# Patient Record
Sex: FEMALE | Race: WHITE | NOT HISPANIC OR LATINO
[De-identification: names, ages, dates, MRNs, and addresses within clinical notes are randomized per-mention and may not be internally consistent; named-entity substitution may affect disease eponyms.]

---

## 2016-06-06 RX ORDER — METOPROLOL TARTRATE 50 MG
2 TABLET ORAL
Qty: 60 | Refills: 0 | COMMUNITY
Start: 2016-06-06 | End: 2016-07-06

## 2017-01-03 ENCOUNTER — APPOINTMENT (OUTPATIENT)
Dept: ENDOCRINOLOGY | Facility: CLINIC | Age: 72
End: 2017-01-03

## 2017-01-04 ENCOUNTER — TRANSCRIPTION ENCOUNTER (OUTPATIENT)
Age: 72
End: 2017-01-04

## 2017-01-04 ENCOUNTER — OTHER (OUTPATIENT)
Age: 72
End: 2017-01-04

## 2017-01-11 ENCOUNTER — OTHER (OUTPATIENT)
Age: 72
End: 2017-01-11

## 2017-01-18 ENCOUNTER — OTHER (OUTPATIENT)
Age: 72
End: 2017-01-18

## 2017-01-24 ENCOUNTER — OTHER (OUTPATIENT)
Age: 72
End: 2017-01-24

## 2017-01-25 ENCOUNTER — OTHER (OUTPATIENT)
Age: 72
End: 2017-01-25

## 2017-01-25 ENCOUNTER — APPOINTMENT (OUTPATIENT)
Dept: CARDIOLOGY | Facility: CLINIC | Age: 72
End: 2017-01-25

## 2017-01-25 ENCOUNTER — NON-APPOINTMENT (OUTPATIENT)
Age: 72
End: 2017-01-25

## 2017-01-25 VITALS
SYSTOLIC BLOOD PRESSURE: 116 MMHG | OXYGEN SATURATION: 93 % | HEART RATE: 104 BPM | HEIGHT: 63 IN | BODY MASS INDEX: 24.98 KG/M2 | DIASTOLIC BLOOD PRESSURE: 60 MMHG | RESPIRATION RATE: 16 BRPM | WEIGHT: 141 LBS

## 2017-01-27 ENCOUNTER — MEDICATION RENEWAL (OUTPATIENT)
Age: 72
End: 2017-01-27

## 2017-02-01 LAB
ALBUMIN SERPL ELPH-MCNC: 3.6 G/DL
ALP BLD-CCNC: 60 U/L
ALT SERPL-CCNC: 5 U/L
ANION GAP SERPL CALC-SCNC: 13 MMOL/L
AST SERPL-CCNC: 13 U/L
BILIRUB SERPL-MCNC: 0.6 MG/DL
BUN SERPL-MCNC: 34 MG/DL
CALCIUM SERPL-MCNC: 9.4 MG/DL
CHLORIDE SERPL-SCNC: 100 MMOL/L
CO2 SERPL-SCNC: 28 MMOL/L
CREAT SERPL-MCNC: 1.75 MG/DL
GLUCOSE SERPL-MCNC: 96 MG/DL
HBA1C MFR BLD HPLC: 6 %
POTASSIUM SERPL-SCNC: 4.2 MMOL/L
PROT SERPL-MCNC: 6.3 G/DL
SODIUM SERPL-SCNC: 141 MMOL/L
TSH SERPL-ACNC: 0.23 UIU/ML

## 2017-02-07 ENCOUNTER — OTHER (OUTPATIENT)
Age: 72
End: 2017-02-07

## 2017-02-24 ENCOUNTER — OTHER (OUTPATIENT)
Age: 72
End: 2017-02-24

## 2017-03-06 ENCOUNTER — OTHER (OUTPATIENT)
Age: 72
End: 2017-03-06

## 2017-03-21 ENCOUNTER — OTHER (OUTPATIENT)
Age: 72
End: 2017-03-21

## 2017-03-31 ENCOUNTER — OTHER (OUTPATIENT)
Age: 72
End: 2017-03-31

## 2017-04-10 ENCOUNTER — OTHER (OUTPATIENT)
Age: 72
End: 2017-04-10

## 2017-04-21 ENCOUNTER — INPATIENT (INPATIENT)
Facility: HOSPITAL | Age: 72
LOS: 20 days | Discharge: HOME | DRG: 286 | End: 2017-05-12
Attending: INTERNAL MEDICINE | Admitting: INTERNAL MEDICINE
Payer: COMMERCIAL

## 2017-04-21 VITALS
SYSTOLIC BLOOD PRESSURE: 150 MMHG | DIASTOLIC BLOOD PRESSURE: 71 MMHG | HEART RATE: 68 BPM | RESPIRATION RATE: 20 BRPM | TEMPERATURE: 98 F | OXYGEN SATURATION: 99 %

## 2017-04-21 LAB
ALBUMIN SERPL ELPH-MCNC: 4.1 G/DL — SIGNIFICANT CHANGE UP (ref 3.3–5)
ALP SERPL-CCNC: 61 U/L — SIGNIFICANT CHANGE UP (ref 40–120)
ALT FLD-CCNC: 7 U/L RC — LOW (ref 10–45)
ANION GAP SERPL CALC-SCNC: 16 MMOL/L — SIGNIFICANT CHANGE UP (ref 5–17)
APTT BLD: 27.4 SEC — LOW (ref 27.5–37.4)
AST SERPL-CCNC: 16 U/L — SIGNIFICANT CHANGE UP (ref 10–40)
BASOPHILS # BLD AUTO: 0 K/UL — SIGNIFICANT CHANGE UP (ref 0–0.2)
BASOPHILS NFR BLD AUTO: 0.2 % — SIGNIFICANT CHANGE UP (ref 0–2)
BILIRUB SERPL-MCNC: 0.8 MG/DL — SIGNIFICANT CHANGE UP (ref 0.2–1.2)
BUN SERPL-MCNC: 50 MG/DL — HIGH (ref 7–23)
CALCIUM SERPL-MCNC: 9.5 MG/DL — SIGNIFICANT CHANGE UP (ref 8.4–10.5)
CHLORIDE SERPL-SCNC: 99 MMOL/L — SIGNIFICANT CHANGE UP (ref 96–108)
CO2 SERPL-SCNC: 28 MMOL/L — SIGNIFICANT CHANGE UP (ref 22–31)
CREAT SERPL-MCNC: 1.47 MG/DL — HIGH (ref 0.5–1.3)
EOSINOPHIL # BLD AUTO: 0 K/UL — SIGNIFICANT CHANGE UP (ref 0–0.5)
EOSINOPHIL NFR BLD AUTO: 0.3 % — SIGNIFICANT CHANGE UP (ref 0–6)
GLUCOSE SERPL-MCNC: 109 MG/DL — HIGH (ref 70–99)
HCT VFR BLD CALC: 35 % — SIGNIFICANT CHANGE UP (ref 34.5–45)
HGB BLD-MCNC: 11.3 G/DL — LOW (ref 11.5–15.5)
INR BLD: 1.14 RATIO — SIGNIFICANT CHANGE UP (ref 0.88–1.16)
LYMPHOCYTES # BLD AUTO: 0.3 K/UL — LOW (ref 1–3.3)
LYMPHOCYTES # BLD AUTO: 4.4 % — LOW (ref 13–44)
MCHC RBC-ENTMCNC: 27 PG — SIGNIFICANT CHANGE UP (ref 27–34)
MCHC RBC-ENTMCNC: 32.2 GM/DL — SIGNIFICANT CHANGE UP (ref 32–36)
MCV RBC AUTO: 83.9 FL — SIGNIFICANT CHANGE UP (ref 80–100)
MONOCYTES # BLD AUTO: 0.5 K/UL — SIGNIFICANT CHANGE UP (ref 0–0.9)
MONOCYTES NFR BLD AUTO: 6.1 % — SIGNIFICANT CHANGE UP (ref 2–14)
NEUTROPHILS # BLD AUTO: 6.8 K/UL — SIGNIFICANT CHANGE UP (ref 1.8–7.4)
NEUTROPHILS NFR BLD AUTO: 89 % — HIGH (ref 43–77)
PLATELET # BLD AUTO: 161 K/UL — SIGNIFICANT CHANGE UP (ref 150–400)
POTASSIUM SERPL-MCNC: 3.9 MMOL/L — SIGNIFICANT CHANGE UP (ref 3.5–5.3)
POTASSIUM SERPL-SCNC: 3.9 MMOL/L — SIGNIFICANT CHANGE UP (ref 3.5–5.3)
PROT SERPL-MCNC: 6.7 G/DL — SIGNIFICANT CHANGE UP (ref 6–8.3)
PROTHROM AB SERPL-ACNC: 12.3 SEC — SIGNIFICANT CHANGE UP (ref 9.8–12.7)
RBC # BLD: 4.17 M/UL — SIGNIFICANT CHANGE UP (ref 3.8–5.2)
RBC # FLD: 19 % — HIGH (ref 10.3–14.5)
SODIUM SERPL-SCNC: 143 MMOL/L — SIGNIFICANT CHANGE UP (ref 135–145)
TROPONIN T SERPL-MCNC: 0.02 NG/ML — SIGNIFICANT CHANGE UP (ref 0–0.06)
WBC # BLD: 7.6 K/UL — SIGNIFICANT CHANGE UP (ref 3.8–10.5)
WBC # FLD AUTO: 7.6 K/UL — SIGNIFICANT CHANGE UP (ref 3.8–10.5)

## 2017-04-21 PROCEDURE — 99285 EMERGENCY DEPT VISIT HI MDM: CPT | Mod: 25

## 2017-04-21 PROCEDURE — 71010: CPT | Mod: 26

## 2017-04-21 PROCEDURE — 93010 ELECTROCARDIOGRAM REPORT: CPT

## 2017-04-21 RX ORDER — ALBUTEROL 90 UG/1
2.5 AEROSOL, METERED ORAL ONCE
Qty: 0 | Refills: 0 | Status: COMPLETED | OUTPATIENT
Start: 2017-04-21 | End: 2017-04-21

## 2017-04-21 RX ORDER — FUROSEMIDE 40 MG
40 TABLET ORAL ONCE
Qty: 0 | Refills: 0 | Status: COMPLETED | OUTPATIENT
Start: 2017-04-21 | End: 2017-04-21

## 2017-04-21 RX ORDER — SODIUM CHLORIDE 9 MG/ML
3 INJECTION INTRAMUSCULAR; INTRAVENOUS; SUBCUTANEOUS ONCE
Qty: 0 | Refills: 0 | Status: COMPLETED | OUTPATIENT
Start: 2017-04-21 | End: 2017-04-21

## 2017-04-21 RX ADMIN — Medication 40 MILLIGRAM(S): at 22:25

## 2017-04-21 RX ADMIN — SODIUM CHLORIDE 3 MILLILITER(S): 9 INJECTION INTRAMUSCULAR; INTRAVENOUS; SUBCUTANEOUS at 22:31

## 2017-04-21 RX ADMIN — ALBUTEROL 2.5 MILLIGRAM(S): 90 AEROSOL, METERED ORAL at 22:25

## 2017-04-21 NOTE — ED PROVIDER NOTE - PROGRESS NOTE DETAILS
Called Dr. Pierce Cobb Pt feels improved but desat when nrb change to nasal o2 refused Bipap. Risks understood  Lonnie Gerard MD, Facep Paged Dr. Pierce Cobb

## 2017-04-21 NOTE — ED ADULT NURSE NOTE - OBJECTIVE STATEMENT
70 y/o female presented to the ED c/o worsening SOB x 1 week. Pt utilizes O2 at home had to increase to 5Liters. Pt arrived to ED O2sat in 70's. Pt has hx of left carcinoid tumor. Pt denies fever, CP, N/V. Pt is Aa&Ox3, PERRL, lungs CTA B/L, abd. soft, NT, ND, skin W/D/I, MAEx4.

## 2017-04-21 NOTE — ED PROVIDER NOTE - OBJECTIVE STATEMENT
71 year old male patient with pmhx of HTN, HLD, T2DM, lung cancer s/p JUDY lobectomy, copd, pulmonary HTN, CAD s/p stents, Afib, CKD presents to the ED c/o intermittent shortness of breath x1 week with nonproductive cough. Patient is on O2 at home and was using up to 5L today. Was desating into the 80s at home.  Denies fever and chest pain. Former smoker.  Pulmonology: Dr. Lopez  PMD: Dr. Du Méndez

## 2017-04-21 NOTE — ED PROVIDER NOTE - DETAILS:
History and Physical performed by me with scribe under my direct supervision.  ITALO Gerard MD FACEP

## 2017-04-21 NOTE — ED PROVIDER NOTE - NS ED MD SCRIBE ATTENDING SCRIBE SECTIONS
VITAL SIGNS( Pullset)/REVIEW OF SYSTEMS/DISPOSITION/HIV/PAST MEDICAL/SURGICAL/SOCIAL HISTORY/HISTORY OF PRESENT ILLNESS/PHYSICAL EXAM

## 2017-04-21 NOTE — ED ADULT NURSE NOTE - PMH
Afib    Arthritis of Knee    Asthma    CAD S/P percutaneous coronary angioplasty    Cardiomegaly    Constipation    COPD (chronic obstructive pulmonary disease)  Home O2  Diabetes mellitus  type 2  dx about 4-5 years ago   no daily fingerstick  Enlarged lymph nodes    Epistaxis    Female stress incontinence    GERD (gastroesophageal reflux disease)    GIB (gastrointestinal bleeding)    H/O heartburn    H/O: HTN (hypertension)    History of lung cancer    Hx of hyperlipidemia    Hyperthyroidism    Loose, teeth  3 bottom front loose teeth  Obese    Obesity    JOSE (obstructive sleep apnea)    Pulmonary HTN    Shoulder pain, right    Sleep Apnea  by criteria  Valvular heart disease

## 2017-04-21 NOTE — ED PROVIDER NOTE - MEDICAL DECISION MAKING DETAILS
Elderly female with history of COPD, afib comes in with shortness of breath. Concern for ACS, CHF, COPD exacerbation. Treat with high flow oxygen, duonebs, and diuretics. Probable admission.

## 2017-04-21 NOTE — ED ADULT NURSE REASSESSMENT NOTE - NS ED NURSE REASSESS COMMENT FT1
Pt refused RVP test, MD aware. Pt stable, no distress noted, family at bedside, commode provided, will continue to monitor.

## 2017-04-22 DIAGNOSIS — R06.09 OTHER FORMS OF DYSPNEA: ICD-10-CM

## 2017-04-22 DIAGNOSIS — E05.90 THYROTOXICOSIS, UNSPECIFIED WITHOUT THYROTOXIC CRISIS OR STORM: ICD-10-CM

## 2017-04-22 DIAGNOSIS — I50.9 HEART FAILURE, UNSPECIFIED: ICD-10-CM

## 2017-04-22 DIAGNOSIS — R04.0 EPISTAXIS: ICD-10-CM

## 2017-04-22 DIAGNOSIS — I48.91 UNSPECIFIED ATRIAL FIBRILLATION: ICD-10-CM

## 2017-04-22 DIAGNOSIS — E11.9 TYPE 2 DIABETES MELLITUS WITHOUT COMPLICATIONS: ICD-10-CM

## 2017-04-22 DIAGNOSIS — J44.9 CHRONIC OBSTRUCTIVE PULMONARY DISEASE, UNSPECIFIED: ICD-10-CM

## 2017-04-22 LAB
ANION GAP SERPL CALC-SCNC: 12 MMOL/L — SIGNIFICANT CHANGE UP (ref 5–17)
BUN SERPL-MCNC: 47 MG/DL — HIGH (ref 7–23)
CALCIUM SERPL-MCNC: 9.8 MG/DL — SIGNIFICANT CHANGE UP (ref 8.4–10.5)
CHLORIDE SERPL-SCNC: 102 MMOL/L — SIGNIFICANT CHANGE UP (ref 96–108)
CO2 SERPL-SCNC: 33 MMOL/L — HIGH (ref 22–31)
CREAT SERPL-MCNC: 1.55 MG/DL — HIGH (ref 0.5–1.3)
GLUCOSE SERPL-MCNC: 108 MG/DL — HIGH (ref 70–99)
HCT VFR BLD CALC: 33.8 % — LOW (ref 34.5–45)
HGB BLD-MCNC: 10.8 G/DL — LOW (ref 11.5–15.5)
MCHC RBC-ENTMCNC: 26.7 PG — LOW (ref 27–34)
MCHC RBC-ENTMCNC: 31.9 GM/DL — LOW (ref 32–36)
MCV RBC AUTO: 83.6 FL — SIGNIFICANT CHANGE UP (ref 80–100)
PLATELET # BLD AUTO: 171 K/UL — SIGNIFICANT CHANGE UP (ref 150–400)
POTASSIUM SERPL-MCNC: 4.6 MMOL/L — SIGNIFICANT CHANGE UP (ref 3.5–5.3)
POTASSIUM SERPL-SCNC: 4.6 MMOL/L — SIGNIFICANT CHANGE UP (ref 3.5–5.3)
RBC # BLD: 4.05 M/UL — SIGNIFICANT CHANGE UP (ref 3.8–5.2)
RBC # FLD: 18.7 % — HIGH (ref 10.3–14.5)
SODIUM SERPL-SCNC: 147 MMOL/L — HIGH (ref 135–145)
TROPONIN T SERPL-MCNC: 0.03 NG/ML — SIGNIFICANT CHANGE UP (ref 0–0.06)
WBC # BLD: 8 K/UL — SIGNIFICANT CHANGE UP (ref 3.8–10.5)
WBC # FLD AUTO: 8 K/UL — SIGNIFICANT CHANGE UP (ref 3.8–10.5)

## 2017-04-22 PROCEDURE — 99223 1ST HOSP IP/OBS HIGH 75: CPT

## 2017-04-22 RX ORDER — SODIUM CHLORIDE 9 MG/ML
1000 INJECTION, SOLUTION INTRAVENOUS
Qty: 0 | Refills: 0 | Status: DISCONTINUED | OUTPATIENT
Start: 2017-04-22 | End: 2017-05-12

## 2017-04-22 RX ORDER — DILTIAZEM HCL 120 MG
240 CAPSULE, EXT RELEASE 24 HR ORAL DAILY
Qty: 0 | Refills: 0 | Status: DISCONTINUED | OUTPATIENT
Start: 2017-04-22 | End: 2017-04-27

## 2017-04-22 RX ORDER — CLOPIDOGREL BISULFATE 75 MG/1
75 TABLET, FILM COATED ORAL DAILY
Qty: 0 | Refills: 0 | Status: DISCONTINUED | OUTPATIENT
Start: 2017-04-22 | End: 2017-05-12

## 2017-04-22 RX ORDER — DIGOXIN 250 MCG
0.12 TABLET ORAL
Qty: 0 | Refills: 0 | Status: DISCONTINUED | OUTPATIENT
Start: 2017-04-22 | End: 2017-04-27

## 2017-04-22 RX ORDER — INSULIN LISPRO 100/ML
VIAL (ML) SUBCUTANEOUS
Qty: 0 | Refills: 0 | Status: DISCONTINUED | OUTPATIENT
Start: 2017-04-22 | End: 2017-05-12

## 2017-04-22 RX ORDER — POTASSIUM CHLORIDE 20 MEQ
10 PACKET (EA) ORAL
Qty: 0 | Refills: 0 | Status: DISCONTINUED | OUTPATIENT
Start: 2017-04-22 | End: 2017-04-26

## 2017-04-22 RX ORDER — FUROSEMIDE 40 MG
40 TABLET ORAL ONCE
Qty: 0 | Refills: 0 | Status: COMPLETED | OUTPATIENT
Start: 2017-04-22 | End: 2017-04-22

## 2017-04-22 RX ORDER — IPRATROPIUM BROMIDE 0.2 MG/ML
500 SOLUTION, NON-ORAL INHALATION EVERY 6 HOURS
Qty: 0 | Refills: 0 | Status: DISCONTINUED | OUTPATIENT
Start: 2017-04-22 | End: 2017-05-12

## 2017-04-22 RX ORDER — GLUCAGON INJECTION, SOLUTION 0.5 MG/.1ML
1 INJECTION, SOLUTION SUBCUTANEOUS ONCE
Qty: 0 | Refills: 0 | Status: DISCONTINUED | OUTPATIENT
Start: 2017-04-22 | End: 2017-05-12

## 2017-04-22 RX ORDER — HEPARIN SODIUM 5000 [USP'U]/ML
5000 INJECTION INTRAVENOUS; SUBCUTANEOUS EVERY 12 HOURS
Qty: 0 | Refills: 0 | Status: DISCONTINUED | OUTPATIENT
Start: 2017-04-22 | End: 2017-05-12

## 2017-04-22 RX ORDER — DEXTROSE 50 % IN WATER 50 %
25 SYRINGE (ML) INTRAVENOUS ONCE
Qty: 0 | Refills: 0 | Status: DISCONTINUED | OUTPATIENT
Start: 2017-04-22 | End: 2017-05-12

## 2017-04-22 RX ORDER — ACETAMINOPHEN 500 MG
650 TABLET ORAL EVERY 6 HOURS
Qty: 0 | Refills: 0 | Status: DISCONTINUED | OUTPATIENT
Start: 2017-04-22 | End: 2017-05-12

## 2017-04-22 RX ORDER — FUROSEMIDE 40 MG
40 TABLET ORAL
Qty: 0 | Refills: 0 | Status: DISCONTINUED | OUTPATIENT
Start: 2017-04-23 | End: 2017-04-27

## 2017-04-22 RX ORDER — LEVALBUTEROL 1.25 MG/.5ML
0.63 SOLUTION, CONCENTRATE RESPIRATORY (INHALATION) EVERY 6 HOURS
Qty: 0 | Refills: 0 | Status: DISCONTINUED | OUTPATIENT
Start: 2017-04-22 | End: 2017-05-03

## 2017-04-22 RX ORDER — METOPROLOL TARTRATE 50 MG
50 TABLET ORAL THREE TIMES A DAY
Qty: 0 | Refills: 0 | Status: DISCONTINUED | OUTPATIENT
Start: 2017-04-22 | End: 2017-04-26

## 2017-04-22 RX ORDER — SODIUM CHLORIDE 0.65 %
1 AEROSOL, SPRAY (ML) NASAL
Qty: 0 | Refills: 0 | Status: DISCONTINUED | OUTPATIENT
Start: 2017-04-22 | End: 2017-04-26

## 2017-04-22 RX ORDER — DOCUSATE SODIUM 100 MG
100 CAPSULE ORAL
Qty: 0 | Refills: 0 | Status: DISCONTINUED | OUTPATIENT
Start: 2017-04-22 | End: 2017-05-12

## 2017-04-22 RX ORDER — ATORVASTATIN CALCIUM 80 MG/1
20 TABLET, FILM COATED ORAL AT BEDTIME
Qty: 0 | Refills: 0 | Status: DISCONTINUED | OUTPATIENT
Start: 2017-04-22 | End: 2017-05-12

## 2017-04-22 RX ORDER — LORATADINE 10 MG/1
10 TABLET ORAL DAILY
Qty: 0 | Refills: 0 | Status: DISCONTINUED | OUTPATIENT
Start: 2017-04-22 | End: 2017-05-12

## 2017-04-22 RX ORDER — ASPIRIN/CALCIUM CARB/MAGNESIUM 324 MG
81 TABLET ORAL DAILY
Qty: 0 | Refills: 0 | Status: DISCONTINUED | OUTPATIENT
Start: 2017-04-22 | End: 2017-05-12

## 2017-04-22 RX ORDER — INSULIN LISPRO 100/ML
VIAL (ML) SUBCUTANEOUS AT BEDTIME
Qty: 0 | Refills: 0 | Status: DISCONTINUED | OUTPATIENT
Start: 2017-04-22 | End: 2017-05-12

## 2017-04-22 RX ORDER — DEXTROSE 50 % IN WATER 50 %
12.5 SYRINGE (ML) INTRAVENOUS ONCE
Qty: 0 | Refills: 0 | Status: DISCONTINUED | OUTPATIENT
Start: 2017-04-22 | End: 2017-05-12

## 2017-04-22 RX ORDER — DEXTROSE 50 % IN WATER 50 %
1 SYRINGE (ML) INTRAVENOUS ONCE
Qty: 0 | Refills: 0 | Status: DISCONTINUED | OUTPATIENT
Start: 2017-04-22 | End: 2017-05-12

## 2017-04-22 RX ADMIN — Medication 240 MILLIGRAM(S): at 12:23

## 2017-04-22 RX ADMIN — Medication 50 MILLIGRAM(S): at 12:23

## 2017-04-22 RX ADMIN — Medication 50 MILLIGRAM(S): at 22:40

## 2017-04-22 RX ADMIN — CLOPIDOGREL BISULFATE 75 MILLIGRAM(S): 75 TABLET, FILM COATED ORAL at 12:23

## 2017-04-22 RX ADMIN — Medication 5 MILLIGRAM(S): at 12:23

## 2017-04-22 RX ADMIN — HEPARIN SODIUM 5000 UNIT(S): 5000 INJECTION INTRAVENOUS; SUBCUTANEOUS at 22:40

## 2017-04-22 RX ADMIN — ATORVASTATIN CALCIUM 20 MILLIGRAM(S): 80 TABLET, FILM COATED ORAL at 22:40

## 2017-04-22 RX ADMIN — Medication 81 MILLIGRAM(S): at 12:23

## 2017-04-22 RX ADMIN — Medication 40 MILLIGRAM(S): at 17:13

## 2017-04-22 NOTE — H&P ADULT. - ASSESSMENT
70 y/o f with PMH HTN, HLD, CAD s/p stents, DM II, lung cancer s/p JUDY lobectomy, COPD on 5L home O2, pulmonary HTN, AFib not on ac due to nasal hemorrhage, CKD III p/w worsening BRO and SOB.

## 2017-04-22 NOTE — H&P ADULT. - HISTORY OF PRESENT ILLNESS
70 y/o f with PMH HTN, HLD, CAD s/p stents, DM II, lung cancer s/p JUDY lobectomy, COPD on 5L home O2, pulmonary HTN, AFib not on ac due to nasal hemorrhage, CKD III p/w worsening SOB and dizziness.  History obtained from patient and daughter at bedside.  Uses 5L home O2 at home and always has some BRO, usually sats in high 80s and desats with movement however has been feeling more SOB for past few days prior to admission.  Denies any cough, chest pain, fevers, chills, sick contacts, LE edema, worsening orthopnea.  Also with dizziness, when looks up or moving and sometimes when going from sitting to standing worse over past few days, no vertigo.  Daughter has noticed sats dropping as low as 70s and also that she cannot move even 3 steps without getting very SOB and desaturating.  Received lasix in ED and urinated well, feels somewhat better.  Otherwise has been having nose bleeds intermittently with lots of clots, last 1 week prior to admission.

## 2017-04-22 NOTE — H&P ADULT. - PROBLEM SELECTOR PLAN 5
continue home prednisone, rofluimlast, inhalers continue home prednisone, rofluimlast, inhalers- patient to bring in nonformulary medications from home

## 2017-04-22 NOTE — H&P ADULT. - PROBLEM SELECTOR PLAN 3
patient reports epistaxis on and off for past few weeks  humidified O2, nasal saline, afrin prn   ENT f/u inpatient vs. outpatient as per primary

## 2017-04-22 NOTE — H&P ADULT. - OTHER
Reviewed previous TTE 10/16- EF 75-80%, severe MV calcification, minimal MR, small pericardial effusion, moderate pulm HTN

## 2017-04-22 NOTE — H&P ADULT. - PROBLEM SELECTOR PLAN 1
patient with severe COPD as well as known CHF, pulmonary HTN presenting with worsening SOB, although lack of cough/sputum + pleural effusions on CXR as well as patient reported improvement after lasix suggests acute on chronic diastolic CHF exacerbation. troponins negative x 2  -check pro-BNP  -repeat TTE to evaluate pulm HTN, valvular disease   -given elevated bicarb and hypernatremia will hold off on additional IV diuresis for now, will continue home torsemide, strict Is/Os, daily weights, cards consult - Dr. Méndez  -pulm consult Dr. Lopez  -continue all home COPD meds  -xopenex (jittery with albuterol), ipratroprium prn   -continue supplemental O2 PRN goal sat 88-92 patient with severe COPD as well as known CHF, pulmonary HTN presenting with worsening SOB, although lack of cough/sputum + pleural effusions on CXR as well as patient reported improvement after lasix suggests acute on chronic diastolic CHF exacerbation. troponins negative x 2  -check pro-BNP  -repeat TTE to evaluate pulm HTN, valvular disease   -continue diuresis with IV lasix, strict Is/Os, daily weights, cards consult - Dr. Méndez  -pulm consult Dr. Lopez  -continue all home COPD meds  -xopenex (jittery with albuterol), ipratroprium prn   -continue supplemental O2 PRN goal sat 88-92 patient with severe COPD as well as known CHF, pulmonary HTN presenting with worsening SOB, although lack of cough/sputum + pleural effusions on CXR as well as patient reported improvement after lasix suggests acute on chronic diastolic CHF exacerbation. troponins negative x 2  -check pro-BNP  -repeat TTE to evaluate pulm HTN, valvular disease   -continue diuresis with IV lasix, strict Is/Os, daily weights, cards consult  to be called by primary Dr. Méndez  -pulm consulted Dr. Lopez, f/u recs  -continue all home COPD meds  -xopenex (jittery with albuterol), ipratroprium prn   -continue supplemental O2 PRN goal sat 88-92

## 2017-04-23 LAB
ANION GAP SERPL CALC-SCNC: 15 MMOL/L — SIGNIFICANT CHANGE UP (ref 5–17)
BASOPHILS # BLD AUTO: 0.01 K/UL — SIGNIFICANT CHANGE UP (ref 0–0.2)
BASOPHILS NFR BLD AUTO: 0.2 % — SIGNIFICANT CHANGE UP (ref 0–2)
BUN SERPL-MCNC: 47 MG/DL — HIGH (ref 7–23)
CALCIUM SERPL-MCNC: 9.8 MG/DL — SIGNIFICANT CHANGE UP (ref 8.4–10.5)
CHLORIDE SERPL-SCNC: 103 MMOL/L — SIGNIFICANT CHANGE UP (ref 96–108)
CO2 SERPL-SCNC: 30 MMOL/L — SIGNIFICANT CHANGE UP (ref 22–31)
CREAT SERPL-MCNC: 1.61 MG/DL — HIGH (ref 0.5–1.3)
DIGOXIN SERPL-MCNC: 1.4 NG/ML — SIGNIFICANT CHANGE UP (ref 0.8–2)
EOSINOPHIL # BLD AUTO: 0.09 K/UL — SIGNIFICANT CHANGE UP (ref 0–0.5)
EOSINOPHIL NFR BLD AUTO: 1.6 % — SIGNIFICANT CHANGE UP (ref 0–6)
GLUCOSE SERPL-MCNC: 67 MG/DL — LOW (ref 70–99)
HBA1C BLD-MCNC: 5.8 % — HIGH (ref 4–5.6)
HCT VFR BLD CALC: 35.5 % — SIGNIFICANT CHANGE UP (ref 34.5–45)
HGB BLD-MCNC: 10.2 G/DL — LOW (ref 11.5–15.5)
IMM GRANULOCYTES NFR BLD AUTO: 0.2 % — SIGNIFICANT CHANGE UP (ref 0–1.5)
LYMPHOCYTES # BLD AUTO: 0.46 K/UL — LOW (ref 1–3.3)
LYMPHOCYTES # BLD AUTO: 8.2 % — LOW (ref 13–44)
MAGNESIUM SERPL-MCNC: 2.4 MG/DL — SIGNIFICANT CHANGE UP (ref 1.6–2.6)
MCHC RBC-ENTMCNC: 25.2 PG — LOW (ref 27–34)
MCHC RBC-ENTMCNC: 28.7 GM/DL — LOW (ref 32–36)
MCV RBC AUTO: 87.9 FL — SIGNIFICANT CHANGE UP (ref 80–100)
MONOCYTES # BLD AUTO: 0.45 K/UL — SIGNIFICANT CHANGE UP (ref 0–0.9)
MONOCYTES NFR BLD AUTO: 8 % — SIGNIFICANT CHANGE UP (ref 2–14)
NEUTROPHILS # BLD AUTO: 4.62 K/UL — SIGNIFICANT CHANGE UP (ref 1.8–7.4)
NEUTROPHILS NFR BLD AUTO: 81.8 % — HIGH (ref 43–77)
NT-PROBNP SERPL-SCNC: HIGH PG/ML (ref 0–300)
PHOSPHATE SERPL-MCNC: 4.2 MG/DL — SIGNIFICANT CHANGE UP (ref 2.5–4.5)
PLATELET # BLD AUTO: 179 K/UL — SIGNIFICANT CHANGE UP (ref 150–400)
POTASSIUM SERPL-MCNC: 5 MMOL/L — SIGNIFICANT CHANGE UP (ref 3.5–5.3)
POTASSIUM SERPL-SCNC: 5 MMOL/L — SIGNIFICANT CHANGE UP (ref 3.5–5.3)
RAPID RVP RESULT: SIGNIFICANT CHANGE UP
RBC # BLD: 4.04 M/UL — SIGNIFICANT CHANGE UP (ref 3.8–5.2)
RBC # FLD: 19.4 % — HIGH (ref 10.3–14.5)
SODIUM SERPL-SCNC: 148 MMOL/L — HIGH (ref 135–145)
TSH SERPL-MCNC: 1.23 UIU/ML — SIGNIFICANT CHANGE UP (ref 0.27–4.2)
WBC # BLD: 5.64 K/UL — SIGNIFICANT CHANGE UP (ref 3.8–10.5)
WBC # FLD AUTO: 5.64 K/UL — SIGNIFICANT CHANGE UP (ref 3.8–10.5)

## 2017-04-23 RX ADMIN — Medication 240 MILLIGRAM(S): at 07:05

## 2017-04-23 RX ADMIN — Medication 10 MILLIEQUIVALENT(S): at 18:50

## 2017-04-23 RX ADMIN — ATORVASTATIN CALCIUM 20 MILLIGRAM(S): 80 TABLET, FILM COATED ORAL at 21:52

## 2017-04-23 RX ADMIN — CLOPIDOGREL BISULFATE 75 MILLIGRAM(S): 75 TABLET, FILM COATED ORAL at 11:58

## 2017-04-23 RX ADMIN — Medication 81 MILLIGRAM(S): at 11:58

## 2017-04-23 RX ADMIN — LEVALBUTEROL 0.63 MILLIGRAM(S): 1.25 SOLUTION, CONCENTRATE RESPIRATORY (INHALATION) at 11:59

## 2017-04-23 RX ADMIN — Medication 50 MILLIGRAM(S): at 21:52

## 2017-04-23 RX ADMIN — Medication 500 MICROGRAM(S): at 11:59

## 2017-04-23 RX ADMIN — HEPARIN SODIUM 5000 UNIT(S): 5000 INJECTION INTRAVENOUS; SUBCUTANEOUS at 07:05

## 2017-04-23 RX ADMIN — Medication 40 MILLIGRAM(S): at 07:05

## 2017-04-23 RX ADMIN — Medication 5 MILLIGRAM(S): at 07:05

## 2017-04-23 RX ADMIN — Medication 50 MILLIGRAM(S): at 07:05

## 2017-04-23 RX ADMIN — Medication 20 MILLIGRAM(S): at 21:51

## 2017-04-23 RX ADMIN — HEPARIN SODIUM 5000 UNIT(S): 5000 INJECTION INTRAVENOUS; SUBCUTANEOUS at 18:49

## 2017-04-23 RX ADMIN — Medication 10 MILLIEQUIVALENT(S): at 07:05

## 2017-04-23 RX ADMIN — Medication 40 MILLIGRAM(S): at 18:49

## 2017-04-24 LAB
ANION GAP SERPL CALC-SCNC: 17 MMOL/L — SIGNIFICANT CHANGE UP (ref 5–17)
BUN SERPL-MCNC: 57 MG/DL — HIGH (ref 7–23)
CALCIUM SERPL-MCNC: 9.4 MG/DL — SIGNIFICANT CHANGE UP (ref 8.4–10.5)
CHLORIDE SERPL-SCNC: 97 MMOL/L — SIGNIFICANT CHANGE UP (ref 96–108)
CO2 SERPL-SCNC: 29 MMOL/L — SIGNIFICANT CHANGE UP (ref 22–31)
CREAT SERPL-MCNC: 1.65 MG/DL — HIGH (ref 0.5–1.3)
GLUCOSE SERPL-MCNC: 148 MG/DL — HIGH (ref 70–99)
HCT VFR BLD CALC: 33.6 % — LOW (ref 34.5–45)
HGB BLD-MCNC: 10.6 G/DL — LOW (ref 11.5–15.5)
MCHC RBC-ENTMCNC: 26.6 PG — LOW (ref 27–34)
MCHC RBC-ENTMCNC: 31.7 GM/DL — LOW (ref 32–36)
MCV RBC AUTO: 83.9 FL — SIGNIFICANT CHANGE UP (ref 80–100)
PLATELET # BLD AUTO: 164 K/UL — SIGNIFICANT CHANGE UP (ref 150–400)
POTASSIUM SERPL-MCNC: 4.8 MMOL/L — SIGNIFICANT CHANGE UP (ref 3.5–5.3)
POTASSIUM SERPL-SCNC: 4.8 MMOL/L — SIGNIFICANT CHANGE UP (ref 3.5–5.3)
RBC # BLD: 4.01 M/UL — SIGNIFICANT CHANGE UP (ref 3.8–5.2)
RBC # FLD: 18.4 % — HIGH (ref 10.3–14.5)
SODIUM SERPL-SCNC: 143 MMOL/L — SIGNIFICANT CHANGE UP (ref 135–145)
WBC # BLD: 4.3 K/UL — SIGNIFICANT CHANGE UP (ref 3.8–10.5)
WBC # FLD AUTO: 4.3 K/UL — SIGNIFICANT CHANGE UP (ref 3.8–10.5)

## 2017-04-24 PROCEDURE — 99222 1ST HOSP IP/OBS MODERATE 55: CPT

## 2017-04-24 PROCEDURE — 71010: CPT | Mod: 26

## 2017-04-24 RX ORDER — TIOTROPIUM BROMIDE AND OLODATEROL 3.124; 2.736 UG/1; UG/1
2 SPRAY, METERED RESPIRATORY (INHALATION) DAILY
Qty: 0 | Refills: 0 | Status: DISCONTINUED | OUTPATIENT
Start: 2017-04-24 | End: 2017-05-12

## 2017-04-24 RX ORDER — ROFLUMILAST 500 UG/1
500 TABLET ORAL DAILY
Qty: 0 | Refills: 0 | Status: DISCONTINUED | OUTPATIENT
Start: 2017-04-24 | End: 2017-05-12

## 2017-04-24 RX ADMIN — TIOTROPIUM BROMIDE AND OLODATEROL 2 PUFF(S): 3.124; 2.736 SPRAY, METERED RESPIRATORY (INHALATION) at 14:30

## 2017-04-24 RX ADMIN — Medication 5 MILLIGRAM(S): at 06:51

## 2017-04-24 RX ADMIN — CLOPIDOGREL BISULFATE 75 MILLIGRAM(S): 75 TABLET, FILM COATED ORAL at 13:46

## 2017-04-24 RX ADMIN — Medication 20 MILLIGRAM(S): at 21:30

## 2017-04-24 RX ADMIN — Medication 2: at 17:26

## 2017-04-24 RX ADMIN — HEPARIN SODIUM 5000 UNIT(S): 5000 INJECTION INTRAVENOUS; SUBCUTANEOUS at 17:21

## 2017-04-24 RX ADMIN — Medication 40 MILLIGRAM(S): at 17:22

## 2017-04-24 RX ADMIN — Medication 50 MILLIGRAM(S): at 06:51

## 2017-04-24 RX ADMIN — Medication 10 MILLIEQUIVALENT(S): at 17:24

## 2017-04-24 RX ADMIN — ROFLUMILAST 500 MICROGRAM(S): 500 TABLET ORAL at 14:32

## 2017-04-24 RX ADMIN — Medication 50 MILLIGRAM(S): at 13:51

## 2017-04-24 RX ADMIN — Medication 240 MILLIGRAM(S): at 06:50

## 2017-04-24 RX ADMIN — Medication 10 MILLIEQUIVALENT(S): at 06:51

## 2017-04-24 RX ADMIN — Medication 81 MILLIGRAM(S): at 13:44

## 2017-04-24 RX ADMIN — Medication 40 MILLIGRAM(S): at 06:50

## 2017-04-24 RX ADMIN — LEVALBUTEROL 0.63 MILLIGRAM(S): 1.25 SOLUTION, CONCENTRATE RESPIRATORY (INHALATION) at 13:47

## 2017-04-24 RX ADMIN — Medication 20 MILLIGRAM(S): at 13:49

## 2017-04-24 RX ADMIN — Medication 1: at 13:42

## 2017-04-24 RX ADMIN — ATORVASTATIN CALCIUM 20 MILLIGRAM(S): 80 TABLET, FILM COATED ORAL at 21:30

## 2017-04-24 RX ADMIN — Medication 20 MILLIGRAM(S): at 06:50

## 2017-04-24 RX ADMIN — HEPARIN SODIUM 5000 UNIT(S): 5000 INJECTION INTRAVENOUS; SUBCUTANEOUS at 06:50

## 2017-04-24 RX ADMIN — Medication 50 MILLIGRAM(S): at 21:30

## 2017-04-24 NOTE — PHYSICAL THERAPY INITIAL EVALUATION ADULT - PERTINENT HX OF CURRENT PROBLEM, REHAB EVAL
CXR 4/21/17 bilateral pleural effusions L> R , bibasilar opactiies; EKG SR w/ primary AVB w/frequent and consequetive PVC's and fusion complexes ,R Superior axis deviation , RVH , anteroseptal infarct age undetermined; await TTE

## 2017-04-24 NOTE — PHYSICAL THERAPY INITIAL EVALUATION ADULT - GENERAL OBSERVATIONS, REHAB EVAL
pt received standing up at sink in room on 6 L nc O2 humidified air pulse ox 88%  nad mild SOB with exertion ;pt agreeable for PT EVAL

## 2017-04-24 NOTE — PHYSICAL THERAPY INITIAL EVALUATION ADULT - ADDITIONAL COMMENTS
pt lives with spouse   on 5L nc O2 at home   Has working glucometer and supplies , declines identifying caregiver pt lives in house  with spouse with 3-4 steps with rail to enter then everything on one level ;   on 3-4 L nc O2 at home continuous past few yrs (started with nc O2 back In 2008 but prn back then )   Has working glucometer and supplies , declines identifying caregiver; no problems bathing and dressing self

## 2017-04-24 NOTE — PHYSICAL THERAPY INITIAL EVALUATION ADULT - PRECAUTIONS/LIMITATIONS, REHAB EVAL
72 y/o f with PMH HTN, HLD, CAD s/p stents, DM II, lung cancer s/p JUDY lobectomy, COPD on 5L home O2, pulmonary HTN, AFib not on ac due to nasal hemorrhage, CKD III p/w worsening SOB and dizziness.  History obtained from patient and daughter at bedside.  Uses 5L home O2 at home and always has some BRO, usually sats in high 80s and desats with movement however has been feeling more SOB for past few days prior to admission.  Denies any cough, chest pain, fevers, chills, sick contacts, LE edema, worsening orthopnea.  Also with dizziness, when looks up or moving and sometimes when going from sitting to standing worse over past few days, no vertigo.  Daughter has noticed sats dropping as low as 70s and also that she cannot move even 3 steps without getting very SOB and desaturating.  Received lasix in ED and urinated well, feels somewhat better.  Otherwise has been having nose bleeds intermittently with lots of clots, last 1 week prior to admission./oxygen therapy device and L/min 72 y/o f with PMH HTN, HLD, CAD s/p stents, DM II, lung cancer s/p JUDY lobectomy, COPD on 5L home O2, pulmonary HTN, AFib not on ac due to nasal hemorrhage, CKD III p/w worsening SOB and dizziness.  History obtained from patient and daughter at bedside.  Uses 5L home O2 at home and always has some BRO, usually sats in high 80s and desats with movement however has been feeling more SOB for past few days prior to admission.  Denies any cough, chest pain, fevers, chills, sick contacts, LE edema, worsening orthopnea.  Also with dizziness, when looks up or moving and sometimes when going from sitting to standing worse over past few days, no vertigo.  Daughter has noticed sats dropping as low as 70s and also that she cannot move even 3 steps without getting very SOB and desaturating.  Received lasix in ED and urinated well, feels somewhat better.  Otherwise has been having nose bleeds intermittently with lots of clots, last 1 week prior to admission./oxygen therapy device and L/min/fall precautions 70 y/o f with PMH HTN, HLD, CAD s/p stents, DM II, lung cancer s/p JUDY lobectomy, COPD on 5L home O2, pulmonary HTN, AFib not on ac due to nasal hemorrhage, CKD III p/w worsening SOB and dizziness. patient  Uses 3-4L home O2 at home and always has some BRO, usually sats in high 80s and desats with movement however has been feeling more SOB for past few days prior to admission.  Denies any cough, chest pain, fevers, chills, sick contacts, LE edema, worsening orthopnea.  Also with dizziness, when looks up or moving and sometimes when going from sitting to standing worse over past few days, no vertigo.  Daughter has noticed sats dropping as low as 70s and also that she cannot move even 3 steps without getting very SOB and desaturating.  Received lasix in ED and urinated well, feels somewhat better.  Otherwise has been having nose bleeds intermittently with lots of clots, last 1 week prior to admission./oxygen therapy device and L/min/fall precautions

## 2017-04-25 LAB
ANION GAP SERPL CALC-SCNC: 13 MMOL/L — SIGNIFICANT CHANGE UP (ref 5–17)
ANION GAP SERPL CALC-SCNC: 14 MMOL/L — SIGNIFICANT CHANGE UP (ref 5–17)
BASE EXCESS BLDV CALC-SCNC: 3.6 MMOL/L — HIGH (ref -2–2)
BUN SERPL-MCNC: 67 MG/DL — HIGH (ref 7–23)
BUN SERPL-MCNC: 72 MG/DL — HIGH (ref 7–23)
CA-I SERPL-SCNC: 1.28 MMOL/L — SIGNIFICANT CHANGE UP (ref 1.12–1.3)
CALCIUM SERPL-MCNC: 9.7 MG/DL — SIGNIFICANT CHANGE UP (ref 8.4–10.5)
CALCIUM SERPL-MCNC: 9.8 MG/DL — SIGNIFICANT CHANGE UP (ref 8.4–10.5)
CHLORIDE BLDV-SCNC: 101 MMOL/L — SIGNIFICANT CHANGE UP (ref 96–108)
CHLORIDE SERPL-SCNC: 97 MMOL/L — SIGNIFICANT CHANGE UP (ref 96–108)
CHLORIDE SERPL-SCNC: 99 MMOL/L — SIGNIFICANT CHANGE UP (ref 96–108)
CO2 BLDV-SCNC: 32 MMOL/L — HIGH (ref 22–30)
CO2 SERPL-SCNC: 30 MMOL/L — SIGNIFICANT CHANGE UP (ref 22–31)
CO2 SERPL-SCNC: 31 MMOL/L — SIGNIFICANT CHANGE UP (ref 22–31)
CREAT SERPL-MCNC: 1.57 MG/DL — HIGH (ref 0.5–1.3)
CREAT SERPL-MCNC: 1.61 MG/DL — HIGH (ref 0.5–1.3)
GAS PNL BLDV: 138 MMOL/L — SIGNIFICANT CHANGE UP (ref 136–145)
GAS PNL BLDV: SIGNIFICANT CHANGE UP
GLUCOSE BLDV-MCNC: 199 MG/DL — HIGH (ref 70–99)
GLUCOSE SERPL-MCNC: 168 MG/DL — HIGH (ref 70–99)
GLUCOSE SERPL-MCNC: 206 MG/DL — HIGH (ref 70–99)
HCO3 BLDV-SCNC: 30 MMOL/L — HIGH (ref 21–29)
HCT VFR BLD CALC: 35.1 % — SIGNIFICANT CHANGE UP (ref 34.5–45)
HCT VFR BLDA CALC: 34 % — LOW (ref 39–50)
HGB BLD CALC-MCNC: 10.9 G/DL — LOW (ref 11.5–15.5)
HGB BLD-MCNC: 10.7 G/DL — LOW (ref 11.5–15.5)
LACTATE BLDV-MCNC: 1.7 MMOL/L — SIGNIFICANT CHANGE UP (ref 0.7–2)
MCHC RBC-ENTMCNC: 25.8 PG — LOW (ref 27–34)
MCHC RBC-ENTMCNC: 30.5 GM/DL — LOW (ref 32–36)
MCV RBC AUTO: 84.8 FL — SIGNIFICANT CHANGE UP (ref 80–100)
OTHER CELLS CSF MANUAL: 14 ML/DL — LOW (ref 18–22)
PCO2 BLDV: 58 MMHG — HIGH (ref 35–50)
PH BLDV: 7.34 — LOW (ref 7.35–7.45)
PLATELET # BLD AUTO: 202 K/UL — SIGNIFICANT CHANGE UP (ref 150–400)
PO2 BLDV: 80 MMHG — HIGH (ref 25–45)
POTASSIUM BLDV-SCNC: 5 MMOL/L — SIGNIFICANT CHANGE UP (ref 3.5–5)
POTASSIUM SERPL-MCNC: 5.7 MMOL/L — HIGH (ref 3.5–5.3)
POTASSIUM SERPL-MCNC: 6 MMOL/L — HIGH (ref 3.5–5.3)
POTASSIUM SERPL-SCNC: 5.7 MMOL/L — HIGH (ref 3.5–5.3)
POTASSIUM SERPL-SCNC: 6 MMOL/L — HIGH (ref 3.5–5.3)
RBC # BLD: 4.14 M/UL — SIGNIFICANT CHANGE UP (ref 3.8–5.2)
RBC # FLD: 18.8 % — HIGH (ref 10.3–14.5)
SAO2 % BLDV: 94 % — HIGH (ref 67–88)
SODIUM SERPL-SCNC: 140 MMOL/L — SIGNIFICANT CHANGE UP (ref 135–145)
SODIUM SERPL-SCNC: 144 MMOL/L — SIGNIFICANT CHANGE UP (ref 135–145)
WBC # BLD: 6.66 K/UL — SIGNIFICANT CHANGE UP (ref 3.8–10.5)
WBC # FLD AUTO: 6.66 K/UL — SIGNIFICANT CHANGE UP (ref 3.8–10.5)

## 2017-04-25 PROCEDURE — 99233 SBSQ HOSP IP/OBS HIGH 50: CPT

## 2017-04-25 PROCEDURE — 93306 TTE W/DOPPLER COMPLETE: CPT | Mod: 26

## 2017-04-25 PROCEDURE — 71250 CT THORAX DX C-: CPT | Mod: 26

## 2017-04-25 RX ORDER — SODIUM POLYSTYRENE SULFONATE 4.1 MEQ/G
30 POWDER, FOR SUSPENSION ORAL ONCE
Qty: 0 | Refills: 0 | Status: COMPLETED | OUTPATIENT
Start: 2017-04-25 | End: 2017-04-25

## 2017-04-25 RX ADMIN — Medication 40 MILLIGRAM(S): at 05:42

## 2017-04-25 RX ADMIN — Medication 40 MILLIGRAM(S): at 18:12

## 2017-04-25 RX ADMIN — ROFLUMILAST 500 MICROGRAM(S): 500 TABLET ORAL at 05:41

## 2017-04-25 RX ADMIN — Medication 50 MILLIGRAM(S): at 13:19

## 2017-04-25 RX ADMIN — Medication 20 MILLIGRAM(S): at 05:43

## 2017-04-25 RX ADMIN — Medication 81 MILLIGRAM(S): at 13:10

## 2017-04-25 RX ADMIN — Medication 100 MILLIGRAM(S): at 18:11

## 2017-04-25 RX ADMIN — Medication 240 MILLIGRAM(S): at 05:41

## 2017-04-25 RX ADMIN — Medication 20 MILLIGRAM(S): at 13:09

## 2017-04-25 RX ADMIN — Medication 1: at 09:51

## 2017-04-25 RX ADMIN — HEPARIN SODIUM 5000 UNIT(S): 5000 INJECTION INTRAVENOUS; SUBCUTANEOUS at 18:11

## 2017-04-25 RX ADMIN — Medication 1: at 13:21

## 2017-04-25 RX ADMIN — ATORVASTATIN CALCIUM 20 MILLIGRAM(S): 80 TABLET, FILM COATED ORAL at 21:32

## 2017-04-25 RX ADMIN — Medication 20 MILLIGRAM(S): at 21:32

## 2017-04-25 RX ADMIN — HEPARIN SODIUM 5000 UNIT(S): 5000 INJECTION INTRAVENOUS; SUBCUTANEOUS at 05:43

## 2017-04-25 RX ADMIN — Medication 50 MILLIGRAM(S): at 21:32

## 2017-04-25 RX ADMIN — Medication 0.12 MILLIGRAM(S): at 13:10

## 2017-04-25 RX ADMIN — TIOTROPIUM BROMIDE AND OLODATEROL 2 PUFF(S): 3.124; 2.736 SPRAY, METERED RESPIRATORY (INHALATION) at 13:11

## 2017-04-25 RX ADMIN — CLOPIDOGREL BISULFATE 75 MILLIGRAM(S): 75 TABLET, FILM COATED ORAL at 13:10

## 2017-04-25 RX ADMIN — Medication 1: at 18:12

## 2017-04-25 RX ADMIN — Medication 10 MILLIEQUIVALENT(S): at 05:51

## 2017-04-25 RX ADMIN — SODIUM POLYSTYRENE SULFONATE 30 GRAM(S): 4.1 POWDER, FOR SUSPENSION ORAL at 18:35

## 2017-04-26 LAB
ANION GAP SERPL CALC-SCNC: 15 MMOL/L — SIGNIFICANT CHANGE UP (ref 5–17)
ANION GAP SERPL CALC-SCNC: 16 MMOL/L — SIGNIFICANT CHANGE UP (ref 5–17)
BUN SERPL-MCNC: 75 MG/DL — HIGH (ref 7–23)
BUN SERPL-MCNC: 77 MG/DL — HIGH (ref 7–23)
CALCIUM SERPL-MCNC: 9.3 MG/DL — SIGNIFICANT CHANGE UP (ref 8.4–10.5)
CALCIUM SERPL-MCNC: 9.4 MG/DL — SIGNIFICANT CHANGE UP (ref 8.4–10.5)
CHLORIDE SERPL-SCNC: 97 MMOL/L — SIGNIFICANT CHANGE UP (ref 96–108)
CHLORIDE SERPL-SCNC: 98 MMOL/L — SIGNIFICANT CHANGE UP (ref 96–108)
CO2 SERPL-SCNC: 26 MMOL/L — SIGNIFICANT CHANGE UP (ref 22–31)
CO2 SERPL-SCNC: 27 MMOL/L — SIGNIFICANT CHANGE UP (ref 22–31)
CREAT SERPL-MCNC: 1.66 MG/DL — HIGH (ref 0.5–1.3)
CREAT SERPL-MCNC: 1.67 MG/DL — HIGH (ref 0.5–1.3)
GLUCOSE SERPL-MCNC: 182 MG/DL — HIGH (ref 70–99)
GLUCOSE SERPL-MCNC: 215 MG/DL — HIGH (ref 70–99)
NT-PROBNP SERPL-SCNC: HIGH PG/ML (ref 0–300)
POTASSIUM SERPL-MCNC: 5 MMOL/L — SIGNIFICANT CHANGE UP (ref 3.5–5.3)
POTASSIUM SERPL-MCNC: 5.1 MMOL/L — SIGNIFICANT CHANGE UP (ref 3.5–5.3)
POTASSIUM SERPL-SCNC: 5 MMOL/L — SIGNIFICANT CHANGE UP (ref 3.5–5.3)
POTASSIUM SERPL-SCNC: 5.1 MMOL/L — SIGNIFICANT CHANGE UP (ref 3.5–5.3)
SODIUM SERPL-SCNC: 139 MMOL/L — SIGNIFICANT CHANGE UP (ref 135–145)
SODIUM SERPL-SCNC: 140 MMOL/L — SIGNIFICANT CHANGE UP (ref 135–145)

## 2017-04-26 PROCEDURE — 31231 NASAL ENDOSCOPY DX: CPT

## 2017-04-26 PROCEDURE — 99232 SBSQ HOSP IP/OBS MODERATE 35: CPT

## 2017-04-26 PROCEDURE — 99222 1ST HOSP IP/OBS MODERATE 55: CPT | Mod: 25

## 2017-04-26 PROCEDURE — 71010: CPT | Mod: 26

## 2017-04-26 RX ORDER — METOPROLOL TARTRATE 50 MG
50 TABLET ORAL ONCE
Qty: 0 | Refills: 0 | Status: COMPLETED | OUTPATIENT
Start: 2017-04-26 | End: 2017-04-26

## 2017-04-26 RX ORDER — METOPROLOL TARTRATE 50 MG
50 TABLET ORAL EVERY 6 HOURS
Qty: 0 | Refills: 0 | Status: DISCONTINUED | OUTPATIENT
Start: 2017-04-26 | End: 2017-04-29

## 2017-04-26 RX ORDER — SUCRALFATE 1 G
1 TABLET ORAL ONCE
Qty: 0 | Refills: 0 | Status: COMPLETED | OUTPATIENT
Start: 2017-04-26 | End: 2017-04-26

## 2017-04-26 RX ORDER — IPRATROPIUM BROMIDE 21 MCG
2 AEROSOL, SPRAY (ML) NASAL
Qty: 0 | Refills: 0 | Status: DISCONTINUED | OUTPATIENT
Start: 2017-04-26 | End: 2017-05-12

## 2017-04-26 RX ORDER — SODIUM CHLORIDE 0.65 %
2 AEROSOL, SPRAY (ML) NASAL
Qty: 0 | Refills: 0 | Status: DISCONTINUED | OUTPATIENT
Start: 2017-04-26 | End: 2017-05-12

## 2017-04-26 RX ORDER — IPRATROPIUM BROMIDE 0.2 MG/ML
500 SOLUTION, NON-ORAL INHALATION EVERY 6 HOURS
Qty: 0 | Refills: 0 | Status: DISCONTINUED | OUTPATIENT
Start: 2017-04-26 | End: 2017-04-28

## 2017-04-26 RX ORDER — BACITRACIN ZINC 500 UNIT/G
1 OINTMENT IN PACKET (EA) TOPICAL
Qty: 0 | Refills: 0 | Status: DISCONTINUED | OUTPATIENT
Start: 2017-04-26 | End: 2017-05-12

## 2017-04-26 RX ADMIN — Medication 500 MICROGRAM(S): at 11:48

## 2017-04-26 RX ADMIN — Medication 81 MILLIGRAM(S): at 11:48

## 2017-04-26 RX ADMIN — Medication 1: at 09:43

## 2017-04-26 RX ADMIN — Medication 20 MILLIGRAM(S): at 05:50

## 2017-04-26 RX ADMIN — HEPARIN SODIUM 5000 UNIT(S): 5000 INJECTION INTRAVENOUS; SUBCUTANEOUS at 18:27

## 2017-04-26 RX ADMIN — Medication 200 MILLIGRAM(S): at 18:27

## 2017-04-26 RX ADMIN — HEPARIN SODIUM 5000 UNIT(S): 5000 INJECTION INTRAVENOUS; SUBCUTANEOUS at 05:57

## 2017-04-26 RX ADMIN — Medication 500 MICROGRAM(S): at 18:27

## 2017-04-26 RX ADMIN — Medication 240 MILLIGRAM(S): at 05:49

## 2017-04-26 RX ADMIN — Medication 1: at 13:40

## 2017-04-26 RX ADMIN — Medication 20 MILLIGRAM(S): at 20:26

## 2017-04-26 RX ADMIN — Medication 50 MILLIGRAM(S): at 05:49

## 2017-04-26 RX ADMIN — Medication 1 GRAM(S): at 23:24

## 2017-04-26 RX ADMIN — Medication 200 MILLIGRAM(S): at 13:40

## 2017-04-26 RX ADMIN — ROFLUMILAST 500 MICROGRAM(S): 500 TABLET ORAL at 05:49

## 2017-04-26 RX ADMIN — TIOTROPIUM BROMIDE AND OLODATEROL 2 PUFF(S): 3.124; 2.736 SPRAY, METERED RESPIRATORY (INHALATION) at 13:38

## 2017-04-26 RX ADMIN — Medication 40 MILLIGRAM(S): at 05:50

## 2017-04-26 RX ADMIN — CLOPIDOGREL BISULFATE 75 MILLIGRAM(S): 75 TABLET, FILM COATED ORAL at 11:48

## 2017-04-26 RX ADMIN — Medication 50 MILLIGRAM(S): at 11:49

## 2017-04-26 RX ADMIN — Medication 50 MILLIGRAM(S): at 18:27

## 2017-04-26 RX ADMIN — Medication 2: at 18:27

## 2017-04-26 RX ADMIN — Medication 500 MICROGRAM(S): at 23:26

## 2017-04-26 RX ADMIN — Medication 50 MILLIGRAM(S): at 16:08

## 2017-04-26 RX ADMIN — Medication 40 MILLIGRAM(S): at 18:27

## 2017-04-26 RX ADMIN — ATORVASTATIN CALCIUM 20 MILLIGRAM(S): 80 TABLET, FILM COATED ORAL at 21:17

## 2017-04-27 LAB
ANION GAP SERPL CALC-SCNC: 18 MMOL/L — HIGH (ref 5–17)
BUN SERPL-MCNC: 88 MG/DL — HIGH (ref 7–23)
CALCIUM SERPL-MCNC: 9.6 MG/DL — SIGNIFICANT CHANGE UP (ref 8.4–10.5)
CHLORIDE SERPL-SCNC: 97 MMOL/L — SIGNIFICANT CHANGE UP (ref 96–108)
CO2 SERPL-SCNC: 25 MMOL/L — SIGNIFICANT CHANGE UP (ref 22–31)
CREAT SERPL-MCNC: 1.94 MG/DL — HIGH (ref 0.5–1.3)
GLUCOSE SERPL-MCNC: 140 MG/DL — HIGH (ref 70–99)
HCT VFR BLD CALC: 35.7 % — SIGNIFICANT CHANGE UP (ref 34.5–45)
HGB BLD-MCNC: 11.2 G/DL — LOW (ref 11.5–15.5)
MCHC RBC-ENTMCNC: 26.6 PG — LOW (ref 27–34)
MCHC RBC-ENTMCNC: 31.4 GM/DL — LOW (ref 32–36)
MCV RBC AUTO: 84.8 FL — SIGNIFICANT CHANGE UP (ref 80–100)
PLATELET # BLD AUTO: 196 K/UL — SIGNIFICANT CHANGE UP (ref 150–400)
POTASSIUM SERPL-MCNC: 4.4 MMOL/L — SIGNIFICANT CHANGE UP (ref 3.5–5.3)
POTASSIUM SERPL-SCNC: 4.4 MMOL/L — SIGNIFICANT CHANGE UP (ref 3.5–5.3)
RBC # BLD: 4.21 M/UL — SIGNIFICANT CHANGE UP (ref 3.8–5.2)
RBC # FLD: 19.1 % — HIGH (ref 10.3–14.5)
SODIUM SERPL-SCNC: 140 MMOL/L — SIGNIFICANT CHANGE UP (ref 135–145)
WBC # BLD: 8.4 K/UL — SIGNIFICANT CHANGE UP (ref 3.8–10.5)
WBC # FLD AUTO: 8.4 K/UL — SIGNIFICANT CHANGE UP (ref 3.8–10.5)

## 2017-04-27 PROCEDURE — 99232 SBSQ HOSP IP/OBS MODERATE 35: CPT

## 2017-04-27 RX ORDER — SUCRALFATE 1 G
1 TABLET ORAL
Qty: 0 | Refills: 0 | Status: DISCONTINUED | OUTPATIENT
Start: 2017-04-27 | End: 2017-04-30

## 2017-04-27 RX ORDER — SUCRALFATE 1 G
1 TABLET ORAL EVERY 6 HOURS
Qty: 0 | Refills: 0 | Status: DISCONTINUED | OUTPATIENT
Start: 2017-04-27 | End: 2017-04-27

## 2017-04-27 RX ORDER — DIGOXIN 250 MCG
0.12 TABLET ORAL ONCE
Qty: 0 | Refills: 0 | Status: COMPLETED | OUTPATIENT
Start: 2017-04-27 | End: 2017-04-27

## 2017-04-27 RX ORDER — DIGOXIN 250 MCG
0.12 TABLET ORAL DAILY
Qty: 0 | Refills: 0 | Status: DISCONTINUED | OUTPATIENT
Start: 2017-04-27 | End: 2017-04-28

## 2017-04-27 RX ADMIN — Medication 240 MILLIGRAM(S): at 06:34

## 2017-04-27 RX ADMIN — Medication 0.12 MILLIGRAM(S): at 16:01

## 2017-04-27 RX ADMIN — Medication 50 MILLIGRAM(S): at 15:09

## 2017-04-27 RX ADMIN — Medication 0.12 MILLIGRAM(S): at 12:56

## 2017-04-27 RX ADMIN — ATORVASTATIN CALCIUM 20 MILLIGRAM(S): 80 TABLET, FILM COATED ORAL at 23:26

## 2017-04-27 RX ADMIN — Medication 200 MILLIGRAM(S): at 18:07

## 2017-04-27 RX ADMIN — Medication 1 GRAM(S): at 22:06

## 2017-04-27 RX ADMIN — Medication 50 MILLIGRAM(S): at 23:27

## 2017-04-27 RX ADMIN — Medication 81 MILLIGRAM(S): at 12:56

## 2017-04-27 RX ADMIN — CLOPIDOGREL BISULFATE 75 MILLIGRAM(S): 75 TABLET, FILM COATED ORAL at 12:56

## 2017-04-27 RX ADMIN — ROFLUMILAST 500 MICROGRAM(S): 500 TABLET ORAL at 06:33

## 2017-04-27 RX ADMIN — Medication 200 MILLIGRAM(S): at 12:56

## 2017-04-27 RX ADMIN — HEPARIN SODIUM 5000 UNIT(S): 5000 INJECTION INTRAVENOUS; SUBCUTANEOUS at 06:33

## 2017-04-27 RX ADMIN — Medication 50 MILLIGRAM(S): at 20:34

## 2017-04-27 RX ADMIN — Medication 2 SPRAY(S): at 12:55

## 2017-04-27 RX ADMIN — Medication 20 MILLIGRAM(S): at 18:37

## 2017-04-27 RX ADMIN — HEPARIN SODIUM 5000 UNIT(S): 5000 INJECTION INTRAVENOUS; SUBCUTANEOUS at 18:06

## 2017-04-27 RX ADMIN — Medication 500 MICROGRAM(S): at 12:56

## 2017-04-27 RX ADMIN — Medication 1 GRAM(S): at 11:41

## 2017-04-27 RX ADMIN — Medication 200 MILLIGRAM(S): at 23:27

## 2017-04-27 RX ADMIN — Medication 1 APPLICATION(S): at 18:05

## 2017-04-27 RX ADMIN — Medication 2 SPRAY(S): at 23:26

## 2017-04-27 RX ADMIN — Medication 40 MILLIGRAM(S): at 06:33

## 2017-04-27 RX ADMIN — Medication 2 SPRAY(S): at 18:04

## 2017-04-27 RX ADMIN — TIOTROPIUM BROMIDE AND OLODATEROL 2 PUFF(S): 3.124; 2.736 SPRAY, METERED RESPIRATORY (INHALATION) at 12:46

## 2017-04-27 RX ADMIN — Medication 2: at 12:57

## 2017-04-27 RX ADMIN — Medication 2: at 18:05

## 2017-04-27 RX ADMIN — Medication 30 MILLIGRAM(S): at 12:56

## 2017-04-27 NOTE — DIETITIAN INITIAL EVALUATION ADULT. - PROBLEM SELECTOR PLAN 1
patient with severe COPD as well as known CHF, pulmonary HTN presenting with worsening SOB, although lack of cough/sputum + pleural effusions on CXR as well as patient reported improvement after lasix suggests acute on chronic diastolic CHF exacerbation. troponins negative x 2  -check pro-BNP  -repeat TTE to evaluate pulm HTN, valvular disease   -continue diuresis with IV lasix, strict Is/Os, daily weights, cards consult  to be called by primary Dr. Méndez  -pulm consulted Dr. Lopez, f/u recs  -continue all home COPD meds  -xopenex (jittery with albuterol), ipratroprium prn   -continue supplemental O2 PRN goal sat 88-92

## 2017-04-27 NOTE — DIETITIAN INITIAL EVALUATION ADULT. - ORAL INTAKE PTA
Pt reports consuming 2-3 meals/day with occasional snacks. Breakfast usually consists of toast or 1/2 mini bagel and coffee. Lunch usually consists of tuna salad and dinner usually consists a protein, vegetable, and a starch. Pt reports occasional snacking on gabrielle Cristino or weight watcher's snacks. Pt denies usage of micronutrient supplementation or nutritional shake supplementation PTA. Pt confirmed NKFA./good

## 2017-04-27 NOTE — DIETITIAN INITIAL EVALUATION ADULT. - ADHERENCE
Pt reports staying away from sugar and salt at home. Pt reports checking blood sugars 1x/day usually ranging 80-97./good

## 2017-04-27 NOTE — DIETITIAN INITIAL EVALUATION ADULT. - +GENDER
----- Message from Stephani Muñiz sent at 2017  9:05 AM CDT -----  Contact: mom [phone]  Mom called to say that was not sleeping, been fussy. Please call mom. Been vomiting also. Fever was 99.0 states mom.      
Mom states patient is vomiting after every feeding  
Statement Selected

## 2017-04-27 NOTE — DIETITIAN INITIAL EVALUATION ADULT. - DIET TYPE
DASH/TLC (sodium and cholesterol restricted diet)/consistent carbohydrate (no snacks)/no concentrated potassium

## 2017-04-27 NOTE — DIETITIAN INITIAL EVALUATION ADULT. - OTHER INFO
Pt seen for LOS. Pt expressed frustrations with  while in house, although eating well with a good appetite. Pt states UBW of 144pounds and stable. Pt reports wt of 238pounds prior to pulmonary lobectomy 2/09 and lost weight gradually through watching diet with no recent changes in weight. Note in chart pt weight 145.5pounds. Pt wt 9/16 per last RD note 148.8pounds. Pt denies any N/V, constipation or diarrhea while in house. Pt's last BM today. PT denies any chewing/swallowing difficulties.

## 2017-04-27 NOTE — DIETITIAN INITIAL EVALUATION ADULT. - ENERGY NEEDS
ht.63inches wt. 145.5pounds BMI: 25.8kg/m2 IBW:115pounds (+/-10%) %IBW: 127%  Pt is 72 yo F p/w worsening SOB and dizziness admitted with CHF exacerbation.   +1 b/l feet Edema noted  No Pressure Ulcers noted

## 2017-04-28 LAB
ANION GAP SERPL CALC-SCNC: 14 MMOL/L — SIGNIFICANT CHANGE UP (ref 5–17)
BUN SERPL-MCNC: 87 MG/DL — HIGH (ref 7–23)
CALCIUM SERPL-MCNC: 9.1 MG/DL — SIGNIFICANT CHANGE UP (ref 8.4–10.5)
CHLORIDE SERPL-SCNC: 99 MMOL/L — SIGNIFICANT CHANGE UP (ref 96–108)
CO2 SERPL-SCNC: 32 MMOL/L — HIGH (ref 22–31)
CREAT SERPL-MCNC: 1.86 MG/DL — HIGH (ref 0.5–1.3)
GLUCOSE SERPL-MCNC: 103 MG/DL — HIGH (ref 70–99)
HCT VFR BLD CALC: 33.1 % — LOW (ref 34.5–45)
HGB BLD-MCNC: 10.1 G/DL — LOW (ref 11.5–15.5)
MCHC RBC-ENTMCNC: 25.9 PG — LOW (ref 27–34)
MCHC RBC-ENTMCNC: 30.5 GM/DL — LOW (ref 32–36)
MCV RBC AUTO: 84.9 FL — SIGNIFICANT CHANGE UP (ref 80–100)
PLATELET # BLD AUTO: 154 K/UL — SIGNIFICANT CHANGE UP (ref 150–400)
POTASSIUM SERPL-MCNC: 4.3 MMOL/L — SIGNIFICANT CHANGE UP (ref 3.5–5.3)
POTASSIUM SERPL-SCNC: 4.3 MMOL/L — SIGNIFICANT CHANGE UP (ref 3.5–5.3)
RBC # BLD: 3.9 M/UL — SIGNIFICANT CHANGE UP (ref 3.8–5.2)
RBC # FLD: 18.7 % — HIGH (ref 10.3–14.5)
SODIUM SERPL-SCNC: 144 MMOL/L — SIGNIFICANT CHANGE UP (ref 135–145)
WBC # BLD: 6.41 K/UL — SIGNIFICANT CHANGE UP (ref 3.8–10.5)
WBC # FLD AUTO: 6.41 K/UL — SIGNIFICANT CHANGE UP (ref 3.8–10.5)

## 2017-04-28 PROCEDURE — 99232 SBSQ HOSP IP/OBS MODERATE 35: CPT

## 2017-04-28 PROCEDURE — 93970 EXTREMITY STUDY: CPT | Mod: 26

## 2017-04-28 RX ORDER — DIGOXIN 250 MCG
0.12 TABLET ORAL EVERY OTHER DAY
Qty: 0 | Refills: 0 | Status: DISCONTINUED | OUTPATIENT
Start: 2017-04-28 | End: 2017-04-29

## 2017-04-28 RX ADMIN — Medication 20 MILLIGRAM(S): at 17:42

## 2017-04-28 RX ADMIN — Medication 2 SPRAY(S): at 17:43

## 2017-04-28 RX ADMIN — Medication 50 MILLIGRAM(S): at 12:05

## 2017-04-28 RX ADMIN — Medication 0.12 MILLIGRAM(S): at 06:51

## 2017-04-28 RX ADMIN — TIOTROPIUM BROMIDE AND OLODATEROL 2 PUFF(S): 3.124; 2.736 SPRAY, METERED RESPIRATORY (INHALATION) at 12:07

## 2017-04-28 RX ADMIN — Medication 2 SPRAY(S): at 17:42

## 2017-04-28 RX ADMIN — Medication 2 SPRAY(S): at 06:53

## 2017-04-28 RX ADMIN — Medication 200 MILLIGRAM(S): at 12:03

## 2017-04-28 RX ADMIN — Medication 1: at 17:38

## 2017-04-28 RX ADMIN — Medication 30 MILLIGRAM(S): at 06:51

## 2017-04-28 RX ADMIN — HEPARIN SODIUM 5000 UNIT(S): 5000 INJECTION INTRAVENOUS; SUBCUTANEOUS at 06:52

## 2017-04-28 RX ADMIN — Medication 50 MILLIGRAM(S): at 06:52

## 2017-04-28 RX ADMIN — Medication 200 MILLIGRAM(S): at 17:37

## 2017-04-28 RX ADMIN — Medication 1: at 13:52

## 2017-04-28 RX ADMIN — ATORVASTATIN CALCIUM 20 MILLIGRAM(S): 80 TABLET, FILM COATED ORAL at 21:42

## 2017-04-28 RX ADMIN — ROFLUMILAST 500 MICROGRAM(S): 500 TABLET ORAL at 06:51

## 2017-04-28 RX ADMIN — Medication 2 SPRAY(S): at 12:05

## 2017-04-28 RX ADMIN — Medication 20 MILLIGRAM(S): at 06:52

## 2017-04-28 RX ADMIN — Medication 1 APPLICATION(S): at 06:52

## 2017-04-28 RX ADMIN — Medication 81 MILLIGRAM(S): at 12:03

## 2017-04-28 RX ADMIN — CLOPIDOGREL BISULFATE 75 MILLIGRAM(S): 75 TABLET, FILM COATED ORAL at 12:06

## 2017-04-28 RX ADMIN — Medication 50 MILLIGRAM(S): at 17:40

## 2017-04-28 RX ADMIN — HEPARIN SODIUM 5000 UNIT(S): 5000 INJECTION INTRAVENOUS; SUBCUTANEOUS at 17:33

## 2017-04-29 LAB
ANION GAP SERPL CALC-SCNC: 22 MMOL/L — HIGH (ref 5–17)
BUN SERPL-MCNC: 80 MG/DL — HIGH (ref 7–23)
CALCIUM SERPL-MCNC: 9.5 MG/DL — SIGNIFICANT CHANGE UP (ref 8.4–10.5)
CHLORIDE SERPL-SCNC: 101 MMOL/L — SIGNIFICANT CHANGE UP (ref 96–108)
CO2 SERPL-SCNC: 23 MMOL/L — SIGNIFICANT CHANGE UP (ref 22–31)
CREAT SERPL-MCNC: 1.66 MG/DL — HIGH (ref 0.5–1.3)
DIGOXIN SERPL-MCNC: 1 NG/ML — SIGNIFICANT CHANGE UP (ref 0.8–2)
GLUCOSE SERPL-MCNC: 98 MG/DL — SIGNIFICANT CHANGE UP (ref 70–99)
HCT VFR BLD CALC: 35.2 % — SIGNIFICANT CHANGE UP (ref 34.5–45)
HGB BLD-MCNC: 10.1 G/DL — LOW (ref 11.5–15.5)
MCHC RBC-ENTMCNC: 24.8 PG — LOW (ref 27–34)
MCHC RBC-ENTMCNC: 28.7 GM/DL — LOW (ref 32–36)
MCV RBC AUTO: 86.3 FL — SIGNIFICANT CHANGE UP (ref 80–100)
PLATELET # BLD AUTO: 172 K/UL — SIGNIFICANT CHANGE UP (ref 150–400)
POTASSIUM SERPL-MCNC: 3.9 MMOL/L — SIGNIFICANT CHANGE UP (ref 3.5–5.3)
POTASSIUM SERPL-SCNC: 3.9 MMOL/L — SIGNIFICANT CHANGE UP (ref 3.5–5.3)
RBC # BLD: 4.08 M/UL — SIGNIFICANT CHANGE UP (ref 3.8–5.2)
RBC # FLD: 18.7 % — HIGH (ref 10.3–14.5)
SODIUM SERPL-SCNC: 146 MMOL/L — HIGH (ref 135–145)
WBC # BLD: 7.81 K/UL — SIGNIFICANT CHANGE UP (ref 3.8–10.5)
WBC # FLD AUTO: 7.81 K/UL — SIGNIFICANT CHANGE UP (ref 3.8–10.5)

## 2017-04-29 RX ORDER — ATROPINE SULFATE 0.1 MG/ML
0.5 SYRINGE (ML) INJECTION ONCE
Qty: 0 | Refills: 0 | Status: DISCONTINUED | OUTPATIENT
Start: 2017-04-29 | End: 2017-05-12

## 2017-04-29 RX ADMIN — HEPARIN SODIUM 5000 UNIT(S): 5000 INJECTION INTRAVENOUS; SUBCUTANEOUS at 05:40

## 2017-04-29 RX ADMIN — ROFLUMILAST 500 MICROGRAM(S): 500 TABLET ORAL at 05:39

## 2017-04-29 RX ADMIN — Medication 1: at 18:01

## 2017-04-29 RX ADMIN — Medication 50 MILLIGRAM(S): at 05:39

## 2017-04-29 RX ADMIN — HEPARIN SODIUM 5000 UNIT(S): 5000 INJECTION INTRAVENOUS; SUBCUTANEOUS at 18:01

## 2017-04-29 RX ADMIN — Medication 200 MILLIGRAM(S): at 05:40

## 2017-04-29 RX ADMIN — Medication 2 SPRAY(S): at 05:41

## 2017-04-29 RX ADMIN — Medication 30 MILLIGRAM(S): at 05:40

## 2017-04-29 RX ADMIN — Medication 200 MILLIGRAM(S): at 23:59

## 2017-04-29 RX ADMIN — Medication 20 MILLIGRAM(S): at 18:00

## 2017-04-29 RX ADMIN — Medication 2 SPRAY(S): at 00:23

## 2017-04-29 RX ADMIN — Medication 200 MILLIGRAM(S): at 18:00

## 2017-04-29 RX ADMIN — Medication 81 MILLIGRAM(S): at 13:10

## 2017-04-29 RX ADMIN — Medication 200 MILLIGRAM(S): at 13:09

## 2017-04-29 RX ADMIN — ATORVASTATIN CALCIUM 20 MILLIGRAM(S): 80 TABLET, FILM COATED ORAL at 21:19

## 2017-04-29 RX ADMIN — Medication 20 MILLIGRAM(S): at 05:39

## 2017-04-29 RX ADMIN — CLOPIDOGREL BISULFATE 75 MILLIGRAM(S): 75 TABLET, FILM COATED ORAL at 13:09

## 2017-04-29 RX ADMIN — Medication 1: at 13:09

## 2017-04-29 RX ADMIN — Medication 50 MILLIGRAM(S): at 00:23

## 2017-04-29 NOTE — PROVIDER CONTACT NOTE (OTHER) - ASSESSMENT
Pt ambulated to the bathroom and was on the toilet when her tele monitor showed v.tach. Pt asymptomic. VS WDL.

## 2017-04-30 LAB
ANION GAP SERPL CALC-SCNC: 13 MMOL/L — SIGNIFICANT CHANGE UP (ref 5–17)
BASOPHILS # BLD AUTO: 0 K/UL — SIGNIFICANT CHANGE UP (ref 0–0.2)
BASOPHILS NFR BLD AUTO: 0 % — SIGNIFICANT CHANGE UP (ref 0–2)
BUN SERPL-MCNC: 69 MG/DL — HIGH (ref 7–23)
CALCIUM SERPL-MCNC: 9.2 MG/DL — SIGNIFICANT CHANGE UP (ref 8.4–10.5)
CHLORIDE SERPL-SCNC: 103 MMOL/L — SIGNIFICANT CHANGE UP (ref 96–108)
CO2 SERPL-SCNC: 30 MMOL/L — SIGNIFICANT CHANGE UP (ref 22–31)
CREAT SERPL-MCNC: 1.54 MG/DL — HIGH (ref 0.5–1.3)
EOSINOPHIL # BLD AUTO: 0.03 K/UL — SIGNIFICANT CHANGE UP (ref 0–0.5)
EOSINOPHIL NFR BLD AUTO: 0.6 % — SIGNIFICANT CHANGE UP (ref 0–6)
GLUCOSE SERPL-MCNC: 92 MG/DL — SIGNIFICANT CHANGE UP (ref 70–99)
HCT VFR BLD CALC: 32 % — LOW (ref 34.5–45)
HCT VFR BLD CALC: 32.5 % — LOW (ref 34.5–45)
HGB BLD-MCNC: 10.6 G/DL — LOW (ref 11.5–15.5)
HGB BLD-MCNC: 9.5 G/DL — LOW (ref 11.5–15.5)
IMM GRANULOCYTES NFR BLD AUTO: 0.6 % — SIGNIFICANT CHANGE UP (ref 0–1.5)
LYMPHOCYTES # BLD AUTO: 0.42 K/UL — LOW (ref 1–3.3)
LYMPHOCYTES # BLD AUTO: 8.2 % — LOW (ref 13–44)
MANUAL SMEAR VERIFICATION: SIGNIFICANT CHANGE UP
MCHC RBC-ENTMCNC: 24.9 PG — LOW (ref 27–34)
MCHC RBC-ENTMCNC: 27.6 PG — SIGNIFICANT CHANGE UP (ref 27–34)
MCHC RBC-ENTMCNC: 29.2 GM/DL — LOW (ref 32–36)
MCHC RBC-ENTMCNC: 33 GM/DL — SIGNIFICANT CHANGE UP (ref 32–36)
MCV RBC AUTO: 83.5 FL — SIGNIFICANT CHANGE UP (ref 80–100)
MCV RBC AUTO: 85.1 FL — SIGNIFICANT CHANGE UP (ref 80–100)
MONOCYTES # BLD AUTO: 0.37 K/UL — SIGNIFICANT CHANGE UP (ref 0–0.9)
MONOCYTES NFR BLD AUTO: 7.2 % — SIGNIFICANT CHANGE UP (ref 2–14)
NEUTROPHILS # BLD AUTO: 4.3 K/UL — SIGNIFICANT CHANGE UP (ref 1.8–7.4)
NEUTROPHILS NFR BLD AUTO: 83.4 % — HIGH (ref 43–77)
PLAT MORPH BLD: NORMAL — SIGNIFICANT CHANGE UP
PLATELET # BLD AUTO: 101 K/UL — LOW (ref 150–400)
PLATELET # BLD AUTO: 129 K/UL — LOW (ref 150–400)
POTASSIUM SERPL-MCNC: 4.2 MMOL/L — SIGNIFICANT CHANGE UP (ref 3.5–5.3)
POTASSIUM SERPL-SCNC: 4.2 MMOL/L — SIGNIFICANT CHANGE UP (ref 3.5–5.3)
RBC # BLD: 3.82 M/UL — SIGNIFICANT CHANGE UP (ref 3.8–5.2)
RBC # BLD: 3.84 M/UL — SIGNIFICANT CHANGE UP (ref 3.8–5.2)
RBC # FLD: 18.4 % — HIGH (ref 10.3–14.5)
RBC # FLD: 18.7 % — HIGH (ref 10.3–14.5)
RBC BLD AUTO: SIGNIFICANT CHANGE UP
SODIUM SERPL-SCNC: 146 MMOL/L — HIGH (ref 135–145)
WBC # BLD: 5.15 K/UL — SIGNIFICANT CHANGE UP (ref 3.8–10.5)
WBC # BLD: 7.4 K/UL — SIGNIFICANT CHANGE UP (ref 3.8–10.5)
WBC # FLD AUTO: 5.15 K/UL — SIGNIFICANT CHANGE UP (ref 3.8–10.5)
WBC # FLD AUTO: 7.4 K/UL — SIGNIFICANT CHANGE UP (ref 3.8–10.5)

## 2017-04-30 RX ORDER — METOPROLOL TARTRATE 50 MG
2.5 TABLET ORAL ONCE
Qty: 0 | Refills: 0 | Status: COMPLETED | OUTPATIENT
Start: 2017-04-30 | End: 2017-04-30

## 2017-04-30 RX ORDER — SUCRALFATE 1 G
1 TABLET ORAL
Qty: 0 | Refills: 0 | Status: DISCONTINUED | OUTPATIENT
Start: 2017-04-30 | End: 2017-05-12

## 2017-04-30 RX ORDER — METOPROLOL TARTRATE 50 MG
25 TABLET ORAL
Qty: 0 | Refills: 0 | Status: DISCONTINUED | OUTPATIENT
Start: 2017-04-30 | End: 2017-05-04

## 2017-04-30 RX ADMIN — Medication 2.5 MILLIGRAM(S): at 12:01

## 2017-04-30 RX ADMIN — HEPARIN SODIUM 5000 UNIT(S): 5000 INJECTION INTRAVENOUS; SUBCUTANEOUS at 17:48

## 2017-04-30 RX ADMIN — ATORVASTATIN CALCIUM 20 MILLIGRAM(S): 80 TABLET, FILM COATED ORAL at 21:40

## 2017-04-30 RX ADMIN — Medication 1 GRAM(S): at 17:47

## 2017-04-30 RX ADMIN — CLOPIDOGREL BISULFATE 75 MILLIGRAM(S): 75 TABLET, FILM COATED ORAL at 12:00

## 2017-04-30 RX ADMIN — Medication 200 MILLIGRAM(S): at 05:42

## 2017-04-30 RX ADMIN — Medication 81 MILLIGRAM(S): at 12:00

## 2017-04-30 RX ADMIN — Medication 20 MILLIGRAM(S): at 05:40

## 2017-04-30 RX ADMIN — Medication 2: at 17:57

## 2017-04-30 RX ADMIN — Medication 1 GRAM(S): at 12:01

## 2017-04-30 RX ADMIN — HEPARIN SODIUM 5000 UNIT(S): 5000 INJECTION INTRAVENOUS; SUBCUTANEOUS at 05:40

## 2017-04-30 RX ADMIN — Medication 1 GRAM(S): at 08:51

## 2017-04-30 RX ADMIN — Medication 2.5 MILLIGRAM(S): at 10:31

## 2017-04-30 RX ADMIN — TIOTROPIUM BROMIDE AND OLODATEROL 2 PUFF(S): 3.124; 2.736 SPRAY, METERED RESPIRATORY (INHALATION) at 12:04

## 2017-04-30 RX ADMIN — Medication 20 MILLIGRAM(S): at 17:48

## 2017-04-30 RX ADMIN — Medication 30 MILLIGRAM(S): at 05:40

## 2017-04-30 RX ADMIN — ROFLUMILAST 500 MICROGRAM(S): 500 TABLET ORAL at 05:40

## 2017-04-30 RX ADMIN — Medication 200 MILLIGRAM(S): at 17:50

## 2017-04-30 RX ADMIN — Medication 25 MILLIGRAM(S): at 17:47

## 2017-04-30 RX ADMIN — Medication 2 SPRAY(S): at 00:05

## 2017-05-01 PROCEDURE — 99232 SBSQ HOSP IP/OBS MODERATE 35: CPT | Mod: GC

## 2017-05-01 RX ORDER — DILTIAZEM HCL 120 MG
240 CAPSULE, EXT RELEASE 24 HR ORAL DAILY
Qty: 0 | Refills: 0 | Status: DISCONTINUED | OUTPATIENT
Start: 2017-05-01 | End: 2017-05-12

## 2017-05-01 RX ORDER — METOPROLOL TARTRATE 50 MG
2.5 TABLET ORAL ONCE
Qty: 0 | Refills: 0 | Status: COMPLETED | OUTPATIENT
Start: 2017-05-01 | End: 2017-05-01

## 2017-05-01 RX ORDER — DIGOXIN 250 MCG
0.12 TABLET ORAL EVERY OTHER DAY
Qty: 0 | Refills: 0 | Status: DISCONTINUED | OUTPATIENT
Start: 2017-05-01 | End: 2017-05-12

## 2017-05-01 RX ADMIN — Medication 81 MILLIGRAM(S): at 11:41

## 2017-05-01 RX ADMIN — Medication 240 MILLIGRAM(S): at 18:32

## 2017-05-01 RX ADMIN — CLOPIDOGREL BISULFATE 75 MILLIGRAM(S): 75 TABLET, FILM COATED ORAL at 11:41

## 2017-05-01 RX ADMIN — ROFLUMILAST 500 MICROGRAM(S): 500 TABLET ORAL at 06:15

## 2017-05-01 RX ADMIN — Medication 20 MILLIGRAM(S): at 08:44

## 2017-05-01 RX ADMIN — HEPARIN SODIUM 5000 UNIT(S): 5000 INJECTION INTRAVENOUS; SUBCUTANEOUS at 06:15

## 2017-05-01 RX ADMIN — Medication 25 MILLIGRAM(S): at 18:31

## 2017-05-01 RX ADMIN — Medication 20 MILLIGRAM(S): at 18:31

## 2017-05-01 RX ADMIN — Medication 20 MILLIGRAM(S): at 06:15

## 2017-05-01 RX ADMIN — Medication 2.5 MILLIGRAM(S): at 22:01

## 2017-05-01 RX ADMIN — TIOTROPIUM BROMIDE AND OLODATEROL 2 PUFF(S): 3.124; 2.736 SPRAY, METERED RESPIRATORY (INHALATION) at 11:51

## 2017-05-01 RX ADMIN — Medication 1 GRAM(S): at 12:20

## 2017-05-01 RX ADMIN — Medication 0.12 MILLIGRAM(S): at 18:30

## 2017-05-01 RX ADMIN — Medication 1: at 18:32

## 2017-05-01 RX ADMIN — Medication 1 GRAM(S): at 18:31

## 2017-05-01 RX ADMIN — Medication 1 GRAM(S): at 08:43

## 2017-05-01 RX ADMIN — Medication 25 MILLIGRAM(S): at 06:15

## 2017-05-01 RX ADMIN — HEPARIN SODIUM 5000 UNIT(S): 5000 INJECTION INTRAVENOUS; SUBCUTANEOUS at 18:32

## 2017-05-01 RX ADMIN — ATORVASTATIN CALCIUM 20 MILLIGRAM(S): 80 TABLET, FILM COATED ORAL at 21:45

## 2017-05-01 RX ADMIN — Medication 1: at 21:45

## 2017-05-02 LAB
ANION GAP SERPL CALC-SCNC: 18 MMOL/L — HIGH (ref 5–17)
BUN SERPL-MCNC: 54 MG/DL — HIGH (ref 7–23)
CALCIUM SERPL-MCNC: 9.6 MG/DL — SIGNIFICANT CHANGE UP (ref 8.4–10.5)
CHLORIDE SERPL-SCNC: 99 MMOL/L — SIGNIFICANT CHANGE UP (ref 96–108)
CO2 SERPL-SCNC: 27 MMOL/L — SIGNIFICANT CHANGE UP (ref 22–31)
CREAT SERPL-MCNC: 1.34 MG/DL — HIGH (ref 0.5–1.3)
GLUCOSE SERPL-MCNC: 95 MG/DL — SIGNIFICANT CHANGE UP (ref 70–99)
HCT VFR BLD CALC: 36.8 % — SIGNIFICANT CHANGE UP (ref 34.5–45)
HGB BLD-MCNC: 10.9 G/DL — LOW (ref 11.5–15.5)
MCHC RBC-ENTMCNC: 25 PG — LOW (ref 27–34)
MCHC RBC-ENTMCNC: 29.6 GM/DL — LOW (ref 32–36)
MCV RBC AUTO: 84.4 FL — SIGNIFICANT CHANGE UP (ref 80–100)
PLATELET # BLD AUTO: 199 K/UL — SIGNIFICANT CHANGE UP (ref 150–400)
POTASSIUM SERPL-MCNC: 3.6 MMOL/L — SIGNIFICANT CHANGE UP (ref 3.5–5.3)
POTASSIUM SERPL-SCNC: 3.6 MMOL/L — SIGNIFICANT CHANGE UP (ref 3.5–5.3)
RBC # BLD: 4.36 M/UL — SIGNIFICANT CHANGE UP (ref 3.8–5.2)
RBC # FLD: 18.9 % — HIGH (ref 10.3–14.5)
SODIUM SERPL-SCNC: 144 MMOL/L — SIGNIFICANT CHANGE UP (ref 135–145)
WBC # BLD: 9.96 K/UL — SIGNIFICANT CHANGE UP (ref 3.8–10.5)
WBC # FLD AUTO: 9.96 K/UL — SIGNIFICANT CHANGE UP (ref 3.8–10.5)

## 2017-05-02 PROCEDURE — 93463 DRUG ADMIN & HEMODYNMIC MEAS: CPT | Mod: 59,GC

## 2017-05-02 PROCEDURE — 93451 RIGHT HEART CATH: CPT | Mod: 26,GC

## 2017-05-02 RX ADMIN — Medication 1: at 13:41

## 2017-05-02 RX ADMIN — Medication 1 GRAM(S): at 05:43

## 2017-05-02 RX ADMIN — Medication 25 MILLIGRAM(S): at 05:42

## 2017-05-02 RX ADMIN — HEPARIN SODIUM 5000 UNIT(S): 5000 INJECTION INTRAVENOUS; SUBCUTANEOUS at 05:43

## 2017-05-02 RX ADMIN — Medication 1 GRAM(S): at 13:35

## 2017-05-02 RX ADMIN — Medication 20 MILLIGRAM(S): at 05:42

## 2017-05-02 RX ADMIN — ROFLUMILAST 500 MICROGRAM(S): 500 TABLET ORAL at 05:42

## 2017-05-02 RX ADMIN — Medication 81 MILLIGRAM(S): at 13:36

## 2017-05-02 RX ADMIN — Medication 2 SPRAY(S): at 13:23

## 2017-05-02 RX ADMIN — Medication 1 GRAM(S): at 00:16

## 2017-05-02 RX ADMIN — ATORVASTATIN CALCIUM 20 MILLIGRAM(S): 80 TABLET, FILM COATED ORAL at 21:29

## 2017-05-02 RX ADMIN — Medication 20 MILLIGRAM(S): at 05:43

## 2017-05-02 RX ADMIN — CLOPIDOGREL BISULFATE 75 MILLIGRAM(S): 75 TABLET, FILM COATED ORAL at 13:36

## 2017-05-02 RX ADMIN — Medication 240 MILLIGRAM(S): at 05:43

## 2017-05-02 RX ADMIN — TIOTROPIUM BROMIDE AND OLODATEROL 2 PUFF(S): 3.124; 2.736 SPRAY, METERED RESPIRATORY (INHALATION) at 13:38

## 2017-05-03 LAB
ANION GAP SERPL CALC-SCNC: 14 MMOL/L — SIGNIFICANT CHANGE UP (ref 5–17)
BUN SERPL-MCNC: 49 MG/DL — HIGH (ref 7–23)
CALCIUM SERPL-MCNC: 9.6 MG/DL — SIGNIFICANT CHANGE UP (ref 8.4–10.5)
CHLORIDE SERPL-SCNC: 102 MMOL/L — SIGNIFICANT CHANGE UP (ref 96–108)
CO2 SERPL-SCNC: 29 MMOL/L — SIGNIFICANT CHANGE UP (ref 22–31)
CREAT SERPL-MCNC: 1.28 MG/DL — SIGNIFICANT CHANGE UP (ref 0.5–1.3)
GLUCOSE SERPL-MCNC: 99 MG/DL — SIGNIFICANT CHANGE UP (ref 70–99)
HCT VFR BLD CALC: 32.6 % — LOW (ref 34.5–45)
HGB BLD-MCNC: 9.6 G/DL — LOW (ref 11.5–15.5)
MCHC RBC-ENTMCNC: 25.1 PG — LOW (ref 27–34)
MCHC RBC-ENTMCNC: 29.4 GM/DL — LOW (ref 32–36)
MCV RBC AUTO: 85.1 FL — SIGNIFICANT CHANGE UP (ref 80–100)
PLATELET # BLD AUTO: 142 K/UL — LOW (ref 150–400)
POTASSIUM SERPL-MCNC: 4.4 MMOL/L — SIGNIFICANT CHANGE UP (ref 3.5–5.3)
POTASSIUM SERPL-SCNC: 4.4 MMOL/L — SIGNIFICANT CHANGE UP (ref 3.5–5.3)
RBC # BLD: 3.83 M/UL — SIGNIFICANT CHANGE UP (ref 3.8–5.2)
RBC # FLD: 18.8 % — HIGH (ref 10.3–14.5)
SODIUM SERPL-SCNC: 145 MMOL/L — SIGNIFICANT CHANGE UP (ref 135–145)
WBC # BLD: 5.79 K/UL — SIGNIFICANT CHANGE UP (ref 3.8–10.5)
WBC # FLD AUTO: 5.79 K/UL — SIGNIFICANT CHANGE UP (ref 3.8–10.5)

## 2017-05-03 PROCEDURE — 71010: CPT | Mod: 26

## 2017-05-03 PROCEDURE — 99233 SBSQ HOSP IP/OBS HIGH 50: CPT

## 2017-05-03 RX ORDER — LEVALBUTEROL 1.25 MG/.5ML
0.63 SOLUTION, CONCENTRATE RESPIRATORY (INHALATION) EVERY 8 HOURS
Qty: 0 | Refills: 0 | Status: DISCONTINUED | OUTPATIENT
Start: 2017-05-03 | End: 2017-05-09

## 2017-05-03 RX ORDER — ACETYLCYSTEINE 200 MG/ML
3 VIAL (ML) MISCELLANEOUS EVERY 6 HOURS
Qty: 0 | Refills: 0 | Status: DISCONTINUED | OUTPATIENT
Start: 2017-05-03 | End: 2017-05-09

## 2017-05-03 RX ADMIN — Medication 0.12 MILLIGRAM(S): at 13:05

## 2017-05-03 RX ADMIN — HEPARIN SODIUM 5000 UNIT(S): 5000 INJECTION INTRAVENOUS; SUBCUTANEOUS at 17:45

## 2017-05-03 RX ADMIN — Medication 20 MILLIGRAM(S): at 06:26

## 2017-05-03 RX ADMIN — Medication 1 GRAM(S): at 13:01

## 2017-05-03 RX ADMIN — Medication 20 MILLIGRAM(S): at 17:44

## 2017-05-03 RX ADMIN — Medication 25 MILLIGRAM(S): at 02:05

## 2017-05-03 RX ADMIN — Medication 1 GRAM(S): at 06:35

## 2017-05-03 RX ADMIN — LEVALBUTEROL 0.63 MILLIGRAM(S): 1.25 SOLUTION, CONCENTRATE RESPIRATORY (INHALATION) at 22:31

## 2017-05-03 RX ADMIN — Medication 1200 MILLIGRAM(S): at 17:41

## 2017-05-03 RX ADMIN — Medication 81 MILLIGRAM(S): at 13:03

## 2017-05-03 RX ADMIN — ATORVASTATIN CALCIUM 20 MILLIGRAM(S): 80 TABLET, FILM COATED ORAL at 22:31

## 2017-05-03 RX ADMIN — TIOTROPIUM BROMIDE AND OLODATEROL 2 PUFF(S): 3.124; 2.736 SPRAY, METERED RESPIRATORY (INHALATION) at 12:59

## 2017-05-03 RX ADMIN — HEPARIN SODIUM 5000 UNIT(S): 5000 INJECTION INTRAVENOUS; SUBCUTANEOUS at 06:27

## 2017-05-03 RX ADMIN — Medication 240 MILLIGRAM(S): at 06:26

## 2017-05-03 RX ADMIN — Medication 3 MILLILITER(S): at 17:39

## 2017-05-03 RX ADMIN — CLOPIDOGREL BISULFATE 75 MILLIGRAM(S): 75 TABLET, FILM COATED ORAL at 13:04

## 2017-05-03 RX ADMIN — LEVALBUTEROL 0.63 MILLIGRAM(S): 1.25 SOLUTION, CONCENTRATE RESPIRATORY (INHALATION) at 17:35

## 2017-05-03 RX ADMIN — Medication 25 MILLIGRAM(S): at 17:48

## 2017-05-03 RX ADMIN — Medication 1 GRAM(S): at 17:43

## 2017-05-03 RX ADMIN — ROFLUMILAST 500 MICROGRAM(S): 500 TABLET ORAL at 06:35

## 2017-05-03 RX ADMIN — Medication 2: at 17:49

## 2017-05-03 NOTE — PROVIDER CONTACT NOTE (OTHER) - ACTION/TREATMENT ORDERED:
pt is being managed with medications to control HR. pt may need placement of pacemaker this admission. cardiology following, no additional medications at this time.

## 2017-05-04 LAB
ANION GAP SERPL CALC-SCNC: 11 MMOL/L — SIGNIFICANT CHANGE UP (ref 5–17)
BUN SERPL-MCNC: 51 MG/DL — HIGH (ref 7–23)
CALCIUM SERPL-MCNC: 9 MG/DL — SIGNIFICANT CHANGE UP (ref 8.4–10.5)
CHLORIDE SERPL-SCNC: 100 MMOL/L — SIGNIFICANT CHANGE UP (ref 96–108)
CO2 SERPL-SCNC: 31 MMOL/L — SIGNIFICANT CHANGE UP (ref 22–31)
CREAT SERPL-MCNC: 1.43 MG/DL — HIGH (ref 0.5–1.3)
GLUCOSE SERPL-MCNC: 95 MG/DL — SIGNIFICANT CHANGE UP (ref 70–99)
HCT VFR BLD CALC: 28.5 % — LOW (ref 34.5–45)
HGB BLD-MCNC: 8.7 G/DL — LOW (ref 11.5–15.5)
MCHC RBC-ENTMCNC: 26 PG — LOW (ref 27–34)
MCHC RBC-ENTMCNC: 30.5 GM/DL — LOW (ref 32–36)
MCV RBC AUTO: 85.1 FL — SIGNIFICANT CHANGE UP (ref 80–100)
PLATELET # BLD AUTO: 127 K/UL — LOW (ref 150–400)
POTASSIUM SERPL-MCNC: 4.1 MMOL/L — SIGNIFICANT CHANGE UP (ref 3.5–5.3)
POTASSIUM SERPL-SCNC: 4.1 MMOL/L — SIGNIFICANT CHANGE UP (ref 3.5–5.3)
RBC # BLD: 3.35 M/UL — LOW (ref 3.8–5.2)
RBC # FLD: 18.7 % — HIGH (ref 10.3–14.5)
SODIUM SERPL-SCNC: 142 MMOL/L — SIGNIFICANT CHANGE UP (ref 135–145)
WBC # BLD: 4.58 K/UL — SIGNIFICANT CHANGE UP (ref 3.8–10.5)
WBC # FLD AUTO: 4.58 K/UL — SIGNIFICANT CHANGE UP (ref 3.8–10.5)

## 2017-05-04 PROCEDURE — 99232 SBSQ HOSP IP/OBS MODERATE 35: CPT

## 2017-05-04 RX ORDER — METOPROLOL TARTRATE 50 MG
25 TABLET ORAL EVERY 8 HOURS
Qty: 0 | Refills: 0 | Status: DISCONTINUED | OUTPATIENT
Start: 2017-05-04 | End: 2017-05-12

## 2017-05-04 RX ADMIN — Medication 25 MILLIGRAM(S): at 09:41

## 2017-05-04 RX ADMIN — Medication 240 MILLIGRAM(S): at 09:39

## 2017-05-04 RX ADMIN — HEPARIN SODIUM 5000 UNIT(S): 5000 INJECTION INTRAVENOUS; SUBCUTANEOUS at 09:43

## 2017-05-04 RX ADMIN — ATORVASTATIN CALCIUM 20 MILLIGRAM(S): 80 TABLET, FILM COATED ORAL at 21:06

## 2017-05-04 RX ADMIN — Medication 1 GRAM(S): at 21:13

## 2017-05-04 RX ADMIN — Medication 1 GRAM(S): at 13:57

## 2017-05-04 RX ADMIN — HEPARIN SODIUM 5000 UNIT(S): 5000 INJECTION INTRAVENOUS; SUBCUTANEOUS at 21:06

## 2017-05-04 RX ADMIN — Medication 3 MILLILITER(S): at 13:57

## 2017-05-04 RX ADMIN — LEVALBUTEROL 0.63 MILLIGRAM(S): 1.25 SOLUTION, CONCENTRATE RESPIRATORY (INHALATION) at 09:39

## 2017-05-04 RX ADMIN — Medication 20 MILLIGRAM(S): at 09:38

## 2017-05-04 RX ADMIN — Medication 10 MILLIGRAM(S): at 09:43

## 2017-05-04 RX ADMIN — Medication 3 MILLILITER(S): at 21:17

## 2017-05-04 RX ADMIN — CLOPIDOGREL BISULFATE 75 MILLIGRAM(S): 75 TABLET, FILM COATED ORAL at 13:57

## 2017-05-04 RX ADMIN — Medication 81 MILLIGRAM(S): at 13:56

## 2017-05-04 RX ADMIN — ROFLUMILAST 500 MICROGRAM(S): 500 TABLET ORAL at 09:42

## 2017-05-04 RX ADMIN — Medication 1200 MILLIGRAM(S): at 21:06

## 2017-05-04 RX ADMIN — Medication 3 MILLILITER(S): at 09:40

## 2017-05-04 RX ADMIN — LEVALBUTEROL 0.63 MILLIGRAM(S): 1.25 SOLUTION, CONCENTRATE RESPIRATORY (INHALATION) at 17:33

## 2017-05-04 RX ADMIN — TIOTROPIUM BROMIDE AND OLODATEROL 2 PUFF(S): 3.124; 2.736 SPRAY, METERED RESPIRATORY (INHALATION) at 13:58

## 2017-05-04 RX ADMIN — Medication 1 GRAM(S): at 09:43

## 2017-05-04 RX ADMIN — Medication 25 MILLIGRAM(S): at 21:13

## 2017-05-04 RX ADMIN — LEVALBUTEROL 0.63 MILLIGRAM(S): 1.25 SOLUTION, CONCENTRATE RESPIRATORY (INHALATION) at 21:17

## 2017-05-04 RX ADMIN — Medication 1200 MILLIGRAM(S): at 09:38

## 2017-05-05 LAB
ANION GAP SERPL CALC-SCNC: 21 MMOL/L — HIGH (ref 5–17)
BUN SERPL-MCNC: 43 MG/DL — HIGH (ref 7–23)
CALCIUM SERPL-MCNC: 9.2 MG/DL — SIGNIFICANT CHANGE UP (ref 8.4–10.5)
CHLORIDE SERPL-SCNC: 100 MMOL/L — SIGNIFICANT CHANGE UP (ref 96–108)
CO2 SERPL-SCNC: 23 MMOL/L — SIGNIFICANT CHANGE UP (ref 22–31)
CREAT SERPL-MCNC: 1.21 MG/DL — SIGNIFICANT CHANGE UP (ref 0.5–1.3)
GLUCOSE SERPL-MCNC: 92 MG/DL — SIGNIFICANT CHANGE UP (ref 70–99)
HCT VFR BLD CALC: 31.3 % — LOW (ref 34.5–45)
HGB BLD-MCNC: 9.3 G/DL — LOW (ref 11.5–15.5)
MCHC RBC-ENTMCNC: 25.7 PG — LOW (ref 27–34)
MCHC RBC-ENTMCNC: 29.7 GM/DL — LOW (ref 32–36)
MCV RBC AUTO: 86.5 FL — SIGNIFICANT CHANGE UP (ref 80–100)
PLATELET # BLD AUTO: 129 K/UL — LOW (ref 150–400)
POTASSIUM SERPL-MCNC: 4 MMOL/L — SIGNIFICANT CHANGE UP (ref 3.5–5.3)
POTASSIUM SERPL-SCNC: 4 MMOL/L — SIGNIFICANT CHANGE UP (ref 3.5–5.3)
RBC # BLD: 3.62 M/UL — LOW (ref 3.8–5.2)
RBC # FLD: 18.5 % — HIGH (ref 10.3–14.5)
SODIUM SERPL-SCNC: 144 MMOL/L — SIGNIFICANT CHANGE UP (ref 135–145)
WBC # BLD: 5.8 K/UL — SIGNIFICANT CHANGE UP (ref 3.8–10.5)
WBC # FLD AUTO: 5.8 K/UL — SIGNIFICANT CHANGE UP (ref 3.8–10.5)

## 2017-05-05 PROCEDURE — 99232 SBSQ HOSP IP/OBS MODERATE 35: CPT

## 2017-05-05 PROCEDURE — 71010: CPT | Mod: 26

## 2017-05-05 RX ADMIN — Medication 25 MILLIGRAM(S): at 21:15

## 2017-05-05 RX ADMIN — Medication 81 MILLIGRAM(S): at 12:27

## 2017-05-05 RX ADMIN — Medication 25 MILLIGRAM(S): at 12:31

## 2017-05-05 RX ADMIN — Medication 3 MILLILITER(S): at 21:16

## 2017-05-05 RX ADMIN — Medication 1 APPLICATION(S): at 05:12

## 2017-05-05 RX ADMIN — ROFLUMILAST 500 MICROGRAM(S): 500 TABLET ORAL at 05:13

## 2017-05-05 RX ADMIN — HEPARIN SODIUM 5000 UNIT(S): 5000 INJECTION INTRAVENOUS; SUBCUTANEOUS at 05:12

## 2017-05-05 RX ADMIN — Medication 10 MILLIGRAM(S): at 05:13

## 2017-05-05 RX ADMIN — LEVALBUTEROL 0.63 MILLIGRAM(S): 1.25 SOLUTION, CONCENTRATE RESPIRATORY (INHALATION) at 21:16

## 2017-05-05 RX ADMIN — Medication 20 MILLIGRAM(S): at 06:19

## 2017-05-05 RX ADMIN — ATORVASTATIN CALCIUM 20 MILLIGRAM(S): 80 TABLET, FILM COATED ORAL at 21:15

## 2017-05-05 RX ADMIN — Medication 1 GRAM(S): at 18:06

## 2017-05-05 RX ADMIN — LEVALBUTEROL 0.63 MILLIGRAM(S): 1.25 SOLUTION, CONCENTRATE RESPIRATORY (INHALATION) at 12:33

## 2017-05-05 RX ADMIN — Medication 25 MILLIGRAM(S): at 05:13

## 2017-05-05 RX ADMIN — Medication 20 MILLIGRAM(S): at 18:22

## 2017-05-05 RX ADMIN — Medication 1200 MILLIGRAM(S): at 05:13

## 2017-05-05 RX ADMIN — Medication 240 MILLIGRAM(S): at 05:13

## 2017-05-05 RX ADMIN — Medication 0.12 MILLIGRAM(S): at 12:28

## 2017-05-05 RX ADMIN — Medication 1 GRAM(S): at 12:29

## 2017-05-05 RX ADMIN — CLOPIDOGREL BISULFATE 75 MILLIGRAM(S): 75 TABLET, FILM COATED ORAL at 12:28

## 2017-05-05 RX ADMIN — Medication 3 MILLILITER(S): at 12:27

## 2017-05-05 RX ADMIN — TIOTROPIUM BROMIDE AND OLODATEROL 2 PUFF(S): 3.124; 2.736 SPRAY, METERED RESPIRATORY (INHALATION) at 12:30

## 2017-05-05 RX ADMIN — Medication 1200 MILLIGRAM(S): at 18:05

## 2017-05-05 RX ADMIN — HEPARIN SODIUM 5000 UNIT(S): 5000 INJECTION INTRAVENOUS; SUBCUTANEOUS at 18:05

## 2017-05-06 LAB
ANION GAP SERPL CALC-SCNC: 16 MMOL/L — SIGNIFICANT CHANGE UP (ref 5–17)
BUN SERPL-MCNC: 38 MG/DL — HIGH (ref 7–23)
CALCIUM SERPL-MCNC: 9.4 MG/DL — SIGNIFICANT CHANGE UP (ref 8.4–10.5)
CHLORIDE SERPL-SCNC: 100 MMOL/L — SIGNIFICANT CHANGE UP (ref 96–108)
CO2 SERPL-SCNC: 25 MMOL/L — SIGNIFICANT CHANGE UP (ref 22–31)
CREAT SERPL-MCNC: 1.1 MG/DL — SIGNIFICANT CHANGE UP (ref 0.5–1.3)
GLUCOSE SERPL-MCNC: 85 MG/DL — SIGNIFICANT CHANGE UP (ref 70–99)
HCT VFR BLD CALC: 32.2 % — LOW (ref 34.5–45)
HGB BLD-MCNC: 9.7 G/DL — LOW (ref 11.5–15.5)
MCHC RBC-ENTMCNC: 26 PG — LOW (ref 27–34)
MCHC RBC-ENTMCNC: 30.1 GM/DL — LOW (ref 32–36)
MCV RBC AUTO: 86.3 FL — SIGNIFICANT CHANGE UP (ref 80–100)
PLATELET # BLD AUTO: 150 K/UL — SIGNIFICANT CHANGE UP (ref 150–400)
POTASSIUM SERPL-MCNC: 3.6 MMOL/L — SIGNIFICANT CHANGE UP (ref 3.5–5.3)
POTASSIUM SERPL-SCNC: 3.6 MMOL/L — SIGNIFICANT CHANGE UP (ref 3.5–5.3)
RBC # BLD: 3.73 M/UL — LOW (ref 3.8–5.2)
RBC # FLD: 18.8 % — HIGH (ref 10.3–14.5)
SODIUM SERPL-SCNC: 141 MMOL/L — SIGNIFICANT CHANGE UP (ref 135–145)
WBC # BLD: 6.07 K/UL — SIGNIFICANT CHANGE UP (ref 3.8–10.5)
WBC # FLD AUTO: 6.07 K/UL — SIGNIFICANT CHANGE UP (ref 3.8–10.5)

## 2017-05-06 PROCEDURE — 99232 SBSQ HOSP IP/OBS MODERATE 35: CPT

## 2017-05-06 RX ADMIN — Medication 25 MILLIGRAM(S): at 21:31

## 2017-05-06 RX ADMIN — Medication 3 MILLILITER(S): at 11:17

## 2017-05-06 RX ADMIN — Medication 10 MILLIGRAM(S): at 05:18

## 2017-05-06 RX ADMIN — CLOPIDOGREL BISULFATE 75 MILLIGRAM(S): 75 TABLET, FILM COATED ORAL at 13:09

## 2017-05-06 RX ADMIN — HEPARIN SODIUM 5000 UNIT(S): 5000 INJECTION INTRAVENOUS; SUBCUTANEOUS at 17:51

## 2017-05-06 RX ADMIN — LEVALBUTEROL 0.63 MILLIGRAM(S): 1.25 SOLUTION, CONCENTRATE RESPIRATORY (INHALATION) at 11:04

## 2017-05-06 RX ADMIN — Medication 25 MILLIGRAM(S): at 13:14

## 2017-05-06 RX ADMIN — ROFLUMILAST 500 MICROGRAM(S): 500 TABLET ORAL at 05:18

## 2017-05-06 RX ADMIN — LEVALBUTEROL 0.63 MILLIGRAM(S): 1.25 SOLUTION, CONCENTRATE RESPIRATORY (INHALATION) at 21:31

## 2017-05-06 RX ADMIN — Medication 240 MILLIGRAM(S): at 05:18

## 2017-05-06 RX ADMIN — HEPARIN SODIUM 5000 UNIT(S): 5000 INJECTION INTRAVENOUS; SUBCUTANEOUS at 05:18

## 2017-05-06 RX ADMIN — Medication 25 MILLIGRAM(S): at 05:18

## 2017-05-06 RX ADMIN — ATORVASTATIN CALCIUM 20 MILLIGRAM(S): 80 TABLET, FILM COATED ORAL at 21:31

## 2017-05-06 RX ADMIN — Medication 1 GRAM(S): at 05:22

## 2017-05-06 RX ADMIN — Medication 500 MICROGRAM(S): at 19:05

## 2017-05-06 RX ADMIN — Medication 20 MILLIGRAM(S): at 05:23

## 2017-05-06 RX ADMIN — Medication 3 MILLILITER(S): at 21:33

## 2017-05-06 RX ADMIN — TIOTROPIUM BROMIDE AND OLODATEROL 2 PUFF(S): 3.124; 2.736 SPRAY, METERED RESPIRATORY (INHALATION) at 12:46

## 2017-05-06 RX ADMIN — Medication 20 MILLIGRAM(S): at 17:51

## 2017-05-06 RX ADMIN — Medication 81 MILLIGRAM(S): at 13:09

## 2017-05-06 RX ADMIN — Medication 1 GRAM(S): at 13:08

## 2017-05-06 RX ADMIN — Medication 1 GRAM(S): at 17:49

## 2017-05-06 NOTE — PROVIDER CONTACT NOTE (OTHER) - BACKGROUND
Pt admitted with CHF exacerbation. hx of afib, COPD, lung ca with resection. Pt goes in and out of rapid afib

## 2017-05-06 NOTE — PROVIDER CONTACT NOTE (OTHER) - REASON
Brad on tele
HR - 130s on tele monitor
Pt went into rapid afib
pt HR elevated
Pt arm bleeding w/ heparin drip running

## 2017-05-07 RX ADMIN — LEVALBUTEROL 0.63 MILLIGRAM(S): 1.25 SOLUTION, CONCENTRATE RESPIRATORY (INHALATION) at 10:33

## 2017-05-07 RX ADMIN — Medication 0.12 MILLIGRAM(S): at 12:21

## 2017-05-07 RX ADMIN — LEVALBUTEROL 0.63 MILLIGRAM(S): 1.25 SOLUTION, CONCENTRATE RESPIRATORY (INHALATION) at 14:49

## 2017-05-07 RX ADMIN — Medication 3 MILLILITER(S): at 10:34

## 2017-05-07 RX ADMIN — Medication 1200 MILLIGRAM(S): at 05:52

## 2017-05-07 RX ADMIN — Medication 1 GRAM(S): at 23:57

## 2017-05-07 RX ADMIN — Medication 1 GRAM(S): at 05:52

## 2017-05-07 RX ADMIN — Medication 3 MILLILITER(S): at 18:49

## 2017-05-07 RX ADMIN — Medication 1200 MILLIGRAM(S): at 18:51

## 2017-05-07 RX ADMIN — TIOTROPIUM BROMIDE AND OLODATEROL 2 PUFF(S): 3.124; 2.736 SPRAY, METERED RESPIRATORY (INHALATION) at 10:25

## 2017-05-07 RX ADMIN — HEPARIN SODIUM 5000 UNIT(S): 5000 INJECTION INTRAVENOUS; SUBCUTANEOUS at 05:52

## 2017-05-07 RX ADMIN — Medication 2 SPRAY(S): at 09:36

## 2017-05-07 RX ADMIN — ATORVASTATIN CALCIUM 20 MILLIGRAM(S): 80 TABLET, FILM COATED ORAL at 21:18

## 2017-05-07 RX ADMIN — Medication 81 MILLIGRAM(S): at 12:21

## 2017-05-07 RX ADMIN — HEPARIN SODIUM 5000 UNIT(S): 5000 INJECTION INTRAVENOUS; SUBCUTANEOUS at 18:54

## 2017-05-07 RX ADMIN — Medication 20 MILLIGRAM(S): at 05:58

## 2017-05-07 RX ADMIN — Medication 25 MILLIGRAM(S): at 05:57

## 2017-05-07 RX ADMIN — LEVALBUTEROL 0.63 MILLIGRAM(S): 1.25 SOLUTION, CONCENTRATE RESPIRATORY (INHALATION) at 21:18

## 2017-05-07 RX ADMIN — Medication 240 MILLIGRAM(S): at 05:52

## 2017-05-07 RX ADMIN — Medication 25 MILLIGRAM(S): at 21:19

## 2017-05-07 RX ADMIN — CLOPIDOGREL BISULFATE 75 MILLIGRAM(S): 75 TABLET, FILM COATED ORAL at 12:21

## 2017-05-07 RX ADMIN — ROFLUMILAST 500 MICROGRAM(S): 500 TABLET ORAL at 05:52

## 2017-05-07 RX ADMIN — Medication 20 MILLIGRAM(S): at 18:48

## 2017-05-07 RX ADMIN — Medication 25 MILLIGRAM(S): at 14:49

## 2017-05-08 LAB
ANION GAP SERPL CALC-SCNC: 12 MMOL/L — SIGNIFICANT CHANGE UP (ref 5–17)
BUN SERPL-MCNC: 34 MG/DL — HIGH (ref 7–23)
CALCIUM SERPL-MCNC: 9.4 MG/DL — SIGNIFICANT CHANGE UP (ref 8.4–10.5)
CHLORIDE SERPL-SCNC: 103 MMOL/L — SIGNIFICANT CHANGE UP (ref 96–108)
CO2 SERPL-SCNC: 28 MMOL/L — SIGNIFICANT CHANGE UP (ref 22–31)
CREAT SERPL-MCNC: 1.25 MG/DL — SIGNIFICANT CHANGE UP (ref 0.5–1.3)
GLUCOSE SERPL-MCNC: 111 MG/DL — HIGH (ref 70–99)
HCT VFR BLD CALC: 30.7 % — LOW (ref 34.5–45)
HGB BLD-MCNC: 9.4 G/DL — LOW (ref 11.5–15.5)
MCHC RBC-ENTMCNC: 26.5 PG — LOW (ref 27–34)
MCHC RBC-ENTMCNC: 30.6 GM/DL — LOW (ref 32–36)
MCV RBC AUTO: 86.5 FL — SIGNIFICANT CHANGE UP (ref 80–100)
PLATELET # BLD AUTO: 133 K/UL — LOW (ref 150–400)
POTASSIUM SERPL-MCNC: 3.6 MMOL/L — SIGNIFICANT CHANGE UP (ref 3.5–5.3)
POTASSIUM SERPL-SCNC: 3.6 MMOL/L — SIGNIFICANT CHANGE UP (ref 3.5–5.3)
RBC # BLD: 3.55 M/UL — LOW (ref 3.8–5.2)
RBC # FLD: 18.8 % — HIGH (ref 10.3–14.5)
SODIUM SERPL-SCNC: 143 MMOL/L — SIGNIFICANT CHANGE UP (ref 135–145)
WBC # BLD: 5.51 K/UL — SIGNIFICANT CHANGE UP (ref 3.8–10.5)
WBC # FLD AUTO: 5.51 K/UL — SIGNIFICANT CHANGE UP (ref 3.8–10.5)

## 2017-05-08 PROCEDURE — 99232 SBSQ HOSP IP/OBS MODERATE 35: CPT

## 2017-05-08 PROCEDURE — 93923 UPR/LXTR ART STDY 3+ LVLS: CPT | Mod: 26

## 2017-05-08 RX ADMIN — Medication 240 MILLIGRAM(S): at 05:54

## 2017-05-08 RX ADMIN — LEVALBUTEROL 0.63 MILLIGRAM(S): 1.25 SOLUTION, CONCENTRATE RESPIRATORY (INHALATION) at 15:04

## 2017-05-08 RX ADMIN — Medication 25 MILLIGRAM(S): at 15:04

## 2017-05-08 RX ADMIN — Medication 1 GRAM(S): at 12:51

## 2017-05-08 RX ADMIN — Medication 1 GRAM(S): at 18:10

## 2017-05-08 RX ADMIN — HEPARIN SODIUM 5000 UNIT(S): 5000 INJECTION INTRAVENOUS; SUBCUTANEOUS at 17:58

## 2017-05-08 RX ADMIN — ATORVASTATIN CALCIUM 20 MILLIGRAM(S): 80 TABLET, FILM COATED ORAL at 21:44

## 2017-05-08 RX ADMIN — Medication 25 MILLIGRAM(S): at 05:54

## 2017-05-08 RX ADMIN — Medication 81 MILLIGRAM(S): at 12:46

## 2017-05-08 RX ADMIN — HEPARIN SODIUM 5000 UNIT(S): 5000 INJECTION INTRAVENOUS; SUBCUTANEOUS at 05:55

## 2017-05-08 RX ADMIN — Medication 1 GRAM(S): at 05:55

## 2017-05-08 RX ADMIN — TIOTROPIUM BROMIDE AND OLODATEROL 2 PUFF(S): 3.124; 2.736 SPRAY, METERED RESPIRATORY (INHALATION) at 15:00

## 2017-05-08 RX ADMIN — ROFLUMILAST 500 MICROGRAM(S): 500 TABLET ORAL at 05:55

## 2017-05-08 RX ADMIN — LEVALBUTEROL 0.63 MILLIGRAM(S): 1.25 SOLUTION, CONCENTRATE RESPIRATORY (INHALATION) at 09:42

## 2017-05-08 RX ADMIN — Medication 5 MILLIGRAM(S): at 05:54

## 2017-05-08 RX ADMIN — Medication 1200 MILLIGRAM(S): at 07:19

## 2017-05-08 RX ADMIN — Medication 20 MILLIGRAM(S): at 17:58

## 2017-05-08 RX ADMIN — CLOPIDOGREL BISULFATE 75 MILLIGRAM(S): 75 TABLET, FILM COATED ORAL at 12:46

## 2017-05-08 RX ADMIN — Medication 1200 MILLIGRAM(S): at 17:58

## 2017-05-08 RX ADMIN — LEVALBUTEROL 0.63 MILLIGRAM(S): 1.25 SOLUTION, CONCENTRATE RESPIRATORY (INHALATION) at 21:46

## 2017-05-08 RX ADMIN — Medication 25 MILLIGRAM(S): at 21:44

## 2017-05-08 RX ADMIN — Medication 20 MILLIGRAM(S): at 05:54

## 2017-05-09 PROCEDURE — 99233 SBSQ HOSP IP/OBS HIGH 50: CPT | Mod: GC

## 2017-05-09 PROCEDURE — 71010: CPT | Mod: 26

## 2017-05-09 RX ADMIN — Medication 5 MILLIGRAM(S): at 06:08

## 2017-05-09 RX ADMIN — Medication 25 MILLIGRAM(S): at 21:26

## 2017-05-09 RX ADMIN — Medication 25 MILLIGRAM(S): at 13:41

## 2017-05-09 RX ADMIN — ATORVASTATIN CALCIUM 20 MILLIGRAM(S): 80 TABLET, FILM COATED ORAL at 21:26

## 2017-05-09 RX ADMIN — Medication 1200 MILLIGRAM(S): at 06:08

## 2017-05-09 RX ADMIN — TIOTROPIUM BROMIDE AND OLODATEROL 2 PUFF(S): 3.124; 2.736 SPRAY, METERED RESPIRATORY (INHALATION) at 12:18

## 2017-05-09 RX ADMIN — Medication 81 MILLIGRAM(S): at 12:09

## 2017-05-09 RX ADMIN — Medication 0.12 MILLIGRAM(S): at 12:09

## 2017-05-09 RX ADMIN — Medication 20 MILLIGRAM(S): at 06:08

## 2017-05-09 RX ADMIN — HEPARIN SODIUM 5000 UNIT(S): 5000 INJECTION INTRAVENOUS; SUBCUTANEOUS at 06:08

## 2017-05-09 RX ADMIN — Medication 240 MILLIGRAM(S): at 06:08

## 2017-05-09 RX ADMIN — Medication 1 GRAM(S): at 12:09

## 2017-05-09 RX ADMIN — Medication 20 MILLIGRAM(S): at 18:25

## 2017-05-09 RX ADMIN — Medication 25 MILLIGRAM(S): at 06:08

## 2017-05-09 RX ADMIN — CLOPIDOGREL BISULFATE 75 MILLIGRAM(S): 75 TABLET, FILM COATED ORAL at 12:09

## 2017-05-09 RX ADMIN — HEPARIN SODIUM 5000 UNIT(S): 5000 INJECTION INTRAVENOUS; SUBCUTANEOUS at 18:25

## 2017-05-09 RX ADMIN — ROFLUMILAST 500 MICROGRAM(S): 500 TABLET ORAL at 06:08

## 2017-05-10 LAB
ANION GAP SERPL CALC-SCNC: 13 MMOL/L — SIGNIFICANT CHANGE UP (ref 5–17)
BUN SERPL-MCNC: 38 MG/DL — HIGH (ref 7–23)
CALCIUM SERPL-MCNC: 9.5 MG/DL — SIGNIFICANT CHANGE UP (ref 8.4–10.5)
CHLORIDE SERPL-SCNC: 102 MMOL/L — SIGNIFICANT CHANGE UP (ref 96–108)
CO2 SERPL-SCNC: 28 MMOL/L — SIGNIFICANT CHANGE UP (ref 22–31)
CREAT SERPL-MCNC: 1.31 MG/DL — HIGH (ref 0.5–1.3)
GLUCOSE SERPL-MCNC: 91 MG/DL — SIGNIFICANT CHANGE UP (ref 70–99)
HCT VFR BLD CALC: 30 % — LOW (ref 34.5–45)
HGB BLD-MCNC: 9.2 G/DL — LOW (ref 11.5–15.5)
MCHC RBC-ENTMCNC: 26.5 PG — LOW (ref 27–34)
MCHC RBC-ENTMCNC: 30.7 GM/DL — LOW (ref 32–36)
MCV RBC AUTO: 86.5 FL — SIGNIFICANT CHANGE UP (ref 80–100)
PLATELET # BLD AUTO: 134 K/UL — LOW (ref 150–400)
POTASSIUM SERPL-MCNC: 4.3 MMOL/L — SIGNIFICANT CHANGE UP (ref 3.5–5.3)
POTASSIUM SERPL-SCNC: 4.3 MMOL/L — SIGNIFICANT CHANGE UP (ref 3.5–5.3)
RAPID RVP RESULT: DETECTED
RBC # BLD: 3.47 M/UL — LOW (ref 3.8–5.2)
RBC # FLD: 18.5 % — HIGH (ref 10.3–14.5)
RV+EV RNA SPEC QL NAA+PROBE: DETECTED
SODIUM SERPL-SCNC: 143 MMOL/L — SIGNIFICANT CHANGE UP (ref 135–145)
WBC # BLD: 4.31 K/UL — SIGNIFICANT CHANGE UP (ref 3.8–10.5)
WBC # FLD AUTO: 4.31 K/UL — SIGNIFICANT CHANGE UP (ref 3.8–10.5)

## 2017-05-10 RX ADMIN — HEPARIN SODIUM 5000 UNIT(S): 5000 INJECTION INTRAVENOUS; SUBCUTANEOUS at 18:41

## 2017-05-10 RX ADMIN — Medication 5 MILLIGRAM(S): at 05:34

## 2017-05-10 RX ADMIN — CLOPIDOGREL BISULFATE 75 MILLIGRAM(S): 75 TABLET, FILM COATED ORAL at 13:28

## 2017-05-10 RX ADMIN — Medication 81 MILLIGRAM(S): at 13:28

## 2017-05-10 RX ADMIN — Medication 1: at 13:32

## 2017-05-10 RX ADMIN — Medication 1 GRAM(S): at 05:34

## 2017-05-10 RX ADMIN — HEPARIN SODIUM 5000 UNIT(S): 5000 INJECTION INTRAVENOUS; SUBCUTANEOUS at 05:35

## 2017-05-10 RX ADMIN — Medication 100 MILLIGRAM(S): at 05:34

## 2017-05-10 RX ADMIN — ATORVASTATIN CALCIUM 20 MILLIGRAM(S): 80 TABLET, FILM COATED ORAL at 22:23

## 2017-05-10 RX ADMIN — Medication 240 MILLIGRAM(S): at 05:34

## 2017-05-10 RX ADMIN — Medication 25 MILLIGRAM(S): at 13:28

## 2017-05-10 RX ADMIN — Medication 1200 MILLIGRAM(S): at 05:34

## 2017-05-10 RX ADMIN — Medication 20 MILLIGRAM(S): at 18:41

## 2017-05-10 RX ADMIN — Medication 25 MILLIGRAM(S): at 22:22

## 2017-05-10 RX ADMIN — TIOTROPIUM BROMIDE AND OLODATEROL 2 PUFF(S): 3.124; 2.736 SPRAY, METERED RESPIRATORY (INHALATION) at 12:59

## 2017-05-10 RX ADMIN — ROFLUMILAST 500 MICROGRAM(S): 500 TABLET ORAL at 05:34

## 2017-05-10 RX ADMIN — Medication 20 MILLIGRAM(S): at 13:27

## 2017-05-11 LAB
ANION GAP SERPL CALC-SCNC: 15 MMOL/L — SIGNIFICANT CHANGE UP (ref 5–17)
BUN SERPL-MCNC: 36 MG/DL — HIGH (ref 7–23)
CALCIUM SERPL-MCNC: 9.1 MG/DL — SIGNIFICANT CHANGE UP (ref 8.4–10.5)
CHLORIDE SERPL-SCNC: 101 MMOL/L — SIGNIFICANT CHANGE UP (ref 96–108)
CO2 SERPL-SCNC: 28 MMOL/L — SIGNIFICANT CHANGE UP (ref 22–31)
CREAT SERPL-MCNC: 1.39 MG/DL — HIGH (ref 0.5–1.3)
GLUCOSE SERPL-MCNC: 84 MG/DL — SIGNIFICANT CHANGE UP (ref 70–99)
HCT VFR BLD CALC: 29.2 % — LOW (ref 34.5–45)
HGB BLD-MCNC: 8.9 G/DL — LOW (ref 11.5–15.5)
MCHC RBC-ENTMCNC: 26.1 PG — LOW (ref 27–34)
MCHC RBC-ENTMCNC: 30.5 GM/DL — LOW (ref 32–36)
MCV RBC AUTO: 85.6 FL — SIGNIFICANT CHANGE UP (ref 80–100)
PLATELET # BLD AUTO: 147 K/UL — LOW (ref 150–400)
POTASSIUM SERPL-MCNC: 3.9 MMOL/L — SIGNIFICANT CHANGE UP (ref 3.5–5.3)
POTASSIUM SERPL-SCNC: 3.9 MMOL/L — SIGNIFICANT CHANGE UP (ref 3.5–5.3)
RBC # BLD: 3.41 M/UL — LOW (ref 3.8–5.2)
RBC # FLD: 18.5 % — HIGH (ref 10.3–14.5)
SODIUM SERPL-SCNC: 144 MMOL/L — SIGNIFICANT CHANGE UP (ref 135–145)
WBC # BLD: 4.93 K/UL — SIGNIFICANT CHANGE UP (ref 3.8–10.5)
WBC # FLD AUTO: 4.93 K/UL — SIGNIFICANT CHANGE UP (ref 3.8–10.5)

## 2017-05-11 RX ADMIN — Medication 25 MILLIGRAM(S): at 21:45

## 2017-05-11 RX ADMIN — Medication 240 MILLIGRAM(S): at 06:19

## 2017-05-11 RX ADMIN — Medication 25 MILLIGRAM(S): at 13:36

## 2017-05-11 RX ADMIN — Medication 25 MILLIGRAM(S): at 06:20

## 2017-05-11 RX ADMIN — Medication 81 MILLIGRAM(S): at 13:36

## 2017-05-11 RX ADMIN — ATORVASTATIN CALCIUM 20 MILLIGRAM(S): 80 TABLET, FILM COATED ORAL at 21:45

## 2017-05-11 RX ADMIN — Medication 0.12 MILLIGRAM(S): at 13:36

## 2017-05-11 RX ADMIN — ROFLUMILAST 500 MICROGRAM(S): 500 TABLET ORAL at 06:18

## 2017-05-11 RX ADMIN — CLOPIDOGREL BISULFATE 75 MILLIGRAM(S): 75 TABLET, FILM COATED ORAL at 13:36

## 2017-05-11 RX ADMIN — Medication 4: at 19:30

## 2017-05-11 RX ADMIN — Medication 5 MILLIGRAM(S): at 06:19

## 2017-05-11 RX ADMIN — Medication 20 MILLIGRAM(S): at 06:18

## 2017-05-11 NOTE — PROVIDER CONTACT NOTE (MEDICATION) - BACKGROUND
pt admitted on 4/22/17 with dyspnea, hypoxia , Bl pleural effusions, severe copd. hx hyperthyroid, GIB, afib, gerd, pulmonary htn, DM , lung ca with resection

## 2017-05-12 ENCOUNTER — TRANSCRIPTION ENCOUNTER (OUTPATIENT)
Age: 72
End: 2017-05-12

## 2017-05-12 VITALS
OXYGEN SATURATION: 94 % | SYSTOLIC BLOOD PRESSURE: 112 MMHG | TEMPERATURE: 98 F | DIASTOLIC BLOOD PRESSURE: 79 MMHG | HEART RATE: 88 BPM | RESPIRATION RATE: 20 BRPM

## 2017-05-12 LAB
ANION GAP SERPL CALC-SCNC: 12 MMOL/L — SIGNIFICANT CHANGE UP (ref 5–17)
BUN SERPL-MCNC: 30 MG/DL — HIGH (ref 7–23)
CALCIUM SERPL-MCNC: 9.2 MG/DL — SIGNIFICANT CHANGE UP (ref 8.4–10.5)
CHLORIDE SERPL-SCNC: 102 MMOL/L — SIGNIFICANT CHANGE UP (ref 96–108)
CO2 SERPL-SCNC: 29 MMOL/L — SIGNIFICANT CHANGE UP (ref 22–31)
CREAT SERPL-MCNC: 1.21 MG/DL — SIGNIFICANT CHANGE UP (ref 0.5–1.3)
GLUCOSE SERPL-MCNC: 93 MG/DL — SIGNIFICANT CHANGE UP (ref 70–99)
HCT VFR BLD CALC: 29.6 % — LOW (ref 34.5–45)
HGB BLD-MCNC: 8.6 G/DL — LOW (ref 11.5–15.5)
MCHC RBC-ENTMCNC: 25.1 PG — LOW (ref 27–34)
MCHC RBC-ENTMCNC: 29.1 GM/DL — LOW (ref 32–36)
MCV RBC AUTO: 86.3 FL — SIGNIFICANT CHANGE UP (ref 80–100)
PLATELET # BLD AUTO: 129 K/UL — LOW (ref 150–400)
POTASSIUM SERPL-MCNC: 3.8 MMOL/L — SIGNIFICANT CHANGE UP (ref 3.5–5.3)
POTASSIUM SERPL-SCNC: 3.8 MMOL/L — SIGNIFICANT CHANGE UP (ref 3.5–5.3)
RBC # BLD: 3.43 M/UL — LOW (ref 3.8–5.2)
RBC # FLD: 18.3 % — HIGH (ref 10.3–14.5)
SODIUM SERPL-SCNC: 143 MMOL/L — SIGNIFICANT CHANGE UP (ref 135–145)
WBC # BLD: 3.63 K/UL — LOW (ref 3.8–10.5)
WBC # FLD AUTO: 3.63 K/UL — LOW (ref 3.8–10.5)

## 2017-05-12 PROCEDURE — C1894: CPT

## 2017-05-12 PROCEDURE — 87581 M.PNEUMON DNA AMP PROBE: CPT

## 2017-05-12 PROCEDURE — 87486 CHLMYD PNEUM DNA AMP PROBE: CPT

## 2017-05-12 PROCEDURE — 84295 ASSAY OF SERUM SODIUM: CPT

## 2017-05-12 PROCEDURE — 87633 RESP VIRUS 12-25 TARGETS: CPT

## 2017-05-12 PROCEDURE — 94640 AIRWAY INHALATION TREATMENT: CPT

## 2017-05-12 PROCEDURE — 83735 ASSAY OF MAGNESIUM: CPT

## 2017-05-12 PROCEDURE — 97116 GAIT TRAINING THERAPY: CPT

## 2017-05-12 PROCEDURE — 93005 ELECTROCARDIOGRAM TRACING: CPT

## 2017-05-12 PROCEDURE — 82803 BLOOD GASES ANY COMBINATION: CPT

## 2017-05-12 PROCEDURE — C1769: CPT

## 2017-05-12 PROCEDURE — 80048 BASIC METABOLIC PNL TOTAL CA: CPT

## 2017-05-12 PROCEDURE — 85730 THROMBOPLASTIN TIME PARTIAL: CPT

## 2017-05-12 PROCEDURE — 80162 ASSAY OF DIGOXIN TOTAL: CPT

## 2017-05-12 PROCEDURE — 97530 THERAPEUTIC ACTIVITIES: CPT

## 2017-05-12 PROCEDURE — 93923 UPR/LXTR ART STDY 3+ LVLS: CPT

## 2017-05-12 PROCEDURE — 99285 EMERGENCY DEPT VISIT HI MDM: CPT | Mod: 25

## 2017-05-12 PROCEDURE — 84132 ASSAY OF SERUM POTASSIUM: CPT

## 2017-05-12 PROCEDURE — 85027 COMPLETE CBC AUTOMATED: CPT

## 2017-05-12 PROCEDURE — 73110 X-RAY EXAM OF WRIST: CPT | Mod: 26,RT

## 2017-05-12 PROCEDURE — 84443 ASSAY THYROID STIM HORMONE: CPT

## 2017-05-12 PROCEDURE — 84484 ASSAY OF TROPONIN QUANT: CPT

## 2017-05-12 PROCEDURE — 93306 TTE W/DOPPLER COMPLETE: CPT

## 2017-05-12 PROCEDURE — 85014 HEMATOCRIT: CPT

## 2017-05-12 PROCEDURE — 93463 DRUG ADMIN & HEMODYNMIC MEAS: CPT

## 2017-05-12 PROCEDURE — 93451 RIGHT HEART CATH: CPT

## 2017-05-12 PROCEDURE — 96374 THER/PROPH/DIAG INJ IV PUSH: CPT

## 2017-05-12 PROCEDURE — 71250 CT THORAX DX C-: CPT

## 2017-05-12 PROCEDURE — 84100 ASSAY OF PHOSPHORUS: CPT

## 2017-05-12 PROCEDURE — 73110 X-RAY EXAM OF WRIST: CPT

## 2017-05-12 PROCEDURE — 85610 PROTHROMBIN TIME: CPT

## 2017-05-12 PROCEDURE — 97161 PT EVAL LOW COMPLEX 20 MIN: CPT

## 2017-05-12 PROCEDURE — 83605 ASSAY OF LACTIC ACID: CPT

## 2017-05-12 PROCEDURE — 83880 ASSAY OF NATRIURETIC PEPTIDE: CPT

## 2017-05-12 PROCEDURE — 82330 ASSAY OF CALCIUM: CPT

## 2017-05-12 PROCEDURE — 87798 DETECT AGENT NOS DNA AMP: CPT

## 2017-05-12 PROCEDURE — 93970 EXTREMITY STUDY: CPT

## 2017-05-12 PROCEDURE — 71045 X-RAY EXAM CHEST 1 VIEW: CPT

## 2017-05-12 PROCEDURE — 99232 SBSQ HOSP IP/OBS MODERATE 35: CPT

## 2017-05-12 PROCEDURE — 82435 ASSAY OF BLOOD CHLORIDE: CPT

## 2017-05-12 PROCEDURE — 83036 HEMOGLOBIN GLYCOSYLATED A1C: CPT

## 2017-05-12 PROCEDURE — 82947 ASSAY GLUCOSE BLOOD QUANT: CPT

## 2017-05-12 PROCEDURE — 80053 COMPREHEN METABOLIC PANEL: CPT

## 2017-05-12 RX ORDER — DIGOXIN 250 MCG
1 TABLET ORAL
Qty: 15 | Refills: 0 | OUTPATIENT
Start: 2017-05-12 | End: 2017-06-11

## 2017-05-12 RX ORDER — SUCRALFATE 1 G
10 TABLET ORAL
Qty: 1200 | Refills: 0 | OUTPATIENT
Start: 2017-05-12 | End: 2017-06-11

## 2017-05-12 RX ORDER — METOPROLOL TARTRATE 50 MG
1 TABLET ORAL
Qty: 90 | Refills: 0 | OUTPATIENT
Start: 2017-05-12 | End: 2017-06-11

## 2017-05-12 RX ORDER — DIGOXIN 250 MCG
1 TABLET ORAL
Qty: 0 | Refills: 0 | COMMUNITY

## 2017-05-12 RX ORDER — DILTIAZEM HCL 120 MG
1 CAPSULE, EXT RELEASE 24 HR ORAL
Qty: 30 | Refills: 0 | OUTPATIENT
Start: 2017-05-12 | End: 2017-06-11

## 2017-05-12 RX ORDER — CLOPIDOGREL BISULFATE 75 MG/1
1 TABLET, FILM COATED ORAL
Qty: 30 | Refills: 0 | OUTPATIENT
Start: 2017-05-12 | End: 2017-06-11

## 2017-05-12 RX ORDER — CEPHALEXIN 500 MG
1 CAPSULE ORAL
Qty: 10 | Refills: 0 | OUTPATIENT
Start: 2017-05-12 | End: 2017-05-17

## 2017-05-12 RX ORDER — BACITRACIN ZINC 500 UNIT/G
1 OINTMENT IN PACKET (EA) TOPICAL
Qty: 0 | Refills: 0 | COMMUNITY
Start: 2017-05-12

## 2017-05-12 RX ADMIN — Medication 25 MILLIGRAM(S): at 06:08

## 2017-05-12 RX ADMIN — Medication 25 MILLIGRAM(S): at 14:19

## 2017-05-12 RX ADMIN — Medication 500 MICROGRAM(S): at 12:28

## 2017-05-12 RX ADMIN — Medication 2 SPRAY(S): at 17:12

## 2017-05-12 RX ADMIN — Medication 240 MILLIGRAM(S): at 06:08

## 2017-05-12 RX ADMIN — Medication 81 MILLIGRAM(S): at 12:30

## 2017-05-12 RX ADMIN — Medication 5 MILLIGRAM(S): at 06:08

## 2017-05-12 RX ADMIN — Medication 1: at 17:48

## 2017-05-12 RX ADMIN — CLOPIDOGREL BISULFATE 75 MILLIGRAM(S): 75 TABLET, FILM COATED ORAL at 12:34

## 2017-05-12 RX ADMIN — ROFLUMILAST 500 MICROGRAM(S): 500 TABLET ORAL at 06:08

## 2017-05-12 NOTE — DISCHARGE NOTE ADULT - CARE PROVIDER_API CALL
Pierce Cobb), Internal Medicine  [address]  [place] [address]  Phone: [phone]  Fax: [phone]    Margot Youssef), Critical Care Medicine; Internal Medicine; Pulmonary Disease  [address] [place] [address]  Phone: [phone]  Fax: [phone]    Du Méndez), Cardiovascular Disease; Interventional Cardiology  [address] [place] [address]  Phone: [phone]  Fax: [phone]    Manuel Smith), Internal Medicine; Nephrology  [address]  [place] [address]  Phone: [phone]  Fax: [phone]

## 2017-05-12 NOTE — PROVIDER CONTACT NOTE (MEDICATION) - RECOMMENDATIONS
educate patient on risks of dvt and importance of dvt ppx endorse to md amin
teach pt importance of heparin shot

## 2017-05-12 NOTE — PROVIDER CONTACT NOTE (MEDICATION) - ACTION/TREATMENT ORDERED:
continue to educate pt on importance of dvt ppx and encourage to take, hold per pt request encourage ambulation
teach pt importance of heparin shot

## 2017-05-12 NOTE — DISCHARGE NOTE ADULT - MEDICATION SUMMARY - MEDICATIONS TO CHANGE
I will SWITCH the dose or number of times a day I take the medications listed below when I get home from the hospital:    Klor-Con 10 mEq oral tablet, extended release  -- 1 tab(s) by mouth 2 times a day    diltiazem 12 hour extended release  -- 240 milligram(s) by mouth twice    torsemide 20 mg oral tablet  -- 1 tab(s) by mouth every 12 hours    metoprolol tartrate 25 mg oral tablet  -- 2 tab(s) by mouth 3 times a day    predniSONE 10 mg oral tablet  -- 0.5 tab(s) by mouth once a day    digoxin 125 mcg (0.125 mg) oral tablet  -- 1 tab(s) by mouth every 3 days    torsemide 20 mg oral tablet  -- 1 tab(s) by mouth every 12 hours

## 2017-05-12 NOTE — DISCHARGE NOTE ADULT - PLAN OF CARE
f/u with Dr Youssef for pulmonary rehab; take medication as prescribed maintain adequate oxygenation rate control take prescribed mediation; f/u with cardiology take prescribed medication; f/u with Pulmonary Dr. Weiss monitor BS; f/u with PCP or private endocrinologist take prescribed medication; f/u with PCP f/u with Dr Smith in one month

## 2017-05-12 NOTE — DISCHARGE NOTE ADULT - PATIENT PORTAL LINK FT
“You can access the [facility] Patient Portal, offered by [facility], by registering with the following website: [email or web address]”

## 2017-05-12 NOTE — DISCHARGE NOTE ADULT - CARE PROVIDERS DIRECT ADDRESSES
,DirectAddress_Unknown,[email or web address],DirectAddress_Unknown

## 2017-05-12 NOTE — DISCHARGE NOTE ADULT - HOSPITAL COURSE
attending  to complete 72 y/o f with PMH HTN, HLD, CAD s/p stents, DM II, lung cancer s/p JUDY lobectomy, COPD on 5L home O2, pulmonary HTN, AFib not on ac due to nasal hemorrhage, CKD III p/w worsening SOB and dizziness.  History obtained from patient and daughter at bedside.  Uses 5L home O2 at home and always has some BRO, usually sats in high 80s and desats with movement however has been feeling more SOB for past few days prior to admission.  Also with dizziness, when looks up or moving and sometimes when going from sitting to standing worse over past few days, no vertigo.  Daughter has noticed sats dropping as low as 70s and also that she cannot move even 3 steps without getting very SOB and desaturating.    Dx: Dyspnea likely Acute on Chronic HFpEF         Hypoxia         Bilateral Pleural Effusions ( ? Atelectasis vs PNA)         Severe COPD/pulm Htn requiring home 02         Hypernatremia         CKD     4/22: - S/p Lasix 40mg IV with improvement in ED, no additional IV diuresis at this time but                    continued on oral home meds, Echo, cards eval, and pulm eval pending   4/24 lasix iv bid; solumedrol q 8  4/26 solumedrol q12; -120 - cardiology aware  4/27 HR remains > 115 .. cardizem IVP, dig changed to daily; dose of po cardizem increased  4/28 dopplers performed;tele karon 80-90; pred taper; po demadex  4/30 A.fib wit RVR - 120s. Spoke with cardiology. Lopressor IV and oral low dose 25mg BID  5/1 Experience one episode of palpitation up on arising from bed.; dig and cardizem restarted by cards  5/2 - off tele for cath  - Dr. Smith  / Dr. Alvarenga [facility] RA 14/12; RV 88/6/14; PA 84/30/50; PCW 11-13; AO sat 97%; PA sat 55.3%  5/3 Dr Gabriel to see today  5/5 as per Dr Gabriel if HR > 120 may increase lopressor to q6  5/9 VQ scan ordered by pulm but scan necessitates pt to lie flat for one hour - pt unable to lie flat; Dr Youssef recommending OP pulm rehab  5/11 continuos pulse ox d/c - O2 alma rosa q4h as per pulmonary  5/12 - xray right wrist  - discharge pending     Pt was diuresed aggressively and given a short course of antibiotics for poss right wrist cellulitis.

## 2017-05-12 NOTE — PROVIDER CONTACT NOTE (MEDICATION) - ASSESSMENT
pt a&ox4 able to make needs known vitals wdl. ambulates frequently refusing sq heparin utilizes O2 nc when ambulating
Pt bruised abdomen. States "does not want heparin shot due to bruising"

## 2017-05-12 NOTE — DISCHARGE NOTE ADULT - MEDICATION SUMMARY - MEDICATIONS TO TAKE
I will START or STAY ON the medications listed below when I get home from the hospital:    predniSONE 5 mg oral tablet  -- 1 tab(s) by mouth once a day  -- Indication: For Antiinflammatory    aspirin 81 mg oral delayed release tablet  -- 1 tab(s) by mouth once a day  -- Indication: For Antiplatelet    dilTIAZem 240 mg/24 hours oral capsule, extended release  -- 1 cap(s) by mouth once a day  -- Indication: For Heart rate control    digoxin 125 mcg (0.125 mg) oral tablet  -- 1 tab(s) by mouth every other day  -- Indication: For Heart failure    ZyrTEC 10 mg oral tablet  -- 1 tab(s) by mouth once a day  -- Indication: For Allergies    atorvastatin 20 mg oral tablet  -- 1 tab(s) by mouth once a day  -- Indication: For CHolesterol    clopidogrel 75 mg oral tablet  -- 1 tab(s) by mouth once a day  -- Indication: For blood thinner    methIMAzole 5 mg oral tablet  -- 1 tab(s) by mouth once a day  -- Indication: For Hyperthtyroid    metoprolol tartrate 25 mg oral tablet  -- 1 tab(s) by mouth every 8 hours  -- Indication: For blood pressure and heart rate    Ventolin HFA 90 mcg/inh inhalation aerosol  -- 2 puff(s) inhaled 4 times a day, As Needed  -- Indication: For breathing    olodaterol-tiotropium 2.5 mcg-2.5 mcg inhalation aerosol  -- 2 puff(s) inhaled once a day  -- Indication: For breathing    bacitracin 500 units/g topical ointment  -- 1 application on skin 2 times a day  -- Indication: For skin    torsemide 20 mg oral tablet  -- 1 tab(s) by mouth daily  -- Indication: For Diuretic    Colace 100 mg oral capsule  -- 1 cap(s) by mouth 2 times a day  -- Indication: For stool softener    Klor-Con 10 mEq oral tablet, extended release  -- 1 tab(s) by mouth daily  -- Indication: For supplement    sucralfate 1 g/10 mL oral suspension  -- 10 milliliter(s) by mouth 4 times a day  -- Indication: For GI    roflumilast 500 mcg oral tablet  -- 1 tab(s) by mouth once a day  -- Indication: For breathing I will START or STAY ON the medications listed below when I get home from the hospital:    carafate 1gm oral tablet  -- 1 tab(s) in the right eye 4 times a day  -- Indication: For GI    predniSONE 5 mg oral tablet  -- 1 tab(s) by mouth once a day  -- Indication: For Antiinflammatory    aspirin 81 mg oral delayed release tablet  -- 1 tab(s) by mouth once a day  -- Indication: For Antiplatelet    dilTIAZem 240 mg/24 hours oral capsule, extended release  -- 1 cap(s) by mouth once a day  -- Indication: For Heart rate control    digoxin 125 mcg (0.125 mg) oral tablet  -- 1 tab(s) by mouth every other day  -- Indication: For Heart failure    ZyrTEC 10 mg oral tablet  -- 1 tab(s) by mouth once a day  -- Indication: For Allergies    atorvastatin 20 mg oral tablet  -- 1 tab(s) by mouth once a day  -- Indication: For CHolesterol    clopidogrel 75 mg oral tablet  -- 1 tab(s) by mouth once a day  -- Indication: For blood thinner    methIMAzole 5 mg oral tablet  -- 1 tab(s) by mouth once a day  -- Indication: For Hyperthtyroid    metoprolol tartrate 25 mg oral tablet  -- 1 tab(s) by mouth every 8 hours  -- Indication: For blood pressure and heart rate    Ventolin HFA 90 mcg/inh inhalation aerosol  -- 2 puff(s) inhaled 4 times a day, As Needed  -- Indication: For breathing    olodaterol-tiotropium 2.5 mcg-2.5 mcg inhalation aerosol  -- 2 puff(s) inhaled once a day  -- Indication: For breathing    bacitracin 500 units/g topical ointment  -- 1 application on skin 2 times a day  -- Indication: For skin    torsemide 20 mg oral tablet  -- 1 tab(s) by mouth daily  -- Indication: For Diuretic    Colace 100 mg oral capsule  -- 1 cap(s) by mouth 2 times a day  -- Indication: For stool softener    Klor-Con 10 mEq oral tablet, extended release  -- 1 tab(s) by mouth daily  -- Indication: For supplement    sucralfate 1 g/10 mL oral suspension  -- 10 milliliter(s) by mouth 4 times a day  -- Indication: For GI    roflumilast 500 mcg oral tablet  -- 1 tab(s) by mouth once a day  -- Indication: For breathing I will START or STAY ON the medications listed below when I get home from the hospital:    predniSONE 5 mg oral tablet  -- 1 tab(s) by mouth once a day  -- Indication: For Antiinflammatory    aspirin 81 mg oral delayed release tablet  -- 1 tab(s) by mouth once a day  -- Indication: For Antiplatelet    dilTIAZem 240 mg/24 hours oral capsule, extended release  -- 1 cap(s) by mouth once a day  -- Indication: For Heart rate control    digoxin 125 mcg (0.125 mg) oral tablet  -- 1 tab(s) by mouth every other day  -- Indication: For Heart failure    ZyrTEC 10 mg oral tablet  -- 1 tab(s) by mouth once a day  -- Indication: For Allergies    atorvastatin 20 mg oral tablet  -- 1 tab(s) by mouth once a day  -- Indication: For CHolesterol    clopidogrel 75 mg oral tablet  -- 1 tab(s) by mouth once a day  -- Indication: For blood thinner    methIMAzole 5 mg oral tablet  -- 1 tab(s) by mouth once a day  -- Indication: For Hyperthtyroid    metoprolol tartrate 25 mg oral tablet  -- 1 tab(s) by mouth every 8 hours  -- Indication: For blood pressure and heart rate    Ventolin HFA 90 mcg/inh inhalation aerosol  -- 2 puff(s) inhaled 4 times a day, As Needed  -- Indication: For breathing    olodaterol-tiotropium 2.5 mcg-2.5 mcg inhalation aerosol  -- 2 puff(s) inhaled once a day  -- Indication: For breathing    Keflex 500 mg oral capsule  -- 1 cap(s) by mouth every 12 hours  -- Finish all this medication unless otherwise directed by prescriber.    -- Indication: For Antibiotic     bacitracin 500 units/g topical ointment  -- 1 application on skin 2 times a day  -- Indication: For skin    torsemide 20 mg oral tablet  -- 1 tab(s) by mouth daily  -- Indication: For Diuretic    Colace 100 mg oral capsule  -- 1 cap(s) by mouth 2 times a day  -- Indication: For stool softener    Klor-Con 10 mEq oral tablet, extended release  -- 1 tab(s) by mouth daily  -- Indication: For supplement    sucralfate 1 g/10 mL oral suspension  -- 10 milliliter(s) by mouth 4 times a day  -- Indication: For GI    roflumilast 500 mcg oral tablet  -- 1 tab(s) by mouth once a day  -- Indication: For breathing

## 2017-05-12 NOTE — DISCHARGE NOTE ADULT - SECONDARY DIAGNOSIS.
Afib CHF (congestive heart failure) COPD (chronic obstructive pulmonary disease) Diabetes mellitus Hyperthyroidism CKD (chronic kidney disease) stage 3, GFR 30-59 ml/min

## 2017-05-12 NOTE — DISCHARGE NOTE ADULT - CARE PLAN
Principal Discharge DX:	Pulmonary HTN  Goal:	maintain adequate oxygenation  Instructions for follow-up, activity and diet:	f/u with Dr Youssef for pulmonary rehab; take medication as prescribed  Secondary Diagnosis:	Afib  Goal:	rate control  Instructions for follow-up, activity and diet:	take prescribed mediation; f/u with cardiology  Secondary Diagnosis:	CHF (congestive heart failure)  Instructions for follow-up, activity and diet:	take prescribed mediation; f/u with cardiology  Secondary Diagnosis:	COPD (chronic obstructive pulmonary disease)  Instructions for follow-up, activity and diet:	take prescribed medication; f/u with Pulmonary Dr. Weiss  Secondary Diagnosis:	Diabetes mellitus  Instructions for follow-up, activity and diet:	monitor BS; f/u with PCP or private endocrinologist  Secondary Diagnosis:	Hyperthyroidism  Instructions for follow-up, activity and diet:	take prescribed medication; f/u with PCP  Secondary Diagnosis:	CKD (chronic kidney disease) stage 3, GFR 30-59 ml/min  Instructions for follow-up, activity and diet:	f/u with Dr Smith in one month Principal Discharge DX:	Pulmonary HTN  Goal:	maintain adequate oxygenation  Instructions for follow-up, activity and diet:	f/u with Dr Youssfe for pulmonary rehab; take medication as prescribed  Secondary Diagnosis:	Afib  Goal:	rate control  Instructions for follow-up, activity and diet:	take prescribed mediation; f/u with cardiology  Secondary Diagnosis:	CHF (congestive heart failure)  Instructions for follow-up, activity and diet:	take prescribed mediation; f/u with cardiology  Secondary Diagnosis:	COPD (chronic obstructive pulmonary disease)  Instructions for follow-up, activity and diet:	take prescribed medication; f/u with Pulmonary Dr. Weiss  Secondary Diagnosis:	Diabetes mellitus  Instructions for follow-up, activity and diet:	monitor BS; f/u with PCP or private endocrinologist  Secondary Diagnosis:	Hyperthyroidism  Instructions for follow-up, activity and diet:	take prescribed medication; f/u with PCP  Secondary Diagnosis:	CKD (chronic kidney disease) stage 3, GFR 30-59 ml/min  Instructions for follow-up, activity and diet:	f/u with Dr Smith in one month

## 2017-05-16 ENCOUNTER — OTHER (OUTPATIENT)
Age: 72
End: 2017-05-16

## 2017-05-24 ENCOUNTER — APPOINTMENT (OUTPATIENT)
Dept: CARDIOLOGY | Facility: CLINIC | Age: 72
End: 2017-05-24

## 2017-06-01 ENCOUNTER — MEDICATION RENEWAL (OUTPATIENT)
Age: 72
End: 2017-06-01

## 2017-06-13 ENCOUNTER — INPATIENT (INPATIENT)
Facility: HOSPITAL | Age: 72
LOS: 7 days | Discharge: HOME | DRG: 554 | End: 2017-06-21
Attending: INTERNAL MEDICINE | Admitting: INTERNAL MEDICINE
Payer: COMMERCIAL

## 2017-06-13 VITALS
RESPIRATION RATE: 16 BRPM | HEIGHT: 63 IN | SYSTOLIC BLOOD PRESSURE: 125 MMHG | HEART RATE: 106 BPM | OXYGEN SATURATION: 97 % | DIASTOLIC BLOOD PRESSURE: 52 MMHG

## 2017-06-13 DIAGNOSIS — I25.10 ATHEROSCLEROTIC HEART DISEASE OF NATIVE CORONARY ARTERY WITHOUT ANGINA PECTORIS: ICD-10-CM

## 2017-06-13 DIAGNOSIS — E11.9 TYPE 2 DIABETES MELLITUS WITHOUT COMPLICATIONS: ICD-10-CM

## 2017-06-13 DIAGNOSIS — M25.531 PAIN IN RIGHT WRIST: ICD-10-CM

## 2017-06-13 DIAGNOSIS — L03.90 CELLULITIS, UNSPECIFIED: ICD-10-CM

## 2017-06-13 DIAGNOSIS — Z85.118 PERSONAL HISTORY OF OTHER MALIGNANT NEOPLASM OF BRONCHUS AND LUNG: ICD-10-CM

## 2017-06-13 DIAGNOSIS — Z29.9 ENCOUNTER FOR PROPHYLACTIC MEASURES, UNSPECIFIED: ICD-10-CM

## 2017-06-13 DIAGNOSIS — J44.9 CHRONIC OBSTRUCTIVE PULMONARY DISEASE, UNSPECIFIED: ICD-10-CM

## 2017-06-13 DIAGNOSIS — I48.2 CHRONIC ATRIAL FIBRILLATION: ICD-10-CM

## 2017-06-13 DIAGNOSIS — L03.116 CELLULITIS OF LEFT LOWER LIMB: ICD-10-CM

## 2017-06-13 LAB
ALBUMIN SERPL ELPH-MCNC: 3.7 G/DL — SIGNIFICANT CHANGE UP (ref 3.3–5)
ALP SERPL-CCNC: 53 U/L — SIGNIFICANT CHANGE UP (ref 40–120)
ALT FLD-CCNC: 9 U/L RC — LOW (ref 10–45)
ANION GAP SERPL CALC-SCNC: 13 MMOL/L — SIGNIFICANT CHANGE UP (ref 5–17)
APPEARANCE UR: CLEAR — SIGNIFICANT CHANGE UP
APTT BLD: 27.1 SEC — LOW (ref 27.5–37.4)
AST SERPL-CCNC: 26 U/L — SIGNIFICANT CHANGE UP (ref 10–40)
BACTERIA # UR AUTO: ABNORMAL /HPF
BASOPHILS # BLD AUTO: 0.1 K/UL — SIGNIFICANT CHANGE UP (ref 0–0.2)
BASOPHILS NFR BLD AUTO: 0.7 % — SIGNIFICANT CHANGE UP (ref 0–2)
BILIRUB SERPL-MCNC: 0.9 MG/DL — SIGNIFICANT CHANGE UP (ref 0.2–1.2)
BILIRUB UR-MCNC: NEGATIVE — SIGNIFICANT CHANGE UP
BUN SERPL-MCNC: 56 MG/DL — HIGH (ref 7–23)
CALCIUM SERPL-MCNC: 10.1 MG/DL — SIGNIFICANT CHANGE UP (ref 8.4–10.5)
CHLORIDE SERPL-SCNC: 90 MMOL/L — LOW (ref 96–108)
CO2 SERPL-SCNC: 34 MMOL/L — HIGH (ref 22–31)
COLOR SPEC: YELLOW — SIGNIFICANT CHANGE UP
CREAT SERPL-MCNC: 1.55 MG/DL — HIGH (ref 0.5–1.3)
DIFF PNL FLD: NEGATIVE — SIGNIFICANT CHANGE UP
EOSINOPHIL # BLD AUTO: 0 K/UL — SIGNIFICANT CHANGE UP (ref 0–0.5)
EOSINOPHIL NFR BLD AUTO: 0.4 % — SIGNIFICANT CHANGE UP (ref 0–6)
EPI CELLS # UR: SIGNIFICANT CHANGE UP /HPF
GAS PNL BLDV: SIGNIFICANT CHANGE UP
GLUCOSE SERPL-MCNC: 127 MG/DL — HIGH (ref 70–99)
GLUCOSE UR QL: NEGATIVE — SIGNIFICANT CHANGE UP
HCT VFR BLD CALC: 31.9 % — LOW (ref 34.5–45)
HGB BLD-MCNC: 10.1 G/DL — LOW (ref 11.5–15.5)
INR BLD: 1.18 RATIO — HIGH (ref 0.88–1.16)
KETONES UR-MCNC: NEGATIVE — SIGNIFICANT CHANGE UP
LEUKOCYTE ESTERASE UR-ACNC: NEGATIVE — SIGNIFICANT CHANGE UP
LYMPHOCYTES # BLD AUTO: 0.3 K/UL — LOW (ref 1–3.3)
LYMPHOCYTES # BLD AUTO: 3.2 % — LOW (ref 13–44)
MCHC RBC-ENTMCNC: 26.5 PG — LOW (ref 27–34)
MCHC RBC-ENTMCNC: 31.8 GM/DL — LOW (ref 32–36)
MCV RBC AUTO: 83.2 FL — SIGNIFICANT CHANGE UP (ref 80–100)
MONOCYTES # BLD AUTO: 0.6 K/UL — SIGNIFICANT CHANGE UP (ref 0–0.9)
MONOCYTES NFR BLD AUTO: 8 % — SIGNIFICANT CHANGE UP (ref 2–14)
NEUTROPHILS # BLD AUTO: 7.1 K/UL — SIGNIFICANT CHANGE UP (ref 1.8–7.4)
NEUTROPHILS NFR BLD AUTO: 87.7 % — HIGH (ref 43–77)
NITRITE UR-MCNC: NEGATIVE — SIGNIFICANT CHANGE UP
PH UR: 6 — SIGNIFICANT CHANGE UP (ref 5–8)
PLATELET # BLD AUTO: 175 K/UL — SIGNIFICANT CHANGE UP (ref 150–400)
POTASSIUM SERPL-MCNC: 4.1 MMOL/L — SIGNIFICANT CHANGE UP (ref 3.5–5.3)
POTASSIUM SERPL-SCNC: 4.1 MMOL/L — SIGNIFICANT CHANGE UP (ref 3.5–5.3)
PROT SERPL-MCNC: 6.8 G/DL — SIGNIFICANT CHANGE UP (ref 6–8.3)
PROT UR-MCNC: 30 MG/DL
PROTHROM AB SERPL-ACNC: 12.8 SEC — HIGH (ref 9.8–12.7)
RBC # BLD: 3.83 M/UL — SIGNIFICANT CHANGE UP (ref 3.8–5.2)
RBC # FLD: 16.8 % — HIGH (ref 10.3–14.5)
RBC CASTS # UR COMP ASSIST: SIGNIFICANT CHANGE UP /HPF (ref 0–2)
SODIUM SERPL-SCNC: 137 MMOL/L — SIGNIFICANT CHANGE UP (ref 135–145)
SP GR SPEC: 1.01 — SIGNIFICANT CHANGE UP (ref 1.01–1.02)
UROBILINOGEN FLD QL: NEGATIVE — SIGNIFICANT CHANGE UP
WBC # BLD: 8.1 K/UL — SIGNIFICANT CHANGE UP (ref 3.8–10.5)
WBC # FLD AUTO: 8.1 K/UL — SIGNIFICANT CHANGE UP (ref 3.8–10.5)
WBC UR QL: SIGNIFICANT CHANGE UP /HPF (ref 0–5)

## 2017-06-13 PROCEDURE — 99285 EMERGENCY DEPT VISIT HI MDM: CPT | Mod: 25

## 2017-06-13 PROCEDURE — 93971 EXTREMITY STUDY: CPT | Mod: 26

## 2017-06-13 PROCEDURE — 99223 1ST HOSP IP/OBS HIGH 75: CPT

## 2017-06-13 PROCEDURE — 93010 ELECTROCARDIOGRAM REPORT: CPT

## 2017-06-13 PROCEDURE — 71010: CPT | Mod: 26

## 2017-06-13 RX ORDER — SODIUM CHLORIDE 9 MG/ML
1000 INJECTION, SOLUTION INTRAVENOUS
Qty: 0 | Refills: 0 | Status: DISCONTINUED | OUTPATIENT
Start: 2017-06-13 | End: 2017-06-21

## 2017-06-13 RX ORDER — POTASSIUM CHLORIDE 20 MEQ
10 PACKET (EA) ORAL DAILY
Qty: 0 | Refills: 0 | Status: DISCONTINUED | OUTPATIENT
Start: 2017-06-13 | End: 2017-06-21

## 2017-06-13 RX ORDER — INSULIN LISPRO 100/ML
VIAL (ML) SUBCUTANEOUS AT BEDTIME
Qty: 0 | Refills: 0 | Status: DISCONTINUED | OUTPATIENT
Start: 2017-06-13 | End: 2017-06-21

## 2017-06-13 RX ORDER — METOPROLOL TARTRATE 50 MG
25 TABLET ORAL EVERY 8 HOURS
Qty: 0 | Refills: 0 | Status: DISCONTINUED | OUTPATIENT
Start: 2017-06-13 | End: 2017-06-16

## 2017-06-13 RX ORDER — ASPIRIN/CALCIUM CARB/MAGNESIUM 324 MG
81 TABLET ORAL DAILY
Qty: 0 | Refills: 0 | Status: DISCONTINUED | OUTPATIENT
Start: 2017-06-13 | End: 2017-06-21

## 2017-06-13 RX ORDER — ATORVASTATIN CALCIUM 80 MG/1
20 TABLET, FILM COATED ORAL AT BEDTIME
Qty: 0 | Refills: 0 | Status: DISCONTINUED | OUTPATIENT
Start: 2017-06-13 | End: 2017-06-21

## 2017-06-13 RX ORDER — ACETAMINOPHEN 500 MG
650 TABLET ORAL EVERY 6 HOURS
Qty: 0 | Refills: 0 | Status: DISCONTINUED | OUTPATIENT
Start: 2017-06-13 | End: 2017-06-21

## 2017-06-13 RX ORDER — DEXTROSE 50 % IN WATER 50 %
12.5 SYRINGE (ML) INTRAVENOUS ONCE
Qty: 0 | Refills: 0 | Status: DISCONTINUED | OUTPATIENT
Start: 2017-06-13 | End: 2017-06-21

## 2017-06-13 RX ORDER — DILTIAZEM HCL 120 MG
240 CAPSULE, EXT RELEASE 24 HR ORAL DAILY
Qty: 0 | Refills: 0 | Status: DISCONTINUED | OUTPATIENT
Start: 2017-06-13 | End: 2017-06-21

## 2017-06-13 RX ORDER — HEPARIN SODIUM 5000 [USP'U]/ML
5000 INJECTION INTRAVENOUS; SUBCUTANEOUS EVERY 12 HOURS
Qty: 0 | Refills: 0 | Status: DISCONTINUED | OUTPATIENT
Start: 2017-06-13 | End: 2017-06-21

## 2017-06-13 RX ORDER — INSULIN LISPRO 100/ML
VIAL (ML) SUBCUTANEOUS
Qty: 0 | Refills: 0 | Status: DISCONTINUED | OUTPATIENT
Start: 2017-06-13 | End: 2017-06-21

## 2017-06-13 RX ORDER — DIGOXIN 250 MCG
0.12 TABLET ORAL EVERY OTHER DAY
Qty: 0 | Refills: 0 | Status: DISCONTINUED | OUTPATIENT
Start: 2017-06-13 | End: 2017-06-16

## 2017-06-13 RX ORDER — DEXTROSE 50 % IN WATER 50 %
25 SYRINGE (ML) INTRAVENOUS ONCE
Qty: 0 | Refills: 0 | Status: DISCONTINUED | OUTPATIENT
Start: 2017-06-13 | End: 2017-06-21

## 2017-06-13 RX ORDER — CEFAZOLIN SODIUM 1 G
1000 VIAL (EA) INJECTION EVERY 8 HOURS
Qty: 0 | Refills: 0 | Status: DISCONTINUED | OUTPATIENT
Start: 2017-06-13 | End: 2017-06-14

## 2017-06-13 RX ORDER — CLOPIDOGREL BISULFATE 75 MG/1
75 TABLET, FILM COATED ORAL DAILY
Qty: 0 | Refills: 0 | Status: DISCONTINUED | OUTPATIENT
Start: 2017-06-13 | End: 2017-06-21

## 2017-06-13 RX ORDER — CETIRIZINE HYDROCHLORIDE 10 MG/1
1 TABLET ORAL
Qty: 0 | Refills: 0 | COMMUNITY

## 2017-06-13 RX ORDER — ROFLUMILAST 500 UG/1
500 TABLET ORAL DAILY
Qty: 0 | Refills: 0 | Status: DISCONTINUED | OUTPATIENT
Start: 2017-06-13 | End: 2017-06-21

## 2017-06-13 RX ORDER — ACETAMINOPHEN 500 MG
1000 TABLET ORAL ONCE
Qty: 0 | Refills: 0 | Status: COMPLETED | OUTPATIENT
Start: 2017-06-13 | End: 2017-06-13

## 2017-06-13 RX ORDER — POTASSIUM CHLORIDE 20 MEQ
40 PACKET (EA) ORAL ONCE
Qty: 0 | Refills: 0 | Status: COMPLETED | OUTPATIENT
Start: 2017-06-13 | End: 2017-06-13

## 2017-06-13 RX ORDER — CEFAZOLIN SODIUM 1 G
1000 VIAL (EA) INJECTION ONCE
Qty: 0 | Refills: 0 | Status: COMPLETED | OUTPATIENT
Start: 2017-06-13 | End: 2017-06-13

## 2017-06-13 RX ORDER — DEXTROSE 50 % IN WATER 50 %
1 SYRINGE (ML) INTRAVENOUS ONCE
Qty: 0 | Refills: 0 | Status: DISCONTINUED | OUTPATIENT
Start: 2017-06-13 | End: 2017-06-21

## 2017-06-13 RX ADMIN — Medication 25 MILLIGRAM(S): at 23:39

## 2017-06-13 RX ADMIN — Medication 100 MILLIGRAM(S): at 21:07

## 2017-06-13 RX ADMIN — Medication 400 MILLIGRAM(S): at 21:39

## 2017-06-13 NOTE — H&P ADULT - PROBLEM SELECTOR PLAN 1
Likely gout vs possible cellulitis. Pt on chronic prednisone but dose was recently lowered. However, the L ankle is not warm. Some R patellar cellulitis vs inflammation  -Will cont. cefazolin over night and tylenol for pain control  -F/u uric acid  -Will consider gout further if no improvement on antibiotics.

## 2017-06-13 NOTE — ED ADULT NURSE NOTE - OBJECTIVE STATEMENT
72 yo female A&OX3 presents to the ED with the c/o r leg swelling and pain. Pt states that she noticed swelling x 1 week. + pain on ambulation, no numbness or tingling. Pt denies any crackles or cuts in the leg. Leg appears slightly red, cool to touch. Pt able to move b/l feet. R knee also red. MAEX4. Pt denies fevers and chills.

## 2017-06-13 NOTE — H&P ADULT - ASSESSMENT
71F w/ CAD s/p STEPHEN, DM2, Lung CA s/p JUDY lobectomy, COPD 4-5L NC, HTN, afib, Pulm HTN, CKD 3, p/w LLE edema and pain x2 weeks

## 2017-06-13 NOTE — H&P ADULT - PROBLEM SELECTOR PLAN 5
On 4-5L NC  -Cont. home 02  -Cont. home spiriva medication  -Cont. prednisone 5mg daily  -Cont. roflumilast

## 2017-06-13 NOTE — H&P ADULT - HISTORY OF PRESENT ILLNESS
71F w/ CAD s/p STEPHEN, DM2, Lung CA s/p JUDY lobectomy, COPD 3-4L NC, HTN, afib, Pulm HTN, CKD 3, p/w LLE edema and pain x2 weeks. Pt states she has been having R patellar discomfort and erythema but has been tolerable. Starting roughly 2 weeks ago she experienced L big toe pain which radiated up to L ankle. Some mild edema in L ankle over this time. Pain worsened until patient had difficulty bearing weight yesterday and today. Pt then came to ER for further evaluation of this pain. Pt denies fevers, chills, discharge, dysuria. Pt denies any recent travel, prolonged immobilization. Pt also complains of R wrist pain since discharge in 3 months ago. Prednisone lowered from 10mg to 5mg at some point after discharge during prior admission    In ER: Given cefazolin

## 2017-06-13 NOTE — H&P ADULT - NSHPPHYSICALEXAM_GEN_ALL_CORE
ICU Vital Signs Last 24 Hrs  T(C): 36.7, Max: 37.2 (06-13 @ 21:50)  T(F): 98, Max: 98.9 (06-13 @ 21:50)  HR: 78 (59 - 106)  BP: 117/61 (111/70 - 125/52)  BP(mean): --  ABP: --  ABP(mean): --  RR: 16 (16 - 16)  SpO2: 97% (96% - 97%)

## 2017-06-13 NOTE — ED PROVIDER NOTE - ATTENDING CONTRIBUTION TO CARE
Patient with multiple chronic medical problems presenting c/o 2 weeks of worsening LLE pain and swelling.  History of edema, but never to this degree/with this much pain.  Now reporting unable to ambulate due to pain.  Also began having pain and redness to R knee x3 weeks, slight dyscomfort over redness, but no swelling of knee and less pain than L leg.  No change to chronic SOB, no chest pains.    On exam patient well appearing,  tachycardic, satting normally on 3L NC (baseline).  No respiratory distress.  LLE with 2+ pitting edema to knee, no noted erythema.  RLE with circumscribed area of erythema directly over kneecap without flucutance, no swelling of knee, no generalized erythema, full ROM.    Concerned for possible LLE DVT given exam, R knee possible cellulitis but no evidence of septic joint - labs, DVT US, pain control, re-evaluate.

## 2017-06-13 NOTE — ED PROVIDER NOTE - OBJECTIVE STATEMENT
72yo female history of afib, COPD on 3-4L, lung ca s/p left lobectomy, pHTN, DM(FS 1767 by EMS) p/w left lower extremity edema x2 weeks and right knee redness for 3 weeks. Pt. reporting difficulty ambulating due to pain. Pt. unable to walk for 3 days. Pt. reports left medial ankle redness 2 weeks ago now with worsening pain to medial ankle radiates upwards. Denies fevers, chest pain, shortness of breath, nausea/vomiting, dysuria/hematuria, sick contacts, recent travel, history of blood clots. PCP: Pierce Cobb Cards: Honey Nephro: Ali 70yo female history of HTN, HLD, CAD s/p stents, DM II, lung cancer s/p JUDY lobectomy, COPD on 3-4L home O2, pulmonary HTN, AFib not on ac due to nasal hemorrhage, CKD III p/w left lower extremity edema x2 weeks and right knee redness for 3 weeks. Pt. reporting difficulty ambulating due to pain. Pt. unable to walk for 3 days. Pt. reports left medial ankle redness 2 weeks ago now with worsening pain to medial ankle radiates upwards. Denies fevers, chest pain, shortness of breath, nausea/vomiting, dysuria/hematuria, sick contacts, recent travel, history of blood clots. PCP: Pierce Cobb Cards: Honey Nephro: Ali

## 2017-06-13 NOTE — H&P ADULT - PROBLEM SELECTOR PLAN 2
R wrist x-ray with some lucency. Suggest MRI. Of note, pt has cardioMEMs device. Unclear if this is MRI safe.  -Call [phone] in AM to discuss with Tech safety for MRI R wrist x-ray with some lucency. Suggest MRI. Of note, pt has cardioMEMs device. Unclear if this is MRI safe.  -Spoke to radiology, Call [phone] in AM to discuss with Tech safety for MRI

## 2017-06-14 LAB
ANION GAP SERPL CALC-SCNC: 9 MMOL/L — SIGNIFICANT CHANGE UP (ref 5–17)
BASOPHILS # BLD AUTO: 0.01 K/UL — SIGNIFICANT CHANGE UP (ref 0–0.2)
BASOPHILS NFR BLD AUTO: 0.2 % — SIGNIFICANT CHANGE UP (ref 0–2)
BUN SERPL-MCNC: 50 MG/DL — HIGH (ref 7–23)
CALCIUM SERPL-MCNC: 9.6 MG/DL — SIGNIFICANT CHANGE UP (ref 8.4–10.5)
CHLORIDE SERPL-SCNC: 92 MMOL/L — LOW (ref 96–108)
CO2 SERPL-SCNC: 35 MMOL/L — HIGH (ref 22–31)
CREAT SERPL-MCNC: 1.48 MG/DL — HIGH (ref 0.5–1.3)
CULTURE RESULTS: NO GROWTH — SIGNIFICANT CHANGE UP
EOSINOPHIL # BLD AUTO: 0.06 K/UL — SIGNIFICANT CHANGE UP (ref 0–0.5)
EOSINOPHIL NFR BLD AUTO: 1.4 % — SIGNIFICANT CHANGE UP (ref 0–6)
GLUCOSE SERPL-MCNC: 104 MG/DL — HIGH (ref 70–99)
HCT VFR BLD CALC: 28.5 % — LOW (ref 34.5–45)
HGB BLD-MCNC: 8.6 G/DL — LOW (ref 11.5–15.5)
IMM GRANULOCYTES NFR BLD AUTO: 0.2 % — SIGNIFICANT CHANGE UP (ref 0–1.5)
LYMPHOCYTES # BLD AUTO: 0.28 K/UL — LOW (ref 1–3.3)
LYMPHOCYTES # BLD AUTO: 6.5 % — LOW (ref 13–44)
MAGNESIUM SERPL-MCNC: 2.6 MG/DL — SIGNIFICANT CHANGE UP (ref 1.6–2.6)
MCHC RBC-ENTMCNC: 24.7 PG — LOW (ref 27–34)
MCHC RBC-ENTMCNC: 30.2 GM/DL — LOW (ref 32–36)
MCV RBC AUTO: 81.9 FL — SIGNIFICANT CHANGE UP (ref 80–100)
MONOCYTES # BLD AUTO: 0.35 K/UL — SIGNIFICANT CHANGE UP (ref 0–0.9)
MONOCYTES NFR BLD AUTO: 8.1 % — SIGNIFICANT CHANGE UP (ref 2–14)
NEUTROPHILS # BLD AUTO: 3.6 K/UL — SIGNIFICANT CHANGE UP (ref 1.8–7.4)
NEUTROPHILS NFR BLD AUTO: 83.6 % — HIGH (ref 43–77)
PLATELET # BLD AUTO: 168 K/UL — SIGNIFICANT CHANGE UP (ref 150–400)
POTASSIUM SERPL-MCNC: 3.5 MMOL/L — SIGNIFICANT CHANGE UP (ref 3.5–5.3)
POTASSIUM SERPL-SCNC: 3.5 MMOL/L — SIGNIFICANT CHANGE UP (ref 3.5–5.3)
RBC # BLD: 3.48 M/UL — LOW (ref 3.8–5.2)
RBC # FLD: 17.1 % — HIGH (ref 10.3–14.5)
SODIUM SERPL-SCNC: 136 MMOL/L — SIGNIFICANT CHANGE UP (ref 135–145)
SPECIMEN SOURCE: SIGNIFICANT CHANGE UP
URATE SERPL-MCNC: 12.5 MG/DL — HIGH (ref 2.5–7)
WBC # BLD: 4.31 K/UL — SIGNIFICANT CHANGE UP (ref 3.8–10.5)
WBC # FLD AUTO: 4.31 K/UL — SIGNIFICANT CHANGE UP (ref 3.8–10.5)

## 2017-06-14 PROCEDURE — 73610 X-RAY EXAM OF ANKLE: CPT | Mod: 26,LT

## 2017-06-14 RX ORDER — COLCHICINE 0.6 MG
0.6 TABLET ORAL DAILY
Qty: 0 | Refills: 0 | Status: DISCONTINUED | OUTPATIENT
Start: 2017-06-14 | End: 2017-06-15

## 2017-06-14 RX ADMIN — Medication 100 MILLIGRAM(S): at 05:37

## 2017-06-14 RX ADMIN — HEPARIN SODIUM 5000 UNIT(S): 5000 INJECTION INTRAVENOUS; SUBCUTANEOUS at 05:37

## 2017-06-14 RX ADMIN — HEPARIN SODIUM 5000 UNIT(S): 5000 INJECTION INTRAVENOUS; SUBCUTANEOUS at 17:52

## 2017-06-14 RX ADMIN — ROFLUMILAST 500 MICROGRAM(S): 500 TABLET ORAL at 11:00

## 2017-06-14 RX ADMIN — Medication 20 MILLIGRAM(S): at 11:00

## 2017-06-14 RX ADMIN — Medication 81 MILLIGRAM(S): at 11:00

## 2017-06-14 RX ADMIN — Medication 5 MILLIGRAM(S): at 09:17

## 2017-06-14 RX ADMIN — ATORVASTATIN CALCIUM 20 MILLIGRAM(S): 80 TABLET, FILM COATED ORAL at 22:37

## 2017-06-14 RX ADMIN — Medication 25 MILLIGRAM(S): at 22:37

## 2017-06-14 RX ADMIN — Medication 240 MILLIGRAM(S): at 10:58

## 2017-06-14 RX ADMIN — Medication 2: at 09:17

## 2017-06-14 RX ADMIN — Medication 10 MILLIEQUIVALENT(S): at 10:59

## 2017-06-14 RX ADMIN — CLOPIDOGREL BISULFATE 75 MILLIGRAM(S): 75 TABLET, FILM COATED ORAL at 10:59

## 2017-06-14 RX ADMIN — Medication 25 MILLIGRAM(S): at 09:17

## 2017-06-14 RX ADMIN — Medication 100 MILLIGRAM(S): at 15:44

## 2017-06-14 RX ADMIN — Medication 0.6 MILLIGRAM(S): at 15:43

## 2017-06-14 NOTE — PROGRESS NOTE ADULT - ASSESSMENT
poss cellulitis vs Gout  severe phtn  volume overload  bucky  acute pulm edema    renal eval  start colchicine  ID eval  continue diuresis

## 2017-06-14 NOTE — PROGRESS NOTE ADULT - SUBJECTIVE AND OBJECTIVE BOX
MEDICINE, PROGRESS NOTE [phone]    FRAN ALEXANDRA 71y [id number]    Patient seen and examined.  Patient is a 71y old  Female who presents with a chief complaint of L Ankle Pain (2017 21:57)  Pt still with significant foot pain and difficulty ambulating.  breathing feels ok     PAST MEDICAL & SURGICAL HISTORY:  Hyperthyroidism  JOSE (obstructive sleep apnea)  Epistaxis  GIB (gastrointestinal bleeding)  CAD S/P percutaneous coronary angioplasty  Afib  Pulmonary HTN  GERD (gastroesophageal reflux disease)  Obesity  Cardiomegaly  Valvular heart disease  Sleep apnea  COPD (chronic obstructive pulmonary disease): Home O2  Sleep Apnea: by criteria  Loose, teeth: 3 bottom front loose teethpain in foot and difficulty ambulating  Female stress incontinence  Shoulder pain, right  Constipation  Arthritis of Knee  H/O heartburn  History of lung cancer  Obese  Hx of hyperlipidemia  Asthma  Diabetes mellitus: type 2  dx about 4-5 years ago   no daily fingerstick  H/O: HTN (hypertension)  Enlarged lymph nodes  Lymph nodes enlarged: s/p biopsy mediastinal  benign  H/O endoscopy  left upper lobectomy    MEDICATIONS  (STANDING):  heparin  Injectable 5000Unit(s) SubCutaneous every 12 hours  predniSONE   Tablet 5milliGRAM(s) Oral daily  aspirin enteric coated 81milliGRAM(s) Oral daily  diltiazem   CD 240milliGRAM(s) Oral daily  clopidogrel Tablet 75milliGRAM(s) Oral daily  methimazole 5milliGRAM(s) Oral daily  metoprolol 25milliGRAM(s) Oral every 8 hours  roflumilast 500MICROGram(s) Oral daily  digoxin     Tablet 0.125milliGRAM(s) Oral every other day  potassium chloride    Tablet ER 10milliEquivalent(s) Oral daily  torsemide 20milliGRAM(s) Oral daily  atorvastatin 20milliGRAM(s) Oral at bedtime  ceFAZolin   IVPB 1000milliGRAM(s) IV Intermittent every 8 hours  insulin lispro (HumaLOG) corrective regimen sliding scale  SubCutaneous three times a day before meals  insulin lispro (HumaLOG) corrective regimen sliding scale  SubCutaneous at bedtime  dextrose 5%. 1000milliLiter(s) IV Continuous <Continuous>  dextrose 50% Injectable 12.5Gram(s) IV Push once  dextrose 50% Injectable 25Gram(s) IV Push once    MEDICATIONS  (PRN):  acetaminophen   Tablet. 650milliGRAM(s) Oral every 6 hours PRN Moderate Pain (4 - 6)  dextrose Gel 1Dose(s) Oral once PRN Blood Glucose LESS THAN 70 milliGRAM(s)/deciliter    Allergies    No Known Allergies    Intolerances    albuterol (Unknown)      PHYSICAL EXAM:  Constitutional: NAD  HEENT: Normocephalic, EOMI  Neck:  No JVD  Respiratory: CTA B/L, No wheezes  Cardiovascular: S1, S2, RRR, + systolic murmur  Gastrointestinal: BS+, soft, NT/ND  Extremities: mild peripheral edema b/l le  Neurological: AAOX3, no focal deficits  Psychiatric: Normal mood, normal affect  : No Gottlieb    Vital Signs Last 24 Hrs  T(C): 36.7, Max: 37.2 ( @ 21:50)  T(F): 98.1, Max: 98.9 ( @ 21:50)  HR: 89 (59 - 106)  BP: 128/81 (111/70 - 128/81)  BP(mean): --  RR: 18 (16 - 18)  SpO2: 96% (96% - 98%)  I&O's Summary      LABS:                        8.6    4.31  )-----------( 168      ( 2017 07:25 )             28.5         136  |  92<L>  |  50<H>  ----------------------------<  104<H>  3.5   |  35<H>  |  1.48<H>    Ca    9.6      2017 07:25  Mg     2.6         TPro  6.8  /  Alb  3.7  /  TBili  0.9  /  DBili  x   /  AST  26  /  ALT  9<L>  /  AlkPhos  53          Magnesium, Serum: 2.6 mg/dL ( @ 07:25)    Urinalysis Basic - ( 2017 17:45 )    Color: Yellow / Appearance: Clear / S.015 / pH: x  Gluc: x / Ketone: Negative  / Bili: Negative / Urobili: Negative   Blood: x / Protein: 30 mg/dL / Nitrite: Negative   Leuk Esterase: Negative / RBC: 0-2 /HPF / WBC 0-2 /HPF   Sq Epi: x / Non Sq Epi: OCC /HPF / Bacteria: Few /HPF        REVIEW OF SYSTEMS:      RADIOLOGY & ADDITIONAL STUDIES:      ASSESSMENT/PLAN:

## 2017-06-14 NOTE — CONSULT NOTE ADULT - SUBJECTIVE AND OBJECTIVE BOX
HPI:   Patient is a 71y female with pulmonary hypertension, on prednisone 5mg, recent prolonged stay for respiratory failure and returns to hospital for 2 weeks history of right knee redness and pain and left foot redness and pain. The foot redness is focused over where she would have a bunyon. She denies any trauma, has no fever or chills. She has chronic shortness of breath that is worse than usual today. She has no dog or cat exposure. No known history of gout.     REVIEW OF SYSTEMS:  All other review of systems negative (Comprehensive ROS)    PAST MEDICAL & SURGICAL HISTORY:  Hyperthyroidism  JOSE (obstructive sleep apnea)  Epistaxis  GIB (gastrointestinal bleeding)  CAD S/P percutaneous coronary angioplasty  Afib  Pulmonary HTN  GERD (gastroesophageal reflux disease)  Obesity  Cardiomegaly  Valvular heart disease  Sleep apnea  COPD (chronic obstructive pulmonary disease): Home O2  Sleep Apnea: by criteria  Loose, teeth: 3 bottom front loose teeth  Female stress incontinence  Shoulder pain, right  Constipation  Arthritis of Knee  H/O heartburn  History of lung cancer  Obese  Hx of hyperlipidemia  Asthma  Diabetes mellitus: type 2  dx about 4-5 years ago   no daily fingerstick  H/O: HTN (hypertension)  Enlarged lymph nodes  Lymph nodes enlarged: s/p biopsy mediastinal  benign  H/O endoscopy  left upper lobectomy      Allergies    No Known Allergies    Intolerances    albuterol (Unknown)      Antimicrobials Day #  :  ceFAZolin   IVPB 1000milliGRAM(s) IV Intermittent every 8 hours    Other Medications:  heparin  Injectable 5000Unit(s) SubCutaneous every 12 hours  predniSONE   Tablet 5milliGRAM(s) Oral daily  aspirin enteric coated 81milliGRAM(s) Oral daily  diltiazem   CD 240milliGRAM(s) Oral daily  clopidogrel Tablet 75milliGRAM(s) Oral daily  methimazole 5milliGRAM(s) Oral daily  metoprolol 25milliGRAM(s) Oral every 8 hours  roflumilast 500MICROGram(s) Oral daily  digoxin     Tablet 0.125milliGRAM(s) Oral every other day  potassium chloride    Tablet ER 10milliEquivalent(s) Oral daily  torsemide 20milliGRAM(s) Oral daily  atorvastatin 20milliGRAM(s) Oral at bedtime  acetaminophen   Tablet. 650milliGRAM(s) Oral every 6 hours PRN  insulin lispro (HumaLOG) corrective regimen sliding scale  SubCutaneous three times a day before meals  insulin lispro (HumaLOG) corrective regimen sliding scale  SubCutaneous at bedtime  dextrose 5%. 1000milliLiter(s) IV Continuous <Continuous>  dextrose Gel 1Dose(s) Oral once PRN  dextrose 50% Injectable 12.5Gram(s) IV Push once  dextrose 50% Injectable 25Gram(s) IV Push once  colchicine 0.6milliGRAM(s) Oral daily      FAMILY HISTORY:  Family history of diabetes mellitus (Mother)  Family history of breast cancer      SOCIAL HISTORY:  Smoking: [ ]Yes [ x]No  ETOH: [ ]Yes [x ]No  Drug Use: [ ]Yes [x ]No      T(F): 98, Max: 98.9 ( @ 21:50)  HR: 88  BP: 119/57  RR: 18  SpO2: 96%  Wt(kg): --    PHYSICAL EXAM:  General: alert, no acute distress  Eyes:  anicteric, no conjunctival injection, no discharge  Oropharynx: no lesions or injection 	  Neck: supple, without adenopathy  Lungs: distant bs to auscultation  Heart: regular rate and rhythm; no murmur, rubs or gallops  Abdomen: soft, nondistended, nontender, without mass or organomegaly  Skin: no lesions  Extremities: right knee patch of red over patella, left foot edema with redness over 1st mtp most, pain with rom foot and ankle. slight pretibial edema of the left leg no redness  Neurologic: alert, oriented, moves all extremities    LAB RESULTS:                        8.6    4.31  )-----------( 168      ( 2017 07:25 )             28.5     06-14    136  |  92<L>  |  50<H>  ----------------------------<  104<H>  3.5   |  35<H>  |  1.48<H>    Ca    9.6      2017 07:25  Mg     2.6         TPro  6.8  /  Alb  3.7  /  TBili  0.9  /  DBili  x   /  AST  26  /  ALT  9<L>  /  AlkPhos  53      LIVER FUNCTIONS - ( 2017 16:32 )  Alb: 3.7 g/dL / Pro: 6.8 g/dL / ALK PHOS: 53 U/L / ALT: 9 U/L RC / AST: 26 U/L / GGT: x           Urinalysis Basic - ( 2017 17:45 )    Color: Yellow / Appearance: Clear / S.015 / pH: x  Gluc: x / Ketone: Negative  / Bili: Negative / Urobili: Negative   Blood: x / Protein: 30 mg/dL / Nitrite: Negative   Leuk Esterase: Negative / RBC: 0-2 /HPF / WBC 0-2 /HPF   Sq Epi: x / Non Sq Epi: OCC /HPF / Bacteria: Few /HPF  Uric Acid, Serum (17 @ 07:25)    Uric Acid, Serum: 12.5 mg/dL      MICROBIOLOGY:  RECENT CULTURES:        RADIOLOGY REVIEWED:  XAM:  ANKLE LEFT (MINIMUM 3 VIEWS)                            PROCEDURE DATE:  2017            INTERPRETATION:  CLINICAL INFORMATION: Left ankle pain.    EXAM: 3 views of the left  ankle with no prior comparisons.    IMPRESSION:  There is nofracture or dislocation of the left ankle. The ankle mortise   is congruent on these nonweightbearing radiographs. No appreciable soft   tissue swelling. Small plantar calcaneal spur. Radiopaque rounded density   seen overlying the midfoot, limiting evaluation.      JERONIMO VENEGAS M.D., RADIOLOGY RESIDENT  This document has been electronically signed.  SHLOMO GONCALVES M.D., ATTENDING RADIOLOGIST  This document has been electronically signed. 2017  9:19AM      Impression:  Patient with 2 weeks of left ankle and right knee pain with redness over the right knee but little swelling and left foot edema with redness most over 1st mtp. She has no fever, no leukocytosis and has not been feeling ill, just pain. the uric acid level is very high. She has gout. If an infection was present she should show some signs of sepsis and feel sick at this point and process should have progress more over 2 weeks    Recommendations:  Monitor off antibiotic  Treat for gout

## 2017-06-15 LAB
FERRITIN SERPL-MCNC: 182 NG/ML — HIGH (ref 15–150)
IRON SATN MFR SERPL: 12 % — LOW (ref 14–50)
IRON SATN MFR SERPL: 26 UG/DL — LOW (ref 30–160)
TIBC SERPL-MCNC: 221 UG/DL — SIGNIFICANT CHANGE UP (ref 220–430)
UIBC SERPL-MCNC: 195 UG/DL — SIGNIFICANT CHANGE UP (ref 110–370)

## 2017-06-15 RX ORDER — ALLOPURINOL 300 MG
200 TABLET ORAL DAILY
Qty: 0 | Refills: 0 | Status: DISCONTINUED | OUTPATIENT
Start: 2017-06-15 | End: 2017-06-21

## 2017-06-15 RX ORDER — COLCHICINE 0.6 MG
0.3 TABLET ORAL
Qty: 0 | Refills: 0 | Status: COMPLETED | OUTPATIENT
Start: 2017-06-15 | End: 2017-06-18

## 2017-06-15 RX ADMIN — Medication 40 MILLIGRAM(S): at 12:15

## 2017-06-15 RX ADMIN — Medication 240 MILLIGRAM(S): at 06:20

## 2017-06-15 RX ADMIN — ATORVASTATIN CALCIUM 20 MILLIGRAM(S): 80 TABLET, FILM COATED ORAL at 22:37

## 2017-06-15 RX ADMIN — Medication 0.12 MILLIGRAM(S): at 12:15

## 2017-06-15 RX ADMIN — Medication 81 MILLIGRAM(S): at 12:11

## 2017-06-15 RX ADMIN — Medication 25 MILLIGRAM(S): at 06:20

## 2017-06-15 RX ADMIN — CLOPIDOGREL BISULFATE 75 MILLIGRAM(S): 75 TABLET, FILM COATED ORAL at 12:11

## 2017-06-15 RX ADMIN — Medication 6: at 17:56

## 2017-06-15 RX ADMIN — Medication 25 MILLIGRAM(S): at 14:50

## 2017-06-15 RX ADMIN — Medication 10 MILLIEQUIVALENT(S): at 12:11

## 2017-06-15 RX ADMIN — Medication 5 MILLIGRAM(S): at 06:20

## 2017-06-15 RX ADMIN — Medication 200 MILLIGRAM(S): at 12:15

## 2017-06-15 RX ADMIN — ROFLUMILAST 500 MICROGRAM(S): 500 TABLET ORAL at 08:05

## 2017-06-15 RX ADMIN — Medication 20 MILLIGRAM(S): at 06:20

## 2017-06-15 RX ADMIN — HEPARIN SODIUM 5000 UNIT(S): 5000 INJECTION INTRAVENOUS; SUBCUTANEOUS at 17:58

## 2017-06-15 RX ADMIN — Medication 0.3 MILLIGRAM(S): at 17:58

## 2017-06-15 RX ADMIN — Medication 25 MILLIGRAM(S): at 22:37

## 2017-06-15 RX ADMIN — HEPARIN SODIUM 5000 UNIT(S): 5000 INJECTION INTRAVENOUS; SUBCUTANEOUS at 06:20

## 2017-06-15 NOTE — PROGRESS NOTE ADULT - SUBJECTIVE AND OBJECTIVE BOX
CC: f/u for LE swelling pain    Patient reports pain mostly L ankle>R, no other joints pain, no fevers    REVIEW OF SYSTEMS: no fevers, no chills, no nausea, no vomiting, no abd pain, no resp symptoms  All other review of systems negative (Comprehensive ROS)    Antimicrobials Day #  none    Other Medications Reviewed    T(F): 97.7, Max: 98.6 (-14 @ 19:20)  HR: 74  BP: 120/73  RR: 16  SpO2: 100%  Wt(kg): --    PHYSICAL EXAM:  General: alert, no acute distress  Eyes:  anicteric, no conjunctival injection, no discharge  Oropharynx: no lesions or injection 	  Neck: supple, without adenopathy  Lungs: clear to auscultation  Heart: regular rate and rhythm; no murmur, rubs or gallops  Abdomen: soft, nondistended, nontender, without mass or organomegaly  Skin: no lesions  Extremities:  right knee patch of redness over patella, left foot edema with redness over 1st mtp, pain with rom foot and ankle. slight pretibial edema of the left leg no redness  Neurologic: alert, oriented, moves all extremities    LAB RESULTS:                        8.6    4.31  )-----------( 168      ( 2017 07:25 )             28.5     06-14    136  |  92<L>  |  50<H>  ----------------------------<  104<H>  3.5   |  35<H>  |  1.48<H>    Ca    9.6      2017 07:25  Mg     2.6         TPro  6.8  /  Alb  3.7  /  TBili  0.9  /  DBili  x   /  AST  26  /  ALT  9<L>  /  AlkPhos  53  06-13    LIVER FUNCTIONS - ( 2017 16:32 )  Alb: 3.7 g/dL / Pro: 6.8 g/dL / ALK PHOS: 53 U/L / ALT: 9 U/L RC / AST: 26 U/L / GGT: x           Urinalysis Basic - ( 2017 17:45 )    Color: Yellow / Appearance: Clear / S.015 / pH: x  Gluc: x / Ketone: Negative  / Bili: Negative / Urobili: Negative   Blood: x / Protein: 30 mg/dL / Nitrite: Negative   Leuk Esterase: Negative / RBC: 0-2 /HPF / WBC 0-2 /HPF   Sq Epi: x / Non Sq Epi: OCC /HPF / Bacteria: Few /HPF        MICROBIOLOGY REVIEWED:  Culture - Blood (17 @ 03:38)   No growth to date.      RADIOLOGY REVIEWED:    There is nofracture or dislocation of the left ankle. The ankle mortise   is congruent on these nonweightbearing radiographs. No appreciable soft   tissue swelling. Small plantar calcaneal spur. Radiopaque rounded density   seen overlying the midfoot, limiting evaluation.

## 2017-06-15 NOTE — CHART NOTE - NSCHARTNOTEFT_GEN_A_CORE
Briefly this is a female c hx of HTN DM CKD stage 3 pw pain in the foot and knee that is likely cw gout    -Solumederol 40mg ivp x 1  -Change colchicine to 0.3mg po bid  -cont demedex at present dose  -Start Uloric    Sayed Sarah [phone] Briefly this is a female c hx of HTN DM CKD stage 3 pw pain in the foot and knee that is likely cw gout    -Solumederol 40mg ivp x 1  -Change colchicine to 0.3mg po bid  -cont demedex at present dose  -Start Allopurinol  -Check iron stores and ferritin    Sayed Sarah [phone]

## 2017-06-15 NOTE — PROGRESS NOTE ADULT - ASSESSMENT
72 yo F with pulmonary hypertension, on prednisone 5mg,  a/w 2 weeks history of right knee redness and pain and left foot redness and pain.   no trauma, no fever or chills.   Now on colchicine for possible gout  Uric Acid, Serum: 12.5 mg/dL  PLAN:   no indications for abx at present  f/u cultures  f/u ESR and procalcitonin trend  colchicine and prednisone for gout

## 2017-06-15 NOTE — PROGRESS NOTE ADULT - ASSESSMENT
Gout flare  ckd 3  severe phtn    apprec ID/nephro input  steroids and colchicine  d/c planning once pt ambulatory  continue diuretics.

## 2017-06-15 NOTE — PROGRESS NOTE ADULT - SUBJECTIVE AND OBJECTIVE BOX
MEDICINE, PROGRESS NOTE [phone]    FARN ALEXANDRA 71y [id number]    Patient seen and examined.  Patient is a 71y old  Female who presents with a chief complaint of L Ankle Pain (13 Jun 2017 21:57)      PAST MEDICAL & SURGICAL HISTORY:  Hyperthyroidism  JOSE (obstructive sleep apnea)  Epistaxis  GIB (gastrointestinal bleeding)  CAD S/P percutaneous coronary angioplasty  Afib  Pulmonary HTN  GERD (gastroesophageal reflux disease)  Obesity  Cardiomegaly  Valvular heart disease  Sleep apnea  COPD (chronic obstructive pulmonary disease): Home O2  Sleep Apnea: by criteria  Loose, teeth: 3 bottom front loose teeth  Female stress incontinence  Shoulder pain, right  Constipation  Arthritis of Knee  H/O heartburn  History of lung cancer  Obese  Hx of hyperlipidemia  Asthma  Diabetes mellitus: type 2  dx about 4-5 years ago   no daily fingerstick  H/O: HTN (hypertension)  Enlarged lymph nodes  Lymph nodes enlarged: s/p biopsy mediastinal  benign  H/O endoscopy  left upper lobectomy    MEDICATIONS  (STANDING):  heparin  Injectable 5000Unit(s) SubCutaneous every 12 hours  predniSONE   Tablet 5milliGRAM(s) Oral daily  aspirin enteric coated 81milliGRAM(s) Oral daily  diltiazem   CD 240milliGRAM(s) Oral daily  clopidogrel Tablet 75milliGRAM(s) Oral daily  methimazole 5milliGRAM(s) Oral daily  metoprolol 25milliGRAM(s) Oral every 8 hours  roflumilast 500MICROGram(s) Oral daily  digoxin     Tablet 0.125milliGRAM(s) Oral every other day  potassium chloride    Tablet ER 10milliEquivalent(s) Oral daily  torsemide 20milliGRAM(s) Oral daily  atorvastatin 20milliGRAM(s) Oral at bedtime  insulin lispro (HumaLOG) corrective regimen sliding scale  SubCutaneous three times a day before meals  insulin lispro (HumaLOG) corrective regimen sliding scale  SubCutaneous at bedtime  dextrose 5%. 1000milliLiter(s) IV Continuous <Continuous>  dextrose 50% Injectable 12.5Gram(s) IV Push once  dextrose 50% Injectable 25Gram(s) IV Push once  colchicine 0.3milliGRAM(s) Oral two times a day  allopurinol 200milliGRAM(s) Oral daily    MEDICATIONS  (PRN):  acetaminophen   Tablet. 650milliGRAM(s) Oral every 6 hours PRN Moderate Pain (4 - 6)  dextrose Gel 1Dose(s) Oral once PRN Blood Glucose LESS THAN 70 milliGRAM(s)/deciliter    Allergies    No Known Allergies    Intolerances    albuterol (Unknown)      PHYSICAL EXAM:  Constitutional: NAD  HEENT: Normocephalic, EOMI  Neck:  No JVD  Respiratory: CTA B/L, No wheezes  Cardiovascular: S1, S2, RRR, + systolic murmur  Gastrointestinal: BS+, soft, NT/ND  Extremities: No peripheral edema  Neurological: AAOX3, no focal deficits  Psychiatric: Normal mood, normal affect  : No Gottlieb    Vital Signs Last 24 Hrs  T(C): 37.1, Max: 37.1 (06-15 @ 17:34)  T(F): 98.7, Max: 98.7 (06-15 @ 17:34)  HR: 79 (53 - 82)  BP: 120/66 (100/57 - 120/73)  BP(mean): --  RR: 18 (16 - 18)  SpO2: 95% (95% - 100%)  I&O's Summary  I & Os for 24h ending 15 Ernie 2017 07:00  =============================================  IN: 400 ml / OUT: 0 ml / NET: 400 ml    I & Os for current day (as of 15 Ernie 2017 21:12)  =============================================  IN: 480 ml / OUT: 0 ml / NET: 480 ml      LABS:                        8.6    4.31  )-----------( 168      ( 14 Jun 2017 07:25 )             28.5     06-14    136  |  92<L>  |  50<H>  ----------------------------<  104<H>  3.5   |  35<H>  |  1.48<H>    Ca    9.6      14 Jun 2017 07:25  Mg     2.6     06-14                REVIEW OF SYSTEMS:      RADIOLOGY & ADDITIONAL STUDIES:      ASSESSMENT/PLAN:

## 2017-06-16 LAB
ANION GAP SERPL CALC-SCNC: 11 MMOL/L — SIGNIFICANT CHANGE UP (ref 5–17)
BUN SERPL-MCNC: 68 MG/DL — HIGH (ref 7–23)
CALCIUM SERPL-MCNC: 10.1 MG/DL — SIGNIFICANT CHANGE UP (ref 8.4–10.5)
CHLORIDE SERPL-SCNC: 94 MMOL/L — LOW (ref 96–108)
CO2 SERPL-SCNC: 36 MMOL/L — HIGH (ref 22–31)
CREAT SERPL-MCNC: 1.59 MG/DL — HIGH (ref 0.5–1.3)
DIGOXIN SERPL-MCNC: 1.1 NG/ML — SIGNIFICANT CHANGE UP (ref 0.8–2)
GLUCOSE SERPL-MCNC: 169 MG/DL — HIGH (ref 70–99)
HCT VFR BLD CALC: 29 % — LOW (ref 34.5–45)
HGB BLD-MCNC: 9.1 G/DL — LOW (ref 11.5–15.5)
MAGNESIUM SERPL-MCNC: 2.4 MG/DL — SIGNIFICANT CHANGE UP (ref 1.6–2.6)
MCHC RBC-ENTMCNC: 26 PG — LOW (ref 27–34)
MCHC RBC-ENTMCNC: 31.4 GM/DL — LOW (ref 32–36)
MCV RBC AUTO: 82.7 FL — SIGNIFICANT CHANGE UP (ref 80–100)
PLATELET # BLD AUTO: 186 K/UL — SIGNIFICANT CHANGE UP (ref 150–400)
POTASSIUM SERPL-MCNC: 3.7 MMOL/L — SIGNIFICANT CHANGE UP (ref 3.5–5.3)
POTASSIUM SERPL-SCNC: 3.7 MMOL/L — SIGNIFICANT CHANGE UP (ref 3.5–5.3)
RBC # BLD: 3.51 M/UL — LOW (ref 3.8–5.2)
RBC # FLD: 16.6 % — HIGH (ref 10.3–14.5)
SODIUM SERPL-SCNC: 141 MMOL/L — SIGNIFICANT CHANGE UP (ref 135–145)
WBC # BLD: 7.4 K/UL — SIGNIFICANT CHANGE UP (ref 3.8–10.5)
WBC # FLD AUTO: 7.4 K/UL — SIGNIFICANT CHANGE UP (ref 3.8–10.5)

## 2017-06-16 RX ORDER — METOPROLOL TARTRATE 50 MG
25 TABLET ORAL EVERY 8 HOURS
Qty: 0 | Refills: 0 | Status: DISCONTINUED | OUTPATIENT
Start: 2017-06-16 | End: 2017-06-21

## 2017-06-16 RX ORDER — IRON SUCROSE 20 MG/ML
200 INJECTION, SOLUTION INTRAVENOUS EVERY 24 HOURS
Qty: 0 | Refills: 0 | Status: COMPLETED | OUTPATIENT
Start: 2017-06-16 | End: 2017-06-19

## 2017-06-16 RX ORDER — DIGOXIN 250 MCG
0.12 TABLET ORAL EVERY OTHER DAY
Qty: 0 | Refills: 0 | Status: DISCONTINUED | OUTPATIENT
Start: 2017-06-17 | End: 2017-06-21

## 2017-06-16 RX ADMIN — HEPARIN SODIUM 5000 UNIT(S): 5000 INJECTION INTRAVENOUS; SUBCUTANEOUS at 06:14

## 2017-06-16 RX ADMIN — Medication 10 MILLIEQUIVALENT(S): at 11:24

## 2017-06-16 RX ADMIN — ROFLUMILAST 500 MICROGRAM(S): 500 TABLET ORAL at 06:15

## 2017-06-16 RX ADMIN — Medication 25 MILLIGRAM(S): at 06:15

## 2017-06-16 RX ADMIN — Medication 25 MILLIGRAM(S): at 15:01

## 2017-06-16 RX ADMIN — Medication 20 MILLIGRAM(S): at 06:15

## 2017-06-16 RX ADMIN — Medication 0.3 MILLIGRAM(S): at 17:07

## 2017-06-16 RX ADMIN — Medication 25 MILLIGRAM(S): at 21:58

## 2017-06-16 RX ADMIN — HEPARIN SODIUM 5000 UNIT(S): 5000 INJECTION INTRAVENOUS; SUBCUTANEOUS at 17:07

## 2017-06-16 RX ADMIN — IRON SUCROSE 110 MILLIGRAM(S): 20 INJECTION, SOLUTION INTRAVENOUS at 11:25

## 2017-06-16 RX ADMIN — ATORVASTATIN CALCIUM 20 MILLIGRAM(S): 80 TABLET, FILM COATED ORAL at 21:57

## 2017-06-16 RX ADMIN — CLOPIDOGREL BISULFATE 75 MILLIGRAM(S): 75 TABLET, FILM COATED ORAL at 11:24

## 2017-06-16 RX ADMIN — Medication 5 MILLIGRAM(S): at 06:15

## 2017-06-16 RX ADMIN — Medication 200 MILLIGRAM(S): at 11:24

## 2017-06-16 RX ADMIN — Medication 0.3 MILLIGRAM(S): at 06:15

## 2017-06-16 RX ADMIN — Medication 81 MILLIGRAM(S): at 11:24

## 2017-06-16 RX ADMIN — Medication 2: at 17:07

## 2017-06-16 RX ADMIN — Medication 240 MILLIGRAM(S): at 06:15

## 2017-06-16 NOTE — PROGRESS NOTE ADULT - SUBJECTIVE AND OBJECTIVE BOX
CC: f/u for gout    Patient reports  that her left ankle and knee are better but still feel tight  REVIEW OF SYSTEMS:  All other review of systems negative (Comprehensive ROS)    Antimicrobials Day #  :none    Other Medications Reviewed    T(F): 98.3, Max: 98.7 (06-15 @ 17:34)  HR: 79  BP: 110/56  RR: 16  SpO2: 99%  Wt(kg): --    PHYSICAL EXAM:  General: alert, no acute distress  Eyes:  anicteric, no conjunctival injection, no discharge  Oropharynx: no lesions or injection 	  Neck: supple, without adenopathy  Lungs: distant bs to auscultation  Heart: regular rate and rhythm; no murmur, rubs or gallops  Abdomen: soft, nondistended, nontender, without mass or organomegaly  Skin: no lesions  Extremities: redness over right knee much improved, no zbigniew swelling. left leg edema better and redness over 1st metatarsal area much improved. no redness of leg, much less tender foot  Neurologic: alert, oriented, moves all extremities    LAB RESULTS:    Uric Acid, Serum (06.14.17 @ 07:25)    Uric Acid, Serum: 12.5 mg/dL      MICROBIOLOGY:  RECENT CULTURES:  06-14 @ 03:38 .Blood Blood     No growth to date.      06-13 @ 21:09 .Urine Clean Catch (Midstream)     No growth    RADIOLOGY REVIEWED:  EXAM:  DUPLEX EXT VEINS LOWER LT                            PROCEDURE DATE:  06/13/2017            INTERPRETATION:  CLINICAL INFORMATION: Left lower extremity swelling    COMPARISON: Lower extremity duplex 10/11/2016    TECHNIQUE: Duplex sonographyof the LEFT LOWER extremity with color and   spectral Doppler, with and without compression.      FINDINGS:    There is normal compressibility of the left common femoral, femoral and   popliteal veins. No calf vein thrombosis is detected.    Doppler examination shows normal spontaneous and phasic flow.    IMPRESSION:     No evidence of left lower extremity deep venous thrombosis.      ERNA GUTIÉRREZ M.D., RADIOLOGY RESIDENT  This document has been electronically signed.  NAVID COUCH M.D., ATTENDING RADIOLOGIST  This document has been electronically signed. Jun 13 2017  7:52PM      EXAM:  ANKLE LEFT (MINIMUM 3 VIEWS)                            PROCEDURE DATE:  06/14/2017            INTERPRETATION:  CLINICAL INFORMATION: Left ankle pain.    EXAM: 3 views of the left  ankle with no prior comparisons.    IMPRESSION:  There is nofracture or dislocation of the left ankle. The ankle mortise   is congruent on these nonweightbearing radiographs. No appreciable soft   tissue swelling. Small plantar calcaneal spur. Radiopaque rounded density   seen overlying the midfoot, limiting evaluation.      JERONIMO VENEGAS M.D., RADIOLOGY RESIDENT  This document has been electronically signed.  SHLOMO GONCALVES M.D., ATTENDING RADIOLOGIST  This document has been electronically signed. Jun 14 2017  9:19AM        Assessment: Patient with pulmonary hypertension, copd who developed redness over the right knee and left foot 2 weeks ago, no fever and no progression with high uric acid all consistent with gout. She is improving on gout treatment and off antibiotics    Plan:  Continue gout treatment  Monitor off antibiotics  call us if further input is needed

## 2017-06-16 NOTE — PROGRESS NOTE ADULT - ASSESSMENT
The patient is a juan carlos 71-year-old female with past medical history of lung cancer, status post lobectomy, pulmonary hypertension, diet-controlled diabetes, hypertension, and chronic kidney disease stage III, presents with extreme shortness of breath.  The patient has severe pulmonary hypertension and was scheduled to have a right heart catheterization to measure pulmonary pressures as an outpt.  This is worsening of her pulmonary disease?  The patient may have some virus(despite RVP being negative) except her lungs actually sounded clear(not much rhonhi at all).  Chest x-ray was consistent with heart failure as well.    PLAN:  1.	Cardiology:  Continue with IV Lasix twice a day.  Out's greater than in's.  I think the patient would benefit from right and left heart catheterization.  2.	Pulmonary:  Nebulizers per Pulmonary.  Continue her oxygen.  CPAP at night.  3.	Renal:  Monitor creatinine, but at present, she will require aggressive diuresis. The patient is a 71-year-old female with past medical history of hypertension, diabetes, lung cancer, pulmonary hypertension with no response to nitric oxide, who presents with pain in her foot.  Uric acid level is around 12, I suspect this can be gout.  Blood cultures and urine cultures are negative.  I do not think this is infectious in etiology.  The patient with chronic kidney disease stage III, which is at her baseline.  Goals of therapy are supportive in nature.  1.	Rheumatology:  S/P Solu-Medrol 40 mg x1.  Colchicine to 0.3 mg mg twice a day for the next three days.   Allopurinol 200mg po qd as well  2.	Renal:  No need for renal sonogram.  At some point, we will quantify her proteinuria burden.  3.	Cardiovascular:  Does not seem to be in any decompensated heart failure despite chest x-ray--> continue with Demadex.  I do not see the need for IV loop diuretics at present.  4.       Anemia- IV Venofer 200mg ivss qd x 4 doses

## 2017-06-16 NOTE — PROGRESS NOTE ADULT - SUBJECTIVE AND OBJECTIVE BOX
MEDICINE, PROGRESS NOTE [phone]    FRAN ALEXANDRA 71y [id number]    Patient seen and examined.  Patient is a 71y old  Female who presents with a chief complaint of L Ankle Pain (13 Jun 2017 21:57)  Pt feels ankle now hurts    PAST MEDICAL & SURGICAL HISTORY:  Hyperthyroidism  JOES (obstructive sleep apnea)  Epistaxis  GIB (gastrointestinal bleeding)  CAD S/P percutaneous coronary angioplasty  Afib  Pulmonary HTN  GERD (gastroesophageal reflux disease)  Obesity  Cardiomegaly  Valvular heart disease  Sleep apnea  COPD (chronic obstructive pulmonary disease): Home O2  Sleep Apnea: by criteria  Loose, teeth: 3 bottom front loose teeth  Female stress incontinence  Shoulder pain, right  Constipation  Arthritis of Knee  H/O heartburn  History of lung cancer  Obese  Hx of hyperlipidemia  Asthma  Diabetes mellitus: type 2  dx about 4-5 years ago   no daily fingerstick  H/O: HTN (hypertension)  Enlarged lymph nodes  Lymph nodes enlarged: s/p biopsy mediastinal  benign  H/O endoscopy  left upper lobectomy    MEDICATIONS  (STANDING):  heparin  Injectable 5000Unit(s) SubCutaneous every 12 hours  predniSONE   Tablet 5milliGRAM(s) Oral daily  aspirin enteric coated 81milliGRAM(s) Oral daily  diltiazem   CD 240milliGRAM(s) Oral daily  clopidogrel Tablet 75milliGRAM(s) Oral daily  methimazole 5milliGRAM(s) Oral daily  roflumilast 500MICROGram(s) Oral daily  digoxin     Tablet 0.125milliGRAM(s) Oral every other day  potassium chloride    Tablet ER 10milliEquivalent(s) Oral daily  torsemide 20milliGRAM(s) Oral daily  atorvastatin 20milliGRAM(s) Oral at bedtime  insulin lispro (HumaLOG) corrective regimen sliding scale  SubCutaneous three times a day before meals  insulin lispro (HumaLOG) corrective regimen sliding scale  SubCutaneous at bedtime  dextrose 5%. 1000milliLiter(s) IV Continuous <Continuous>  dextrose 50% Injectable 12.5Gram(s) IV Push once  dextrose 50% Injectable 25Gram(s) IV Push once  colchicine 0.3milliGRAM(s) Oral two times a day  allopurinol 200milliGRAM(s) Oral daily  iron sucrose IVPB 200milliGRAM(s) IV Intermittent every 24 hours  metoprolol 25milliGRAM(s) Oral every 8 hours    MEDICATIONS  (PRN):  acetaminophen   Tablet. 650milliGRAM(s) Oral every 6 hours PRN Moderate Pain (4 - 6)  dextrose Gel 1Dose(s) Oral once PRN Blood Glucose LESS THAN 70 milliGRAM(s)/deciliter    Allergies    No Known Allergies    Intolerances    albuterol (Unknown)      PHYSICAL EXAM:  Constitutional: NAD  HEENT: Normocephalic, EOMI  Neck:  No JVD  Respiratory: CTA B/L, No wheezes  Cardiovascular: S1, S2, RRR, + systolic murmur  Gastrointestinal: BS+, soft, NT/ND  Extremities: No peripheral edema  Neurological: AAOX3, no focal deficits  Psychiatric: Normal mood, normal affect  : No Gottlieb    Vital Signs Last 24 Hrs  T(C): 36.8, Max: 37.1 (06-15 @ 17:34)  T(F): 98.2, Max: 98.7 (06-15 @ 17:34)  HR: 57 (57 - 92)  BP: 115/64 (107/55 - 120/66)  BP(mean): --  RR: 17 (16 - 18)  SpO2: 100% (95% - 100%)  I&O's Summary  I & Os for 24h ending 16 Jun 2017 07:00  =============================================  IN: 1180 ml / OUT: 0 ml / NET: 1180 ml    I & Os for current day (as of 16 Jun 2017 16:46)  =============================================  IN: 400 ml / OUT: 0 ml / NET: 400 ml      LABS:                        9.1    7.4   )-----------( 186      ( 16 Jun 2017 15:48 )             29.0     06-16    141  |  94<L>  |  68<H>  ----------------------------<  169<H>  3.7   |  36<H>  |  1.59<H>    Ca    10.1      16 Jun 2017 15:49  Mg     2.4     06-16          Magnesium, Serum: 2.4 mg/dL (06-16 @ 15:49)        REVIEW OF SYSTEMS:      RADIOLOGY & ADDITIONAL STUDIES:      ASSESSMENT/PLAN:

## 2017-06-16 NOTE — PROGRESS NOTE ADULT - SUBJECTIVE AND OBJECTIVE BOX
[facility]PHROLOGY[facility] [phone]        Patient seen and examined         MEDICATIONS  (STANDING):  heparin  Injectable 5000Unit(s) SubCutaneous every 12 hours  predniSONE   Tablet 5milliGRAM(s) Oral daily  aspirin enteric coated 81milliGRAM(s) Oral daily  diltiazem   CD 240milliGRAM(s) Oral daily  clopidogrel Tablet 75milliGRAM(s) Oral daily  methimazole 5milliGRAM(s) Oral daily  metoprolol 25milliGRAM(s) Oral every 8 hours  roflumilast 500MICROGram(s) Oral daily  digoxin     Tablet 0.125milliGRAM(s) Oral every other day  potassium chloride    Tablet ER 10milliEquivalent(s) Oral daily  torsemide 20milliGRAM(s) Oral daily  atorvastatin 20milliGRAM(s) Oral at bedtime  insulin lispro (HumaLOG) corrective regimen sliding scale  SubCutaneous three times a day before meals  insulin lispro (HumaLOG) corrective regimen sliding scale  SubCutaneous at bedtime  dextrose 5%. 1000milliLiter(s) IV Continuous <Continuous>  dextrose 50% Injectable 12.5Gram(s) IV Push once  dextrose 50% Injectable 25Gram(s) IV Push once  colchicine 0.3milliGRAM(s) Oral two times a day  allopurinol 200milliGRAM(s) Oral daily      VITAL:  T(C): , Max: 37.1 (06-15 @ 17:34)  T(F): , Max: 98.7 (06-15 @ 17:34)  HR: 79  BP: 110/56  BP(mean): --  RR: 16  SpO2: 99%  Wt(kg): --    I and O's:    I & Os for current day (as of 06-16 @ 08:26)  =============================================  IN: 1180 ml / OUT: 0 ml / NET: 1180 ml        PHYSICAL EXAM:    Constitutional: NAD  HEENT: PERRLA    Neck:  No JVD  Respiratory: CTAB/L  Cardiovascular: S1 and S2  Gastrointestinal: BS+, soft, NT/ND  Extremities: No peripheral edema  Neurological: A/O x 3, no focal deficits  Psychiatric: Normal mood, normal affect  : No Gottlieb  Skin: No rashes  Access: Not applicable    LABS:                Urine Studies:          RADIOLOGY & ADDITIONAL STUDIES: [facility] [phone]        Patient seen and examined in bed.  The foot is better for sure.          MEDICATIONS  (STANDING):  heparin  Injectable 5000Unit(s) SubCutaneous every 12 hours  predniSONE   Tablet 5milliGRAM(s) Oral daily  aspirin enteric coated 81milliGRAM(s) Oral daily  diltiazem   CD 240milliGRAM(s) Oral daily  clopidogrel Tablet 75milliGRAM(s) Oral daily  methimazole 5milliGRAM(s) Oral daily  metoprolol 25milliGRAM(s) Oral every 8 hours  roflumilast 500MICROGram(s) Oral daily  digoxin     Tablet 0.125milliGRAM(s) Oral every other day  potassium chloride    Tablet ER 10milliEquivalent(s) Oral daily  torsemide 20milliGRAM(s) Oral daily  atorvastatin 20milliGRAM(s) Oral at bedtime  insulin lispro (HumaLOG) corrective regimen sliding scale  SubCutaneous three times a day before meals  insulin lispro (HumaLOG) corrective regimen sliding scale  SubCutaneous at bedtime  dextrose 5%. 1000milliLiter(s) IV Continuous <Continuous>  dextrose 50% Injectable 12.5Gram(s) IV Push once  dextrose 50% Injectable 25Gram(s) IV Push once  colchicine 0.3milliGRAM(s) Oral two times a day  allopurinol 200milliGRAM(s) Oral daily      VITAL:  T(C): , Max: 37.1 (06-15 @ 17:34)  T(F): , Max: 98.7 (06-15 @ 17:34)  HR: 79  BP: 110/56  BP(mean): --  RR: 16  SpO2: 99%  Wt(kg): --    I and O's:    I & Os for current day (as of 06-16 @ 08:26)  =============================================  IN: 1180 ml / OUT: 0 ml / NET: 1180 ml        PHYSICAL EXAM:    Constitutional: NAD  HEENT: PERRLA    Neck:  No JVD  Respiratory: decreased breath sounds  Cardiovascular: S1 and S2  Gastrointestinal: BS+, soft, NT/ND  Extremities: No peripheral edema  Neurological: A/O x 3, no focal deficits  Psychiatric: Normal mood, normal affect  : No Gottlieb  Skin: No rashes  Access: Not applicable    LABS:    Iron with Total Binding Capacity (06.15.17 @ 13:32)    Iron - Total Binding Capacity.: 221 ug/dL    % Saturation, Iron: 12 %    Iron Total, Serum: 26 ug/dL    Unsaturated Iron Binding Capacity: 195 ug/dL

## 2017-06-16 NOTE — PROGRESS NOTE ADULT - ASSESSMENT
Compensated hf  gout flare  ckd  sev phtn    continue current care  apprec id/renal input  continue venofer  continue colchicine  encourge activity.

## 2017-06-17 LAB
ANION GAP SERPL CALC-SCNC: 13 MMOL/L — SIGNIFICANT CHANGE UP (ref 5–17)
BUN SERPL-MCNC: 69 MG/DL — HIGH (ref 7–23)
CALCIUM SERPL-MCNC: 9.9 MG/DL — SIGNIFICANT CHANGE UP (ref 8.4–10.5)
CHLORIDE SERPL-SCNC: 96 MMOL/L — SIGNIFICANT CHANGE UP (ref 96–108)
CO2 SERPL-SCNC: 35 MMOL/L — HIGH (ref 22–31)
CREAT SERPL-MCNC: 1.37 MG/DL — HIGH (ref 0.5–1.3)
GLUCOSE SERPL-MCNC: 153 MG/DL — HIGH (ref 70–99)
HCT VFR BLD CALC: 29.7 % — LOW (ref 34.5–45)
HGB BLD-MCNC: 9.4 G/DL — LOW (ref 11.5–15.5)
MCHC RBC-ENTMCNC: 26.5 PG — LOW (ref 27–34)
MCHC RBC-ENTMCNC: 31.6 GM/DL — LOW (ref 32–36)
MCV RBC AUTO: 83.9 FL — SIGNIFICANT CHANGE UP (ref 80–100)
PLATELET # BLD AUTO: 196 K/UL — SIGNIFICANT CHANGE UP (ref 150–400)
POTASSIUM SERPL-MCNC: 3.2 MMOL/L — LOW (ref 3.5–5.3)
POTASSIUM SERPL-SCNC: 3.2 MMOL/L — LOW (ref 3.5–5.3)
RBC # BLD: 3.54 M/UL — LOW (ref 3.8–5.2)
RBC # FLD: 16.7 % — HIGH (ref 10.3–14.5)
SODIUM SERPL-SCNC: 144 MMOL/L — SIGNIFICANT CHANGE UP (ref 135–145)
WBC # BLD: 6.1 K/UL — SIGNIFICANT CHANGE UP (ref 3.8–10.5)
WBC # FLD AUTO: 6.1 K/UL — SIGNIFICANT CHANGE UP (ref 3.8–10.5)

## 2017-06-17 RX ADMIN — CLOPIDOGREL BISULFATE 75 MILLIGRAM(S): 75 TABLET, FILM COATED ORAL at 08:31

## 2017-06-17 RX ADMIN — Medication 0.3 MILLIGRAM(S): at 22:03

## 2017-06-17 RX ADMIN — Medication 0.3 MILLIGRAM(S): at 08:29

## 2017-06-17 RX ADMIN — Medication 20 MILLIGRAM(S): at 08:30

## 2017-06-17 RX ADMIN — Medication 5 MILLIGRAM(S): at 08:31

## 2017-06-17 RX ADMIN — HEPARIN SODIUM 5000 UNIT(S): 5000 INJECTION INTRAVENOUS; SUBCUTANEOUS at 08:41

## 2017-06-17 RX ADMIN — Medication 25 MILLIGRAM(S): at 22:04

## 2017-06-17 RX ADMIN — ROFLUMILAST 500 MICROGRAM(S): 500 TABLET ORAL at 08:32

## 2017-06-17 RX ADMIN — Medication 25 MILLIGRAM(S): at 17:17

## 2017-06-17 RX ADMIN — Medication 200 MILLIGRAM(S): at 08:29

## 2017-06-17 RX ADMIN — IRON SUCROSE 110 MILLIGRAM(S): 20 INJECTION, SOLUTION INTRAVENOUS at 13:03

## 2017-06-17 RX ADMIN — Medication 0.12 MILLIGRAM(S): at 13:04

## 2017-06-17 RX ADMIN — ATORVASTATIN CALCIUM 20 MILLIGRAM(S): 80 TABLET, FILM COATED ORAL at 22:03

## 2017-06-17 RX ADMIN — Medication 81 MILLIGRAM(S): at 08:31

## 2017-06-17 RX ADMIN — Medication 240 MILLIGRAM(S): at 08:30

## 2017-06-17 RX ADMIN — HEPARIN SODIUM 5000 UNIT(S): 5000 INJECTION INTRAVENOUS; SUBCUTANEOUS at 22:02

## 2017-06-17 NOTE — PROGRESS NOTE ADULT - SUBJECTIVE AND OBJECTIVE BOX
NEPHROLOGY-[facility]  Luba Fischer NP      Patient seen and examined . Awake alert, oriented, No acute events noted. Denies sob, cp, nausea/vomiting        MEDICATIONS  (STANDING):  heparin  Injectable 5000Unit(s) SubCutaneous every 12 hours  predniSONE   Tablet 5milliGRAM(s) Oral daily  aspirin enteric coated 81milliGRAM(s) Oral daily  diltiazem   CD 240milliGRAM(s) Oral daily  clopidogrel Tablet 75milliGRAM(s) Oral daily  methimazole 5milliGRAM(s) Oral daily  roflumilast 500MICROGram(s) Oral daily  potassium chloride    Tablet ER 10milliEquivalent(s) Oral daily  torsemide 20milliGRAM(s) Oral daily  atorvastatin 20milliGRAM(s) Oral at bedtime  insulin lispro (HumaLOG) corrective regimen sliding scale  SubCutaneous three times a day before meals  insulin lispro (HumaLOG) corrective regimen sliding scale  SubCutaneous at bedtime  dextrose 5%. 1000milliLiter(s) IV Continuous <Continuous>  dextrose 50% Injectable 12.5Gram(s) IV Push once  dextrose 50% Injectable 25Gram(s) IV Push once  colchicine 0.3milliGRAM(s) Oral two times a day  allopurinol 200milliGRAM(s) Oral daily  iron sucrose IVPB 200milliGRAM(s) IV Intermittent every 24 hours  metoprolol 25milliGRAM(s) Oral every 8 hours  digoxin     Tablet 0.125milliGRAM(s) Oral every other day      VITAL:  T(C): , Max: 36.8 (06-16 @ 21:48)  T(F): , Max: 98.3 (06-16 @ 21:48)  HR: 68  BP: 112/53  BP(mean): --  RR: 18  SpO2: 98%  Wt(kg): --    I and O's:  I & Os for 24h ending 06-17 @ 07:00  =============================================  IN: 740 ml / OUT: 0 ml / NET: 740 ml    I & Os for current day (as of 06-17 @ 13:52)  =============================================  IN: 300 ml / OUT: 0 ml / NET: 300 ml    Height (cm): 160 (06-17 @ 12:18)  Weight (kg): 62.1 (06-17 @ 12:18)  BMI (kg/m2): 24.3 (06-17 @ 12:18)  BSA (m2): 1.65 (06-17 @ 12:18)    PHYSICAL EXAM:    Constitutional: NAD  HEENT: PERRLA, EOMI,     Neck:  No JVD  Respiratory: CTAB/L  Cardiovascular: S1 and S2  Gastrointestinal: BS+, soft, NT/ND  Extremities: No peripheral edema  Neurological: A/O x 3, no focal deficits  Psychiatric: Normal mood, normal affect  : No Gottlieb  Skin: No rashes  Access: Not applicable    LABS:                        9.4    6.1   )-----------( 196      ( 17 Jun 2017 08:58 )             29.7       17 Jun 2017 08:58    144    |  96     |  69<H>  ----------------------------<  153<H>  3.2<L>   |  35<H>  |  1.37<H>  16 Jun 2017 15:49    141    |  94<L>  |  68<H>  ----------------------------<  169<H>  3.7     |  36<H>  |  1.59<H>    Ca    9.9        17 Jun 2017 08:58  Ca    10.1       16 Jun 2017 15:49  Mg     2.4       16 Jun 2017 15:49      RADIOLOGY & ADDITIONAL STUDIES:      Assessment and Plan:   		   The patient is a 71-year-old female with past medical history of hypertension, diabetes, lung cancer, pulmonary hypertension with no response to nitric oxide, who presents with pain in her foot. Elevated Uric Acid levels.CKD III   Plan  1.	Rheumatology:  S/P Solu-Medrol 40 mg x1.  Colchicine to 0.3 mg mg twice a day for the next three days.   Allopurinol 200mg po qd as well  2.	Renal:  No need for renal sonogram.  Stable CKD III at baseline. Daily BMP  3.	Cardiovascular:  Continue with Torsemide.    4.       Anemia-Continue with Venofer day#2 of 4. Trend hgb/hct  The patient is a juan carlos 71-year-old female with past medical history of lung cancer, status post lobectomy, pulmonary hypertension, diet-controlled diabetes, hypertension, and chronic kidney disease stage III, presents with extreme shortness of breath.  The patient has severe pulmonary hypertension and was scheduled to have a right heart catheterization to measure pulmonary pressures as an outpt.  This is worsening of her pulmonary disease?  The patient may have some virus(despite RVP being negative) except her lungs actually sounded clear(not much rhonhi at all).  Chest x-ray was consistent with heart failure as well.    PLAN:  1.	Cardiology:  Continue with IV Lasix twice a day.  Out's greater than in's.  I think the patient would benefit from right and left heart catheterization.  2.	Pulmonary:  Nebulizers per Pulmonary.  Continue her oxygen.  CPAP at night.  3.	Renal:  Monitor creatinine, but at present, she will require aggressive diuresis.

## 2017-06-17 NOTE — PROGRESS NOTE ADULT - ASSESSMENT
Gout flare  severe phtn  knee pain    nutrition consult for weight loss  continue diuresis  may need to consider increased diuresis alix  leg elevation

## 2017-06-17 NOTE — PROGRESS NOTE ADULT - SUBJECTIVE AND OBJECTIVE BOX
MEDICINE, PROGRESS NOTE [phone]    FRAN ALEXANDRA 71y [id number]    Patient seen and examined.  Patient is a 71y old  Female who presents with a chief complaint of L Ankle Pain (13 Jun 2017 21:57)  Pt wrist, toe, ankle feeling better but knee is hurting worse today    PAST MEDICAL & SURGICAL HISTORY:  Hyperthyroidism  JOSE (obstructive sleep apnea)  Epistaxis  GIB (gastrointestinal bleeding)  CAD S/P percutaneous coronary angioplasty  Afib  Pulmonary HTN  GERD (gastroesophageal reflux disease)  Obesity  Cardiomegaly  Valvular heart disease  Sleep apnea  COPD (chronic obstructive pulmonary disease): Home O2  Sleep Apnea: by criteria  Loose, teeth: 3 bottom front loose teeth  Female stress incontinence  Shoulder pain, right  Constipation  Arthritis of Knee  H/O heartburn  History of lung cancer  Obese  Hx of hyperlipidemia  Asthma  Diabetes mellitus: type 2  dx about 4-5 years ago   no daily fingerstick  H/O: HTN (hypertension)  Enlarged lymph nodes  Lymph nodes enlarged: s/p biopsy mediastinal  benign  H/O endoscopy  left upper lobectomy    MEDICATIONS  (STANDING):  heparin  Injectable 5000Unit(s) SubCutaneous every 12 hours  predniSONE   Tablet 5milliGRAM(s) Oral daily  aspirin enteric coated 81milliGRAM(s) Oral daily  diltiazem   CD 240milliGRAM(s) Oral daily  clopidogrel Tablet 75milliGRAM(s) Oral daily  methimazole 5milliGRAM(s) Oral daily  roflumilast 500MICROGram(s) Oral daily  potassium chloride    Tablet ER 10milliEquivalent(s) Oral daily  torsemide 20milliGRAM(s) Oral daily  atorvastatin 20milliGRAM(s) Oral at bedtime  insulin lispro (HumaLOG) corrective regimen sliding scale  SubCutaneous three times a day before meals  insulin lispro (HumaLOG) corrective regimen sliding scale  SubCutaneous at bedtime  dextrose 5%. 1000milliLiter(s) IV Continuous <Continuous>  dextrose 50% Injectable 12.5Gram(s) IV Push once  dextrose 50% Injectable 25Gram(s) IV Push once  colchicine 0.3milliGRAM(s) Oral two times a day  allopurinol 200milliGRAM(s) Oral daily  iron sucrose IVPB 200milliGRAM(s) IV Intermittent every 24 hours  metoprolol 25milliGRAM(s) Oral every 8 hours  digoxin     Tablet 0.125milliGRAM(s) Oral every other day    MEDICATIONS  (PRN):  acetaminophen   Tablet. 650milliGRAM(s) Oral every 6 hours PRN Moderate Pain (4 - 6)  dextrose Gel 1Dose(s) Oral once PRN Blood Glucose LESS THAN 70 milliGRAM(s)/deciliter    Allergies    No Known Allergies    Intolerances    albuterol (Unknown)      PHYSICAL EXAM:  Constitutional: NAD  HEENT: Normocephalic, EOMI  Neck:  No JVD  Respiratory: CTA B/L, No wheezes  Cardiovascular: S1, S2, RRR, + systolic murmur  Gastrointestinal: BS+, soft, NT/ND  Extremities: No peripheral edema  Neurological: AAOX3, no focal deficits  Psychiatric: Normal mood, normal affect  : No Gottlieb    Vital Signs Last 24 Hrs  T(C): 36.7, Max: 36.8 (06-16 @ 21:48)  T(F): 98, Max: 98.3 (06-16 @ 21:48)  HR: 65 (62 - 68)  BP: 120/72 (94/55 - 123/61)  BP(mean): --  RR: 18 (18 - 18)  SpO2: 99% (98% - 99%)  I&O's Summary  I & Os for 24h ending 17 Jun 2017 07:00  =============================================  IN: 740 ml / OUT: 0 ml / NET: 740 ml    I & Os for current day (as of 17 Jun 2017 18:28)  =============================================  IN: 1040 ml / OUT: 0 ml / NET: 1040 ml      LABS:                        9.4    6.1   )-----------( 196      ( 17 Jun 2017 08:58 )             29.7     06-17    144  |  96  |  69<H>  ----------------------------<  153<H>  3.2<L>   |  35<H>  |  1.37<H>    Ca    9.9      17 Jun 2017 08:58  Mg     2.4     06-16                REVIEW OF SYSTEMS:      RADIOLOGY & ADDITIONAL STUDIES:      ASSESSMENT/PLAN:

## 2017-06-18 LAB
ANION GAP SERPL CALC-SCNC: 9 MMOL/L — SIGNIFICANT CHANGE UP (ref 5–17)
BUN SERPL-MCNC: 55 MG/DL — HIGH (ref 7–23)
CALCIUM SERPL-MCNC: 9.7 MG/DL — SIGNIFICANT CHANGE UP (ref 8.4–10.5)
CHLORIDE SERPL-SCNC: 99 MMOL/L — SIGNIFICANT CHANGE UP (ref 96–108)
CO2 SERPL-SCNC: 36 MMOL/L — HIGH (ref 22–31)
CREAT SERPL-MCNC: 1.23 MG/DL — SIGNIFICANT CHANGE UP (ref 0.5–1.3)
GLUCOSE SERPL-MCNC: 98 MG/DL — SIGNIFICANT CHANGE UP (ref 70–99)
HCT VFR BLD CALC: 30.4 % — LOW (ref 34.5–45)
HGB BLD-MCNC: 9.7 G/DL — LOW (ref 11.5–15.5)
MCHC RBC-ENTMCNC: 26.8 PG — LOW (ref 27–34)
MCHC RBC-ENTMCNC: 31.9 GM/DL — LOW (ref 32–36)
MCV RBC AUTO: 84 FL — SIGNIFICANT CHANGE UP (ref 80–100)
PLATELET # BLD AUTO: 196 K/UL — SIGNIFICANT CHANGE UP (ref 150–400)
POTASSIUM SERPL-MCNC: 3.7 MMOL/L — SIGNIFICANT CHANGE UP (ref 3.5–5.3)
POTASSIUM SERPL-SCNC: 3.7 MMOL/L — SIGNIFICANT CHANGE UP (ref 3.5–5.3)
RBC # BLD: 3.62 M/UL — LOW (ref 3.8–5.2)
RBC # FLD: 16.9 % — HIGH (ref 10.3–14.5)
SODIUM SERPL-SCNC: 144 MMOL/L — SIGNIFICANT CHANGE UP (ref 135–145)
WBC # BLD: 5.2 K/UL — SIGNIFICANT CHANGE UP (ref 3.8–10.5)
WBC # FLD AUTO: 5.2 K/UL — SIGNIFICANT CHANGE UP (ref 3.8–10.5)

## 2017-06-18 RX ADMIN — Medication 81 MILLIGRAM(S): at 08:02

## 2017-06-18 RX ADMIN — Medication 240 MILLIGRAM(S): at 07:58

## 2017-06-18 RX ADMIN — HEPARIN SODIUM 5000 UNIT(S): 5000 INJECTION INTRAVENOUS; SUBCUTANEOUS at 07:58

## 2017-06-18 RX ADMIN — IRON SUCROSE 110 MILLIGRAM(S): 20 INJECTION, SOLUTION INTRAVENOUS at 12:31

## 2017-06-18 RX ADMIN — Medication 2: at 16:49

## 2017-06-18 RX ADMIN — Medication 200 MILLIGRAM(S): at 08:04

## 2017-06-18 RX ADMIN — Medication 5 MILLIGRAM(S): at 07:58

## 2017-06-18 RX ADMIN — CLOPIDOGREL BISULFATE 75 MILLIGRAM(S): 75 TABLET, FILM COATED ORAL at 08:02

## 2017-06-18 RX ADMIN — Medication 20 MILLIGRAM(S): at 07:58

## 2017-06-18 RX ADMIN — Medication 10 MILLIEQUIVALENT(S): at 07:58

## 2017-06-18 RX ADMIN — ROFLUMILAST 500 MICROGRAM(S): 500 TABLET ORAL at 07:58

## 2017-06-18 RX ADMIN — Medication 25 MILLIGRAM(S): at 21:11

## 2017-06-18 RX ADMIN — ATORVASTATIN CALCIUM 20 MILLIGRAM(S): 80 TABLET, FILM COATED ORAL at 21:11

## 2017-06-18 RX ADMIN — Medication 0.3 MILLIGRAM(S): at 07:58

## 2017-06-18 NOTE — PROGRESS NOTE ADULT - ASSESSMENT
Gout  ckd 3  severe pthn  copd/emphysema     continue current care  complete venofer  encourage activity  d/c planning alix

## 2017-06-18 NOTE — PROGRESS NOTE ADULT - SUBJECTIVE AND OBJECTIVE BOX
NEPHROLOGY[facility]  Luba Fischer NP      Patient seen and examined. Awake, orientedx3, no acute events noted. Slept better last night. Denies sob, chest pain, n/v, no abd pain  No acute events noted    MEDICATIONS  (STANDING):  heparin  Injectable 5000Unit(s) SubCutaneous every 12 hours  predniSONE   Tablet 5milliGRAM(s) Oral daily  aspirin enteric coated 81milliGRAM(s) Oral daily  diltiazem   CD 240milliGRAM(s) Oral daily  clopidogrel Tablet 75milliGRAM(s) Oral daily  methimazole 5milliGRAM(s) Oral daily  roflumilast 500MICROGram(s) Oral daily  potassium chloride    Tablet ER 10milliEquivalent(s) Oral daily  torsemide 20milliGRAM(s) Oral daily  atorvastatin 20milliGRAM(s) Oral at bedtime  insulin lispro (HumaLOG) corrective regimen sliding scale  SubCutaneous three times a day before meals  insulin lispro (HumaLOG) corrective regimen sliding scale  SubCutaneous at bedtime  dextrose 5%. 1000milliLiter(s) IV Continuous <Continuous>  dextrose 50% Injectable 12.5Gram(s) IV Push once  dextrose 50% Injectable 25Gram(s) IV Push once  allopurinol 200milliGRAM(s) Oral daily  iron sucrose IVPB 200milliGRAM(s) IV Intermittent every 24 hours  metoprolol 25milliGRAM(s) Oral every 8 hours  digoxin     Tablet 0.125milliGRAM(s) Oral every other day      VITAL:  T(C): , Max: 36.9 (06-18 @ 09:15)  T(F): , Max: 98.4 (06-18 @ 09:15)  HR: 85  BP: 115/65  BP(mean): --  RR: 17  SpO2: 100%  Wt(kg): --    I and O's:  I & Os for 24h ending 06-18 @ 07:00  =============================================  IN: 1640 ml / OUT: 0 ml / NET: 1640 ml    I & Os for current day (as of 06-18 @ 09:56)  =============================================  IN: 350 ml / OUT: 0 ml / NET: 350 ml    Height (cm): 160 (06-17 @ 12:18)  Weight (kg): 62.1 (06-17 @ 12:18)  BMI (kg/m2): 24.3 (06-17 @ 12:18)  BSA (m2): 1.65 (06-17 @ 12:18)    PHYSICAL EXAM:    Constitutional: NAD  HEENT: PERRLA, EOMI,     Neck:  No JVD  Respiratory: CTAB/L  Cardiovascular: S1 and S2  Gastrointestinal: BS+, soft, NT/ND  Extremities: No peripheral edema  Neurological: A/O x 3, no focal deficits  Psychiatric: Normal mood, normal affect  : No Gottlieb  Skin: No rashes  Access: Not applicable    LABS:                        9.7    5.2   )-----------( 196      ( 18 Jun 2017 08:48 )             30.4       18 Jun 2017 08:48    144    |  99     |  55<H>  ----------------------------<  98     3.7     |  36<H>  |  1.23   17 Jun 2017 08:58    144    |  96     |  69<H>  ----------------------------<  153<H>  3.2<L>   |  35<H>  |  1.37<H>    Ca    9.7        18 Jun 2017 08:48  Ca    9.9        17 Jun 2017 08:58  Mg     2.4       16 Jun 2017 15:49      RADIOLOGY & ADDITIONAL STUDIES: no new    Assessment and Plan:   		   The patient is a 71-year-old female with past medical history of hypertension, diabetes, lung cancer, pulmonary hypertension with no response to nitric oxide, who presents with pain in her foot. Elevated Uric Acid levels.CKD III   Plan  1.	Rheumatology:  S/P Solu-Medrol 40 mg x1. C/W Allopurinol 200mg po qd and Prednisone 5mg po daily  2.	Renal:  No need for renal sonogram.  CKD III at baseline. Daily BMP  3.	Cardiovascular:  Continue with Torsemide.    4.       Anemia-Continue with Venofer day#3 of 4. HGB/HCT improving  The patient is a juan carlos 71-year-old female with past medical history of lung cancer, status post lobectomy, pulmonary hypertension, diet-controlled diabetes, hypertension, and chronic kidney disease stage III, presents with extreme shortness of breath.  The patient has severe pulmonary hypertension and was scheduled to have a right heart catheterization to measure pulmonary pressures as an outpt.  This is worsening of her pulmonary disease?  The patient may have some virus(despite RVP being negative) except her lungs actually sounded clear(not much rhonhi at all).  Chest x-ray was consistent with heart failure as well.    PLAN:  1.	Cardiology:  Continue with IV Lasix twice a day.  Out's greater than in's.  I think the patient would benefit from right and left heart catheterization.  2.	Pulmonary:  Nebulizers per Pulmonary.  Continue her oxygen.  CPAP at night.  3.	Renal:  Monitor creatinine, but at present, she will require aggressive diuresis.

## 2017-06-18 NOTE — PROGRESS NOTE ADULT - SUBJECTIVE AND OBJECTIVE BOX
MEDICINE, PROGRESS NOTE [phone]    FRAN ALEXANDRA 71y [id number]    Patient seen and examined.  Patient is a 71y old  Female who presents with a chief complaint of L Ankle Pain (13 Jun 2017 21:57)  Pt c/o feeling sob, pt c/o b/l feet pain, pt c/o right knee pain    PAST MEDICAL & SURGICAL HISTORY:  Hyperthyroidism  JOSE (obstructive sleep apnea)  Epistaxis  GIB (gastrointestinal bleeding)  CAD S/P percutaneous coronary angioplasty  Afib  Pulmonary HTN  GERD (gastroesophageal reflux disease)  Obesity  Cardiomegaly  Valvular heart disease  Sleep apnea  COPD (chronic obstructive pulmonary disease): Home O2  Sleep Apnea: by criteria  Loose, teeth: 3 bottom front loose teeth  Female stress incontinence  Shoulder pain, right  Constipation  Arthritis of Knee  H/O heartburn  History of lung cancer  Obese  Hx of hyperlipidemia  Asthma  Diabetes mellitus: type 2  dx about 4-5 years ago   no daily fingerstick  H/O: HTN (hypertension)  Enlarged lymph nodes  Lymph nodes enlarged: s/p biopsy mediastinal  benign  H/O endoscopy  left upper lobectomy    MEDICATIONS  (STANDING):  heparin  Injectable 5000Unit(s) SubCutaneous every 12 hours  predniSONE   Tablet 5milliGRAM(s) Oral daily  aspirin enteric coated 81milliGRAM(s) Oral daily  diltiazem   CD 240milliGRAM(s) Oral daily  clopidogrel Tablet 75milliGRAM(s) Oral daily  methimazole 5milliGRAM(s) Oral daily  roflumilast 500MICROGram(s) Oral daily  potassium chloride    Tablet ER 10milliEquivalent(s) Oral daily  torsemide 20milliGRAM(s) Oral daily  atorvastatin 20milliGRAM(s) Oral at bedtime  insulin lispro (HumaLOG) corrective regimen sliding scale  SubCutaneous three times a day before meals  insulin lispro (HumaLOG) corrective regimen sliding scale  SubCutaneous at bedtime  dextrose 5%. 1000milliLiter(s) IV Continuous <Continuous>  dextrose 50% Injectable 12.5Gram(s) IV Push once  dextrose 50% Injectable 25Gram(s) IV Push once  allopurinol 200milliGRAM(s) Oral daily  iron sucrose IVPB 200milliGRAM(s) IV Intermittent every 24 hours  metoprolol 25milliGRAM(s) Oral every 8 hours  digoxin     Tablet 0.125milliGRAM(s) Oral every other day    MEDICATIONS  (PRN):  acetaminophen   Tablet. 650milliGRAM(s) Oral every 6 hours PRN Moderate Pain (4 - 6)  dextrose Gel 1Dose(s) Oral once PRN Blood Glucose LESS THAN 70 milliGRAM(s)/deciliter    Allergies    No Known Allergies    Intolerances    albuterol (Unknown)      PHYSICAL EXAM:  Constitutional: NAD  HEENT: Normocephalic, EOMI  Neck:  No JVD  Respiratory: CTA B/L, No wheezes  Cardiovascular: S1, S2, RRR, + systolic murmur  Gastrointestinal: BS+, soft, NT/ND  Extremities: mild peripheral edema  Neurological: AAOX3, no focal deficits  Psychiatric: Normal mood, normal affect  : No Gottlieb    Vital Signs Last 24 Hrs  T(C): 37.3, Max: 37.3 (06-18 @ 17:08)  T(F): 99.2, Max: 99.2 (06-18 @ 17:08)  HR: 87 (56 - 87)  BP: 104/64 (101/48 - 115/70)  BP(mean): --  RR: 18 (16 - 18)  SpO2: 100% (97% - 100%)  I&O's Summary  I & Os for 24h ending 18 Jun 2017 07:00  =============================================  IN: 1640 ml / OUT: 0 ml / NET: 1640 ml    I & Os for current day (as of 18 Jun 2017 19:23)  =============================================  IN: 1040 ml / OUT: 0 ml / NET: 1040 ml      LABS:                        9.7    5.2   )-----------( 196      ( 18 Jun 2017 08:48 )             30.4     06-18    144  |  99  |  55<H>  ----------------------------<  98  3.7   |  36<H>  |  1.23    Ca    9.7      18 Jun 2017 08:48                REVIEW OF SYSTEMS:      RADIOLOGY & ADDITIONAL STUDIES:      ASSESSMENT/PLAN:

## 2017-06-19 ENCOUNTER — TRANSCRIPTION ENCOUNTER (OUTPATIENT)
Age: 72
End: 2017-06-19

## 2017-06-19 LAB
ALBUMIN SERPL ELPH-MCNC: 3.2 G/DL — LOW (ref 3.3–5)
ALP SERPL-CCNC: 46 U/L — SIGNIFICANT CHANGE UP (ref 40–120)
ALT FLD-CCNC: 8 U/L RC — LOW (ref 10–45)
ANION GAP SERPL CALC-SCNC: 11 MMOL/L — SIGNIFICANT CHANGE UP (ref 5–17)
AST SERPL-CCNC: 12 U/L — SIGNIFICANT CHANGE UP (ref 10–40)
BILIRUB SERPL-MCNC: 0.4 MG/DL — SIGNIFICANT CHANGE UP (ref 0.2–1.2)
BUN SERPL-MCNC: 51 MG/DL — HIGH (ref 7–23)
CALCIUM SERPL-MCNC: 9.2 MG/DL — SIGNIFICANT CHANGE UP (ref 8.4–10.5)
CHLORIDE SERPL-SCNC: 98 MMOL/L — SIGNIFICANT CHANGE UP (ref 96–108)
CO2 SERPL-SCNC: 34 MMOL/L — HIGH (ref 22–31)
CREAT SERPL-MCNC: 1.24 MG/DL — SIGNIFICANT CHANGE UP (ref 0.5–1.3)
CULTURE RESULTS: SIGNIFICANT CHANGE UP
CULTURE RESULTS: SIGNIFICANT CHANGE UP
DIGOXIN SERPL-MCNC: 1.1 NG/ML — SIGNIFICANT CHANGE UP (ref 0.8–2)
GLUCOSE SERPL-MCNC: 98 MG/DL — SIGNIFICANT CHANGE UP (ref 70–99)
HCT VFR BLD CALC: 28.1 % — LOW (ref 34.5–45)
HGB BLD-MCNC: 9.2 G/DL — LOW (ref 11.5–15.5)
MAGNESIUM SERPL-MCNC: 2.1 MG/DL — SIGNIFICANT CHANGE UP (ref 1.6–2.6)
MCHC RBC-ENTMCNC: 27.3 PG — SIGNIFICANT CHANGE UP (ref 27–34)
MCHC RBC-ENTMCNC: 32.7 GM/DL — SIGNIFICANT CHANGE UP (ref 32–36)
MCV RBC AUTO: 83.7 FL — SIGNIFICANT CHANGE UP (ref 80–100)
PHOSPHATE SERPL-MCNC: 2.6 MG/DL — SIGNIFICANT CHANGE UP (ref 2.5–4.5)
PLATELET # BLD AUTO: 163 K/UL — SIGNIFICANT CHANGE UP (ref 150–400)
POTASSIUM SERPL-MCNC: 3.7 MMOL/L — SIGNIFICANT CHANGE UP (ref 3.5–5.3)
POTASSIUM SERPL-SCNC: 3.7 MMOL/L — SIGNIFICANT CHANGE UP (ref 3.5–5.3)
PROT SERPL-MCNC: 5.8 G/DL — LOW (ref 6–8.3)
RBC # BLD: 3.36 M/UL — LOW (ref 3.8–5.2)
RBC # FLD: 17.3 % — HIGH (ref 10.3–14.5)
SODIUM SERPL-SCNC: 143 MMOL/L — SIGNIFICANT CHANGE UP (ref 135–145)
SPECIMEN SOURCE: SIGNIFICANT CHANGE UP
SPECIMEN SOURCE: SIGNIFICANT CHANGE UP
T3 SERPL-MCNC: 75 NG/DL — LOW (ref 80–200)
T4 AB SER-ACNC: 6.2 UG/DL — SIGNIFICANT CHANGE UP (ref 4.6–12)
TSH SERPL-MCNC: 1.1 UIU/ML — SIGNIFICANT CHANGE UP (ref 0.27–4.2)
WBC # BLD: 4.4 K/UL — SIGNIFICANT CHANGE UP (ref 3.8–10.5)
WBC # FLD AUTO: 4.4 K/UL — SIGNIFICANT CHANGE UP (ref 3.8–10.5)

## 2017-06-19 RX ADMIN — Medication 240 MILLIGRAM(S): at 08:10

## 2017-06-19 RX ADMIN — ATORVASTATIN CALCIUM 20 MILLIGRAM(S): 80 TABLET, FILM COATED ORAL at 23:44

## 2017-06-19 RX ADMIN — Medication 5 MILLIGRAM(S): at 08:09

## 2017-06-19 RX ADMIN — Medication 2: at 17:11

## 2017-06-19 RX ADMIN — Medication 25 MILLIGRAM(S): at 08:09

## 2017-06-19 RX ADMIN — Medication 0.12 MILLIGRAM(S): at 08:12

## 2017-06-19 RX ADMIN — CLOPIDOGREL BISULFATE 75 MILLIGRAM(S): 75 TABLET, FILM COATED ORAL at 08:09

## 2017-06-19 RX ADMIN — Medication 81 MILLIGRAM(S): at 08:10

## 2017-06-19 RX ADMIN — HEPARIN SODIUM 5000 UNIT(S): 5000 INJECTION INTRAVENOUS; SUBCUTANEOUS at 08:10

## 2017-06-19 RX ADMIN — Medication 25 MILLIGRAM(S): at 17:12

## 2017-06-19 RX ADMIN — Medication 25 MILLIGRAM(S): at 23:44

## 2017-06-19 RX ADMIN — Medication 10 MILLIEQUIVALENT(S): at 08:08

## 2017-06-19 RX ADMIN — IRON SUCROSE 110 MILLIGRAM(S): 20 INJECTION, SOLUTION INTRAVENOUS at 12:29

## 2017-06-19 RX ADMIN — Medication 200 MILLIGRAM(S): at 08:09

## 2017-06-19 RX ADMIN — ROFLUMILAST 500 MICROGRAM(S): 500 TABLET ORAL at 08:08

## 2017-06-19 RX ADMIN — Medication 20 MILLIGRAM(S): at 08:08

## 2017-06-19 NOTE — PROGRESS NOTE ADULT - SUBJECTIVE AND OBJECTIVE BOX
NEPHROLOGY-[facility] [phone]        Patient seen and examined         MEDICATIONS  (STANDING):  heparin  Injectable 5000Unit(s) SubCutaneous every 12 hours  predniSONE   Tablet 5milliGRAM(s) Oral daily  aspirin enteric coated 81milliGRAM(s) Oral daily  diltiazem   CD 240milliGRAM(s) Oral daily  clopidogrel Tablet 75milliGRAM(s) Oral daily  methimazole 5milliGRAM(s) Oral daily  roflumilast 500MICROGram(s) Oral daily  potassium chloride    Tablet ER 10milliEquivalent(s) Oral daily  torsemide 20milliGRAM(s) Oral daily  atorvastatin 20milliGRAM(s) Oral at bedtime  insulin lispro (HumaLOG) corrective regimen sliding scale  SubCutaneous three times a day before meals  insulin lispro (HumaLOG) corrective regimen sliding scale  SubCutaneous at bedtime  dextrose 5%. 1000milliLiter(s) IV Continuous <Continuous>  dextrose 50% Injectable 12.5Gram(s) IV Push once  dextrose 50% Injectable 25Gram(s) IV Push once  allopurinol 200milliGRAM(s) Oral daily  iron sucrose IVPB 200milliGRAM(s) IV Intermittent every 24 hours  metoprolol 25milliGRAM(s) Oral every 8 hours  digoxin     Tablet 0.125milliGRAM(s) Oral every other day      VITAL:  T(C): , Max: 37.3 (06-18 @ 17:08)  T(F): , Max: 99.2 (06-18 @ 17:08)  HR: 67  BP: 110/67  BP(mean): --  RR: 17  SpO2: 96%  Wt(kg): --    I and O's:    I & Os for current day (as of 06-19 @ 08:41)  =============================================  IN: 1260 ml / OUT: 0 ml / NET: 1260 ml        PHYSICAL EXAM:    Constitutional: NAD  HEENT: PERRLA    Neck:  No JVD  Respiratory: CTAB/L  Cardiovascular: S1 and S2  Gastrointestinal: BS+, soft, NT/ND  Extremities: No peripheral edema  Neurological: A/O x 3, no focal deficits  Psychiatric: Normal mood, normal affect  : No Gottlieb  Skin: No rashes  Access: Not applicable    LABS:                        9.2    4.4   )-----------( 163      ( 19 Jun 2017 07:16 )             28.1     06-19    143  |  98  |  51<H>  ----------------------------<  98  3.7   |  34<H>  |  1.24    Ca    9.2      19 Jun 2017 07:16  Phos  2.6     06-19  Mg     2.1     06-19    TPro  5.8<L>  /  Alb  3.2<L>  /  TBili  0.4  /  DBili  x   /  AST  12  /  ALT  8<L>  /  AlkPhos  46  06-19          Urine Studies:          RADIOLOGY & ADDITIONAL STUDIES: NEPHROLOGY[facility] [phone]        Patient seen and examined in the chair.  Ankle pain has improved.  She states that the knee still hurts        MEDICATIONS  (STANDING):  heparin  Injectable 5000Unit(s) SubCutaneous every 12 hours  predniSONE   Tablet 5milliGRAM(s) Oral daily  aspirin enteric coated 81milliGRAM(s) Oral daily  diltiazem   CD 240milliGRAM(s) Oral daily  clopidogrel Tablet 75milliGRAM(s) Oral daily  methimazole 5milliGRAM(s) Oral daily  roflumilast 500MICROGram(s) Oral daily  potassium chloride    Tablet ER 10milliEquivalent(s) Oral daily  torsemide 20milliGRAM(s) Oral daily  atorvastatin 20milliGRAM(s) Oral at bedtime  insulin lispro (HumaLOG) corrective regimen sliding scale  SubCutaneous three times a day before meals  insulin lispro (HumaLOG) corrective regimen sliding scale  SubCutaneous at bedtime  dextrose 5%. 1000milliLiter(s) IV Continuous <Continuous>  dextrose 50% Injectable 12.5Gram(s) IV Push once  dextrose 50% Injectable 25Gram(s) IV Push once  allopurinol 200milliGRAM(s) Oral daily  iron sucrose IVPB 200milliGRAM(s) IV Intermittent every 24 hours  metoprolol 25milliGRAM(s) Oral every 8 hours  digoxin     Tablet 0.125milliGRAM(s) Oral every other day      VITAL:  T(C): , Max: 37.3 (06-18 @ 17:08)  T(F): , Max: 99.2 (06-18 @ 17:08)  HR: 67  BP: 110/67  BP(mean): --  RR: 17  SpO2: 96%  Wt(kg): --    I and O's:    I & Os for current day (as of 06-19 @ 08:41)  =============================================  IN: 1260 ml / OUT: 0 ml / NET: 1260 ml        PHYSICAL EXAM:    Constitutional: NAD  HEENT: PERRLA    Neck:  No JVD  Respiratory: CTAB/L  Cardiovascular: S1 and S2  Gastrointestinal: BS+, soft, NT/ND  Extremities: No peripheral edema  Neurological: A/O x 3, no focal deficits  Psychiatric: Normal mood, normal affect  : No Gottlieb  Skin: No rashes  Access: Not applicable    LABS:                        9.2    4.4   )-----------( 163      ( 19 Jun 2017 07:16 )             28.1     06-19    143  |  98  |  51<H>  ----------------------------<  98  3.7   |  34<H>  |  1.24    Ca    9.2      19 Jun 2017 07:16  Phos  2.6     06-19  Mg     2.1     06-19    TPro  5.8<L>  /  Alb  3.2<L>  /  TBili  0.4  /  DBili  x   /  AST  12  /  ALT  8<L>  /  AlkPhos  46  06-19          Urine Studies:          RADIOLOGY & ADDITIONAL STUDIES:

## 2017-06-19 NOTE — DISCHARGE NOTE ADULT - PATIENT PORTAL LINK FT
“You can access the [employer] Patient Portal, offered by [facility], by registering with the following website: [email or web address]”

## 2017-06-19 NOTE — DISCHARGE NOTE ADULT - PLAN OF CARE
-Continue the Allopurinol 200mg orally once a day, take Tylenol to help with pain as needed  Gout is a form of arthritis. It can cause pain and swelling in the joints. At first, it tends to affect only one joint – most frequently the big toe. It happens in people who have too much uric acid in the blood. Uric acid is a chemical that is produced when the body breaks down certain foods. Uric acid can form sharp needle-like crystals that build up in the joints and cause pain. Uric acid crystals can also form inside the tubes that carry urine from the kidneys to the bladder. These crystals can turn into "kidney stones" that can cause pain and problems with the flow of urine.  -TO PREVENT GOUT ATTACKS:  If you are overweight, losing weight can help relieve gout. Plus, you can make changes to your diet that might help prevent future attacks.  You should cut down on:  ?Red meat and seafood  ?Alcohol, such as beer, wine, and hard alcohol  ?Foods and drinks that have high-fructose corn syrup (that includes most sodas and store-bought cakes and cookies)    Instead, you should eat lots of:  ?Low-fat dairy products, such as low-fat milk, cheese, and yogurt  ?Whole grains and vegetables    Some people with gout also have other health problems, such as heart disease, high blood pressure, kidney disease, or obesity. If you have any of these issues, it's important to work with your doctor to manage them. This can help improve your overall health and might also help with your gout. To prevent acute painful/gouty attacks Renal:  No need for renal sonogram.  At some point, we will quantify her proteinuria burden.  Cardiovascular:  Continue with Demadex.  I do not see the need for IV loop diuretics at present.  4.       Anemia- SP 4 doses of   Venofer 200mg      Attending Attestation:   JEFF planning. -Continue your medications as prescribed  Atrial fibrillation is the most common heart rhythm problem.  The condition puts you at risk for has stroke and heart attack  It helps if you control your blood pressure, not drink more than 1-2 alcohol drinks per day, cut down on caffeine, getting treatment for over active thyroid gland, and get regular exercise  Call your doctor if you feel your heart racing or beating unusually, chest tightness or pain, lightheaded, faint, shortness of breath especially with exercise  It is important to take your heart medication as prescribed  You may be on anticoagulation which is very important to take as directed - you may need blood work to monitor drug levels -Continue your medications and chronic home oxygen as prescribed -Continue your medications as prescribed -Plan to evaluate the protein in your urine outpatient. Your kidney function improved.  The kidneys' job is to remove wastes and excess water and salts from the blood. Kidneys receive blood through the renal arteries (figure 1). The blood flows into parts of the kidney called nephrons (figure 2). Each nephron is made of a glomerulus and a tubule. Each kidney contains hundreds of thousands of nephrons.    The glomeruli filter the blood, removing waste products from the blood. They also prevent some substances, such as protein, from being taken out of the blood. If the glomeruli are damaged, protein from the blood leaks into the urine.    Normally, you should have less than 150 milligrams (about 3 percent of a teaspoon) of protein in the urine per day. Having more than 150 milligrams per day is called proteinuria.    DOES PROTEINURIA CAUSE SYMPTOMS? — People with a small amount of proteinuria generally have no signs or symptoms. However, some patients have edema (swelling) in the face, legs, or abdomen if they lose large amounts of protein in their urine.

## 2017-06-19 NOTE — DISCHARGE NOTE ADULT - INSTRUCTIONS
call your pmd if you have a fever greater than 100  if your pain  or swelling increases.   follow up with your doctor as directed

## 2017-06-19 NOTE — DIETITIAN INITIAL EVALUATION ADULT. - OTHER INFO
Patient found sitting in chair.  Complaining of some pain in knee.  Patient not very open to RD.  Attempted to discuss meal planning at home.  Patient reports not to eat breakfast or lunch as not hungry.  Just drinks coffee.  Dinner may be a bowl of pasta with butter.    Doesn't like to cook for self if  not home.  Will eat Gerardo Ku  ice cream.  Very unhappy with hospital food and has been "living" on hamburgers.  Wants pizza, ice cream, fruit ices and hossein shantelle cake.  This RDN assisted in getting pizza for lunch meal.  Dislikes cold cereals.  This RDN offered bagels, puddings, yogurt, chicken, fish, peanut butter and jelly, cheese, eggs but patient firmly resisted trying.  Patient feels that if she eats, she will gain back her weight.   Patient does not mind diabetes or actively self manage.  Patient reports that MD wants her to eat more.  Patient provided with multiple ideas for meal planning at home and tips to improve appetite and intake but patient highly resistant   A1c is 5.8 and normal due to  not eating. Patient found sitting in chair.  Complaining of some pain in knee.  Patient not very open to RD.  Attempted to discuss meal planning at home.  Patient reports not to eat breakfast or lunch as not hungry.  Just drinks coffee.  Dinner may be a bowl of pasta with butter.    Doesn't like to cook for self if  not home.  Will eat Gerardo Ku  ice cream.  Very unhappy with hospital food and has been "living" on hamburgers.  Wants pizza, ice cream, fruit ices and hossein shantelle cake.  This RDN assisted in getting pizza for lunch meal.  Dislikes cold cereals.  This RDN offered bagels, puddings, yogurt, chicken, fish, peanut butter and jelly, cheese, eggs but patient firmly resisted trying.  Patient feels that if she eats, she will gain back her weight.  Highest wt was 238 pounds in 2008.  Dropped down to 200 pounds in 2013.  Now at 137 pounds. Patient does not mind diabetes or actively self manage.  Patient reports that MD wants her to eat more.  Patient provided with multiple ideas for meal planning at home and tips to improve appetite and intake but patient highly resistant   A1c is 5.8 and normal due to  not eating.

## 2017-06-19 NOTE — DISCHARGE NOTE ADULT - MEDICATION SUMMARY - MEDICATIONS TO TAKE
I will START or STAY ON the medications listed below when I get home from the hospital:    predniSONE 5 mg oral tablet  -- 1 tab(s) by mouth once a day  -- Indication: For Copd    aspirin 81 mg oral delayed release tablet  -- 1 tab(s) by mouth once a day  -- Indication: For CAD    acetaminophen 325 mg oral tablet  -- 2 tab(s) by mouth every 6 hours, As needed, Moderate Pain (4 - 6)  -- Indication: For Gout    dilTIAZem 240 mg/24 hours oral capsule, extended release  -- 1 cap(s) by mouth once a day  -- Indication: For A-fib    digoxin 125 mcg (0.125 mg) oral tablet  -- 1 tab(s) by mouth every other day  -- Indication: For A-fib    allopurinol 100 mg oral tablet  -- 2 tab(s) by mouth once a day  -- Indication: For Gout    atorvastatin 20 mg oral tablet  -- 1 tab(s) by mouth once a day  -- Indication: For HLD    clopidogrel 75 mg oral tablet  -- 1 tab(s) by mouth once a day  -- Indication: For CAD    methIMAzole 5 mg oral tablet  -- 1 tab(s) by mouth once a day  -- Indication: For Thyroid dysfunction    metoprolol tartrate 25 mg oral tablet  -- 1 tab(s) by mouth every 8 hours  -- Indication: For A-fib    Ventolin HFA 90 mcg/inh inhalation aerosol  -- 2 puff(s) inhaled 4 times a day, As Needed  -- Indication: For CoPD    olodaterol-tiotropium 2.5 mcg-2.5 mcg inhalation aerosol  -- 2 puff(s) inhaled once a day  -- Indication: For CoPD    torsemide 20 mg oral tablet  -- 1 tab(s) by mouth daily  -- Indication: For Pulm HTN    Colace 100 mg oral capsule  -- 1 cap(s) by mouth 2 times a day  -- Indication: For Constipation    Klor-Con 10 mEq oral tablet, extended release  -- 1 tab(s) by mouth daily  -- Indication: For Pulm HTN    sucralfate 1 g/10 mL oral suspension  -- 10 milliliter(s) by mouth 4 times a day  -- Indication: For PPI    roflumilast 500 mcg oral tablet  -- 1 tab(s) by mouth once a day  -- Indication: For CoPD I will START or STAY ON the medications listed below when I get home from the hospital:    predniSONE 5 mg oral tablet  -- 1 tab(s) by mouth once a day  -- Indication: For Copd    aspirin 81 mg oral delayed release tablet  -- 1 tab(s) by mouth once a day  -- Indication: For CAD    acetaminophen 325 mg oral tablet  -- 2 tab(s) by mouth every 6 hours, As needed, Moderate Pain (4 - 6)  -- Indication: For Gout    digoxin 125 mcg (0.125 mg) oral tablet  -- 1 tab(s) by mouth every other day  -- Indication: For A-fib    dilTIAZem 240 mg/24 hours oral capsule, extended release  -- 1 cap(s) by mouth once a day  -- Indication: For Af    allopurinol 100 mg oral tablet  -- 2 tab(s) by mouth once a day  -- Indication: For Acute gout of left ankle, unspecified cause    atorvastatin 20 mg oral tablet  -- 1 tab(s) by mouth once a day  -- Indication: For CAD S/P percutaneous coronary angioplasty    clopidogrel 75 mg oral tablet  -- 1 tab(s) by mouth once a day  -- Indication: For CAD S/P percutaneous coronary angioplasty    methIMAzole 5 mg oral tablet  -- 1 tab(s) by mouth once a day  -- Indication: For Thyroid dysfunction    metoprolol tartrate 25 mg oral tablet  -- 1 tab(s) by mouth every 8 hours  -- Indication: For A-fib    Ventolin HFA 90 mcg/inh inhalation aerosol  -- 2 puff(s) inhaled 4 times a day, As Needed  -- Indication: For CoPD    olodaterol-tiotropium 2.5 mcg-2.5 mcg inhalation aerosol  -- 2 puff(s) inhaled once a day  -- Indication: For CoPD    torsemide 20 mg oral tablet  -- 1 tab(s) by mouth daily  -- Indication: For Pulm HTN    Colace 100 mg oral capsule  -- 1 cap(s) by mouth 2 times a day  -- Indication: For Constipation    Klor-Con 10 mEq oral tablet, extended release  -- 1 tab(s) by mouth daily  -- Indication: For Pulm HTN    sucralfate 1 g/10 mL oral suspension  -- 10 milliliter(s) by mouth 4 times a day  -- Indication: For gi    roflumilast 500 mcg oral tablet  -- 1 tab(s) by mouth once a day  -- Indication: For CoPD

## 2017-06-19 NOTE — PROGRESS NOTE ADULT - ASSESSMENT
GOUT  Vol overload  severe PHTN  severe COPD    continue current care  appreciate renal input  pt instructed to perform leg elevation  monitor fluid intake  d/c home alix am

## 2017-06-19 NOTE — DISCHARGE NOTE ADULT - CONDITIONS AT DISCHARGE
stable stable  pt A&O x's 4,  ambulating independently , tolerating diet,  no c/o pain and voiding adequate urine  skin intact left lower leg ankle and foot  swollen r/t gout.    ivl dc'd  no s/s of infection or infiltration  pt being dc'd on o2.  pt has own tank from home   setting as per pt.

## 2017-06-19 NOTE — PROGRESS NOTE ADULT - ASSESSMENT
The patient is a 71-year-old female with past medical history of hypertension, diabetes, lung cancer, pulmonary hypertension with no response to nitric oxide, who presents with pain in her foot.  Uric acid level is around 12, I suspect this can be gout.  Blood cultures and urine cultures are negative.  I do not think this is infectious in etiology.  The patient with chronic kidney disease stage III, which is at her baseline.  Goals of therapy are supportive in nature.  1.	Rheumatology:  S/P Solu-Medrol 40 mg x1.  Colchicine to 0.3 mg mg twice a day for the next three days.   Allopurinol 200mg po qd as well  2.	Renal:  No need for renal sonogram.  At some point, we will quantify her proteinuria burden.  3.	Cardiovascular:  Does not seem to be in any decompensated heart failure despite chest x-ray--> continue with Demadex.  I do not see the need for IV loop diuretics at present.  4.       Anemia- IV Venofer 200mg ivss qd x 4 doses The patient is a 71-year-old female with past medical history of hypertension, diabetes, lung cancer, pulmonary hypertension with no response to nitric oxide, who presents with   gout.     The patient with chronic kidney disease stage III, which is at her baseline.  Goals of therapy are supportive in nature.  Iron deficiency  1.	Rheumatology:  S/P Solu-Medrol 40 mg x1.  SP Colchicine to 0.3 mg mg twice a day and on  Allopurinol 200mg po qd as well  2.	Renal:  No need for renal sonogram.  At some point, we will quantify her proteinuria burden.  3.	Cardiovascular:  Continue with Demadex.  I do not see the need for IV loop diuretics at present.  4.       Anemia- Last dose of  Venofer 200mg

## 2017-06-19 NOTE — DISCHARGE NOTE ADULT - CARE PROVIDER_API CALL
Pierce Cobb), Internal Medicine  [address]  [place] [address]  Phone: [phone]  Fax: [phone]    Manuel Smith), Internal Medicine; Nephrology  [address] [facility] [address]  [place] [address]  Phone: [phone]  Fax: [phone]

## 2017-06-19 NOTE — DIETITIAN INITIAL EVALUATION ADULT. - ENERGY NEEDS
HT 63 inches, , BMI 24.3 HT 63 inches, , BMI 24.3, High wt 238 pounds.  Dxd with gout.  Patient does not eat diet high in fatty red meats, etoh?  Skin intact.

## 2017-06-19 NOTE — DIETITIAN INITIAL EVALUATION ADULT. - NS AS NUTRI INTERV ED CONTENT
Recommended modifications/Reviewed need for protein as well as calories for optimal muscular health and weight stability, offered a variety of simple ideas for meals such as cheese, eggs, tuna, hamburgers, yogurt, pudding, low sugar ice cream.  Patient resistant to suggestions and had a reason why she could not engage in healthier meal planning.  Advised patient to discuss nutrition concerns with her MD to confirm what she should be doing.  Suggested MVI with minerals, calcium, Vit D.  Reports that Vit D gives her palpitations.  Doesn't know if she can take a MVI with her meds./Nutrition relationship to health/disease

## 2017-06-19 NOTE — DIETITIAN INITIAL EVALUATION ADULT. - SIGNS/SYMPTOMS
Patient eats < 500 calories/ day.  Does not include adequate amounts of protein, resistant to change

## 2017-06-19 NOTE — DISCHARGE NOTE ADULT - CARE PLAN
Principal Discharge DX:	Acute gout of left ankle, unspecified cause  Goal:	To prevent acute painful/gouty attacks  Instructions for follow-up, activity and diet:	-Continue the Allopurinol 200mg orally once a day, take Tylenol to help with pain as needed  Gout is a form of arthritis. It can cause pain and swelling in the joints. At first, it tends to affect only one joint – most frequently the big toe. It happens in people who have too much uric acid in the blood. Uric acid is a chemical that is produced when the body breaks down certain foods. Uric acid can form sharp needle-like crystals that build up in the joints and cause pain. Uric acid crystals can also form inside the tubes that carry urine from the kidneys to the bladder. These crystals can turn into "kidney stones" that can cause pain and problems with the flow of urine.  -TO PREVENT GOUT ATTACKS:  If you are overweight, losing weight can help relieve gout. Plus, you can make changes to your diet that might help prevent future attacks.  You should cut down on:  ?Red meat and seafood  ?Alcohol, such as beer, wine, and hard alcohol  ?Foods and drinks that have high-fructose corn syrup (that includes most sodas and store-bought cakes and cookies)    Instead, you should eat lots of:  ?Low-fat dairy products, such as low-fat milk, cheese, and yogurt  ?Whole grains and vegetables    Some people with gout also have other health problems, such as heart disease, high blood pressure, kidney disease, or obesity. If you have any of these issues, it's important to work with your doctor to manage them. This can help improve your overall health and might also help with your gout.  Instructions for follow-up, activity and diet:	Renal:  No need for renal sonogram.  At some point, we will quantify her proteinuria burden.  Cardiovascular:  Continue with Demadex.  I do not see the need for IV loop diuretics at present.  4.       Anemia- SP 4 doses of   Venofer 200mg      Attending Attestation:   JEFF planning. Principal Discharge DX:	Acute gout of left ankle, unspecified cause  Goal:	To prevent acute painful/gouty attacks  Instructions for follow-up, activity and diet:	-Continue the Allopurinol 200mg orally once a day, take Tylenol to help with pain as needed  Gout is a form of arthritis. It can cause pain and swelling in the joints. At first, it tends to affect only one joint – most frequently the big toe. It happens in people who have too much uric acid in the blood. Uric acid is a chemical that is produced when the body breaks down certain foods. Uric acid can form sharp needle-like crystals that build up in the joints and cause pain. Uric acid crystals can also form inside the tubes that carry urine from the kidneys to the bladder. These crystals can turn into "kidney stones" that can cause pain and problems with the flow of urine.  -TO PREVENT GOUT ATTACKS:  If you are overweight, losing weight can help relieve gout. Plus, you can make changes to your diet that might help prevent future attacks.  You should cut down on:  ?Red meat and seafood  ?Alcohol, such as beer, wine, and hard alcohol  ?Foods and drinks that have high-fructose corn syrup (that includes most sodas and store-bought cakes and cookies)    Instead, you should eat lots of:  ?Low-fat dairy products, such as low-fat milk, cheese, and yogurt  ?Whole grains and vegetables    Some people with gout also have other health problems, such as heart disease, high blood pressure, kidney disease, or obesity. If you have any of these issues, it's important to work with your doctor to manage them. This can help improve your overall health and might also help with your gout.  Secondary Diagnosis:	Proteinuria, unspecified type  Instructions for follow-up, activity and diet:	-Plan to evaluate the protein in your urine outpatient. Your kidney function improved.  The kidneys' job is to remove wastes and excess water and salts from the blood. Kidneys receive blood through the renal arteries (figure 1). The blood flows into parts of the kidney called nephrons (figure 2). Each nephron is made of a glomerulus and a tubule. Each kidney contains hundreds of thousands of nephrons.    The glomeruli filter the blood, removing waste products from the blood. They also prevent some substances, such as protein, from being taken out of the blood. If the glomeruli are damaged, protein from the blood leaks into the urine.    Normally, you should have less than 150 milligrams (about 3 percent of a teaspoon) of protein in the urine per day. Having more than 150 milligrams per day is called proteinuria.    DOES PROTEINURIA CAUSE SYMPTOMS? — People with a small amount of proteinuria generally have no signs or symptoms. However, some patients have edema (swelling) in the face, legs, or abdomen if they lose large amounts of protein in their urine.  Secondary Diagnosis:	Atrial fibrillation, unspecified type  Instructions for follow-up, activity and diet:	-Continue your medications as prescribed  Atrial fibrillation is the most common heart rhythm problem.  The condition puts you at risk for has stroke and heart attack  It helps if you control your blood pressure, not drink more than 1-2 alcohol drinks per day, cut down on caffeine, getting treatment for over active thyroid gland, and get regular exercise  Call your doctor if you feel your heart racing or beating unusually, chest tightness or pain, lightheaded, faint, shortness of breath especially with exercise  It is important to take your heart medication as prescribed  You may be on anticoagulation which is very important to take as directed - you may need blood work to monitor drug levels  Secondary Diagnosis:	Chronic obstructive pulmonary disease, unspecified COPD type  Instructions for follow-up, activity and diet:	-Continue your medications and chronic home oxygen as prescribed  Secondary Diagnosis:	CAD S/P percutaneous coronary angioplasty  Instructions for follow-up, activity and diet:	-Continue your medications as prescribed

## 2017-06-19 NOTE — DISCHARGE NOTE ADULT - SECONDARY DIAGNOSIS.
Proteinuria, unspecified type Atrial fibrillation, unspecified type Chronic obstructive pulmonary disease, unspecified COPD type CAD S/P percutaneous coronary angioplasty

## 2017-06-19 NOTE — PROGRESS NOTE ADULT - SUBJECTIVE AND OBJECTIVE BOX
MEDICINE, PROGRESS NOTE [phone]    FRAN ALEXANDRA 71y [id number]    Patient seen and examined.  Patient is a 71y old  Female who presents with a chief complaint of L Ankle Pain (19 Jun 2017 09:57)  Pt still c/o knee pain    PAST MEDICAL & SURGICAL HISTORY:  Hyperthyroidism  JOSE (obstructive sleep apnea)  Epistaxis  GIB (gastrointestinal bleeding)  CAD S/P percutaneous coronary angioplasty  Afib  Pulmonary HTN  GERD (gastroesophageal reflux disease)  Obesity  Cardiomegaly  Valvular heart disease  Sleep apnea  COPD (chronic obstructive pulmonary disease): Home O2  Sleep Apnea: by criteria  Loose, teeth: 3 bottom front loose teeth  Female stress incontinence  Shoulder pain, right  Constipation  Arthritis of Knee  H/O heartburn  History of lung cancer  Obese  Hx of hyperlipidemia  Asthma  Diabetes mellitus: type 2  dx about 4-5 years ago   no daily fingerstick  H/O: HTN (hypertension)  Enlarged lymph nodes  Lymph nodes enlarged: s/p biopsy mediastinal  benign  H/O endoscopy  left upper lobectomy    MEDICATIONS  (STANDING):  heparin  Injectable 5000Unit(s) SubCutaneous every 12 hours  predniSONE   Tablet 5milliGRAM(s) Oral daily  aspirin enteric coated 81milliGRAM(s) Oral daily  diltiazem   CD 240milliGRAM(s) Oral daily  clopidogrel Tablet 75milliGRAM(s) Oral daily  methimazole 5milliGRAM(s) Oral daily  roflumilast 500MICROGram(s) Oral daily  potassium chloride    Tablet ER 10milliEquivalent(s) Oral daily  torsemide 20milliGRAM(s) Oral daily  atorvastatin 20milliGRAM(s) Oral at bedtime  insulin lispro (HumaLOG) corrective regimen sliding scale  SubCutaneous three times a day before meals  insulin lispro (HumaLOG) corrective regimen sliding scale  SubCutaneous at bedtime  dextrose 5%. 1000milliLiter(s) IV Continuous <Continuous>  dextrose 50% Injectable 12.5Gram(s) IV Push once  dextrose 50% Injectable 25Gram(s) IV Push once  allopurinol 200milliGRAM(s) Oral daily  metoprolol 25milliGRAM(s) Oral every 8 hours  digoxin     Tablet 0.125milliGRAM(s) Oral every other day    MEDICATIONS  (PRN):  acetaminophen   Tablet. 650milliGRAM(s) Oral every 6 hours PRN Moderate Pain (4 - 6)  dextrose Gel 1Dose(s) Oral once PRN Blood Glucose LESS THAN 70 milliGRAM(s)/deciliter    Allergies    No Known Allergies    Intolerances    albuterol (Unknown)      PHYSICAL EXAM:  Constitutional: NAD  HEENT: Normocephalic, EOMI  Neck:  No JVD  Respiratory: CTA B/L, No wheezes  Cardiovascular: S1, S2, RRR, + systolic murmur  Gastrointestinal: BS+, soft, NT/ND  Extremities: No peripheral edema  Neurological: AAOX3, no focal deficits  Psychiatric: Normal mood, normal affect  : No Gottlieb    Vital Signs Last 24 Hrs  T(C): 36.8, Max: 36.9 (06-18 @ 21:09)  T(F): 98.2, Max: 98.5 (06-18 @ 21:09)  HR: 85 (67 - 99)  BP: 111/68 (109/67 - 111/68)  BP(mean): --  RR: 18 (17 - 18)  SpO2: 96% (96% - 99%)  I&O's Summary  I & Os for 24h ending 19 Jun 2017 07:00  =============================================  IN: 1260 ml / OUT: 0 ml / NET: 1260 ml    I & Os for current day (as of 19 Jun 2017 18:45)  =============================================  IN: 960 ml / OUT: 0 ml / NET: 960 ml      LABS:                        9.2    4.4   )-----------( 163      ( 19 Jun 2017 07:16 )             28.1     06-19    143  |  98  |  51<H>  ----------------------------<  98  3.7   |  34<H>  |  1.24    Ca    9.2      19 Jun 2017 07:16  Phos  2.6     06-19  Mg     2.1     06-19    TPro  5.8<L>  /  Alb  3.2<L>  /  TBili  0.4  /  DBili  x   /  AST  12  /  ALT  8<L>  /  AlkPhos  46  06-19        Magnesium, Serum: 2.1 mg/dL (06-19 @ 07:16)        REVIEW OF SYSTEMS:      RADIOLOGY & ADDITIONAL STUDIES:      ASSESSMENT/PLAN:

## 2017-06-20 VITALS
SYSTOLIC BLOOD PRESSURE: 99 MMHG | TEMPERATURE: 99 F | HEART RATE: 79 BPM | DIASTOLIC BLOOD PRESSURE: 64 MMHG | RESPIRATION RATE: 17 BRPM | OXYGEN SATURATION: 99 %

## 2017-06-20 LAB
ANION GAP SERPL CALC-SCNC: 12 MMOL/L — SIGNIFICANT CHANGE UP (ref 5–17)
BUN SERPL-MCNC: 37 MG/DL — HIGH (ref 7–23)
CALCIUM SERPL-MCNC: 9.7 MG/DL — SIGNIFICANT CHANGE UP (ref 8.4–10.5)
CHLORIDE SERPL-SCNC: 96 MMOL/L — SIGNIFICANT CHANGE UP (ref 96–108)
CO2 SERPL-SCNC: 32 MMOL/L — HIGH (ref 22–31)
CREAT SERPL-MCNC: 1.24 MG/DL — SIGNIFICANT CHANGE UP (ref 0.5–1.3)
GLUCOSE SERPL-MCNC: 124 MG/DL — HIGH (ref 70–99)
HCT VFR BLD CALC: 33.1 % — LOW (ref 34.5–45)
HGB BLD-MCNC: 10.4 G/DL — LOW (ref 11.5–15.5)
MCHC RBC-ENTMCNC: 26.4 PG — LOW (ref 27–34)
MCHC RBC-ENTMCNC: 31.3 GM/DL — LOW (ref 32–36)
MCV RBC AUTO: 84.3 FL — SIGNIFICANT CHANGE UP (ref 80–100)
PLATELET # BLD AUTO: 206 K/UL — SIGNIFICANT CHANGE UP (ref 150–400)
POTASSIUM SERPL-MCNC: 4.4 MMOL/L — SIGNIFICANT CHANGE UP (ref 3.5–5.3)
POTASSIUM SERPL-SCNC: 4.4 MMOL/L — SIGNIFICANT CHANGE UP (ref 3.5–5.3)
RBC # BLD: 3.93 M/UL — SIGNIFICANT CHANGE UP (ref 3.8–5.2)
RBC # FLD: 18 % — HIGH (ref 10.3–14.5)
SODIUM SERPL-SCNC: 140 MMOL/L — SIGNIFICANT CHANGE UP (ref 135–145)
WBC # BLD: 7.7 K/UL — SIGNIFICANT CHANGE UP (ref 3.8–10.5)
WBC # FLD AUTO: 7.7 K/UL — SIGNIFICANT CHANGE UP (ref 3.8–10.5)

## 2017-06-20 RX ORDER — ACETAMINOPHEN 500 MG
2 TABLET ORAL
Qty: 0 | Refills: 0 | COMMUNITY
Start: 2017-06-20

## 2017-06-20 RX ORDER — ALLOPURINOL 300 MG
2 TABLET ORAL
Qty: 60 | Refills: 0 | OUTPATIENT
Start: 2017-06-20 | End: 2017-07-20

## 2017-06-20 RX ADMIN — ATORVASTATIN CALCIUM 20 MILLIGRAM(S): 80 TABLET, FILM COATED ORAL at 22:23

## 2017-06-20 RX ADMIN — Medication 2: at 17:50

## 2017-06-20 RX ADMIN — CLOPIDOGREL BISULFATE 75 MILLIGRAM(S): 75 TABLET, FILM COATED ORAL at 08:35

## 2017-06-20 RX ADMIN — Medication 25 MILLIGRAM(S): at 08:17

## 2017-06-20 RX ADMIN — Medication 25 MILLIGRAM(S): at 17:47

## 2017-06-20 RX ADMIN — Medication 81 MILLIGRAM(S): at 08:32

## 2017-06-20 RX ADMIN — HEPARIN SODIUM 5000 UNIT(S): 5000 INJECTION INTRAVENOUS; SUBCUTANEOUS at 08:18

## 2017-06-20 RX ADMIN — ROFLUMILAST 500 MICROGRAM(S): 500 TABLET ORAL at 08:18

## 2017-06-20 RX ADMIN — Medication 5 MILLIGRAM(S): at 08:17

## 2017-06-20 RX ADMIN — Medication 20 MILLIGRAM(S): at 08:18

## 2017-06-20 RX ADMIN — Medication 240 MILLIGRAM(S): at 08:47

## 2017-06-20 NOTE — PROGRESS NOTE ADULT - ASSESSMENT
The patient is a 71-year-old female with past medical history of hypertension, diabetes, lung cancer, pulmonary hypertension with no response to nitric oxide, who presents with   gout.     The patient with chronic kidney disease stage III, which is at her baseline.  Goals of therapy are supportive in nature.  Iron deficiency  1.	Rheumatology:  S/P Solu-Medrol 40 mg x1.  SP Colchicine to 0.3 mg mg twice a day and on  Allopurinol 200mg po qd as well  2.	Renal:  No need for renal sonogram.  At some point, we will quantify her proteinuria burden.  3.	Cardiovascular:  Continue with Demadex.  I do not see the need for IV loop diuretics at present.  4.       Anemia- Last dose of  Venofer 200mg The patient is a 71-year-old female with past medical history of hypertension, diabetes, lung cancer, pulmonary hypertension(with no response to nitric oxide,) who presents with   gout.    Chronic kidney disease stage III  Iron deficiency  1.	Rheumatology:  S/P Solu-Medrol 40 mg x1.  SP Colchicine to 0.3 mg mg twice a day and on  Allopurinol 200mg po qd as well  2.	Renal:  No need for renal sonogram.  At some point, we will quantify her proteinuria burden.  3.	Cardiovascular:  Continue with Demadex.  I do not see the need for IV loop diuretics at present.  4.       Anemia- SP 4 doses of   Venofer 200mg

## 2017-06-20 NOTE — PROGRESS NOTE ADULT - SUBJECTIVE AND OBJECTIVE BOX
MEDICINE, PROGRESS NOTE [phone]    FRAN ALEXANDRA 71y [id number]    Patient seen and examined.  Patient is a 71y old  Female who presents with a chief complaint of L Ankle Pain (19 Jun 2017 09:57)  Pt still c/o sob, knee pain, foot pain.    PAST MEDICAL & SURGICAL HISTORY:  Hyperthyroidism  JOSE (obstructive sleep apnea)  Epistaxis  GIB (gastrointestinal bleeding)  CAD S/P percutaneous coronary angioplasty  Afib  Pulmonary HTN  GERD (gastroesophageal reflux disease)  Obesity  Cardiomegaly  Valvular heart disease  Sleep apnea  COPD (chronic obstructive pulmonary disease): Home O2  Sleep Apnea: by criteria  Loose, teeth: 3 bottom front loose teeth  Female stress incontinence  Shoulder pain, right  Constipation  Arthritis of Knee  H/O heartburn  History of lung cancer  Obese  Hx of hyperlipidemia  Asthma  Diabetes mellitus: type 2  dx about 4-5 years ago   no daily fingerstick  H/O: HTN (hypertension)  Enlarged lymph nodes  Lymph nodes enlarged: s/p biopsy mediastinal  benign  H/O endoscopy  left upper lobectomy    MEDICATIONS  (STANDING):  heparin  Injectable 5000Unit(s) SubCutaneous every 12 hours  predniSONE   Tablet 5milliGRAM(s) Oral daily  aspirin enteric coated 81milliGRAM(s) Oral daily  diltiazem   CD 240milliGRAM(s) Oral daily  clopidogrel Tablet 75milliGRAM(s) Oral daily  methimazole 5milliGRAM(s) Oral daily  roflumilast 500MICROGram(s) Oral daily  potassium chloride    Tablet ER 10milliEquivalent(s) Oral daily  torsemide 20milliGRAM(s) Oral daily  atorvastatin 20milliGRAM(s) Oral at bedtime  insulin lispro (HumaLOG) corrective regimen sliding scale  SubCutaneous three times a day before meals  insulin lispro (HumaLOG) corrective regimen sliding scale  SubCutaneous at bedtime  dextrose 5%. 1000milliLiter(s) IV Continuous <Continuous>  dextrose 50% Injectable 12.5Gram(s) IV Push once  dextrose 50% Injectable 25Gram(s) IV Push once  allopurinol 200milliGRAM(s) Oral daily  metoprolol 25milliGRAM(s) Oral every 8 hours  digoxin     Tablet 0.125milliGRAM(s) Oral every other day    MEDICATIONS  (PRN):  acetaminophen   Tablet. 650milliGRAM(s) Oral every 6 hours PRN Moderate Pain (4 - 6)  dextrose Gel 1Dose(s) Oral once PRN Blood Glucose LESS THAN 70 milliGRAM(s)/deciliter    Allergies    No Known Allergies    Intolerances    albuterol (Unknown)      PHYSICAL EXAM:  Constitutional: NAD  HEENT: Normocephalic, EOMI  Neck:  No JVD  Respiratory: CTA B/L, No wheezes  Cardiovascular: S1, S2, RRR, + systolic murmur  Gastrointestinal: BS+, soft, NT/ND  Extremities: No peripheral edema  Neurological: AAOX3, no focal deficits  Psychiatric: Normal mood, normal affect  : No Gottlieb    Vital Signs Last 24 Hrs  T(C): 37, Max: 37 (06-20 @ 21:25)  T(F): 98.6, Max: 98.6 (06-20 @ 21:25)  HR: 79 (79 - 98)  BP: 99/64 (99/64 - 114/62)  BP(mean): --  RR: 17 (17 - 18)  SpO2: 99% (98% - 100%)  I&O's Summary  I & Os for 24h ending 20 Jun 2017 07:00  =============================================  IN: 1740 ml / OUT: 0 ml / NET: 1740 ml    I & Os for current day (as of 20 Jun 2017 21:33)  =============================================  IN: 440 ml / OUT: 0 ml / NET: 440 ml      LABS:                        10.4   7.7   )-----------( 206      ( 20 Jun 2017 11:56 )             33.1     06-20    140  |  96  |  37<H>  ----------------------------<  124<H>  4.4   |  32<H>  |  1.24    Ca    9.7      20 Jun 2017 11:56  Phos  2.6     06-19  Mg     2.1     06-19    TPro  5.8<L>  /  Alb  3.2<L>  /  TBili  0.4  /  DBili  x   /  AST  12  /  ALT  8<L>  /  AlkPhos  46  06-19              REVIEW OF SYSTEMS:      RADIOLOGY & ADDITIONAL STUDIES:      ASSESSMENT/PLAN:

## 2017-06-20 NOTE — PROGRESS NOTE ADULT - SUBJECTIVE AND OBJECTIVE BOX
NEPHROLOGY-[facility] [phone]        Patient seen and examined         MEDICATIONS  (STANDING):  heparin  Injectable 5000Unit(s) SubCutaneous every 12 hours  predniSONE   Tablet 5milliGRAM(s) Oral daily  aspirin enteric coated 81milliGRAM(s) Oral daily  diltiazem   CD 240milliGRAM(s) Oral daily  clopidogrel Tablet 75milliGRAM(s) Oral daily  methimazole 5milliGRAM(s) Oral daily  roflumilast 500MICROGram(s) Oral daily  potassium chloride    Tablet ER 10milliEquivalent(s) Oral daily  torsemide 20milliGRAM(s) Oral daily  atorvastatin 20milliGRAM(s) Oral at bedtime  insulin lispro (HumaLOG) corrective regimen sliding scale  SubCutaneous three times a day before meals  insulin lispro (HumaLOG) corrective regimen sliding scale  SubCutaneous at bedtime  dextrose 5%. 1000milliLiter(s) IV Continuous <Continuous>  dextrose 50% Injectable 12.5Gram(s) IV Push once  dextrose 50% Injectable 25Gram(s) IV Push once  allopurinol 200milliGRAM(s) Oral daily  metoprolol 25milliGRAM(s) Oral every 8 hours  digoxin     Tablet 0.125milliGRAM(s) Oral every other day      VITAL:  T(C): , Max: 36.8 (06-19 @ 09:41)  T(F): , Max: 98.3 (06-19 @ 09:41)  HR: 85  BP: 114/62  BP(mean): --  RR: 18  SpO2: 100%  Wt(kg): --    I and O's:    I & Os for current day (as of 06-20 @ 08:36)  =============================================  IN: 1740 ml / OUT: 0 ml / NET: 1740 ml        PHYSICAL EXAM:    Constitutional: NAD  HEENT: PERRLA    Neck:  No JVD  Respiratory: CTAB/L  Cardiovascular: S1 and S2  Gastrointestinal: BS+, soft, NT/ND  Extremities: No peripheral edema  Neurological: A/O x 3, no focal deficits  Psychiatric: Normal mood, normal affect  : No Gottlieb  Skin: No rashes  Access: Not applicable    LABS:                        9.2    4.4   )-----------( 163      ( 19 Jun 2017 07:16 )             28.1     06-19    143  |  98  |  51<H>  ----------------------------<  98  3.7   |  34<H>  |  1.24    Ca    9.2      19 Jun 2017 07:16  Phos  2.6     06-19  Mg     2.1     06-19    TPro  5.8<L>  /  Alb  3.2<L>  /  TBili  0.4  /  DBili  x   /  AST  12  /  ALT  8<L>  /  AlkPhos  46  06-19          Urine Studies:          RADIOLOGY & ADDITIONAL STUDIES: [facility]OLOGY-[facility] [phone]        Patient seen and examined in bed.  She was complaining of knee pain         MEDICATIONS  (STANDING):  heparin  Injectable 5000Unit(s) SubCutaneous every 12 hours  predniSONE   Tablet 5milliGRAM(s) Oral daily  aspirin enteric coated 81milliGRAM(s) Oral daily  diltiazem   CD 240milliGRAM(s) Oral daily  clopidogrel Tablet 75milliGRAM(s) Oral daily  methimazole 5milliGRAM(s) Oral daily  roflumilast 500MICROGram(s) Oral daily  potassium chloride    Tablet ER 10milliEquivalent(s) Oral daily  torsemide 20milliGRAM(s) Oral daily  atorvastatin 20milliGRAM(s) Oral at bedtime  insulin lispro (HumaLOG) corrective regimen sliding scale  SubCutaneous three times a day before meals  insulin lispro (HumaLOG) corrective regimen sliding scale  SubCutaneous at bedtime  dextrose 5%. 1000milliLiter(s) IV Continuous <Continuous>  dextrose 50% Injectable 12.5Gram(s) IV Push once  dextrose 50% Injectable 25Gram(s) IV Push once  allopurinol 200milliGRAM(s) Oral daily  metoprolol 25milliGRAM(s) Oral every 8 hours  digoxin     Tablet 0.125milliGRAM(s) Oral every other day      VITAL:  T(C): , Max: 36.8 (06-19 @ 09:41)  T(F): , Max: 98.3 (06-19 @ 09:41)  HR: 85  BP: 114/62  BP(mean): --  RR: 18  SpO2: 100%  Wt(kg): --    I and O's:    I & Os for current day (as of 06-20 @ 08:36)  =============================================  IN: 1740 ml / OUT: 0 ml / NET: 1740 ml        PHYSICAL EXAM:    Constitutional: NAD  HEENT: PERRLA    Neck:  No JVD  Respiratory: decreased breath sounds   Cardiovascular: S1 and S2  Gastrointestinal: BS+, soft, NT/ND  Extremities: trace peripheral edema  Neurological: A/O x 3, no focal deficits  Psychiatric: Normal mood, normal affect  : No Gottlieb  Skin: No rashes  Access: Not applicable    LABS:                        9.2    4.4   )-----------( 163      ( 19 Jun 2017 07:16 )             28.1     06-19    143  |  98  |  51<H>  ----------------------------<  98  3.7   |  34<H>  |  1.24    Ca    9.2      19 Jun 2017 07:16  Phos  2.6     06-19  Mg     2.1     06-19    TPro  5.8<L>  /  Alb  3.2<L>  /  TBili  0.4  /  DBili  x   /  AST  12  /  ALT  8<L>  /  AlkPhos  46  06-19

## 2017-06-21 PROCEDURE — 85027 COMPLETE CBC AUTOMATED: CPT

## 2017-06-21 PROCEDURE — 81001 URINALYSIS AUTO W/SCOPE: CPT

## 2017-06-21 PROCEDURE — 83735 ASSAY OF MAGNESIUM: CPT

## 2017-06-21 PROCEDURE — 84436 ASSAY OF TOTAL THYROXINE: CPT

## 2017-06-21 PROCEDURE — 80053 COMPREHEN METABOLIC PANEL: CPT

## 2017-06-21 PROCEDURE — 85610 PROTHROMBIN TIME: CPT

## 2017-06-21 PROCEDURE — 82435 ASSAY OF BLOOD CHLORIDE: CPT

## 2017-06-21 PROCEDURE — 84550 ASSAY OF BLOOD/URIC ACID: CPT

## 2017-06-21 PROCEDURE — 84443 ASSAY THYROID STIM HORMONE: CPT

## 2017-06-21 PROCEDURE — 85014 HEMATOCRIT: CPT

## 2017-06-21 PROCEDURE — 83605 ASSAY OF LACTIC ACID: CPT

## 2017-06-21 PROCEDURE — 83550 IRON BINDING TEST: CPT

## 2017-06-21 PROCEDURE — 84295 ASSAY OF SERUM SODIUM: CPT

## 2017-06-21 PROCEDURE — 82947 ASSAY GLUCOSE BLOOD QUANT: CPT

## 2017-06-21 PROCEDURE — 80299 QUANTITATIVE ASSAY DRUG: CPT

## 2017-06-21 PROCEDURE — 71045 X-RAY EXAM CHEST 1 VIEW: CPT

## 2017-06-21 PROCEDURE — 82330 ASSAY OF CALCIUM: CPT

## 2017-06-21 PROCEDURE — 99285 EMERGENCY DEPT VISIT HI MDM: CPT | Mod: 25

## 2017-06-21 PROCEDURE — 82803 BLOOD GASES ANY COMBINATION: CPT

## 2017-06-21 PROCEDURE — 80162 ASSAY OF DIGOXIN TOTAL: CPT

## 2017-06-21 PROCEDURE — 84132 ASSAY OF SERUM POTASSIUM: CPT

## 2017-06-21 PROCEDURE — 80048 BASIC METABOLIC PNL TOTAL CA: CPT

## 2017-06-21 PROCEDURE — 73610 X-RAY EXAM OF ANKLE: CPT

## 2017-06-21 PROCEDURE — 93971 EXTREMITY STUDY: CPT

## 2017-06-21 PROCEDURE — 93005 ELECTROCARDIOGRAM TRACING: CPT

## 2017-06-21 PROCEDURE — 85730 THROMBOPLASTIN TIME PARTIAL: CPT

## 2017-06-21 PROCEDURE — 87086 URINE CULTURE/COLONY COUNT: CPT

## 2017-06-21 PROCEDURE — 87040 BLOOD CULTURE FOR BACTERIA: CPT

## 2017-06-21 PROCEDURE — 84100 ASSAY OF PHOSPHORUS: CPT

## 2017-06-21 PROCEDURE — 82728 ASSAY OF FERRITIN: CPT

## 2017-06-21 PROCEDURE — 84480 ASSAY TRIIODOTHYRONINE (T3): CPT

## 2017-06-21 RX ORDER — DILTIAZEM HCL 120 MG
1 CAPSULE, EXT RELEASE 24 HR ORAL
Qty: 30 | Refills: 0 | OUTPATIENT
Start: 2017-06-21 | End: 2017-07-21

## 2017-06-21 RX ORDER — ALLOPURINOL 300 MG
2 TABLET ORAL
Qty: 60 | Refills: 0 | OUTPATIENT
Start: 2017-06-21 | End: 2017-07-21

## 2017-06-21 RX ORDER — CLOPIDOGREL BISULFATE 75 MG/1
1 TABLET, FILM COATED ORAL
Qty: 30 | Refills: 0 | OUTPATIENT
Start: 2017-06-21 | End: 2017-07-21

## 2017-06-21 RX ORDER — SUCRALFATE 1 G
10 TABLET ORAL
Qty: 1200 | Refills: 0 | OUTPATIENT
Start: 2017-06-21 | End: 2017-07-21

## 2017-06-21 RX ORDER — ATORVASTATIN CALCIUM 80 MG/1
1 TABLET, FILM COATED ORAL
Qty: 30 | Refills: 0 | OUTPATIENT
Start: 2017-06-21 | End: 2017-07-21

## 2017-06-21 RX ADMIN — Medication 200 MILLIGRAM(S): at 08:31

## 2017-06-21 RX ADMIN — Medication 5 MILLIGRAM(S): at 08:32

## 2017-06-21 RX ADMIN — CLOPIDOGREL BISULFATE 75 MILLIGRAM(S): 75 TABLET, FILM COATED ORAL at 09:55

## 2017-06-21 RX ADMIN — Medication 10 MILLIEQUIVALENT(S): at 09:55

## 2017-06-21 RX ADMIN — Medication 0.12 MILLIGRAM(S): at 09:55

## 2017-06-21 RX ADMIN — Medication 240 MILLIGRAM(S): at 08:31

## 2017-06-21 RX ADMIN — Medication 81 MILLIGRAM(S): at 08:31

## 2017-06-21 RX ADMIN — Medication 25 MILLIGRAM(S): at 08:29

## 2017-06-21 RX ADMIN — Medication 20 MILLIGRAM(S): at 09:53

## 2017-06-21 RX ADMIN — ROFLUMILAST 500 MICROGRAM(S): 500 TABLET ORAL at 08:30

## 2017-06-21 NOTE — PROGRESS NOTE ADULT - ASSESSMENT
severe pthn  severe copd  gout flare    continue current care  d/ pt home today and f/u with me in 1 week.

## 2017-06-21 NOTE — PROGRESS NOTE ADULT - PROVIDER SPECIALTY LIST ADULT
Infectious Disease
Infectious Disease
Internal Medicine
Nephrology

## 2017-06-21 NOTE — PROGRESS NOTE ADULT - SUBJECTIVE AND OBJECTIVE BOX
MEDICINE, PROGRESS NOTE [phone]    FRAN ALEXANDRA 71y [id number]    Patient seen and examined.  Patient is a 71y old  Female who presents with a chief complaint of L Ankle Pain (19 Jun 2017 09:57)  Pt with no new complaints      PAST MEDICAL & SURGICAL HISTORY:  Hyperthyroidism  JOSE (obstructive sleep apnea)  Epistaxis  GIB (gastrointestinal bleeding)  CAD S/P percutaneous coronary angioplasty  Afib  Pulmonary HTN  GERD (gastroesophageal reflux disease)  Obesity  Cardiomegaly  Valvular heart disease  Sleep apnea  COPD (chronic obstructive pulmonary disease): Home O2  Sleep Apnea: by criteria  Loose, teeth: 3 bottom front loose teeth  Female stress incontinence  Shoulder pain, right  Constipation  Arthritis of Knee  H/O heartburn  History of lung cancer  Obese  Hx of hyperlipidemia  Asthma  Diabetes mellitus: type 2  dx about 4-5 years ago   no daily fingerstick  H/O: HTN (hypertension)  Enlarged lymph nodes  Lymph nodes enlarged: s/p biopsy mediastinal  benign  H/O endoscopy  left upper lobectomy    MEDICATIONS  (STANDING):  heparin  Injectable 5000Unit(s) SubCutaneous every 12 hours  predniSONE   Tablet 5milliGRAM(s) Oral daily  aspirin enteric coated 81milliGRAM(s) Oral daily  diltiazem   CD 240milliGRAM(s) Oral daily  clopidogrel Tablet 75milliGRAM(s) Oral daily  methimazole 5milliGRAM(s) Oral daily  roflumilast 500MICROGram(s) Oral daily  potassium chloride    Tablet ER 10milliEquivalent(s) Oral daily  torsemide 20milliGRAM(s) Oral daily  atorvastatin 20milliGRAM(s) Oral at bedtime  insulin lispro (HumaLOG) corrective regimen sliding scale  SubCutaneous three times a day before meals  insulin lispro (HumaLOG) corrective regimen sliding scale  SubCutaneous at bedtime  dextrose 5%. 1000milliLiter(s) IV Continuous <Continuous>  dextrose 50% Injectable 12.5Gram(s) IV Push once  dextrose 50% Injectable 25Gram(s) IV Push once  allopurinol 200milliGRAM(s) Oral daily  metoprolol 25milliGRAM(s) Oral every 8 hours  digoxin     Tablet 0.125milliGRAM(s) Oral every other day    MEDICATIONS  (PRN):  acetaminophen   Tablet. 650milliGRAM(s) Oral every 6 hours PRN Moderate Pain (4 - 6)  dextrose Gel 1Dose(s) Oral once PRN Blood Glucose LESS THAN 70 milliGRAM(s)/deciliter    Allergies    No Known Allergies    Intolerances    albuterol (Unknown)      PHYSICAL EXAM:  Constitutional: NAD  HEENT: Normocephalic, EOMI  Neck:  No JVD  Respiratory: CTA B/L, No wheezes  Cardiovascular: S1, S2, RRR, + systolic murmur  Gastrointestinal: BS+, soft, NT/ND  Extremities: mild peripheral edema  Neurological: AAOX3, no focal deficits  Psychiatric: Normal mood, normal affect  : No Gottlieb    Vital Signs Last 24 Hrs  T(C): 37, Max: 37 (06-20 @ 21:25)  T(F): 98.6, Max: 98.6 (06-20 @ 21:25)  HR: 79 (79 - 98)  BP: 99/64 (99/64 - 108/73)  BP(mean): --  RR: 17 (17 - 18)  SpO2: 99% (98% - 99%)  I&O's Summary    I & Os for current day (as of 21 Jun 2017 10:44)  =============================================  IN: 680 ml / OUT: 0 ml / NET: 680 ml      LABS:                        10.4   7.7   )-----------( 206      ( 20 Jun 2017 11:56 )             33.1     06-20    140  |  96  |  37<H>  ----------------------------<  124<H>  4.4   |  32<H>  |  1.24    Ca    9.7      20 Jun 2017 11:56                REVIEW OF SYSTEMS:      RADIOLOGY & ADDITIONAL STUDIES:      ASSESSMENT/PLAN:

## 2017-08-02 ENCOUNTER — MEDICATION RENEWAL (OUTPATIENT)
Age: 72
End: 2017-08-02

## 2017-08-07 ENCOUNTER — APPOINTMENT (OUTPATIENT)
Dept: CARDIOLOGY | Facility: CLINIC | Age: 72
End: 2017-08-07
Payer: MEDICARE

## 2017-08-07 ENCOUNTER — APPOINTMENT (OUTPATIENT)
Dept: CARDIOLOGY | Facility: CLINIC | Age: 72
End: 2017-08-07

## 2017-08-07 ENCOUNTER — NON-APPOINTMENT (OUTPATIENT)
Age: 72
End: 2017-08-07

## 2017-08-07 VITALS
BODY MASS INDEX: 26.57 KG/M2 | SYSTOLIC BLOOD PRESSURE: 121 MMHG | WEIGHT: 150 LBS | DIASTOLIC BLOOD PRESSURE: 61 MMHG | HEART RATE: 79 BPM | OXYGEN SATURATION: 93 %

## 2017-08-07 DIAGNOSIS — I25.10 ATHEROSCLEROTIC HEART DISEASE OF NATIVE CORONARY ARTERY W/OUT ANGINA PECTORIS: ICD-10-CM

## 2017-08-07 DIAGNOSIS — R60.9 EDEMA, UNSPECIFIED: ICD-10-CM

## 2017-08-07 DIAGNOSIS — I27.2 OTHER SECONDARY PULMONARY HYPERTENSION: ICD-10-CM

## 2017-08-07 DIAGNOSIS — I50.32 CHRONIC DIASTOLIC (CONGESTIVE) HEART FAILURE: ICD-10-CM

## 2017-08-07 DIAGNOSIS — I48.91 UNSPECIFIED ATRIAL FIBRILLATION: ICD-10-CM

## 2017-08-07 DIAGNOSIS — I10 ESSENTIAL (PRIMARY) HYPERTENSION: ICD-10-CM

## 2017-08-07 PROCEDURE — 99215 OFFICE O/P EST HI 40 MIN: CPT

## 2017-08-07 PROCEDURE — 93000 ELECTROCARDIOGRAM COMPLETE: CPT

## 2017-08-08 LAB
ALBUMIN SERPL ELPH-MCNC: 3.6 G/DL
ALP BLD-CCNC: 54 U/L
ALT SERPL-CCNC: 5 U/L
ANION GAP SERPL CALC-SCNC: 15 MMOL/L
AST SERPL-CCNC: 15 U/L
BILIRUB SERPL-MCNC: 1 MG/DL
BUN SERPL-MCNC: 44 MG/DL
CALCIUM SERPL-MCNC: 9.8 MG/DL
CHLORIDE SERPL-SCNC: 95 MMOL/L
CHOLEST SERPL-MCNC: 92 MG/DL
CHOLEST/HDLC SERPL: 2.8 RATIO
CO2 SERPL-SCNC: 32 MMOL/L
CREAT SERPL-MCNC: 1.39 MG/DL
GLUCOSE SERPL-MCNC: 155 MG/DL
HBA1C MFR BLD HPLC: 5.9 %
HDLC SERPL-MCNC: 33 MG/DL
LDLC SERPL CALC-MCNC: 39 MG/DL
POTASSIUM SERPL-SCNC: 3.7 MMOL/L
PROT SERPL-MCNC: 6.3 G/DL
SODIUM SERPL-SCNC: 142 MMOL/L
TRIGL SERPL-MCNC: 102 MG/DL

## 2017-08-18 ENCOUNTER — OTHER (OUTPATIENT)
Age: 72
End: 2017-08-18

## 2017-09-12 ENCOUNTER — OTHER (OUTPATIENT)
Age: 72
End: 2017-09-12

## 2017-09-15 ENCOUNTER — OTHER (OUTPATIENT)
Age: 72
End: 2017-09-15

## 2017-09-20 ENCOUNTER — APPOINTMENT (OUTPATIENT)
Dept: CARDIOLOGY | Facility: CLINIC | Age: 72
End: 2017-09-20

## 2017-09-25 ENCOUNTER — RX RENEWAL (OUTPATIENT)
Age: 72
End: 2017-09-25

## 2017-11-20 ENCOUNTER — RX RENEWAL (OUTPATIENT)
Age: 72
End: 2017-11-20

## 2017-11-28 ENCOUNTER — INPATIENT (INPATIENT)
Facility: HOSPITAL | Age: 72
LOS: 37 days | Discharge: HOME HEALTH | DRG: 314 | End: 2018-01-05
Attending: INTERNAL MEDICINE | Admitting: INTERNAL MEDICINE
Payer: COMMERCIAL

## 2017-11-28 VITALS
RESPIRATION RATE: 20 BRPM | HEIGHT: 63 IN | SYSTOLIC BLOOD PRESSURE: 115 MMHG | DIASTOLIC BLOOD PRESSURE: 71 MMHG | TEMPERATURE: 99 F | HEART RATE: 84 BPM | WEIGHT: 151.9 LBS | OXYGEN SATURATION: 96 %

## 2017-11-28 DIAGNOSIS — J44.9 CHRONIC OBSTRUCTIVE PULMONARY DISEASE, UNSPECIFIED: ICD-10-CM

## 2017-11-28 DIAGNOSIS — I27.20 PULMONARY HYPERTENSION, UNSPECIFIED: ICD-10-CM

## 2017-11-28 DIAGNOSIS — Z83.3 FAMILY HISTORY OF DIABETES MELLITUS: ICD-10-CM

## 2017-11-28 DIAGNOSIS — R18.8 OTHER ASCITES: ICD-10-CM

## 2017-11-28 DIAGNOSIS — I25.10 ATHEROSCLEROTIC HEART DISEASE OF NATIVE CORONARY ARTERY WITHOUT ANGINA PECTORIS: ICD-10-CM

## 2017-11-28 DIAGNOSIS — Z75.8 OTHER PROBLEMS RELATED TO MEDICAL FACILITIES AND OTHER HEALTH CARE: ICD-10-CM

## 2017-11-28 DIAGNOSIS — I48.91 UNSPECIFIED ATRIAL FIBRILLATION: ICD-10-CM

## 2017-11-28 DIAGNOSIS — R74.8 ABNORMAL LEVELS OF OTHER SERUM ENZYMES: ICD-10-CM

## 2017-11-28 DIAGNOSIS — E87.70 FLUID OVERLOAD, UNSPECIFIED: ICD-10-CM

## 2017-11-28 LAB
ALBUMIN SERPL ELPH-MCNC: 3.9 G/DL — SIGNIFICANT CHANGE UP (ref 3.3–5)
ALP SERPL-CCNC: 52 U/L — SIGNIFICANT CHANGE UP (ref 40–120)
ALT FLD-CCNC: 5 U/L RC — LOW (ref 10–45)
AMMONIA BLD-MCNC: 12 UMOL/L — SIGNIFICANT CHANGE UP (ref 11–55)
ANION GAP SERPL CALC-SCNC: 14 MMOL/L — SIGNIFICANT CHANGE UP (ref 5–17)
APTT BLD: 27.9 SEC — SIGNIFICANT CHANGE UP (ref 27.5–37.4)
AST SERPL-CCNC: 16 U/L — SIGNIFICANT CHANGE UP (ref 10–40)
BASOPHILS # BLD AUTO: 0 K/UL — SIGNIFICANT CHANGE UP (ref 0–0.2)
BASOPHILS NFR BLD AUTO: 0.1 % — SIGNIFICANT CHANGE UP (ref 0–2)
BILIRUB SERPL-MCNC: 0.8 MG/DL — SIGNIFICANT CHANGE UP (ref 0.2–1.2)
BUN SERPL-MCNC: 69 MG/DL — HIGH (ref 7–23)
CALCIUM SERPL-MCNC: 9.3 MG/DL — SIGNIFICANT CHANGE UP (ref 8.4–10.5)
CHLORIDE SERPL-SCNC: 97 MMOL/L — SIGNIFICANT CHANGE UP (ref 96–108)
CO2 SERPL-SCNC: 32 MMOL/L — HIGH (ref 22–31)
CREAT SERPL-MCNC: 2.06 MG/DL — HIGH (ref 0.5–1.3)
EOSINOPHIL # BLD AUTO: 0 K/UL — SIGNIFICANT CHANGE UP (ref 0–0.5)
EOSINOPHIL NFR BLD AUTO: 0.3 % — SIGNIFICANT CHANGE UP (ref 0–6)
GAS PNL BLDV: SIGNIFICANT CHANGE UP
GLUCOSE SERPL-MCNC: 211 MG/DL — HIGH (ref 70–99)
HCT VFR BLD CALC: 32.6 % — LOW (ref 34.5–45)
HGB BLD-MCNC: 10.2 G/DL — LOW (ref 11.5–15.5)
INR BLD: 1.13 RATIO — SIGNIFICANT CHANGE UP (ref 0.88–1.16)
LYMPHOCYTES # BLD AUTO: 0.3 K/UL — LOW (ref 1–3.3)
LYMPHOCYTES # BLD AUTO: 3.8 % — LOW (ref 13–44)
MCHC RBC-ENTMCNC: 27.7 PG — SIGNIFICANT CHANGE UP (ref 27–34)
MCHC RBC-ENTMCNC: 31.4 GM/DL — LOW (ref 32–36)
MCV RBC AUTO: 88.2 FL — SIGNIFICANT CHANGE UP (ref 80–100)
MONOCYTES # BLD AUTO: 0.3 K/UL — SIGNIFICANT CHANGE UP (ref 0–0.9)
MONOCYTES NFR BLD AUTO: 3.8 % — SIGNIFICANT CHANGE UP (ref 2–14)
NEUTROPHILS # BLD AUTO: 7.2 K/UL — SIGNIFICANT CHANGE UP (ref 1.8–7.4)
NEUTROPHILS NFR BLD AUTO: 91.9 % — HIGH (ref 43–77)
NT-PROBNP SERPL-SCNC: HIGH PG/ML (ref 0–300)
PLATELET # BLD AUTO: 206 K/UL — SIGNIFICANT CHANGE UP (ref 150–400)
POTASSIUM SERPL-MCNC: 4.3 MMOL/L — SIGNIFICANT CHANGE UP (ref 3.5–5.3)
POTASSIUM SERPL-SCNC: 4.3 MMOL/L — SIGNIFICANT CHANGE UP (ref 3.5–5.3)
PROT SERPL-MCNC: 6.4 G/DL — SIGNIFICANT CHANGE UP (ref 6–8.3)
PROTHROM AB SERPL-ACNC: 12.3 SEC — SIGNIFICANT CHANGE UP (ref 9.8–12.7)
RBC # BLD: 3.69 M/UL — LOW (ref 3.8–5.2)
RBC # FLD: 17.2 % — HIGH (ref 10.3–14.5)
SODIUM SERPL-SCNC: 143 MMOL/L — SIGNIFICANT CHANGE UP (ref 135–145)
TROPONIN T SERPL-MCNC: 0.07 NG/ML — HIGH (ref 0–0.06)
WBC # BLD: 7.8 K/UL — SIGNIFICANT CHANGE UP (ref 3.8–10.5)
WBC # FLD AUTO: 7.8 K/UL — SIGNIFICANT CHANGE UP (ref 3.8–10.5)

## 2017-11-28 PROCEDURE — 99223 1ST HOSP IP/OBS HIGH 75: CPT

## 2017-11-28 PROCEDURE — 99285 EMERGENCY DEPT VISIT HI MDM: CPT

## 2017-11-28 PROCEDURE — 76705 ECHO EXAM OF ABDOMEN: CPT | Mod: 26

## 2017-11-28 RX ORDER — FUROSEMIDE 40 MG
80 TABLET ORAL ONCE
Qty: 0 | Refills: 0 | Status: COMPLETED | OUTPATIENT
Start: 2017-11-28 | End: 2017-11-28

## 2017-11-28 RX ORDER — HEPARIN SODIUM 5000 [USP'U]/ML
5000 INJECTION INTRAVENOUS; SUBCUTANEOUS EVERY 8 HOURS
Qty: 0 | Refills: 0 | Status: DISCONTINUED | OUTPATIENT
Start: 2017-11-28 | End: 2018-01-05

## 2017-11-28 RX ADMIN — Medication 80 MILLIGRAM(S): at 19:35

## 2017-11-28 NOTE — H&P ADULT - PROBLEM SELECTOR PLAN 1
Patient has predominantly R-sided heart failure secondary to pulmonary hypertension and today presents with gradual increasing lower extremity edema and ascites over the past 1-2 weeks. Reports that normal weight is ~130 pounds, but over this time has noticed an increase in her weight to 152lbs on the day of admission. Symptoms have persisted despite increases in her torsemide dosing to 80mg daily from 20mg daily. Likely secondary to worsening pHTN and ineffective diuresis.   - Unclear output to 80IV, however patient reports effective diuresis  - Follow up AM labs, if creatinine improving and lytes stable will redose 80IV x 1  - Goal net negative ~1-1.5L, would redose 120IV x 1 in afternoon if insufficient output  - Fluid restriction, low sodium diet  - Daily weights, strict I&Os  - Would likely benefit from Pulmonary and Cardiology consult in AM

## 2017-11-28 NOTE — H&P ADULT - ASSESSMENT
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

## 2017-11-28 NOTE — H&P ADULT - PROBLEM SELECTOR PLAN 8
F: none  E: replete prn  N: regular diet, low sodium (declined DASH)  GI: PPI  DVT: subq heparin  Code: FULL

## 2017-11-28 NOTE — ED PROVIDER NOTE - CARE PLAN
Principal Discharge DX:	Hypervolemia, unspecified hypervolemia type  Secondary Diagnosis:	Other ascites

## 2017-11-28 NOTE — H&P ADULT - HISTORY OF PRESENT ILLNESS
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    Patient states that     In the ED, T 99.1, HR 84, /71, O2 94% on 3L NC. Labs showed WBC 7.8, stable anemia of Hgb 10.4, Platelets 206. Metabolic panel with BUN 69, Cr 2.06 (baseline 1.2-1.3), but otherwise unremarkable. Coags unremarkable. Troponin 0.07, BNP >35,000 (higher than prior). VBG unremarkable with normal lactate. ECG without ischemic changes. Bedside ECHO in ED with severe R-sided heart failure. CXR with increased pulmonary edema compared to baseline.     Patient was given 80IV lasix with unclear output, declining dubois. Admitted for diuresis and plan for paracentesis. 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    Patient states that she initially started to develop ascites approximately 4 weeks ago, at which time she had a paracentesis with 6.1L drained. Since then, she has noticed the slow re-accumulation of fluid over time that has led to worsening distention and lower extremity edema. She has been followed as an outpatient and her Torsemide dose has been titrated. Initially on 20mg daily, but since then has increased to 80mg daily. Despite this, she continues to have worsening fluid accumulation. In addition to this, she reports that she has had worsening shortness of breath/BRO, intermittent nausea, and poor appetite with minimal PO intake. She denies any fevers, chills, chest pain, or other GI symptoms. Abdominal pain is present from distention, but no area with pain in particular.     In the ED, T 99.1, HR 84, /71, O2 94% on 3L NC. Labs showed WBC 7.8, stable anemia of Hgb 10.4, Platelets 206. Metabolic panel with BUN 69, Cr 2.06 (baseline 1.2-1.3), but otherwise unremarkable. Coags unremarkable. Troponin 0.07, BNP >35,000 (higher than prior). VBG unremarkable with normal lactate. ECG without ischemic changes. Bedside ECHO in ED with severe R-sided heart failure. CXR with increased pulmonary edema compared to baseline.     Patient was given 80IV lasix with unclear output, declining dubois. Admitted for diuresis and plan for paracentesis.

## 2017-11-28 NOTE — H&P ADULT - PROBLEM SELECTOR PLAN 4
Patient has a history of severe pHTN. Pending Pulmonary evaluation and diuresis. Continue home medications  - Continue Opsumit 10mg daily  - IV Lasix for diuresis as above

## 2017-11-28 NOTE — ED PROCEDURE NOTE - PROCEDURE ADDITIONAL DETAILS
Jazz Gil DO: Emergency Department Focused Ultrasound performed at patient's bedside for educational purposes and concern for cardiac tamponade. The study will have a follow up echo inpatient performed for poor R sided CHF.

## 2017-11-28 NOTE — H&P ADULT - PROBLEM SELECTOR PLAN 2
Patient with significant abdominal ascites with recent tap about 4 weeks ago with ~6L fluid drained. Suspect secondary to R-sided failure though unclear studies from prior paracentesis  - Will need repeat paracentesis with IR during this admission to alleviate symptoms  - No suspicion of SBP at this time

## 2017-11-28 NOTE — H&P ADULT - PROBLEM SELECTOR PLAN 6
- Not on any anticoagulation  - Continue rate control with Dilt, digoxin  - Check digoxin level this AM given CASSANDRA, next dose due 11/30 AM

## 2017-11-28 NOTE — ED ADULT NURSE NOTE - OBJECTIVE STATEMENT
70 yo F presents to ED A+Ox3 c/o generalized abdominal pain. Pt. reports history of CHF. States approx. 4 weeks ago she "needed fluid from her abdomen drained." Pt. states over the past four weeks, the "fluid has been collecting again and it got worse over the past few days." Pt. states at home she wears 3-5L NC, as per patient oxygen saturation at baseline "is 85-86%, it goes up a little when I walk around." Pt. reports swelling to her lower extremities. Reports abdominal discomfort. Denies chest pain, SOB at this time, fever, chills, burning with urination, urinary frequency, difficulty urinating. Breathing unlabored on NC. Abdomen round, firm to touch and distended. Edema noted to lower extremities. Skin warm pink and dry. Pt. ambulatory with steady gait. Family at bedside. Comfort and safety measures in place. MD Shaffer aware of vital signs, no interventions required at this time.

## 2017-11-28 NOTE — H&P ADULT - NSHPREVIEWOFSYSTEMS_GEN_ALL_CORE
REVIEW OF SYSTEMS:    CONSTITUTIONAL: No weakness, fevers or chills  EYES/ENT: No visual changes;  No vertigo or throat pain   NECK: No pain or stiffness  RESPIRATORY: No cough, wheezing, hemoptysis; No shortness of breath  CARDIOVASCULAR: No chest pain or palpitations  GASTROINTESTINAL: No abdominal or epigastric pain. No nausea, vomiting, or hematemesis; No diarrhea or constipation. No melena or hematochezia.  GENITOURINARY: No dysuria, frequency or hematuria  NEUROLOGICAL: No numbness or weakness  SKIN: No itching, burning, rashes, or lesions   PSYCH: No depression or anxiety  HEME: No swollen lymph nodes REVIEW OF SYSTEMS:    CONSTITUTIONAL: No weakness, fevers or chills  EYES/ENT: No visual changes;  No vertigo or throat pain   NECK: No pain or stiffness  RESPIRATORY: +shortness of breath/BRO. No cough, wheezing, hemoptysis  CARDIOVASCULAR: +lower extremity swelling. No chest pain or palpitations  GASTROINTESTINAL: +abdominal distention/pain, + nausea No vomiting or hematemesis; No diarrhea or constipation. No melena or hematochezia.  GENITOURINARY: No dysuria, frequency or hematuria  NEUROLOGICAL: No numbness or weakness  SKIN: No itching, burning, rashes, or lesions   PSYCH: No depression or anxiety  HEME: No swollen lymph nodes

## 2017-11-28 NOTE — ED PROVIDER NOTE - MEDICAL DECISION MAKING DETAILS
71F w/ CAD s/p STEPHEN, DM2, Lung CA s/p JUDY lobectomy, COPD 3-4L NC, HTN, afib, Pulm HTN, CKD 3 coming in today with evidence of R sided CHF with symptomatic ascites (1 month hx, w/u at [facility]). WIll need admission for diuresis and paracentesis. Will d/w Dr. Cobb regarding admission. 71F w/ CAD s/p STEPHEN, DM2, Lung CA s/p JUDY lobectomy, COPD 3-4L NC, HTN, afib, Pulm HTN, CKD 3 coming in today with evidence of R sided CHF with symptomatic ascites (1 month hx, w/u at [facility]). WIll need admission for diuresis and paracentesis. Will d/w Dr. Cobb regarding admission.    BERNARDA Shaffer MD: Pt is a 72 y/o female with PMH CAD s/p STEPHEN, DM2, Lung CA s/p JUDY lobectomy, COPD 3-4L NC, HTN, afib, Pulm HTN, CKD 3 who p/w 1 mo of abdominal pain. Has had ascites that developed about a month ago, had it drained at [facility] and it was thought to be due to right heart failure. Has had worsening distension and abd pain. Seen by her Cardiologist as an outpt, had an US that showed minimal pocket, not drainable. c/o nausea, worsening LE edema bilaterally. Denies fevers, chills, chest pain, new SOB outside of baseline, HA.  DDx: CHF exacerbation, hepatic d/o, volume overload  Plan: basic labs, cxr, ekg, likely admit for further eval and mgt

## 2017-11-28 NOTE — ED ADULT NURSE REASSESSMENT NOTE - NS ED NURSE REASSESS COMMENT FT1
MD Shaffer aware of vital signs. Pt. states "my normal oxygen is in the high 80s, it goes up a little if I walk around." Breathing unlabored on 3L NC. MD Diana at bedside evaluating patient, no interventions required at this time.

## 2017-11-28 NOTE — ED PROVIDER NOTE - PROGRESS NOTE DETAILS
Jazz Gil, DO: US done at bedside to evaluate for tamponade. Pericardial effusion with no evidence of RV end diastolic collapse. Will continue to monitor. D/w Dr. Cobb. Pt adamantly refusing dubois for aggressive diuresis.

## 2017-11-28 NOTE — ED PROVIDER NOTE - OBJECTIVE STATEMENT
71F w/ CAD s/p STEPHEN, DM2, Lung CA s/p JUDY lobectomy, COPD 3-4L NC, HTN, afib, Pulm HTN, CKD 3 presents today with abdominal pain for the last month. Has had ascites that developed about a month ago, which was drained at [facility]. Likely etiology at that time was thought to be 2/2 right heart failure. Since d/c from  [facility] abd pain     PMD: Varin Cobb   Nephro: Sayed Ali  Cards: Dr. Méndez 71F w/ CAD s/p STEPHEN, DM2, Lung CA s/p JUDY lobectomy, COPD 3-4L NC, HTN, afib, Pulm HTN, CKD 3 presents today with abdominal pain for the last month. Has had ascites that developed about a month ago, which was drained at [facility]. Likely etiology at that time was thought to be 2/2 right heart failure. Since d/c from  [facility] abd pain has been getting worse as it has been getting more distended. Has been following with her cardiologist during the interval period. US as outpatient initially showed minimal pocket, not drainable. Today is very distended with nausea, large well formed stools. Pt also complaining of worsening LE edema bilaterally. Denies fevers, chills, chest pain, new SOB outside of baseline, HA.    PMD: Varin Cobb   Nephro: Sayed Ali  Cards: Dr. Méndez

## 2017-11-28 NOTE — H&P ADULT - NSHPPHYSICALEXAM_GEN_ALL_CORE
PHYSICAL EXAM:  GENERAL: NAD, well-groomed, well-developed  HEAD:  Atraumatic, Normocephalic  EYES: EOMI, PERRLA, conjunctiva and sclera clear  ENMT: No tonsillar erythema, exudates, or enlargement; Moist mucous membranes,   NECK: Supple, No JVD, Normal thyroid  HEART: Regular rate and rhythm; No murmurs, rubs, or gallops  RESPIRATORY: CTA B/L, No W/R/R  ABDOMEN: Soft, Nontender, Nondistended; Bowel sounds present  NEUROLOGY: A&Ox3, nonfocal, CN II-XII grossly intact, sensation intact, no gait abnormalities bilaterally;  EXTREMITIES:  2+ Peripheral Pulses, No clubbing, cyanosis, or edema  SKIN: warm, dry, normal color, no rash or abnormal lesions  MSK: No joint effusion  PSYCH: Flat affect PHYSICAL EXAM:  GENERAL: NAD, well-groomed, well-developed  HEAD:  Atraumatic, Normocephalic  EYES: EOMI, PERRLA, conjunctiva and sclera clear  ENMT: No tonsillar erythema, exudates, or enlargement; Moist mucous membranes,   NECK: Supple, + JVD, Normal thyroid  HEART: irregular rhythm, non-tachycardic; No murmurs, rubs, or gallops  RESPIRATORY: bibasilar rales, no wheezing  ABDOMEN: Soft, distended without areas of tenderness, positive fluid wave, no rebound or guarding  NEUROLOGY: A&Ox3, nonfocal, CN II-XII grossly intact, sensation intact  EXTREMITIES:  2+ Peripheral Pulses, 1-2+ bilateral edema to the knee  SKIN: warm, dry, normal color, no rash or abnormal lesions  MSK: No joint effusion  PSYCH: Flat affect

## 2017-11-28 NOTE — H&P ADULT - NSHPLABSRESULTS_GEN_ALL_CORE
10.2   7.8   )-----------( 206      ( 28 Nov 2017 19:16 )             32.6       11-28    143  |  97  |  69<H>  ----------------------------<  211<H>  4.3   |  32<H>  |  2.06<H>    Ca    9.3      28 Nov 2017 19:16    TPro  6.4  /  Alb  3.9  /  TBili  0.8  /  DBili  x   /  AST  16  /  ALT  5<L>  /  AlkPhos  52  11-28          PT/INR - ( 28 Nov 2017 19:16 )   PT: 12.3 sec;   INR: 1.13 ratio         PTT - ( 28 Nov 2017 19:16 )  PTT:27.9 sec    Lactate Trend      CARDIAC MARKERS ( 28 Nov 2017 19:16 )  x     / 0.07 ng/mL / x     / x     / x        I personally interpreted this patient's labs. They were notable for WBC 7.8, stable anemia of Hgb 10.4, Platelets 206. Metabolic panel with BUN 69, Cr 2.06 (baseline 1.2-1.3), but otherwise unremarkable. Coags unremarkable. Troponin 0.07, BNP >35,000 (higher than prior). VBG unremarkable with normal lactate.  I personally interpreted this patient's imaging. CXR was notable for enlarged heart border along with increased pulmonary edema and left-sided effusion  I personally interpreted this patient's ECG. It showed no acute ischemic changes 10.2   7.8   )-----------( 206      ( 28 Nov 2017 19:16 )             32.6       11-28    143  |  97  |  69<H>  ----------------------------<  211<H>  4.3   |  32<H>  |  2.06<H>    Ca    9.3      28 Nov 2017 19:16    TPro  6.4  /  Alb  3.9  /  TBili  0.8  /  DBili  x   /  AST  16  /  ALT  5<L>  /  AlkPhos  52  11-28          PT/INR - ( 28 Nov 2017 19:16 )   PT: 12.3 sec;   INR: 1.13 ratio         PTT - ( 28 Nov 2017 19:16 )  PTT:27.9 sec    Lactate Trend      CARDIAC MARKERS ( 28 Nov 2017 19:16 )  x     / 0.07 ng/mL / x     / x     / x        I personally interpreted this patient's labs. They were notable for WBC 7.8, stable anemia of Hgb 10.4, Platelets 206. Metabolic panel with BUN 69, Cr 2.06 (baseline 1.2-1.3), but otherwise unremarkable. Coags unremarkable. Troponin 0.07, BNP >35,000 (higher than prior). VBG unremarkable with normal lactate.  I personally interpreted this patient's imaging. CXR was notable for enlarged heart border along with increased pulmonary edema and left-sided effusion  I personally interpreted this patient's ECG. It showed no acute ischemic changes, occasional PVCs

## 2017-11-29 DIAGNOSIS — E87.70 FLUID OVERLOAD, UNSPECIFIED: ICD-10-CM

## 2017-11-29 DIAGNOSIS — I27.20 PULMONARY HYPERTENSION, UNSPECIFIED: ICD-10-CM

## 2017-11-29 DIAGNOSIS — J44.9 CHRONIC OBSTRUCTIVE PULMONARY DISEASE, UNSPECIFIED: ICD-10-CM

## 2017-11-29 DIAGNOSIS — R06.00 DYSPNEA, UNSPECIFIED: ICD-10-CM

## 2017-11-29 LAB
ALBUMIN SERPL ELPH-MCNC: 3.4 G/DL — SIGNIFICANT CHANGE UP (ref 3.3–5)
ALP SERPL-CCNC: 45 U/L — SIGNIFICANT CHANGE UP (ref 40–120)
ALT FLD-CCNC: 5 U/L — LOW (ref 10–45)
ANION GAP SERPL CALC-SCNC: 12 MMOL/L — SIGNIFICANT CHANGE UP (ref 5–17)
AST SERPL-CCNC: 12 U/L — SIGNIFICANT CHANGE UP (ref 10–40)
BASOPHILS # BLD AUTO: 0.06 K/UL — SIGNIFICANT CHANGE UP (ref 0–0.2)
BASOPHILS NFR BLD AUTO: 1 % — SIGNIFICANT CHANGE UP (ref 0–2)
BILIRUB SERPL-MCNC: 0.7 MG/DL — SIGNIFICANT CHANGE UP (ref 0.2–1.2)
BUN SERPL-MCNC: 67 MG/DL — HIGH (ref 7–23)
CALCIUM SERPL-MCNC: 9.1 MG/DL — SIGNIFICANT CHANGE UP (ref 8.4–10.5)
CHLORIDE SERPL-SCNC: 96 MMOL/L — SIGNIFICANT CHANGE UP (ref 96–108)
CO2 SERPL-SCNC: 33 MMOL/L — HIGH (ref 22–31)
CREAT SERPL-MCNC: 1.85 MG/DL — HIGH (ref 0.5–1.3)
DIGOXIN SERPL-MCNC: 1.3 NG/ML — SIGNIFICANT CHANGE UP (ref 0.8–2)
EOSINOPHIL # BLD AUTO: 0.06 K/UL — SIGNIFICANT CHANGE UP (ref 0–0.5)
EOSINOPHIL NFR BLD AUTO: 1 % — SIGNIFICANT CHANGE UP (ref 0–6)
GLUCOSE SERPL-MCNC: 104 MG/DL — HIGH (ref 70–99)
HCT VFR BLD CALC: 30 % — LOW (ref 34.5–45)
HGB BLD-MCNC: 8.9 G/DL — LOW (ref 11.5–15.5)
LYMPHOCYTES # BLD AUTO: 0.72 K/UL — LOW (ref 1–3.3)
LYMPHOCYTES # BLD AUTO: 13 % — SIGNIFICANT CHANGE UP (ref 13–44)
MAGNESIUM SERPL-MCNC: 2.3 MG/DL — SIGNIFICANT CHANGE UP (ref 1.6–2.6)
MCHC RBC-ENTMCNC: 25.6 PG — LOW (ref 27–34)
MCHC RBC-ENTMCNC: 29.7 GM/DL — LOW (ref 32–36)
MCV RBC AUTO: 86.2 FL — SIGNIFICANT CHANGE UP (ref 80–100)
MONOCYTES # BLD AUTO: 0.28 K/UL — SIGNIFICANT CHANGE UP (ref 0–0.9)
MONOCYTES NFR BLD AUTO: 5 % — SIGNIFICANT CHANGE UP (ref 2–14)
NEUTROPHILS # BLD AUTO: 4.45 K/UL — SIGNIFICANT CHANGE UP (ref 1.8–7.4)
NEUTROPHILS NFR BLD AUTO: 80 % — HIGH (ref 43–77)
PLATELET # BLD AUTO: 190 K/UL — SIGNIFICANT CHANGE UP (ref 150–400)
POTASSIUM SERPL-MCNC: 4 MMOL/L — SIGNIFICANT CHANGE UP (ref 3.5–5.3)
POTASSIUM SERPL-SCNC: 4 MMOL/L — SIGNIFICANT CHANGE UP (ref 3.5–5.3)
PROT SERPL-MCNC: 5.8 G/DL — LOW (ref 6–8.3)
RBC # BLD: 3.48 M/UL — LOW (ref 3.8–5.2)
RBC # FLD: 18.1 % — HIGH (ref 10.3–14.5)
SODIUM SERPL-SCNC: 141 MMOL/L — SIGNIFICANT CHANGE UP (ref 135–145)
TROPONIN T SERPL-MCNC: 0.07 NG/ML — HIGH (ref 0–0.06)
WBC # BLD: 5.56 K/UL — SIGNIFICANT CHANGE UP (ref 3.8–10.5)
WBC # FLD AUTO: 5.56 K/UL — SIGNIFICANT CHANGE UP (ref 3.8–10.5)

## 2017-11-29 RX ORDER — ATORVASTATIN CALCIUM 80 MG/1
20 TABLET, FILM COATED ORAL AT BEDTIME
Qty: 0 | Refills: 0 | Status: DISCONTINUED | OUTPATIENT
Start: 2017-11-29 | End: 2018-01-05

## 2017-11-29 RX ORDER — ONDANSETRON 8 MG/1
4 TABLET, FILM COATED ORAL EVERY 8 HOURS
Qty: 0 | Refills: 0 | Status: DISCONTINUED | OUTPATIENT
Start: 2017-11-29 | End: 2018-01-05

## 2017-11-29 RX ORDER — FUROSEMIDE 40 MG
10 TABLET ORAL
Qty: 500 | Refills: 0 | Status: DISCONTINUED | OUTPATIENT
Start: 2017-11-29 | End: 2017-12-05

## 2017-11-29 RX ORDER — ALLOPURINOL 300 MG
200 TABLET ORAL DAILY
Qty: 0 | Refills: 0 | Status: DISCONTINUED | OUTPATIENT
Start: 2017-11-29 | End: 2017-12-28

## 2017-11-29 RX ORDER — DIGOXIN 250 MCG
0.12 TABLET ORAL EVERY OTHER DAY
Qty: 0 | Refills: 0 | Status: DISCONTINUED | OUTPATIENT
Start: 2017-11-30 | End: 2018-01-05

## 2017-11-29 RX ORDER — ATORVASTATIN CALCIUM 80 MG/1
20 TABLET, FILM COATED ORAL ONCE
Qty: 0 | Refills: 0 | Status: COMPLETED | OUTPATIENT
Start: 2017-11-29 | End: 2017-11-29

## 2017-11-29 RX ORDER — SIMETHICONE 80 MG/1
80 TABLET, CHEWABLE ORAL ONCE
Qty: 0 | Refills: 0 | Status: COMPLETED | OUTPATIENT
Start: 2017-11-29 | End: 2017-11-29

## 2017-11-29 RX ORDER — ALBUTEROL 90 UG/1
2 AEROSOL, METERED ORAL EVERY 6 HOURS
Qty: 0 | Refills: 0 | Status: DISCONTINUED | OUTPATIENT
Start: 2017-11-29 | End: 2017-11-29

## 2017-11-29 RX ORDER — DILTIAZEM HCL 120 MG
120 CAPSULE, EXT RELEASE 24 HR ORAL DAILY
Qty: 0 | Refills: 0 | Status: DISCONTINUED | OUTPATIENT
Start: 2017-11-29 | End: 2017-12-19

## 2017-11-29 RX ORDER — ROFLUMILAST 500 UG/1
500 TABLET ORAL DAILY
Qty: 0 | Refills: 0 | Status: DISCONTINUED | OUTPATIENT
Start: 2017-11-29 | End: 2018-01-04

## 2017-11-29 RX ORDER — FUROSEMIDE 40 MG
80 TABLET ORAL DAILY
Qty: 0 | Refills: 0 | Status: DISCONTINUED | OUTPATIENT
Start: 2017-11-29 | End: 2017-11-29

## 2017-11-29 RX ORDER — ACETAMINOPHEN 500 MG
650 TABLET ORAL EVERY 6 HOURS
Qty: 0 | Refills: 0 | Status: DISCONTINUED | OUTPATIENT
Start: 2017-11-29 | End: 2018-01-05

## 2017-11-29 RX ORDER — DILTIAZEM HCL 120 MG
240 CAPSULE, EXT RELEASE 24 HR ORAL DAILY
Qty: 0 | Refills: 0 | Status: DISCONTINUED | OUTPATIENT
Start: 2017-11-29 | End: 2017-11-29

## 2017-11-29 RX ORDER — IPRATROPIUM/ALBUTEROL SULFATE 18-103MCG
3 AEROSOL WITH ADAPTER (GRAM) INHALATION EVERY 6 HOURS
Qty: 0 | Refills: 0 | Status: DISCONTINUED | OUTPATIENT
Start: 2017-11-29 | End: 2017-12-18

## 2017-11-29 RX ORDER — METOPROLOL TARTRATE 50 MG
25 TABLET ORAL EVERY 8 HOURS
Qty: 0 | Refills: 0 | Status: DISCONTINUED | OUTPATIENT
Start: 2017-11-29 | End: 2017-12-15

## 2017-11-29 RX ORDER — METOPROLOL TARTRATE 50 MG
25 TABLET ORAL ONCE
Qty: 0 | Refills: 0 | Status: COMPLETED | OUTPATIENT
Start: 2017-11-29 | End: 2017-11-29

## 2017-11-29 RX ORDER — TIOTROPIUM BROMIDE AND OLODATEROL 3.124; 2.736 UG/1; UG/1
2 SPRAY, METERED RESPIRATORY (INHALATION) DAILY
Qty: 0 | Refills: 0 | Status: DISCONTINUED | OUTPATIENT
Start: 2017-11-29 | End: 2018-01-05

## 2017-11-29 RX ORDER — DOCUSATE SODIUM 100 MG
100 CAPSULE ORAL
Qty: 0 | Refills: 0 | Status: DISCONTINUED | OUTPATIENT
Start: 2017-11-29 | End: 2018-01-05

## 2017-11-29 RX ORDER — PANTOPRAZOLE SODIUM 20 MG/1
40 TABLET, DELAYED RELEASE ORAL
Qty: 0 | Refills: 0 | Status: DISCONTINUED | OUTPATIENT
Start: 2017-11-29 | End: 2018-01-05

## 2017-11-29 RX ORDER — ASPIRIN/CALCIUM CARB/MAGNESIUM 324 MG
81 TABLET ORAL DAILY
Qty: 0 | Refills: 0 | Status: DISCONTINUED | OUTPATIENT
Start: 2017-11-29 | End: 2018-01-05

## 2017-11-29 RX ORDER — CLOPIDOGREL BISULFATE 75 MG/1
75 TABLET, FILM COATED ORAL DAILY
Qty: 0 | Refills: 0 | Status: DISCONTINUED | OUTPATIENT
Start: 2017-11-29 | End: 2018-01-05

## 2017-11-29 RX ADMIN — ATORVASTATIN CALCIUM 20 MILLIGRAM(S): 80 TABLET, FILM COATED ORAL at 01:32

## 2017-11-29 RX ADMIN — Medication 25 MILLIGRAM(S): at 14:32

## 2017-11-29 RX ADMIN — HEPARIN SODIUM 5000 UNIT(S): 5000 INJECTION INTRAVENOUS; SUBCUTANEOUS at 14:32

## 2017-11-29 RX ADMIN — Medication 81 MILLIGRAM(S): at 11:19

## 2017-11-29 RX ADMIN — CLOPIDOGREL BISULFATE 75 MILLIGRAM(S): 75 TABLET, FILM COATED ORAL at 11:20

## 2017-11-29 RX ADMIN — Medication 200 MILLIGRAM(S): at 11:19

## 2017-11-29 RX ADMIN — Medication 5 MG/HR: at 15:53

## 2017-11-29 RX ADMIN — Medication 80 MILLIGRAM(S): at 10:13

## 2017-11-29 NOTE — CONSULT NOTE ADULT - SUBJECTIVE AND OBJECTIVE BOX
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    Patient states that she initially started to develop ascites approximately 4 weeks ago, at which time she had a paracentesis with 6.1L drained. Since then, she has noticed the slow re-accumulation of fluid over time that has led to worsening distention and lower extremity edema. She has been followed as an outpatient and her Torsemide dose has been titrated. Initially on 20mg daily, but since then has increased to 80mg daily. Despite this, she continues to have worsening fluid accumulation. In addition to this, she reports that she has had worsening shortness of breath/BRO, intermittent nausea, and poor appetite with minimal PO intake. She denies any fevers, chills, chest pain, or other GI symptoms. Abdominal pain is present from distention, but no area with pain in particular.     In the ED, T 99.1, HR 84, /71, O2 94% on 3L NC. Labs showed WBC 7.8, stable anemia of Hgb 10.4, Platelets 206. Metabolic panel with BUN 69, Cr 2.06 (baseline 1.2-1.3), but otherwise unremarkable. Coags unremarkable. Troponin 0.07, BNP >35,000 (higher than prior). VBG unremarkable with normal lactate. ECG without ischemic changes. Bedside ECHO in ED with severe R-sided heart failure. CXR with increased pulmonary edema compared to baseline.     Patient was given 80IV lasix with unclear output, declining dubois. Admitted for diuresis and plan for paracentesis. (28 Nov 2017 22:19)      PAST MEDICAL & SURGICAL HISTORY:  Hyperthyroidism  JOSE (obstructive sleep apnea)  Epistaxis  GIB (gastrointestinal bleeding)  CAD S/P percutaneous coronary angioplasty  Afib  Pulmonary HTN  GERD (gastroesophageal reflux disease)  Obesity  Cardiomegaly  Valvular heart disease  COPD (chronic obstructive pulmonary disease): Home O2  Sleep Apnea: by criteria  Loose, teeth: 3 bottom front loose teeth  Female stress incontinence  Shoulder pain, right  Constipation  Arthritis of Knee  H/O heartburn  History of lung cancer  Obese  Hx of hyperlipidemia  Asthma  Diabetes mellitus: type 2  dx about 4-5 years ago   no daily fingerstick  H/O: HTN (hypertension)  Enlarged lymph nodes  Lymph nodes enlarged: s/p biopsy mediastinal  benign  H/O endoscopy  left upper lobectomy    Allergies    No Known Allergies    Intolerances    albuterol (Unknown)    FAMILY HISTORY:  No pertinent family history in first degree relatives    Social history: Former Smoker    Review of Systems:  CONSTITUTIONAL: No fever, chills, or fatigue  EYES: No eye pain, visual disturbances, or discharge  ENMT:  No difficulty hearing, tinnitus, vertigo; No sinus or throat pain  NECK: No pain or stiffness  RESPIRATORY: Per above  CARDIOVASCULAR: No chest pain, palpitations, dizziness, or leg swelling  GASTROINTESTINAL: No abdominal or epigastric pain. No nausea, vomiting, or hematemesis; No diarrhea or constipation. No melena or hematochezia.  GENITOURINARY: No dysuria, frequency, hematuria, or incontinence  NEUROLOGICAL: No headaches, memory loss, loss of strength, numbness, or tremors  SKIN: No itching, burning, rashes, or lesions   MUSCULOSKELETAL: No joint pain or swelling; No muscle, back, or extremity pain  PSYCHIATRIC: No depression, anxiety, mood swings, or difficulty sleeping      Medications:  MEDICATIONS  (STANDING):  allopurinol 200 milliGRAM(s) Oral daily  aspirin enteric coated 81 milliGRAM(s) Oral daily  atorvastatin 20 milliGRAM(s) Oral at bedtime  clopidogrel Tablet 75 milliGRAM(s) Oral daily  diltiazem    milliGRAM(s) Oral daily  docusate sodium 100 milliGRAM(s) Oral two times a day  furosemide   Injectable 80 milliGRAM(s) IV Push daily  furosemide Infusion 10 mG/Hr (5 mL/Hr) IV Continuous <Continuous>  heparin  Injectable 5000 Unit(s) SubCutaneous every 8 hours  macitentan (OPSUMIT) 10 milliGRAM(s) 10 milliGRAM(s) Oral daily  methimazole 5 milliGRAM(s) Oral daily  metoprolol     tartrate 25 milliGRAM(s) Oral every 8 hours  pantoprazole    Tablet 40 milliGRAM(s) Oral before breakfast  predniSONE   Tablet 5 milliGRAM(s) Oral daily  roflumilast 500 MICROGram(s) Oral daily  tiotropium 2.5 MICROgram(s)/olodaterol 2.5 MICROgram(s) Inhaler 2 Puff(s) Inhalation daily    MEDICATIONS  (PRN):  acetaminophen   Tablet 650 milliGRAM(s) Oral every 6 hours PRN Moderate Pain (4 - 6)  ALBUTerol    90 MICROgram(s) HFA Inhaler 2 Puff(s) Inhalation every 6 hours PRN Shortness of Breath and/or Wheezing  ondansetron    Tablet 4 milliGRAM(s) Oral every 8 hours PRN Nausea and/or Vomiting            Vital Signs Last 24 Hrs  T(C): 36.4 (29 Nov 2017 07:28), Max: 37.3 (28 Nov 2017 17:17)  T(F): 97.6 (29 Nov 2017 07:28), Max: 99.1 (28 Nov 2017 17:17)  HR: 108 (29 Nov 2017 14:07) (80 - 110)  BP: 108/65 (29 Nov 2017 14:07) (98/57 - 118/68)  BP(mean): --  RR: 22 (29 Nov 2017 10:06) (20 - 24)  SpO2: 92% (29 Nov 2017 10:06) (85% - 97%) on 6L NC      VBG pH 7.37 11-28 @ 19:16  VBG pCO2 64 11-28 @ 19:16  VBG O2 sat 36 11-28 @ 19:16  VBG lactate 1.7 11-28 @ 19:16            LABS:                        8.9    5.56  )-----------( 190      ( 29 Nov 2017 07:05 )             30.0     11-29    141  |  96  |  67<H>  ----------------------------<  104<H>  4.0   |  33<H>  |  1.85<H>    Ca    9.1      29 Nov 2017 07:14    TPro  5.8<L>  /  Alb  3.4  /  TBili  0.7  /  DBili  x   /  AST  12  /  ALT  5<L>  /  AlkPhos  45  11-29      CARDIAC MARKERS ( 29 Nov 2017 05:51 )  x     / 0.07 ng/mL / x     / x     / x      CARDIAC MARKERS ( 28 Nov 2017 19:16 )  x     / 0.07 ng/mL / x     / x     / x          CAPILLARY BLOOD GLUCOSE        PT/INR - ( 28 Nov 2017 19:16 )   PT: 12.3 sec;   INR: 1.13 ratio         PTT - ( 28 Nov 2017 19:16 )  PTT:27.9 sec      Serum Pro-Brain Natriuretic Peptide: >11057 pg/mL (11-28-17 @ 19:16)              CULTURES: (if applicable)        Physical Examination:    General: No acute distress.      HEENT: Pupils equal, reactive to light.  Symmetric.    PULM: Decreased BS at bases with crackles L>R    CVS: S1, S2 irreg    ABD: Soft, distended, nontender, normoactive bowel sounds, no masses    EXT: Bilateral LE edema    SKIN: Warm and well perfused, no rashes noted.    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    Patient states that she initially started to develop ascites approximately 4 weeks ago, at which time she had a paracentesis with 6.1L drained. Since then, she has noticed the slow re-accumulation of fluid over time that has led to worsening distention and lower extremity edema. She has been followed as an outpatient and her Torsemide dose has been titrated. Initially on 20mg daily, but since then has increased to 80mg daily. Despite this, she continues to have worsening fluid accumulation. In addition to this, she reports that she has had worsening shortness of breath/BRO, intermittent nausea, and poor appetite with minimal PO intake. She denies any fevers, chills, chest pain, or other GI symptoms. Abdominal pain is present from distention, but no area with pain in particular.     In the ED, T 99.1, HR 84, /71, O2 94% on 3L NC. Labs showed WBC 7.8, stable anemia of Hgb 10.4, Platelets 206. Metabolic panel with BUN 69, Cr 2.06 (baseline 1.2-1.3), but otherwise unremarkable. Coags unremarkable. Troponin 0.07, BNP >35,000 (higher than prior). VBG unremarkable with normal lactate. ECG without ischemic changes. Bedside ECHO in ED with severe R-sided heart failure. CXR with increased pulmonary edema compared to baseline.     Patient was given 80IV lasix with unclear output, declining dubois. Admitted for diuresis and plan for paracentesis. (28 Nov 2017 22:19)      PAST MEDICAL & SURGICAL HISTORY:  Hyperthyroidism  JOSE (obstructive sleep apnea)  Epistaxis  GIB (gastrointestinal bleeding)  CAD S/P percutaneous coronary angioplasty  Afib  Pulmonary HTN  GERD (gastroesophageal reflux disease)  Obesity  Cardiomegaly  Valvular heart disease  COPD (chronic obstructive pulmonary disease): Home O2  Sleep Apnea: by criteria  Loose, teeth: 3 bottom front loose teeth  Female stress incontinence  Shoulder pain, right  Constipation  Arthritis of Knee  H/O heartburn  History of lung cancer  Obese  Hx of hyperlipidemia  Asthma  Diabetes mellitus: type 2  dx about 4-5 years ago   no daily fingerstick  H/O: HTN (hypertension)  Enlarged lymph nodes  Lymph nodes enlarged: s/p biopsy mediastinal  benign  H/O endoscopy  left upper lobectomy    Allergies    No Known Allergies    Intolerances    albuterol (Unknown)    FAMILY HISTORY:  No pertinent family history in first degree relatives    Social history: Former Smoker    Review of Systems:  CONSTITUTIONAL: No fever, chills, or fatigue  EYES: No eye pain, visual disturbances, or discharge  ENMT:  No difficulty hearing, tinnitus, vertigo; No sinus or throat pain  NECK: No pain or stiffness  RESPIRATORY: Per above  CARDIOVASCULAR: No chest pain, palpitations, dizziness, or leg swelling  GASTROINTESTINAL: No abdominal or epigastric pain. No nausea, vomiting, or hematemesis; No diarrhea or constipation. No melena or hematochezia.  GENITOURINARY: No dysuria, frequency, hematuria, or incontinence  NEUROLOGICAL: No headaches, memory loss, loss of strength, numbness, or tremors  SKIN: No itching, burning, rashes, or lesions   MUSCULOSKELETAL: No joint pain or swelling; No muscle, back, or extremity pain  PSYCHIATRIC: No depression, anxiety, mood swings, or difficulty sleeping      Medications:  MEDICATIONS  (STANDING):  allopurinol 200 milliGRAM(s) Oral daily  aspirin enteric coated 81 milliGRAM(s) Oral daily  atorvastatin 20 milliGRAM(s) Oral at bedtime  clopidogrel Tablet 75 milliGRAM(s) Oral daily  diltiazem    milliGRAM(s) Oral daily  docusate sodium 100 milliGRAM(s) Oral two times a day  furosemide   Injectable 80 milliGRAM(s) IV Push daily  furosemide Infusion 10 mG/Hr (5 mL/Hr) IV Continuous <Continuous>  heparin  Injectable 5000 Unit(s) SubCutaneous every 8 hours  macitentan (OPSUMIT) 10 milliGRAM(s) 10 milliGRAM(s) Oral daily  methimazole 5 milliGRAM(s) Oral daily  metoprolol     tartrate 25 milliGRAM(s) Oral every 8 hours  pantoprazole    Tablet 40 milliGRAM(s) Oral before breakfast  predniSONE   Tablet 5 milliGRAM(s) Oral daily  roflumilast 500 MICROGram(s) Oral daily  tiotropium 2.5 MICROgram(s)/olodaterol 2.5 MICROgram(s) Inhaler 2 Puff(s) Inhalation daily    MEDICATIONS  (PRN):  acetaminophen   Tablet 650 milliGRAM(s) Oral every 6 hours PRN Moderate Pain (4 - 6)  ALBUTerol    90 MICROgram(s) HFA Inhaler 2 Puff(s) Inhalation every 6 hours PRN Shortness of Breath and/or Wheezing  ondansetron    Tablet 4 milliGRAM(s) Oral every 8 hours PRN Nausea and/or Vomiting    Vital Signs Last 24 Hrs  T(C): 36.4 (29 Nov 2017 07:28), Max: 37.3 (28 Nov 2017 17:17)  T(F): 97.6 (29 Nov 2017 07:28), Max: 99.1 (28 Nov 2017 17:17)  HR: 108 (29 Nov 2017 14:07) (80 - 110)  BP: 108/65 (29 Nov 2017 14:07) (98/57 - 118/68)  BP(mean): --  RR: 22 (29 Nov 2017 10:06) (20 - 24)  SpO2: 92% (29 Nov 2017 10:06) (85% - 97%) on 6L NC      VBG pH 7.37 11-28 @ 19:16  VBG pCO2 64 11-28 @ 19:16  VBG O2 sat 36 11-28 @ 19:16  VBG lactate 1.7 11-28 @ 19:16    LABS:                        8.9    5.56  )-----------( 190      ( 29 Nov 2017 07:05 )             30.0     11-29    141  |  96  |  67<H>  ----------------------------<  104<H>  4.0   |  33<H>  |  1.85<H>    Ca    9.1      29 Nov 2017 07:14    TPro  5.8<L>  /  Alb  3.4  /  TBili  0.7  /  DBili  x   /  AST  12  /  ALT  5<L>  /  AlkPhos  45  11-29      CARDIAC MARKERS ( 29 Nov 2017 05:51 )  x     / 0.07 ng/mL / x     / x     / x      CARDIAC MARKERS ( 28 Nov 2017 19:16 )  x     / 0.07 ng/mL / x     / x     / x          CAPILLARY BLOOD GLUCOSE        PT/INR - ( 28 Nov 2017 19:16 )   PT: 12.3 sec;   INR: 1.13 ratio         PTT - ( 28 Nov 2017 19:16 )  PTT:27.9 sec      Serum Pro-Brain Natriuretic Peptide: >22736 pg/mL (11-28-17 @ 19:16)    CULTURES: (if applicable)    Physical Examination:    General: No acute distress.      HEENT: Pupils equal, reactive to light.  Symmetric.    PULM: Decreased BS at bases with crackles L>R    CVS: S1, S2 irreg    ABD: Soft, distended, nontender, normoactive bowel sounds, no masses    EXT: Bilateral LE edema    SKIN: Warm and well perfused, no rashes noted.    NEURO: Alert, oriented, interactive, nonfocal    RADIOLOGY REVIEWED  CXR: Bilateral pleural effusions with patchy opacities L>R

## 2017-11-29 NOTE — PROVIDER CONTACT NOTE (MEDICATION) - ASSESSMENT
/81. Pt reporting that if the RN will not leave the medication she will take her own home med. /60 . Pt reporting that if the RN will not leave the medication she will take her own home med.

## 2017-11-29 NOTE — PROVIDER CONTACT NOTE (MEDICATION) - SITUATION
Pt is requesting to take home meds only. Pt refuses to give medication to pharmacy to be verified. Pt refused to show nurse medication.

## 2017-11-29 NOTE — PROVIDER CONTACT NOTE (MEDICATION) - BACKGROUND
Pt admitted fro CHF exht. Pt has jossy meds at bedside and refuses to let nurse send medication for verification to pharmacy.

## 2017-11-29 NOTE — PROGRESS NOTE ADULT - ASSESSMENT
Volume overload/ascites  respir distress  copd  pulm htn  ckd    discussed with dr hargrove earlier today  lasix drip   may consider paracentesis if needed  discussed with nursing staff in er to please hand lasix drip   discussed with pharmacy to send up lasix drip that was ordered 3 hours prior  continue home pulm regimen  discussed with cardio fellow  pulm consult  appreciate renal input  pts overall prognosis seems poor.

## 2017-11-29 NOTE — CONSULT NOTE ADULT - ASSESSMENT
72 y/o F with PMH of severe pHTN (PASP: 71 in May 2017 with normal PCWP) complicated by RV failure (on 3-5L NC at home), Diastolic HF, Afib (no AC 2nd severe epistaxis), CAD s/p PCI, Hyperthyroid, Lung CA s/p JUDY lobectomy, COPD, T2DM who presents for evaluation of shortness of breath and abdominal distention. Recent admission at [facility] approximately 5 weeks ago for large volume paracentesis (6.1L removed). Now presents with worsened LE edema, recurrent ascites, pleural effusions and pulmonary edema with worsened dyspnea. 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 Recent admission at [facility] approximately 5 weeks ago for large volume paracentesis (6.1L removed). Now presents with worsened LE edema, recurrent ascites, pleural effusions and pulmonary edema with worsened dyspnea. 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 Recent admission at [facility] approximately 5 weeks ago for large volume paracentesis (6.1L removed). Now presents again with RV failure-worsened LE edema, recurrent ascites, pleural effusions and pulmonary edema with worsened dyspnea and CASSANDRA on CKD

## 2017-11-29 NOTE — PROGRESS NOTE ADULT - SUBJECTIVE AND OBJECTIVE BOX
Pt was seen and examined.  Briefly this is a elderly female with hx HTN DM PHTN with fixed disease pw left and right heart failure  Cor Pulm and RV failure  Ascites and LE edema  CASSANDRA    Suggest  Lasix gtt at present --10mg/hr  IF BP drops then IV hydrocortisone(unclear of her ability to absorb)  Gottlieb to gravity  Eventually will need paracentesis but not today(or on the Lasix gtt)        Sayed Sarah  [facility]  [phone]

## 2017-11-29 NOTE — PROVIDER CONTACT NOTE (MEDICATION) - RECOMMENDATIONS
Rn not comfortable leaving medication with pt. Pt education on need of verification for safety. Rn needs to see pt take medication to ensure safety.

## 2017-11-30 DIAGNOSIS — I48.91 UNSPECIFIED ATRIAL FIBRILLATION: ICD-10-CM

## 2017-11-30 LAB
ANION GAP SERPL CALC-SCNC: 14 MMOL/L — SIGNIFICANT CHANGE UP (ref 5–17)
BUN SERPL-MCNC: 57 MG/DL — HIGH (ref 7–23)
CALCIUM SERPL-MCNC: 9 MG/DL — SIGNIFICANT CHANGE UP (ref 8.4–10.5)
CHLORIDE SERPL-SCNC: 98 MMOL/L — SIGNIFICANT CHANGE UP (ref 96–108)
CO2 SERPL-SCNC: 33 MMOL/L — HIGH (ref 22–31)
CREAT SERPL-MCNC: 1.63 MG/DL — HIGH (ref 0.5–1.3)
DIGOXIN SERPL-MCNC: 1.2 NG/ML — SIGNIFICANT CHANGE UP (ref 0.8–2)
GLUCOSE SERPL-MCNC: 92 MG/DL — SIGNIFICANT CHANGE UP (ref 70–99)
HCT VFR BLD CALC: 28.8 % — LOW (ref 34.5–45)
HGB BLD-MCNC: 8.7 G/DL — LOW (ref 11.5–15.5)
MAGNESIUM SERPL-MCNC: 2.2 MG/DL — SIGNIFICANT CHANGE UP (ref 1.6–2.6)
MCHC RBC-ENTMCNC: 26.4 PG — LOW (ref 27–34)
MCHC RBC-ENTMCNC: 30.2 GM/DL — LOW (ref 32–36)
MCV RBC AUTO: 87.5 FL — SIGNIFICANT CHANGE UP (ref 80–100)
PHOSPHATE SERPL-MCNC: 3.7 MG/DL — SIGNIFICANT CHANGE UP (ref 2.5–4.5)
PLATELET # BLD AUTO: 182 K/UL — SIGNIFICANT CHANGE UP (ref 150–400)
POTASSIUM SERPL-MCNC: 3.5 MMOL/L — SIGNIFICANT CHANGE UP (ref 3.5–5.3)
POTASSIUM SERPL-SCNC: 3.5 MMOL/L — SIGNIFICANT CHANGE UP (ref 3.5–5.3)
RBC # BLD: 3.29 M/UL — LOW (ref 3.8–5.2)
RBC # FLD: 18.2 % — HIGH (ref 10.3–14.5)
SODIUM SERPL-SCNC: 145 MMOL/L — SIGNIFICANT CHANGE UP (ref 135–145)
WBC # BLD: 4.51 K/UL — SIGNIFICANT CHANGE UP (ref 3.8–10.5)
WBC # FLD AUTO: 4.51 K/UL — SIGNIFICANT CHANGE UP (ref 3.8–10.5)

## 2017-11-30 RX ORDER — SODIUM CHLORIDE 0.65 %
1 AEROSOL, SPRAY (ML) NASAL
Qty: 0 | Refills: 0 | Status: DISCONTINUED | OUTPATIENT
Start: 2017-11-30 | End: 2018-01-05

## 2017-11-30 RX ADMIN — Medication 120 MILLIGRAM(S): at 08:45

## 2017-11-30 RX ADMIN — Medication 5 MILLIGRAM(S): at 08:45

## 2017-11-30 RX ADMIN — CLOPIDOGREL BISULFATE 75 MILLIGRAM(S): 75 TABLET, FILM COATED ORAL at 13:27

## 2017-11-30 RX ADMIN — HEPARIN SODIUM 5000 UNIT(S): 5000 INJECTION INTRAVENOUS; SUBCUTANEOUS at 23:33

## 2017-11-30 RX ADMIN — HEPARIN SODIUM 5000 UNIT(S): 5000 INJECTION INTRAVENOUS; SUBCUTANEOUS at 08:46

## 2017-11-30 RX ADMIN — HEPARIN SODIUM 5000 UNIT(S): 5000 INJECTION INTRAVENOUS; SUBCUTANEOUS at 16:22

## 2017-11-30 RX ADMIN — TIOTROPIUM BROMIDE AND OLODATEROL 2 PUFF(S): 3.124; 2.736 SPRAY, METERED RESPIRATORY (INHALATION) at 12:29

## 2017-11-30 RX ADMIN — Medication 0.12 MILLIGRAM(S): at 13:26

## 2017-11-30 RX ADMIN — ATORVASTATIN CALCIUM 20 MILLIGRAM(S): 80 TABLET, FILM COATED ORAL at 23:33

## 2017-11-30 RX ADMIN — Medication 25 MILLIGRAM(S): at 23:33

## 2017-11-30 RX ADMIN — Medication 81 MILLIGRAM(S): at 13:28

## 2017-11-30 RX ADMIN — ROFLUMILAST 500 MICROGRAM(S): 500 TABLET ORAL at 08:43

## 2017-11-30 RX ADMIN — Medication 25 MILLIGRAM(S): at 16:22

## 2017-11-30 RX ADMIN — Medication 200 MILLIGRAM(S): at 13:27

## 2017-11-30 RX ADMIN — Medication 25 MILLIGRAM(S): at 08:40

## 2017-11-30 NOTE — PROGRESS NOTE ADULT - ASSESSMENT
Volume overload/ascites/edema  phtn  copd severe    o2 supplementation  lasix drip  pt refused to speak with hf team  pt refusing dubois  will attempt diuresis and eventually address paracentesis  appreciate renal and pulm input

## 2017-11-30 NOTE — PROGRESS NOTE ADULT - ASSESSMENT
A 71-year-old female with past medical history of diabetes, hypertension, severe pulmonary hypertension, right ventricular dysfunction, presents with not only left heart failure but also stigmata of acute cor pulmonale.  The patient's acute renal failure is likely hemodynamic in nature.  Goals of therapy are to improve her hemodynamics to improve her forward flow and renal perfusion.  1.	Renal:  We will discuss with Dr. Cobb regarding Lasix or a Bumex drip.  Needs out's greater than in's.  2.	Gastroenterology:  With the Lasix drip, please hold off on paracentesis.  If paracentesis is planned today, will discontinue the Lasix drip.  Cannot remove a large amount of fluid from the abdominal cavity while on the Lasix drip as this will lead to intravascular volume depletion.  3.	Cardiology:  With respect to her lungs, unfortunately she has fixed pulmonary disease.  She had no response to nitric oxide.  4.	Pulmonary:  Continue with pulmonary nebulizers.  Continue with oxygen at all times.  Options to help with pulmonary hypertension are limited. A 71-year-old female with past medical history of diabetes, hypertension, severe pulmonary hypertension, right ventricular dysfunction, presents with not only left heart failure but also stigmata of acute cor pulmonale.  The patient's acute renal failure is likely hemodynamic in nature.  Goals of therapy are to improve her hemodynamics to improve her forward flow and renal perfusion.  CASSANDRA  Severe PHTN  Cor Pulm  Failure to thrive  1.	Renal:    Lasix  drip at 10mg/hr.  Needs out's greater than in's.  She refused a dubois  2.	Gastroenterology:  With the Lasix drip, please hold off on paracentesis.     3.	Cardiology:  With respect to her lungs, unfortunately she has fixed pulmonary disease.  She had no response to nitric oxide.  4.	Pulmonary:  Continue with pulmonary nebulizers.  Continue with oxygen at all times.  Options to help with pulmonary hypertension are limited.

## 2017-11-30 NOTE — PROGRESS NOTE ADULT - SUBJECTIVE AND OBJECTIVE BOX
MEDICINE, PROGRESS NOTE [phone]    FRAN ALEXANDRA 71y [id number]    Patient seen and examined.  Patient is a 71y old  Female who presents with a chief complaint of shortness of breath, abdominal pain (28 Nov 2017 22:19)  Pt still sob.    PAST MEDICAL & SURGICAL HISTORY:  Hyperthyroidism  JOSE (obstructive sleep apnea)  Epistaxis  GIB (gastrointestinal bleeding)  CAD S/P percutaneous coronary angioplasty  Afib  Pulmonary HTN  GERD (gastroesophageal reflux disease)  Obesity  Cardiomegaly  Valvular heart disease  COPD (chronic obstructive pulmonary disease): Home O2  Sleep Apnea: by criteria  Loose, teeth: 3 bottom front loose teeth  Female stress incontinence  Shoulder pain, right  Constipation  Arthritis of Knee  H/O heartburn  History of lung cancer  Obese  Hx of hyperlipidemia  Asthma  Diabetes mellitus: type 2  dx about 4-5 years ago   no daily fingerstick  H/O: HTN (hypertension)  Enlarged lymph nodes  Lymph nodes enlarged: s/p biopsy mediastinal  benign  H/O endoscopy  left upper lobectomy    MEDICATIONS  (STANDING):  ALBUTerol/ipratropium for Nebulization 3 milliLiter(s) Nebulizer every 6 hours  allopurinol 200 milliGRAM(s) Oral daily  aspirin enteric coated 81 milliGRAM(s) Oral daily  atorvastatin 20 milliGRAM(s) Oral at bedtime  clopidogrel Tablet 75 milliGRAM(s) Oral daily  digoxin     Tablet 0.125 milliGRAM(s) Oral every other day  diltiazem    milliGRAM(s) Oral daily  docusate sodium 100 milliGRAM(s) Oral two times a day  furosemide Infusion 10 mG/Hr (5 mL/Hr) IV Continuous <Continuous>  heparin  Injectable 5000 Unit(s) SubCutaneous every 8 hours  macitentan (OPSUMIT) 10 milliGRAM(s) 10 milliGRAM(s) Oral daily  methimazole 5 milliGRAM(s) Oral daily  metoprolol     tartrate 25 milliGRAM(s) Oral every 8 hours  pantoprazole    Tablet 40 milliGRAM(s) Oral before breakfast  predniSONE   Tablet 5 milliGRAM(s) Oral daily  roflumilast 500 MICROGram(s) Oral daily  tiotropium 2.5 MICROgram(s)/olodaterol 2.5 MICROgram(s) Inhaler 2 Puff(s) Inhalation daily    MEDICATIONS  (PRN):  acetaminophen   Tablet 650 milliGRAM(s) Oral every 6 hours PRN Moderate Pain (4 - 6)  ondansetron    Tablet 4 milliGRAM(s) Oral every 8 hours PRN Nausea and/or Vomiting    Allergies    No Known Allergies    Intolerances    albuterol (Unknown)      PHYSICAL EXAM:  Constitutional: NAD  HEENT: Normocephalic, EOMI  Neck:  + JVD  Respiratory: CTA B/L, No wheezes  Cardiovascular: S1, S2, RRR, + systolic murmur  Gastrointestinal: BS+, distended, + fluid wave, tympanitic  Extremities: + peripheral edema le b/l  Neurological: AAOX3, no focal deficits  Psychiatric: Normal mood, normal affect  : No Gottlieb (pt refused)    Vital Signs Last 24 Hrs  T(C): 36.7 (30 Nov 2017 21:10), Max: 36.7 (30 Nov 2017 06:06)  T(F): 98 (30 Nov 2017 21:10), Max: 98 (30 Nov 2017 06:06)  HR: 76 (30 Nov 2017 21:10) (72 - 88)  BP: 102/50 (30 Nov 2017 21:10) (98/55 - 109/67)  BP(mean): --  RR: 18 (30 Nov 2017 21:10) (18 - 20)  SpO2: 97% (30 Nov 2017 21:10) (93% - 98%)  I&O's Summary    29 Nov 2017 07:01  -  30 Nov 2017 07:00  --------------------------------------------------------  IN: 425 mL / OUT: 1450 mL / NET: -1025 mL    30 Nov 2017 07:01  -  30 Nov 2017 21:27  --------------------------------------------------------  IN: 720 mL / OUT: 350 mL / NET: 370 mL        LABS:                        8.7    4.51  )-----------( 182      ( 30 Nov 2017 08:52 )             28.8     11-30    145  |  98  |  57<H>  ----------------------------<  92  3.5   |  33<H>  |  1.63<H>    Ca    9.0      30 Nov 2017 08:46  Phos  3.7     11-30  Mg     2.2     11-30    TPro  5.8<L>  /  Alb  3.4  /  TBili  0.7  /  DBili  x   /  AST  12  /  ALT  5<L>  /  AlkPhos  45  11-29    CARDIAC MARKERS ( 29 Nov 2017 05:51 )  x     / 0.07 ng/mL / x     / x     / x          Magnesium, Serum: 2.2 mg/dL (11-30 @ 08:46)

## 2017-11-30 NOTE — PROGRESS NOTE ADULT - ASSESSMENT
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 Recent admission at [facility] approximately 5 weeks ago for large volume paracentesis (6.1L removed). Now presents again with RV failure-worsened LE edema, recurrent ascites, pleural effusions and pulmonary edema with worsened dyspnea and CASSANDRA on CKD

## 2017-11-30 NOTE — PROGRESS NOTE ADULT - SUBJECTIVE AND OBJECTIVE BOX
[facility] [phone]        Patient seen and examined         MEDICATIONS  (STANDING):  ALBUTerol/ipratropium for Nebulization 3 milliLiter(s) Nebulizer every 6 hours  allopurinol 200 milliGRAM(s) Oral daily  aspirin enteric coated 81 milliGRAM(s) Oral daily  atorvastatin 20 milliGRAM(s) Oral at bedtime  clopidogrel Tablet 75 milliGRAM(s) Oral daily  digoxin     Tablet 0.125 milliGRAM(s) Oral every other day  diltiazem    milliGRAM(s) Oral daily  docusate sodium 100 milliGRAM(s) Oral two times a day  furosemide Infusion 10 mG/Hr (5 mL/Hr) IV Continuous <Continuous>  heparin  Injectable 5000 Unit(s) SubCutaneous every 8 hours  macitentan (OPSUMIT) 10 milliGRAM(s) 10 milliGRAM(s) Oral daily  methimazole 5 milliGRAM(s) Oral daily  metoprolol     tartrate 25 milliGRAM(s) Oral every 8 hours  pantoprazole    Tablet 40 milliGRAM(s) Oral before breakfast  predniSONE   Tablet 5 milliGRAM(s) Oral daily  roflumilast 500 MICROGram(s) Oral daily  tiotropium 2.5 MICROgram(s)/olodaterol 2.5 MICROgram(s) Inhaler 2 Puff(s) Inhalation daily      VITAL:  T(C): , Max: 36.7 (11-30-17 @ 06:06)  T(F): , Max: 98 (11-30-17 @ 06:06)  HR: 77 (11-30-17 @ 06:06)  BP: 98/55 (11-30-17 @ 06:06)  BP(mean): --  RR: 19 (11-30-17 @ 06:06)  SpO2: 97% (11-30-17 @ 06:06)  Wt(kg): --    I and O's:    11-29 @ 07:01  -  11-30 @ 07:00  --------------------------------------------------------  IN: 425 mL / OUT: 1450 mL / NET: -1025 mL      Height (cm): 160.02 (11-29 @ 16:50)  Weight (kg): 69.2 (11-29 @ 16:50)  BMI (kg/m2): 27 (11-29 @ 16:50)  BSA (m2): 1.72 (11-29 @ 16:50)    PHYSICAL EXAM:    Constitutional: NAD  HEENT: PERRLA    Neck:  No JVD  Respiratory: CTAB/L  Cardiovascular: S1 and S2  Gastrointestinal: BS+, soft, NT/ND  Extremities: No peripheral edema  Neurological: A/O x 3, no focal deficits  Psychiatric: Normal mood, normal affect  : No Gottlieb  Skin: No rashes  Access: Not applicable    LABS:                        8.9    5.56  )-----------( 190      ( 29 Nov 2017 07:05 )             30.0     11-29    141  |  96  |  67<H>  ----------------------------<  104<H>  4.0   |  33<H>  |  1.85<H>    Ca    9.1      29 Nov 2017 07:14  Mg     2.3     11-29    TPro  5.8<L>  /  Alb  3.4  /  TBili  0.7  /  DBili  x   /  AST  12  /  ALT  5<L>  /  AlkPhos  45  11-29          Urine Studies:          RADIOLOGY & ADDITIONAL STUDIES: [facility] [phone]        Patient seen and examined in bed.  She can now lie at 30 degrees        MEDICATIONS  (STANDING):  ALBUTerol/ipratropium for Nebulization 3 milliLiter(s) Nebulizer every 6 hours  allopurinol 200 milliGRAM(s) Oral daily  aspirin enteric coated 81 milliGRAM(s) Oral daily  atorvastatin 20 milliGRAM(s) Oral at bedtime  clopidogrel Tablet 75 milliGRAM(s) Oral daily  digoxin     Tablet 0.125 milliGRAM(s) Oral every other day  diltiazem    milliGRAM(s) Oral daily  docusate sodium 100 milliGRAM(s) Oral two times a day  furosemide Infusion 10 mG/Hr (5 mL/Hr) IV Continuous <Continuous>  heparin  Injectable 5000 Unit(s) SubCutaneous every 8 hours  macitentan (OPSUMIT) 10 milliGRAM(s) 10 milliGRAM(s) Oral daily  methimazole 5 milliGRAM(s) Oral daily  metoprolol     tartrate 25 milliGRAM(s) Oral every 8 hours  pantoprazole    Tablet 40 milliGRAM(s) Oral before breakfast  predniSONE   Tablet 5 milliGRAM(s) Oral daily  roflumilast 500 MICROGram(s) Oral daily  tiotropium 2.5 MICROgram(s)/olodaterol 2.5 MICROgram(s) Inhaler 2 Puff(s) Inhalation daily      VITAL:  T(C): , Max: 36.7 (11-30-17 @ 06:06)  T(F): , Max: 98 (11-30-17 @ 06:06)  HR: 77 (11-30-17 @ 06:06)  BP: 98/55 (11-30-17 @ 06:06)  BP(mean): --  RR: 19 (11-30-17 @ 06:06)  SpO2: 97% (11-30-17 @ 06:06)  Wt(kg): --    I and O's:    11-29 @ 07:01  -  11-30 @ 07:00  --------------------------------------------------------  IN: 425 mL / OUT: 1450 mL / NET: -1025 mL      Height (cm): 160.02 (11-29 @ 16:50)  Weight (kg): 69.2 (11-29 @ 16:50)  BMI (kg/m2): 27 (11-29 @ 16:50)  BSA (m2): 1.72 (11-29 @ 16:50)    PHYSICAL EXAM:    Constitutional: NAD  HEENT: PERRLA    Neck:  +  JVD  Respiratory: reduced breath sounds  Cardiovascular: S1 and S2  Gastrointestinal: BS+, soft, NT/distended  Extremities: + 1 peripheral edema  Neurological: A/O x 3, no focal deficits  Psychiatric: Normal mood, normal affect  : No Gottlieb  Skin: No rashes  Access: Not applicable    LABS:                        8.9    5.56  )-----------( 190      ( 29 Nov 2017 07:05 )             30.0     11-29    141  |  96  |  67<H>  ----------------------------<  104<H>  4.0   |  33<H>  |  1.85<H>    Ca    9.1      29 Nov 2017 07:14  Mg     2.3     11-29    TPro  5.8<L>  /  Alb  3.4  /  TBili  0.7  /  DBili  x   /  AST  12  /  ALT  5<L>  /  AlkPhos  45  11-29          Urine Studies:          RADIOLOGY & ADDITIONAL STUDIES:

## 2017-11-30 NOTE — PROGRESS NOTE ADULT - SUBJECTIVE AND OBJECTIVE BOX
Follow-up Pulm Progress Note    Still more dyspneic than baseline but improved from yesterday  85% on 3L NC  91% on 5L NC    Medications:  MEDICATIONS  (STANDING):  ALBUTerol/ipratropium for Nebulization 3 milliLiter(s) Nebulizer every 6 hours  allopurinol 200 milliGRAM(s) Oral daily  aspirin enteric coated 81 milliGRAM(s) Oral daily  atorvastatin 20 milliGRAM(s) Oral at bedtime  clopidogrel Tablet 75 milliGRAM(s) Oral daily  digoxin     Tablet 0.125 milliGRAM(s) Oral every other day  diltiazem    milliGRAM(s) Oral daily  docusate sodium 100 milliGRAM(s) Oral two times a day  furosemide Infusion 10 mG/Hr (5 mL/Hr) IV Continuous <Continuous>  heparin  Injectable 5000 Unit(s) SubCutaneous every 8 hours  macitentan (OPSUMIT) 10 milliGRAM(s) 10 milliGRAM(s) Oral daily  methimazole 5 milliGRAM(s) Oral daily  metoprolol     tartrate 25 milliGRAM(s) Oral every 8 hours  pantoprazole    Tablet 40 milliGRAM(s) Oral before breakfast  predniSONE   Tablet 5 milliGRAM(s) Oral daily  roflumilast 500 MICROGram(s) Oral daily  tiotropium 2.5 MICROgram(s)/olodaterol 2.5 MICROgram(s) Inhaler 2 Puff(s) Inhalation daily    MEDICATIONS  (PRN):  acetaminophen   Tablet 650 milliGRAM(s) Oral every 6 hours PRN Moderate Pain (4 - 6)  ondansetron    Tablet 4 milliGRAM(s) Oral every 8 hours PRN Nausea and/or Vomiting          Vital Signs Last 24 Hrs  T(C): 36.6 (30 Nov 2017 11:58), Max: 36.7 (30 Nov 2017 06:06)  T(F): 97.9 (30 Nov 2017 11:58), Max: 98 (30 Nov 2017 06:06)  HR: 88 (30 Nov 2017 11:58) (59 - 108)  BP: 109/67 (30 Nov 2017 11:58) (97/59 - 113/76)  BP(mean): --  RR: 18 (30 Nov 2017 11:58) (18 - 20)  SpO2: 93% (30 Nov 2017 11:58) (93% - 98%) on 5L NC      VBG pH 7.37 11-28 @ 19:16    VBG pCO2 64 11-28 @ 19:16    VBG O2 sat 36 11-28 @ 19:16    VBG lactate 1.7 11-28 @ 19:16      11-29 @ 07:01  -  11-30 @ 07:00  --------------------------------------------------------  IN: 425 mL / OUT: 1450 mL / NET: -1025 mL          LABS:                        8.7    4.51  )-----------( 182      ( 30 Nov 2017 08:52 )             28.8     11-30    145  |  98  |  57<H>  ----------------------------<  92  3.5   |  33<H>  |  1.63<H>    Ca    9.0      30 Nov 2017 08:46  Phos  3.7     11-30  Mg     2.2     11-30    TPro  5.8<L>  /  Alb  3.4  /  TBili  0.7  /  DBili  x   /  AST  12  /  ALT  5<L>  /  AlkPhos  45  11-29      CARDIAC MARKERS ( 29 Nov 2017 05:51 )  x     / 0.07 ng/mL / x     / x     / x      CARDIAC MARKERS ( 28 Nov 2017 19:16 )  x     / 0.07 ng/mL / x     / x     / x          CAPILLARY BLOOD GLUCOSE        PT/INR - ( 28 Nov 2017 19:16 )   PT: 12.3 sec;   INR: 1.13 ratio         PTT - ( 28 Nov 2017 19:16 )  PTT:27.9 sec      Serum Pro-Brain Natriuretic Peptide: >59131 pg/mL (11-28-17 @ 19:16)        Physical Examination:  PULM: Decreased BS at bases  CVS: S1, S2 irreg    RADIOLOGY REVIEWED  CXR: L pleural effusion  Bilateral patchy opacities Follow-up Pulm Progress Note    Still more dyspneic than baseline but improved from yesterday  85% on 3L NC  91% on 5L NC    Medications:  MEDICATIONS  (STANDING):  ALBUTerol/ipratropium for Nebulization 3 milliLiter(s) Nebulizer every 6 hours  allopurinol 200 milliGRAM(s) Oral daily  aspirin enteric coated 81 milliGRAM(s) Oral daily  atorvastatin 20 milliGRAM(s) Oral at bedtime  clopidogrel Tablet 75 milliGRAM(s) Oral daily  digoxin     Tablet 0.125 milliGRAM(s) Oral every other day  diltiazem    milliGRAM(s) Oral daily  docusate sodium 100 milliGRAM(s) Oral two times a day  furosemide Infusion 10 mG/Hr (5 mL/Hr) IV Continuous <Continuous>  heparin  Injectable 5000 Unit(s) SubCutaneous every 8 hours  macitentan (OPSUMIT) 10 milliGRAM(s) 10 milliGRAM(s) Oral daily  methimazole 5 milliGRAM(s) Oral daily  metoprolol     tartrate 25 milliGRAM(s) Oral every 8 hours  pantoprazole    Tablet 40 milliGRAM(s) Oral before breakfast  predniSONE   Tablet 5 milliGRAM(s) Oral daily  roflumilast 500 MICROGram(s) Oral daily  tiotropium 2.5 MICROgram(s)/olodaterol 2.5 MICROgram(s) Inhaler 2 Puff(s) Inhalation daily    MEDICATIONS  (PRN):  acetaminophen   Tablet 650 milliGRAM(s) Oral every 6 hours PRN Moderate Pain (4 - 6)  ondansetron    Tablet 4 milliGRAM(s) Oral every 8 hours PRN Nausea and/or Vomiting    Vital Signs Last 24 Hrs  T(C): 36.6 (30 Nov 2017 11:58), Max: 36.7 (30 Nov 2017 06:06)  T(F): 97.9 (30 Nov 2017 11:58), Max: 98 (30 Nov 2017 06:06)  HR: 88 (30 Nov 2017 11:58) (59 - 108)  BP: 109/67 (30 Nov 2017 11:58) (97/59 - 113/76)  BP(mean): --  RR: 18 (30 Nov 2017 11:58) (18 - 20)  SpO2: 93% (30 Nov 2017 11:58) (93% - 98%) on 5L NC      VBG pH 7.37 11-28 @ 19:16    VBG pCO2 64 11-28 @ 19:16    VBG O2 sat 36 11-28 @ 19:16    VBG lactate 1.7 11-28 @ 19:16      11-29 @ 07:01  -  11-30 @ 07:00  --------------------------------------------------------  IN: 425 mL / OUT: 1450 mL / NET: -1025 mL    LABS:                        8.7    4.51  )-----------( 182      ( 30 Nov 2017 08:52 )             28.8     11-30    145  |  98  |  57<H>  ----------------------------<  92  3.5   |  33<H>  |  1.63<H>    Ca    9.0      30 Nov 2017 08:46  Phos  3.7     11-30  Mg     2.2     11-30    TPro  5.8<L>  /  Alb  3.4  /  TBili  0.7  /  DBili  x   /  AST  12  /  ALT  5<L>  /  AlkPhos  45  11-29      CARDIAC MARKERS ( 29 Nov 2017 05:51 )  x     / 0.07 ng/mL / x     / x     / x      CARDIAC MARKERS ( 28 Nov 2017 19:16 )  x     / 0.07 ng/mL / x     / x     / x          CAPILLARY BLOOD GLUCOSE        PT/INR - ( 28 Nov 2017 19:16 )   PT: 12.3 sec;   INR: 1.13 ratio         PTT - ( 28 Nov 2017 19:16 )  PTT:27.9 sec      Serum Pro-Brain Natriuretic Peptide: >77371 pg/mL (11-28-17 @ 19:16)    Physical Examination:  PULM: Decreased BS at bases  CVS: S1, S2 irreg    RADIOLOGY REVIEWED  CXR: L pleural effusion  Bilateral patchy opacities

## 2017-11-30 NOTE — PROGRESS NOTE ADULT - PROBLEM SELECTOR PLAN 1
2nd pleural effusions/pulmonary edema and restrictive disease from ascites   -Keep sp02>90% on supplemental oxygen (currently requiring 5L to maintain which is slightly above normal)   -Output > input with lasix gtt.

## 2017-12-01 LAB
ANION GAP SERPL CALC-SCNC: 11 MMOL/L — SIGNIFICANT CHANGE UP (ref 5–17)
BUN SERPL-MCNC: 54 MG/DL — HIGH (ref 7–23)
CALCIUM SERPL-MCNC: 8.8 MG/DL — SIGNIFICANT CHANGE UP (ref 8.4–10.5)
CHLORIDE SERPL-SCNC: 96 MMOL/L — SIGNIFICANT CHANGE UP (ref 96–108)
CO2 SERPL-SCNC: 34 MMOL/L — HIGH (ref 22–31)
CREAT SERPL-MCNC: 1.63 MG/DL — HIGH (ref 0.5–1.3)
GLUCOSE SERPL-MCNC: 93 MG/DL — SIGNIFICANT CHANGE UP (ref 70–99)
HCT VFR BLD CALC: 27.7 % — LOW (ref 34.5–45)
HGB BLD-MCNC: 8.4 G/DL — LOW (ref 11.5–15.5)
MAGNESIUM SERPL-MCNC: 2.1 MG/DL — SIGNIFICANT CHANGE UP (ref 1.6–2.6)
MCHC RBC-ENTMCNC: 26.3 PG — LOW (ref 27–34)
MCHC RBC-ENTMCNC: 30.3 GM/DL — LOW (ref 32–36)
MCV RBC AUTO: 86.8 FL — SIGNIFICANT CHANGE UP (ref 80–100)
PLATELET # BLD AUTO: 180 K/UL — SIGNIFICANT CHANGE UP (ref 150–400)
POTASSIUM SERPL-MCNC: 3.6 MMOL/L — SIGNIFICANT CHANGE UP (ref 3.5–5.3)
POTASSIUM SERPL-SCNC: 3.6 MMOL/L — SIGNIFICANT CHANGE UP (ref 3.5–5.3)
RBC # BLD: 3.19 M/UL — LOW (ref 3.8–5.2)
RBC # FLD: 18.2 % — HIGH (ref 10.3–14.5)
SODIUM SERPL-SCNC: 141 MMOL/L — SIGNIFICANT CHANGE UP (ref 135–145)
WBC # BLD: 4.75 K/UL — SIGNIFICANT CHANGE UP (ref 3.8–10.5)
WBC # FLD AUTO: 4.75 K/UL — SIGNIFICANT CHANGE UP (ref 3.8–10.5)

## 2017-12-01 PROCEDURE — 71010: CPT | Mod: 26

## 2017-12-01 RX ADMIN — HEPARIN SODIUM 5000 UNIT(S): 5000 INJECTION INTRAVENOUS; SUBCUTANEOUS at 15:56

## 2017-12-01 RX ADMIN — Medication 5 MG/HR: at 14:08

## 2017-12-01 RX ADMIN — Medication 25 MILLIGRAM(S): at 15:54

## 2017-12-01 RX ADMIN — HEPARIN SODIUM 5000 UNIT(S): 5000 INJECTION INTRAVENOUS; SUBCUTANEOUS at 23:18

## 2017-12-01 RX ADMIN — Medication 25 MILLIGRAM(S): at 06:17

## 2017-12-01 RX ADMIN — ROFLUMILAST 500 MICROGRAM(S): 500 TABLET ORAL at 08:33

## 2017-12-01 RX ADMIN — Medication 3 MILLILITER(S): at 22:05

## 2017-12-01 RX ADMIN — Medication 81 MILLIGRAM(S): at 14:08

## 2017-12-01 RX ADMIN — Medication 25 MILLIGRAM(S): at 23:15

## 2017-12-01 RX ADMIN — Medication 5 MILLIGRAM(S): at 08:34

## 2017-12-01 RX ADMIN — ATORVASTATIN CALCIUM 20 MILLIGRAM(S): 80 TABLET, FILM COATED ORAL at 23:18

## 2017-12-01 RX ADMIN — HEPARIN SODIUM 5000 UNIT(S): 5000 INJECTION INTRAVENOUS; SUBCUTANEOUS at 08:33

## 2017-12-01 RX ADMIN — Medication 200 MILLIGRAM(S): at 14:07

## 2017-12-01 RX ADMIN — Medication 3 MILLILITER(S): at 16:00

## 2017-12-01 RX ADMIN — TIOTROPIUM BROMIDE AND OLODATEROL 2 PUFF(S): 3.124; 2.736 SPRAY, METERED RESPIRATORY (INHALATION) at 14:11

## 2017-12-01 RX ADMIN — CLOPIDOGREL BISULFATE 75 MILLIGRAM(S): 75 TABLET, FILM COATED ORAL at 14:08

## 2017-12-01 RX ADMIN — Medication 120 MILLIGRAM(S): at 08:34

## 2017-12-01 NOTE — PROGRESS NOTE ADULT - ASSESSMENT
Volume overload/ascites/edema  phtn  copd severe  malnutrition weightloss  bucky    continue current care  continue diuresis  will eventually consider a paracentesis  strict i/o  continue inhale/neb regimen  appreciate renal input

## 2017-12-01 NOTE — PROGRESS NOTE ADULT - SUBJECTIVE AND OBJECTIVE BOX
MEDICINE, PROGRESS NOTE [phone]    FRAN ALEXANDRA 71y [id number]    Patient seen and examined.  Patient is a 71y old  Female who presents with a chief complaint of shortness of breath, abdominal pain (28 Nov 2017 22:19)  pt still sob    PAST MEDICAL & SURGICAL HISTORY:  Hyperthyroidism  JOSE (obstructive sleep apnea)  Epistaxis  GIB (gastrointestinal bleeding)  CAD S/P percutaneous coronary angioplasty  Afib  Pulmonary HTN  GERD (gastroesophageal reflux disease)  Obesity  Cardiomegaly  Valvular heart disease  COPD (chronic obstructive pulmonary disease): Home O2  Sleep Apnea: by criteria  Loose, teeth: 3 bottom front loose teeth  Female stress incontinence  Shoulder pain, right  Constipation  Arthritis of Knee  H/O heartburn  History of lung cancer  Obese  Hx of hyperlipidemia  Asthma  Diabetes mellitus: type 2  dx about 4-5 years ago   no daily fingerstick  H/O: HTN (hypertension)  Enlarged lymph nodes  Lymph nodes enlarged: s/p biopsy mediastinal  benign  H/O endoscopy  left upper lobectomy    MEDICATIONS  (STANDING):  ALBUTerol/ipratropium for Nebulization 3 milliLiter(s) Nebulizer every 6 hours  allopurinol 200 milliGRAM(s) Oral daily  aspirin enteric coated 81 milliGRAM(s) Oral daily  atorvastatin 20 milliGRAM(s) Oral at bedtime  clopidogrel Tablet 75 milliGRAM(s) Oral daily  digoxin     Tablet 0.125 milliGRAM(s) Oral every other day  diltiazem    milliGRAM(s) Oral daily  docusate sodium 100 milliGRAM(s) Oral two times a day  furosemide Infusion 10 mG/Hr (5 mL/Hr) IV Continuous <Continuous>  heparin  Injectable 5000 Unit(s) SubCutaneous every 8 hours  macitentan (OPSUMIT) 10 milliGRAM(s) 10 milliGRAM(s) Oral daily  methimazole 5 milliGRAM(s) Oral daily  metoprolol     tartrate 25 milliGRAM(s) Oral every 8 hours  pantoprazole    Tablet 40 milliGRAM(s) Oral before breakfast  predniSONE   Tablet 5 milliGRAM(s) Oral daily  roflumilast 500 MICROGram(s) Oral daily  tiotropium 2.5 MICROgram(s)/olodaterol 2.5 MICROgram(s) Inhaler 2 Puff(s) Inhalation daily    MEDICATIONS  (PRN):  acetaminophen   Tablet 650 milliGRAM(s) Oral every 6 hours PRN Moderate Pain (4 - 6)  ondansetron    Tablet 4 milliGRAM(s) Oral every 8 hours PRN Nausea and/or Vomiting  sodium chloride 0.65% Nasal 1 Spray(s) Both Nostrils five times a day PRN dry nose    Allergies    No Known Allergies    Intolerances    albuterol (Unknown)      PHYSICAL EXAM:  Constitutional: NAD  HEENT: Normocephalic, EOMI  Neck:  No JVD  Respiratory: dec bs at bases  Cardiovascular: S1, S2, RRR, + systolic murmur  Gastrointestinal: BS+, soft, NT/ND  Extremities: + peripheral edema  Neurological: AAOX3, no focal deficits  Psychiatric: Normal mood, normal affect  : No Gottlieb    Vital Signs Last 24 Hrs  T(C): 36.7 (01 Dec 2017 11:39), Max: 36.7 (30 Nov 2017 21:10)  T(F): 98 (01 Dec 2017 11:39), Max: 98 (30 Nov 2017 21:10)  HR: 96 (01 Dec 2017 11:39) (65 - 96)  BP: 108/63 (01 Dec 2017 11:39) (101/54 - 108/63)  BP(mean): --  RR: 18 (01 Dec 2017 11:39) (18 - 18)  SpO2: 95% (01 Dec 2017 11:39) (95% - 99%)  I&O's Summary    30 Nov 2017 07:01  -  01 Dec 2017 07:00  --------------------------------------------------------  IN: 960 mL / OUT: 750 mL / NET: 210 mL    01 Dec 2017 07:01  -  01 Dec 2017 20:21  --------------------------------------------------------  IN: 720 mL / OUT: 850 mL / NET: -130 mL        LABS:                        8.4    4.75  )-----------( 180      ( 01 Dec 2017 07:29 )             27.7     12-01    141  |  96  |  54<H>  ----------------------------<  93  3.6   |  34<H>  |  1.63<H>    Ca    8.8      01 Dec 2017 07:28  Phos  3.7     11-30  Mg     2.1     12-01          Magnesium, Serum: 2.1 mg/dL (12-01 @ 07:28)

## 2017-12-01 NOTE — PROGRESS NOTE ADULT - ASSESSMENT
A 71-year-old female with past medical history of diabetes, hypertension, severe pulmonary hypertension, right ventricular dysfunction, presents with not only left heart failure but also stigmata of acute cor pulmonale.  The patient's acute renal failure is likely hemodynamic in nature.  Goals of therapy are to improve her hemodynamics to improve her forward flow and renal perfusion.  CASSANDRA  Severe PHTN  Cor Pulm  Failure to thrive  1.	Renal:    Lasix  drip at 10mg/hr.  Needs out's greater than in's.  She still needs diuresis as still with JVD  2.	Gastroenterology:  With the Lasix drip, please hold off on paracentesis.  Maybe can perform paracentesis on Monday   3.	Cardiology:  With respect to her lungs, unfortunately she has fixed pulmonary disease.  She had no response to nitric oxide.  4.	Pulmonary:  Continue with pulmonary nebulizers.  Continue with oxygen at all times.  Options to help with pulmonary hypertension are limited.

## 2017-12-01 NOTE — PROGRESS NOTE ADULT - SUBJECTIVE AND OBJECTIVE BOX
[facility] [phone]        Patient seen and examined in bed.  She states that she was breathing easier as well        MEDICATIONS  (STANDING):  ALBUTerol/ipratropium for Nebulization 3 milliLiter(s) Nebulizer every 6 hours  allopurinol 200 milliGRAM(s) Oral daily  aspirin enteric coated 81 milliGRAM(s) Oral daily  atorvastatin 20 milliGRAM(s) Oral at bedtime  clopidogrel Tablet 75 milliGRAM(s) Oral daily  digoxin     Tablet 0.125 milliGRAM(s) Oral every other day  diltiazem    milliGRAM(s) Oral daily  docusate sodium 100 milliGRAM(s) Oral two times a day  furosemide Infusion 10 mG/Hr (5 mL/Hr) IV Continuous <Continuous>  heparin  Injectable 5000 Unit(s) SubCutaneous every 8 hours  macitentan (OPSUMIT) 10 milliGRAM(s) 10 milliGRAM(s) Oral daily  methimazole 5 milliGRAM(s) Oral daily  metoprolol     tartrate 25 milliGRAM(s) Oral every 8 hours  pantoprazole    Tablet 40 milliGRAM(s) Oral before breakfast  predniSONE   Tablet 5 milliGRAM(s) Oral daily  roflumilast 500 MICROGram(s) Oral daily  tiotropium 2.5 MICROgram(s)/olodaterol 2.5 MICROgram(s) Inhaler 2 Puff(s) Inhalation daily      VITAL:  T(C): , Max: 36.7 (11-30-17 @ 21:10)  T(F): , Max: 98 (11-30-17 @ 21:10)  HR: 65 (12-01-17 @ 03:47)  BP: 101/54 (12-01-17 @ 03:47)  BP(mean): --  RR: 18 (12-01-17 @ 03:47)  SpO2: 99% (12-01-17 @ 03:47)  Wt(kg): --    I and O's:    11-30 @ 07:01  -  12-01 @ 07:00  --------------------------------------------------------  IN: 960 mL / OUT: 750 mL / NET: 210 mL          PHYSICAL EXAM:    Constitutional: NAD  HEENT: PERRLA    Neck:  + JVD sitting up  Respiratory: CTAB/L  Cardiovascular: S1 and S2  Gastrointestinal: BS+, soft, NT/ND  Extremities: +  peripheral edema  Neurological: A/O x 3, no focal deficits  Psychiatric: Normal mood, normal affect  : No Gottlieb  Skin: No rashes  Access: Not applicable    LABS:                        8.4    4.75  )-----------( 180      ( 01 Dec 2017 07:29 )             27.7     12-01    141  |  96  |  54<H>  ----------------------------<  93  3.6   |  34<H>  |  1.63<H>    Ca    8.8      01 Dec 2017 07:28  Phos  3.7     11-30  Mg     2.1     12-01            Urine Studies:          RADIOLOGY & ADDITIONAL STUDIES:

## 2017-12-02 LAB
ANION GAP SERPL CALC-SCNC: 12 MMOL/L — SIGNIFICANT CHANGE UP (ref 5–17)
BUN SERPL-MCNC: 54 MG/DL — HIGH (ref 7–23)
CALCIUM SERPL-MCNC: 8.9 MG/DL — SIGNIFICANT CHANGE UP (ref 8.4–10.5)
CHLORIDE SERPL-SCNC: 93 MMOL/L — LOW (ref 96–108)
CO2 SERPL-SCNC: 36 MMOL/L — HIGH (ref 22–31)
CREAT SERPL-MCNC: 1.73 MG/DL — HIGH (ref 0.5–1.3)
GLUCOSE SERPL-MCNC: 120 MG/DL — HIGH (ref 70–99)
HCT VFR BLD CALC: 29 % — LOW (ref 34.5–45)
HGB BLD-MCNC: 9.5 G/DL — LOW (ref 11.5–15.5)
MAGNESIUM SERPL-MCNC: 2.2 MG/DL — SIGNIFICANT CHANGE UP (ref 1.6–2.6)
MCHC RBC-ENTMCNC: 28.8 PG — SIGNIFICANT CHANGE UP (ref 27–34)
MCHC RBC-ENTMCNC: 32.8 GM/DL — SIGNIFICANT CHANGE UP (ref 32–36)
MCV RBC AUTO: 87.8 FL — SIGNIFICANT CHANGE UP (ref 80–100)
PLATELET # BLD AUTO: 183 K/UL — SIGNIFICANT CHANGE UP (ref 150–400)
POTASSIUM SERPL-MCNC: 3.3 MMOL/L — LOW (ref 3.5–5.3)
POTASSIUM SERPL-SCNC: 3.3 MMOL/L — LOW (ref 3.5–5.3)
RBC # BLD: 3.31 M/UL — LOW (ref 3.8–5.2)
RBC # FLD: 17 % — HIGH (ref 10.3–14.5)
SODIUM SERPL-SCNC: 141 MMOL/L — SIGNIFICANT CHANGE UP (ref 135–145)
WBC # BLD: 6.5 K/UL — SIGNIFICANT CHANGE UP (ref 3.8–10.5)
WBC # FLD AUTO: 6.5 K/UL — SIGNIFICANT CHANGE UP (ref 3.8–10.5)

## 2017-12-02 RX ORDER — POTASSIUM CHLORIDE 20 MEQ
20 PACKET (EA) ORAL
Qty: 0 | Refills: 0 | Status: COMPLETED | OUTPATIENT
Start: 2017-12-02 | End: 2017-12-02

## 2017-12-02 RX ORDER — POTASSIUM CHLORIDE 20 MEQ
20 PACKET (EA) ORAL
Qty: 0 | Refills: 0 | Status: DISCONTINUED | OUTPATIENT
Start: 2017-12-02 | End: 2017-12-02

## 2017-12-02 RX ORDER — POTASSIUM CHLORIDE 20 MEQ
40 PACKET (EA) ORAL EVERY 4 HOURS
Qty: 0 | Refills: 0 | Status: DISCONTINUED | OUTPATIENT
Start: 2017-12-02 | End: 2017-12-02

## 2017-12-02 RX ADMIN — HEPARIN SODIUM 5000 UNIT(S): 5000 INJECTION INTRAVENOUS; SUBCUTANEOUS at 09:51

## 2017-12-02 RX ADMIN — Medication 0.12 MILLIGRAM(S): at 11:39

## 2017-12-02 RX ADMIN — Medication 120 MILLIGRAM(S): at 09:50

## 2017-12-02 RX ADMIN — CLOPIDOGREL BISULFATE 75 MILLIGRAM(S): 75 TABLET, FILM COATED ORAL at 11:39

## 2017-12-02 RX ADMIN — Medication 5 MILLIGRAM(S): at 09:51

## 2017-12-02 RX ADMIN — Medication 25 MILLIGRAM(S): at 06:42

## 2017-12-02 RX ADMIN — TIOTROPIUM BROMIDE AND OLODATEROL 2 PUFF(S): 3.124; 2.736 SPRAY, METERED RESPIRATORY (INHALATION) at 09:53

## 2017-12-02 RX ADMIN — HEPARIN SODIUM 5000 UNIT(S): 5000 INJECTION INTRAVENOUS; SUBCUTANEOUS at 16:14

## 2017-12-02 RX ADMIN — Medication 25 MILLIGRAM(S): at 23:16

## 2017-12-02 RX ADMIN — Medication 3 MILLILITER(S): at 10:48

## 2017-12-02 RX ADMIN — Medication 81 MILLIGRAM(S): at 11:39

## 2017-12-02 RX ADMIN — Medication 25 MILLIGRAM(S): at 15:24

## 2017-12-02 RX ADMIN — ROFLUMILAST 500 MICROGRAM(S): 500 TABLET ORAL at 09:50

## 2017-12-02 RX ADMIN — Medication 20 MILLIEQUIVALENT(S): at 20:09

## 2017-12-02 RX ADMIN — Medication 20 MILLIEQUIVALENT(S): at 18:40

## 2017-12-02 RX ADMIN — HEPARIN SODIUM 5000 UNIT(S): 5000 INJECTION INTRAVENOUS; SUBCUTANEOUS at 23:16

## 2017-12-02 RX ADMIN — Medication 3 MILLILITER(S): at 16:13

## 2017-12-02 RX ADMIN — ATORVASTATIN CALCIUM 20 MILLIGRAM(S): 80 TABLET, FILM COATED ORAL at 23:16

## 2017-12-02 RX ADMIN — Medication 200 MILLIGRAM(S): at 11:39

## 2017-12-02 NOTE — PROGRESS NOTE ADULT - SUBJECTIVE AND OBJECTIVE BOX
MEDICINE, PROGRESS NOTE [phone]    FRAN ALEXANDRA 71y [id number]    Patient seen and examined.  Patient is a 71y old  Female who presents with a chief complaint of shortness of breath, abdominal pain (28 Nov 2017 22:19)  Pt sitting with family at bedside, tachypneic but able to speak    PAST MEDICAL & SURGICAL HISTORY:  Hyperthyroidism  JOSE (obstructive sleep apnea)  Epistaxis  GIB (gastrointestinal bleeding)  CAD S/P percutaneous coronary angioplasty  Afib  Pulmonary HTN  GERD (gastroesophageal reflux disease)  Obesity  Cardiomegaly  Valvular heart disease  COPD (chronic obstructive pulmonary disease): Home O2  Sleep Apnea: by criteria  Loose, teeth: 3 bottom front loose teeth  Female stress incontinence  Shoulder pain, right  Constipation  Arthritis of Knee  H/O heartburn  History of lung cancer  Obese  Hx of hyperlipidemia  Asthma  Diabetes mellitus: type 2  dx about 4-5 years ago   no daily fingerstick  H/O: HTN (hypertension)  Enlarged lymph nodes  Lymph nodes enlarged: s/p biopsy mediastinal  benign  H/O endoscopy  left upper lobectomy    MEDICATIONS  (STANDING):  ALBUTerol/ipratropium for Nebulization 3 milliLiter(s) Nebulizer every 6 hours  allopurinol 200 milliGRAM(s) Oral daily  aspirin enteric coated 81 milliGRAM(s) Oral daily  atorvastatin 20 milliGRAM(s) Oral at bedtime  clopidogrel Tablet 75 milliGRAM(s) Oral daily  digoxin     Tablet 0.125 milliGRAM(s) Oral every other day  diltiazem    milliGRAM(s) Oral daily  docusate sodium 100 milliGRAM(s) Oral two times a day  furosemide Infusion 10 mG/Hr (5 mL/Hr) IV Continuous <Continuous>  heparin  Injectable 5000 Unit(s) SubCutaneous every 8 hours  macitentan (OPSUMIT) 10 milliGRAM(s) 10 milliGRAM(s) Oral daily  methimazole 5 milliGRAM(s) Oral daily  metoprolol     tartrate 25 milliGRAM(s) Oral every 8 hours  pantoprazole    Tablet 40 milliGRAM(s) Oral before breakfast  potassium chloride    Tablet ER 20 milliEquivalent(s) Oral every 2 hours  predniSONE   Tablet 5 milliGRAM(s) Oral daily  roflumilast 500 MICROGram(s) Oral daily  tiotropium 2.5 MICROgram(s)/olodaterol 2.5 MICROgram(s) Inhaler 2 Puff(s) Inhalation daily    MEDICATIONS  (PRN):  acetaminophen   Tablet 650 milliGRAM(s) Oral every 6 hours PRN Moderate Pain (4 - 6)  ondansetron    Tablet 4 milliGRAM(s) Oral every 8 hours PRN Nausea and/or Vomiting  sodium chloride 0.65% Nasal 1 Spray(s) Both Nostrils five times a day PRN dry nose    Allergies    No Known Allergies    Intolerances    albuterol (Unknown)      PHYSICAL EXAM:  Constitutional: NAD  HEENT: Normocephalic, EOMI  Neck:  No JVD  Respiratory: CTA B/L, No wheezes, dec bs at bases  Cardiovascular: S1, S2, RRR, + systolic murmur  Gastrointestinal: Bs hypo, distended, + fluid wave  Extremities: + peripheral edema le b/l  Neurological: AAOX3, no focal deficits  Psychiatric: Normal mood, normal affect  : No Gottlieb    Vital Signs Last 24 Hrs  T(C): 36.7 (02 Dec 2017 11:22), Max: 36.7 (02 Dec 2017 11:22)  T(F): 98.1 (02 Dec 2017 11:22), Max: 98.1 (02 Dec 2017 11:22)  HR: 90 (02 Dec 2017 15:26) (69 - 98)  BP: 105/59 (02 Dec 2017 15:26) (101/58 - 108/63)  BP(mean): --  RR: 20 (02 Dec 2017 11:22) (18 - 20)  SpO2: 90% (02 Dec 2017 11:22) (90% - 98%)  I&O's Summary    01 Dec 2017 07:01  -  02 Dec 2017 07:00  --------------------------------------------------------  IN: 840 mL / OUT: 1250 mL / NET: -410 mL    02 Dec 2017 07:01  -  02 Dec 2017 18:13  --------------------------------------------------------  IN: 200 mL / OUT: 0 mL / NET: 200 mL        LABS:                        9.5    6.5   )-----------( 183      ( 02 Dec 2017 12:13 )             29.0     12-02    141  |  93<L>  |  54<H>  ----------------------------<  120<H>  3.3<L>   |  36<H>  |  1.73<H>    Ca    8.9      02 Dec 2017 12:13  Mg     2.2     12-02          Magnesium, Serum: 2.2 mg/dL (12-02 @ 12:13)

## 2017-12-02 NOTE — PROGRESS NOTE ADULT - ASSESSMENT
Assessment and Plan:   · Assessment		  A 71-year-old female with past medical history of diabetes, hypertension, severe pulmonary hypertension, right ventricular dysfunction, presents with not only left heart failure but also stigmata of acute cor pulmonale.  The patient's acute renal failure is likely hemodynamic in nature.  Goals of therapy are to improve her hemodynamics to improve her forward flow and renal perfusion.  CASSANDRA  Severe PHTN  Cor Pulm  Failure to thrive  1.	Renal: Lasix  drip at 10mg/hr. Keep net negative I's and O's. Monitor BMP daily.  2.	Gastroenterology: Now on Lasix drip. ? paracentesis on Monday   3.	Pulmonary: Unfortunately, she has fixed pulmonary disease.  She had no response to nitric oxide. Continue with pulmonary nebulizers.  Continue with oxygen at all times.  4.	Cardiology: Monitor hemodynamics and volume status.

## 2017-12-02 NOTE — CHART NOTE - NSCHARTNOTEFT_GEN_A_CORE
Potassium level on BMP = 3.3    12-02    141  |  93<L>  |  54<H>  ----------------------------<  120<H>  3.3<L>   |  36<H>  |  1.73<H>    Ca    8.9      02 Dec 2017 12:13  Mg     2.2     12-02    Currently on lasix drip, no standing potassium replacement currently.    Patient refusing "hospital's potassium" - only wants to take her own.  Her daughter will bring in today.  Will send to pharmacy for verification and use to repleat potassium once verified.  Vital signs stable. Afib 70 - 100 on tele.

## 2017-12-02 NOTE — PROGRESS NOTE ADULT - ASSESSMENT
sev copd  phtn  chf  volume overload/ascites    continue diuresis  monitor labs  poss para on monday

## 2017-12-02 NOTE — PROGRESS NOTE ADULT - SUBJECTIVE AND OBJECTIVE BOX
[facility]-Symone Bardales NP- PROGRESS NOTE    Patient seen and examined sitting up in chair. BRO getting out of bed into chair. Lasix gtt infusing.      Hospital Medications:   MEDICATIONS  (STANDING):  ALBUTerol/ipratropium for Nebulization 3 milliLiter(s) Nebulizer every 6 hours  allopurinol 200 milliGRAM(s) Oral daily  aspirin enteric coated 81 milliGRAM(s) Oral daily  atorvastatin 20 milliGRAM(s) Oral at bedtime  clopidogrel Tablet 75 milliGRAM(s) Oral daily  digoxin     Tablet 0.125 milliGRAM(s) Oral every other day  diltiazem    milliGRAM(s) Oral daily  docusate sodium 100 milliGRAM(s) Oral two times a day  furosemide Infusion 10 mG/Hr (5 mL/Hr) IV Continuous <Continuous>  heparin  Injectable 5000 Unit(s) SubCutaneous every 8 hours  macitentan (OPSUMIT) 10 milliGRAM(s) 10 milliGRAM(s) Oral daily  methimazole 5 milliGRAM(s) Oral daily  metoprolol     tartrate 25 milliGRAM(s) Oral every 8 hours  pantoprazole    Tablet 40 milliGRAM(s) Oral before breakfast  predniSONE   Tablet 5 milliGRAM(s) Oral daily  roflumilast 500 MICROGram(s) Oral daily  tiotropium 2.5 MICROgram(s)/olodaterol 2.5 MICROgram(s) Inhaler 2 Puff(s) Inhalation daily      REVIEW OF SYSTEMS:  As per HPI, 8 out of 10 ROS were unremarkable    VITALS:  T(F): 97.8 (12-02-17 @ 09:46), Max: 98 (12-01-17 @ 11:39)  HR: 80 (12-02-17 @ 09:46)  BP: 105/68 (12-02-17 @ 09:46)  RR: 20 (12-02-17 @ 09:46)  SpO2: 93% (12-02-17 @ 09:46)  Wt(kg): --    12-01 @ 07:01  -  12-02 @ 07:00  --------------------------------------------------------  IN: 840 mL / OUT: 1250 mL / NET: -410 mL          PHYSICAL EXAM:  Constitutional: NAD  HEENT: PERRL  Neck: + JVD  Respiratory: CTAB, no wheezes, rales or rhonchi  Cardiovascular: S1, S2, RRR  Gastrointestinal: BS+, soft, NT/ND  Extremities: + peripheral edema  Neurological: A/O x 3, no focal deficits  Psychiatric: Normal mood, normal affect  : No CVA tenderness. No dubois.   Skin: No rashes    LABS:  Blood Gas Venous - Sodium: 138 mmoL/L (11-28 @ 19:16)      12-01    141  |  96  |  54<H>  ----------------------------<  93  3.6   |  34<H>  |  1.63<H>  11-30    145  |  98  |  57<H>  ----------------------------<  92  3.5   |  33<H>  |  1.63<H>    Ca    8.8      01 Dec 2017 07:28  Mg     2.1     12-01  Mg     2.2     11-30  Mg     2.3     11-29                              8.4    4.75  )-----------( 180      ( 01 Dec 2017 07:29 )             27.7

## 2017-12-03 LAB
ANION GAP SERPL CALC-SCNC: 12 MMOL/L — SIGNIFICANT CHANGE UP (ref 5–17)
BUN SERPL-MCNC: 55 MG/DL — HIGH (ref 7–23)
CALCIUM SERPL-MCNC: 9.2 MG/DL — SIGNIFICANT CHANGE UP (ref 8.4–10.5)
CHLORIDE SERPL-SCNC: 96 MMOL/L — SIGNIFICANT CHANGE UP (ref 96–108)
CO2 SERPL-SCNC: 33 MMOL/L — HIGH (ref 22–31)
CREAT SERPL-MCNC: 1.75 MG/DL — HIGH (ref 0.5–1.3)
GLUCOSE SERPL-MCNC: 95 MG/DL — SIGNIFICANT CHANGE UP (ref 70–99)
HCT VFR BLD CALC: 27.4 % — LOW (ref 34.5–45)
HGB BLD-MCNC: 8.4 G/DL — LOW (ref 11.5–15.5)
MCHC RBC-ENTMCNC: 26.6 PG — LOW (ref 27–34)
MCHC RBC-ENTMCNC: 30.7 GM/DL — LOW (ref 32–36)
MCV RBC AUTO: 86.7 FL — SIGNIFICANT CHANGE UP (ref 80–100)
PLATELET # BLD AUTO: 187 K/UL — SIGNIFICANT CHANGE UP (ref 150–400)
POTASSIUM SERPL-MCNC: 4.2 MMOL/L — SIGNIFICANT CHANGE UP (ref 3.5–5.3)
POTASSIUM SERPL-SCNC: 4.2 MMOL/L — SIGNIFICANT CHANGE UP (ref 3.5–5.3)
RBC # BLD: 3.16 M/UL — LOW (ref 3.8–5.2)
RBC # FLD: 18.1 % — HIGH (ref 10.3–14.5)
SODIUM SERPL-SCNC: 141 MMOL/L — SIGNIFICANT CHANGE UP (ref 135–145)
WBC # BLD: 5.24 K/UL — SIGNIFICANT CHANGE UP (ref 3.8–10.5)
WBC # FLD AUTO: 5.24 K/UL — SIGNIFICANT CHANGE UP (ref 3.8–10.5)

## 2017-12-03 RX ADMIN — Medication 25 MILLIGRAM(S): at 06:55

## 2017-12-03 RX ADMIN — Medication 81 MILLIGRAM(S): at 15:13

## 2017-12-03 RX ADMIN — HEPARIN SODIUM 5000 UNIT(S): 5000 INJECTION INTRAVENOUS; SUBCUTANEOUS at 15:16

## 2017-12-03 RX ADMIN — Medication 120 MILLIGRAM(S): at 09:59

## 2017-12-03 RX ADMIN — CLOPIDOGREL BISULFATE 75 MILLIGRAM(S): 75 TABLET, FILM COATED ORAL at 15:13

## 2017-12-03 RX ADMIN — Medication 3 MILLILITER(S): at 15:26

## 2017-12-03 RX ADMIN — TIOTROPIUM BROMIDE AND OLODATEROL 2 PUFF(S): 3.124; 2.736 SPRAY, METERED RESPIRATORY (INHALATION) at 08:09

## 2017-12-03 RX ADMIN — ATORVASTATIN CALCIUM 20 MILLIGRAM(S): 80 TABLET, FILM COATED ORAL at 23:17

## 2017-12-03 RX ADMIN — Medication 25 MILLIGRAM(S): at 23:17

## 2017-12-03 RX ADMIN — Medication 5 MILLIGRAM(S): at 10:08

## 2017-12-03 RX ADMIN — Medication 200 MILLIGRAM(S): at 15:13

## 2017-12-03 RX ADMIN — Medication 25 MILLIGRAM(S): at 15:13

## 2017-12-03 RX ADMIN — Medication 3 MILLILITER(S): at 22:25

## 2017-12-03 RX ADMIN — Medication 5 MG/HR: at 15:13

## 2017-12-03 RX ADMIN — Medication 3 MILLILITER(S): at 10:01

## 2017-12-03 RX ADMIN — HEPARIN SODIUM 5000 UNIT(S): 5000 INJECTION INTRAVENOUS; SUBCUTANEOUS at 23:18

## 2017-12-03 RX ADMIN — HEPARIN SODIUM 5000 UNIT(S): 5000 INJECTION INTRAVENOUS; SUBCUTANEOUS at 09:59

## 2017-12-03 RX ADMIN — ROFLUMILAST 500 MICROGRAM(S): 500 TABLET ORAL at 10:00

## 2017-12-03 NOTE — PROGRESS NOTE ADULT - ASSESSMENT
Assessment and Plan:   · Assessment		  A 71-year-old female with past medical history of diabetes, hypertension, severe pulmonary hypertension, right ventricular dysfunction, presents with not only left heart failure but also stigmata of acute cor pulmonale.  The patient's acute renal failure is likely hemodynamic in nature.  Goals of therapy are to improve her hemodynamics in order to improve her forward flow and renal perfusion.  CASSANDRA  Severe PHTN  Cor Pulm  Failure to thrive  1.	Renal: Lasix  drip at 10mg/hr. Keep net negative. I's and O's. Weight going down. Monitor BMP daily.  2.	Gastroenterology: Still on Lasix drip. ? paracentesis on Monday   3.	Pulmonary: Pulmonary nebulizers.  Continue with nasal cannula oxygen at all times.  4.	Cardiology: Monitor hemodynamics and volume status.

## 2017-12-03 NOTE — PROGRESS NOTE ADULT - ASSESSMENT
sev copd  phtn  chf  volume overload/ascites  bucky    continue current care  will potentially need to dc lasix drip alix  will consider para with albumin  pts overall prognosis is poor but she is refusing to speak with palliative at this time.

## 2017-12-03 NOTE — PROGRESS NOTE ADULT - ASSESSMENT
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 Recent admission at [facility] approximately 5 weeks ago for large volume paracentesis (6.1L removed). Now presents again with RV failure-worsened LE edema, recurrent ascites, pleural effusions and pulmonary edema with worsened dyspnea and CASSANDRA on CKD

## 2017-12-03 NOTE — PROGRESS NOTE ADULT - SUBJECTIVE AND OBJECTIVE BOX
[facility]-Symone Bardales NP- PROGRESS NOTE    Patient seen and examined sitting up in chair with BRO. Still on lasix drip at 10mg /H. Patient had BM this morning.      Hospital Medications:   MEDICATIONS  (STANDING):  ALBUTerol/ipratropium for Nebulization 3 milliLiter(s) Nebulizer every 6 hours  allopurinol 200 milliGRAM(s) Oral daily  aspirin enteric coated 81 milliGRAM(s) Oral daily  atorvastatin 20 milliGRAM(s) Oral at bedtime  clopidogrel Tablet 75 milliGRAM(s) Oral daily  digoxin     Tablet 0.125 milliGRAM(s) Oral every other day  diltiazem    milliGRAM(s) Oral daily  docusate sodium 100 milliGRAM(s) Oral two times a day  furosemide Infusion 10 mG/Hr (5 mL/Hr) IV Continuous <Continuous>  heparin  Injectable 5000 Unit(s) SubCutaneous every 8 hours  macitentan (OPSUMIT) 10 milliGRAM(s) 10 milliGRAM(s) Oral daily  methimazole 5 milliGRAM(s) Oral daily  metoprolol     tartrate 25 milliGRAM(s) Oral every 8 hours  pantoprazole    Tablet 40 milliGRAM(s) Oral before breakfast  predniSONE   Tablet 5 milliGRAM(s) Oral daily  roflumilast 500 MICROGram(s) Oral daily  tiotropium 2.5 MICROgram(s)/olodaterol 2.5 MICROgram(s) Inhaler 2 Puff(s) Inhalation daily      REVIEW OF SYSTEMS:  As per HPI, 8 out of 10 ROS were unremarkable    VITALS:  T(F): 97.9 (12-03-17 @ 06:03), Max: 97.9 (12-03-17 @ 06:03)  HR: 73 (12-03-17 @ 08:20)  BP: 107/62 (12-03-17 @ 08:20)  RR: 20 (12-03-17 @ 06:03)  SpO2: 93% (12-03-17 @ 06:03)  Wt(kg): --    12-02 @ 07:01  -  12-03 @ 07:00  --------------------------------------------------------  IN: 720 mL / OUT: 700 mL / NET: 20 mL          PHYSICAL EXAM:  Constitutional: NAD  HEENT: Normocephalic  Neck: + JVD  Respiratory: CTAB, no wheezes, rales or rhonchi  Cardiovascular: S1, S2, RRR  Gastrointestinal: BS+, soft, NT/ND  Extremities: + peripheral edema  Neurological: A/O x 3, no focal deficits  Psychiatric: Normal mood, normal affect  : No CVA tenderness. No dubois.   Skin: No rashes    LABS:      12-03    141  |  96  |  55<H>  ----------------------------<  95  4.2   |  33<H>  |  1.75<H>  12-02    141  |  93<L>  |  54<H>  ----------------------------<  120<H>  3.3<L>   |  36<H>  |  1.73<H>  12-01    141  |  96  |  54<H>  ----------------------------<  93  3.6   |  34<H>  |  1.63<H>    Ca    9.2      03 Dec 2017 08:40  Mg     2.2     12-02  Mg     2.1     12-01                              8.4    5.24  )-----------( 187      ( 03 Dec 2017 08:27 )             27.4

## 2017-12-03 NOTE — PROGRESS NOTE ADULT - SUBJECTIVE AND OBJECTIVE BOX
MEDICINE, PROGRESS NOTE [phone]    FRAN ALEXANDRA 71y [id number]    Patient seen and examined.  Patient is a 71y old  Female who presents with a chief complaint of shortness of breath, abdominal pain (28 Nov 2017 22:19)  Pt still sob.    PAST MEDICAL & SURGICAL HISTORY:  Hyperthyroidism  JOSE (obstructive sleep apnea)  Epistaxis  GIB (gastrointestinal bleeding)  CAD S/P percutaneous coronary angioplasty  Afib  Pulmonary HTN  GERD (gastroesophageal reflux disease)  Obesity  Cardiomegaly  Valvular heart disease  COPD (chronic obstructive pulmonary disease): Home O2  Sleep Apnea: by criteria  Loose, teeth: 3 bottom front loose teeth  Female stress incontinence  Shoulder pain, right  Constipation  Arthritis of Knee  H/O heartburn  History of lung cancer  Obese  Hx of hyperlipidemia  Asthma  Diabetes mellitus: type 2  dx about 4-5 years ago   no daily fingerstick  H/O: HTN (hypertension)  Enlarged lymph nodes  Lymph nodes enlarged: s/p biopsy mediastinal  benign  H/O endoscopy  left upper lobectomy    MEDICATIONS  (STANDING):  ALBUTerol/ipratropium for Nebulization 3 milliLiter(s) Nebulizer every 6 hours  allopurinol 200 milliGRAM(s) Oral daily  aspirin enteric coated 81 milliGRAM(s) Oral daily  atorvastatin 20 milliGRAM(s) Oral at bedtime  clopidogrel Tablet 75 milliGRAM(s) Oral daily  digoxin     Tablet 0.125 milliGRAM(s) Oral every other day  diltiazem    milliGRAM(s) Oral daily  docusate sodium 100 milliGRAM(s) Oral two times a day  furosemide Infusion 10 mG/Hr (5 mL/Hr) IV Continuous <Continuous>  heparin  Injectable 5000 Unit(s) SubCutaneous every 8 hours  macitentan (OPSUMIT) 10 milliGRAM(s) 10 milliGRAM(s) Oral daily  methimazole 5 milliGRAM(s) Oral daily  metoprolol     tartrate 25 milliGRAM(s) Oral every 8 hours  pantoprazole    Tablet 40 milliGRAM(s) Oral before breakfast  predniSONE   Tablet 5 milliGRAM(s) Oral daily  roflumilast 500 MICROGram(s) Oral daily  tiotropium 2.5 MICROgram(s)/olodaterol 2.5 MICROgram(s) Inhaler 2 Puff(s) Inhalation daily    MEDICATIONS  (PRN):  acetaminophen   Tablet 650 milliGRAM(s) Oral every 6 hours PRN Moderate Pain (4 - 6)  ondansetron    Tablet 4 milliGRAM(s) Oral every 8 hours PRN Nausea and/or Vomiting  sodium chloride 0.65% Nasal 1 Spray(s) Both Nostrils five times a day PRN dry nose    Allergies    No Known Allergies    Intolerances    albuterol (Unknown)      PHYSICAL EXAM:  Constitutional: NAD  HEENT: Normocephalic, EOMI  Neck:  No JVD  Respiratory: CTA B/L, No wheezes, dec bs at bases  Cardiovascular: S1, S2, RRR, + systolic murmur  Gastrointestinal: BS hypo, +distention, + fluid wave,   Extremities: + peripheral edema le b/l  Neurological: AAOX3, no focal deficits  Psychiatric: Normal mood, normal affect  : No Gottlieb pt refuses    Vital Signs Last 24 Hrs  T(C): 36.9 (03 Dec 2017 17:17), Max: 36.9 (03 Dec 2017 17:17)  T(F): 98.4 (03 Dec 2017 17:17), Max: 98.4 (03 Dec 2017 17:17)  HR: 78 (03 Dec 2017 17:17) (71 - 100)  BP: 104/63 (03 Dec 2017 17:17) (101/63 - 114/56)  BP(mean): --  RR: 18 (03 Dec 2017 17:17) (18 - 20)  SpO2: 92% (03 Dec 2017 17:17) (92% - 99%)  I&O's Summary    02 Dec 2017 07:01  -  03 Dec 2017 07:00  --------------------------------------------------------  IN: 720 mL / OUT: 700 mL / NET: 20 mL    03 Dec 2017 07:01  -  03 Dec 2017 17:55  --------------------------------------------------------  IN: 120 mL / OUT: 0 mL / NET: 120 mL        LABS:                        8.4    5.24  )-----------( 187      ( 03 Dec 2017 08:27 )             27.4     12-03    141  |  96  |  55<H>  ----------------------------<  95  4.2   |  33<H>  |  1.75<H>    Ca    9.2      03 Dec 2017 08:40  Mg     2.2     12-02

## 2017-12-03 NOTE — PROGRESS NOTE ADULT - SUBJECTIVE AND OBJECTIVE BOX
Follow-up Pulm Progress Note    No new respiratory events overnight.  mild dyspnic, 02 sat 96% on 5lnc    Medications:  MEDICATIONS  (STANDING):  ALBUTerol/ipratropium for Nebulization 3 milliLiter(s) Nebulizer every 6 hours  allopurinol 200 milliGRAM(s) Oral daily  aspirin enteric coated 81 milliGRAM(s) Oral daily  atorvastatin 20 milliGRAM(s) Oral at bedtime  clopidogrel Tablet 75 milliGRAM(s) Oral daily  digoxin     Tablet 0.125 milliGRAM(s) Oral every other day  diltiazem    milliGRAM(s) Oral daily  docusate sodium 100 milliGRAM(s) Oral two times a day  furosemide Infusion 10 mG/Hr (5 mL/Hr) IV Continuous <Continuous>  heparin  Injectable 5000 Unit(s) SubCutaneous every 8 hours  macitentan (OPSUMIT) 10 milliGRAM(s) 10 milliGRAM(s) Oral daily  methimazole 5 milliGRAM(s) Oral daily  metoprolol     tartrate 25 milliGRAM(s) Oral every 8 hours  pantoprazole    Tablet 40 milliGRAM(s) Oral before breakfast  predniSONE   Tablet 5 milliGRAM(s) Oral daily  roflumilast 500 MICROGram(s) Oral daily  tiotropium 2.5 MICROgram(s)/olodaterol 2.5 MICROgram(s) Inhaler 2 Puff(s) Inhalation daily    MEDICATIONS  (PRN):  acetaminophen   Tablet 650 milliGRAM(s) Oral every 6 hours PRN Moderate Pain (4 - 6)  ondansetron    Tablet 4 milliGRAM(s) Oral every 8 hours PRN Nausea and/or Vomiting  sodium chloride 0.65% Nasal 1 Spray(s) Both Nostrils five times a day PRN dry nose          Vital Signs Last 24 Hrs  T(C): 36.6 (03 Dec 2017 06:03), Max: 36.7 (02 Dec 2017 11:22)  T(F): 97.9 (03 Dec 2017 06:03), Max: 98.1 (02 Dec 2017 11:22)  HR: 73 (03 Dec 2017 08:20) (71 - 98)  BP: 107/62 (03 Dec 2017 08:20) (101/63 - 111/74)  BP(mean): --  RR: 20 (03 Dec 2017 06:03) (20 - 20)  SpO2: 93% (03 Dec 2017 06:03) (90% - 99%)          12-02 @ 07:01  -  12-03 @ 07:00  --------------------------------------------------------  IN: 720 mL / OUT: 700 mL / NET: 20 mL          LABS:                        8.4    5.24  )-----------( 187      ( 03 Dec 2017 08:27 )             27.4     12-03    141  |  96  |  55<H>  ----------------------------<  95  4.2   |  33<H>  |  1.75<H>    Ca    9.2      03 Dec 2017 08:40  Mg     2.2     12-02            CAPILLARY BLOOD GLUCOSE                            CULTURES: (if applicable)          Physical Examination:  PULM: decreased bs  bilaterally, no significant sputum production  CVS: S1, S2 heard  abd: + distented  RADIOLOGY REVIEWED  CXR: < from: Xray Chest 1 View AP -PORTABLE-Routine (12.01.17 @ 08:46) >  IMPRESSION:    Small left pleural effusion. Clear lungs.    < end of copied text >      CT chest:    TTE:

## 2017-12-03 NOTE — PROGRESS NOTE ADULT - PROBLEM SELECTOR PLAN 1
2nd pleural effusions/pulmonary edema and restrictive disease from ascites   -Keep sp02>90% on supplemental oxygen 5lnc  -Output > input with lasix gtt.

## 2017-12-04 LAB
ANION GAP SERPL CALC-SCNC: 15 MMOL/L — SIGNIFICANT CHANGE UP (ref 5–17)
BUN SERPL-MCNC: 52 MG/DL — HIGH (ref 7–23)
CALCIUM SERPL-MCNC: 9.4 MG/DL — SIGNIFICANT CHANGE UP (ref 8.4–10.5)
CHLORIDE SERPL-SCNC: 95 MMOL/L — LOW (ref 96–108)
CO2 SERPL-SCNC: 30 MMOL/L — SIGNIFICANT CHANGE UP (ref 22–31)
CREAT SERPL-MCNC: 1.76 MG/DL — HIGH (ref 0.5–1.3)
GLUCOSE SERPL-MCNC: 99 MG/DL — SIGNIFICANT CHANGE UP (ref 70–99)
HCT VFR BLD CALC: 29.7 % — LOW (ref 34.5–45)
HGB BLD-MCNC: 8.9 G/DL — LOW (ref 11.5–15.5)
MAGNESIUM SERPL-MCNC: 2.1 MG/DL — SIGNIFICANT CHANGE UP (ref 1.6–2.6)
MCHC RBC-ENTMCNC: 26.3 PG — LOW (ref 27–34)
MCHC RBC-ENTMCNC: 30 GM/DL — LOW (ref 32–36)
MCV RBC AUTO: 87.9 FL — SIGNIFICANT CHANGE UP (ref 80–100)
PLATELET # BLD AUTO: 192 K/UL — SIGNIFICANT CHANGE UP (ref 150–400)
POTASSIUM SERPL-MCNC: 4 MMOL/L — SIGNIFICANT CHANGE UP (ref 3.5–5.3)
POTASSIUM SERPL-SCNC: 4 MMOL/L — SIGNIFICANT CHANGE UP (ref 3.5–5.3)
RBC # BLD: 3.38 M/UL — LOW (ref 3.8–5.2)
RBC # FLD: 17.7 % — HIGH (ref 10.3–14.5)
SODIUM SERPL-SCNC: 140 MMOL/L — SIGNIFICANT CHANGE UP (ref 135–145)
WBC # BLD: 5.48 K/UL — SIGNIFICANT CHANGE UP (ref 3.8–10.5)
WBC # FLD AUTO: 5.48 K/UL — SIGNIFICANT CHANGE UP (ref 3.8–10.5)

## 2017-12-04 PROCEDURE — 76705 ECHO EXAM OF ABDOMEN: CPT | Mod: 26

## 2017-12-04 RX ADMIN — Medication 25 MILLIGRAM(S): at 14:55

## 2017-12-04 RX ADMIN — Medication 3 MILLILITER(S): at 18:56

## 2017-12-04 RX ADMIN — Medication 200 MILLIGRAM(S): at 11:45

## 2017-12-04 RX ADMIN — CLOPIDOGREL BISULFATE 75 MILLIGRAM(S): 75 TABLET, FILM COATED ORAL at 11:45

## 2017-12-04 RX ADMIN — Medication 81 MILLIGRAM(S): at 11:45

## 2017-12-04 RX ADMIN — Medication 25 MILLIGRAM(S): at 06:20

## 2017-12-04 RX ADMIN — Medication 3 MILLILITER(S): at 06:20

## 2017-12-04 RX ADMIN — Medication 5 MILLIGRAM(S): at 09:32

## 2017-12-04 RX ADMIN — HEPARIN SODIUM 5000 UNIT(S): 5000 INJECTION INTRAVENOUS; SUBCUTANEOUS at 17:44

## 2017-12-04 RX ADMIN — Medication 0.12 MILLIGRAM(S): at 11:47

## 2017-12-04 RX ADMIN — ATORVASTATIN CALCIUM 20 MILLIGRAM(S): 80 TABLET, FILM COATED ORAL at 23:02

## 2017-12-04 RX ADMIN — ROFLUMILAST 500 MICROGRAM(S): 500 TABLET ORAL at 09:32

## 2017-12-04 RX ADMIN — Medication 25 MILLIGRAM(S): at 23:03

## 2017-12-04 RX ADMIN — TIOTROPIUM BROMIDE AND OLODATEROL 2 PUFF(S): 3.124; 2.736 SPRAY, METERED RESPIRATORY (INHALATION) at 09:32

## 2017-12-04 RX ADMIN — Medication 5 MG/HR: at 09:34

## 2017-12-04 RX ADMIN — Medication 120 MILLIGRAM(S): at 09:32

## 2017-12-04 NOTE — PROGRESS NOTE ADULT - ASSESSMENT
Assessment and Plan:   · Assessment		  A 71-year-old female with past medical history of diabetes, hypertension, severe pulmonary hypertension, right ventricular dysfunction, presents with not only left heart failure but also stigmata of acute cor pulmonale.  The patient's acute renal failure is likely hemodynamic in nature.  Goals of therapy are to improve her hemodynamics in order to improve her forward flow and renal perfusion.  CASSANDRA  Severe PHTN  Cor Pulm  Failure to thrive  1.	Renal: Lasix  drip at 10mg/hr. Keep net negative. I's and O's. Weight going down nicely and the creatinine is stable. Monitor BMP daily.  JVD is improving  2.	Gastroenterology: Still on Lasix drip. Abd sono to assess the ascites   3.	Pulmonary: Pulmonary nebulizers.  Continue with nasal cannula oxygen at all times.  4.	Cardiology: Monitor hemodynamics and volume status.

## 2017-12-04 NOTE — PROGRESS NOTE ADULT - PROBLEM SELECTOR PLAN 1
2nd pleural effusions/pulmonary edema and restrictive disease from ascites   -Keep sp02>90% on supplemental oxygen   -Output > input with lasix gtt

## 2017-12-04 NOTE — PROGRESS NOTE ADULT - ASSESSMENT
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 Recent admission at [facility] approximately 5 weeks ago for large volume paracentesis (6.1L removed). Now presents again with RV failure-worsened LE edema, recurrent ascites, pleural effusions and pulmonary edema with worsened dyspnea and CASSANDRA on CKD

## 2017-12-04 NOTE — PROGRESS NOTE ADULT - SUBJECTIVE AND OBJECTIVE BOX
MEDICINE, PROGRESS NOTE [phone]    FRAN ALEXANDRA 71y [id number]    Patient seen and examined.  Patient is a 71y old  Female who presents with a chief complaint of shortness of breath, abdominal pain (28 Nov 2017 22:19)  pt feels a little better.    PAST MEDICAL & SURGICAL HISTORY:  Hyperthyroidism  JOSE (obstructive sleep apnea)  Epistaxis  GIB (gastrointestinal bleeding)  CAD S/P percutaneous coronary angioplasty  Afib  Pulmonary HTN  GERD (gastroesophageal reflux disease)  Obesity  Cardiomegaly  Valvular heart disease  COPD (chronic obstructive pulmonary disease): Home O2  Sleep Apnea: by criteria  Loose, teeth: 3 bottom front loose teeth  Female stress incontinence  Shoulder pain, right  Constipation  Arthritis of Knee  H/O heartburn  History of lung cancer  Obese  Hx of hyperlipidemia  Asthma  Diabetes mellitus: type 2  dx about 4-5 years ago   no daily fingerstick  H/O: HTN (hypertension)  Enlarged lymph nodes  Lymph nodes enlarged: s/p biopsy mediastinal  benign  H/O endoscopy  left upper lobectomy    MEDICATIONS  (STANDING):  ALBUTerol/ipratropium for Nebulization 3 milliLiter(s) Nebulizer every 6 hours  allopurinol 200 milliGRAM(s) Oral daily  aspirin enteric coated 81 milliGRAM(s) Oral daily  atorvastatin 20 milliGRAM(s) Oral at bedtime  clopidogrel Tablet 75 milliGRAM(s) Oral daily  digoxin     Tablet 0.125 milliGRAM(s) Oral every other day  diltiazem    milliGRAM(s) Oral daily  docusate sodium 100 milliGRAM(s) Oral two times a day  furosemide Infusion 10 mG/Hr (5 mL/Hr) IV Continuous <Continuous>  heparin  Injectable 5000 Unit(s) SubCutaneous every 8 hours  macitentan (OPSUMIT) 10 milliGRAM(s) 10 milliGRAM(s) Oral daily  methimazole 5 milliGRAM(s) Oral daily  metoprolol     tartrate 25 milliGRAM(s) Oral every 8 hours  pantoprazole    Tablet 40 milliGRAM(s) Oral before breakfast  predniSONE   Tablet 5 milliGRAM(s) Oral daily  roflumilast 500 MICROGram(s) Oral daily  tiotropium 2.5 MICROgram(s)/olodaterol 2.5 MICROgram(s) Inhaler 2 Puff(s) Inhalation daily    MEDICATIONS  (PRN):  acetaminophen   Tablet 650 milliGRAM(s) Oral every 6 hours PRN Moderate Pain (4 - 6)  ondansetron    Tablet 4 milliGRAM(s) Oral every 8 hours PRN Nausea and/or Vomiting  sodium chloride 0.65% Nasal 1 Spray(s) Both Nostrils five times a day PRN dry nose    Allergies    No Known Allergies    Intolerances    albuterol (Unknown)      PHYSICAL EXAM:  Constitutional: NAD  HEENT: Normocephalic, EOMI  Neck:  No JVD  Respiratory: CTA B/L, No wheezes  Cardiovascular: S1, S2, RRR, + systolic murmur  Gastrointestinal: BS+, + distention, + fluid wave  Extremities: + peripheral edema le b/l  Neurological: AAOX3, no focal deficits  Psychiatric: Normal mood, normal affect  : No Gottlieb    Vital Signs Last 24 Hrs  T(C): 36.7 (04 Dec 2017 20:55), Max: 36.7 (04 Dec 2017 05:58)  T(F): 98 (04 Dec 2017 20:55), Max: 98 (04 Dec 2017 05:58)  HR: 98 (04 Dec 2017 20:55) (89 - 112)  BP: 102/62 (04 Dec 2017 20:55) (102/62 - 116/73)  BP(mean): --  RR: 18 (04 Dec 2017 20:55) (18 - 18)  SpO2: 96% (04 Dec 2017 20:55) (92% - 96%)  I&O's Summary    03 Dec 2017 07:01  -  04 Dec 2017 07:00  --------------------------------------------------------  IN: 660 mL / OUT: 1200 mL / NET: -540 mL    04 Dec 2017 07:01  -  04 Dec 2017 22:56  --------------------------------------------------------  IN: 1140 mL / OUT: 400 mL / NET: 740 mL        LABS:                        8.9    5.48  )-----------( 192      ( 04 Dec 2017 07:09 )             29.7     12-04    140  |  95<L>  |  52<H>  ----------------------------<  99  4.0   |  30  |  1.76<H>    Ca    9.4      04 Dec 2017 07:27  Mg     2.1     12-04          Magnesium, Serum: 2.1 mg/dL (12-04 @ 07:27)

## 2017-12-04 NOTE — PROGRESS NOTE ADULT - SUBJECTIVE AND OBJECTIVE BOX
Follow-up Pulm Progress Note    No new respiratory events overnight.  Mildly dyspenic but improved  92% on 4L NC    Medications:  MEDICATIONS  (STANDING):  ALBUTerol/ipratropium for Nebulization 3 milliLiter(s) Nebulizer every 6 hours  allopurinol 200 milliGRAM(s) Oral daily  aspirin enteric coated 81 milliGRAM(s) Oral daily  atorvastatin 20 milliGRAM(s) Oral at bedtime  clopidogrel Tablet 75 milliGRAM(s) Oral daily  digoxin     Tablet 0.125 milliGRAM(s) Oral every other day  diltiazem    milliGRAM(s) Oral daily  docusate sodium 100 milliGRAM(s) Oral two times a day  furosemide Infusion 10 mG/Hr (5 mL/Hr) IV Continuous <Continuous>  heparin  Injectable 5000 Unit(s) SubCutaneous every 8 hours  macitentan (OPSUMIT) 10 milliGRAM(s) 10 milliGRAM(s) Oral daily  methimazole 5 milliGRAM(s) Oral daily  metoprolol     tartrate 25 milliGRAM(s) Oral every 8 hours  pantoprazole    Tablet 40 milliGRAM(s) Oral before breakfast  predniSONE   Tablet 5 milliGRAM(s) Oral daily  roflumilast 500 MICROGram(s) Oral daily  tiotropium 2.5 MICROgram(s)/olodaterol 2.5 MICROgram(s) Inhaler 2 Puff(s) Inhalation daily    MEDICATIONS  (PRN):  acetaminophen   Tablet 650 milliGRAM(s) Oral every 6 hours PRN Moderate Pain (4 - 6)  ondansetron    Tablet 4 milliGRAM(s) Oral every 8 hours PRN Nausea and/or Vomiting  sodium chloride 0.65% Nasal 1 Spray(s) Both Nostrils five times a day PRN dry nose          Vital Signs Last 24 Hrs  T(C): 36.4 (04 Dec 2017 11:46), Max: 37.2 (03 Dec 2017 21:00)  T(F): 97.6 (04 Dec 2017 11:46), Max: 98.9 (03 Dec 2017 21:00)  HR: 112 (04 Dec 2017 11:46) (68 - 112)  BP: 109/59 (04 Dec 2017 11:46) (103/56 - 116/73)  BP(mean): --  RR: 18 (04 Dec 2017 11:46) (18 - 19)  SpO2: 92% (04 Dec 2017 11:46) (92% - 94%) on 4L NC          12-03 @ 07:01  -  12-04 @ 07:00  --------------------------------------------------------  IN: 660 mL / OUT: 1200 mL / NET: -540 mL          LABS:                        8.9    5.48  )-----------( 192      ( 04 Dec 2017 07:09 )             29.7     12-04    140  |  95<L>  |  52<H>  ----------------------------<  99  4.0   |  30  |  1.76<H>    Ca    9.4      04 Dec 2017 07:27  Mg     2.1     12-04            CAPILLARY BLOOD GLUCOSE                            CULTURES: (if applicable)          Physical Examination:  PULM: Decreased BS at bases  CVS: S1, S2 irreg  RADIOLOGY REVIEWED  CXR: L pleural effusion, improved congestion

## 2017-12-04 NOTE — PROGRESS NOTE ADULT - ASSESSMENT
sev copd  phtn  chf  volume overload/ascites  bucky    appreciate renal and pulm input  continue current care  eventual para when renal agrees

## 2017-12-04 NOTE — PROGRESS NOTE ADULT - SUBJECTIVE AND OBJECTIVE BOX
[facility] [phone]        Patient seen and examined in bed.  She is breathing easier and remains on a Lasix gtt          MEDICATIONS  (STANDING):  ALBUTerol/ipratropium for Nebulization 3 milliLiter(s) Nebulizer every 6 hours  allopurinol 200 milliGRAM(s) Oral daily  aspirin enteric coated 81 milliGRAM(s) Oral daily  atorvastatin 20 milliGRAM(s) Oral at bedtime  clopidogrel Tablet 75 milliGRAM(s) Oral daily  digoxin     Tablet 0.125 milliGRAM(s) Oral every other day  diltiazem    milliGRAM(s) Oral daily  docusate sodium 100 milliGRAM(s) Oral two times a day  furosemide Infusion 10 mG/Hr (5 mL/Hr) IV Continuous <Continuous>  heparin  Injectable 5000 Unit(s) SubCutaneous every 8 hours  macitentan (OPSUMIT) 10 milliGRAM(s) 10 milliGRAM(s) Oral daily  methimazole 5 milliGRAM(s) Oral daily  metoprolol     tartrate 25 milliGRAM(s) Oral every 8 hours  pantoprazole    Tablet 40 milliGRAM(s) Oral before breakfast  predniSONE   Tablet 5 milliGRAM(s) Oral daily  roflumilast 500 MICROGram(s) Oral daily  tiotropium 2.5 MICROgram(s)/olodaterol 2.5 MICROgram(s) Inhaler 2 Puff(s) Inhalation daily      VITAL:  T(C): , Max: 37.2 (12-03-17 @ 21:00)  T(F): , Max: 98.9 (12-03-17 @ 21:00)  HR: 89 (12-04-17 @ 05:58)  BP: 105/58 (12-04-17 @ 05:58)  BP(mean): --  RR: 18 (12-04-17 @ 05:58)  SpO2: 94% (12-04-17 @ 05:58)  Wt(kg): --    I and O's:    12-03 @ 07:01  -  12-04 @ 07:00  --------------------------------------------------------  IN: 660 mL / OUT: 1200 mL / NET: -540 mL    12-04 @ 07:01  -  12-04 @ 10:27  --------------------------------------------------------  IN: 360 mL / OUT: 0 mL / NET: 360 mL          PHYSICAL EXAM:    Constitutional: NAD  HEENT: PERRLA    Neck:  No JVD at 60 degrees but still has at 30 degrees  Respiratory: CTAB/L  Cardiovascular: S1 and S2  Gastrointestinal: BS+, soft, NT/ND  Extremities: +  peripheral edema  Neurological: A/O x 3, no focal deficits  Psychiatric: Normal mood, normal affect  : No Gottlieb  Skin: No rashes  Access: Not applicable    LABS:                        8.9    5.48  )-----------( 192      ( 04 Dec 2017 07:09 )             29.7     12-04    140  |  95<L>  |  52<H>  ----------------------------<  99  4.0   |  30  |  1.76<H>    Ca    9.4      04 Dec 2017 07:27  Mg     2.1     12-04            Urine Studies:          RADIOLOGY & ADDITIONAL STUDIES:

## 2017-12-05 LAB
ANION GAP SERPL CALC-SCNC: 14 MMOL/L — SIGNIFICANT CHANGE UP (ref 5–17)
BUN SERPL-MCNC: 53 MG/DL — HIGH (ref 7–23)
CALCIUM SERPL-MCNC: 9.1 MG/DL — SIGNIFICANT CHANGE UP (ref 8.4–10.5)
CHLORIDE SERPL-SCNC: 93 MMOL/L — LOW (ref 96–108)
CO2 SERPL-SCNC: 33 MMOL/L — HIGH (ref 22–31)
CREAT SERPL-MCNC: 1.82 MG/DL — HIGH (ref 0.5–1.3)
GLUCOSE SERPL-MCNC: 99 MG/DL — SIGNIFICANT CHANGE UP (ref 70–99)
HCT VFR BLD CALC: 31.7 % — LOW (ref 34.5–45)
HGB BLD-MCNC: 9.6 G/DL — LOW (ref 11.5–15.5)
MCHC RBC-ENTMCNC: 26.4 PG — LOW (ref 27–34)
MCHC RBC-ENTMCNC: 30.3 GM/DL — LOW (ref 32–36)
MCV RBC AUTO: 87.1 FL — SIGNIFICANT CHANGE UP (ref 80–100)
PLATELET # BLD AUTO: 201 K/UL — SIGNIFICANT CHANGE UP (ref 150–400)
POTASSIUM SERPL-MCNC: 3.7 MMOL/L — SIGNIFICANT CHANGE UP (ref 3.5–5.3)
POTASSIUM SERPL-SCNC: 3.7 MMOL/L — SIGNIFICANT CHANGE UP (ref 3.5–5.3)
RBC # BLD: 3.64 M/UL — LOW (ref 3.8–5.2)
RBC # FLD: 17.7 % — HIGH (ref 10.3–14.5)
SODIUM SERPL-SCNC: 140 MMOL/L — SIGNIFICANT CHANGE UP (ref 135–145)
WBC # BLD: 6.4 K/UL — SIGNIFICANT CHANGE UP (ref 3.8–10.5)
WBC # FLD AUTO: 6.4 K/UL — SIGNIFICANT CHANGE UP (ref 3.8–10.5)

## 2017-12-05 RX ORDER — ACETAMINOPHEN 500 MG
1000 TABLET ORAL ONCE
Qty: 0 | Refills: 0 | Status: COMPLETED | OUTPATIENT
Start: 2017-12-05 | End: 2017-12-05

## 2017-12-05 RX ORDER — CEFTRIAXONE 500 MG/1
1 INJECTION, POWDER, FOR SOLUTION INTRAMUSCULAR; INTRAVENOUS ONCE
Qty: 0 | Refills: 0 | Status: COMPLETED | OUTPATIENT
Start: 2017-12-05 | End: 2017-12-06

## 2017-12-05 RX ADMIN — CLOPIDOGREL BISULFATE 75 MILLIGRAM(S): 75 TABLET, FILM COATED ORAL at 12:40

## 2017-12-05 RX ADMIN — Medication 3 MILLILITER(S): at 12:40

## 2017-12-05 RX ADMIN — Medication 400 MILLIGRAM(S): at 20:58

## 2017-12-05 RX ADMIN — Medication 200 MILLIGRAM(S): at 15:10

## 2017-12-05 RX ADMIN — TIOTROPIUM BROMIDE AND OLODATEROL 2 PUFF(S): 3.124; 2.736 SPRAY, METERED RESPIRATORY (INHALATION) at 10:03

## 2017-12-05 RX ADMIN — Medication 120 MILLIGRAM(S): at 10:05

## 2017-12-05 RX ADMIN — ATORVASTATIN CALCIUM 20 MILLIGRAM(S): 80 TABLET, FILM COATED ORAL at 23:09

## 2017-12-05 RX ADMIN — Medication 25 MILLIGRAM(S): at 06:30

## 2017-12-05 RX ADMIN — ROFLUMILAST 500 MICROGRAM(S): 500 TABLET ORAL at 10:05

## 2017-12-05 RX ADMIN — Medication 5 MILLIGRAM(S): at 10:05

## 2017-12-05 RX ADMIN — Medication 81 MILLIGRAM(S): at 12:40

## 2017-12-05 RX ADMIN — Medication 1000 MILLIGRAM(S): at 21:28

## 2017-12-05 RX ADMIN — Medication 25 MILLIGRAM(S): at 15:10

## 2017-12-05 NOTE — PROGRESS NOTE ADULT - ASSESSMENT
Assessment and Plan:   · Assessment		  A 71-year-old female with past medical history of diabetes, hypertension, severe pulmonary hypertension, right ventricular dysfunction, presents with not only left heart failure but also stigmata of acute cor pulmonale.  The patient's acute renal failure is likely hemodynamic in nature.  Goals of therapy are to improve her hemodynamics in order to improve her forward flow and renal perfusion.  CASSANDRA  Severe PHTN  Cor Pulm  Failure to thrive  Abd pain -RO SBP  1.	Renal: DC the Lasix  drip .  Slight bump in creatinine. Monitor BMP daily.  JVD is improving but still present  2.	Gastroenterology: No renal objection to LARGE VOLUME PARACENTESIS;  IV alb to follow procedure;  Please check cx as well;  IV Ceftriaxone at 5pm or following the paracentesis and cx  3.	Pulmonary: Pulmonary nebulizers.  Continue with nasal cannula oxygen at all times.  4.	Cardiology: Monitor hemodynamics and volume status.     Options for her PHTN are limited

## 2017-12-05 NOTE — PROGRESS NOTE ADULT - ASSESSMENT
severe copd  severe PHTN  vol overload/ ascites    d/c lasix drip  plan for para alix with post procedural albumin  start abx after para  f/u cx and studies from para  continue oxygen

## 2017-12-05 NOTE — PROGRESS NOTE ADULT - ASSESSMENT
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 Recent admission at [facility] approximately 5 weeks ago for large volume paracentesis (6.1L removed). Now presents again with RV failure-worsened LE edema, recurrent ascites, pleural effusions and pulmonary edema with worsened dyspnea and CASSANDRA on CKD

## 2017-12-05 NOTE — CHART NOTE - NSCHARTNOTEFT_GEN_A_CORE
Called by RN for pt's c/o of abdominal pain most likely related to ascites. Offered Tylenol po or IV pt refusing the medication and not even wants to try. Reviewed charts pt was receiving only tylenol for pain. BUN/cr 52/1.76. Cont with IV lasix strict I&O's .  Will f/u with day team.      Zeina Hay NP, #  [id number]

## 2017-12-05 NOTE — PROGRESS NOTE ADULT - SUBJECTIVE AND OBJECTIVE BOX
Follow-up Pulm Progress Note    No new respiratory events overnight.  Denies SOB/CP.   92% on 4L NC  c/o abd pain    Medications:  MEDICATIONS  (STANDING):  ALBUTerol/ipratropium for Nebulization 3 milliLiter(s) Nebulizer every 6 hours  allopurinol 200 milliGRAM(s) Oral daily  aspirin enteric coated 81 milliGRAM(s) Oral daily  atorvastatin 20 milliGRAM(s) Oral at bedtime  cefTRIAXone   IVPB 1 Gram(s) IV Intermittent once  clopidogrel Tablet 75 milliGRAM(s) Oral daily  digoxin     Tablet 0.125 milliGRAM(s) Oral every other day  diltiazem    milliGRAM(s) Oral daily  docusate sodium 100 milliGRAM(s) Oral two times a day  heparin  Injectable 5000 Unit(s) SubCutaneous every 8 hours  macitentan (OPSUMIT) 10 milliGRAM(s) 10 milliGRAM(s) Oral daily  methimazole 5 milliGRAM(s) Oral daily  metoprolol     tartrate 25 milliGRAM(s) Oral every 8 hours  pantoprazole    Tablet 40 milliGRAM(s) Oral before breakfast  predniSONE   Tablet 5 milliGRAM(s) Oral daily  roflumilast 500 MICROGram(s) Oral daily  tiotropium 2.5 MICROgram(s)/olodaterol 2.5 MICROgram(s) Inhaler 2 Puff(s) Inhalation daily    MEDICATIONS  (PRN):  acetaminophen   Tablet 650 milliGRAM(s) Oral every 6 hours PRN Moderate Pain (4 - 6)  ondansetron    Tablet 4 milliGRAM(s) Oral every 8 hours PRN Nausea and/or Vomiting  sodium chloride 0.65% Nasal 1 Spray(s) Both Nostrils five times a day PRN dry nose          Vital Signs Last 24 Hrs  T(C): 36.6 (05 Dec 2017 11:51), Max: 36.8 (05 Dec 2017 08:31)  T(F): 97.9 (05 Dec 2017 11:51), Max: 98.3 (05 Dec 2017 08:31)  HR: 120 (05 Dec 2017 15:07) (77 - 120)  BP: 115/62 (05 Dec 2017 15:07) (101/63 - 116/72)  BP(mean): --  RR: 18 (05 Dec 2017 11:51) (16 - 18)  SpO2: 91% (05 Dec 2017 11:51) (91% - 97%) on 4L NC          12-04 @ 07:01  -  12-05 @ 07:00  --------------------------------------------------------  IN: 1350 mL / OUT: 800 mL / NET: 550 mL          LABS:                        9.6    6.40  )-----------( 201      ( 05 Dec 2017 07:15 )             31.7     12-05    140  |  93<L>  |  53<H>  ----------------------------<  99  3.7   |  33<H>  |  1.82<H>    Ca    9.1      05 Dec 2017 07:25  Mg     2.1     12-04              Physical Examination:  PULM: Bibasilar crackles   CVS: S1, S2 RRR    RADIOLOGY REVIEWED

## 2017-12-05 NOTE — PROGRESS NOTE ADULT - SUBJECTIVE AND OBJECTIVE BOX
[facility] [phone]        Patient seen and examined in bed.  She had abd pain last night.  Pain was on the right side and associated with burping.  SHe feels better this am.  SOB is slightly improved      ROS-+weakness + fatigue + abd distention; all other ros were reviewed and were negative       MEDICATIONS  (STANDING):  ALBUTerol/ipratropium for Nebulization 3 milliLiter(s) Nebulizer every 6 hours  allopurinol 200 milliGRAM(s) Oral daily  aspirin enteric coated 81 milliGRAM(s) Oral daily  atorvastatin 20 milliGRAM(s) Oral at bedtime  cefTRIAXone   IVPB 1 Gram(s) IV Intermittent once  clopidogrel Tablet 75 milliGRAM(s) Oral daily  digoxin     Tablet 0.125 milliGRAM(s) Oral every other day  diltiazem    milliGRAM(s) Oral daily  docusate sodium 100 milliGRAM(s) Oral two times a day  heparin  Injectable 5000 Unit(s) SubCutaneous every 8 hours  macitentan (OPSUMIT) 10 milliGRAM(s) 10 milliGRAM(s) Oral daily  methimazole 5 milliGRAM(s) Oral daily  metoprolol     tartrate 25 milliGRAM(s) Oral every 8 hours  pantoprazole    Tablet 40 milliGRAM(s) Oral before breakfast  predniSONE   Tablet 5 milliGRAM(s) Oral daily  roflumilast 500 MICROGram(s) Oral daily  tiotropium 2.5 MICROgram(s)/olodaterol 2.5 MICROgram(s) Inhaler 2 Puff(s) Inhalation daily      VITAL:  T(C): , Max: 36.8 (12-05-17 @ 08:31)  T(F): , Max: 98.3 (12-05-17 @ 08:31)  HR: 87 (12-05-17 @ 08:31)  BP: 116/72 (12-05-17 @ 08:31)  BP(mean): --  RR: 16 (12-05-17 @ 08:31)  SpO2: 93% (12-05-17 @ 08:31)  Wt(kg): --    I and O's:    12-04 @ 07:01  -  12-05 @ 07:00  --------------------------------------------------------  IN: 1350 mL / OUT: 800 mL / NET: 550 mL    12-05 @ 07:01  -  12-05 @ 11:38  --------------------------------------------------------  IN: 40 mL / OUT: 0 mL / NET: 40 mL          PHYSICAL EXAM:    Constitutional: NAD  HEENT: PERRLA    Neck:  +  JVD  Respiratory: CTAB/L  Cardiovascular: S1 and S2  Gastrointestinal: BS+, soft, NT + distended  Extremities: + 1 peripheral edema  Neurological: A/O x 3, no focal deficits  Psychiatric: Normal mood, normal affect  : No Gottlieb  Skin: No rashes  Access: Not applicable    LABS:                        9.6    6.40  )-----------( 201      ( 05 Dec 2017 07:15 )             31.7     12-05    140  |  93<L>  |  53<H>  ----------------------------<  99  3.7   |  33<H>  |  1.82<H>    Ca    9.1      05 Dec 2017 07:25  Mg     2.1     12-04            Urine Studies:          RADIOLOGY & ADDITIONAL STUDIES:      < from: US Abdomen Limited (12.04.17 @ 10:59) >        INTERPRETATION:  Clinical information: Heart failure, evaluate ascites   prior to paracentesis.    Comparison: Abdominal sonogram dated 28 November.    Findings: Sonographic survey of the abdomen reveals a large volume of   ascites. Fluid is present in all 4 quadrants with the largest amount in   the lower quadrants. The fluid appears simple without intervening   septations or loculations.    Impression:Large volume ascites.            < end of copied text >

## 2017-12-05 NOTE — PROGRESS NOTE ADULT - SUBJECTIVE AND OBJECTIVE BOX
MEDICINE, PROGRESS NOTE [phone]    FRAN ALEXANDRA 71y [id number]    Patient seen and examined.  Patient is a 71y old  Female who presents with a chief complaint of shortness of breath, abdominal pain (28 Nov 2017 22:19)  Pt having abd pain since last night    PAST MEDICAL & SURGICAL HISTORY:  Hyperthyroidism  JOSE (obstructive sleep apnea)  Epistaxis  GIB (gastrointestinal bleeding)  CAD S/P percutaneous coronary angioplasty  Afib  Pulmonary HTN  GERD (gastroesophageal reflux disease)  Obesity  Cardiomegaly  Valvular heart disease  COPD (chronic obstructive pulmonary disease): Home O2  Sleep Apnea: by criteria  Loose, teeth: 3 bottom front loose teeth  Female stress incontinence  Shoulder pain, right  Constipation  Arthritis of Knee  H/O heartburn  History of lung cancer  Obese  Hx of hyperlipidemia  Asthma  Diabetes mellitus: type 2  dx about 4-5 years ago   no daily fingerstick  H/O: HTN (hypertension)  Enlarged lymph nodes  Lymph nodes enlarged: s/p biopsy mediastinal  benign  H/O endoscopy  left upper lobectomy    MEDICATIONS  (STANDING):  acetaminophen  IVPB. 1000 milliGRAM(s) IV Intermittent once  ALBUTerol/ipratropium for Nebulization 3 milliLiter(s) Nebulizer every 6 hours  allopurinol 200 milliGRAM(s) Oral daily  aspirin enteric coated 81 milliGRAM(s) Oral daily  atorvastatin 20 milliGRAM(s) Oral at bedtime  cefTRIAXone   IVPB 1 Gram(s) IV Intermittent once  clopidogrel Tablet 75 milliGRAM(s) Oral daily  digoxin     Tablet 0.125 milliGRAM(s) Oral every other day  diltiazem    milliGRAM(s) Oral daily  docusate sodium 100 milliGRAM(s) Oral two times a day  heparin  Injectable 5000 Unit(s) SubCutaneous every 8 hours  macitentan (OPSUMIT) 10 milliGRAM(s) 10 milliGRAM(s) Oral daily  methimazole 5 milliGRAM(s) Oral daily  metoprolol     tartrate 25 milliGRAM(s) Oral every 8 hours  pantoprazole    Tablet 40 milliGRAM(s) Oral before breakfast  predniSONE   Tablet 5 milliGRAM(s) Oral daily  roflumilast 500 MICROGram(s) Oral daily  tiotropium 2.5 MICROgram(s)/olodaterol 2.5 MICROgram(s) Inhaler 2 Puff(s) Inhalation daily    MEDICATIONS  (PRN):  acetaminophen   Tablet 650 milliGRAM(s) Oral every 6 hours PRN Moderate Pain (4 - 6)  ondansetron    Tablet 4 milliGRAM(s) Oral every 8 hours PRN Nausea and/or Vomiting  sodium chloride 0.65% Nasal 1 Spray(s) Both Nostrils five times a day PRN dry nose    Allergies    No Known Allergies    Intolerances    albuterol (Unknown)      PHYSICAL EXAM:  Constitutional: NAD  HEENT: Normocephalic, EOMI  Neck:  No JVD  Respiratory: CTA B/L, No wheezes, dec bs at bases  Cardiovascular: S1, S2, RRR, + systolic murmur  Gastrointestinal: Bs hypo, + ruq tenderness, distended, + fluid wave  Extremities: + peripheral edema  Neurological: AAOX3, no focal deficits  Psychiatric: Normal mood, normal affect  : No Gottlieb    Vital Signs Last 24 Hrs  T(C): 36.6 (05 Dec 2017 11:51), Max: 36.8 (05 Dec 2017 08:31)  T(F): 97.9 (05 Dec 2017 11:51), Max: 98.3 (05 Dec 2017 08:31)  HR: 120 (05 Dec 2017 15:07) (77 - 120)  BP: 115/62 (05 Dec 2017 15:07) (101/63 - 116/72)  BP(mean): --  RR: 18 (05 Dec 2017 11:51) (16 - 18)  SpO2: 91% (05 Dec 2017 11:51) (91% - 97%)  I&O's Summary    04 Dec 2017 07:01  -  05 Dec 2017 07:00  --------------------------------------------------------  IN: 1350 mL / OUT: 800 mL / NET: 550 mL    05 Dec 2017 07:01  -  05 Dec 2017 20:01  --------------------------------------------------------  IN: 380 mL / OUT: 400 mL / NET: -20 mL        LABS:                        9.6    6.40  )-----------( 201      ( 05 Dec 2017 07:15 )             31.7     12-05    140  |  93<L>  |  53<H>  ----------------------------<  99  3.7   |  33<H>  |  1.82<H>    Ca    9.1      05 Dec 2017 07:25  Mg     2.1     12-04

## 2017-12-05 NOTE — PROGRESS NOTE ADULT - PROBLEM SELECTOR PLAN 1
2nd pleural effusions/pulmonary edema and restrictive disease from ascites   -Keep sp02>90% on supplemental oxygen

## 2017-12-06 ENCOUNTER — RESULT REVIEW (OUTPATIENT)
Age: 72
End: 2017-12-06

## 2017-12-06 LAB
ALBUMIN FLD-MCNC: 2.2 G/DL — SIGNIFICANT CHANGE UP
ALBUMIN SERPL ELPH-MCNC: 3.1 G/DL — LOW (ref 3.3–5)
ALBUMIN SERPL ELPH-MCNC: 3.4 G/DL — SIGNIFICANT CHANGE UP (ref 3.3–5)
ALP SERPL-CCNC: 39 U/L — LOW (ref 40–120)
ALT FLD-CCNC: 5 U/L — LOW (ref 10–45)
ANION GAP SERPL CALC-SCNC: 10 MMOL/L — SIGNIFICANT CHANGE UP (ref 5–17)
ANION GAP SERPL CALC-SCNC: 10 MMOL/L — SIGNIFICANT CHANGE UP (ref 5–17)
AST SERPL-CCNC: 13 U/L — SIGNIFICANT CHANGE UP (ref 10–40)
B PERT IGG+IGM PNL SER: ABNORMAL
BILIRUB DIRECT SERPL-MCNC: 0.3 MG/DL — HIGH (ref 0–0.2)
BILIRUB INDIRECT FLD-MCNC: 0.4 MG/DL — SIGNIFICANT CHANGE UP (ref 0.2–1)
BILIRUB SERPL-MCNC: 0.7 MG/DL — SIGNIFICANT CHANGE UP (ref 0.2–1.2)
BUN SERPL-MCNC: 55 MG/DL — HIGH (ref 7–23)
BUN SERPL-MCNC: 55 MG/DL — HIGH (ref 7–23)
CALCIUM SERPL-MCNC: 8.8 MG/DL — SIGNIFICANT CHANGE UP (ref 8.4–10.5)
CALCIUM SERPL-MCNC: 9.1 MG/DL — SIGNIFICANT CHANGE UP (ref 8.4–10.5)
CHLORIDE SERPL-SCNC: 95 MMOL/L — LOW (ref 96–108)
CHLORIDE SERPL-SCNC: 96 MMOL/L — SIGNIFICANT CHANGE UP (ref 96–108)
CO2 SERPL-SCNC: 33 MMOL/L — HIGH (ref 22–31)
CO2 SERPL-SCNC: 34 MMOL/L — HIGH (ref 22–31)
COLOR FLD: YELLOW — SIGNIFICANT CHANGE UP
CREAT SERPL-MCNC: 1.88 MG/DL — HIGH (ref 0.5–1.3)
CREAT SERPL-MCNC: 2.24 MG/DL — HIGH (ref 0.5–1.3)
FLUID INTAKE SUBSTANCE CLASS: SIGNIFICANT CHANGE UP
FLUID SEGMENTED GRANULOCYTES: 60 % — SIGNIFICANT CHANGE UP
GLUCOSE FLD-MCNC: 104 MG/DL — SIGNIFICANT CHANGE UP
GLUCOSE SERPL-MCNC: 125 MG/DL — HIGH (ref 70–99)
GLUCOSE SERPL-MCNC: 92 MG/DL — SIGNIFICANT CHANGE UP (ref 70–99)
GRAM STN FLD: SIGNIFICANT CHANGE UP
HCT VFR BLD CALC: 30.1 % — LOW (ref 34.5–45)
HCT VFR BLD CALC: 30.5 % — LOW (ref 34.5–45)
HGB BLD-MCNC: 8.9 G/DL — LOW (ref 11.5–15.5)
HGB BLD-MCNC: 9.7 G/DL — LOW (ref 11.5–15.5)
LDH SERPL L TO P-CCNC: 85 U/L — SIGNIFICANT CHANGE UP
LYMPHOCYTES # FLD: 4 % — SIGNIFICANT CHANGE UP
MAGNESIUM SERPL-MCNC: 2.2 MG/DL — SIGNIFICANT CHANGE UP (ref 1.6–2.6)
MCHC RBC-ENTMCNC: 25.7 PG — LOW (ref 27–34)
MCHC RBC-ENTMCNC: 27.8 PG — SIGNIFICANT CHANGE UP (ref 27–34)
MCHC RBC-ENTMCNC: 29.6 GM/DL — LOW (ref 32–36)
MCHC RBC-ENTMCNC: 31.8 GM/DL — LOW (ref 32–36)
MCV RBC AUTO: 87 FL — SIGNIFICANT CHANGE UP (ref 80–100)
MCV RBC AUTO: 87.5 FL — SIGNIFICANT CHANGE UP (ref 80–100)
MESOTHL CELL # FLD: 20 % — SIGNIFICANT CHANGE UP
MONOS+MACROS # FLD: 15 % — SIGNIFICANT CHANGE UP
NIGHT BLUE STAIN TISS: SIGNIFICANT CHANGE UP
OTHER CELLS FLD MANUAL: 1 % — SIGNIFICANT CHANGE UP
PHOSPHATE SERPL-MCNC: 3.6 MG/DL — SIGNIFICANT CHANGE UP (ref 2.5–4.5)
PLATELET # BLD AUTO: 192 K/UL — SIGNIFICANT CHANGE UP (ref 150–400)
PLATELET # BLD AUTO: 193 K/UL — SIGNIFICANT CHANGE UP (ref 150–400)
POTASSIUM SERPL-MCNC: 4.1 MMOL/L — SIGNIFICANT CHANGE UP (ref 3.5–5.3)
POTASSIUM SERPL-MCNC: 4.2 MMOL/L — SIGNIFICANT CHANGE UP (ref 3.5–5.3)
POTASSIUM SERPL-SCNC: 4.1 MMOL/L — SIGNIFICANT CHANGE UP (ref 3.5–5.3)
POTASSIUM SERPL-SCNC: 4.2 MMOL/L — SIGNIFICANT CHANGE UP (ref 3.5–5.3)
PROT FLD-MCNC: 3.2 G/DL — SIGNIFICANT CHANGE UP
PROT SERPL-MCNC: 5.4 G/DL — LOW (ref 6–8.3)
RBC # BLD: 3.46 M/UL — LOW (ref 3.8–5.2)
RBC # BLD: 3.49 M/UL — LOW (ref 3.8–5.2)
RBC # FLD: 17.2 % — HIGH (ref 10.3–14.5)
RBC # FLD: 18.1 % — HIGH (ref 10.3–14.5)
RCV VOL RI: 143 /UL — HIGH (ref 0–5)
SODIUM SERPL-SCNC: 138 MMOL/L — SIGNIFICANT CHANGE UP (ref 135–145)
SODIUM SERPL-SCNC: 140 MMOL/L — SIGNIFICANT CHANGE UP (ref 135–145)
SPECIMEN SOURCE: SIGNIFICANT CHANGE UP
SPECIMEN SOURCE: SIGNIFICANT CHANGE UP
TOTAL NUCLEATED CELL COUNT, BODY FLUID: 208 /UL — HIGH (ref 0–5)
TUBE TYPE: SIGNIFICANT CHANGE UP
WBC # BLD: 5.39 K/UL — SIGNIFICANT CHANGE UP (ref 3.8–10.5)
WBC # BLD: 7.4 K/UL — SIGNIFICANT CHANGE UP (ref 3.8–10.5)
WBC # FLD AUTO: 5.39 K/UL — SIGNIFICANT CHANGE UP (ref 3.8–10.5)
WBC # FLD AUTO: 7.4 K/UL — SIGNIFICANT CHANGE UP (ref 3.8–10.5)

## 2017-12-06 PROCEDURE — 49083 ABD PARACENTESIS W/IMAGING: CPT

## 2017-12-06 PROCEDURE — 88112 CYTOPATH CELL ENHANCE TECH: CPT | Mod: 26

## 2017-12-06 PROCEDURE — 88305 TISSUE EXAM BY PATHOLOGIST: CPT | Mod: 26

## 2017-12-06 RX ORDER — ALBUMIN HUMAN 25 %
250 VIAL (ML) INTRAVENOUS ONCE
Qty: 0 | Refills: 0 | Status: DISCONTINUED | OUTPATIENT
Start: 2017-12-06 | End: 2017-12-06

## 2017-12-06 RX ORDER — ALBUMIN HUMAN 25 %
50 VIAL (ML) INTRAVENOUS ONCE
Qty: 0 | Refills: 0 | Status: DISCONTINUED | OUTPATIENT
Start: 2017-12-06 | End: 2017-12-06

## 2017-12-06 RX ORDER — ACETAMINOPHEN 500 MG
1000 TABLET ORAL ONCE
Qty: 0 | Refills: 0 | Status: COMPLETED | OUTPATIENT
Start: 2017-12-06 | End: 2017-12-06

## 2017-12-06 RX ADMIN — Medication 200 MILLIGRAM(S): at 15:37

## 2017-12-06 RX ADMIN — Medication 1000 MILLIGRAM(S): at 17:05

## 2017-12-06 RX ADMIN — TIOTROPIUM BROMIDE AND OLODATEROL 2 PUFF(S): 3.124; 2.736 SPRAY, METERED RESPIRATORY (INHALATION) at 10:53

## 2017-12-06 RX ADMIN — CLOPIDOGREL BISULFATE 75 MILLIGRAM(S): 75 TABLET, FILM COATED ORAL at 15:38

## 2017-12-06 RX ADMIN — Medication 81 MILLIGRAM(S): at 15:37

## 2017-12-06 RX ADMIN — Medication 25 MILLIGRAM(S): at 22:09

## 2017-12-06 RX ADMIN — Medication 25 MILLIGRAM(S): at 06:48

## 2017-12-06 RX ADMIN — Medication 0.12 MILLIGRAM(S): at 15:38

## 2017-12-06 RX ADMIN — Medication 400 MILLIGRAM(S): at 16:50

## 2017-12-06 RX ADMIN — Medication 25 MILLIGRAM(S): at 15:39

## 2017-12-06 RX ADMIN — CEFTRIAXONE 100 GRAM(S): 500 INJECTION, POWDER, FOR SOLUTION INTRAMUSCULAR; INTRAVENOUS at 15:39

## 2017-12-06 RX ADMIN — ROFLUMILAST 500 MICROGRAM(S): 500 TABLET ORAL at 10:07

## 2017-12-06 RX ADMIN — ATORVASTATIN CALCIUM 20 MILLIGRAM(S): 80 TABLET, FILM COATED ORAL at 22:09

## 2017-12-06 RX ADMIN — Medication 120 MILLIGRAM(S): at 10:07

## 2017-12-06 RX ADMIN — Medication 5 MILLIGRAM(S): at 10:08

## 2017-12-06 NOTE — PROGRESS NOTE ADULT - SUBJECTIVE AND OBJECTIVE BOX
[facility] [phone]        Patient seen and examined in bed.  The paracentesis was not done yesterday        MEDICATIONS  (STANDING):  ALBUTerol/ipratropium for Nebulization 3 milliLiter(s) Nebulizer every 6 hours  allopurinol 200 milliGRAM(s) Oral daily  aspirin enteric coated 81 milliGRAM(s) Oral daily  atorvastatin 20 milliGRAM(s) Oral at bedtime  cefTRIAXone   IVPB 1 Gram(s) IV Intermittent once  clopidogrel Tablet 75 milliGRAM(s) Oral daily  digoxin     Tablet 0.125 milliGRAM(s) Oral every other day  diltiazem    milliGRAM(s) Oral daily  docusate sodium 100 milliGRAM(s) Oral two times a day  heparin  Injectable 5000 Unit(s) SubCutaneous every 8 hours  macitentan (OPSUMIT) 10 milliGRAM(s) 10 milliGRAM(s) Oral daily  methimazole 5 milliGRAM(s) Oral daily  metoprolol     tartrate 25 milliGRAM(s) Oral every 8 hours  pantoprazole    Tablet 40 milliGRAM(s) Oral before breakfast  predniSONE   Tablet 5 milliGRAM(s) Oral daily  roflumilast 500 MICROGram(s) Oral daily  tiotropium 2.5 MICROgram(s)/olodaterol 2.5 MICROgram(s) Inhaler 2 Puff(s) Inhalation daily      VITAL:  T(C): , Max: 36.9 (12-05-17 @ 21:41)  T(F): , Max: 98.4 (12-05-17 @ 21:41)  HR: 73 (12-06-17 @ 06:23)  BP: 113/65 (12-06-17 @ 06:23)  BP(mean): --  RR: 18 (12-06-17 @ 03:47)  SpO2: 95% (12-06-17 @ 03:47)  Wt(kg): --    I and O's:    12-05 @ 07:01  -  12-06 @ 07:00  --------------------------------------------------------  IN: 380 mL / OUT: 400 mL / NET: -20 mL          PHYSICAL EXAM:    Constitutional: NAD  HEENT: PERRLA    Neck:  +  JVD  Respiratory: CTAB/L  Cardiovascular: S1 and S2  Gastrointestinal: BS+, soft, distended  Extremities: No peripheral edema  Neurological: A/O x 3, no focal deficits  Psychiatric: Normal mood, normal affect  : No Gottlieb  Skin: No rashes  Access: Not applicable    LABS:                        8.9    5.39  )-----------( 192      ( 06 Dec 2017 07:39 )             30.1     12-06    140  |  96  |  55<H>  ----------------------------<  92  4.2   |  34<H>  |  1.88<H>    Ca    9.1      06 Dec 2017 07:29  Phos  3.6     12-06  Mg     2.2     12-06    TPro  5.4<L>  /  Alb  3.1<L>  /  TBili  0.7  /  DBili  0.3<H>  /  AST  13  /  ALT  5<L>  /  AlkPhos  39<L>  12-06          Urine Studies:          RADIOLOGY & ADDITIONAL STUDIES:

## 2017-12-06 NOTE — PROGRESS NOTE ADULT - SUBJECTIVE AND OBJECTIVE BOX
Follow-up Pulm Progress Note    No new respiratory events overnight.  Denies SOB/CP.   c/o abd pain   s/p 8.1L paracentesis     Medications:  MEDICATIONS  (STANDING):  acetaminophen  IVPB. 1000 milliGRAM(s) IV Intermittent once  ALBUTerol/ipratropium for Nebulization 3 milliLiter(s) Nebulizer every 6 hours  allopurinol 200 milliGRAM(s) Oral daily  aspirin enteric coated 81 milliGRAM(s) Oral daily  atorvastatin 20 milliGRAM(s) Oral at bedtime  clopidogrel Tablet 75 milliGRAM(s) Oral daily  digoxin     Tablet 0.125 milliGRAM(s) Oral every other day  diltiazem    milliGRAM(s) Oral daily  docusate sodium 100 milliGRAM(s) Oral two times a day  heparin  Injectable 5000 Unit(s) SubCutaneous every 8 hours  macitentan (OPSUMIT) 10 milliGRAM(s) 10 milliGRAM(s) Oral daily  methimazole 5 milliGRAM(s) Oral daily  metoprolol     tartrate 25 milliGRAM(s) Oral every 8 hours  pantoprazole    Tablet 40 milliGRAM(s) Oral before breakfast  predniSONE   Tablet 5 milliGRAM(s) Oral daily  roflumilast 500 MICROGram(s) Oral daily  tiotropium 2.5 MICROgram(s)/olodaterol 2.5 MICROgram(s) Inhaler 2 Puff(s) Inhalation daily    MEDICATIONS  (PRN):  acetaminophen   Tablet 650 milliGRAM(s) Oral every 6 hours PRN Moderate Pain (4 - 6)  ondansetron    Tablet 4 milliGRAM(s) Oral every 8 hours PRN Nausea and/or Vomiting  sodium chloride 0.65% Nasal 1 Spray(s) Both Nostrils five times a day PRN dry nose          Vital Signs Last 24 Hrs  T(C): 36.7 (06 Dec 2017 14:40), Max: 36.9 (05 Dec 2017 21:41)  T(F): 98 (06 Dec 2017 14:40), Max: 98.4 (05 Dec 2017 21:41)  HR: 103 (06 Dec 2017 14:40) (73 - 103)  BP: 97/61 (06 Dec 2017 14:40) (90/58 - 113/65)  BP(mean): --  RR: 18 (06 Dec 2017 14:40) (18 - 18)  SpO2: 96% (06 Dec 2017 14:40) (92% - 96%) on 4L NC          12-05 @ 07:01  -  12-06 @ 07:00  --------------------------------------------------------  IN: 380 mL / OUT: 400 mL / NET: -20 mL          LABS:                        8.9    5.39  )-----------( 192      ( 06 Dec 2017 07:39 )             30.1     12-06    140  |  96  |  55<H>  ----------------------------<  92  4.2   |  34<H>  |  1.88<H>    Ca    9.1      06 Dec 2017 07:29  Phos  3.6     12-06  Mg     2.2     12-06    TPro  5.4<L>  /  Alb  3.1<L>  /  TBili  0.7  /  DBili  0.3<H>  /  AST  13  /  ALT  5<L>  /  AlkPhos  39<L>  12-06          CAPILLARY BLOOD GLUCOSE                        Fluid characteristics  -- 12-06 @ 14:33  pH --  LDH 85  tprot 3.2    Cell count  Appearance --  Fluid type --  BF lymph --  color --  eosinophil --  PMN --  Mesothelial --  Monocyte --  Other body cells --      CULTURES: (if applicable)          Physical Examination:  PULM: Trace crackles at bases  CVS: S1, S2 RRR    RADIOLOGY REVIEWED  CXR: L pleural effusion  pulm edema

## 2017-12-06 NOTE — PROGRESS NOTE ADULT - SUBJECTIVE AND OBJECTIVE BOX
71 year old female with a history of severe pHTN complicated by RV failure (on 3L NC at home), Afib not on AC, CAD s/p PCI, COPD, T2DM with ascites presented to IR for paracentesis (diagnostic and therapeutic) . Renal clearance noted .        Allergies: albuterol (Unknown)  No Known Allergies      PAST MEDICAL & SURGICAL HISTORY:  Hyperthyroidism  JOSE (obstructive sleep apnea)  Epistaxis  GIB (gastrointestinal bleeding)  CAD S/P percutaneous coronary angioplasty  Afib  Pulmonary HTN  GERD (gastroesophageal reflux disease)  Obesity  Cardiomegaly  Valvular heart disease  COPD (chronic obstructive pulmonary disease): Home O2  Sleep Apnea: by criteria  Loose, teeth: 3 bottom front loose teeth  Female stress incontinence  Shoulder pain, right  Constipation  Arthritis of Knee  H/O heartburn  History of lung cancer  Obese  Hx of hyperlipidemia  Asthma  Diabetes mellitus: type 2  dx about 4-5 years ago   no daily fingerstick  H/O: HTN (hypertension)  Enlarged lymph nodes  Lymph nodes enlarged: s/p biopsy mediastinal  benign  H/O endoscopy  left upper lobectomy        Pertinent labs:                      8.9    5.39  )-----------( 192      ( 06 Dec 2017 07:39 )             30.1   12-06    140  |  96  |  55<H>  ----------------------------<  92  4.2   |  34<H>  |  1.88<H>    Ca    9.1      06 Dec 2017 07:29  Phos  3.6     12-06  Mg     2.2     12-06    TPro  5.4<L>  /  Alb  3.1<L>  /  TBili  0.7  /  DBili  0.3<H>  /  AST  13  /  ALT  5<L>  /  AlkPhos  39<L>  12-06      Consent: Procedure/risks/ Benefits explained. Informed consent obtained. Pt verbalizes understanding. 71 year old female with a history of severe pHTN complicated by RV failure (on 3L NC at home), Afib not on AC, CAD s/p PCI, COPD, T2DM with ascites presented to IR for paracentesis (diagnostic and therapeutic) . Renal clearance noted .    D/w With NP Nataliia give 25% albumin X 1 for fluid removal.       Allergies: albuterol (Unknown)  No Known Allergies      PAST MEDICAL & SURGICAL HISTORY:  Hyperthyroidism  JOSE (obstructive sleep apnea)  Epistaxis  GIB (gastrointestinal bleeding)  CAD S/P percutaneous coronary angioplasty  Afib  Pulmonary HTN  GERD (gastroesophageal reflux disease)  Obesity  Cardiomegaly  Valvular heart disease  COPD (chronic obstructive pulmonary disease): Home O2  Sleep Apnea: by criteria  Loose, teeth: 3 bottom front loose teeth  Female stress incontinence  Shoulder pain, right  Constipation  Arthritis of Knee  H/O heartburn  History of lung cancer  Obese  Hx of hyperlipidemia  Asthma  Diabetes mellitus: type 2  dx about 4-5 years ago   no daily fingerstick  H/O: HTN (hypertension)  Enlarged lymph nodes  Lymph nodes enlarged: s/p biopsy mediastinal  benign  H/O endoscopy  left upper lobectomy        Pertinent labs:                      8.9    5.39  )-----------( 192      ( 06 Dec 2017 07:39 )             30.1   12-06    140  |  96  |  55<H>  ----------------------------<  92  4.2   |  34<H>  |  1.88<H>    Ca    9.1      06 Dec 2017 07:29  Phos  3.6     12-06  Mg     2.2     12-06    TPro  5.4<L>  /  Alb  3.1<L>  /  TBili  0.7  /  DBili  0.3<H>  /  AST  13  /  ALT  5<L>  /  AlkPhos  39<L>  12-06      Consent: Procedure/risks/ Benefits explained. Informed consent obtained. Pt verbalizes understanding.

## 2017-12-06 NOTE — PROGRESS NOTE ADULT - PROBLEM SELECTOR PLAN 1
2nd pleural effusions/pulmonary edema and restrictive disease from ascites   -Keep sp02>90% on supplemental oxygen  -s/p 8.1L paracentesis today   -CXR in AM

## 2017-12-06 NOTE — PROGRESS NOTE ADULT - ASSESSMENT
s/p large volume paracentesis  severe copd  severe phtn    continue current care  check abd xray  check abd sono  will re-evaluate diuretics alix  appreciate renal input

## 2017-12-06 NOTE — PROGRESS NOTE ADULT - ASSESSMENT
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 Recent admission at [facility] approximately 5 weeks ago for large volume paracentesis (6.1L removed). Now presents again with RV failure-worsened LE edema, recurrent ascites, pleural effusions and pulmonary edema with worsened dyspnea and CASSANDRA on CKD

## 2017-12-06 NOTE — PROGRESS NOTE ADULT - SUBJECTIVE AND OBJECTIVE BOX
MEDICINE, PROGRESS NOTE [phone]    RFAN ALEXANDRA 71y [id number]    Patient seen and examined.  Patient is a 71y old  Female who presents with a chief complaint of shortness of breath, abdominal pain (28 Nov 2017 22:19)  Pt c/o abd pain.    PAST MEDICAL & SURGICAL HISTORY:  Hyperthyroidism  JOSE (obstructive sleep apnea)  Epistaxis  GIB (gastrointestinal bleeding)  CAD S/P percutaneous coronary angioplasty  Afib  Pulmonary HTN  GERD (gastroesophageal reflux disease)  Obesity  Cardiomegaly  Valvular heart disease  COPD (chronic obstructive pulmonary disease): Home O2  Sleep Apnea: by criteria  Loose, teeth: 3 bottom front loose teeth  Female stress incontinence  Shoulder pain, right  Constipation  Arthritis of Knee  H/O heartburn  History of lung cancer  Obese  Hx of hyperlipidemia  Asthma  Diabetes mellitus: type 2  dx about 4-5 years ago   no daily fingerstick  H/O: HTN (hypertension)  Enlarged lymph nodes  Lymph nodes enlarged: s/p biopsy mediastinal  benign  H/O endoscopy  left upper lobectomy    MEDICATIONS  (STANDING):  ALBUTerol/ipratropium for Nebulization 3 milliLiter(s) Nebulizer every 6 hours  allopurinol 200 milliGRAM(s) Oral daily  aspirin enteric coated 81 milliGRAM(s) Oral daily  atorvastatin 20 milliGRAM(s) Oral at bedtime  clopidogrel Tablet 75 milliGRAM(s) Oral daily  digoxin     Tablet 0.125 milliGRAM(s) Oral every other day  diltiazem    milliGRAM(s) Oral daily  docusate sodium 100 milliGRAM(s) Oral two times a day  heparin  Injectable 5000 Unit(s) SubCutaneous every 8 hours  macitentan (OPSUMIT) 10 milliGRAM(s) 10 milliGRAM(s) Oral daily  methimazole 5 milliGRAM(s) Oral daily  metoprolol     tartrate 25 milliGRAM(s) Oral every 8 hours  pantoprazole    Tablet 40 milliGRAM(s) Oral before breakfast  predniSONE   Tablet 5 milliGRAM(s) Oral daily  roflumilast 500 MICROGram(s) Oral daily  tiotropium 2.5 MICROgram(s)/olodaterol 2.5 MICROgram(s) Inhaler 2 Puff(s) Inhalation daily    MEDICATIONS  (PRN):  acetaminophen   Tablet 650 milliGRAM(s) Oral every 6 hours PRN Moderate Pain (4 - 6)  ondansetron    Tablet 4 milliGRAM(s) Oral every 8 hours PRN Nausea and/or Vomiting  sodium chloride 0.65% Nasal 1 Spray(s) Both Nostrils five times a day PRN dry nose    Allergies    No Known Allergies    Intolerances    albuterol (Unknown)      PHYSICAL EXAM:  Constitutional: NAD  HEENT: Normocephalic, EOMI  Neck:  + JVD  Respiratory: Coarse bs b/l  Cardiovascular: S1, S2, RRR, + systolic murmur  Gastrointestinal: BS hypo, soft, + tenderness, dec distention  Extremities: + peripheral edema le b/l  Neurological: AAOX3, no focal deficits  Psychiatric: Normal mood, normal affect  : No Gottlieb    Vital Signs Last 24 Hrs  T(C): 36.7 (06 Dec 2017 14:40), Max: 36.9 (05 Dec 2017 21:41)  T(F): 98 (06 Dec 2017 14:40), Max: 98.4 (05 Dec 2017 21:41)  HR: 87 (06 Dec 2017 16:06) (73 - 103)  BP: 108/63 (06 Dec 2017 16:06) (90/58 - 113/65)  BP(mean): --  RR: 18 (06 Dec 2017 14:40) (18 - 18)  SpO2: 96% (06 Dec 2017 14:40) (92% - 96%)  I&O's Summary    05 Dec 2017 07:01  -  06 Dec 2017 07:00  --------------------------------------------------------  IN: 380 mL / OUT: 400 mL / NET: -20 mL    06 Dec 2017 07:01  -  06 Dec 2017 18:30  --------------------------------------------------------  IN: 300 mL / OUT: 200 mL / NET: 100 mL        LABS:                        8.9    5.39  )-----------( 192      ( 06 Dec 2017 07:39 )             30.1     12-06    140  |  96  |  55<H>  ----------------------------<  92  4.2   |  34<H>  |  1.88<H>    Ca    9.1      06 Dec 2017 07:29  Phos  3.6     12-06  Mg     2.2     12-06    TPro  5.4<L>  /  Alb  3.1<L>  /  TBili  0.7  /  DBili  0.3<H>  /  AST  13  /  ALT  5<L>  /  AlkPhos  39<L>  12-06        Magnesium, Serum: 2.2 mg/dL (12-06 @ 07:29)

## 2017-12-06 NOTE — PROGRESS NOTE ADULT - ASSESSMENT
Assessment and Plan:   · Assessment		  A 71-year-old female with past medical history of diabetes, hypertension, severe pulmonary hypertension, right ventricular dysfunction, presents with not only left heart failure but also stigmata of acute cor pulmonale.  The patient's acute renal failure is likely hemodynamic in nature.  Goals of therapy are to improve her hemodynamics in order to improve her forward flow and renal perfusion.  CASSANDRA  Severe PHTN  Cor Pulm  Failure to thrive  Abd pain -RO SBP  1.	Renal: Off the Lasix  drip.  Will likely restart Lasix as tid dosing in am.  Monitor BMP daily.  JVD is improving but still present  2.	Gastroenterology: No renal objection to LARGE VOLUME PARACENTESIS;  IV alb to follow procedure;  Please check cx as well;  IV Ceftriaxone at 5pm or following the paracentesis and cx  3.	Pulmonary: Pulmonary nebulizers.  Continue with nasal cannula oxygen at all times.  4.	Cardiology: Monitor hemodynamics and volume status.     Options for her PHTN are limited

## 2017-12-06 NOTE — PROGRESS NOTE ADULT - SUBJECTIVE AND OBJECTIVE BOX
Interventional Radiology Procedure Note    Procedure: US-GUIDED THERAPEUTIC AND DIAGNOSTIC PARACENTESIS    Indication: ASCITES, HEART FAILURE    Operators: JON GALO PA-C/ MARTIR NICOLE PA-C  Supervising MD: Ruben Verde    Anesthesia (type): Local Lidocaine 1%    Contrast: NONE	    EBL: Minimal     Findings/Follow up Plan of Care: Drained 8100 ml of clear straw color fluid via RLQ abdominal approach.  pt tolerated procedure well, hemostasis secure, VSS.  dressing applied over the site is C/D/I    Specimens Removed: peritoneal fluid sent     Implants: NONE    Complications: NONE    Condition/Disposition: Stable, pt returned to [facility] in stable condition.    Please call Interventional Radiology x [phone] with any questions, concerns, or issues.

## 2017-12-07 LAB
ANION GAP SERPL CALC-SCNC: 11 MMOL/L — SIGNIFICANT CHANGE UP (ref 5–17)
BUN SERPL-MCNC: 57 MG/DL — HIGH (ref 7–23)
CALCIUM SERPL-MCNC: 8.8 MG/DL — SIGNIFICANT CHANGE UP (ref 8.4–10.5)
CHLORIDE SERPL-SCNC: 95 MMOL/L — LOW (ref 96–108)
CO2 SERPL-SCNC: 32 MMOL/L — HIGH (ref 22–31)
CREAT SERPL-MCNC: 2.04 MG/DL — HIGH (ref 0.5–1.3)
GLUCOSE SERPL-MCNC: 95 MG/DL — SIGNIFICANT CHANGE UP (ref 70–99)
HCT VFR BLD CALC: 28.3 % — LOW (ref 34.5–45)
HGB BLD-MCNC: 8.5 G/DL — LOW (ref 11.5–15.5)
MAGNESIUM SERPL-MCNC: 2.2 MG/DL — SIGNIFICANT CHANGE UP (ref 1.6–2.6)
MCHC RBC-ENTMCNC: 25.9 PG — LOW (ref 27–34)
MCHC RBC-ENTMCNC: 30 GM/DL — LOW (ref 32–36)
MCV RBC AUTO: 86.3 FL — SIGNIFICANT CHANGE UP (ref 80–100)
PHOSPHATE SERPL-MCNC: 4.2 MG/DL — SIGNIFICANT CHANGE UP (ref 2.5–4.5)
PLATELET # BLD AUTO: 160 K/UL — SIGNIFICANT CHANGE UP (ref 150–400)
POTASSIUM SERPL-MCNC: 4.5 MMOL/L — SIGNIFICANT CHANGE UP (ref 3.5–5.3)
POTASSIUM SERPL-SCNC: 4.5 MMOL/L — SIGNIFICANT CHANGE UP (ref 3.5–5.3)
RBC # BLD: 3.28 M/UL — LOW (ref 3.8–5.2)
RBC # FLD: 18.1 % — HIGH (ref 10.3–14.5)
SODIUM SERPL-SCNC: 138 MMOL/L — SIGNIFICANT CHANGE UP (ref 135–145)
WBC # BLD: 5.2 K/UL — SIGNIFICANT CHANGE UP (ref 3.8–10.5)
WBC # FLD AUTO: 5.2 K/UL — SIGNIFICANT CHANGE UP (ref 3.8–10.5)

## 2017-12-07 PROCEDURE — 76700 US EXAM ABDOM COMPLETE: CPT | Mod: 26

## 2017-12-07 PROCEDURE — 71010: CPT | Mod: 26

## 2017-12-07 PROCEDURE — 74000: CPT | Mod: 26

## 2017-12-07 RX ORDER — FUROSEMIDE 40 MG
60 TABLET ORAL EVERY 8 HOURS
Qty: 0 | Refills: 0 | Status: DISCONTINUED | OUTPATIENT
Start: 2017-12-07 | End: 2017-12-08

## 2017-12-07 RX ADMIN — Medication 200 MILLIGRAM(S): at 12:31

## 2017-12-07 RX ADMIN — Medication 25 MILLIGRAM(S): at 08:03

## 2017-12-07 RX ADMIN — Medication 120 MILLIGRAM(S): at 10:21

## 2017-12-07 RX ADMIN — Medication 5 MILLIGRAM(S): at 10:21

## 2017-12-07 RX ADMIN — Medication 60 MILLIGRAM(S): at 13:27

## 2017-12-07 RX ADMIN — ROFLUMILAST 500 MICROGRAM(S): 500 TABLET ORAL at 10:23

## 2017-12-07 RX ADMIN — HEPARIN SODIUM 5000 UNIT(S): 5000 INJECTION INTRAVENOUS; SUBCUTANEOUS at 23:30

## 2017-12-07 RX ADMIN — Medication 81 MILLIGRAM(S): at 12:31

## 2017-12-07 RX ADMIN — CLOPIDOGREL BISULFATE 75 MILLIGRAM(S): 75 TABLET, FILM COATED ORAL at 12:31

## 2017-12-07 RX ADMIN — Medication 25 MILLIGRAM(S): at 23:29

## 2017-12-07 RX ADMIN — Medication 25 MILLIGRAM(S): at 17:09

## 2017-12-07 RX ADMIN — ATORVASTATIN CALCIUM 20 MILLIGRAM(S): 80 TABLET, FILM COATED ORAL at 23:29

## 2017-12-07 NOTE — PHYSICAL THERAPY INITIAL EVALUATION ADULT - CRITERIA FOR SKILLED THERAPEUTIC INTERVENTIONS
functional limitations in following categories/therapy frequency/predicted duration of therapy intervention/impairments found

## 2017-12-07 NOTE — PROGRESS NOTE ADULT - SUBJECTIVE AND OBJECTIVE BOX
[facility] [phone]        Patient seen and examined in bed.  She had her paracentesis and 8 liters removal        MEDICATIONS  (STANDING):  ALBUTerol/ipratropium for Nebulization 3 milliLiter(s) Nebulizer every 6 hours  allopurinol 200 milliGRAM(s) Oral daily  aspirin enteric coated 81 milliGRAM(s) Oral daily  atorvastatin 20 milliGRAM(s) Oral at bedtime  clopidogrel Tablet 75 milliGRAM(s) Oral daily  digoxin     Tablet 0.125 milliGRAM(s) Oral every other day  diltiazem    milliGRAM(s) Oral daily  docusate sodium 100 milliGRAM(s) Oral two times a day  heparin  Injectable 5000 Unit(s) SubCutaneous every 8 hours  macitentan (OPSUMIT) 10 milliGRAM(s) 10 milliGRAM(s) Oral daily  methimazole 5 milliGRAM(s) Oral daily  metoprolol     tartrate 25 milliGRAM(s) Oral every 8 hours  pantoprazole    Tablet 40 milliGRAM(s) Oral before breakfast  predniSONE   Tablet 5 milliGRAM(s) Oral daily  roflumilast 500 MICROGram(s) Oral daily  tiotropium 2.5 MICROgram(s)/olodaterol 2.5 MICROgram(s) Inhaler 2 Puff(s) Inhalation daily      VITAL:  T(C): , Max: 36.8 (12-06-17 @ 21:09)  T(F): , Max: 98.2 (12-06-17 @ 21:09)  HR: 87 (12-07-17 @ 08:01)  BP: 104/64 (12-07-17 @ 08:01)  BP(mean): --  RR: 18 (12-07-17 @ 08:01)  SpO2: 98% (12-07-17 @ 08:01)  Wt(kg): --    I and O's:    12-06 @ 07:01  -  12-07 @ 07:00  --------------------------------------------------------  IN: 660 mL / OUT: 400 mL / NET: 260 mL          PHYSICAL EXAM:    Constitutional: NAD  HEENT: PERRLA    Neck:  +  JVD  Respiratory: CTAB/L  Cardiovascular: S1 and S2  Gastrointestinal: BS+, soft, NT   Extremities: No peripheral edema  Neurological: A/O x 3, no focal deficits  Psychiatric: Normal mood, normal affect  : No Gottlieb  Skin: No rashes  Access: Not applicable    LABS:                        8.5    5.20  )-----------( 160      ( 07 Dec 2017 07:41 )             28.3     12-07    138  |  95<L>  |  57<H>  ----------------------------<  95  4.5   |  32<H>  |  2.04<H>    Ca    8.8      07 Dec 2017 07:38  Phos  4.2     12-07  Mg     2.2     12-07    TPro  x   /  Alb  3.4  /  TBili  x   /  DBili  x   /  AST  x   /  ALT  x   /  AlkPhos  x   12-06          Urine Studies:          RADIOLOGY & ADDITIONAL STUDIES:        < from: US Abdomen Complete (12.07.17 @ 09:21) >  IMPRESSION:     Trace to small amount of simple abdominal ascites.    Heterogeneous appearance to the liver.    Splenomegaly with 10 mm hypoechoic splenic nodule which is indeterminant.    2.7 x 2.8 cm left lower pole exophytic renal cyst with thick septation.

## 2017-12-07 NOTE — PHYSICAL THERAPY INITIAL EVALUATION ADULT - ACTIVE RANGE OF MOTION EXAMINATION, REHAB EVAL
bilateral upper extremity Active ROM was WFL (within functional limits)/bilateral  lower extremity Active ROM was WFL (within functional limits)/(+)ascites noted

## 2017-12-07 NOTE — PHYSICAL THERAPY INITIAL EVALUATION ADULT - PERTINENT HX OF CURRENT PROBLEM, REHAB EVAL
71 year old female with a history of severe pHTN complicated by RV failure (on 3L NC at home), Afib not on AC, CAD s/p PCI, COPD, T2DM who presents for evaluation of shortness of breath and abdominal distention. Patient states that she initially started to develop ascites approximately 4 weeks ago, at which time she had a paracentesis with 6.1L drained. Since then, she has noticed the slow re-accumulation of fluid over time that has led to worsening distention and LE edema contd below:

## 2017-12-07 NOTE — PROGRESS NOTE ADULT - PROBLEM SELECTOR PLAN 1
2nd compressive atelectasis from pleural effusions, pulmonary edema and restrictive disease from ascites   -Keep sp02>90% on supplemental oxygen  -s/p 8.1L paracentesis yesterday   -Resume Lasix dosing

## 2017-12-07 NOTE — PROGRESS NOTE ADULT - ASSESSMENT
s/p large volume paracentesis  severe copd  severe phtn    continue current care  lasix tid  encourage po intake  strict i/o  daily weights  f/u labs alix

## 2017-12-07 NOTE — PROGRESS NOTE ADULT - ASSESSMENT
Assessment and Plan:   · Assessment		  A 71-year-old female with past medical history of diabetes, hypertension, severe pulmonary hypertension, right ventricular dysfunction, presents with not only left heart failure but also stigmata of acute cor pulmonale.  The patient's acute renal failure is likely hemodynamic in nature.  Goals of therapy are to improve her hemodynamics in order to improve her forward flow and renal perfusion.  CASSANDRA  Severe PHTN  Cor Pulm  Failure to thrive  Abd pain -R/O SBP  1.	Renal: Off the Lasix  drip.  Will likely restart Lasix as tid today.  Monitor BMP daily.  JVD is improving but still present  2.	Gastroenterology: S/P LARGE VOLUME PARACENTESIS;  SP IV Ceftriaxone but cx are thus far negative.    3.	Pulmonary: Pulmonary nebulizers.  Continue with nasal cannula oxygen at all times.  4.	Cardiology: Monitor hemodynamics and volume status.     Options for her PHTN are limited

## 2017-12-07 NOTE — PHYSICAL THERAPY INITIAL EVALUATION ADULT - ADDITIONAL COMMENTS
contd from above: US abd: large volume ascites contd from above: US abd: large volume ascites    social history: pt states lives in private house with . pt independent with mobility and did not use assistive device, (+)home o2

## 2017-12-07 NOTE — PROGRESS NOTE ADULT - PROBLEM SELECTOR PLAN 3
Type II/III Likely Type II/III  -Check GAGAN, RF, HIV in AM  -c/w Opsumit  -Vasodilator non-responder on RHC from 5/2017

## 2017-12-07 NOTE — PROGRESS NOTE ADULT - ASSESSMENT
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 Recent admission at [facility] approximately 5 weeks ago for large volume paracentesis (6.1L removed). Now presents again with RV failure-worsened LE edema, recurrent ascites, pleural effusions and pulmonary edema with worsened dyspnea and CASSANDRA on CKD

## 2017-12-07 NOTE — PROGRESS NOTE ADULT - SUBJECTIVE AND OBJECTIVE BOX
MEDICINE, PROGRESS NOTE [phone]    FRAN ALEXANDRA 71y [id number]    Patient seen and examined.  Patient is a 71y old  Female who presents with a chief complaint of shortness of breath, abdominal pain (28 Nov 2017 22:19)  Pt still with abd discomfort but improved.    PAST MEDICAL & SURGICAL HISTORY:  Hyperthyroidism  JOSE (obstructive sleep apnea)  Epistaxis  GIB (gastrointestinal bleeding)  CAD S/P percutaneous coronary angioplasty  Afib  Pulmonary HTN  GERD (gastroesophageal reflux disease)  Obesity  Cardiomegaly  Valvular heart disease  COPD (chronic obstructive pulmonary disease): Home O2  Sleep Apnea: by criteria  Loose, teeth: 3 bottom front loose teeth  Female stress incontinence  Shoulder pain, right  Constipation  Arthritis of Knee  H/O heartburn  History of lung cancer  Obese  Hx of hyperlipidemia  Asthma  Diabetes mellitus: type 2  dx about 4-5 years ago   no daily fingerstick  H/O: HTN (hypertension)  Enlarged lymph nodes  Lymph nodes enlarged: s/p biopsy mediastinal  benign  H/O endoscopy  left upper lobectomy    MEDICATIONS  (STANDING):  ALBUTerol/ipratropium for Nebulization 3 milliLiter(s) Nebulizer every 6 hours  allopurinol 200 milliGRAM(s) Oral daily  aspirin enteric coated 81 milliGRAM(s) Oral daily  atorvastatin 20 milliGRAM(s) Oral at bedtime  clopidogrel Tablet 75 milliGRAM(s) Oral daily  digoxin     Tablet 0.125 milliGRAM(s) Oral every other day  diltiazem    milliGRAM(s) Oral daily  docusate sodium 100 milliGRAM(s) Oral two times a day  furosemide   Injectable 60 milliGRAM(s) IV Push every 8 hours  heparin  Injectable 5000 Unit(s) SubCutaneous every 8 hours  macitentan (OPSUMIT) 10 milliGRAM(s) 10 milliGRAM(s) Oral daily  methimazole 5 milliGRAM(s) Oral daily  metoprolol     tartrate 25 milliGRAM(s) Oral every 8 hours  pantoprazole    Tablet 40 milliGRAM(s) Oral before breakfast  predniSONE   Tablet 5 milliGRAM(s) Oral daily  roflumilast 500 MICROGram(s) Oral daily  tiotropium 2.5 MICROgram(s)/olodaterol 2.5 MICROgram(s) Inhaler 2 Puff(s) Inhalation daily    MEDICATIONS  (PRN):  acetaminophen   Tablet 650 milliGRAM(s) Oral every 6 hours PRN Moderate Pain (4 - 6)  ondansetron    Tablet 4 milliGRAM(s) Oral every 8 hours PRN Nausea and/or Vomiting  sodium chloride 0.65% Nasal 1 Spray(s) Both Nostrils five times a day PRN dry nose    Allergies    No Known Allergies    Intolerances    albuterol (Unknown)      PHYSICAL EXAM:  Constitutional: NAD  HEENT: Normocephalic, EOMI  Neck:  No JVD  Respiratory: dec bs at bases  Cardiovascular: S1, S2, RRR, + systolic murmur  Gastrointestinal: BS+, soft, + distention, + fluid wave  Extremities: +peripheral edema le b/l  Neurological: AAOX3, no focal deficits  Psychiatric: Normal mood, normal affect  : No Gottlieb    Vital Signs Last 24 Hrs  T(C): 36.7 (07 Dec 2017 12:29), Max: 36.8 (06 Dec 2017 21:09)  T(F): 98.1 (07 Dec 2017 12:29), Max: 98.2 (06 Dec 2017 21:09)  HR: 90 (07 Dec 2017 17:06) (63 - 90)  BP: 100/55 (07 Dec 2017 17:06) (91/52 - 104/64)  BP(mean): --  RR: 18 (07 Dec 2017 13:24) (18 - 18)  SpO2: 100% (07 Dec 2017 17:06) (98% - 100%)  I&O's Summary    06 Dec 2017 07:01  -  07 Dec 2017 07:00  --------------------------------------------------------  IN: 660 mL / OUT: 400 mL / NET: 260 mL    07 Dec 2017 07:01  -  07 Dec 2017 20:43  --------------------------------------------------------  IN: 720 mL / OUT: 600 mL / NET: 120 mL        LABS:                        8.5    5.20  )-----------( 160      ( 07 Dec 2017 07:41 )             28.3     12-07    138  |  95<L>  |  57<H>  ----------------------------<  95  4.5   |  32<H>  |  2.04<H>    Ca    8.8      07 Dec 2017 07:38  Phos  4.2     12-07  Mg     2.2     12-07    TPro  x   /  Alb  3.4  /  TBili  x   /  DBili  x   /  AST  x   /  ALT  x   /  AlkPhos  x   12-06        Magnesium, Serum: 2.2 mg/dL (12-07 @ 07:38)

## 2017-12-07 NOTE — PROGRESS NOTE ADULT - SUBJECTIVE AND OBJECTIVE BOX
Follow-up Pulm Progress Note    c/o abd pain   97% on 4L NC  Denies dyspnea, CP    Medications:  MEDICATIONS  (STANDING):  ALBUTerol/ipratropium for Nebulization 3 milliLiter(s) Nebulizer every 6 hours  allopurinol 200 milliGRAM(s) Oral daily  aspirin enteric coated 81 milliGRAM(s) Oral daily  atorvastatin 20 milliGRAM(s) Oral at bedtime  clopidogrel Tablet 75 milliGRAM(s) Oral daily  digoxin     Tablet 0.125 milliGRAM(s) Oral every other day  diltiazem    milliGRAM(s) Oral daily  docusate sodium 100 milliGRAM(s) Oral two times a day  furosemide   Injectable 60 milliGRAM(s) IV Push every 8 hours  heparin  Injectable 5000 Unit(s) SubCutaneous every 8 hours  macitentan (OPSUMIT) 10 milliGRAM(s) 10 milliGRAM(s) Oral daily  methimazole 5 milliGRAM(s) Oral daily  metoprolol     tartrate 25 milliGRAM(s) Oral every 8 hours  pantoprazole    Tablet 40 milliGRAM(s) Oral before breakfast  predniSONE   Tablet 5 milliGRAM(s) Oral daily  roflumilast 500 MICROGram(s) Oral daily  tiotropium 2.5 MICROgram(s)/olodaterol 2.5 MICROgram(s) Inhaler 2 Puff(s) Inhalation daily    MEDICATIONS  (PRN):  acetaminophen   Tablet 650 milliGRAM(s) Oral every 6 hours PRN Moderate Pain (4 - 6)  ondansetron    Tablet 4 milliGRAM(s) Oral every 8 hours PRN Nausea and/or Vomiting  sodium chloride 0.65% Nasal 1 Spray(s) Both Nostrils five times a day PRN dry nose          Vital Signs Last 24 Hrs  T(C): 36.7 (07 Dec 2017 12:29), Max: 36.8 (06 Dec 2017 21:09)  T(F): 98.1 (07 Dec 2017 12:29), Max: 98.2 (06 Dec 2017 21:09)  HR: 69 (07 Dec 2017 12:29) (63 - 103)  BP: 91/52 (07 Dec 2017 12:29) (91/52 - 108/63)  BP(mean): --  RR: 18 (07 Dec 2017 12:29) (18 - 18)  SpO2: 100% (07 Dec 2017 12:29) (96% - 100%) on 4L NC          12-06 @ 07:01  -  12-07 @ 07:00  --------------------------------------------------------  IN: 660 mL / OUT: 400 mL / NET: 260 mL          LABS:                        8.5    5.20  )-----------( 160      ( 07 Dec 2017 07:41 )             28.3     12-07    138  |  95<L>  |  57<H>  ----------------------------<  95  4.5   |  32<H>  |  2.04<H>    Ca    8.8      07 Dec 2017 07:38  Phos  4.2     12-07  Mg     2.2     12-07    TPro  x   /  Alb  3.4  /  TBili  x   /  DBili  x   /  AST  x   /  ALT  x   /  AlkPhos  x   12-06          CAPILLARY BLOOD GLUCOSE                        Fluid characteristics  -- 12-06 @ 14:33  pH --  LDH 85  tprot 3.2    Cell count  Appearance --  Fluid type --  BF lymph --  color --  eosinophil --  PMN --  Mesothelial --  Monocyte --  Other body cells --  Fluid characteristics  -- 12-06 @ 14:32  pH --  LDH --  tprot --    Cell count  Appearance Hazy  Fluid type --  BF lymph 4  color Yellow  eosinophil --  PMN 60  Mesothelial 20  Monocyte 15  Other body cells 1      CULTURES: (if applicable)  Culture Results:   Testing in progress (12-06 @ 17:13)    Most recent blood culture -- 12-06 @ 17:13   -- -- .Body Fluid Peritoneal Fluid 12-06 @ 17:13  Most recent blood culture -- 12-06 @ 17:12   -- -- .Body Fluid Abdominal Fluid 12-06 @ 17:12    Blood culture 12-06 @ 17:12  --    polymorphonuclear leukocytes seen  No organisms seen  by cytocentrifuge  --  --  --    Urine culture    -->      Physical Examination:  PULM: Clear to auscultation bilaterally, no significant sputum production  CVS: S1, S2 heard    RADIOLOGY REVIEWED  CXR: Moderate L pleural effusion, unchanged

## 2017-12-08 LAB
ANION GAP SERPL CALC-SCNC: 14 MMOL/L — SIGNIFICANT CHANGE UP (ref 5–17)
BUN SERPL-MCNC: 56 MG/DL — HIGH (ref 7–23)
CALCIUM SERPL-MCNC: 8.5 MG/DL — SIGNIFICANT CHANGE UP (ref 8.4–10.5)
CHLORIDE SERPL-SCNC: 93 MMOL/L — LOW (ref 96–108)
CO2 SERPL-SCNC: 30 MMOL/L — SIGNIFICANT CHANGE UP (ref 22–31)
CREAT SERPL-MCNC: 2.03 MG/DL — HIGH (ref 0.5–1.3)
GLUCOSE SERPL-MCNC: 88 MG/DL — SIGNIFICANT CHANGE UP (ref 70–99)
HCT VFR BLD CALC: 27.5 % — LOW (ref 34.5–45)
HGB BLD-MCNC: 8.2 G/DL — LOW (ref 11.5–15.5)
HIV 1+2 AB+HIV1 P24 AG SERPL QL IA: SIGNIFICANT CHANGE UP
MCHC RBC-ENTMCNC: 25.7 PG — LOW (ref 27–34)
MCHC RBC-ENTMCNC: 29.8 GM/DL — LOW (ref 32–36)
MCV RBC AUTO: 86.2 FL — SIGNIFICANT CHANGE UP (ref 80–100)
NON-GYNECOLOGICAL CYTOLOGY STUDY: SIGNIFICANT CHANGE UP
PLATELET # BLD AUTO: 178 K/UL — SIGNIFICANT CHANGE UP (ref 150–400)
POTASSIUM SERPL-MCNC: 3.8 MMOL/L — SIGNIFICANT CHANGE UP (ref 3.5–5.3)
POTASSIUM SERPL-SCNC: 3.8 MMOL/L — SIGNIFICANT CHANGE UP (ref 3.5–5.3)
RBC # BLD: 3.19 M/UL — LOW (ref 3.8–5.2)
RBC # FLD: 17.3 % — HIGH (ref 10.3–14.5)
RHEUMATOID FACT SERPL-ACNC: 15 IU/ML — HIGH (ref 0–13.9)
SODIUM SERPL-SCNC: 137 MMOL/L — SIGNIFICANT CHANGE UP (ref 135–145)
WBC # BLD: 4.42 K/UL — SIGNIFICANT CHANGE UP (ref 3.8–10.5)
WBC # FLD AUTO: 4.42 K/UL — SIGNIFICANT CHANGE UP (ref 3.8–10.5)

## 2017-12-08 PROCEDURE — 73522 X-RAY EXAM HIPS BI 3-4 VIEWS: CPT | Mod: 26

## 2017-12-08 PROCEDURE — 99233 SBSQ HOSP IP/OBS HIGH 50: CPT

## 2017-12-08 PROCEDURE — 73560 X-RAY EXAM OF KNEE 1 OR 2: CPT | Mod: 26,50

## 2017-12-08 RX ORDER — BUMETANIDE 0.25 MG/ML
1 INJECTION INTRAMUSCULAR; INTRAVENOUS EVERY 12 HOURS
Qty: 0 | Refills: 0 | Status: DISCONTINUED | OUTPATIENT
Start: 2017-12-08 | End: 2017-12-11

## 2017-12-08 RX ADMIN — HEPARIN SODIUM 5000 UNIT(S): 5000 INJECTION INTRAVENOUS; SUBCUTANEOUS at 23:20

## 2017-12-08 RX ADMIN — Medication 5 MILLIGRAM(S): at 09:25

## 2017-12-08 RX ADMIN — HEPARIN SODIUM 5000 UNIT(S): 5000 INJECTION INTRAVENOUS; SUBCUTANEOUS at 09:23

## 2017-12-08 RX ADMIN — TIOTROPIUM BROMIDE AND OLODATEROL 2 PUFF(S): 3.124; 2.736 SPRAY, METERED RESPIRATORY (INHALATION) at 09:28

## 2017-12-08 RX ADMIN — HEPARIN SODIUM 5000 UNIT(S): 5000 INJECTION INTRAVENOUS; SUBCUTANEOUS at 17:10

## 2017-12-08 RX ADMIN — Medication 81 MILLIGRAM(S): at 12:59

## 2017-12-08 RX ADMIN — CLOPIDOGREL BISULFATE 75 MILLIGRAM(S): 75 TABLET, FILM COATED ORAL at 13:00

## 2017-12-08 RX ADMIN — ATORVASTATIN CALCIUM 20 MILLIGRAM(S): 80 TABLET, FILM COATED ORAL at 23:20

## 2017-12-08 RX ADMIN — ROFLUMILAST 500 MICROGRAM(S): 500 TABLET ORAL at 09:24

## 2017-12-08 RX ADMIN — Medication 120 MILLIGRAM(S): at 09:24

## 2017-12-08 RX ADMIN — Medication 3 MILLILITER(S): at 09:24

## 2017-12-08 RX ADMIN — Medication 25 MILLIGRAM(S): at 09:23

## 2017-12-08 RX ADMIN — Medication 25 MILLIGRAM(S): at 17:10

## 2017-12-08 RX ADMIN — Medication 60 MILLIGRAM(S): at 06:51

## 2017-12-08 RX ADMIN — Medication 200 MILLIGRAM(S): at 13:00

## 2017-12-08 RX ADMIN — Medication 0.12 MILLIGRAM(S): at 12:59

## 2017-12-08 RX ADMIN — BUMETANIDE 1 MILLIGRAM(S): 0.25 INJECTION INTRAMUSCULAR; INTRAVENOUS at 17:09

## 2017-12-08 NOTE — DIETITIAN INITIAL EVALUATION ADULT. - OTHER INFO
Pt seen for LOS. Pt reports a fair PO intake in house. Pt states after fluid removal her appetite has increased, Pt currently consumes 70-80% of meals. RD offered Pt PO supplements, states she dislikes them and does not see a need for them. RD reviewed menu selections, foods preferences and encouraged adequate protein/energy intake. Reviewed low Na diet and encouraged Pt to separate solids from liquids to avoid early satiety.   Pt denies GI Distress. Denies chewing/swallowing difficulty. Denies micronutrient supplementation. NKFA

## 2017-12-08 NOTE — PROGRESS NOTE ADULT - SUBJECTIVE AND OBJECTIVE BOX
Follow-up Pulm Progress Note    No new respiratory events overnight.  Denies SOB/CP.   90% on 4.5L NC  s/p fall this morning     Medications:  MEDICATIONS  (STANDING):  ALBUTerol/ipratropium for Nebulization 3 milliLiter(s) Nebulizer every 6 hours  allopurinol 200 milliGRAM(s) Oral daily  aspirin enteric coated 81 milliGRAM(s) Oral daily  atorvastatin 20 milliGRAM(s) Oral at bedtime  buMETAnide Injectable 1 milliGRAM(s) IV Push every 12 hours  clopidogrel Tablet 75 milliGRAM(s) Oral daily  digoxin     Tablet 0.125 milliGRAM(s) Oral every other day  diltiazem    milliGRAM(s) Oral daily  docusate sodium 100 milliGRAM(s) Oral two times a day  heparin  Injectable 5000 Unit(s) SubCutaneous every 8 hours  macitentan (OPSUMIT) 10 milliGRAM(s) 10 milliGRAM(s) Oral daily  methimazole 5 milliGRAM(s) Oral daily  metoprolol     tartrate 25 milliGRAM(s) Oral every 8 hours  pantoprazole    Tablet 40 milliGRAM(s) Oral before breakfast  predniSONE   Tablet 5 milliGRAM(s) Oral daily  roflumilast 500 MICROGram(s) Oral daily  tiotropium 2.5 MICROgram(s)/olodaterol 2.5 MICROgram(s) Inhaler 2 Puff(s) Inhalation daily    MEDICATIONS  (PRN):  acetaminophen   Tablet 650 milliGRAM(s) Oral every 6 hours PRN Moderate Pain (4 - 6)  ondansetron    Tablet 4 milliGRAM(s) Oral every 8 hours PRN Nausea and/or Vomiting  sodium chloride 0.65% Nasal 1 Spray(s) Both Nostrils five times a day PRN dry nose          Vital Signs Last 24 Hrs  T(C): 36.6 (08 Dec 2017 08:30), Max: 36.7 (07 Dec 2017 20:56)  T(F): 97.8 (08 Dec 2017 08:30), Max: 98 (07 Dec 2017 20:56)  HR: 76 (08 Dec 2017 08:30) (70 - 98)  BP: 110/58 (08 Dec 2017 08:30) (95/49 - 110/58)  BP(mean): --  RR: 18 (08 Dec 2017 08:30) (18 - 19)  SpO2: 98% (08 Dec 2017 08:30) (98% - 100%) on 4.5L NC          12-07 @ 07:01  -  12-08 @ 07:00  --------------------------------------------------------  IN: 1080 mL / OUT: 1350 mL / NET: -270 mL          LABS:                        8.2    4.42  )-----------( 178      ( 08 Dec 2017 07:28 )             27.5     12-08    137  |  93<L>  |  56<H>  ----------------------------<  88  3.8   |  30  |  2.03<H>    Ca    8.5      08 Dec 2017 07:39  Phos  4.2     12-07  Mg     2.2     12-07    TPro  x   /  Alb  3.4  /  TBili  x   /  DBili  x   /  AST  x   /  ALT  x   /  AlkPhos  x   12-06          CAPILLARY BLOOD GLUCOSE                GAGAN -- 12-08 @ 07:39  Anti SS-1 --  Anti SS-2 --  Anti RNP --  RF 15.0 12-08 @ 07:39    Atypical ANCA -- 12-08 @ 07:39  c-ANCA titer -- 12-08 @ 07:39  c-ANCA -- 12-08 @ 07:39  p-ANCA -- 12-08 @ 07:39          Fluid characteristics  -- 12-06 @ 14:33  pH --  LDH 85  tprot 3.2    Cell count  Appearance --  Fluid type --  BF lymph --  color --  eosinophil --  PMN --  Mesothelial --  Monocyte --  Other body cells --  Fluid characteristics  -- 12-06 @ 14:32  pH --  LDH --  tprot --    Cell count  Appearance Hazy  Fluid type --  BF lymph 4  color Yellow  eosinophil --  PMN 60  Mesothelial 20  Monocyte 15  Other body cells 1      CULTURES: (if applicable)  Culture Results:   Testing in progress (12-06 @ 17:13)  Culture Results:   No growth (12-06 @ 17:12)    Most recent blood culture -- 12-06 @ 17:13   -- -- .Body Fluid Peritoneal Fluid 12-06 @ 17:13  Most recent blood culture -- 12-06 @ 17:12   -- -- .Body Fluid Abdominal Fluid 12-06 @ 17:12    Blood culture 12-06 @ 17:12  --    polymorphonuclear leukocytes seen  No organisms seen  by cytocentrifuge  --  --  --    Urine culture    -->      Physical Examination:  PULM: Decreased BS L base  CVS: S1, S2 RRR    RADIOLOGY REVIEWED  CXR 12/7: Persistent L pleural effusion Follow-up Pulm Progress Note    No new respiratory events overnight.  Denies SOB/CP.   90% on 4.5L NC  s/p fall this morning     Medications:  MEDICATIONS  (STANDING):  ALBUTerol/ipratropium for Nebulization 3 milliLiter(s) Nebulizer every 6 hours  allopurinol 200 milliGRAM(s) Oral daily  aspirin enteric coated 81 milliGRAM(s) Oral daily  atorvastatin 20 milliGRAM(s) Oral at bedtime  buMETAnide Injectable 1 milliGRAM(s) IV Push every 12 hours  clopidogrel Tablet 75 milliGRAM(s) Oral daily  digoxin     Tablet 0.125 milliGRAM(s) Oral every other day  diltiazem    milliGRAM(s) Oral daily  docusate sodium 100 milliGRAM(s) Oral two times a day  heparin  Injectable 5000 Unit(s) SubCutaneous every 8 hours  macitentan (OPSUMIT) 10 milliGRAM(s) 10 milliGRAM(s) Oral daily  methimazole 5 milliGRAM(s) Oral daily  metoprolol     tartrate 25 milliGRAM(s) Oral every 8 hours  pantoprazole    Tablet 40 milliGRAM(s) Oral before breakfast  predniSONE   Tablet 5 milliGRAM(s) Oral daily  roflumilast 500 MICROGram(s) Oral daily  tiotropium 2.5 MICROgram(s)/olodaterol 2.5 MICROgram(s) Inhaler 2 Puff(s) Inhalation daily    MEDICATIONS  (PRN):  acetaminophen   Tablet 650 milliGRAM(s) Oral every 6 hours PRN Moderate Pain (4 - 6)  ondansetron    Tablet 4 milliGRAM(s) Oral every 8 hours PRN Nausea and/or Vomiting  sodium chloride 0.65% Nasal 1 Spray(s) Both Nostrils five times a day PRN dry nose    Vital Signs Last 24 Hrs  T(C): 36.6 (08 Dec 2017 08:30), Max: 36.7 (07 Dec 2017 20:56)  T(F): 97.8 (08 Dec 2017 08:30), Max: 98 (07 Dec 2017 20:56)  HR: 76 (08 Dec 2017 08:30) (70 - 98)  BP: 110/58 (08 Dec 2017 08:30) (95/49 - 110/58)  BP(mean): --  RR: 18 (08 Dec 2017 08:30) (18 - 19)  SpO2: 98% (08 Dec 2017 08:30) (98% - 100%) on 4.5L NC      12-07 @ 07:01  -  12-08 @ 07:00  --------------------------------------------------------  IN: 1080 mL / OUT: 1350 mL / NET: -270 mL    LABS:                        8.2    4.42  )-----------( 178      ( 08 Dec 2017 07:28 )             27.5     12-08    137  |  93<L>  |  56<H>  ----------------------------<  88  3.8   |  30  |  2.03<H>    Ca    8.5      08 Dec 2017 07:39  Phos  4.2     12-07  Mg     2.2     12-07    TPro  x   /  Alb  3.4  /  TBili  x   /  DBili  x   /  AST  x   /  ALT  x   /  AlkPhos  x   12-06    CAPILLARY BLOOD GLUCOSE    GAGAN -- 12-08 @ 07:39  Anti SS-1 --  Anti SS-2 --  Anti RNP --  RF 15.0 12-08 @ 07:39    Atypical ANCA -- 12-08 @ 07:39  c-ANCA titer -- 12-08 @ 07:39  c-ANCA -- 12-08 @ 07:39  p-ANCA -- 12-08 @ 07:39    Fluid characteristics  -- 12-06 @ 14:33  pH --  LDH 85  tprot 3.2    Cell count  Appearance --  Fluid type --  BF lymph --  color --  eosinophil --  PMN --  Mesothelial --  Monocyte --  Other body cells --  Fluid characteristics  -- 12-06 @ 14:32  pH --  LDH --  tprot --    Cell count  Appearance Hazy  Fluid type --  BF lymph 4  color Yellow  eosinophil --  PMN 60  Mesothelial 20  Monocyte 15  Other body cells 1      CULTURES: (if applicable)  Culture Results:   Testing in progress (12-06 @ 17:13)  Culture Results:   No growth (12-06 @ 17:12)    Most recent blood culture -- 12-06 @ 17:13   -- -- .Body Fluid Peritoneal Fluid 12-06 @ 17:13  Most recent blood culture -- 12-06 @ 17:12   -- -- .Body Fluid Abdominal Fluid 12-06 @ 17:12    Blood culture 12-06 @ 17:12  --    polymorphonuclear leukocytes seen  No organisms seen  by cytocentrifuge  --  --  --    Urine culture    -->      Physical Examination:  PULM: Decreased BS L base  CVS: S1, S2 RRR    RADIOLOGY REVIEWED  CXR 12/7: Persistent L pleural effusion

## 2017-12-08 NOTE — CONSULT NOTE ADULT - SUBJECTIVE AND OBJECTIVE BOX
CHIEF COMPLAINT:    HPI:  70 yo F. 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 She developed ascites requiring paracentesis as an outpt and had her diuresis uptitrated as an outpt but she continued to have fluid reaccumulation. She reports that she has had worsening shortness of breath/BRO, intermittent nausea, and poor appetite with minimal PO intake. She was admitted and has been diuresed on a lasix gtt until she developed azotemia and eventually had a paracentesis removing 8.1 L of fluid and was then restarted on diuretics. She continues to be grossly fluid overloaded and question has been posed about starting remodulin, which the patient has been on in the past.       PAST MEDICAL & SURGICAL HISTORY:  Hyperthyroidism  JOSE (obstructive sleep apnea)  Epistaxis  GIB (gastrointestinal bleeding)  CAD S/P percutaneous coronary angioplasty  Afib  Pulmonary HTN  GERD (gastroesophageal reflux disease)  Obesity  Cardiomegaly  Valvular heart disease  COPD (chronic obstructive pulmonary disease): Home O2  Sleep Apnea: by criteria  Loose, teeth: 3 bottom front loose teeth  Female stress incontinence  Shoulder pain, right  Constipation  Arthritis of Knee  H/O heartburn  History of lung cancer  Obese  Hx of hyperlipidemia  Asthma  Diabetes mellitus: type 2  dx about 4-5 years ago   no daily fingerstick  H/O: HTN (hypertension)  Enlarged lymph nodes  Lymph nodes enlarged: s/p biopsy mediastinal  benign  H/O endoscopy  left upper lobectomy      FAMILY HISTORY:  No pertinent family history in first degree relatives      SOCIAL HISTORY:  Smoking: [ ] Never Smoked [ ] Former Smoker (__ packs x ___ years) [ ] Current Smoker  (__ packs x ___ years)  Substance Use: [ ] Never Used [ ] Used ____  EtOH Use:  Marital Status: [ ] Single [ ]  [ ]  [ ]   Sexual History:   Occupation:  Recent Travel:  Country of Birth:  Advance Directives:    Allergies    No Known Allergies    Intolerances    albuterol (Unknown)      HOME MEDICATIONS:    REVIEW OF SYSTEMS:  Constitutional: [ ] fevers [ ] chills [ ] weight loss [ ] weight gain  HEENT: [ ] dry eyes [ ] eye irritation [ ] postnasal drip [ ] nasal congestion  CV: [ ] chest pain [ ] orthopnea [ ] palpitations [ ] murmur  Resp: [ ] cough [ ] shortness of breath [ ] dyspnea [ ] wheezing [ ] sputum [ ] hemoptysis  GI: [ ] nausea [ ] vomiting [ ] diarrhea [ ] constipation [ ] abd pain [ ] dysphagia   : [ ] dysuria [ ] nocturia [ ] hematuria [ ] increased urinary frequency  Musculoskeletal: [ ] back pain [ ] myalgias [ ] arthralgias [ ] fracture  Skin: [ ] rash [ ] itch  Neurological: [ ] headache [ ] dizziness [ ] syncope [ ] weakness [ ] numbness  Psychiatric: [ ] anxiety [ ] depression  Endocrine: [ ] diabetes [ ] thyroid problem  Hematologic/Lymphatic: [ ] anemia [ ] bleeding problem  Allergic/Immunologic: [ ] itchy eyes [ ] nasal discharge [ ] hives [ ] angioedema  [ ] All other systems negative  [ ] Unable to assess ROS because ________    OBJECTIVE:  ICU Vital Signs Last 24 Hrs  T(C): 36.8 (08 Dec 2017 14:13), Max: 36.8 (08 Dec 2017 14:13)  T(F): 98.2 (08 Dec 2017 14:13), Max: 98.2 (08 Dec 2017 14:13)  HR: 68 (08 Dec 2017 14:13) (68 - 98)  BP: 91/43 (08 Dec 2017 14:13) (91/43 - 110/58)  BP(mean): --  ABP: --  ABP(mean): --  RR: 19 (08 Dec 2017 14:13) (18 - 19)  SpO2: 98% (08 Dec 2017 14:13) (98% - 100%)        12-07 @ 07:01  -  12-08 @ 07:00  --------------------------------------------------------  IN: 1080 mL / OUT: 1350 mL / NET: -270 mL    12-08 @ 07:01  -  12-08 @ 16:11  --------------------------------------------------------  IN: 240 mL / OUT: 400 mL / NET: -160 mL      CAPILLARY BLOOD GLUCOSE          PHYSICAL EXAM:  General:   HEENT:   Lymph Nodes:  Neck:   Respiratory:   Cardiovascular:   Abdomen:   Extremities:   Skin:   Neurological:  Psychiatry:    HOSPITAL MEDICATIONS:  MEDICATIONS  (STANDING):  ALBUTerol/ipratropium for Nebulization 3 milliLiter(s) Nebulizer every 6 hours  allopurinol 200 milliGRAM(s) Oral daily  aspirin enteric coated 81 milliGRAM(s) Oral daily  atorvastatin 20 milliGRAM(s) Oral at bedtime  buMETAnide Injectable 1 milliGRAM(s) IV Push every 12 hours  clopidogrel Tablet 75 milliGRAM(s) Oral daily  digoxin     Tablet 0.125 milliGRAM(s) Oral every other day  diltiazem    milliGRAM(s) Oral daily  docusate sodium 100 milliGRAM(s) Oral two times a day  heparin  Injectable 5000 Unit(s) SubCutaneous every 8 hours  macitentan (OPSUMIT) 10 milliGRAM(s) 10 milliGRAM(s) Oral daily  methimazole 5 milliGRAM(s) Oral daily  metoprolol     tartrate 25 milliGRAM(s) Oral every 8 hours  pantoprazole    Tablet 40 milliGRAM(s) Oral before breakfast  predniSONE   Tablet 5 milliGRAM(s) Oral daily  roflumilast 500 MICROGram(s) Oral daily  tiotropium 2.5 MICROgram(s)/olodaterol 2.5 MICROgram(s) Inhaler 2 Puff(s) Inhalation daily    MEDICATIONS  (PRN):  acetaminophen   Tablet 650 milliGRAM(s) Oral every 6 hours PRN Moderate Pain (4 - 6)  ondansetron    Tablet 4 milliGRAM(s) Oral every 8 hours PRN Nausea and/or Vomiting  sodium chloride 0.65% Nasal 1 Spray(s) Both Nostrils five times a day PRN dry nose      LABS:                        8.2    4.42  )-----------( 178      ( 08 Dec 2017 07:28 )             27.5     Hgb Trend: 8.2<--, 8.5<--, 9.7<--, 8.9<--, 9.6<--  12-08    137  |  93<L>  |  56<H>  ----------------------------<  88  3.8   |  30  |  2.03<H>    Ca    8.5      08 Dec 2017 07:39  Phos  4.2     12-07  Mg     2.2     12-07    TPro  x   /  Alb  3.4  /  TBili  x   /  DBili  x   /  AST  x   /  ALT  x   /  AlkPhos  x   12-06    Creatinine Trend: 2.03<--, 2.04<--, 2.24<--, 1.88<--, 1.82<--, 1.76<--            MICROBIOLOGY:     RADIOLOGY:  [ ] Reviewed and interpreted by me    PULMONARY FUNCTION TESTS:    EKG: CHIEF COMPLAINT:    HPI:  70 yo F. 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 She developed ascites requiring paracentesis as an outpt and had her diuresis uptitrated as an outpt but she continued to have fluid reaccumulation. She reports that she has had worsening shortness of breath/BRO, intermittent nausea, and poor appetite with minimal PO intake. She was also started on macitentan, and was supposed to start remodulin but was having difficulty taking it due to poor PO intake. She was admitted and has been diuresed on a lasix gtt until she developed azotemia and eventually had a paracentesis removing 8.1 L of fluid and was then restarted on diuretics. She continues to be grossly fluid overloaded and question has been posed about starting remodulin. Her symptoms of fluid overload started several months ago and prior to that she never had any issues of LE edema or abdominal distension. The pt is not sure what the cause of that is and is not aware of any workup looking for that.       PAST MEDICAL & SURGICAL HISTORY:  Hyperthyroidism  JOSE (obstructive sleep apnea)  Epistaxis  GIB (gastrointestinal bleeding)  CAD S/P percutaneous coronary angioplasty  Afib  Pulmonary HTN  GERD (gastroesophageal reflux disease)  Obesity  Cardiomegaly  Valvular heart disease  COPD (chronic obstructive pulmonary disease): Home O2  Sleep Apnea: by criteria  Loose, teeth: 3 bottom front loose teeth  Female stress incontinence  Shoulder pain, right  Constipation  Arthritis of Knee  H/O heartburn  History of lung cancer  Obese  Hx of hyperlipidemia  Asthma  Diabetes mellitus: type 2  dx about 4-5 years ago   no daily fingerstick  H/O: HTN (hypertension)  Enlarged lymph nodes  Lymph nodes enlarged: s/p biopsy mediastinal  benign  H/O endoscopy  left upper lobectomy      FAMILY HISTORY:  No pertinent family history in first degree relatives      SOCIAL HISTORY:  Smoking: [ x] Former Smoker (0.5-1 packs x 20 years)   Substance Use: [x ] Never Used   EtOH Use: none  Marital Status: [ ] Single [ x]  [ ]  [ ]   Sexual History:   Occupation:  Recent Travel:  Country of Birth:  Advance Directives:    Allergies    No Known Allergies    Intolerances    albuterol (Unknown)      HOME MEDICATIONS:  · 	allopurinol 100 mg oral tablet: 2 tab(s) orally once a day, Last Dose Taken:    · 	sucralfate 1 g/10 mL oral suspension: 10 milliliter(s) orally 4 times a day, Last Dose Taken:    · 	dilTIAZem 240 mg/24 hours oral capsule, extended release: 1 cap(s) orally once a day, Last Dose Taken:    · 	clopidogrel 75 mg oral tablet: 1 tab(s) orally once a day, Last Dose Taken:    · 	atorvastatin 20 mg oral tablet: 1 tab(s) orally once a day, Last Dose Taken:    · 	acetaminophen 325 mg oral tablet: 2 tab(s) orally every 6 hours, As needed, Moderate Pain (4 - 6), Last Dose Taken:    · 	metoprolol tartrate 25 mg oral tablet: 1 tab(s) orally every 8 hours, Last Dose Taken:    · 	digoxin 125 mcg (0.125 mg) oral tablet: 1 tab(s) orally every other day, Last Dose Taken:    · 	predniSONE 5 mg oral tablet: 1 tab(s) orally once a day, Last Dose Taken:    · 	aspirin 81 mg oral delayed release tablet: 1 tab(s) orally once a day, Last Dose Taken:    · 	Ventolin HFA 90 mcg/inh inhalation aerosol: 2 puff(s) inhaled 4 times a day, As Needed, Last Dose Taken:    · 	Colace 100 mg oral capsule: 1 cap(s) orally 2 times a day, Last Dose Taken:    · 	methIMAzole 5 mg oral tablet: 1 tab(s) orally once a day, Last Dose Taken:    · 	roflumilast 500 mcg oral tablet: 1 tab(s) orally once a day, Last Dose Taken:    · 	Klor-Con 10 mEq oral tablet, extended release: 1 tab(s) orally daily, Last Dose Taken:    · 	torsemide 20 mg oral tablet: 3 tab(s) orally once a day, Last Dose Taken:    · 	Stiolto Respimat 2.5 mcg-2.5 mcg/inh inhalation aerosol: 2 puff(s) inhaled every 24 hours, Last Dose Taken:    · 	Zofran 4 mg oral tablet: 1 tab(s) orally every 8 hours, As Needed, Last Dose Taken:    · 	Opsumit 10 mg oral tablet: 1 tab(s) orally once a day, Last Dose Taken:    · 	metOLazone 2.5 mg oral tablet: 1 tab(s) orally once a day, As Needed, Last Dose Taken:    · 	pantoprazole 40 mg oral delayed release tablet: 1 tab(s) orally once a day, Last Dose Taken:      REVIEW OF SYSTEMS:  Constitutional: [ -] fevers [ -] chills [ ] weight loss [+ ] weight gain  HEENT: [ ] dry eyes [ ] eye irritation [ ] postnasal drip [ ] nasal congestion  CV: [- ] chest pain [+ ] orthopnea [- ] palpitations [ ] murmur  Resp: [ -] cough [+ ] shortness of breath [+ ] dyspnea [ ] wheezing [ ] sputum [ ] hemoptysis  GI: [ + nausea [- ] vomiting [ -] diarrhea [ ] constipation [ +] abd pain [ ] dysphagia   [ x] All other systems negative  [ ] Unable to assess ROS because ________    OBJECTIVE:  ICU Vital Signs Last 24 Hrs  T(C): 36.8 (08 Dec 2017 14:13), Max: 36.8 (08 Dec 2017 14:13)  T(F): 98.2 (08 Dec 2017 14:13), Max: 98.2 (08 Dec 2017 14:13)  HR: 68 (08 Dec 2017 14:13) (68 - 98)  BP: 91/43 (08 Dec 2017 14:13) (91/43 - 110/58)  BP(mean): --  ABP: --  ABP(mean): --  RR: 19 (08 Dec 2017 14:13) (18 - 19)  SpO2: 98% (08 Dec 2017 14:13) (98% - 100%)        12-07 @ 07:01  -  12-08 @ 07:00  --------------------------------------------------------  IN: 1080 mL / OUT: 1350 mL / NET: -270 mL    12-08 @ 07:01  -  12-08 @ 16:11  --------------------------------------------------------  IN: 240 mL / OUT: 400 mL / NET: -160 mL      CAPILLARY BLOOD GLUCOSE          PHYSICAL EXAM:  General: NAD, speaking in full sentences  HEENT: MMM  Neck: supple, +JVD  Respiratory: diminished BS left base, clear to auscultation b/l, no rales  Cardiovascular: s1s2 RRR loud P2, RV heave  Abdomen: soft, distended, non tender  Extremities: warm, +2 pitting edema  Skin: intact, chronic venous stasis changes  Neurological: intact, non focal  Psychiatry: AAOx3    HOSPITAL MEDICATIONS:  MEDICATIONS  (STANDING):  ALBUTerol/ipratropium for Nebulization 3 milliLiter(s) Nebulizer every 6 hours  allopurinol 200 milliGRAM(s) Oral daily  aspirin enteric coated 81 milliGRAM(s) Oral daily  atorvastatin 20 milliGRAM(s) Oral at bedtime  buMETAnide Injectable 1 milliGRAM(s) IV Push every 12 hours  clopidogrel Tablet 75 milliGRAM(s) Oral daily  digoxin     Tablet 0.125 milliGRAM(s) Oral every other day  diltiazem    milliGRAM(s) Oral daily  docusate sodium 100 milliGRAM(s) Oral two times a day  heparin  Injectable 5000 Unit(s) SubCutaneous every 8 hours  macitentan (OPSUMIT) 10 milliGRAM(s) 10 milliGRAM(s) Oral daily  methimazole 5 milliGRAM(s) Oral daily  metoprolol     tartrate 25 milliGRAM(s) Oral every 8 hours  pantoprazole    Tablet 40 milliGRAM(s) Oral before breakfast  predniSONE   Tablet 5 milliGRAM(s) Oral daily  roflumilast 500 MICROGram(s) Oral daily  tiotropium 2.5 MICROgram(s)/olodaterol 2.5 MICROgram(s) Inhaler 2 Puff(s) Inhalation daily    MEDICATIONS  (PRN):  acetaminophen   Tablet 650 milliGRAM(s) Oral every 6 hours PRN Moderate Pain (4 - 6)  ondansetron    Tablet 4 milliGRAM(s) Oral every 8 hours PRN Nausea and/or Vomiting  sodium chloride 0.65% Nasal 1 Spray(s) Both Nostrils five times a day PRN dry nose      LABS:                        8.2    4.42  )-----------( 178      ( 08 Dec 2017 07:28 )             27.5     Hgb Trend: 8.2<--, 8.5<--, 9.7<--, 8.9<--, 9.6<--  12-08    137  |  93<L>  |  56<H>  ----------------------------<  88  3.8   |  30  |  2.03<H>    Ca    8.5      08 Dec 2017 07:39  Phos  4.2     12-07  Mg     2.2     12-07    TPro  x   /  Alb  3.4  /  TBili  x   /  DBili  x   /  AST  x   /  ALT  x   /  AlkPhos  x   12-06    Creatinine Trend: 2.03<--, 2.04<--, 2.24<--, 1.88<--, 1.82<--, 1.76<--            MICROBIOLOGY:     RADIOLOGY:  [ ] Reviewed and interpreted by me

## 2017-12-08 NOTE — PROGRESS NOTE ADULT - SUBJECTIVE AND OBJECTIVE BOX
NEPHROLOGY-[facility] [phone]        Patient seen and examined in bed.  This am she slid from her bed and was on her knees.  She was breathing easier        MEDICATIONS  (STANDING):  ALBUTerol/ipratropium for Nebulization 3 milliLiter(s) Nebulizer every 6 hours  allopurinol 200 milliGRAM(s) Oral daily  aspirin enteric coated 81 milliGRAM(s) Oral daily  atorvastatin 20 milliGRAM(s) Oral at bedtime  clopidogrel Tablet 75 milliGRAM(s) Oral daily  digoxin     Tablet 0.125 milliGRAM(s) Oral every other day  diltiazem    milliGRAM(s) Oral daily  docusate sodium 100 milliGRAM(s) Oral two times a day  furosemide   Injectable 60 milliGRAM(s) IV Push every 8 hours  heparin  Injectable 5000 Unit(s) SubCutaneous every 8 hours  macitentan (OPSUMIT) 10 milliGRAM(s) 10 milliGRAM(s) Oral daily  methimazole 5 milliGRAM(s) Oral daily  metoprolol     tartrate 25 milliGRAM(s) Oral every 8 hours  pantoprazole    Tablet 40 milliGRAM(s) Oral before breakfast  predniSONE   Tablet 5 milliGRAM(s) Oral daily  roflumilast 500 MICROGram(s) Oral daily  tiotropium 2.5 MICROgram(s)/olodaterol 2.5 MICROgram(s) Inhaler 2 Puff(s) Inhalation daily      VITAL:  T(C): , Max: 36.7 (12-07-17 @ 12:29)  T(F): , Max: 98.1 (12-07-17 @ 12:29)  HR: 76 (12-08-17 @ 08:30)  BP: 110/58 (12-08-17 @ 08:30)  BP(mean): --  RR: 18 (12-08-17 @ 08:30)  SpO2: 98% (12-08-17 @ 08:30)  Wt(kg): --    I and O's:    12-07 @ 07:01  -  12-08 @ 07:00  --------------------------------------------------------  IN: 1080 mL / OUT: 1350 mL / NET: -270 mL          PHYSICAL EXAM:    Constitutional: NAD  HEENT: PERRLA    Neck:  No JVD sitting up  Respiratory: reduced breath sounds  Cardiovascular: S1 and S2  Gastrointestinal: BS+, soft,    Extremities: No peripheral edema  Neurological: A/O x 3, no focal deficits  Psychiatric: Normal mood, normal affect  : No Gottlieb  Skin: No rashes  Access: Not applicable    LABS:                        8.2    4.42  )-----------( 178      ( 08 Dec 2017 07:28 )             27.5     12-08    137  |  93<L>  |  56<H>  ----------------------------<  88  3.8   |  30  |  2.03<H>    Ca    8.5      08 Dec 2017 07:39  Phos  4.2     12-07  Mg     2.2     12-07    TPro  x   /  Alb  3.4  /  TBili  x   /  DBili  x   /  AST  x   /  ALT  x   /  AlkPhos  x   12-06          Urine Studies:          RADIOLOGY & ADDITIONAL STUDIES:

## 2017-12-08 NOTE — PROGRESS NOTE ADULT - SUBJECTIVE AND OBJECTIVE BOX
MEDICINE, PROGRESS NOTE [phone]    FRAN ALEXANDRA 71y [id number]    Patient seen and examined.  Patient is a 71y old  Female who presents with a chief complaint of shortness of breath, abdominal pain (28 Nov 2017 22:19)  pt feels more bloated today. Pt slid off bed and landed on her knees.    PAST MEDICAL & SURGICAL HISTORY:  Hyperthyroidism  JOSE (obstructive sleep apnea)  Epistaxis  GIB (gastrointestinal bleeding)  CAD S/P percutaneous coronary angioplasty  Afib  Pulmonary HTN  GERD (gastroesophageal reflux disease)  Obesity  Cardiomegaly  Valvular heart disease  COPD (chronic obstructive pulmonary disease): Home O2  Sleep Apnea: by criteria  Loose, teeth: 3 bottom front loose teeth  Female stress incontinence  Shoulder pain, right  Constipation  Arthritis of Knee  H/O heartburn  History of lung cancer  Obese  Hx of hyperlipidemia  Asthma  Diabetes mellitus: type 2  dx about 4-5 years ago   no daily fingerstick  H/O: HTN (hypertension)  Enlarged lymph nodes  Lymph nodes enlarged: s/p biopsy mediastinal  benign  H/O endoscopy  left upper lobectomy    MEDICATIONS  (STANDING):  ALBUTerol/ipratropium for Nebulization 3 milliLiter(s) Nebulizer every 6 hours  allopurinol 200 milliGRAM(s) Oral daily  aspirin enteric coated 81 milliGRAM(s) Oral daily  atorvastatin 20 milliGRAM(s) Oral at bedtime  buMETAnide Injectable 1 milliGRAM(s) IV Push every 12 hours  clopidogrel Tablet 75 milliGRAM(s) Oral daily  digoxin     Tablet 0.125 milliGRAM(s) Oral every other day  diltiazem    milliGRAM(s) Oral daily  docusate sodium 100 milliGRAM(s) Oral two times a day  heparin  Injectable 5000 Unit(s) SubCutaneous every 8 hours  macitentan (OPSUMIT) 10 milliGRAM(s) 10 milliGRAM(s) Oral daily  methimazole 5 milliGRAM(s) Oral daily  metoprolol     tartrate 25 milliGRAM(s) Oral every 8 hours  pantoprazole    Tablet 40 milliGRAM(s) Oral before breakfast  predniSONE   Tablet 5 milliGRAM(s) Oral daily  roflumilast 500 MICROGram(s) Oral daily  tiotropium 2.5 MICROgram(s)/olodaterol 2.5 MICROgram(s) Inhaler 2 Puff(s) Inhalation daily    MEDICATIONS  (PRN):  acetaminophen   Tablet 650 milliGRAM(s) Oral every 6 hours PRN Moderate Pain (4 - 6)  ondansetron    Tablet 4 milliGRAM(s) Oral every 8 hours PRN Nausea and/or Vomiting  sodium chloride 0.65% Nasal 1 Spray(s) Both Nostrils five times a day PRN dry nose    Allergies    No Known Allergies    Intolerances    albuterol (Unknown)      PHYSICAL EXAM:  Constitutional: NAD  HEENT: Normocephalic, EOMI  Neck:  No JVD  Respiratory: CTA B/L, No wheezes, dec bs at bases  Cardiovascular: S1, S2, RRR, + systolic murmur  Gastrointestinal: BS+, soft, + distention, + fluid wave  Extremities: + peripheral edema le b/l   Neurological: AAOX3, no focal deficits  Psychiatric: Normal mood, normal affect  : No Gottlieb    Vital Signs Last 24 Hrs  T(C): 36.8 (08 Dec 2017 14:13), Max: 36.8 (08 Dec 2017 14:13)  T(F): 98.2 (08 Dec 2017 14:13), Max: 98.2 (08 Dec 2017 14:13)  HR: 68 (08 Dec 2017 14:13) (68 - 98)  BP: 91/43 (08 Dec 2017 14:13) (91/43 - 110/58)  BP(mean): --  RR: 19 (08 Dec 2017 14:13) (18 - 19)  SpO2: 98% (08 Dec 2017 14:13) (98% - 100%)  I&O's Summary    07 Dec 2017 07:01  -  08 Dec 2017 07:00  --------------------------------------------------------  IN: 1080 mL / OUT: 1350 mL / NET: -270 mL    08 Dec 2017 07:01  -  08 Dec 2017 17:55  --------------------------------------------------------  IN: 240 mL / OUT: 400 mL / NET: -160 mL        LABS:                        8.2    4.42  )-----------( 178      ( 08 Dec 2017 07:28 )             27.5     12-08    137  |  93<L>  |  56<H>  ----------------------------<  88  3.8   |  30  |  2.03<H>    Ca    8.5      08 Dec 2017 07:39  Phos  4.2     12-07  Mg     2.2     12-07    TPro  x   /  Alb  3.4  /  TBili  x   /  DBili  x   /  AST  x   /  ALT  x   /  AlkPhos  x   12-06

## 2017-12-08 NOTE — CONSULT NOTE ADULT - ATTENDING COMMENTS
72 yo F. 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 Unclear reason for pt's decompensation but now has had significant BRO, edema, ascites. Prior PFTs show significant RLD. Presume Group 1/3 phtn. Need to check for prior v/q.  Agree with continuing diuresis for net negative output. Cont Opsimut. Will d/w Dr. Youssef re: initating remodulin but given significant new limitation in ambulation despite diuresis so far would seem reasonable
as above

## 2017-12-08 NOTE — PROVIDER CONTACT NOTE (FALL NOTIFICATION) - ACTION/TREATMENT ORDERED:
seen and assessed by STEPHANIE Mora, x ray B/L knee ordered, will continue monitor, fall precaution maintained

## 2017-12-08 NOTE — DIETITIAN INITIAL EVALUATION ADULT. - NS AS NUTRI INTERV ED CONTENT
Other (specify)/Reinforced Low Na diet education. reinforced intake of nutrient dense meals/snacks as well as  fluids from solids at meal times to avoid early satiety, Pt verbalized understanding. Aware that RD remains available to monitor PO intake, wt, labs and diet education

## 2017-12-08 NOTE — PROGRESS NOTE ADULT - ASSESSMENT
Assessment and Plan:   · Assessment		  A 71-year-old female with past medical history of diabetes, hypertension, severe pulmonary hypertension, right ventricular dysfunction, presents with not only left heart failure but also stigmata of acute cor pulmonale.  The patient's acute renal failure is likely hemodynamic in nature.  Goals of therapy are to improve her hemodynamics in order to improve her forward flow and renal perfusion.  CASSANDRA  Severe PHTN  Cor Pulm  Failure to thrive  Abd pain -R/O SBP  1.	Renal: Off the Lasix  drip.  Will change Lasix to Bumex.  Monitor BMP daily.   2.	Gastroenterology: S/P LARGE VOLUME PARACENTESIS;  No evidence of SBP  3.	Pulmonary: Pulmonary nebulizers.  Continue with nasal cannula oxygen at all times.  Can we try Remodulin again.  This was prescribed as a outpt by Dr Yin Luis  4.	 Cardiology: Monitor hemodynamics and volume status.     Options for her PHTN are limited.  Can remodulin be tried again

## 2017-12-08 NOTE — DIETITIAN INITIAL EVALUATION ADULT. - ORAL INTAKE PTA
Pt reports a good PO intake PTA. States she eats small meals as she gets full quickly. Might be roll, toast, eggs, 1/2 sandwich, pasta, raviolis, shrimp, scallops, and chicken. Pt drinks water, diet soda or iced tea./good

## 2017-12-08 NOTE — DIETITIAN INITIAL EVALUATION ADULT. - PROBLEM SELECTOR PLAN 3
Patient with mildly elevated troponin in setting of volume overload with BNP > 70814 and CASSANDRA. Likely secondary to combination of mild demand from volume overload as well as decreased renal clearance.   - No anticoagulation at this time, continue aspirin  - Trend troponin to peak

## 2017-12-08 NOTE — CONSULT NOTE ADULT - ASSESSMENT
72 yo F. 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

## 2017-12-08 NOTE — CHART NOTE - NSCHARTNOTEFT_GEN_A_CORE
Notified by RN for pt s/p fall this morning at approximately 4:25am. Pt seen and examined at bedside. Pt states she rolled over in bed and slid onto her knees onto the floor. Pt states she has mild tenderness in her knees b/l from the impact of the floor. Pt has no other complaints at this time. Denies syncope, dizziness, SOB, CP, palpitations, change in vision, hip / back pain. Ordered b/l knee x-ray. Fall precautions in place. Incident report completed and signed. Will discuss with primary team in AM.    Gibran Mora PA-C  Department of Medicine  [phone]

## 2017-12-08 NOTE — DIETITIAN INITIAL EVALUATION ADULT. - PHYSICAL APPEARANCE
Appears thin. Pt laying in bed, abdominal distentions noted. Nutrition physical focused exam not conducted at Pt taken for xray./other (specify)

## 2017-12-08 NOTE — PROGRESS NOTE ADULT - ASSESSMENT
s/p large volume paracentesis  severe copd  severe phtn  bucky    f/u paracentesis cultures  continue current care  will call dr moreno for eval regarding remodulin

## 2017-12-08 NOTE — PROGRESS NOTE ADULT - ASSESSMENT
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 Recent admission at [facility] approximately 5 weeks ago for large volume paracentesis (6.1L removed). Now presents again with RV failure-worsened LE edema, recurrent ascites, pleural effusions and pulmonary edema with worsened dyspnea and CASSANDRA on CKD

## 2017-12-08 NOTE — PROGRESS NOTE ADULT - PROBLEM SELECTOR PLAN 1
2nd compressive atelectasis from pleural effusions, pulmonary edema and restrictive disease from ascites   -Keep sp02>90% on supplemental oxygen  -s/p 8.1L paracentesis 12/6  -output>input with bumex

## 2017-12-09 LAB
ANA TITR SER: NEGATIVE — SIGNIFICANT CHANGE UP
ANION GAP SERPL CALC-SCNC: 10 MMOL/L — SIGNIFICANT CHANGE UP (ref 5–17)
BUN SERPL-MCNC: 58 MG/DL — HIGH (ref 7–23)
CALCIUM SERPL-MCNC: 8.5 MG/DL — SIGNIFICANT CHANGE UP (ref 8.4–10.5)
CHLORIDE SERPL-SCNC: 97 MMOL/L — SIGNIFICANT CHANGE UP (ref 96–108)
CO2 SERPL-SCNC: 32 MMOL/L — HIGH (ref 22–31)
CREAT SERPL-MCNC: 1.72 MG/DL — HIGH (ref 0.5–1.3)
GLUCOSE SERPL-MCNC: 97 MG/DL — SIGNIFICANT CHANGE UP (ref 70–99)
HCT VFR BLD CALC: 26.2 % — LOW (ref 34.5–45)
HGB BLD-MCNC: 8.2 G/DL — LOW (ref 11.5–15.5)
MCHC RBC-ENTMCNC: 26.9 PG — LOW (ref 27–34)
MCHC RBC-ENTMCNC: 31.3 GM/DL — LOW (ref 32–36)
MCV RBC AUTO: 85.9 FL — SIGNIFICANT CHANGE UP (ref 80–100)
PLATELET # BLD AUTO: 166 K/UL — SIGNIFICANT CHANGE UP (ref 150–400)
POTASSIUM SERPL-MCNC: 4 MMOL/L — SIGNIFICANT CHANGE UP (ref 3.5–5.3)
POTASSIUM SERPL-SCNC: 4 MMOL/L — SIGNIFICANT CHANGE UP (ref 3.5–5.3)
RBC # BLD: 3.05 M/UL — LOW (ref 3.8–5.2)
RBC # FLD: 17.4 % — HIGH (ref 10.3–14.5)
SODIUM SERPL-SCNC: 139 MMOL/L — SIGNIFICANT CHANGE UP (ref 135–145)
WBC # BLD: 4.11 K/UL — SIGNIFICANT CHANGE UP (ref 3.8–10.5)
WBC # FLD AUTO: 4.11 K/UL — SIGNIFICANT CHANGE UP (ref 3.8–10.5)

## 2017-12-09 RX ADMIN — ATORVASTATIN CALCIUM 20 MILLIGRAM(S): 80 TABLET, FILM COATED ORAL at 21:31

## 2017-12-09 RX ADMIN — BUMETANIDE 1 MILLIGRAM(S): 0.25 INJECTION INTRAMUSCULAR; INTRAVENOUS at 06:25

## 2017-12-09 RX ADMIN — HEPARIN SODIUM 5000 UNIT(S): 5000 INJECTION INTRAVENOUS; SUBCUTANEOUS at 23:19

## 2017-12-09 RX ADMIN — Medication 25 MILLIGRAM(S): at 08:33

## 2017-12-09 RX ADMIN — Medication 81 MILLIGRAM(S): at 13:16

## 2017-12-09 RX ADMIN — Medication 120 MILLIGRAM(S): at 09:59

## 2017-12-09 RX ADMIN — Medication 5 MILLIGRAM(S): at 09:58

## 2017-12-09 RX ADMIN — Medication 25 MILLIGRAM(S): at 17:24

## 2017-12-09 RX ADMIN — Medication 200 MILLIGRAM(S): at 13:17

## 2017-12-09 RX ADMIN — TIOTROPIUM BROMIDE AND OLODATEROL 2 PUFF(S): 3.124; 2.736 SPRAY, METERED RESPIRATORY (INHALATION) at 08:35

## 2017-12-09 RX ADMIN — ROFLUMILAST 500 MICROGRAM(S): 500 TABLET ORAL at 09:59

## 2017-12-09 RX ADMIN — HEPARIN SODIUM 5000 UNIT(S): 5000 INJECTION INTRAVENOUS; SUBCUTANEOUS at 08:33

## 2017-12-09 RX ADMIN — CLOPIDOGREL BISULFATE 75 MILLIGRAM(S): 75 TABLET, FILM COATED ORAL at 13:16

## 2017-12-09 NOTE — PROGRESS NOTE ADULT - SUBJECTIVE AND OBJECTIVE BOX
No pain, no shortness of breath      VITAL:  T(C): , Max: 36.8 (12-08-17 @ 14:13)  T(F): , Max: 98.2 (12-08-17 @ 14:13)  HR: 80 (12-09-17 @ 08:30)  BP: 128/64 (12-09-17 @ 08:30)  BP(mean): --  RR: 18 (12-09-17 @ 08:30)  SpO2: 99% (12-09-17 @ 08:30)  Wt(kg): --      PHYSICAL EXAM:  Constitutional: NAD  HEENT: PERRLA    Neck:  No JVD sitting up  Respiratory: reduced breath sounds b/l  Cardiovascular: S1 and S2  Gastrointestinal: BS+, soft,    Extremities: No peripheral edema  Neurological: A/O x 3, no focal deficits  Psychiatric: Normal mood, normal affect  : No Gottlieb  Skin: No rashes      LABS:                        8.2    4.11  )-----------( 166      ( 09 Dec 2017 07:43 )             26.2     Na(139)/K(4.0)/Cl(97)/HCO3(32)/BUN(58)/Cr(1.72)Glu(97)/Ca(8.5)/Mg(--)/PO4(--)    12-09 @ 07:41  Na(137)/K(3.8)/Cl(93)/HCO3(30)/BUN(56)/Cr(2.03)Glu(88)/Ca(8.5)/Mg(--)/PO4(--)    12-08 @ 07:39  Na(138)/K(4.5)/Cl(95)/HCO3(32)/BUN(57)/Cr(2.04)Glu(95)/Ca(8.8)/Mg(2.2)/PO4(4.2)    12-07 @ 07:38  Na(138)/K(4.1)/Cl(95)/HCO3(33)/BUN(55)/Cr(2.24)Glu(125)/Ca(8.8)/Mg(--)/PO4(--)    12-06 @ 18:17    IMPRESSION: 71F w/ DM2, HTN, and severe pHTN, 11/28/17 a/w acute on chronic CHF and CASSANDRA    (1)Renal - CASSANDRA - prerenally mediated - creatinine slowly downtrending.  (2)Lytes - acceptable for now  (3)CV - severe pulmonary hypertension     RECOMMEND:    (1)Bumex as ordered; Is<Os  (2)Dose new meds for GFR 25-35ml/min          Arnol Villatoro MD  [facility]  [phone] No pain; SOB mildly improved. (+)mild/chronic intermittent nausea      VITAL:  T(C): , Max: 36.8 (12-08-17 @ 14:13)  T(F): , Max: 98.2 (12-08-17 @ 14:13)  HR: 80 (12-09-17 @ 08:30)  BP: 128/64 (12-09-17 @ 08:30)  BP(mean): --  RR: 18 (12-09-17 @ 08:30)  SpO2: 99% (12-09-17 @ 08:30)  Wt(kg): --      PHYSICAL EXAM:  Constitutional: NAD  HEENT: PERRLA    Neck:  No JVD sitting up  Respiratory: CTA-b/l  Cardiovascular: RRR s1s2  Gastrointestinal: BS+, soft,    Extremities: No peripheral edema  Neurological: A/O x 3, no focal deficits  Psychiatric: Normal mood, normal affect  : No Gottlieb  Skin: No rashes      LABS:                        8.2    4.11  )-----------( 166      ( 09 Dec 2017 07:43 )             26.2     Na(139)/K(4.0)/Cl(97)/HCO3(32)/BUN(58)/Cr(1.72)Glu(97)/Ca(8.5)/Mg(--)/PO4(--)    12-09 @ 07:41  Na(137)/K(3.8)/Cl(93)/HCO3(30)/BUN(56)/Cr(2.03)Glu(88)/Ca(8.5)/Mg(--)/PO4(--)    12-08 @ 07:39  Na(138)/K(4.5)/Cl(95)/HCO3(32)/BUN(57)/Cr(2.04)Glu(95)/Ca(8.8)/Mg(2.2)/PO4(4.2)    12-07 @ 07:38  Na(138)/K(4.1)/Cl(95)/HCO3(33)/BUN(55)/Cr(2.24)Glu(125)/Ca(8.8)/Mg(--)/PO4(--)    12-06 @ 18:17    IMPRESSION: 71F w/ DM2, HTN, and severe pHTN, 11/28/17 a/w acute on chronic CHF and CASSANDRA    (1)Renal - CASSANDRA - prerenally mediated - creatinine slowly downtrending.  (2)Lytes - acceptable for now  (3)CV - severe pulmonary hypertension     RECOMMEND:    (1)Bumex as ordered; Is<Os  (2)Dose new meds for GFR 25-35ml/min          Arnol Villatoro MD  [facility]  [phone]

## 2017-12-09 NOTE — PROGRESS NOTE ADULT - SUBJECTIVE AND OBJECTIVE BOX
MEDICINE, PROGRESS NOTE [phone]    FRAN ALEXANDRA 71y [id number]    Patient seen and examined.  Patient is a 71y old  Female who presents with a chief complaint of shortness of breath, abdominal pain (28 Nov 2017 22:19)  Pt still with abd discomfort.     PAST MEDICAL & SURGICAL HISTORY:  Hyperthyroidism  JOSE (obstructive sleep apnea)  Epistaxis  GIB (gastrointestinal bleeding)  CAD S/P percutaneous coronary angioplasty  Afib  Pulmonary HTN  GERD (gastroesophageal reflux disease)  Obesity  Cardiomegaly  Valvular heart disease  COPD (chronic obstructive pulmonary disease): Home O2  Sleep Apnea: by criteria  Loose, teeth: 3 bottom front loose teeth  Female stress incontinence  Shoulder pain, right  Constipation  Arthritis of Knee  H/O heartburn  History of lung cancer  Obese  Hx of hyperlipidemia  Asthma  Diabetes mellitus: type 2  dx about 4-5 years ago   no daily fingerstick  H/O: HTN (hypertension)  Enlarged lymph nodes  Lymph nodes enlarged: s/p biopsy mediastinal  benign  H/O endoscopy  left upper lobectomy    MEDICATIONS  (STANDING):  ALBUTerol/ipratropium for Nebulization 3 milliLiter(s) Nebulizer every 6 hours  allopurinol 200 milliGRAM(s) Oral daily  aspirin enteric coated 81 milliGRAM(s) Oral daily  atorvastatin 20 milliGRAM(s) Oral at bedtime  buMETAnide Injectable 1 milliGRAM(s) IV Push every 12 hours  clopidogrel Tablet 75 milliGRAM(s) Oral daily  digoxin     Tablet 0.125 milliGRAM(s) Oral every other day  diltiazem    milliGRAM(s) Oral daily  docusate sodium 100 milliGRAM(s) Oral two times a day  heparin  Injectable 5000 Unit(s) SubCutaneous every 8 hours  macitentan (OPSUMIT) 10 milliGRAM(s) 10 milliGRAM(s) Oral daily  methimazole 5 milliGRAM(s) Oral daily  metoprolol     tartrate 25 milliGRAM(s) Oral every 8 hours  pantoprazole    Tablet 40 milliGRAM(s) Oral before breakfast  predniSONE   Tablet 5 milliGRAM(s) Oral daily  roflumilast 500 MICROGram(s) Oral daily  tiotropium 2.5 MICROgram(s)/olodaterol 2.5 MICROgram(s) Inhaler 2 Puff(s) Inhalation daily    MEDICATIONS  (PRN):  acetaminophen   Tablet 650 milliGRAM(s) Oral every 6 hours PRN Moderate Pain (4 - 6)  ondansetron    Tablet 4 milliGRAM(s) Oral every 8 hours PRN Nausea and/or Vomiting  sodium chloride 0.65% Nasal 1 Spray(s) Both Nostrils five times a day PRN dry nose    Allergies    No Known Allergies    Intolerances    albuterol (Unknown)      PHYSICAL EXAM:  Constitutional: NAD  HEENT: Normocephalic, EOMI  Neck:  + JVD  Respiratory: dec bs at bases, coarse bs throughout  Cardiovascular: S1, S2, RRR, + systolic murmur  Gastrointestinal: BS+, soft, NT/ND  Extremities: No peripheral edema  Neurological: AAOX3, no focal deficits  Psychiatric: Normal mood, normal affect  : No Gottlieb    Vital Signs Last 24 Hrs  T(C): 36.8 (09 Dec 2017 12:01), Max: 36.8 (08 Dec 2017 14:13)  T(F): 98.3 (09 Dec 2017 12:01), Max: 98.3 (09 Dec 2017 12:01)  HR: 79 (09 Dec 2017 12:01) (68 - 91)  BP: 100/55 (09 Dec 2017 12:01) (91/43 - 128/64)  BP(mean): --  RR: 18 (09 Dec 2017 12:01) (18 - 19)  SpO2: 99% (09 Dec 2017 12:01) (98% - 99%)  I&O's Summary    08 Dec 2017 07:01  -  09 Dec 2017 07:00  --------------------------------------------------------  IN: 850 mL / OUT: 1000 mL / NET: -150 mL    09 Dec 2017 07:01  -  09 Dec 2017 13:41  --------------------------------------------------------  IN: 240 mL / OUT: 400 mL / NET: -160 mL        LABS:                        8.2    4.11  )-----------( 166      ( 09 Dec 2017 07:43 )             26.2     12-09    139  |  97  |  58<H>  ----------------------------<  97  4.0   |  32<H>  |  1.72<H>    Ca    8.5      09 Dec 2017 07:41

## 2017-12-09 NOTE — PROVIDER CONTACT NOTE (MEDICATION) - ASSESSMENT
Attempted to give meds at 17:30hrs. Pt. said, 'I am eating now, I don't want it now." Attempted to give Bumex iv at 18:15 hrs. Pt. in [facility] getting washed up. Attempted to give again at 19:00 hrs. Pt. said, " I can't take it now, because now it is too late."

## 2017-12-09 NOTE — PROGRESS NOTE ADULT - ASSESSMENT
s/p large volume paracentesis  severe copd  severe phtn  bucky    appreciate renal input  continue current care  await Dr. Youssef's input in regards to remodulin  monitor i/o  daily weights

## 2017-12-10 LAB
ANION GAP SERPL CALC-SCNC: 11 MMOL/L — SIGNIFICANT CHANGE UP (ref 5–17)
BUN SERPL-MCNC: 54 MG/DL — HIGH (ref 7–23)
CALCIUM SERPL-MCNC: 8.8 MG/DL — SIGNIFICANT CHANGE UP (ref 8.4–10.5)
CHLORIDE SERPL-SCNC: 98 MMOL/L — SIGNIFICANT CHANGE UP (ref 96–108)
CO2 SERPL-SCNC: 32 MMOL/L — HIGH (ref 22–31)
CREAT SERPL-MCNC: 1.64 MG/DL — HIGH (ref 0.5–1.3)
FERRITIN SERPL-MCNC: 273 NG/ML — HIGH (ref 15–150)
GLUCOSE SERPL-MCNC: 99 MG/DL — SIGNIFICANT CHANGE UP (ref 70–99)
HCT VFR BLD CALC: 25.1 % — LOW (ref 34.5–45)
HGB BLD-MCNC: 7.8 G/DL — LOW (ref 11.5–15.5)
IRON SATN MFR SERPL: 13 % — LOW (ref 14–50)
IRON SATN MFR SERPL: 25 UG/DL — LOW (ref 30–160)
MCHC RBC-ENTMCNC: 27.1 PG — SIGNIFICANT CHANGE UP (ref 27–34)
MCHC RBC-ENTMCNC: 31.1 GM/DL — LOW (ref 32–36)
MCV RBC AUTO: 87.2 FL — SIGNIFICANT CHANGE UP (ref 80–100)
PLATELET # BLD AUTO: 165 K/UL — SIGNIFICANT CHANGE UP (ref 150–400)
POTASSIUM SERPL-MCNC: 4.7 MMOL/L — SIGNIFICANT CHANGE UP (ref 3.5–5.3)
POTASSIUM SERPL-SCNC: 4.7 MMOL/L — SIGNIFICANT CHANGE UP (ref 3.5–5.3)
RBC # BLD: 2.88 M/UL — LOW (ref 3.8–5.2)
RBC # FLD: 17.5 % — HIGH (ref 10.3–14.5)
SODIUM SERPL-SCNC: 141 MMOL/L — SIGNIFICANT CHANGE UP (ref 135–145)
TIBC SERPL-MCNC: 195 UG/DL — LOW (ref 220–430)
UIBC SERPL-MCNC: 170 UG/DL — SIGNIFICANT CHANGE UP (ref 110–370)
WBC # BLD: 3.51 K/UL — LOW (ref 3.8–10.5)
WBC # FLD AUTO: 3.51 K/UL — LOW (ref 3.8–10.5)

## 2017-12-10 RX ORDER — ERYTHROPOIETIN 10000 [IU]/ML
10000 INJECTION, SOLUTION INTRAVENOUS; SUBCUTANEOUS
Qty: 0 | Refills: 0 | Status: DISCONTINUED | OUTPATIENT
Start: 2017-12-10 | End: 2018-01-04

## 2017-12-10 RX ADMIN — Medication 25 MILLIGRAM(S): at 23:51

## 2017-12-10 RX ADMIN — Medication 200 MILLIGRAM(S): at 12:00

## 2017-12-10 RX ADMIN — Medication 3 MILLILITER(S): at 12:00

## 2017-12-10 RX ADMIN — ROFLUMILAST 500 MICROGRAM(S): 500 TABLET ORAL at 08:58

## 2017-12-10 RX ADMIN — HEPARIN SODIUM 5000 UNIT(S): 5000 INJECTION INTRAVENOUS; SUBCUTANEOUS at 08:25

## 2017-12-10 RX ADMIN — ATORVASTATIN CALCIUM 20 MILLIGRAM(S): 80 TABLET, FILM COATED ORAL at 23:45

## 2017-12-10 RX ADMIN — HEPARIN SODIUM 5000 UNIT(S): 5000 INJECTION INTRAVENOUS; SUBCUTANEOUS at 23:51

## 2017-12-10 RX ADMIN — BUMETANIDE 1 MILLIGRAM(S): 0.25 INJECTION INTRAMUSCULAR; INTRAVENOUS at 05:17

## 2017-12-10 RX ADMIN — Medication 5 MILLIGRAM(S): at 08:26

## 2017-12-10 RX ADMIN — TIOTROPIUM BROMIDE AND OLODATEROL 2 PUFF(S): 3.124; 2.736 SPRAY, METERED RESPIRATORY (INHALATION) at 08:33

## 2017-12-10 RX ADMIN — Medication 0.12 MILLIGRAM(S): at 12:00

## 2017-12-10 RX ADMIN — Medication 120 MILLIGRAM(S): at 08:25

## 2017-12-10 RX ADMIN — Medication 25 MILLIGRAM(S): at 08:25

## 2017-12-10 RX ADMIN — BUMETANIDE 1 MILLIGRAM(S): 0.25 INJECTION INTRAMUSCULAR; INTRAVENOUS at 18:27

## 2017-12-10 RX ADMIN — HEPARIN SODIUM 5000 UNIT(S): 5000 INJECTION INTRAVENOUS; SUBCUTANEOUS at 15:56

## 2017-12-10 RX ADMIN — CLOPIDOGREL BISULFATE 75 MILLIGRAM(S): 75 TABLET, FILM COATED ORAL at 12:02

## 2017-12-10 RX ADMIN — Medication 3 MILLILITER(S): at 08:25

## 2017-12-10 RX ADMIN — Medication 25 MILLIGRAM(S): at 15:56

## 2017-12-10 RX ADMIN — Medication 81 MILLIGRAM(S): at 12:00

## 2017-12-10 NOTE — PROGRESS NOTE ADULT - SUBJECTIVE AND OBJECTIVE BOX
Patient is a 71y old  Female who presents with a chief complaint of shortness of breath, abdominal pain (28 Nov 2017 22:19)      SUBJECTIVE / OVERNIGHT EVENTS:   Feels better.  Denies CP/SOB/Palpitation/HA.    MEDICATIONS  (STANDING):  ALBUTerol/ipratropium for Nebulization 3 milliLiter(s) Nebulizer every 6 hours  allopurinol 200 milliGRAM(s) Oral daily  aspirin enteric coated 81 milliGRAM(s) Oral daily  atorvastatin 20 milliGRAM(s) Oral at bedtime  buMETAnide Injectable 1 milliGRAM(s) IV Push every 12 hours  clopidogrel Tablet 75 milliGRAM(s) Oral daily  digoxin     Tablet 0.125 milliGRAM(s) Oral every other day  diltiazem    milliGRAM(s) Oral daily  docusate sodium 100 milliGRAM(s) Oral two times a day  epoetin hilda Injectable 66499 Unit(s) SubCutaneous <User Schedule>  heparin  Injectable 5000 Unit(s) SubCutaneous every 8 hours  macitentan (OPSUMIT) 10 milliGRAM(s) 10 milliGRAM(s) Oral daily  methimazole 5 milliGRAM(s) Oral daily  metoprolol     tartrate 25 milliGRAM(s) Oral every 8 hours  pantoprazole    Tablet 40 milliGRAM(s) Oral before breakfast  predniSONE   Tablet 5 milliGRAM(s) Oral daily  roflumilast 500 MICROGram(s) Oral daily  tiotropium 2.5 MICROgram(s)/olodaterol 2.5 MICROgram(s) Inhaler 2 Puff(s) Inhalation daily    MEDICATIONS  (PRN):  acetaminophen   Tablet 650 milliGRAM(s) Oral every 6 hours PRN Moderate Pain (4 - 6)  ondansetron    Tablet 4 milliGRAM(s) Oral every 8 hours PRN Nausea and/or Vomiting  sodium chloride 0.65% Nasal 1 Spray(s) Both Nostrils five times a day PRN dry nose        CAPILLARY BLOOD GLUCOSE        I&O's Summary    09 Dec 2017 07:01  -  10 Dec 2017 07:00  --------------------------------------------------------  IN: 680 mL / OUT: 1125 mL / NET: -445 mL    10 Dec 2017 07:01  -  10 Dec 2017 22:02  --------------------------------------------------------  IN: 600 mL / OUT: 1150 mL / NET: -550 mL        PHYSICAL EXAM:  GENERAL: NAD, well-developed  HEAD:  Atraumatic, Normocephalic  NECK: Supple, No JVD  CHEST/LUNG: Clear to auscultation bilaterally; No wheezing.  HEART: Regular rate and rhythm; No murmurs, rubs, or gallops  ABDOMEN: Soft, Nontender, Nondistended; Bowel sounds present  EXTREMITIES:   No clubbing, cyanosis, or edema  NEUROLOGY: AAO X 3  SKIN: No rashes    LABS:                        7.8    3.51  )-----------( 165      ( 10 Dec 2017 08:40 )             25.1     12-10    141  |  98  |  54<H>  ----------------------------<  99  4.7   |  32<H>  |  1.64<H>    Ca    8.8      10 Dec 2017 08:45              CAPILLARY BLOOD GLUCOSE        12-06 @ 17:13  Culture-urine --  Culture results   Testing in progress  method type --  Organism --  Organism Identification --  Specimen source .Body Fluid Peritoneal Fluid  12-06 @ 17:12  Culture-urine --  Culture results   No growth  method type --  Organism --  Organism Identification --  Specimen source .Body Fluid Abdominal Fluid           12-06 @ 17:13  Culture blood --  Culture results   Testing in progress  Gram stain --  Gram stain blood --  Method type --  Organism --  Organism identification --  Specimen source .Body Fluid Peritoneal Fluid   12-06 @ 17:12  Culture blood --  Culture results   No growth  Gram stain   polymorphonuclear leukocytes seen  No organisms seen  by cytocentrifuge  Gram stain blood --  Method type --  Organism --  Organism identification --  Specimen source .Body Fluid Abdominal Fluid      RADIOLOGY & ADDITIONAL TESTS:    Imaging Personally Reviewed:    Consultant(s) Notes Reviewed:      Care Discussed with Consultants/Other Providers:

## 2017-12-10 NOTE — PROGRESS NOTE ADULT - PROBLEM SELECTOR PLAN 3
Awaiting 2nd opinion consult with Dr. Youssef for medication options. d/w pulm fellow, he will follow up tomorrow   -Vasodilator non-responder on RHC from 5/2017

## 2017-12-10 NOTE — PROGRESS NOTE ADULT - SUBJECTIVE AND OBJECTIVE BOX
[facility] [phone]        Patient seen and examined in bed.  She was in good spirits        MEDICATIONS  (STANDING):  ALBUTerol/ipratropium for Nebulization 3 milliLiter(s) Nebulizer every 6 hours  allopurinol 200 milliGRAM(s) Oral daily  aspirin enteric coated 81 milliGRAM(s) Oral daily  atorvastatin 20 milliGRAM(s) Oral at bedtime  buMETAnide Injectable 1 milliGRAM(s) IV Push every 12 hours  clopidogrel Tablet 75 milliGRAM(s) Oral daily  digoxin     Tablet 0.125 milliGRAM(s) Oral every other day  diltiazem    milliGRAM(s) Oral daily  docusate sodium 100 milliGRAM(s) Oral two times a day  heparin  Injectable 5000 Unit(s) SubCutaneous every 8 hours  macitentan (OPSUMIT) 10 milliGRAM(s) 10 milliGRAM(s) Oral daily  methimazole 5 milliGRAM(s) Oral daily  metoprolol     tartrate 25 milliGRAM(s) Oral every 8 hours  pantoprazole    Tablet 40 milliGRAM(s) Oral before breakfast  predniSONE   Tablet 5 milliGRAM(s) Oral daily  roflumilast 500 MICROGram(s) Oral daily  tiotropium 2.5 MICROgram(s)/olodaterol 2.5 MICROgram(s) Inhaler 2 Puff(s) Inhalation daily      VITAL:  T(C): , Max: 36.8 (12-09-17 @ 12:01)  T(F): , Max: 98.3 (12-09-17 @ 12:01)  HR: 83 (12-10-17 @ 11:45)  BP: 115/67 (12-10-17 @ 11:45)  BP(mean): --  RR: 18 (12-10-17 @ 11:45)  SpO2: 97% (12-10-17 @ 11:45)  Wt(kg): --    I and O's:    12-09 @ 07:01  -  12-10 @ 07:00  --------------------------------------------------------  IN: 680 mL / OUT: 1125 mL / NET: -445 mL          PHYSICAL EXAM:    Constitutional: NAD  HEENT: PERRLA    Neck:  +  JVD  Respiratory: CTAB/L  Cardiovascular: S1 and S2  Gastrointestinal: BS+, soft, distended  Extremities: No peripheral edema  Neurological: A/O x 3, no focal deficits  Psychiatric: Normal mood, normal affect  : No Gottlieb  Skin: No rashes  Access: Not applicable    LABS:                        7.8    3.51  )-----------( 165      ( 10 Dec 2017 08:40 )             25.1     12-10    141  |  98  |  54<H>  ----------------------------<  99  4.7   |  32<H>  |  1.64<H>    Ca    8.8      10 Dec 2017 08:45            Urine Studies:          RADIOLOGY & ADDITIONAL STUDIES:

## 2017-12-10 NOTE — PROGRESS NOTE ADULT - ASSESSMENT
Assessment and Plan:   · Assessment		  A 71-year-old female with past medical history of diabetes, hypertension, severe pulmonary hypertension, right ventricular dysfunction, presents with not only left heart failure but also stigmata of acute cor pulmonale.  The patient's acute renal failure is likely hemodynamic in nature.  Goals of therapy are to improve her hemodynamics in order to improve her forward flow and renal perfusion.  CASSANDRA  Severe PHTN  Cor Pulm  Failure to thrive  Abd pain -R/O SBP  1.	Renal: Off the Lasix  drip.  IV Bumex.  Monitor BMP daily.   2.	Gastroenterology: S/P LARGE VOLUME PARACENTESIS;  No evidence of SBP  3.	Pulmonary: Pulmonary nebulizers.  Continue with nasal cannula oxygen at all times.  Can we try Remodulin again.  This was prescribed as a outpt by Dr Yin Luis.  Awaiting pulm to decided.  2nd opinion noted  4.	 Cardiology: Monitor hemodynamics and volume status.   5.       Anemia-Start Epogen  Options for her PHTN are limited.  Can remodulin be tried again

## 2017-12-10 NOTE — PROGRESS NOTE ADULT - PROBLEM SELECTOR PLAN 1
2nd compressive atelectasis from pleural effusions, pulmonary edema and restrictive disease from ascites   -Keep sp02>90% on supplemental oxygen  -s/p 8.1L paracentesis 12/6, ascites reaccumulating   -output>input with bumex  -Daily weights

## 2017-12-10 NOTE — PROGRESS NOTE ADULT - ASSESSMENT
s/p large volume paracentesis  severe copd  severe phtn  bucky    appreciate renal input  continue current care  Pul f/up.  monitor i/o  daily weights

## 2017-12-11 ENCOUNTER — TRANSCRIPTION ENCOUNTER (OUTPATIENT)
Age: 72
End: 2017-12-11

## 2017-12-11 LAB
ANION GAP SERPL CALC-SCNC: 10 MMOL/L — SIGNIFICANT CHANGE UP (ref 5–17)
BUN SERPL-MCNC: 48 MG/DL — HIGH (ref 7–23)
CALCIUM SERPL-MCNC: 8.8 MG/DL — SIGNIFICANT CHANGE UP (ref 8.4–10.5)
CHLORIDE SERPL-SCNC: 97 MMOL/L — SIGNIFICANT CHANGE UP (ref 96–108)
CO2 SERPL-SCNC: 34 MMOL/L — HIGH (ref 22–31)
CREAT SERPL-MCNC: 1.5 MG/DL — HIGH (ref 0.5–1.3)
CULTURE RESULTS: SIGNIFICANT CHANGE UP
GLUCOSE SERPL-MCNC: 118 MG/DL — HIGH (ref 70–99)
HCT VFR BLD CALC: 25.2 % — LOW (ref 34.5–45)
HGB BLD-MCNC: 7.5 G/DL — LOW (ref 11.5–15.5)
MAGNESIUM SERPL-MCNC: 2.2 MG/DL — SIGNIFICANT CHANGE UP (ref 1.6–2.6)
MCHC RBC-ENTMCNC: 26.1 PG — LOW (ref 27–34)
MCHC RBC-ENTMCNC: 29.8 GM/DL — LOW (ref 32–36)
MCV RBC AUTO: 87.8 FL — SIGNIFICANT CHANGE UP (ref 80–100)
PHOSPHATE SERPL-MCNC: 2.8 MG/DL — SIGNIFICANT CHANGE UP (ref 2.5–4.5)
PLATELET # BLD AUTO: 148 K/UL — LOW (ref 150–400)
POTASSIUM SERPL-MCNC: 4.2 MMOL/L — SIGNIFICANT CHANGE UP (ref 3.5–5.3)
POTASSIUM SERPL-SCNC: 4.2 MMOL/L — SIGNIFICANT CHANGE UP (ref 3.5–5.3)
RBC # BLD: 2.87 M/UL — LOW (ref 3.8–5.2)
RBC # FLD: 17.2 % — HIGH (ref 10.3–14.5)
SODIUM SERPL-SCNC: 141 MMOL/L — SIGNIFICANT CHANGE UP (ref 135–145)
SPECIMEN SOURCE: SIGNIFICANT CHANGE UP
WBC # BLD: 3.48 K/UL — LOW (ref 3.8–10.5)
WBC # FLD AUTO: 3.48 K/UL — LOW (ref 3.8–10.5)

## 2017-12-11 RX ORDER — BUMETANIDE 0.25 MG/ML
2 INJECTION INTRAMUSCULAR; INTRAVENOUS
Qty: 0 | Refills: 0 | Status: DISCONTINUED | OUTPATIENT
Start: 2017-12-11 | End: 2017-12-20

## 2017-12-11 RX ORDER — IRON SUCROSE 20 MG/ML
200 INJECTION, SOLUTION INTRAVENOUS EVERY 24 HOURS
Qty: 0 | Refills: 0 | Status: COMPLETED | OUTPATIENT
Start: 2017-12-11 | End: 2017-12-14

## 2017-12-11 RX ADMIN — ROFLUMILAST 500 MICROGRAM(S): 500 TABLET ORAL at 08:47

## 2017-12-11 RX ADMIN — Medication 25 MILLIGRAM(S): at 16:00

## 2017-12-11 RX ADMIN — Medication 25 MILLIGRAM(S): at 06:59

## 2017-12-11 RX ADMIN — HEPARIN SODIUM 5000 UNIT(S): 5000 INJECTION INTRAVENOUS; SUBCUTANEOUS at 23:11

## 2017-12-11 RX ADMIN — Medication 120 MILLIGRAM(S): at 08:47

## 2017-12-11 RX ADMIN — Medication 25 MILLIGRAM(S): at 23:12

## 2017-12-11 RX ADMIN — BUMETANIDE 1 MILLIGRAM(S): 0.25 INJECTION INTRAMUSCULAR; INTRAVENOUS at 06:58

## 2017-12-11 RX ADMIN — Medication 200 MILLIGRAM(S): at 13:09

## 2017-12-11 RX ADMIN — CLOPIDOGREL BISULFATE 75 MILLIGRAM(S): 75 TABLET, FILM COATED ORAL at 13:08

## 2017-12-11 RX ADMIN — IRON SUCROSE 110 MILLIGRAM(S): 20 INJECTION, SOLUTION INTRAVENOUS at 10:28

## 2017-12-11 RX ADMIN — TIOTROPIUM BROMIDE AND OLODATEROL 2 PUFF(S): 3.124; 2.736 SPRAY, METERED RESPIRATORY (INHALATION) at 08:50

## 2017-12-11 RX ADMIN — ATORVASTATIN CALCIUM 20 MILLIGRAM(S): 80 TABLET, FILM COATED ORAL at 23:12

## 2017-12-11 RX ADMIN — ERYTHROPOIETIN 10000 UNIT(S): 10000 INJECTION, SOLUTION INTRAVENOUS; SUBCUTANEOUS at 10:31

## 2017-12-11 RX ADMIN — Medication 81 MILLIGRAM(S): at 13:08

## 2017-12-11 RX ADMIN — BUMETANIDE 2 MILLIGRAM(S): 0.25 INJECTION INTRAMUSCULAR; INTRAVENOUS at 17:32

## 2017-12-11 RX ADMIN — Medication 5 MILLIGRAM(S): at 08:47

## 2017-12-11 RX ADMIN — Medication 3 MILLILITER(S): at 21:58

## 2017-12-11 NOTE — PROGRESS NOTE ADULT - SUBJECTIVE AND OBJECTIVE BOX
[facility] [phone]        Patient seen and examined in bed.  Her edema is worse.  She feels her abd is more swollen as well        MEDICATIONS  (STANDING):  ALBUTerol/ipratropium for Nebulization 3 milliLiter(s) Nebulizer every 6 hours  allopurinol 200 milliGRAM(s) Oral daily  aspirin enteric coated 81 milliGRAM(s) Oral daily  atorvastatin 20 milliGRAM(s) Oral at bedtime  buMETAnide Injectable 1 milliGRAM(s) IV Push every 12 hours  clopidogrel Tablet 75 milliGRAM(s) Oral daily  digoxin     Tablet 0.125 milliGRAM(s) Oral every other day  diltiazem    milliGRAM(s) Oral daily  docusate sodium 100 milliGRAM(s) Oral two times a day  epoetin hilda Injectable 48140 Unit(s) SubCutaneous <User Schedule>  heparin  Injectable 5000 Unit(s) SubCutaneous every 8 hours  macitentan (OPSUMIT) 10 milliGRAM(s) 10 milliGRAM(s) Oral daily  methimazole 5 milliGRAM(s) Oral daily  metoprolol     tartrate 25 milliGRAM(s) Oral every 8 hours  pantoprazole    Tablet 40 milliGRAM(s) Oral before breakfast  predniSONE   Tablet 5 milliGRAM(s) Oral daily  roflumilast 500 MICROGram(s) Oral daily  tiotropium 2.5 MICROgram(s)/olodaterol 2.5 MICROgram(s) Inhaler 2 Puff(s) Inhalation daily      VITAL:  T(C): , Max: 36.8 (12-11-17 @ 06:44)  T(F): , Max: 98.2 (12-11-17 @ 06:44)  HR: 65 (12-11-17 @ 08:43)  BP: 106/63 (12-11-17 @ 08:43)  BP(mean): --  RR: 19 (12-11-17 @ 08:43)  SpO2: 93% (12-11-17 @ 08:43)  Wt(kg): --    I and O's:    12-10 @ 07:01  -  12-11 @ 07:00  --------------------------------------------------------  IN: 840 mL / OUT: 1650 mL / NET: -810 mL          PHYSICAL EXAM:    Constitutional: NAD  HEENT: PERRLA    Neck:  +JVD  Respiratory: CTAB/L  Cardiovascular: S1 and S2  Gastrointestinal: BS+, soft, NT/ND  Extremities: + 3 peripheral edema and tense edema as well  Neurological: A/O x 3, no focal deficits  Psychiatric: Normal mood, normal affect  : No Gottlieb  Skin: No rashes  Access: Not applicable    LABS:                        7.5    3.48  )-----------( 148      ( 11 Dec 2017 07:52 )             25.2     12-10    141  |  98  |  54<H>  ----------------------------<  99  4.7   |  32<H>  |  1.64<H>    Ca    8.8      10 Dec 2017 08:45            Urine Studies:          RADIOLOGY & ADDITIONAL STUDIES:

## 2017-12-11 NOTE — DISCHARGE NOTE ADULT - CARE PROVIDER_API CALL
Pierce Cobb (MD), Internal Medicine  [address]  [place] [address]  Phone: [phone]  Fax: [phone]    Margot Youssef), Critical Care Medicine; Internal Medicine; Pulmonary Disease  [address] [facility]  [address]  [place] [address]  Phone: [phone]  Fax: [phone]    Cirilo Weiss (), Critical Care Medicine; Internal Medicine; Pulmonary Disease  [address]  [place] [address]  Phone: [phone]  Fax: [phone]    Manuel Smith), Internal Medicine; Nephrology  [address]  [address]  [place] [address]  Phone: [phone]  Fax: [phone]

## 2017-12-11 NOTE — PROGRESS NOTE ADULT - SUBJECTIVE AND OBJECTIVE BOX
Interval Events: Patient was seen and examined at bedside. No overnight events.    REVIEW OF SYSTEMS:  Constitutional: [ ] fevers [ ] chills [ ] weight loss [ ] weight gain  CV: [ ] chest pain [ ] orthopnea [ ] palpitations [ ] murmur  Resp: [ ] cough [ ] shortness of breath [ ] dyspnea [ ] wheezing [ ] sputum [ ] hemoptysis  [ ] All other systems negative  [ ] Unable to assess ROS because ________    OBJECTIVE:  ICU Vital Signs Last 24 Hrs  T(C): 36.7 (11 Dec 2017 11:41), Max: 36.8 (11 Dec 2017 06:44)  T(F): 98 (11 Dec 2017 11:41), Max: 98.2 (11 Dec 2017 06:44)  HR: 91 (11 Dec 2017 11:41) (65 - 91)  BP: 110/68 (11 Dec 2017 11:41) (93/54 - 129/76)  BP(mean): --  ABP: --  ABP(mean): --  RR: 18 (11 Dec 2017 11:41) (18 - 19)  SpO2: 94% (11 Dec 2017 11:41) (93% - 99%)        12-10 @ 07:01 - 12-11 @ 07:00  --------------------------------------------------------  IN: 840 mL / OUT: 1650 mL / NET: -810 mL    12-11 @ 07:01 - 12-11 @ 15:51  --------------------------------------------------------  IN: 100 mL / OUT: 400 mL / NET: -300 mL      CAPILLARY BLOOD GLUCOSE          PHYSICAL EXAM:  General: Mild respiratory distress  HEENT: PERRLA  Lymph Nodes: No palpable lymphadenopathy  Neck: Supple  Respiratory: CTA B/L  Cardiovascular:  Loud P2, RRR  Abdomen: Distended abdomen with hypoactive BS  Extremities: 4/4 edema in LE bilaterally  Skin: venous stasis changes  Neurological: AAOx3, Nonfocal      HOSPITAL MEDICATIONS:  MEDICATIONS  (STANDING):  ALBUTerol/ipratropium for Nebulization 3 milliLiter(s) Nebulizer every 6 hours  allopurinol 200 milliGRAM(s) Oral daily  aspirin enteric coated 81 milliGRAM(s) Oral daily  atorvastatin 20 milliGRAM(s) Oral at bedtime  buMETAnide Injectable 2 milliGRAM(s) IV Push two times a day  clopidogrel Tablet 75 milliGRAM(s) Oral daily  digoxin     Tablet 0.125 milliGRAM(s) Oral every other day  diltiazem    milliGRAM(s) Oral daily  docusate sodium 100 milliGRAM(s) Oral two times a day  epoetin hilda Injectable 91766 Unit(s) SubCutaneous <User Schedule>  heparin  Injectable 5000 Unit(s) SubCutaneous every 8 hours  iron sucrose IVPB 200 milliGRAM(s) IV Intermittent every 24 hours  macitentan (OPSUMIT) 10 milliGRAM(s) 10 milliGRAM(s) Oral daily  methimazole 5 milliGRAM(s) Oral daily  metolazone 5 milliGRAM(s) Oral daily  metoprolol     tartrate 25 milliGRAM(s) Oral every 8 hours  pantoprazole    Tablet 40 milliGRAM(s) Oral before breakfast  predniSONE   Tablet 5 milliGRAM(s) Oral daily  roflumilast 500 MICROGram(s) Oral daily  tiotropium 2.5 MICROgram(s)/olodaterol 2.5 MICROgram(s) Inhaler 2 Puff(s) Inhalation daily    MEDICATIONS  (PRN):  acetaminophen   Tablet 650 milliGRAM(s) Oral every 6 hours PRN Moderate Pain (4 - 6)  ondansetron    Tablet 4 milliGRAM(s) Oral every 8 hours PRN Nausea and/or Vomiting  sodium chloride 0.65% Nasal 1 Spray(s) Both Nostrils five times a day PRN dry nose      LABS:                        7.5    3.48  )-----------( 148      ( 11 Dec 2017 07:52 )             25.2     Hgb Trend: 7.5<--, 7.8<--, 8.2<--, 8.2<--, 8.5<--  12-11    141  |  97  |  48<H>  ----------------------------<  118<H>  4.2   |  34<H>  |  1.50<H>    Ca    8.8      11 Dec 2017 09:19  Phos  2.8     12-11  Mg     2.2     12-11      Creatinine Trend: 1.50<--, 1.64<--, 1.72<--, 2.03<--, 2.04<--, 2.24<--          MICROBIOLOGY:     RADIOLOGY:  [ ] Reviewed and interpreted by me

## 2017-12-11 NOTE — PROGRESS NOTE ADULT - SUBJECTIVE AND OBJECTIVE BOX
Follow-up Pulm Progress Note    Appears comfortable on home O2 settings.     Medications:  MEDICATIONS  (STANDING):  ALBUTerol/ipratropium for Nebulization 3 milliLiter(s) Nebulizer every 6 hours  allopurinol 200 milliGRAM(s) Oral daily  aspirin enteric coated 81 milliGRAM(s) Oral daily  atorvastatin 20 milliGRAM(s) Oral at bedtime  buMETAnide Injectable 2 milliGRAM(s) IV Push two times a day  clopidogrel Tablet 75 milliGRAM(s) Oral daily  digoxin     Tablet 0.125 milliGRAM(s) Oral every other day  diltiazem    milliGRAM(s) Oral daily  docusate sodium 100 milliGRAM(s) Oral two times a day  epoetin hilda Injectable 58282 Unit(s) SubCutaneous <User Schedule>  heparin  Injectable 5000 Unit(s) SubCutaneous every 8 hours  iron sucrose IVPB 200 milliGRAM(s) IV Intermittent every 24 hours  macitentan (OPSUMIT) 10 milliGRAM(s) 10 milliGRAM(s) Oral daily  methimazole 5 milliGRAM(s) Oral daily  metolazone 5 milliGRAM(s) Oral daily  metoprolol     tartrate 25 milliGRAM(s) Oral every 8 hours  pantoprazole    Tablet 40 milliGRAM(s) Oral before breakfast  predniSONE   Tablet 5 milliGRAM(s) Oral daily  roflumilast 500 MICROGram(s) Oral daily  tiotropium 2.5 MICROgram(s)/olodaterol 2.5 MICROgram(s) Inhaler 2 Puff(s) Inhalation daily    MEDICATIONS  (PRN):  acetaminophen   Tablet 650 milliGRAM(s) Oral every 6 hours PRN Moderate Pain (4 - 6)  ondansetron    Tablet 4 milliGRAM(s) Oral every 8 hours PRN Nausea and/or Vomiting  sodium chloride 0.65% Nasal 1 Spray(s) Both Nostrils five times a day PRN dry nose    Vital Signs Last 24 Hrs  T(C): 36.8 (11 Dec 2017 06:44), Max: 36.8 (11 Dec 2017 06:44)  T(F): 98.2 (11 Dec 2017 06:44), Max: 98.2 (11 Dec 2017 06:44)  HR: 65 (11 Dec 2017 08:43) (65 - 104)  BP: 106/63 (11 Dec 2017 08:43) (93/54 - 129/76)  BP(mean): --  RR: 19 (11 Dec 2017 08:43) (18 - 19)  SpO2: 93% (11 Dec 2017 08:43) (93% - 99%)    12-10 @ 07:01  -  12-11 @ 07:00  --------------------------------------------------------  IN: 840 mL / OUT: 1650 mL / NET: -810 mL    LABS:                        7.5    3.48  )-----------( 148      ( 11 Dec 2017 07:52 )             25.2     12-11    141  |  97  |  48<H>  ----------------------------<  118<H>  4.2   |  34<H>  |  1.50<H>    Ca    8.8      11 Dec 2017 09:19  Phos  2.8     12-11  Mg     2.2     12-11    CAPILLARY BLOOD GLUCOSE    GAGAN Negative 12-08 @ 07:39  Anti SS-1 --  Anti SS-2 --  Anti RNP --  RF 15.0 12-08 @ 07:39    Atypical ANCA -- 12-08 @ 07:39  c-ANCA titer -- 12-08 @ 07:39  c-ANCA -- 12-08 @ 07:39  p-ANCA -- 12-08 @ 07:39    Fluid characteristics  -- 12-06 @ 14:33  pH --  LDH 85  tprot 3.2    Cell count  Appearance --  Fluid type --  BF lymph --  color --  eosinophil --  PMN --  Mesothelial --  Monocyte --  Other body cells --  Fluid characteristics  -- 12-06 @ 14:32  pH --  LDH --  tprot --    Cell count  Appearance Hazy  Fluid type --  BF lymph 4  color Yellow  eosinophil --  PMN 60  Mesothelial 20  Monocyte 15  Other body cells 1      CULTURES: (if applicable)  Culture Results:   Testing in progress (12-06 @ 17:13)  Culture Results:   No growth (12-06 @ 17:12)    Most recent blood culture -- 12-06 @ 17:13   -- -- .Body Fluid Peritoneal Fluid 12-06 @ 17:13  Most recent blood culture -- 12-06 @ 17:12   -- -- .Body Fluid Abdominal Fluid 12-06 @ 17:12    Physical Examination:  PULM: Decreased BS at bases  CVS: S1, S2 heard    RADIOLOGY REVIEWED  CXR:     CT chest:    TTE:

## 2017-12-11 NOTE — PROGRESS NOTE ADULT - ASSESSMENT
s/p large volume paracentesis  severe copd  severe phtn  bucky  anemia    continue current care  continue diuresis  await PHTN service recs  can check repeat echo if pulm desires   appreciate renal input

## 2017-12-11 NOTE — DISCHARGE NOTE ADULT - PATIENT PORTAL LINK FT
“You can access the [facility] Patient Portal, offered by [facility], by registering with the following website: [email or web address]”

## 2017-12-11 NOTE — PROGRESS NOTE ADULT - PROBLEM SELECTOR PLAN 3
Awaiting 2nd opinion consult with Dr. Youssef for medication options. d/w pulm fellow, he will follow up today  -Vasodilator non-responder on RHC from 5/2017

## 2017-12-11 NOTE — PROGRESS NOTE ADULT - SUBJECTIVE AND OBJECTIVE BOX
MEDICINE, PROGRESS NOTE [phone]    FRAN ALEXANDRA 71y [id number]    Patient seen and examined.  Patient is a 71y old  Female who presents with a chief complaint of shortness of breath, volume overload and ascites (11 Dec 2017 11:41)  Pt feels ik but feels water is reaccumulating.    PAST MEDICAL & SURGICAL HISTORY:  Hyperthyroidism  JOSE (obstructive sleep apnea)  Epistaxis  GIB (gastrointestinal bleeding)  CAD S/P percutaneous coronary angioplasty  Afib  Pulmonary HTN  GERD (gastroesophageal reflux disease)  Obesity  Cardiomegaly  Valvular heart disease  COPD (chronic obstructive pulmonary disease): Home O2  Sleep Apnea: by criteria  Loose, teeth: 3 bottom front loose teeth  Female stress incontinence  Shoulder pain, right  Constipation  Arthritis of Knee  H/O heartburn  History of lung cancer  Obese  Hx of hyperlipidemia  Asthma  Diabetes mellitus: type 2  dx about 4-5 years ago   no daily fingerstick  H/O: HTN (hypertension)  Enlarged lymph nodes  Lymph nodes enlarged: s/p biopsy mediastinal  benign  H/O endoscopy  left upper lobectomy    MEDICATIONS  (STANDING):  ALBUTerol/ipratropium for Nebulization 3 milliLiter(s) Nebulizer every 6 hours  allopurinol 200 milliGRAM(s) Oral daily  aspirin enteric coated 81 milliGRAM(s) Oral daily  atorvastatin 20 milliGRAM(s) Oral at bedtime  buMETAnide Injectable 2 milliGRAM(s) IV Push two times a day  clopidogrel Tablet 75 milliGRAM(s) Oral daily  digoxin     Tablet 0.125 milliGRAM(s) Oral every other day  diltiazem    milliGRAM(s) Oral daily  docusate sodium 100 milliGRAM(s) Oral two times a day  epoetin hilda Injectable 03200 Unit(s) SubCutaneous <User Schedule>  heparin  Injectable 5000 Unit(s) SubCutaneous every 8 hours  iron sucrose IVPB 200 milliGRAM(s) IV Intermittent every 24 hours  macitentan (OPSUMIT) 10 milliGRAM(s) 10 milliGRAM(s) Oral daily  methimazole 5 milliGRAM(s) Oral daily  metolazone 5 milliGRAM(s) Oral daily  metoprolol     tartrate 25 milliGRAM(s) Oral every 8 hours  pantoprazole    Tablet 40 milliGRAM(s) Oral before breakfast  predniSONE   Tablet 5 milliGRAM(s) Oral daily  roflumilast 500 MICROGram(s) Oral daily  tiotropium 2.5 MICROgram(s)/olodaterol 2.5 MICROgram(s) Inhaler 2 Puff(s) Inhalation daily    MEDICATIONS  (PRN):  acetaminophen   Tablet 650 milliGRAM(s) Oral every 6 hours PRN Moderate Pain (4 - 6)  ondansetron    Tablet 4 milliGRAM(s) Oral every 8 hours PRN Nausea and/or Vomiting  sodium chloride 0.65% Nasal 1 Spray(s) Both Nostrils five times a day PRN dry nose    Allergies    No Known Allergies    Intolerances    albuterol (Unknown)      PHYSICAL EXAM:  Constitutional: NAD  HEENT: Normocephalic, EOMI  Neck:  No JVD  Respiratory: CTA B/L, No wheezes  Cardiovascular: S1, S2, RRR, + systolic murmur  Gastrointestinal: BS+, soft, NT, + disetention, + fluid wave  Extremities: + peripheral edema le b/l   Neurological: AAOX3, no focal deficits  Psychiatric: Normal mood, normal affect  : No Gottlieb    Vital Signs Last 24 Hrs  T(C): 36.7 (11 Dec 2017 11:41), Max: 36.8 (11 Dec 2017 06:44)  T(F): 98 (11 Dec 2017 11:41), Max: 98.2 (11 Dec 2017 06:44)  HR: 87 (11 Dec 2017 15:59) (65 - 91)  BP: 118/66 (11 Dec 2017 15:59) (93/54 - 129/76)  BP(mean): --  RR: 18 (11 Dec 2017 11:41) (18 - 19)  SpO2: 94% (11 Dec 2017 11:41) (93% - 99%)  I&O's Summary    10 Dec 2017 07:01  -  11 Dec 2017 07:00  --------------------------------------------------------  IN: 840 mL / OUT: 1650 mL / NET: -810 mL    11 Dec 2017 07:01  -  11 Dec 2017 20:00  --------------------------------------------------------  IN: 340 mL / OUT: 750 mL / NET: -410 mL        LABS:                        7.5    3.48  )-----------( 148      ( 11 Dec 2017 07:52 )             25.2     12-11    141  |  97  |  48<H>  ----------------------------<  118<H>  4.2   |  34<H>  |  1.50<H>    Ca    8.8      11 Dec 2017 09:19  Phos  2.8     12-11  Mg     2.2     12-11          Magnesium, Serum: 2.2 mg/dL (12-11 @ 09:19)

## 2017-12-11 NOTE — PROGRESS NOTE ADULT - ASSESSMENT
Assessment and Plan:   · Assessment		  A 71-year-old female with past medical history of diabetes, hypertension, severe pulmonary hypertension, right ventricular dysfunction, presents with not only left heart failure but also stigmata of acute cor pulmonale.  The patient's acute renal failure is likely hemodynamic in nature.  Goals of therapy are to improve her hemodynamics in order to improve her forward flow and renal perfusion.  CASSANDRA  Severe PHTN  Cor Pulm  Failure to thrive  Abd pain -R/O SBP  1.	Renal: Off the Lasix  drip.  IV Bumex BUT increase to 2mg ivp bid and add zaroxoyln.  Monitor BMP daily.   2.	Gastroenterology: S/P LARGE VOLUME PARACENTESIS;  No evidence of SBP  3.	Pulmonary: Pulmonary nebulizers.  Continue with nasal cannula oxygen at all times.  Can we try Remodulin again.  This was prescribed as a outpt by Dr Yin Luis.  Awaiting pulm to decided.  2nd opinion noted  4.	 Cardiology: Monitor hemodynamics and volume status.   5.       Anemia-On  Epogen and start IV Venofer for iron deficiency;  If HGB goes down further then may need blood transfusion     Options for her PHTN are limited.  Can remodulin be tried again?  I went over her prognosis with the patient today

## 2017-12-11 NOTE — PROGRESS NOTE ADULT - ASSESSMENT
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 Recent admission at [facility] approximately 5 weeks ago for large volume paracentesis (6.1L removed). Now presents again with RV failure-worsened LE edema, recurrent ascites, pleural effusions and pulmonary edema with worsened dyspnea and CASSANDRA on CKD

## 2017-12-11 NOTE — DISCHARGE NOTE ADULT - HOSPITAL COURSE
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 Recent admission at [facility] approximately 5 weeks prior for large volume paracentesis (6.1L removed). Now presents again with RV failure-worsened LE edema, recurrent ascites, pleural effusions and pulmonary edema with worsened dyspnea and CASSANDRA on CKD        The patient's acute renal failure is likely hemodynamic in nature.  Goals of therapy are to improve her hemodynamics in order to improve her forward flow and renal perfusion.

## 2017-12-11 NOTE — DISCHARGE NOTE ADULT - MEDICATION SUMMARY - MEDICATIONS TO CHANGE
I will SWITCH the dose or number of times a day I take the medications listed below when I get home from the hospital:    allopurinol 100 mg oral tablet  -- 2 tab(s) by mouth once a day

## 2017-12-11 NOTE — DISCHARGE NOTE ADULT - PLAN OF CARE
Euvolemic Take your medications as directed  Monitor your weight daily  Continue to follow closely with your physicians symptom management Call your Health Care provider upon arrival home to make a follow up appointment within one week.  Take all inhalers as prescribed by your Health Care Provider.  Take steroids as prescribed by your Health Care Provider.  If your cough increases infrequency and severity and/or you have shortness of breath or increased shortness of breath call your Health Care Provider.  If you develop fever, chills, night sweats, malaise, and/or change in mental status call your Health care Provider.  Nutrition is very important.  Eat small frequent meals.  Increase your activity as tolerated.  Do not stay in bed all day resolved Report any signs and/or symptoms of reaccumulation to your physician   Monitor your weight daily  Continue to follow closely with your physicians Take your medications as directed  Continue to follow closely with your physicians

## 2017-12-11 NOTE — PROGRESS NOTE ADULT - ASSESSMENT
72 yo F. 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  Would suggest the following:       - would continue w/ macitentan and diuresis as per Renal  - Please get TTE to evaluate RV and LV status at this time.   - Will speak with Dr. Youssef as [place] (hopefully on [place]).    Alan Bates DO MPH  Pulmonary Consult Service [phone] 72 yo F. 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  Would suggest the following:       - would continue w/ diuresis as per Renal  - Please get TTE to evaluate RV and LV status at this time.   - Will speak with Dr. Youssef as soon as [place] (hopefully on Tuesday).    Alan Bates DO MPH  Pulmonary Consult Service [phone]

## 2017-12-11 NOTE — DISCHARGE NOTE ADULT - MEDICATION SUMMARY - MEDICATIONS TO STOP TAKING
I will STOP taking the medications listed below when I get home from the hospital:    Klor-Con 10 mEq oral tablet, extended release  -- 1 tab(s) by mouth daily    torsemide 20 mg oral tablet  -- 3 tab(s) by mouth once a day    metOLazone 2.5 mg oral tablet  -- 1 tab(s) by mouth once a day, As Needed

## 2017-12-11 NOTE — DISCHARGE NOTE ADULT - CARE PLAN
Principal Discharge DX:	Volume overload  Goal:	Euvolemic  Instructions for follow-up, activity and diet:	Take your medications as directed  Monitor your weight daily  Continue to follow closely with your physicians  Secondary Diagnosis:	Chronic obstructive pulmonary disease, unspecified COPD type  Goal:	symptom management  Instructions for follow-up, activity and diet:	Call your Health Care provider upon arrival home to make a follow up appointment within one week.  Take all inhalers as prescribed by your Health Care Provider.  Take steroids as prescribed by your Health Care Provider.  If your cough increases infrequency and severity and/or you have shortness of breath or increased shortness of breath call your Health Care Provider.  If you develop fever, chills, night sweats, malaise, and/or change in mental status call your Health care Provider.  Nutrition is very important.  Eat small frequent meals.  Increase your activity as tolerated.  Do not stay in bed all day  Secondary Diagnosis:	Other ascites  Goal:	resolved  Instructions for follow-up, activity and diet:	Report any signs and/or symptoms of reaccumulation to your physician   Monitor your weight daily  Continue to follow closely with your physicians  Secondary Diagnosis:	Pulmonary hypertension  Goal:	symptom management  Instructions for follow-up, activity and diet:	Take your medications as directed  Continue to follow closely with your physicians

## 2017-12-11 NOTE — DISCHARGE NOTE ADULT - FINDINGS/TREATMENT
12/6/17: Interventional Radiology - Paracentesis: 8150 cc of fluid were removed. Intravenous albumin was administered. 12/7/17: Abdominal Ultrasound: Liver: Heterogeneous liver.  Bile ducts: Normal caliber. Common bile duct measures 4 mm.   Gallbladder: Within normal limits.      Pancreas: Visualized portions are within normal limits. 3 mm cystic structure in head of pancreas.  Spleen: 17.3 cm. Within normal limits. 10 mm hypoechoic nodule in spleen. Possibly a hemangioma but indeterminant.  Right kidney: 8.2 cm. No hydronephrosis.      Left kidney: 8.9 cm.  No hydronephrosis. 2.7 x 2.8 cm left lower pole exophytic renal cyst with thick septation.  Ascites: Small amount of simple abdominal ascites.  Aorta and IVC: Visualized portions are within normal limits.  IMPRESSION: Trace to small amount of simple abdominal ascites.  Heterogeneous appearance to the liver.  Splenomegaly with 10 mm hypoechoic splenic nodule which is indeterminant.  2.7 x 2.8 cm left lower pole exophytic renal cyst with thick septation

## 2017-12-11 NOTE — DISCHARGE NOTE ADULT - MEDICATION SUMMARY - MEDICATIONS TO TAKE
I will START or STAY ON the medications listed below when I get home from the hospital:    Commode  -- Commode  -- Indication: For COmmode    Shower chair  -- Shower Chair  -- Indication: For shower chair    [place] Extender  -- Toilet Extender  -- Indication: For toilet extender    predniSONE 5 mg oral tablet  -- 1 tab(s) by mouth once a day  -- Indication: For steroid    Opsumit 10 mg oral tablet  -- 1 tab(s) by mouth once a day  -- Indication: For Pulmonary HTN    aspirin 81 mg oral delayed release tablet  -- 1 tab(s) by mouth once a day  -- Indication: For CAD    digoxin 125 mcg (0.125 mg) oral tablet  -- 1 tab(s) by mouth every other day  -- Indication: For Afib    dilTIAZem 240 mg/24 hours oral capsule, extended release  -- 1 cap(s) by mouth once a day  -- Indication: For Afib/htn    Zofran 4 mg oral tablet  -- 1 tab(s) by mouth every 8 hours, As Needed  -- Indication: For nausea/vomiting    allopurinol 100 mg oral tablet  -- 1 tab(s) by mouth once a day  -- Indication: For Gout    atorvastatin 20 mg oral tablet  -- 1 tab(s) by mouth once a day  -- Indication: For Cholesterol    clopidogrel 75 mg oral tablet  -- 1 tab(s) by mouth once a day  -- Indication: For Antiplatelet    methIMAzole 5 mg oral tablet  -- 1 tab(s) by mouth once a day  -- Indication: For Hyperthyroidism    metoprolol tartrate 25 mg oral tablet  -- 1 tab(s) by mouth every 8 hours  -- Indication: For blood pressure    Ventolin HFA 90 mcg/inh inhalation aerosol  -- 2 puff(s) inhaled 4 times a day, As Needed  -- Indication: For COPD (chronic obstructive pulmonary disease)    Stiolto Respimat 2.5 mcg-2.5 mcg/inh inhalation aerosol  -- 2 puff(s) inhaled every 24 hours  -- Indication: For COPD (chronic obstructive pulmonary disease)    bumetanide 0.5 mg oral tablet  -- 1 tab(s) by mouth every 12 hours  -- Indication: For Diuretic    Colace 100 mg oral capsule  -- 1 cap(s) by mouth 2 times a day  -- Indication: For stool softner    sucralfate 1 g/10 mL oral suspension  -- 10 milliliter(s) by mouth 4 times a day  -- Indication: For Gerd    pantoprazole 40 mg oral delayed release tablet  -- 1 tab(s) by mouth once a day  -- Indication: For Gerd    roflumilast 500 mcg oral tablet  -- 1 tab(s) by mouth once a day  -- Indication: For COPD (chronic obstructive pulmonary disease) I will START or STAY ON the medications listed below when I get home from the hospital:    Commode  -- Commode  -- Indication: For COmmode    Shower chair  -- Shower Chair  -- Indication: For shower chair    [place] Extender  -- Toilet Extender  -- Indication: For toilet extender    predniSONE 5 mg oral tablet  -- 1 tab(s) by mouth once a day  -- Indication: For .    Opsumit 10 mg oral tablet  -- 1 tab(s) by mouth once a day  -- Indication: For Pulmonary HTN    aspirin 81 mg oral delayed release tablet  -- 1 tab(s) by mouth once a day  -- Indication: For CAD    digoxin 125 mcg (0.125 mg) oral tablet  -- 1 tab(s) by mouth every other day  -- Indication: For Afib    dilTIAZem 240 mg/24 hours oral capsule, extended release  -- 1 cap(s) by mouth once a day  -- Indication: For Afib/htn    Zofran 4 mg oral tablet  -- 1 tab(s) by mouth every 8 hours, As Needed  -- Indication: For nausea/vomiting    allopurinol 100 mg oral tablet  -- 1 tab(s) by mouth once a day  -- Indication: For Gout    allopurinol 100 mg oral tablet  -- 1 tab(s) by mouth once a day  -- Indication: For Gout    atorvastatin 20 mg oral tablet  -- 1 tab(s) by mouth once a day  -- Indication: For Cholesterol    clopidogrel 75 mg oral tablet  -- 1 tab(s) by mouth once a day  -- Indication: For Antiplatelet    methIMAzole 5 mg oral tablet  -- 1 tab(s) by mouth once a day  -- Indication: For Hyperthyroidism    metoprolol tartrate 25 mg oral tablet  -- 1 tab(s) by mouth every 8 hours  -- Indication: For blood pressure    Stiolto Respimat 2.5 mcg-2.5 mcg/inh inhalation aerosol  -- 2 puff(s) inhaled every 24 hours  -- Indication: For COPD (chronic obstructive pulmonary disease)    Ventolin HFA 90 mcg/inh inhalation aerosol  -- 2 puff(s) inhaled 4 times a day, As Needed  -- Indication: For COPD (chronic obstructive pulmonary disease)    bumetanide 0.5 mg oral tablet  -- 1 tab(s) by mouth every 12 hours  -- Indication: For Diuretic    Colace 100 mg oral capsule  -- 1 cap(s) by mouth 2 times a day  -- Indication: For stool softner    Klor-Con 10 mEq oral tablet, extended release  -- 1 tab(s) by mouth once a day   -- Indication: For Electrolyte    sucralfate 1 g/10 mL oral suspension  -- 10 milliliter(s) by mouth 4 times a day  -- Indication: For Gerd    pantoprazole 40 mg oral delayed release tablet  -- 1 tab(s) by mouth once a day  -- Indication: For Gerd    roflumilast 500 mcg oral tablet  -- 1 tab(s) by mouth once a day  -- Indication: For COPD (chronic obstructive pulmonary disease)    HYDROcodone-homatropine 5 mg-1.5 mg/5 mL oral syrup  -- 5 milliliter(s) by mouth once a day (at bedtime), As Needed -Cough MDD:5ml  -- Indication: For Cough

## 2017-12-12 LAB
ANION GAP SERPL CALC-SCNC: 9 MMOL/L — SIGNIFICANT CHANGE UP (ref 5–17)
BUN SERPL-MCNC: 45 MG/DL — HIGH (ref 7–23)
CALCIUM SERPL-MCNC: 8.6 MG/DL — SIGNIFICANT CHANGE UP (ref 8.4–10.5)
CHLORIDE SERPL-SCNC: 97 MMOL/L — SIGNIFICANT CHANGE UP (ref 96–108)
CO2 SERPL-SCNC: 34 MMOL/L — HIGH (ref 22–31)
CREAT SERPL-MCNC: 1.38 MG/DL — HIGH (ref 0.5–1.3)
GLUCOSE SERPL-MCNC: 111 MG/DL — HIGH (ref 70–99)
HCT VFR BLD CALC: 25.1 % — LOW (ref 34.5–45)
HGB BLD-MCNC: 7.5 G/DL — LOW (ref 11.5–15.5)
MAGNESIUM SERPL-MCNC: 2.1 MG/DL — SIGNIFICANT CHANGE UP (ref 1.6–2.6)
MCHC RBC-ENTMCNC: 25.9 PG — LOW (ref 27–34)
MCHC RBC-ENTMCNC: 29.9 GM/DL — LOW (ref 32–36)
MCV RBC AUTO: 86.6 FL — SIGNIFICANT CHANGE UP (ref 80–100)
PHOSPHATE SERPL-MCNC: 2.6 MG/DL — SIGNIFICANT CHANGE UP (ref 2.5–4.5)
PLATELET # BLD AUTO: 158 K/UL — SIGNIFICANT CHANGE UP (ref 150–400)
POTASSIUM SERPL-MCNC: 4 MMOL/L — SIGNIFICANT CHANGE UP (ref 3.5–5.3)
POTASSIUM SERPL-SCNC: 4 MMOL/L — SIGNIFICANT CHANGE UP (ref 3.5–5.3)
RBC # BLD: 2.9 M/UL — LOW (ref 3.8–5.2)
RBC # FLD: 18 % — HIGH (ref 10.3–14.5)
SODIUM SERPL-SCNC: 140 MMOL/L — SIGNIFICANT CHANGE UP (ref 135–145)
WBC # BLD: 4.26 K/UL — SIGNIFICANT CHANGE UP (ref 3.8–10.5)
WBC # FLD AUTO: 4.26 K/UL — SIGNIFICANT CHANGE UP (ref 3.8–10.5)

## 2017-12-12 RX ADMIN — Medication 5 MILLIGRAM(S): at 09:53

## 2017-12-12 RX ADMIN — ROFLUMILAST 500 MICROGRAM(S): 500 TABLET ORAL at 09:53

## 2017-12-12 RX ADMIN — Medication 25 MILLIGRAM(S): at 23:11

## 2017-12-12 RX ADMIN — Medication 0.12 MILLIGRAM(S): at 11:58

## 2017-12-12 RX ADMIN — Medication 120 MILLIGRAM(S): at 09:53

## 2017-12-12 RX ADMIN — BUMETANIDE 2 MILLIGRAM(S): 0.25 INJECTION INTRAMUSCULAR; INTRAVENOUS at 06:08

## 2017-12-12 RX ADMIN — Medication 81 MILLIGRAM(S): at 11:58

## 2017-12-12 RX ADMIN — Medication 3 MILLILITER(S): at 17:26

## 2017-12-12 RX ADMIN — HEPARIN SODIUM 5000 UNIT(S): 5000 INJECTION INTRAVENOUS; SUBCUTANEOUS at 09:54

## 2017-12-12 RX ADMIN — BUMETANIDE 2 MILLIGRAM(S): 0.25 INJECTION INTRAMUSCULAR; INTRAVENOUS at 17:27

## 2017-12-12 RX ADMIN — Medication 25 MILLIGRAM(S): at 17:26

## 2017-12-12 RX ADMIN — ATORVASTATIN CALCIUM 20 MILLIGRAM(S): 80 TABLET, FILM COATED ORAL at 23:11

## 2017-12-12 RX ADMIN — Medication 25 MILLIGRAM(S): at 06:08

## 2017-12-12 RX ADMIN — TIOTROPIUM BROMIDE AND OLODATEROL 2 PUFF(S): 3.124; 2.736 SPRAY, METERED RESPIRATORY (INHALATION) at 08:37

## 2017-12-12 RX ADMIN — CLOPIDOGREL BISULFATE 75 MILLIGRAM(S): 75 TABLET, FILM COATED ORAL at 11:57

## 2017-12-12 RX ADMIN — IRON SUCROSE 110 MILLIGRAM(S): 20 INJECTION, SOLUTION INTRAVENOUS at 10:00

## 2017-12-12 NOTE — PROGRESS NOTE ADULT - ASSESSMENT
s/p large volume paracentesis  severe copd  severe phtn  bucky  anemia    continue current care  continue diuresis  await discussion with dr moreno

## 2017-12-12 NOTE — PROGRESS NOTE ADULT - SUBJECTIVE AND OBJECTIVE BOX
Interval Events: Patient was seen and examined at bedside. No overnight events. Net negative yesterday, but patient continues to have worsening abdominal girth and dyspnea.     REVIEW OF SYSTEMS:  Constitutional: [ ] fevers [ ] chills [ ] weight loss [ ] weight gain  CV: [ ] chest pain [ ] orthopnea [ ] palpitations [ ] murmur  Resp: [x ] cough [ ] shortness of breath [x ] dyspnea [ ] wheezing [ ] sputum [ ] hemoptysis  [x ] All other systems negative  [ ] Unable to assess ROS because ________    OBJECTIVE:  ICU Vital Signs Last 24 Hrs  T(C): 37 (12 Dec 2017 14:34), Max: 37.1 (11 Dec 2017 21:08)  T(F): 98.6 (12 Dec 2017 14:34), Max: 98.7 (11 Dec 2017 21:08)  HR: 95 (12 Dec 2017 14:34) (85 - 101)  BP: 118/65 (12 Dec 2017 14:34) (100/60 - 118/66)  BP(mean): --  ABP: --  ABP(mean): --  RR: 18 (12 Dec 2017 14:34) (18 - 18)  SpO2: 95% (12 Dec 2017 14:34) (92% - 96%)        12-11 @ 07:01  -  12-12 @ 07:00  --------------------------------------------------------  IN: 580 mL / OUT: 2050 mL / NET: -1470 mL    12-12 @ 07:01  -  12-12 @ 15:43  --------------------------------------------------------  IN: 0 mL / OUT: 800 mL / NET: -800 mL      CAPILLARY BLOOD GLUCOSE          PHYSICAL EXAM:  General: UNAD  HEENT: PERRLA  Lymph Nodes: No palpable lymphadenopathy  Neck: supple  Respiratory: CTA B/L  Cardiovascular: RRR, Loud P2  Abdomen: Distended abdomen, +BS, No R/G/R  Extremities: 4/4 edema   Neurological: AAOx3      HOSPITAL MEDICATIONS:  MEDICATIONS  (STANDING):  ALBUTerol/ipratropium for Nebulization 3 milliLiter(s) Nebulizer every 6 hours  allopurinol 200 milliGRAM(s) Oral daily  aspirin enteric coated 81 milliGRAM(s) Oral daily  atorvastatin 20 milliGRAM(s) Oral at bedtime  buMETAnide Injectable 2 milliGRAM(s) IV Push two times a day  clopidogrel Tablet 75 milliGRAM(s) Oral daily  digoxin     Tablet 0.125 milliGRAM(s) Oral every other day  diltiazem    milliGRAM(s) Oral daily  docusate sodium 100 milliGRAM(s) Oral two times a day  epoetin hilda Injectable 03884 Unit(s) SubCutaneous <User Schedule>  heparin  Injectable 5000 Unit(s) SubCutaneous every 8 hours  iron sucrose IVPB 200 milliGRAM(s) IV Intermittent every 24 hours  macitentan (OPSUMIT) 10 milliGRAM(s) 10 milliGRAM(s) Oral daily  methimazole 5 milliGRAM(s) Oral daily  metolazone 5 milliGRAM(s) Oral daily  metoprolol     tartrate 25 milliGRAM(s) Oral every 8 hours  pantoprazole    Tablet 40 milliGRAM(s) Oral before breakfast  predniSONE   Tablet 5 milliGRAM(s) Oral daily  roflumilast 500 MICROGram(s) Oral daily  tiotropium 2.5 MICROgram(s)/olodaterol 2.5 MICROgram(s) Inhaler 2 Puff(s) Inhalation daily    MEDICATIONS  (PRN):  acetaminophen   Tablet 650 milliGRAM(s) Oral every 6 hours PRN Moderate Pain (4 - 6)  ondansetron    Tablet 4 milliGRAM(s) Oral every 8 hours PRN Nausea and/or Vomiting  sodium chloride 0.65% Nasal 1 Spray(s) Both Nostrils five times a day PRN dry nose      LABS:                        7.5    4.26  )-----------( 158      ( 12 Dec 2017 07:30 )             25.1     Hgb Trend: 7.5<--, 7.5<--, 7.8<--, 8.2<--, 8.2<--  12-12    140  |  97  |  45<H>  ----------------------------<  111<H>  4.0   |  34<H>  |  1.38<H>    Ca    8.6      12 Dec 2017 08:54  Phos  2.6     12-12  Mg     2.1     12-12      Creatinine Trend: 1.38<--, 1.50<--, 1.64<--, 1.72<--, 2.03<--, 2.04<--          MICROBIOLOGY:     RADIOLOGY:  [ ] Reviewed and interpreted by me

## 2017-12-12 NOTE — PROGRESS NOTE ADULT - ASSESSMENT
72 yo F. 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  Would suggest the following:         - After consulting with Dr. Youssef, it is not suggested that the patient start on any pulmonary hypertension medication. Oral treprostinil at this juncture of her care would not have much affect on her right heart failure. No additional pulmonary hypertension medication is appropriate at this time.  - TTE would be helpful to see if she continues to have good LV function as dobutamine assisted diuresis has some success in this type of patient.  - would continue w/ diuresis as per Renal  - Please get TTE to evaluate RV and LV status at this time.     Alan Bates DO MPH  Pulmonary Consult Service [id number]

## 2017-12-12 NOTE — PROGRESS NOTE ADULT - SUBJECTIVE AND OBJECTIVE BOX
Follow-up Pulm Progress Note    Mildly uncomfortable. No change in o2 requirement.     Medications:  MEDICATIONS  (STANDING):  ALBUTerol/ipratropium for Nebulization 3 milliLiter(s) Nebulizer every 6 hours  allopurinol 200 milliGRAM(s) Oral daily  aspirin enteric coated 81 milliGRAM(s) Oral daily  atorvastatin 20 milliGRAM(s) Oral at bedtime  buMETAnide Injectable 2 milliGRAM(s) IV Push two times a day  clopidogrel Tablet 75 milliGRAM(s) Oral daily  digoxin     Tablet 0.125 milliGRAM(s) Oral every other day  diltiazem    milliGRAM(s) Oral daily  docusate sodium 100 milliGRAM(s) Oral two times a day  epoetin hilda Injectable 30079 Unit(s) SubCutaneous <User Schedule>  heparin  Injectable 5000 Unit(s) SubCutaneous every 8 hours  iron sucrose IVPB 200 milliGRAM(s) IV Intermittent every 24 hours  macitentan (OPSUMIT) 10 milliGRAM(s) 10 milliGRAM(s) Oral daily  methimazole 5 milliGRAM(s) Oral daily  metolazone 5 milliGRAM(s) Oral daily  metoprolol     tartrate 25 milliGRAM(s) Oral every 8 hours  pantoprazole    Tablet 40 milliGRAM(s) Oral before breakfast  predniSONE   Tablet 5 milliGRAM(s) Oral daily  roflumilast 500 MICROGram(s) Oral daily  tiotropium 2.5 MICROgram(s)/olodaterol 2.5 MICROgram(s) Inhaler 2 Puff(s) Inhalation daily    MEDICATIONS  (PRN):  acetaminophen   Tablet 650 milliGRAM(s) Oral every 6 hours PRN Moderate Pain (4 - 6)  ondansetron    Tablet 4 milliGRAM(s) Oral every 8 hours PRN Nausea and/or Vomiting  sodium chloride 0.65% Nasal 1 Spray(s) Both Nostrils five times a day PRN dry nose    Vital Signs Last 24 Hrs  T(C): 37 (12 Dec 2017 14:34), Max: 37.1 (11 Dec 2017 21:08)  T(F): 98.6 (12 Dec 2017 14:34), Max: 98.7 (11 Dec 2017 21:08)  HR: 95 (12 Dec 2017 14:34) (85 - 101)  BP: 118/65 (12 Dec 2017 14:34) (100/60 - 118/66)  BP(mean): --  RR: 18 (12 Dec 2017 14:34) (18 - 18)  SpO2: 95% (12 Dec 2017 14:34) (92% - 96%)    12-11 @ 07:01  -  12-12 @ 07:00  --------------------------------------------------------  IN: 580 mL / OUT: 2050 mL / NET: -1470 mL    LABS:                        7.5    4.26  )-----------( 158      ( 12 Dec 2017 07:30 )             25.1     12-12    140  |  97  |  45<H>  ----------------------------<  111<H>  4.0   |  34<H>  |  1.38<H>    Ca    8.6      12 Dec 2017 08:54  Phos  2.6     12-12  Mg     2.1     12-12    CAPILLARY BLOOD GLUCOSE    GAGAN Negative 12-08 @ 07:39  Anti SS-1 --  Anti SS-2 --  Anti RNP --  RF 15.0 12-08 @ 07:39    Atypical ANCA -- 12-08 @ 07:39  c-ANCA titer -- 12-08 @ 07:39  c-ANCA -- 12-08 @ 07:39  p-ANCA -- 12-08 @ 07:39    Fluid characteristics  -- 12-06 @ 14:33  pH --  LDH 85  tprot 3.2    Cell count  Appearance --  Fluid type --  BF lymph --  color --  eosinophil --  PMN --  Mesothelial --  Monocyte --  Other body cells --  Fluid characteristics  -- 12-06 @ 14:32  pH --  LDH --  tprot --    Cell count  Appearance Hazy  Fluid type --  BF lymph 4  color Yellow  eosinophil --  PMN 60  Mesothelial 20  Monocyte 15  Other body cells 1      CULTURES: (if applicable)        Culture - Acid Fast - Body Fluid w/Smear (collected 12-06-17 @ 17:13)  Source: .Body Fluid Peritoneal Fluid    Culture - Body Fluid with Gram Stain (collected 12-06-17 @ 17:12)  Source: .Body Fluid Abdominal Fluid  Gram Stain (12-06-17 @ 22:04):    polymorphonuclear leukocytes seen    No organisms seen    by cytocentrifuge  Final Report (12-11-17 @ 22:21):    No growth at 5 days      Culture - Fungal, Body Fluid (collected 12-06-17 @ 17:13)  Source: .Body Fluid Peritoneal Fluid  Preliminary Report (12-07-17 @ 08:41):    Testing in progress    Physical Examination:  PULM: Decreased BS at bases  CVS: S1, S2 heard  Abd: distended  Ext: + 2 b/l edema    RADIOLOGY REVIEWED  CXR:     CT chest:    TTE:

## 2017-12-12 NOTE — PROGRESS NOTE ADULT - SUBJECTIVE AND OBJECTIVE BOX
[facility]Y-[facility] [phone]        Patient seen and examined in bed.  She was in good spirits.  No NV noted.  Urinating more         MEDICATIONS  (STANDING):  ALBUTerol/ipratropium for Nebulization 3 milliLiter(s) Nebulizer every 6 hours  allopurinol 200 milliGRAM(s) Oral daily  aspirin enteric coated 81 milliGRAM(s) Oral daily  atorvastatin 20 milliGRAM(s) Oral at bedtime  buMETAnide Injectable 2 milliGRAM(s) IV Push two times a day  clopidogrel Tablet 75 milliGRAM(s) Oral daily  digoxin     Tablet 0.125 milliGRAM(s) Oral every other day  diltiazem    milliGRAM(s) Oral daily  docusate sodium 100 milliGRAM(s) Oral two times a day  epoetin hilda Injectable 96849 Unit(s) SubCutaneous <User Schedule>  heparin  Injectable 5000 Unit(s) SubCutaneous every 8 hours  iron sucrose IVPB 200 milliGRAM(s) IV Intermittent every 24 hours  macitentan (OPSUMIT) 10 milliGRAM(s) 10 milliGRAM(s) Oral daily  methimazole 5 milliGRAM(s) Oral daily  metolazone 5 milliGRAM(s) Oral daily  metoprolol     tartrate 25 milliGRAM(s) Oral every 8 hours  pantoprazole    Tablet 40 milliGRAM(s) Oral before breakfast  predniSONE   Tablet 5 milliGRAM(s) Oral daily  roflumilast 500 MICROGram(s) Oral daily  tiotropium 2.5 MICROgram(s)/olodaterol 2.5 MICROgram(s) Inhaler 2 Puff(s) Inhalation daily      VITAL:  T(C): , Max: 37.1 (12-11-17 @ 21:08)  T(F): , Max: 98.7 (12-11-17 @ 21:08)  HR: 90 (12-12-17 @ 09:32)  BP: 100/63 (12-12-17 @ 09:32)  BP(mean): --  RR: 18 (12-12-17 @ 05:52)  SpO2: 92% (12-12-17 @ 05:52)  Wt(kg): --    I and O's:    12-11 @ 07:01  -  12-12 @ 07:00  --------------------------------------------------------  IN: 580 mL / OUT: 2050 mL / NET: -1470 mL        Weight (kg): 64.4 (12-12 @ 09:32)    PHYSICAL EXAM:    Constitutional: NAD  HEENT: PERRLA    Neck:  +  JVD  Respiratory: CTAB/L  Cardiovascular: S1 and S2  Gastrointestinal: BS+, soft, NT/ND  Extremities: No peripheral edema  Neurological: A/O x 3, no focal deficits  Psychiatric: Normal mood, normal affect  : No Gottlieb  Skin: No rashes  Access: Not applicable    LABS:                        7.5    4.26  )-----------( 158      ( 12 Dec 2017 07:30 )             25.1     12-12    140  |  97  |  45<H>  ----------------------------<  111<H>  4.0   |  34<H>  |  1.38<H>    Ca    8.6      12 Dec 2017 08:54  Phos  2.6     12-12  Mg     2.1     12-12            Urine Studies:          RADIOLOGY & ADDITIONAL STUDIES:

## 2017-12-12 NOTE — PROGRESS NOTE ADULT - ASSESSMENT
Assessment and Plan:   · Assessment		  A 71-year-old female with past medical history of diabetes, hypertension, severe pulmonary hypertension, right ventricular dysfunction, presents with not only left heart failure but also stigmata of acute cor pulmonale.  The patient's acute renal failure is likely hemodynamic in nature.  Goals of therapy are to improve her hemodynamics in order to improve her forward flow and renal perfusion.  CASSANDRA  Severe PHTN  Cor Pulm  Failure to thrive  Abd pain -R/O SBP  1.	Renal: IV Bumex 2mg ivp bid and zaroxoyln.  Monitor BMP daily. Creatinine is better  2.	Gastroenterology: S/P LARGE VOLUME PARACENTESIS;  No evidence of SBP  3.	Pulmonary: Pulmonary nebulizers.  Continue with nasal cannula oxygen at all times.  Can we try Remodulin again.  This was prescribed as a outpt by Dr Yin Luis.  Awaiting pulm to decided.  2nd opinion noted--Dr Youssef to evaluate.  If there are no medications that can be tried then hospice may be appropriate   4.	 Cardiology: Monitor hemodynamics and volume status.   5.       Anemia-On  Epogen and start IV Venofer 2/4  for iron deficiency;  If HGB goes down further then may need blood transfusion     Options for her PHTN are limited.

## 2017-12-12 NOTE — PROGRESS NOTE ADULT - ASSESSMENT
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 Recent admission at [facility] approximately 5 weeks ago for large volume paracentesis (6.1L removed). Now presents again with RV failure-worsened LE edema, recurrent ascites, pleural effusions and pulmonary edema with worsened dyspnea and CASSANDRA on CKD

## 2017-12-12 NOTE — PROGRESS NOTE ADULT - SUBJECTIVE AND OBJECTIVE BOX
MEDICINE, PROGRESS NOTE [phone]    FRAN ALEXANDRA 71y [id number]    Patient seen and examined.  Patient is a 71y old  Female who presents with a chief complaint of shortness of breath, volume overload and ascites (11 Dec 2017 11:41)  Pt feels more swollen today.    PAST MEDICAL & SURGICAL HISTORY:  Hyperthyroidism  JOSE (obstructive sleep apnea)  Epistaxis  GIB (gastrointestinal bleeding)  CAD S/P percutaneous coronary angioplasty  Afib  Pulmonary HTN  GERD (gastroesophageal reflux disease)  Obesity  Cardiomegaly  Valvular heart disease  COPD (chronic obstructive pulmonary disease): Home O2  Sleep Apnea: by criteria  Loose, teeth: 3 bottom front loose teeth  Female stress incontinence  Shoulder pain, right  Constipation  Arthritis of Knee  H/O heartburn  History of lung cancer  Obese  Hx of hyperlipidemia  Asthma  Diabetes mellitus: type 2  dx about 4-5 years ago   no daily fingerstick  H/O: HTN (hypertension)  Enlarged lymph nodes  Lymph nodes enlarged: s/p biopsy mediastinal  benign  H/O endoscopy  left upper lobectomy    MEDICATIONS  (STANDING):  ALBUTerol/ipratropium for Nebulization 3 milliLiter(s) Nebulizer every 6 hours  allopurinol 200 milliGRAM(s) Oral daily  aspirin enteric coated 81 milliGRAM(s) Oral daily  atorvastatin 20 milliGRAM(s) Oral at bedtime  buMETAnide Injectable 2 milliGRAM(s) IV Push two times a day  clopidogrel Tablet 75 milliGRAM(s) Oral daily  digoxin     Tablet 0.125 milliGRAM(s) Oral every other day  diltiazem    milliGRAM(s) Oral daily  docusate sodium 100 milliGRAM(s) Oral two times a day  epoetin hilda Injectable 53781 Unit(s) SubCutaneous <User Schedule>  heparin  Injectable 5000 Unit(s) SubCutaneous every 8 hours  iron sucrose IVPB 200 milliGRAM(s) IV Intermittent every 24 hours  macitentan (OPSUMIT) 10 milliGRAM(s) 10 milliGRAM(s) Oral daily  methimazole 5 milliGRAM(s) Oral daily  metolazone 5 milliGRAM(s) Oral daily  metoprolol     tartrate 25 milliGRAM(s) Oral every 8 hours  pantoprazole    Tablet 40 milliGRAM(s) Oral before breakfast  predniSONE   Tablet 5 milliGRAM(s) Oral daily  roflumilast 500 MICROGram(s) Oral daily  tiotropium 2.5 MICROgram(s)/olodaterol 2.5 MICROgram(s) Inhaler 2 Puff(s) Inhalation daily    MEDICATIONS  (PRN):  acetaminophen   Tablet 650 milliGRAM(s) Oral every 6 hours PRN Moderate Pain (4 - 6)  ondansetron    Tablet 4 milliGRAM(s) Oral every 8 hours PRN Nausea and/or Vomiting  sodium chloride 0.65% Nasal 1 Spray(s) Both Nostrils five times a day PRN dry nose    Allergies    No Known Allergies    Intolerances    albuterol (Unknown)      PHYSICAL EXAM:  Constitutional: NAD  HEENT: Normocephalic, EOMI  Neck:  No JVD  Respiratory: CTA B/L, No wheezes  Cardiovascular: S1, S2, RRR, + systolic murmur  Gastrointestinal: BS+, soft, NT, + distention, + fluid wave  Extremities: + peripheral edema le b/l  Neurological: AAOX3, no focal deficits  Psychiatric: Normal mood, normal affect  : No Gottlieb    Vital Signs Last 24 Hrs  T(C): 37 (12 Dec 2017 14:34), Max: 37.1 (11 Dec 2017 21:08)  T(F): 98.6 (12 Dec 2017 14:34), Max: 98.7 (11 Dec 2017 21:08)  HR: 105 (12 Dec 2017 17:22) (85 - 105)  BP: 119/76 (12 Dec 2017 17:22) (100/60 - 119/76)  BP(mean): --  RR: 18 (12 Dec 2017 14:34) (18 - 18)  SpO2: 95% (12 Dec 2017 14:34) (92% - 96%)  I&O's Summary    11 Dec 2017 07:01  -  12 Dec 2017 07:00  --------------------------------------------------------  IN: 580 mL / OUT: 2050 mL / NET: -1470 mL    12 Dec 2017 07:01  -  12 Dec 2017 19:29  --------------------------------------------------------  IN: 320 mL / OUT: 950 mL / NET: -630 mL        LABS:                        7.5    4.26  )-----------( 158      ( 12 Dec 2017 07:30 )             25.1     12-12    140  |  97  |  45<H>  ----------------------------<  111<H>  4.0   |  34<H>  |  1.38<H>    Ca    8.6      12 Dec 2017 08:54  Phos  2.6     12-12  Mg     2.1     12-12          Magnesium, Serum: 2.1 mg/dL (12-12 @ 08:54)

## 2017-12-13 LAB
ANION GAP SERPL CALC-SCNC: 7 MMOL/L — SIGNIFICANT CHANGE UP (ref 5–17)
BUN SERPL-MCNC: 45 MG/DL — HIGH (ref 7–23)
CALCIUM SERPL-MCNC: 8.8 MG/DL — SIGNIFICANT CHANGE UP (ref 8.4–10.5)
CHLORIDE SERPL-SCNC: 95 MMOL/L — LOW (ref 96–108)
CO2 SERPL-SCNC: 37 MMOL/L — HIGH (ref 22–31)
CREAT SERPL-MCNC: 1.42 MG/DL — HIGH (ref 0.5–1.3)
GLUCOSE SERPL-MCNC: 99 MG/DL — SIGNIFICANT CHANGE UP (ref 70–99)
HCT VFR BLD CALC: 27.2 % — LOW (ref 34.5–45)
HGB BLD-MCNC: 8.1 G/DL — LOW (ref 11.5–15.5)
MAGNESIUM SERPL-MCNC: 2.1 MG/DL — SIGNIFICANT CHANGE UP (ref 1.6–2.6)
MCHC RBC-ENTMCNC: 26.3 PG — LOW (ref 27–34)
MCHC RBC-ENTMCNC: 29.8 GM/DL — LOW (ref 32–36)
MCV RBC AUTO: 88.3 FL — SIGNIFICANT CHANGE UP (ref 80–100)
PHOSPHATE SERPL-MCNC: 2.9 MG/DL — SIGNIFICANT CHANGE UP (ref 2.5–4.5)
PLATELET # BLD AUTO: 180 K/UL — SIGNIFICANT CHANGE UP (ref 150–400)
POTASSIUM SERPL-MCNC: 3.9 MMOL/L — SIGNIFICANT CHANGE UP (ref 3.5–5.3)
POTASSIUM SERPL-SCNC: 3.9 MMOL/L — SIGNIFICANT CHANGE UP (ref 3.5–5.3)
RBC # BLD: 3.08 M/UL — LOW (ref 3.8–5.2)
RBC # FLD: 17.8 % — HIGH (ref 10.3–14.5)
SODIUM SERPL-SCNC: 139 MMOL/L — SIGNIFICANT CHANGE UP (ref 135–145)
WBC # BLD: 3.97 K/UL — SIGNIFICANT CHANGE UP (ref 3.8–10.5)
WBC # FLD AUTO: 3.97 K/UL — SIGNIFICANT CHANGE UP (ref 3.8–10.5)

## 2017-12-13 PROCEDURE — 93306 TTE W/DOPPLER COMPLETE: CPT | Mod: 26

## 2017-12-13 RX ADMIN — CLOPIDOGREL BISULFATE 75 MILLIGRAM(S): 75 TABLET, FILM COATED ORAL at 12:46

## 2017-12-13 RX ADMIN — BUMETANIDE 2 MILLIGRAM(S): 0.25 INJECTION INTRAMUSCULAR; INTRAVENOUS at 06:10

## 2017-12-13 RX ADMIN — HEPARIN SODIUM 5000 UNIT(S): 5000 INJECTION INTRAVENOUS; SUBCUTANEOUS at 09:15

## 2017-12-13 RX ADMIN — ROFLUMILAST 500 MICROGRAM(S): 500 TABLET ORAL at 09:13

## 2017-12-13 RX ADMIN — Medication 5 MILLIGRAM(S): at 09:13

## 2017-12-13 RX ADMIN — Medication 120 MILLIGRAM(S): at 09:13

## 2017-12-13 RX ADMIN — Medication 200 MILLIGRAM(S): at 12:46

## 2017-12-13 RX ADMIN — BUMETANIDE 2 MILLIGRAM(S): 0.25 INJECTION INTRAMUSCULAR; INTRAVENOUS at 17:48

## 2017-12-13 RX ADMIN — TIOTROPIUM BROMIDE AND OLODATEROL 2 PUFF(S): 3.124; 2.736 SPRAY, METERED RESPIRATORY (INHALATION) at 12:48

## 2017-12-13 RX ADMIN — Medication 25 MILLIGRAM(S): at 09:13

## 2017-12-13 RX ADMIN — Medication 25 MILLIGRAM(S): at 23:53

## 2017-12-13 RX ADMIN — Medication 25 MILLIGRAM(S): at 17:48

## 2017-12-13 RX ADMIN — ATORVASTATIN CALCIUM 20 MILLIGRAM(S): 80 TABLET, FILM COATED ORAL at 23:53

## 2017-12-13 RX ADMIN — Medication 81 MILLIGRAM(S): at 12:46

## 2017-12-13 RX ADMIN — ERYTHROPOIETIN 10000 UNIT(S): 10000 INJECTION, SOLUTION INTRAVENOUS; SUBCUTANEOUS at 17:48

## 2017-12-13 RX ADMIN — IRON SUCROSE 110 MILLIGRAM(S): 20 INJECTION, SOLUTION INTRAVENOUS at 12:48

## 2017-12-13 NOTE — PROGRESS NOTE ADULT - PROBLEM SELECTOR PLAN 3
-Appreciate house pulmonary consult for pHTN mgmt. No additional pHTN medications are indicated   -Vasodilator non-responder on RHC from 5/2017

## 2017-12-13 NOTE — PROGRESS NOTE ADULT - SUBJECTIVE AND OBJECTIVE BOX
Follow-up Pulm Progress Note    No new respiratory events overnight.  Denies SOB/CP.   90% on 4.5L NC    Medications:  MEDICATIONS  (STANDING):  ALBUTerol/ipratropium for Nebulization 3 milliLiter(s) Nebulizer every 6 hours  allopurinol 200 milliGRAM(s) Oral daily  aspirin enteric coated 81 milliGRAM(s) Oral daily  atorvastatin 20 milliGRAM(s) Oral at bedtime  buMETAnide Injectable 2 milliGRAM(s) IV Push two times a day  clopidogrel Tablet 75 milliGRAM(s) Oral daily  digoxin     Tablet 0.125 milliGRAM(s) Oral every other day  diltiazem    milliGRAM(s) Oral daily  docusate sodium 100 milliGRAM(s) Oral two times a day  epoetin hilda Injectable 15470 Unit(s) SubCutaneous <User Schedule>  heparin  Injectable 5000 Unit(s) SubCutaneous every 8 hours  iron sucrose IVPB 200 milliGRAM(s) IV Intermittent every 24 hours  macitentan (OPSUMIT) 10 milliGRAM(s) 10 milliGRAM(s) Oral daily  methimazole 5 milliGRAM(s) Oral daily  metolazone 5 milliGRAM(s) Oral daily  metoprolol     tartrate 25 milliGRAM(s) Oral every 8 hours  pantoprazole    Tablet 40 milliGRAM(s) Oral before breakfast  predniSONE   Tablet 5 milliGRAM(s) Oral daily  roflumilast 500 MICROGram(s) Oral daily  tiotropium 2.5 MICROgram(s)/olodaterol 2.5 MICROgram(s) Inhaler 2 Puff(s) Inhalation daily    MEDICATIONS  (PRN):  acetaminophen   Tablet 650 milliGRAM(s) Oral every 6 hours PRN Moderate Pain (4 - 6)  ondansetron    Tablet 4 milliGRAM(s) Oral every 8 hours PRN Nausea and/or Vomiting  sodium chloride 0.65% Nasal 1 Spray(s) Both Nostrils five times a day PRN dry nose          Vital Signs Last 24 Hrs  T(C): 36.7 (13 Dec 2017 12:18), Max: 36.8 (12 Dec 2017 20:49)  T(F): 98 (13 Dec 2017 12:18), Max: 98.2 (12 Dec 2017 20:49)  HR: 86 (13 Dec 2017 12:18) (62 - 105)  BP: 103/59 (13 Dec 2017 12:18) (100/55 - 119/76)  BP(mean): --  RR: 17 (13 Dec 2017 12:18) (17 - 18)  SpO2: 95% (13 Dec 2017 12:18) (95% - 100%) on 4.5L NC          12-12 @ 07:01  -  12-13 @ 07:00  --------------------------------------------------------  IN: 440 mL / OUT: 1550 mL / NET: -1110 mL          LABS:                        8.1    3.97  )-----------( 180      ( 13 Dec 2017 07:19 )             27.2     12-13    139  |  95<L>  |  45<H>  ----------------------------<  99  3.9   |  37<H>  |  1.42<H>    Ca    8.8      13 Dec 2017 08:38  Phos  2.9     12-13  Mg     2.1     12-13            CAPILLARY BLOOD GLUCOSE                GAGAN Negative 12-08 @ 07:39  Anti SS-1 --  Anti SS-2 --  Anti RNP --  RF 15.0 12-08 @ 07:39    Atypical ANCA -- 12-08 @ 07:39  c-ANCA titer -- 12-08 @ 07:39  c-ANCA -- 12-08 @ 07:39  p-ANCA -- 12-08 @ 07:39          Fluid characteristics  -- 12-06 @ 14:33  pH --  LDH 85  tprot 3.2    Cell count  Appearance --  Fluid type --  BF lymph --  color --  eosinophil --  PMN --  Mesothelial --  Monocyte --  Other body cells --  Fluid characteristics  -- 12-06 @ 14:32  pH --  LDH --  tprot --    Cell count  Appearance Hazy  Fluid type --  BF lymph 4  color Yellow  eosinophil --  PMN 60  Mesothelial 20  Monocyte 15  Other body cells 1      CULTURES: (if applicable)  Culture Results:   Testing in progress (12-06 @ 17:13)  Culture Results:   No growth at 5 days (12-06 @ 17:12)          Physical Examination:  PULM: Decreased BS at L base  CVS: S1, S2 RRR    RADIOLOGY REVIEWED    TTE: < from: Transthoracic Echocardiogram (12.13.17 @ 18:58) >  Conclusions:  1. Calcified trileaflet aortic valve with normal opening.  2. Normal left ventricular internal dimensions and wall  thicknesses.  3. Hyperdynamic left ventricular systolic function.  Flattening of the interventricular septum in both systole  and diastole is  consistent with right ventricular pressure  overload.  4. Severe right atrial enlargement.  5. Right ventricular enlargement with decreased right  ventricular systolic function.  6. Normal tricuspid valve. Mild-moderate tricuspid  regurgitation.  7. Estimated pulmonary artery systolic pressure equals 72  mm Hg, assuming right atrial pressure equals 8 mm Hg,  consistent with severe pulmonary pressures.    < end of copied text > Follow-up Pulm Progress Note    No new respiratory events overnight.  Denies SOB/CP.   90% on 4.5L NC    Medications:  MEDICATIONS  (STANDING):  ALBUTerol/ipratropium for Nebulization 3 milliLiter(s) Nebulizer every 6 hours  allopurinol 200 milliGRAM(s) Oral daily  aspirin enteric coated 81 milliGRAM(s) Oral daily  atorvastatin 20 milliGRAM(s) Oral at bedtime  buMETAnide Injectable 2 milliGRAM(s) IV Push two times a day  clopidogrel Tablet 75 milliGRAM(s) Oral daily  digoxin     Tablet 0.125 milliGRAM(s) Oral every other day  diltiazem    milliGRAM(s) Oral daily  docusate sodium 100 milliGRAM(s) Oral two times a day  epoetin hilda Injectable 05158 Unit(s) SubCutaneous <User Schedule>  heparin  Injectable 5000 Unit(s) SubCutaneous every 8 hours  iron sucrose IVPB 200 milliGRAM(s) IV Intermittent every 24 hours  macitentan (OPSUMIT) 10 milliGRAM(s) 10 milliGRAM(s) Oral daily  methimazole 5 milliGRAM(s) Oral daily  metolazone 5 milliGRAM(s) Oral daily  metoprolol     tartrate 25 milliGRAM(s) Oral every 8 hours  pantoprazole    Tablet 40 milliGRAM(s) Oral before breakfast  predniSONE   Tablet 5 milliGRAM(s) Oral daily  roflumilast 500 MICROGram(s) Oral daily  tiotropium 2.5 MICROgram(s)/olodaterol 2.5 MICROgram(s) Inhaler 2 Puff(s) Inhalation daily    MEDICATIONS  (PRN):  acetaminophen   Tablet 650 milliGRAM(s) Oral every 6 hours PRN Moderate Pain (4 - 6)  ondansetron    Tablet 4 milliGRAM(s) Oral every 8 hours PRN Nausea and/or Vomiting  sodium chloride 0.65% Nasal 1 Spray(s) Both Nostrils five times a day PRN dry nose    Vital Signs Last 24 Hrs  T(C): 36.7 (13 Dec 2017 12:18), Max: 36.8 (12 Dec 2017 20:49)  T(F): 98 (13 Dec 2017 12:18), Max: 98.2 (12 Dec 2017 20:49)  HR: 86 (13 Dec 2017 12:18) (62 - 105)  BP: 103/59 (13 Dec 2017 12:18) (100/55 - 119/76)  BP(mean): --  RR: 17 (13 Dec 2017 12:18) (17 - 18)  SpO2: 95% (13 Dec 2017 12:18) (95% - 100%) on 4.5L NC    12-12 @ 07:01  -  12-13 @ 07:00  --------------------------------------------------------  IN: 440 mL / OUT: 1550 mL / NET: -1110 mL    LABS:                        8.1    3.97  )-----------( 180      ( 13 Dec 2017 07:19 )             27.2     12-13    139  |  95<L>  |  45<H>  ----------------------------<  99  3.9   |  37<H>  |  1.42<H>    Ca    8.8      13 Dec 2017 08:38  Phos  2.9     12-13  Mg     2.1     12-13    CAPILLARY BLOOD GLUCOSE    GAGAN Negative 12-08 @ 07:39  Anti SS-1 --  Anti SS-2 --  Anti RNP --  RF 15.0 12-08 @ 07:39    Atypical ANCA -- 12-08 @ 07:39  c-ANCA titer -- 12-08 @ 07:39  c-ANCA -- 12-08 @ 07:39  p-ANCA -- 12-08 @ 07:39    Fluid characteristics  -- 12-06 @ 14:33  pH --  LDH 85  tprot 3.2    Cell count  Appearance --  Fluid type --  BF lymph --  color --  eosinophil --  PMN --  Mesothelial --  Monocyte --  Other body cells --  Fluid characteristics  -- 12-06 @ 14:32  pH --  LDH --  tprot --    Cell count  Appearance Hazy  Fluid type --  BF lymph 4  color Yellow  eosinophil --  PMN 60  Mesothelial 20  Monocyte 15  Other body cells 1      CULTURES: (if applicable)  Culture Results:   Testing in progress (12-06 @ 17:13)  Culture Results:   No growth at 5 days (12-06 @ 17:12)    Physical Examination:  PULM: Decreased BS at L base  CVS: S1, S2 RRR    RADIOLOGY REVIEWED    TTE: < from: Transthoracic Echocardiogram (12.13.17 @ 18:58) >  Conclusions:  1. Calcified trileaflet aortic valve with normal opening.  2. Normal left ventricular internal dimensions and wall  thicknesses.  3. Hyperdynamic left ventricular systolic function.  Flattening of the interventricular septum in both systole  and diastole is  consistent with right ventricular pressure  overload.  4. Severe right atrial enlargement.  5. Right ventricular enlargement with decreased right  ventricular systolic function.  6. Normal tricuspid valve. Mild-moderate tricuspid  regurgitation.  7. Estimated pulmonary artery systolic pressure equals 72  mm Hg, assuming right atrial pressure equals 8 mm Hg,  consistent with severe pulmonary pressures.    < end of copied text >

## 2017-12-13 NOTE — PROGRESS NOTE ADULT - ASSESSMENT
s/p large volume paracentesis  severe copd  severe phtn  bucky  anemia    continue current care  will review with dr hargrove and attempt dobutamine  monitor labs closely

## 2017-12-13 NOTE — PROGRESS NOTE ADULT - PROBLEM SELECTOR PLAN 1
2nd compressive atelectasis from pleural effusions, pulmonary edema and restrictive disease from ascites 2nd cor pulmonale   -Keep sp02>90% on supplemental oxygen  -s/p 8.1L paracentesis 12/6  -output>input with bumex  -Daily weights

## 2017-12-13 NOTE — PROGRESS NOTE ADULT - SUBJECTIVE AND OBJECTIVE BOX
MEDICINE, PROGRESS NOTE [phone]    FRAN ALEXANDRA 71y [id number]    Patient seen and examined.  Patient is a 71y old  Female who presents with a chief complaint of shortness of breath, volume overload and ascites (11 Dec 2017 11:41)  Pt feels weak.    PAST MEDICAL & SURGICAL HISTORY:  Hyperthyroidism  JOSE (obstructive sleep apnea)  Epistaxis  GIB (gastrointestinal bleeding)  CAD S/P percutaneous coronary angioplasty  Afib  Pulmonary HTN  GERD (gastroesophageal reflux disease)  Obesity  Cardiomegaly  Valvular heart disease  COPD (chronic obstructive pulmonary disease): Home O2  Sleep Apnea: by criteria  Loose, teeth: 3 bottom front loose teeth  Female stress incontinence  Shoulder pain, right  Constipation  Arthritis of Knee  H/O heartburn  History of lung cancer  Obese  Hx of hyperlipidemia  Asthma  Diabetes mellitus: type 2  dx about 4-5 years ago   no daily fingerstick  H/O: HTN (hypertension)  Enlarged lymph nodes  Lymph nodes enlarged: s/p biopsy mediastinal  benign  H/O endoscopy  left upper lobectomy    MEDICATIONS  (STANDING):  ALBUTerol/ipratropium for Nebulization 3 milliLiter(s) Nebulizer every 6 hours  allopurinol 200 milliGRAM(s) Oral daily  aspirin enteric coated 81 milliGRAM(s) Oral daily  atorvastatin 20 milliGRAM(s) Oral at bedtime  buMETAnide Injectable 2 milliGRAM(s) IV Push two times a day  clopidogrel Tablet 75 milliGRAM(s) Oral daily  digoxin     Tablet 0.125 milliGRAM(s) Oral every other day  diltiazem    milliGRAM(s) Oral daily  docusate sodium 100 milliGRAM(s) Oral two times a day  epoetin hilda Injectable 63676 Unit(s) SubCutaneous <User Schedule>  heparin  Injectable 5000 Unit(s) SubCutaneous every 8 hours  iron sucrose IVPB 200 milliGRAM(s) IV Intermittent every 24 hours  macitentan (OPSUMIT) 10 milliGRAM(s) 10 milliGRAM(s) Oral daily  methimazole 5 milliGRAM(s) Oral daily  metolazone 5 milliGRAM(s) Oral daily  metoprolol     tartrate 25 milliGRAM(s) Oral every 8 hours  pantoprazole    Tablet 40 milliGRAM(s) Oral before breakfast  predniSONE   Tablet 5 milliGRAM(s) Oral daily  roflumilast 500 MICROGram(s) Oral daily  tiotropium 2.5 MICROgram(s)/olodaterol 2.5 MICROgram(s) Inhaler 2 Puff(s) Inhalation daily    MEDICATIONS  (PRN):  acetaminophen   Tablet 650 milliGRAM(s) Oral every 6 hours PRN Moderate Pain (4 - 6)  ondansetron    Tablet 4 milliGRAM(s) Oral every 8 hours PRN Nausea and/or Vomiting  sodium chloride 0.65% Nasal 1 Spray(s) Both Nostrils five times a day PRN dry nose    Allergies    No Known Allergies    Intolerances    albuterol (Unknown)      PHYSICAL EXAM:  Constitutional: NAD  HEENT: Normocephalic, EOMI  Neck:  No JVD  Respiratory: CTA B/L, No wheezes  Cardiovascular: S1, S2, RRR, + systolic murmur  Gastrointestinal: BS+, soft, NT/ND  Extremities: No peripheral edema  Neurological: AAOX3, no focal deficits  Psychiatric: Normal mood, normal affect  : No Gottlieb    Vital Signs Last 24 Hrs  T(C): 36.7 (13 Dec 2017 12:18), Max: 36.8 (12 Dec 2017 20:49)  T(F): 98 (13 Dec 2017 12:18), Max: 98.2 (12 Dec 2017 20:49)  HR: 86 (13 Dec 2017 12:18) (62 - 93)  BP: 103/59 (13 Dec 2017 12:18) (100/55 - 111/61)  BP(mean): --  RR: 17 (13 Dec 2017 12:18) (17 - 18)  SpO2: 95% (13 Dec 2017 12:18) (95% - 100%)  I&O's Summary    12 Dec 2017 07:01  -  13 Dec 2017 07:00  --------------------------------------------------------  IN: 440 mL / OUT: 1550 mL / NET: -1110 mL    13 Dec 2017 07:01  -  13 Dec 2017 18:33  --------------------------------------------------------  IN: 860 mL / OUT: 1400 mL / NET: -540 mL        LABS:                        8.1    3.97  )-----------( 180      ( 13 Dec 2017 07:19 )             27.2     12-13    139  |  95<L>  |  45<H>  ----------------------------<  99  3.9   |  37<H>  |  1.42<H>    Ca    8.8      13 Dec 2017 08:38  Phos  2.9     12-13  Mg     2.1     12-13          Magnesium, Serum: 2.1 mg/dL (12-13 @ 08:38)

## 2017-12-13 NOTE — PROGRESS NOTE ADULT - ASSESSMENT
Assessment and Plan:   · Assessment		  A 71-year-old female with past medical history of diabetes, hypertension, severe pulmonary hypertension, right ventricular dysfunction, presents with not only left heart failure but also stigmata of acute cor pulmonale.  The patient's acute renal failure is likely hemodynamic in nature.  Goals of therapy are to improve her hemodynamics in order to improve her forward flow and renal perfusion.  CASSANDRA  Severe PHTN  Cor Pulm  Failure to thrive  Abd pain -R/O SBP  1.	Renal: IV Bumex 2mg ivp bid and zaroxoyln.  Monitor BMP daily. Creatinine is better  2.	Gastroenterology: S/P LARGE VOLUME PARACENTESIS;  No evidence of SBP  3.	Pulmonary: Pulmonary nebulizers.  Continue with nasal cannula oxygen at all times.       4.	 Cardiology: Monitor hemodynamics and volume status.   5.       Anemia-On  Epogen and start IV Venofer 3/4  for iron deficiency;  If HGB goes down further then may need blood transfusion     Options for her PHTN are limited.   IV  gtt is a consideration but only if can be approved as outpt

## 2017-12-13 NOTE — PROGRESS NOTE ADULT - SUBJECTIVE AND OBJECTIVE BOX
NEPHROLOGY-[facility] [phone]        Patient seen and examined in bed.  She was in good spirits.  No NV noted  Urinating well      MEDICATIONS  (STANDING):  ALBUTerol/ipratropium for Nebulization 3 milliLiter(s) Nebulizer every 6 hours  allopurinol 200 milliGRAM(s) Oral daily  aspirin enteric coated 81 milliGRAM(s) Oral daily  atorvastatin 20 milliGRAM(s) Oral at bedtime  buMETAnide Injectable 2 milliGRAM(s) IV Push two times a day  clopidogrel Tablet 75 milliGRAM(s) Oral daily  digoxin     Tablet 0.125 milliGRAM(s) Oral every other day  diltiazem    milliGRAM(s) Oral daily  docusate sodium 100 milliGRAM(s) Oral two times a day  epoetin hilda Injectable 42364 Unit(s) SubCutaneous <User Schedule>  heparin  Injectable 5000 Unit(s) SubCutaneous every 8 hours  iron sucrose IVPB 200 milliGRAM(s) IV Intermittent every 24 hours  macitentan (OPSUMIT) 10 milliGRAM(s) 10 milliGRAM(s) Oral daily  methimazole 5 milliGRAM(s) Oral daily  metolazone 5 milliGRAM(s) Oral daily  metoprolol     tartrate 25 milliGRAM(s) Oral every 8 hours  pantoprazole    Tablet 40 milliGRAM(s) Oral before breakfast  predniSONE   Tablet 5 milliGRAM(s) Oral daily  roflumilast 500 MICROGram(s) Oral daily  tiotropium 2.5 MICROgram(s)/olodaterol 2.5 MICROgram(s) Inhaler 2 Puff(s) Inhalation daily      VITAL:  T(C): , Max: 37 (12-12-17 @ 14:34)  T(F): , Max: 98.6 (12-12-17 @ 14:34)  HR: 67 (12-13-17 @ 09:17)  BP: 106/58 (12-13-17 @ 09:17)  BP(mean): --  RR: 17 (12-13-17 @ 06:09)  SpO2: 100% (12-13-17 @ 06:09)  Wt(kg): --    I and O's:    12-12 @ 07:01  -  12-13 @ 07:00  --------------------------------------------------------  IN: 440 mL / OUT: 1550 mL / NET: -1110 mL    12-13 @ 07:01  -  12-13 @ 10:42  --------------------------------------------------------  IN: 240 mL / OUT: 0 mL / NET: 240 mL          PHYSICAL EXAM:    Constitutional: NAD  HEENT: PERRLA    Neck:   + JVD  Respiratory: CTAB/L  Cardiovascular: S1 and S2  Gastrointestinal: BS+, soft, distended  Extremities: +  peripheral edema  Neurological: A/O x 3, no focal deficits  Psychiatric: Normal mood, normal affect  : No Gottlieb  Skin: No rashes  Access: Not applicable    LABS:                        8.1    3.97  )-----------( 180      ( 13 Dec 2017 07:19 )             27.2     12-13    139  |  95<L>  |  45<H>  ----------------------------<  99  3.9   |  37<H>  |  1.42<H>    Ca    8.8      13 Dec 2017 08:38  Phos  2.9     12-13  Mg     2.1     12-13            Urine Studies:          RADIOLOGY & ADDITIONAL STUDIES:

## 2017-12-13 NOTE — CHART NOTE - NSCHARTNOTEFT_GEN_A_CORE
Spoke at length with patient's point of contact Francisca [phone] who is an RN here at [facility]. Explained that no additional pHTN medications are indicated at this time given overall poor prognosis and the fact that she has failed oral treprostinil. She requested that if GOC and palliative care were to be discussed, that this be done in front of the patient and the family. Her siblings are not sure in which route they want to peruse. Francisca and Ms. Srivastava are up to date with Dr. Youssef's recommendation of no additional pulm htn medications. Dr. Weiss and Dr. Smith also told of this recommendation.     Alan Bates, DO MPH  Pulmonary Consult Service  [phone]

## 2017-12-13 NOTE — PROVIDER CONTACT NOTE (MEDICATION) - SITUATION
Pt refused alluporinol (due at noon, rescheduled multiple times as pt kept refusing), duoneb, colace and heparin  pt also scheduled for 0630 echo, stated that she would not be going that early.

## 2017-12-13 NOTE — PROVIDER CONTACT NOTE (MEDICATION) - ASSESSMENT
pt A&Ox4, vss, refusing Heparin subq & nebulizer treatment. pt states she has already received those medications today, and will only take them once a day, in the morning. Pt education provided asbout both medications. Pt has refused medications in the past

## 2017-12-13 NOTE — PROGRESS NOTE ADULT - ASSESSMENT
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 Recent admission at [place] approximately 5 weeks ago for large volume paracentesis (6.1L removed). Now presents again with RV failure-worsened LE edema, recurrent ascites, pleural effusions and pulmonary edema with worsened dyspnea and CASSANDRA on CKD

## 2017-12-14 LAB
ANION GAP SERPL CALC-SCNC: 11 MMOL/L — SIGNIFICANT CHANGE UP (ref 5–17)
BUN SERPL-MCNC: 50 MG/DL — HIGH (ref 7–23)
CALCIUM SERPL-MCNC: 9 MG/DL — SIGNIFICANT CHANGE UP (ref 8.4–10.5)
CHLORIDE SERPL-SCNC: 95 MMOL/L — LOW (ref 96–108)
CO2 SERPL-SCNC: 36 MMOL/L — HIGH (ref 22–31)
CREAT SERPL-MCNC: 1.53 MG/DL — HIGH (ref 0.5–1.3)
GLUCOSE SERPL-MCNC: 96 MG/DL — SIGNIFICANT CHANGE UP (ref 70–99)
HCT VFR BLD CALC: 27.4 % — LOW (ref 34.5–45)
HGB BLD-MCNC: 8.3 G/DL — LOW (ref 11.5–15.5)
MCHC RBC-ENTMCNC: 26.4 PG — LOW (ref 27–34)
MCHC RBC-ENTMCNC: 30.3 GM/DL — LOW (ref 32–36)
MCV RBC AUTO: 87.3 FL — SIGNIFICANT CHANGE UP (ref 80–100)
PLATELET # BLD AUTO: 192 K/UL — SIGNIFICANT CHANGE UP (ref 150–400)
POTASSIUM SERPL-MCNC: 3.9 MMOL/L — SIGNIFICANT CHANGE UP (ref 3.5–5.3)
POTASSIUM SERPL-SCNC: 3.9 MMOL/L — SIGNIFICANT CHANGE UP (ref 3.5–5.3)
RBC # BLD: 3.14 M/UL — LOW (ref 3.8–5.2)
RBC # FLD: 18 % — HIGH (ref 10.3–14.5)
SODIUM SERPL-SCNC: 142 MMOL/L — SIGNIFICANT CHANGE UP (ref 135–145)
WBC # BLD: 4.6 K/UL — SIGNIFICANT CHANGE UP (ref 3.8–10.5)
WBC # FLD AUTO: 4.6 K/UL — SIGNIFICANT CHANGE UP (ref 3.8–10.5)

## 2017-12-14 RX ADMIN — Medication 25 MILLIGRAM(S): at 08:41

## 2017-12-14 RX ADMIN — Medication 81 MILLIGRAM(S): at 12:08

## 2017-12-14 RX ADMIN — BUMETANIDE 2 MILLIGRAM(S): 0.25 INJECTION INTRAMUSCULAR; INTRAVENOUS at 18:26

## 2017-12-14 RX ADMIN — BUMETANIDE 2 MILLIGRAM(S): 0.25 INJECTION INTRAMUSCULAR; INTRAVENOUS at 06:22

## 2017-12-14 RX ADMIN — Medication 0.12 MILLIGRAM(S): at 14:01

## 2017-12-14 RX ADMIN — HEPARIN SODIUM 5000 UNIT(S): 5000 INJECTION INTRAVENOUS; SUBCUTANEOUS at 08:42

## 2017-12-14 RX ADMIN — HEPARIN SODIUM 5000 UNIT(S): 5000 INJECTION INTRAVENOUS; SUBCUTANEOUS at 18:32

## 2017-12-14 RX ADMIN — Medication 25 MILLIGRAM(S): at 18:29

## 2017-12-14 RX ADMIN — Medication 120 MILLIGRAM(S): at 08:42

## 2017-12-14 RX ADMIN — Medication 5 MILLIGRAM(S): at 08:41

## 2017-12-14 RX ADMIN — CLOPIDOGREL BISULFATE 75 MILLIGRAM(S): 75 TABLET, FILM COATED ORAL at 12:08

## 2017-12-14 RX ADMIN — ROFLUMILAST 500 MICROGRAM(S): 500 TABLET ORAL at 08:41

## 2017-12-14 RX ADMIN — IRON SUCROSE 110 MILLIGRAM(S): 20 INJECTION, SOLUTION INTRAVENOUS at 11:01

## 2017-12-14 RX ADMIN — Medication 200 MILLIGRAM(S): at 12:08

## 2017-12-14 RX ADMIN — TIOTROPIUM BROMIDE AND OLODATEROL 2 PUFF(S): 3.124; 2.736 SPRAY, METERED RESPIRATORY (INHALATION) at 08:43

## 2017-12-14 NOTE — PROGRESS NOTE ADULT - PROBLEM SELECTOR PLAN 3
-Appreciate house pulmonary consult for pHTN mgmt. No additional pHTN medications are indicated   -Vasodilator non-responder on RHC from 5/2017  -Consider palliative/hospice consult   -Patient would like to speak to Dr. Youssef

## 2017-12-14 NOTE — PROGRESS NOTE ADULT - ASSESSMENT
Assessment and Plan:   · Assessment		  A 71-year-old female with past medical history of diabetes, hypertension, severe pulmonary hypertension, right ventricular dysfunction, presents with not only left heart failure but also stigmata of acute cor pulmonale.  The patient's acute renal failure is likely hemodynamic in nature.  Goals of therapy are to improve her hemodynamics in order to improve her forward flow and renal perfusion.  CASSANDRA  Severe PHTN  Cor Pulm  Failure to thrive    1.	Renal: IV Bumex 2mg ivp bid and zaroxoyln.  Monitor BMP daily.    2.	Gastroenterology: S/P LARGE VOLUME PARACENTESIS;  No evidence of SBP  3.	Pulmonary: Pulmonary nebulizers.  Continue with nasal cannula oxygen at all times.       4.	 Cardiology: Monitor hemodynamics and volume status.   5.       Anemia-On  Epogen and start IV Venofer 4/4  for iron deficiency;  If HGB goes down further then may need blood transfusion     Options for her PHTN are limited.        I agree that hospice is likely a good option.  End stage cor Pulm

## 2017-12-14 NOTE — PROGRESS NOTE ADULT - ASSESSMENT
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 Recent admission at [place] approximately 5 weeks ago for large volume paracentesis (6.1L removed). Now presents again with RV failure-worsened LE edema, recurrent ascites, pleural effusions and pulmonary edema with worsened dyspnea and CASSANDRA on CKD

## 2017-12-14 NOTE — PROGRESS NOTE ADULT - SUBJECTIVE AND OBJECTIVE BOX
NEPHROLOGY[facility] [phone]        Patient seen and examined in bed.  She was urinating well    ROS-+sob all other ROS neg        MEDICATIONS  (STANDING):  ALBUTerol/ipratropium for Nebulization 3 milliLiter(s) Nebulizer every 6 hours  allopurinol 200 milliGRAM(s) Oral daily  aspirin enteric coated 81 milliGRAM(s) Oral daily  atorvastatin 20 milliGRAM(s) Oral at bedtime  buMETAnide Injectable 2 milliGRAM(s) IV Push two times a day  clopidogrel Tablet 75 milliGRAM(s) Oral daily  digoxin     Tablet 0.125 milliGRAM(s) Oral every other day  diltiazem    milliGRAM(s) Oral daily  docusate sodium 100 milliGRAM(s) Oral two times a day  epoetin hilda Injectable 98929 Unit(s) SubCutaneous <User Schedule>  heparin  Injectable 5000 Unit(s) SubCutaneous every 8 hours  macitentan (OPSUMIT) 10 milliGRAM(s) 10 milliGRAM(s) Oral daily  methimazole 5 milliGRAM(s) Oral daily  metolazone 5 milliGRAM(s) Oral daily  metoprolol     tartrate 25 milliGRAM(s) Oral every 8 hours  pantoprazole    Tablet 40 milliGRAM(s) Oral before breakfast  predniSONE   Tablet 5 milliGRAM(s) Oral daily  roflumilast 500 MICROGram(s) Oral daily  tiotropium 2.5 MICROgram(s)/olodaterol 2.5 MICROgram(s) Inhaler 2 Puff(s) Inhalation daily      VITAL:  T(C): , Max: 37.2 (12-14-17 @ 11:21)  T(F): , Max: 98.9 (12-14-17 @ 11:21)  HR: 94 (12-14-17 @ 12:42)  BP: 98/62 (12-14-17 @ 12:42)  BP(mean): --  RR: 17 (12-14-17 @ 11:21)  SpO2: 99% (12-14-17 @ 11:21)  Wt(kg): --    I and O's:    12-13 @ 07:01  -  12-14 @ 07:00  --------------------------------------------------------  IN: 985 mL / OUT: 2350 mL / NET: -1365 mL    12-14 @ 07:01  -  12-14 @ 13:34  --------------------------------------------------------  IN: 300 mL / OUT: 600 mL / NET: -300 mL          PHYSICAL EXAM:    Constitutional: NAD  HEENT: PERRLA    Neck:  + JVD  Respiratory: reduced breath sounds  Cardiovascular: S1 and S2  Gastrointestinal: BS+, soft, NT/ND  Extremities: + peripheral edema  Neurological: A/O x 3, no focal deficits  Psychiatric: Normal mood, normal affect  : No Gottlieb  Skin: No rashes  Access: Not applicable    LABS:                        8.3    4.60  )-----------( 192      ( 14 Dec 2017 07:44 )             27.4     12-14    142  |  95<L>  |  50<H>  ----------------------------<  96  3.9   |  36<H>  |  1.53<H>    Ca    9.0      14 Dec 2017 11:52  Phos  2.9     12-13  Mg     2.1     12-13            Urine Studies:          RADIOLOGY & ADDITIONAL STUDIES:

## 2017-12-14 NOTE — PROGRESS NOTE ADULT - ASSESSMENT
s/p large volume paracentesis  severe copd  severe phtn  bucky  anemia  vol overload    continue current care  had long discussion with pt and daughter at bedside about the possibility of   attempting dobutamine. risks and benefits were explained, including sudden cardiac death but not limited to just that. Pt and daughter were going to discuss   with remainder of family prior to decision.  case was discussed with dr hargrove in detail as well.  continue strict I/o's

## 2017-12-14 NOTE — PROGRESS NOTE ADULT - PROBLEM SELECTOR PLAN 1
2nd compressive atelectasis from pleural effusions, pulmonary edema and restrictive disease from ascites 2nd cor pulmonale   -Keep sp02>90% on supplemental oxygen  -s/p 8.1L paracentesis 12/6  -output>input with bumex/zaroxolyn   -Daily weights

## 2017-12-14 NOTE — PROGRESS NOTE ADULT - SUBJECTIVE AND OBJECTIVE BOX
Follow-up Pulm Progress Note    No new respiratory events overnight.  Denies SOB/CP.   96% on 4.5L NC    Medications:  MEDICATIONS  (STANDING):  ALBUTerol/ipratropium for Nebulization 3 milliLiter(s) Nebulizer every 6 hours  allopurinol 200 milliGRAM(s) Oral daily  aspirin enteric coated 81 milliGRAM(s) Oral daily  atorvastatin 20 milliGRAM(s) Oral at bedtime  buMETAnide Injectable 2 milliGRAM(s) IV Push two times a day  clopidogrel Tablet 75 milliGRAM(s) Oral daily  digoxin     Tablet 0.125 milliGRAM(s) Oral every other day  diltiazem    milliGRAM(s) Oral daily  docusate sodium 100 milliGRAM(s) Oral two times a day  epoetin hilda Injectable 71912 Unit(s) SubCutaneous <User Schedule>  heparin  Injectable 5000 Unit(s) SubCutaneous every 8 hours  macitentan (OPSUMIT) 10 milliGRAM(s) 10 milliGRAM(s) Oral daily  methimazole 5 milliGRAM(s) Oral daily  metolazone 5 milliGRAM(s) Oral daily  metoprolol     tartrate 25 milliGRAM(s) Oral every 8 hours  pantoprazole    Tablet 40 milliGRAM(s) Oral before breakfast  predniSONE   Tablet 5 milliGRAM(s) Oral daily  roflumilast 500 MICROGram(s) Oral daily  tiotropium 2.5 MICROgram(s)/olodaterol 2.5 MICROgram(s) Inhaler 2 Puff(s) Inhalation daily    MEDICATIONS  (PRN):  acetaminophen   Tablet 650 milliGRAM(s) Oral every 6 hours PRN Moderate Pain (4 - 6)  ondansetron    Tablet 4 milliGRAM(s) Oral every 8 hours PRN Nausea and/or Vomiting  sodium chloride 0.65% Nasal 1 Spray(s) Both Nostrils five times a day PRN dry nose          Vital Signs Last 24 Hrs  T(C): 37.2 (14 Dec 2017 11:21), Max: 37.2 (14 Dec 2017 11:21)  T(F): 98.9 (14 Dec 2017 11:21), Max: 98.9 (14 Dec 2017 11:21)  HR: 94 (14 Dec 2017 12:42) (70 - 101)  BP: 98/62 (14 Dec 2017 12:42) (88/53 - 118/60)  BP(mean): --  RR: 17 (14 Dec 2017 11:21) (16 - 18)  SpO2: 99% (14 Dec 2017 11:21) (97% - 99%) on 4.5L NC          12-13 @ 07:01  -  12-14 @ 07:00  --------------------------------------------------------  IN: 985 mL / OUT: 2350 mL / NET: -1365 mL          LABS:                        8.3    4.60  )-----------( 192      ( 14 Dec 2017 07:44 )             27.4     12-14    142  |  95<L>  |  50<H>  ----------------------------<  96  3.9   |  36<H>  |  1.53<H>    Ca    9.0      14 Dec 2017 11:52  Phos  2.9     12-13  Mg     2.1     12-13            CAPILLARY BLOOD GLUCOSE                GAGAN Negative 12-08 @ 07:39  Anti SS-1 --  Anti SS-2 --  Anti RNP --  RF 15.0 12-08 @ 07:39    Atypical ANCA -- 12-08 @ 07:39  c-ANCA titer -- 12-08 @ 07:39  c-ANCA -- 12-08 @ 07:39  p-ANCA -- 12-08 @ 07:39          Fluid characteristics  -- 12-06 @ 14:33  pH --  LDH 85  tprot 3.2    Cell count  Appearance --  Fluid type --  BF lymph --  color --  eosinophil --  PMN --  Mesothelial --  Monocyte --  Other body cells --  Fluid characteristics  -- 12-06 @ 14:32  pH --  LDH --  tprot --    Cell count  Appearance Hazy  Fluid type --  BF lymph 4  color Yellow  eosinophil --  PMN 60  Mesothelial 20  Monocyte 15  Other body cells 1      CULTURES: (if applicable)          Physical Examination:  PULM: Decreased BS throughout  CVS: S1, S2 RRR    RADIOLOGY REVIEWED

## 2017-12-14 NOTE — PROGRESS NOTE ADULT - SUBJECTIVE AND OBJECTIVE BOX
MEDICINE, PROGRESS NOTE [phone]    FRAN ALEXANDRA 71y [id number]  document note for yesterday as it didn't save  Patient seen and examined.  Patient is a 71y old  Female who presents with a chief complaint of shortness of breath, volume overload and ascites (11 Dec 2017 11:41)      PAST MEDICAL & SURGICAL HISTORY:  Hyperthyroidism  JOSE (obstructive sleep apnea)  Epistaxis  GIB (gastrointestinal bleeding)  CAD S/P percutaneous coronary angioplasty  Afib  Pulmonary HTN  GERD (gastroesophageal reflux disease)  Obesity  Cardiomegaly  Valvular heart disease  COPD (chronic obstructive pulmonary disease): Home O2  Sleep Apnea: by criteria  Loose, teeth: 3 bottom front loose teeth  Female stress incontinence  Shoulder pain, right  Constipation  Arthritis of Knee  H/O heartburn  History of lung cancer  Obese  Hx of hyperlipidemia  Asthma  Diabetes mellitus: type 2  dx about 4-5 years ago   no daily fingerstick  H/O: HTN (hypertension)  Enlarged lymph nodes  Lymph nodes enlarged: s/p biopsy mediastinal  benign  H/O endoscopy  left upper lobectomy    MEDICATIONS  (STANDING):  ALBUTerol/ipratropium for Nebulization 3 milliLiter(s) Nebulizer every 6 hours  allopurinol 200 milliGRAM(s) Oral daily  aspirin enteric coated 81 milliGRAM(s) Oral daily  atorvastatin 20 milliGRAM(s) Oral at bedtime  buMETAnide Injectable 2 milliGRAM(s) IV Push two times a day  clopidogrel Tablet 75 milliGRAM(s) Oral daily  digoxin     Tablet 0.125 milliGRAM(s) Oral every other day  diltiazem    milliGRAM(s) Oral daily  DOBUTamine Infusion 2.5 MICROgram(s)/kG/Min (4.83 mL/Hr) IV Continuous <Continuous>  docusate sodium 100 milliGRAM(s) Oral two times a day  epoetin hilda Injectable 31425 Unit(s) SubCutaneous <User Schedule>  heparin  Injectable 5000 Unit(s) SubCutaneous every 8 hours  macitentan (OPSUMIT) 10 milliGRAM(s) 10 milliGRAM(s) Oral daily  methimazole 5 milliGRAM(s) Oral daily  metolazone 5 milliGRAM(s) Oral daily  pantoprazole    Tablet 40 milliGRAM(s) Oral before breakfast  potassium chloride    Tablet ER 40 milliEquivalent(s) Oral once  predniSONE   Tablet 5 milliGRAM(s) Oral daily  roflumilast 500 MICROGram(s) Oral daily  tiotropium 2.5 MICROgram(s)/olodaterol 2.5 MICROgram(s) Inhaler 2 Puff(s) Inhalation daily    MEDICATIONS  (PRN):  acetaminophen   Tablet 650 milliGRAM(s) Oral every 6 hours PRN Moderate Pain (4 - 6)  ondansetron    Tablet 4 milliGRAM(s) Oral every 8 hours PRN Nausea and/or Vomiting  sodium chloride 0.65% Nasal 1 Spray(s) Both Nostrils five times a day PRN dry nose    Allergies    No Known Allergies    Intolerances    albuterol (Unknown)      PHYSICAL EXAM:  Constitutional: NAD  HEENT: Normocephalic, EOMI  Neck:  No JVD  Respiratory: CTA B/L, No wheezes, dec air entry at bases  Cardiovascular: S1, S2, RRR, + systolic murmur  Gastrointestinal: BS+, soft,  + distention, + fluid wave  Extremities: + peripheral edema le b/l  Neurological: AAOX3, no focal deficits  Psychiatric: Normal mood, normal affect  : No Gottlieb    labs were reviewed on computer and discussed with pt and daughter. MEDICINE, PROGRESS NOTE [phone]    FRAN ALEXANDRA 71y [id number]  documenting note for 12/14/17 as it didn't save  Patient seen and examined.  Patient is a 71y old  Female who presents with a chief complaint of shortness of breath, volume overload and ascites (11 Dec 2017 11:41)      PAST MEDICAL & SURGICAL HISTORY:  Hyperthyroidism  JOSE (obstructive sleep apnea)  Epistaxis  GIB (gastrointestinal bleeding)  CAD S/P percutaneous coronary angioplasty  Afib  Pulmonary HTN  GERD (gastroesophageal reflux disease)  Obesity  Cardiomegaly  Valvular heart disease  COPD (chronic obstructive pulmonary disease): Home O2  Sleep Apnea: by criteria  Loose, teeth: 3 bottom front loose teeth  Female stress incontinence  Shoulder pain, right  Constipation  Arthritis of Knee  H/O heartburn  History of lung cancer  Obese  Hx of hyperlipidemia  Asthma  Diabetes mellitus: type 2  dx about 4-5 years ago   no daily fingerstick  H/O: HTN (hypertension)  Enlarged lymph nodes  Lymph nodes enlarged: s/p biopsy mediastinal  benign  H/O endoscopy  left upper lobectomy    MEDICATIONS  (STANDING):  ALBUTerol/ipratropium for Nebulization 3 milliLiter(s) Nebulizer every 6 hours  allopurinol 200 milliGRAM(s) Oral daily  aspirin enteric coated 81 milliGRAM(s) Oral daily  atorvastatin 20 milliGRAM(s) Oral at bedtime  buMETAnide Injectable 2 milliGRAM(s) IV Push two times a day  clopidogrel Tablet 75 milliGRAM(s) Oral daily  digoxin     Tablet 0.125 milliGRAM(s) Oral every other day  diltiazem    milliGRAM(s) Oral daily  DOBUTamine Infusion 2.5 MICROgram(s)/kG/Min (4.83 mL/Hr) IV Continuous <Continuous>  docusate sodium 100 milliGRAM(s) Oral two times a day  epoetin hilda Injectable 66912 Unit(s) SubCutaneous <User Schedule>  heparin  Injectable 5000 Unit(s) SubCutaneous every 8 hours  macitentan (OPSUMIT) 10 milliGRAM(s) 10 milliGRAM(s) Oral daily  methimazole 5 milliGRAM(s) Oral daily  metolazone 5 milliGRAM(s) Oral daily  pantoprazole    Tablet 40 milliGRAM(s) Oral before breakfast  potassium chloride    Tablet ER 40 milliEquivalent(s) Oral once  predniSONE   Tablet 5 milliGRAM(s) Oral daily  roflumilast 500 MICROGram(s) Oral daily  tiotropium 2.5 MICROgram(s)/olodaterol 2.5 MICROgram(s) Inhaler 2 Puff(s) Inhalation daily    MEDICATIONS  (PRN):  acetaminophen   Tablet 650 milliGRAM(s) Oral every 6 hours PRN Moderate Pain (4 - 6)  ondansetron    Tablet 4 milliGRAM(s) Oral every 8 hours PRN Nausea and/or Vomiting  sodium chloride 0.65% Nasal 1 Spray(s) Both Nostrils five times a day PRN dry nose    Allergies    No Known Allergies    Intolerances    albuterol (Unknown)      PHYSICAL EXAM:  Constitutional: NAD  HEENT: Normocephalic, EOMI  Neck:  No JVD  Respiratory: CTA B/L, No wheezes, dec air entry at bases  Cardiovascular: S1, S2, RRR, + systolic murmur  Gastrointestinal: BS+, soft,  + distention, + fluid wave  Extremities: + peripheral edema le b/l  Neurological: AAOX3, no focal deficits  Psychiatric: Normal mood, normal affect  : No Gottlieb    labs were reviewed on computer and discussed with pt and daughter.

## 2017-12-15 LAB
ANION GAP SERPL CALC-SCNC: 11 MMOL/L — SIGNIFICANT CHANGE UP (ref 5–17)
BUN SERPL-MCNC: 52 MG/DL — HIGH (ref 7–23)
CALCIUM SERPL-MCNC: 8.7 MG/DL — SIGNIFICANT CHANGE UP (ref 8.4–10.5)
CHLORIDE SERPL-SCNC: 93 MMOL/L — LOW (ref 96–108)
CO2 SERPL-SCNC: 36 MMOL/L — HIGH (ref 22–31)
CREAT SERPL-MCNC: 1.59 MG/DL — HIGH (ref 0.5–1.3)
GLUCOSE SERPL-MCNC: 92 MG/DL — SIGNIFICANT CHANGE UP (ref 70–99)
HCT VFR BLD CALC: 27.9 % — LOW (ref 34.5–45)
HGB BLD-MCNC: 8.4 G/DL — LOW (ref 11.5–15.5)
MCHC RBC-ENTMCNC: 26.6 PG — LOW (ref 27–34)
MCHC RBC-ENTMCNC: 30.1 GM/DL — LOW (ref 32–36)
MCV RBC AUTO: 88.3 FL — SIGNIFICANT CHANGE UP (ref 80–100)
PLATELET # BLD AUTO: 212 K/UL — SIGNIFICANT CHANGE UP (ref 150–400)
POTASSIUM SERPL-MCNC: 3.3 MMOL/L — LOW (ref 3.5–5.3)
POTASSIUM SERPL-SCNC: 3.3 MMOL/L — LOW (ref 3.5–5.3)
RBC # BLD: 3.16 M/UL — LOW (ref 3.8–5.2)
RBC # FLD: 17.9 % — HIGH (ref 10.3–14.5)
SODIUM SERPL-SCNC: 140 MMOL/L — SIGNIFICANT CHANGE UP (ref 135–145)
WBC # BLD: 5 K/UL — SIGNIFICANT CHANGE UP (ref 3.8–10.5)
WBC # FLD AUTO: 5 K/UL — SIGNIFICANT CHANGE UP (ref 3.8–10.5)

## 2017-12-15 RX ORDER — DOBUTAMINE HCL 250MG/20ML
2.5 VIAL (ML) INTRAVENOUS
Qty: 500 | Refills: 0 | Status: DISCONTINUED | OUTPATIENT
Start: 2017-12-15 | End: 2017-12-15

## 2017-12-15 RX ORDER — POTASSIUM CHLORIDE 20 MEQ
40 PACKET (EA) ORAL ONCE
Qty: 0 | Refills: 0 | Status: COMPLETED | OUTPATIENT
Start: 2017-12-15 | End: 2017-12-15

## 2017-12-15 RX ADMIN — TIOTROPIUM BROMIDE AND OLODATEROL 2 PUFF(S): 3.124; 2.736 SPRAY, METERED RESPIRATORY (INHALATION) at 10:19

## 2017-12-15 RX ADMIN — BUMETANIDE 2 MILLIGRAM(S): 0.25 INJECTION INTRAMUSCULAR; INTRAVENOUS at 17:16

## 2017-12-15 RX ADMIN — Medication 200 MILLIGRAM(S): at 12:12

## 2017-12-15 RX ADMIN — Medication 81 MILLIGRAM(S): at 12:12

## 2017-12-15 RX ADMIN — Medication 25 MILLIGRAM(S): at 07:00

## 2017-12-15 RX ADMIN — Medication 25 MILLIGRAM(S): at 00:11

## 2017-12-15 RX ADMIN — Medication 4.83 MICROGRAM(S)/KG/MIN: at 17:17

## 2017-12-15 RX ADMIN — Medication 3 MILLILITER(S): at 23:02

## 2017-12-15 RX ADMIN — ROFLUMILAST 500 MICROGRAM(S): 500 TABLET ORAL at 10:00

## 2017-12-15 RX ADMIN — BUMETANIDE 2 MILLIGRAM(S): 0.25 INJECTION INTRAMUSCULAR; INTRAVENOUS at 07:00

## 2017-12-15 RX ADMIN — Medication 40 MILLIEQUIVALENT(S): at 20:16

## 2017-12-15 RX ADMIN — CLOPIDOGREL BISULFATE 75 MILLIGRAM(S): 75 TABLET, FILM COATED ORAL at 12:12

## 2017-12-15 RX ADMIN — ATORVASTATIN CALCIUM 20 MILLIGRAM(S): 80 TABLET, FILM COATED ORAL at 00:11

## 2017-12-15 RX ADMIN — ERYTHROPOIETIN 10000 UNIT(S): 10000 INJECTION, SOLUTION INTRAVENOUS; SUBCUTANEOUS at 12:12

## 2017-12-15 RX ADMIN — HEPARIN SODIUM 5000 UNIT(S): 5000 INJECTION INTRAVENOUS; SUBCUTANEOUS at 10:01

## 2017-12-15 RX ADMIN — Medication 5 MILLIGRAM(S): at 10:00

## 2017-12-15 RX ADMIN — Medication 120 MILLIGRAM(S): at 10:00

## 2017-12-15 RX ADMIN — ATORVASTATIN CALCIUM 20 MILLIGRAM(S): 80 TABLET, FILM COATED ORAL at 21:04

## 2017-12-15 NOTE — PROGRESS NOTE ADULT - SUBJECTIVE AND OBJECTIVE BOX
MEDICINE, PROGRESS NOTE [phone]    FRAN ALEXANDRA 71y [id number]    Patient seen and examined.  Patient is a 71y old  Female who presents with a chief complaint of shortness of breath, volume overload and ascites (11 Dec 2017 11:41)  Pt feels ok. sitting in chair with entire family present at bedside.    PAST MEDICAL & SURGICAL HISTORY:  Hyperthyroidism  JOSE (obstructive sleep apnea)  Epistaxis  GIB (gastrointestinal bleeding)  CAD S/P percutaneous coronary angioplasty  Afib  Pulmonary HTN  GERD (gastroesophageal reflux disease)  Obesity  Cardiomegaly  Valvular heart disease  COPD (chronic obstructive pulmonary disease): Home O2  Sleep Apnea: by criteria  Loose, teeth: 3 bottom front loose teeth  Female stress incontinence  Shoulder pain, right  Constipation  Arthritis of Knee  H/O heartburn  History of lung cancer  Obese  Hx of hyperlipidemia  Asthma  Diabetes mellitus: type 2  dx about 4-5 years ago   no daily fingerstick  H/O: HTN (hypertension)  Enlarged lymph nodes  Lymph nodes enlarged: s/p biopsy mediastinal  benign  H/O endoscopy  left upper lobectomy    MEDICATIONS  (STANDING):  ALBUTerol/ipratropium for Nebulization 3 milliLiter(s) Nebulizer every 6 hours  allopurinol 200 milliGRAM(s) Oral daily  aspirin enteric coated 81 milliGRAM(s) Oral daily  atorvastatin 20 milliGRAM(s) Oral at bedtime  buMETAnide Injectable 2 milliGRAM(s) IV Push two times a day  clopidogrel Tablet 75 milliGRAM(s) Oral daily  digoxin     Tablet 0.125 milliGRAM(s) Oral every other day  diltiazem    milliGRAM(s) Oral daily  DOBUTamine Infusion 2.5 MICROgram(s)/kG/Min (4.83 mL/Hr) IV Continuous <Continuous>  docusate sodium 100 milliGRAM(s) Oral two times a day  epoetin hilda Injectable 98397 Unit(s) SubCutaneous <User Schedule>  heparin  Injectable 5000 Unit(s) SubCutaneous every 8 hours  macitentan (OPSUMIT) 10 milliGRAM(s) 10 milliGRAM(s) Oral daily  methimazole 5 milliGRAM(s) Oral daily  metolazone 5 milliGRAM(s) Oral daily  pantoprazole    Tablet 40 milliGRAM(s) Oral before breakfast  potassium chloride    Tablet ER 40 milliEquivalent(s) Oral once  predniSONE   Tablet 5 milliGRAM(s) Oral daily  roflumilast 500 MICROGram(s) Oral daily  tiotropium 2.5 MICROgram(s)/olodaterol 2.5 MICROgram(s) Inhaler 2 Puff(s) Inhalation daily    MEDICATIONS  (PRN):  acetaminophen   Tablet 650 milliGRAM(s) Oral every 6 hours PRN Moderate Pain (4 - 6)  ondansetron    Tablet 4 milliGRAM(s) Oral every 8 hours PRN Nausea and/or Vomiting  sodium chloride 0.65% Nasal 1 Spray(s) Both Nostrils five times a day PRN dry nose    Allergies    No Known Allergies    Intolerances    albuterol (Unknown)      PHYSICAL EXAM:  Constitutional: NAD  HEENT: Normocephalic, EOMI  Neck:  No JVD  Respiratory: CTA B/L, No wheezes  Cardiovascular: S1, S2, iRRR, + systolic murmur  Gastrointestinal: BS+, soft, + distention, + fluid wave  Extremities: + peripheral edema  Neurological: AAOX3, no focal deficits  Psychiatric: Normal mood, normal affect  : No Gottlieb    Vital Signs Last 24 Hrs  T(C): 36.9 (15 Dec 2017 17:17), Max: 36.9 (14 Dec 2017 20:38)  T(F): 98.5 (15 Dec 2017 17:17), Max: 98.5 (14 Dec 2017 20:38)  HR: 125 (15 Dec 2017 17:17) (70 - 125)  BP: 112/72 (15 Dec 2017 17:17) (101/59 - 122/74)  BP(mean): --  RR: 18 (15 Dec 2017 05:50) (18 - 18)  SpO2: 99% (15 Dec 2017 05:50) (97% - 99%)  I&O's Summary    14 Dec 2017 07:01  -  15 Dec 2017 07:00  --------------------------------------------------------  IN: 1340 mL / OUT: 2800 mL / NET: -1460 mL    15 Dec 2017 07:01  -  15 Dec 2017 18:32  --------------------------------------------------------  IN: 780 mL / OUT: 0 mL / NET: 780 mL        LABS:                        8.4    5.00  )-----------( 212      ( 15 Dec 2017 07:31 )             27.9     12-15    140  |  93<L>  |  52<H>  ----------------------------<  92  3.3<L>   |  36<H>  |  1.59<H>    Ca    8.7      15 Dec 2017 07:42

## 2017-12-15 NOTE — PROGRESS NOTE ADULT - SUBJECTIVE AND OBJECTIVE BOX
NEPHROLOGY-[facility] [phone]        Patient seen and examined in bed.  She feel about the same.  Still grossly overloaded    ROS- + SOB.  +weakness + fatigue; all other ros were reviewed and were negative     MEDICATIONS  (STANDING):  ALBUTerol/ipratropium for Nebulization 3 milliLiter(s) Nebulizer every 6 hours  allopurinol 200 milliGRAM(s) Oral daily  aspirin enteric coated 81 milliGRAM(s) Oral daily  atorvastatin 20 milliGRAM(s) Oral at bedtime  buMETAnide Injectable 2 milliGRAM(s) IV Push two times a day  clopidogrel Tablet 75 milliGRAM(s) Oral daily  digoxin     Tablet 0.125 milliGRAM(s) Oral every other day  diltiazem    milliGRAM(s) Oral daily  docusate sodium 100 milliGRAM(s) Oral two times a day  epoetin hilda Injectable 25194 Unit(s) SubCutaneous <User Schedule>  heparin  Injectable 5000 Unit(s) SubCutaneous every 8 hours  macitentan (OPSUMIT) 10 milliGRAM(s) 10 milliGRAM(s) Oral daily  methimazole 5 milliGRAM(s) Oral daily  metolazone 5 milliGRAM(s) Oral daily  metoprolol     tartrate 25 milliGRAM(s) Oral every 8 hours  pantoprazole    Tablet 40 milliGRAM(s) Oral before breakfast  predniSONE   Tablet 5 milliGRAM(s) Oral daily  roflumilast 500 MICROGram(s) Oral daily  tiotropium 2.5 MICROgram(s)/olodaterol 2.5 MICROgram(s) Inhaler 2 Puff(s) Inhalation daily      VITAL:  T(C): , Max: 37.2 (12-14-17 @ 11:21)  T(F): , Max: 98.9 (12-14-17 @ 11:21)  HR: 102 (12-15-17 @ 06:57)  BP: 119/74 (12-15-17 @ 06:57)  BP(mean): --  RR: 18 (12-15-17 @ 05:50)  SpO2: 99% (12-15-17 @ 05:50)  Wt(kg): --    I and O's:    12-14 @ 07:01  -  12-15 @ 07:00  --------------------------------------------------------  IN: 1340 mL / OUT: 2800 mL / NET: -1460 mL    12-15 @ 07:01  -  12-15 @ 10:50  --------------------------------------------------------  IN: 300 mL / OUT: 0 mL / NET: 300 mL          PHYSICAL EXAM:    Constitutional: NAD  HEENT: PERRLA    Neck:  +  JVD  Respiratory: reduced breath sounds  Cardiovascular: S1 and S2  Gastrointestinal: BS+, soft, NT/ND  Extremities: + 3 peripheral edema  Neurological: A/O x 3, no focal deficits  Psychiatric: Normal mood, normal affect  : No Gottlieb  Skin: No rashes  Access: Not applicable    LABS:                        8.4    5.00  )-----------( 212      ( 15 Dec 2017 07:31 )             27.9     12-15    140  |  93<L>  |  52<H>  ----------------------------<  92  3.3<L>   |  36<H>  |  1.59<H>    Ca    8.7      15 Dec 2017 07:42            Urine Studies:          RADIOLOGY & ADDITIONAL STUDIES:        < from: Xray Chest 1 View AP -PORTABLE-Routine (12.07.17 @ 09:38) >        INTERPRETATION:    CLINICAL INDICATION: Evaluate progression pleural effusions present noted   on 12/1/2017.    TECHNIQUE: Portable frontal view of the chest.    COMPARISON: Frontal chest radiograph from 12/1/2017..    FINDINGS:     Surgical clips are noted over the left hemithorax.  Cardiac size is not accurately assessed in this projection.  Unchanged moderate left pleural effusion. No pneumothorax.  The right lung is clear.  No acute osseous abnormality. Spinal degenerative changes.      IMPRESSION:    Unchanged moderate left pleural effusion.        < end of copied text >

## 2017-12-15 NOTE — PROGRESS NOTE ADULT - ASSESSMENT
severe copd  severe phtn  bucky  anemia  vol overload  s/p large volume paracentesis  Afib    continue current care  case discussed with dr. hargrove and dr bonner  Dobutamine drip was discussed with pt and entire family again, risks and benefits were explained including but not limited to death. Pt and entire family displayed understanding and decided to proceed with dobutamine.  Pt has told family that  she wishes to remain full code as well.   will start dobutamine and continue to monitor pt  f/u labs alix  continue strict i/o's   discussed with house staff as well.

## 2017-12-15 NOTE — PROGRESS NOTE ADULT - SUBJECTIVE AND OBJECTIVE BOX
Follow-up Pulm Progress Note    No new respiratory events overnight.  Denies SOB/CP.   99% on 4.5L NC    Medications:  MEDICATIONS  (STANDING):  ALBUTerol/ipratropium for Nebulization 3 milliLiter(s) Nebulizer every 6 hours  allopurinol 200 milliGRAM(s) Oral daily  aspirin enteric coated 81 milliGRAM(s) Oral daily  atorvastatin 20 milliGRAM(s) Oral at bedtime  buMETAnide Injectable 2 milliGRAM(s) IV Push two times a day  clopidogrel Tablet 75 milliGRAM(s) Oral daily  digoxin     Tablet 0.125 milliGRAM(s) Oral every other day  diltiazem    milliGRAM(s) Oral daily  DOBUTamine Infusion 2.5 MICROgram(s)/kG/Min (4.83 mL/Hr) IV Continuous <Continuous>  docusate sodium 100 milliGRAM(s) Oral two times a day  epoetin hilda Injectable 41694 Unit(s) SubCutaneous <User Schedule>  heparin  Injectable 5000 Unit(s) SubCutaneous every 8 hours  macitentan (OPSUMIT) 10 milliGRAM(s) 10 milliGRAM(s) Oral daily  methimazole 5 milliGRAM(s) Oral daily  metolazone 5 milliGRAM(s) Oral daily  pantoprazole    Tablet 40 milliGRAM(s) Oral before breakfast  potassium chloride    Tablet ER 40 milliEquivalent(s) Oral once  predniSONE   Tablet 5 milliGRAM(s) Oral daily  roflumilast 500 MICROGram(s) Oral daily  tiotropium 2.5 MICROgram(s)/olodaterol 2.5 MICROgram(s) Inhaler 2 Puff(s) Inhalation daily    MEDICATIONS  (PRN):  acetaminophen   Tablet 650 milliGRAM(s) Oral every 6 hours PRN Moderate Pain (4 - 6)  ondansetron    Tablet 4 milliGRAM(s) Oral every 8 hours PRN Nausea and/or Vomiting  sodium chloride 0.65% Nasal 1 Spray(s) Both Nostrils five times a day PRN dry nose          Vital Signs Last 24 Hrs  T(C): 36.7 (15 Dec 2017 12:11), Max: 36.9 (14 Dec 2017 20:38)  T(F): 98 (15 Dec 2017 12:11), Max: 98.5 (14 Dec 2017 20:38)  HR: 82 (15 Dec 2017 12:11) (70 - 108)  BP: 110/70 (15 Dec 2017 12:11) (101/59 - 122/74)  BP(mean): --  RR: 18 (15 Dec 2017 05:50) (18 - 18)  SpO2: 99% (15 Dec 2017 05:50) (97% - 99%) on 4.5L NC          12-14 @ 07:01  -  12-15 @ 07:00  --------------------------------------------------------  IN: 1340 mL / OUT: 2800 mL / NET: -1460 mL          LABS:                        8.4    5.00  )-----------( 212      ( 15 Dec 2017 07:31 )             27.9     12-15    140  |  93<L>  |  52<H>  ----------------------------<  92  3.3<L>   |  36<H>  |  1.59<H>    Ca    8.7      15 Dec 2017 07:42            CAPILLARY BLOOD GLUCOSE                GAGAN Negative 12-08 @ 07:39  Anti SS-1 --  Anti SS-2 --  Anti RNP --  RF 15.0 12-08 @ 07:39    Atypical ANCA -- 12-08 @ 07:39  c-ANCA titer -- 12-08 @ 07:39  c-ANCA -- 12-08 @ 07:39  p-ANCA -- 12-08 @ 07:39          Fluid characteristics  -- 12-06 @ 14:33  pH --  LDH 85  tprot 3.2    Cell count  Appearance --  Fluid type --  BF lymph --  color --  eosinophil --  PMN --  Mesothelial --  Monocyte --  Other body cells --  Fluid characteristics  -- 12-06 @ 14:32  pH --  LDH --  tprot --    Cell count  Appearance Hazy  Fluid type --  BF lymph 4  color Yellow  eosinophil --  PMN 60  Mesothelial 20  Monocyte 15  Other body cells 1      CULTURES: (if applicable)          Physical Examination:  PULM: Trace wheeze R midlung, decreased BS L base  CVS: S1, S2 RRR    RADIOLOGY REVIEWED  CXR: 12/8: Moderate L pleural effusion

## 2017-12-15 NOTE — PROGRESS NOTE ADULT - ASSESSMENT
Assessment and Plan:   · Assessment		  A 71-year-old female with past medical history of diabetes, hypertension, severe pulmonary hypertension, right ventricular dysfunction, presents with not only left heart failure but also stigmata of acute cor pulmonale.  The patient's acute renal failure is likely hemodynamic in nature.  Goals of therapy are to improve her hemodynamics in order to improve her forward flow and renal perfusion.  CASSANDRA  Severe PHTN  Cor Pulm  Failure to thrive    1.	Renal: IV Bumex 2mg ivp bid and zaroxoyln.  Monitor BMP daily.    2.	Gastroenterology: S/P LARGE VOLUME PARACENTESIS;  No evidence of SBP  3.	Pulmonary: Pulmonary nebulizers.  Continue with nasal cannula oxygen at all times.  No response to Nitric oxide  4.	 Cardiology: Monitor hemodynamics and volume status.  The options of  gtt @ 2.5mg/kg/min were discussed with the patient.  This medication does have a risk of sudden death when taken long term.  The benefit that it could provide is helping with her volume status.  The patient understands the risk and wishes to proceed  5.       Anemia-SP IV Venofer 4/4  for iron deficiency;  Start Epogen     Options for her PHTN are limited.      End stage cor Pulm

## 2017-12-15 NOTE — PROGRESS NOTE ADULT - PROBLEM SELECTOR PLAN 3
-Appreciate house pulmonary consult for pHTN mgmt. No additional pHTN medications are indicated   -Vasodilator non-responder on RHC from 5/2017  -Trialing dobutamine gtt

## 2017-12-15 NOTE — PROGRESS NOTE ADULT - ASSESSMENT
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 Recent admission at [facility] approximately 5 weeks ago for large volume paracentesis (6.1L removed). Now presents again with RV failure-worsened LE edema, recurrent ascites, pleural effusions and pulmonary edema with worsened dyspnea and CASSANDRA on CKD

## 2017-12-16 LAB
ANION GAP SERPL CALC-SCNC: 15 MMOL/L — SIGNIFICANT CHANGE UP (ref 5–17)
BASE EXCESS BLDV CALC-SCNC: 12 MMOL/L — HIGH (ref -2–2)
BUN SERPL-MCNC: 54 MG/DL — HIGH (ref 7–23)
CALCIUM SERPL-MCNC: 9.4 MG/DL — SIGNIFICANT CHANGE UP (ref 8.4–10.5)
CHLORIDE SERPL-SCNC: 91 MMOL/L — LOW (ref 96–108)
CO2 BLDV-SCNC: 42 MMOL/L — HIGH (ref 22–30)
CO2 SERPL-SCNC: 35 MMOL/L — HIGH (ref 22–31)
CREAT SERPL-MCNC: 1.59 MG/DL — HIGH (ref 0.5–1.3)
GAS PNL BLDV: SIGNIFICANT CHANGE UP
GLUCOSE SERPL-MCNC: 97 MG/DL — SIGNIFICANT CHANGE UP (ref 70–99)
HCO3 BLDV-SCNC: 39 MMOL/L — HIGH (ref 21–29)
HCT VFR BLD CALC: 28.5 % — LOW (ref 34.5–45)
HGB BLD-MCNC: 8.6 G/DL — LOW (ref 11.5–15.5)
MCHC RBC-ENTMCNC: 26.2 PG — LOW (ref 27–34)
MCHC RBC-ENTMCNC: 30.2 GM/DL — LOW (ref 32–36)
MCV RBC AUTO: 86.9 FL — SIGNIFICANT CHANGE UP (ref 80–100)
PCO2 BLDV: 75 MMHG — HIGH (ref 35–50)
PH BLDV: 7.34 — LOW (ref 7.35–7.45)
PLATELET # BLD AUTO: 218 K/UL — SIGNIFICANT CHANGE UP (ref 150–400)
PO2 BLDV: 19 MMHG — LOW (ref 25–45)
POTASSIUM SERPL-MCNC: 3.7 MMOL/L — SIGNIFICANT CHANGE UP (ref 3.5–5.3)
POTASSIUM SERPL-SCNC: 3.7 MMOL/L — SIGNIFICANT CHANGE UP (ref 3.5–5.3)
RBC # BLD: 3.28 M/UL — LOW (ref 3.8–5.2)
RBC # FLD: 18.1 % — HIGH (ref 10.3–14.5)
SAO2 % BLDV: 20 % — LOW (ref 67–88)
SODIUM SERPL-SCNC: 141 MMOL/L — SIGNIFICANT CHANGE UP (ref 135–145)
WBC # BLD: 4.94 K/UL — SIGNIFICANT CHANGE UP (ref 3.8–10.5)
WBC # FLD AUTO: 4.94 K/UL — SIGNIFICANT CHANGE UP (ref 3.8–10.5)

## 2017-12-16 RX ORDER — DIGOXIN 250 MCG
0.12 TABLET ORAL ONCE
Qty: 0 | Refills: 0 | Status: COMPLETED | OUTPATIENT
Start: 2017-12-16 | End: 2017-12-16

## 2017-12-16 RX ADMIN — HEPARIN SODIUM 5000 UNIT(S): 5000 INJECTION INTRAVENOUS; SUBCUTANEOUS at 23:31

## 2017-12-16 RX ADMIN — Medication 0.12 MILLIGRAM(S): at 11:39

## 2017-12-16 RX ADMIN — CLOPIDOGREL BISULFATE 75 MILLIGRAM(S): 75 TABLET, FILM COATED ORAL at 11:38

## 2017-12-16 RX ADMIN — Medication 200 MILLIGRAM(S): at 11:38

## 2017-12-16 RX ADMIN — Medication 0.12 MILLIGRAM(S): at 23:04

## 2017-12-16 RX ADMIN — Medication 120 MILLIGRAM(S): at 08:31

## 2017-12-16 RX ADMIN — TIOTROPIUM BROMIDE AND OLODATEROL 2 PUFF(S): 3.124; 2.736 SPRAY, METERED RESPIRATORY (INHALATION) at 08:32

## 2017-12-16 RX ADMIN — HEPARIN SODIUM 5000 UNIT(S): 5000 INJECTION INTRAVENOUS; SUBCUTANEOUS at 08:31

## 2017-12-16 RX ADMIN — ATORVASTATIN CALCIUM 20 MILLIGRAM(S): 80 TABLET, FILM COATED ORAL at 21:04

## 2017-12-16 RX ADMIN — BUMETANIDE 2 MILLIGRAM(S): 0.25 INJECTION INTRAMUSCULAR; INTRAVENOUS at 05:52

## 2017-12-16 RX ADMIN — ROFLUMILAST 500 MICROGRAM(S): 500 TABLET ORAL at 08:31

## 2017-12-16 RX ADMIN — Medication 5 MILLIGRAM(S): at 08:31

## 2017-12-16 RX ADMIN — BUMETANIDE 2 MILLIGRAM(S): 0.25 INJECTION INTRAMUSCULAR; INTRAVENOUS at 17:53

## 2017-12-16 RX ADMIN — Medication 81 MILLIGRAM(S): at 11:38

## 2017-12-16 NOTE — CHART NOTE - NSCHARTNOTEFT_GEN_A_CORE
Pt was noted on Tele A.Fib with RVR ranging from 120-160 while on Dobutamine gtt@2.5mcgs/hr. Pt is a 71 year old female with a history of severe pHTN complicated by RV failure (on 3L NC at home), Afib not on AC, CAD s/p PCI, COPD, T2DM who presents for evaluation of shortness of breath and abdominal distention likely secondary to worsening R-sided heart failure secondary to pulmonary hypertension.   - Spoke to Dr. Cobb.   - Recommended to hold the gtt for now.  - c/w Bumex 2mg IVP BID.   - c/w Metolazone.  - Monitor on Tele.   - Will endorse to primary team in am,.    DOT. JEANETH CARROLL  #[id number]  Medicine PA.

## 2017-12-16 NOTE — PROGRESS NOTE ADULT - ASSESSMENT
71 year old female with CAD, sev pHTN, dCHF, HTN, DM, Afib, lung CA, COPD and CKD 3 p/w acute on chronic dCHF, ascites, pleural effusion, pulm edema and acute on chronic kidney injury.     - CKD stage 3: likely due to HTN and DM  - CASSANDRA: non oliguric. Likely prerenal due to ineffective circulating volume. Renal function stable  - acute on chronic dCHF: volume overload improving per patient, less edema and net negative balance. Off dobutamine due to Afib with -160s  - GI: s/p LVP      Recommendation  - continue bumex 2 mg IV BID and metolazone 5 mg po daily  - dobutamine per primary, possible primacor  - strict I+O and daily weights    Daughter updated at bedside

## 2017-12-16 NOTE — PROGRESS NOTE ADULT - ASSESSMENT
severe copd  severe phtn  bucky  anemia  vol overload  s/p large volume paracentesis  Afib with rvr    continue current care  dobutamine drip had to be stopped as pts ventricular response went into the 170's  and sustained elevated.  mointor labs  will re-evaluate with renal/cardio/pulm as to possible primacor

## 2017-12-16 NOTE — PROGRESS NOTE ADULT - SUBJECTIVE AND OBJECTIVE BOX
MEDICINE, PROGRESS NOTE [phone]    FRAN ALEXANDRA 71y [id number]    Patient seen and examined.  Patient is a 71y old  Female who presents with a chief complaint of shortness of breath, volume overload and ascites (11 Dec 2017 11:41)      PAST MEDICAL & SURGICAL HISTORY:  Hyperthyroidism  JOSE (obstructive sleep apnea)  Epistaxis  GIB (gastrointestinal bleeding)  CAD S/P percutaneous coronary angioplasty  Afib  Pulmonary HTN  GERD (gastroesophageal reflux disease)  Obesity  Cardiomegaly  Valvular heart disease  COPD (chronic obstructive pulmonary disease): Home O2  Sleep Apnea: by criteria  Loose, teeth: 3 bottom front loose teeth  Female stress incontinence  Shoulder pain, right  Constipation  Arthritis of Knee  H/O heartburn  History of lung cancer  Obese  Hx of hyperlipidemia  Asthma  Diabetes mellitus: type 2  dx about 4-5 years ago   no daily fingerstick  H/O: HTN (hypertension)  Enlarged lymph nodes  Lymph nodes enlarged: s/p biopsy mediastinal  benign  H/O endoscopy  left upper lobectomy    MEDICATIONS  (STANDING):  ALBUTerol/ipratropium for Nebulization 3 milliLiter(s) Nebulizer every 6 hours  allopurinol 200 milliGRAM(s) Oral daily  aspirin enteric coated 81 milliGRAM(s) Oral daily  atorvastatin 20 milliGRAM(s) Oral at bedtime  buMETAnide Injectable 2 milliGRAM(s) IV Push two times a day  clopidogrel Tablet 75 milliGRAM(s) Oral daily  digoxin     Tablet 0.125 milliGRAM(s) Oral every other day  diltiazem    milliGRAM(s) Oral daily  docusate sodium 100 milliGRAM(s) Oral two times a day  epoetin hilda Injectable 72562 Unit(s) SubCutaneous <User Schedule>  heparin  Injectable 5000 Unit(s) SubCutaneous every 8 hours  macitentan (OPSUMIT) 10 milliGRAM(s) 10 milliGRAM(s) Oral daily  methimazole 5 milliGRAM(s) Oral daily  metolazone 5 milliGRAM(s) Oral daily  pantoprazole    Tablet 40 milliGRAM(s) Oral before breakfast  predniSONE   Tablet 5 milliGRAM(s) Oral daily  roflumilast 500 MICROGram(s) Oral daily  tiotropium 2.5 MICROgram(s)/olodaterol 2.5 MICROgram(s) Inhaler 2 Puff(s) Inhalation daily    MEDICATIONS  (PRN):  acetaminophen   Tablet 650 milliGRAM(s) Oral every 6 hours PRN Moderate Pain (4 - 6)  ondansetron    Tablet 4 milliGRAM(s) Oral every 8 hours PRN Nausea and/or Vomiting  sodium chloride 0.65% Nasal 1 Spray(s) Both Nostrils five times a day PRN dry nose    Allergies    No Known Allergies    Intolerances    albuterol (Unknown)    tele - afib/flutter 100 - 150  PHYSICAL EXAM:  Constitutional: NAD  HEENT: Normocephalic, EOMI  Neck:  No JVD  Respiratory: CTA B/L, No wheezes  Cardiovascular: S1, S2, RRR, + systolic murmur  Gastrointestinal: BS+, soft, NT/ND  Extremities: No peripheral edema  Neurological: AAOX3, no focal deficits  Psychiatric: Normal mood, normal affect  : No Gottlieb    Vital Signs Last 24 Hrs  T(C): 36.6 (16 Dec 2017 11:24), Max: 36.9 (15 Dec 2017 17:17)  T(F): 97.8 (16 Dec 2017 11:24), Max: 98.5 (15 Dec 2017 17:17)  HR: 110 (16 Dec 2017 13:03) (110 - 135)  BP: 107/67 (16 Dec 2017 13:03) (92/66 - 122/60)  BP(mean): --  RR: 17 (16 Dec 2017 11:24) (17 - 18)  SpO2: 97% (16 Dec 2017 11:24) (96% - 100%)  I&O's Summary    15 Dec 2017 07:01  -  16 Dec 2017 07:00  --------------------------------------------------------  IN: 780 mL / OUT: 1600 mL / NET: -820 mL        LABS:                        8.6    4.94  )-----------( 218      ( 16 Dec 2017 08:58 )             28.5     12-16    141  |  91<L>  |  54<H>  ----------------------------<  97  3.7   |  35<H>  |  1.59<H>    Ca    9.4      16 Dec 2017 08:58

## 2017-12-16 NOTE — PROGRESS NOTE ADULT - SUBJECTIVE AND OBJECTIVE BOX
Events noted for AFib with -160 overnight. Dobutamine gtt held. Today resting comfortably in bed. Feels better. Denies SOB. States "less pressure on lower extremities".                           8.6    4.94  )-----------( 218      ( 16 Dec 2017 08:58 )             28.5                           8.6    4.94  )-----------( 218      ( 16 Dec 2017 08:58 )             28.5     12-16    141  |  91<L>  |  54<H>  ----------------------------<  97  3.7   |  35<H>  |  1.59<H>    Ca    9.4      16 Dec 2017 08:58        I&O's Detail    15 Dec 2017 07:  -  16 Dec 2017 07:00  --------------------------------------------------------  IN:    Oral Fluid: 780 mL  Total IN: 780 mL    OUT:    Voided: 1600 mL  Total OUT: 1600 mL    Total NET: -820 mL      16 Dec 2017 07:  -  16 Dec 2017 14:03  --------------------------------------------------------  IN:    Oral Fluid: 760 mL  Total IN: 760 mL    OUT:    Voided: 1600 mL  Total OUT: 1600 mL    Total NET: -840 mL          I&O's Summary    15 Dec 2017 07:  -  16 Dec 2017 07:00  --------------------------------------------------------  IN: 780 mL / OUT: 1600 mL / NET: -820 mL    16 Dec 2017 07:  -  16 Dec 2017 14:03  --------------------------------------------------------  IN: 760 mL / OUT: 1600 mL / NET: -840 mL        Daily     Daily Weight in k.6 (16 Dec 2017 07:16)    Vital Signs Last 24 Hrs  T(C): 36.6 (16 Dec 2017 11:24), Max: 36.9 (15 Dec 2017 17:17)  T(F): 97.8 (16 Dec 2017 11:24), Max: 98.5 (15 Dec 2017 17:17)  HR: 110 (16 Dec 2017 13:03) (110 - 135)  BP: 107/67 (16 Dec 2017 13:03) (92/66 - 122/60)  BP(mean): --  RR: 17 (16 Dec 2017 11:24) (17 - 18)  SpO2: 97% (16 Dec 2017 11:24) (96% - 100%)      MEDICATIONS  (STANDING):  ALBUTerol/ipratropium for Nebulization 3 milliLiter(s) Nebulizer every 6 hours  allopurinol 200 milliGRAM(s) Oral daily  aspirin enteric coated 81 milliGRAM(s) Oral daily  atorvastatin 20 milliGRAM(s) Oral at bedtime  buMETAnide Injectable 2 milliGRAM(s) IV Push two times a day  clopidogrel Tablet 75 milliGRAM(s) Oral daily  digoxin     Tablet 0.125 milliGRAM(s) Oral every other day  diltiazem    milliGRAM(s) Oral daily  docusate sodium 100 milliGRAM(s) Oral two times a day  epoetin hilda Injectable 08419 Unit(s) SubCutaneous <User Schedule>  heparin  Injectable 5000 Unit(s) SubCutaneous every 8 hours  macitentan (OPSUMIT) 10 milliGRAM(s) 10 milliGRAM(s) Oral daily  methimazole 5 milliGRAM(s) Oral daily  metolazone 5 milliGRAM(s) Oral daily  pantoprazole    Tablet 40 milliGRAM(s) Oral before breakfast  predniSONE   Tablet 5 milliGRAM(s) Oral daily  roflumilast 500 MICROGram(s) Oral daily  tiotropium 2.5 MICROgram(s)/olodaterol 2.5 MICROgram(s) Inhaler 2 Puff(s) Inhalation daily    MEDICATIONS  (PRN):  acetaminophen   Tablet 650 milliGRAM(s) Oral every 6 hours PRN Moderate Pain (4 - 6)  ondansetron    Tablet 4 milliGRAM(s) Oral every 8 hours PRN Nausea and/or Vomiting  sodium chloride 0.65% Nasal 1 Spray(s) Both Nostrils five times a day PRN dry nose

## 2017-12-17 LAB
ANION GAP SERPL CALC-SCNC: 16 MMOL/L — SIGNIFICANT CHANGE UP (ref 5–17)
BUN SERPL-MCNC: 54 MG/DL — HIGH (ref 7–23)
CALCIUM SERPL-MCNC: 9.5 MG/DL — SIGNIFICANT CHANGE UP (ref 8.4–10.5)
CHLORIDE SERPL-SCNC: 88 MMOL/L — LOW (ref 96–108)
CO2 SERPL-SCNC: 37 MMOL/L — HIGH (ref 22–31)
CREAT SERPL-MCNC: 1.6 MG/DL — HIGH (ref 0.5–1.3)
GLUCOSE SERPL-MCNC: 90 MG/DL — SIGNIFICANT CHANGE UP (ref 70–99)
HCT VFR BLD CALC: 30.8 % — LOW (ref 34.5–45)
HGB BLD-MCNC: 9.1 G/DL — LOW (ref 11.5–15.5)
MAGNESIUM SERPL-MCNC: 2 MG/DL — SIGNIFICANT CHANGE UP (ref 1.6–2.6)
MCHC RBC-ENTMCNC: 25.9 PG — LOW (ref 27–34)
MCHC RBC-ENTMCNC: 29.5 GM/DL — LOW (ref 32–36)
MCV RBC AUTO: 87.7 FL — SIGNIFICANT CHANGE UP (ref 80–100)
PLATELET # BLD AUTO: 228 K/UL — SIGNIFICANT CHANGE UP (ref 150–400)
POTASSIUM SERPL-MCNC: 3.9 MMOL/L — SIGNIFICANT CHANGE UP (ref 3.5–5.3)
POTASSIUM SERPL-SCNC: 3.9 MMOL/L — SIGNIFICANT CHANGE UP (ref 3.5–5.3)
RBC # BLD: 3.51 M/UL — LOW (ref 3.8–5.2)
RBC # FLD: 18.5 % — HIGH (ref 10.3–14.5)
SODIUM SERPL-SCNC: 141 MMOL/L — SIGNIFICANT CHANGE UP (ref 135–145)
WBC # BLD: 5.04 K/UL — SIGNIFICANT CHANGE UP (ref 3.8–10.5)
WBC # FLD AUTO: 5.04 K/UL — SIGNIFICANT CHANGE UP (ref 3.8–10.5)

## 2017-12-17 RX ADMIN — Medication 5 MILLIGRAM(S): at 08:27

## 2017-12-17 RX ADMIN — ATORVASTATIN CALCIUM 20 MILLIGRAM(S): 80 TABLET, FILM COATED ORAL at 22:30

## 2017-12-17 RX ADMIN — BUMETANIDE 2 MILLIGRAM(S): 0.25 INJECTION INTRAMUSCULAR; INTRAVENOUS at 17:39

## 2017-12-17 RX ADMIN — Medication 120 MILLIGRAM(S): at 08:27

## 2017-12-17 RX ADMIN — Medication 81 MILLIGRAM(S): at 12:47

## 2017-12-17 RX ADMIN — HEPARIN SODIUM 5000 UNIT(S): 5000 INJECTION INTRAVENOUS; SUBCUTANEOUS at 08:27

## 2017-12-17 RX ADMIN — ROFLUMILAST 500 MICROGRAM(S): 500 TABLET ORAL at 08:27

## 2017-12-17 RX ADMIN — Medication 200 MILLIGRAM(S): at 12:47

## 2017-12-17 RX ADMIN — CLOPIDOGREL BISULFATE 75 MILLIGRAM(S): 75 TABLET, FILM COATED ORAL at 12:47

## 2017-12-17 RX ADMIN — TIOTROPIUM BROMIDE AND OLODATEROL 2 PUFF(S): 3.124; 2.736 SPRAY, METERED RESPIRATORY (INHALATION) at 08:29

## 2017-12-17 RX ADMIN — HEPARIN SODIUM 5000 UNIT(S): 5000 INJECTION INTRAVENOUS; SUBCUTANEOUS at 23:18

## 2017-12-17 RX ADMIN — BUMETANIDE 2 MILLIGRAM(S): 0.25 INJECTION INTRAMUSCULAR; INTRAVENOUS at 06:07

## 2017-12-17 NOTE — PROGRESS NOTE ADULT - SUBJECTIVE AND OBJECTIVE BOX
Continued A.Fib with RVR ranging from 130-150. Cardizem and digoxin given. Remains off Dobutamine.                             9.1    5.04  )-----------( 228      ( 17 Dec 2017 08:58 )             30.8                           9.1    5.04  )-----------( 228      ( 17 Dec 2017 08:58 )             30.8     12    141  |  88<L>  |  54<H>  ----------------------------<  90  3.9   |  37<H>  |  1.60<H>    Ca    9.5      17 Dec 2017 08:50  Mg     2.0             I&O's Detail    16 Dec 2017 07:  -  17 Dec 2017 07:00  --------------------------------------------------------  IN:    Oral Fluid: 1160 mL  Total IN: 1160 mL    OUT:    Voided: 4200 mL  Total OUT: 4200 mL    Total NET: -3040 mL      17 Dec 2017 07:  -  17 Dec 2017 13:32  --------------------------------------------------------  IN:  Total IN: 0 mL    OUT:    Voided: 400 mL  Total OUT: 400 mL    Total NET: -400 mL          I&O's Summary    16 Dec 2017 07:  -  17 Dec 2017 07:00  --------------------------------------------------------  IN: 1160 mL / OUT: 4200 mL / NET: -3040 mL    17 Dec 2017 07:  -  17 Dec 2017 13:32  --------------------------------------------------------  IN: 0 mL / OUT: 400 mL / NET: -400 mL        Daily     Daily Weight in k.6 (17 Dec 2017 06:03)    Vital Signs Last 24 Hrs  T(C): 36.7 (17 Dec 2017 11:32), Max: 36.7 (16 Dec 2017 19:57)  T(F): 98.1 (17 Dec 2017 11:32), Max: 98.1 (17 Dec 2017 06:03)  HR: 120 (17 Dec 2017 11:32) (81 - 125)  BP: 115/79 (17 Dec 2017 11:32) (114/62 - 119/63)  BP(mean): --  RR: 17 (17 Dec 2017 11:32) (17 - 18)  SpO2: 95% (17 Dec 2017 11:32) (95% - 100%)      MEDICATIONS  (STANDING):  ALBUTerol/ipratropium for Nebulization 3 milliLiter(s) Nebulizer every 6 hours  allopurinol 200 milliGRAM(s) Oral daily  aspirin enteric coated 81 milliGRAM(s) Oral daily  atorvastatin 20 milliGRAM(s) Oral at bedtime  buMETAnide Injectable 2 milliGRAM(s) IV Push two times a day  clopidogrel Tablet 75 milliGRAM(s) Oral daily  digoxin     Tablet 0.125 milliGRAM(s) Oral every other day  diltiazem    milliGRAM(s) Oral daily  docusate sodium 100 milliGRAM(s) Oral two times a day  epoetin hilda Injectable 18690 Unit(s) SubCutaneous <User Schedule>  heparin  Injectable 5000 Unit(s) SubCutaneous every 8 hours  macitentan (OPSUMIT) 10 milliGRAM(s) 10 milliGRAM(s) Oral daily  methimazole 5 milliGRAM(s) Oral daily  metolazone 5 milliGRAM(s) Oral daily  pantoprazole    Tablet 40 milliGRAM(s) Oral before breakfast  predniSONE   Tablet 5 milliGRAM(s) Oral daily  roflumilast 500 MICROGram(s) Oral daily  tiotropium 2.5 MICROgram(s)/olodaterol 2.5 MICROgram(s) Inhaler 2 Puff(s) Inhalation daily    MEDICATIONS  (PRN):  acetaminophen   Tablet 650 milliGRAM(s) Oral every 6 hours PRN Moderate Pain (4 - 6)  ondansetron    Tablet 4 milliGRAM(s) Oral every 8 hours PRN Nausea and/or Vomiting  sodium chloride 0.65% Nasal 1 Spray(s) Both Nostrils five times a day PRN dry nose

## 2017-12-17 NOTE — CONSULT NOTE ADULT - ASSESSMENT
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  Patient seems to be in a-fib since admission, on PO diltiazem daily and digoxin qod. Patient in ADHF, was on dobutamine but HR increased to 170s.  stopped now patient in a-fib with RVR in 130-140's  -would give digoxin 0.125 IV x1 and if HR still >110 then can give one more dose of digoxin 0.125 IV x1 about 4 hours later  -will clarify with primary team why patient is not on AC  -will discuss with attending if KALEIGH/cardioversion is warranted

## 2017-12-17 NOTE — PROGRESS NOTE ADULT - ASSESSMENT
71 year old female with CAD, sev pHTN, dCHF, HTN, DM, Afib, lung CA, COPD and CKD 3 p/w acute on chronic dCHF, ascites, pleural effusion, pulm edema and acute on chronic kidney injury c/b A.Fib with RVR.    - CKD stage 3: likely due to HTN and DM  - CASSANDRA: non oliguric. Likely prerenal due to ineffective circulating volume. Renal function overall stable  - acute on chronic dCHF: volume overload slowly improving with good diuresis, weight loss and net negative balance.   - GI: s/p LVP      Recommendation  - continue bumex 2 mg IV BID and metolazone 5 mg po daily  - dobutamine on hold, possible primacor  - strict I+O and daily weights  - cardizem and digoxin per primary  - ? A/C, patient would not like to have KALEIGH/Cardioversion until she speaks to attending    Daughter updated at bedside

## 2017-12-17 NOTE — CONSULT NOTE ADULT - SUBJECTIVE AND OBJECTIVE BOX
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 Four weeks ago she had large voulme paracentesis at [place] and has noticed reaccumulation and abnominal distension, worsening LE edema, SOB/BRO. Also with nausea, poor appetite.    Patient found to be in RV failure, CASSANDRA on admission. CXR with increased pulm edema. Had paracentesis done. Patient currently being diuresed on bumex, was started on dobutamine and had ectopy and HR increased to 170's, stopped yesterday. Now patient still with a-fib and HR in 130-140's. Patient on diltiazem PO and digoxin qOD here.  Unclear why patient is not on AC.      PMH:   Hyperthyroidism  JOSE (obstructive sleep apnea)  Epistaxis  GIB (gastrointestinal bleeding)  CAD S/P percutaneous coronary angioplasty  Afib  Pulmonary HTN  GERD (gastroesophageal reflux disease)  Obesity  Cardiomegaly  Valvular heart disease  Sleep apnea  COPD (chronic obstructive pulmonary disease)  Sleep Apnea  Loose, teeth  Female stress incontinence  Shoulder pain, right  Constipation  Arthritis of Knee  H/O heartburn  History of lung cancer  Obese  Hx of hyperlipidemia  Asthma  Diabetes mellitus  H/O: HTN (hypertension)  Enlarged lymph nodes      PSH:   Lymph nodes enlarged  H/O endoscopy  left upper lobectomy      Medications:   acetaminophen   Tablet 650 milliGRAM(s) Oral every 6 hours PRN  ALBUTerol/ipratropium for Nebulization 3 milliLiter(s) Nebulizer every 6 hours  allopurinol 200 milliGRAM(s) Oral daily  aspirin enteric coated 81 milliGRAM(s) Oral daily  atorvastatin 20 milliGRAM(s) Oral at bedtime  buMETAnide Injectable 2 milliGRAM(s) IV Push two times a day  clopidogrel Tablet 75 milliGRAM(s) Oral daily  digoxin     Tablet 0.125 milliGRAM(s) Oral every other day  diltiazem    milliGRAM(s) Oral daily  docusate sodium 100 milliGRAM(s) Oral two times a day  epoetin hilda Injectable 90474 Unit(s) SubCutaneous <User Schedule>  heparin  Injectable 5000 Unit(s) SubCutaneous every 8 hours  macitentan (OPSUMIT) 10 milliGRAM(s) 10 milliGRAM(s) Oral daily  methimazole 5 milliGRAM(s) Oral daily  metolazone 5 milliGRAM(s) Oral daily  ondansetron    Tablet 4 milliGRAM(s) Oral every 8 hours PRN  pantoprazole    Tablet 40 milliGRAM(s) Oral before breakfast  predniSONE   Tablet 5 milliGRAM(s) Oral daily  roflumilast 500 MICROGram(s) Oral daily  sodium chloride 0.65% Nasal 1 Spray(s) Both Nostrils five times a day PRN  tiotropium 2.5 MICROgram(s)/olodaterol 2.5 MICROgram(s) Inhaler 2 Puff(s) Inhalation daily      Allergies:  albuterol (Unknown)  No Known Allergies      FAMILY HISTORY:  No pertinent family history in first degree relatives      Social History:  Smoking History:  Alcohol Use:  Drug Use:    Review of Systems:  REVIEW OF SYSTEMS:    CONSTITUTIONAL: No weakness, fevers or chills  EYES/ENT: No visual changes;  No dysphagia  NECK: No pain or stiffness  RESPIRATORY: No cough, wheezing, hemoptysis; +shortness of breath  CARDIOVASCULAR: No chest pain or palpitations; +lower extremity edema  GASTROINTESTINAL: No abdominal or epigastric pain. +nausea, vomiting, or hematemesis; No diarrhea or constipation. No melena or hematochezia.  BACK: No back pain  GENITOURINARY: No dysuria, frequency or hematuria  NEUROLOGICAL: No numbness or weakness  SKIN: No itching, burning, rashes, or lesions   All other review of systems is negative unless indicated above.    Physical Exam:  T(F): 98.1 (12-17), Max: 98.1 (12-17)  HR: 81 (12-17) (81 - 125)  BP: 115/70 (12-17) (92/66 - 119/63)  RR: 18 (12-17)  SpO2: 100% (12-17)  GENERAL: No acute distress, well-developed  HEAD:  Atraumatic, Normocephalic  ENT: EOMI, PERRLA, conjunctiva and sclera clear, Neck supple, No JVD, moist mucosa  CHEST/LUNG: Clear to auscultation bilaterally; No wheeze, equal breath sounds bilaterally   BACK: No spinal tenderness  HEART: Regular rate and rhythm; No murmurs, rubs, or gallops  ABDOMEN: Soft, Nontender, Nondistended; Bowel sounds present  EXTREMITIES:  No clubbing, cyanosis, or edema  PSYCH: Nl behavior, nl affect  NEUROLOGY: AAOx3, non-focal, cranial nerves intact  SKIN: Normal color, No rashes or lesions  LINES:    TTE:  Conclusions:  1. Calcified trileaflet aortic valve with normal opening.  2. Normal left ventricular internal dimensions and wall  thicknesses.  3. Hyperdynamic left ventricular systolic function.  Flattening of the interventricular septum in both systole  and diastole is  consistent with right ventricular pressure  overload.  4. Severe right atrial enlargement.  5. Right ventricular enlargement with decreased right  ventricular systolic function.  6. Normal tricuspid valve. Mild-moderate tricuspid  regurgitation.  7. Estimated pulmonary artery systolic pressure equals 72  mm Hg, assuming right atrial pressure equals 8 mm Hg,  consistent with severe pulmonary pressures.  Labs: Personally reviewed                        8.6    4.94  )-----------( 218      ( 16 Dec 2017 08:58 )             28.5     12-16    141  |  91<L>  |  54<H>  ----------------------------<  97  3.7   |  35<H>  |  1.59<H>    Ca    9.4      16 Dec 2017 08:58

## 2017-12-17 NOTE — CHART NOTE - NSCHARTNOTEFT_GEN_A_CORE
Pt was noted on Tele A.Fib with RVR ranging from 130-150 on Tele. 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     # Chronic A. Fib with RVR.   - Spoke to Dr. Cobb, recommends Cardizem 5mg IVP x1.   - Spoke to CCU Fellow, recommends Digoxin 0.125mg IVP x1, then 4 hours later, another Digoxin 0.125mg IVP x1.   - HR control: Cardizem 120mg and Digoxin 0.125mcg q other day.   - Monitor on Tele.   - Keep K/Mg> 4.0/2.0.   - f/u CCU/EP recommendatios.    DOT. JEANETH CARROLL   # [id number]  Medicine PA.

## 2017-12-17 NOTE — PROGRESS NOTE ADULT - ASSESSMENT
severe copd  severe phtn  bucky  anemia  vol overload  s/p large volume paracentesis  Afib with rvr    continue current care  appreciate ep input  monitor renal function  will need to decide what to do with renal and cardio

## 2017-12-18 DIAGNOSIS — I48.91 UNSPECIFIED ATRIAL FIBRILLATION: ICD-10-CM

## 2017-12-18 DIAGNOSIS — M10.9 GOUT, UNSPECIFIED: ICD-10-CM

## 2017-12-18 DIAGNOSIS — E87.6 HYPOKALEMIA: ICD-10-CM

## 2017-12-18 DIAGNOSIS — E05.90 THYROTOXICOSIS, UNSPECIFIED WITHOUT THYROTOXIC CRISIS OR STORM: ICD-10-CM

## 2017-12-18 LAB
ANION GAP SERPL CALC-SCNC: 15 MMOL/L — SIGNIFICANT CHANGE UP (ref 5–17)
BUN SERPL-MCNC: 57 MG/DL — HIGH (ref 7–23)
CALCIUM SERPL-MCNC: 9.2 MG/DL — SIGNIFICANT CHANGE UP (ref 8.4–10.5)
CHLORIDE SERPL-SCNC: 87 MMOL/L — LOW (ref 96–108)
CO2 SERPL-SCNC: 37 MMOL/L — HIGH (ref 22–31)
CREAT SERPL-MCNC: 1.58 MG/DL — HIGH (ref 0.5–1.3)
DIGOXIN SERPL-MCNC: 1 NG/ML — SIGNIFICANT CHANGE UP (ref 0.8–2)
GLUCOSE SERPL-MCNC: 91 MG/DL — SIGNIFICANT CHANGE UP (ref 70–99)
HCT VFR BLD CALC: 27.1 % — LOW (ref 34.5–45)
HGB BLD-MCNC: 8.4 G/DL — LOW (ref 11.5–15.5)
MAGNESIUM SERPL-MCNC: 1.8 MG/DL — SIGNIFICANT CHANGE UP (ref 1.6–2.6)
MAGNESIUM SERPL-MCNC: 2.2 MG/DL — SIGNIFICANT CHANGE UP (ref 1.6–2.6)
MCHC RBC-ENTMCNC: 26.9 PG — LOW (ref 27–34)
MCHC RBC-ENTMCNC: 31 GM/DL — LOW (ref 32–36)
MCV RBC AUTO: 86.9 FL — SIGNIFICANT CHANGE UP (ref 80–100)
PLATELET # BLD AUTO: 192 K/UL — SIGNIFICANT CHANGE UP (ref 150–400)
POTASSIUM SERPL-MCNC: 3.2 MMOL/L — LOW (ref 3.5–5.3)
POTASSIUM SERPL-MCNC: 3.7 MMOL/L — SIGNIFICANT CHANGE UP (ref 3.5–5.3)
POTASSIUM SERPL-SCNC: 3.2 MMOL/L — LOW (ref 3.5–5.3)
POTASSIUM SERPL-SCNC: 3.7 MMOL/L — SIGNIFICANT CHANGE UP (ref 3.5–5.3)
RBC # BLD: 3.12 M/UL — LOW (ref 3.8–5.2)
RBC # FLD: 18.2 % — HIGH (ref 10.3–14.5)
SODIUM SERPL-SCNC: 139 MMOL/L — SIGNIFICANT CHANGE UP (ref 135–145)
TSH SERPL-MCNC: 2.33 UIU/ML — SIGNIFICANT CHANGE UP (ref 0.27–4.2)
URATE SERPL-MCNC: 8 MG/DL — HIGH (ref 2.5–7)
WBC # BLD: 4.56 K/UL — SIGNIFICANT CHANGE UP (ref 3.8–10.5)
WBC # FLD AUTO: 4.56 K/UL — SIGNIFICANT CHANGE UP (ref 3.8–10.5)

## 2017-12-18 PROCEDURE — 99223 1ST HOSP IP/OBS HIGH 75: CPT

## 2017-12-18 RX ORDER — DILTIAZEM HCL 120 MG
15 CAPSULE, EXT RELEASE 24 HR ORAL
Qty: 125 | Refills: 0 | Status: DISCONTINUED | OUTPATIENT
Start: 2017-12-18 | End: 2017-12-20

## 2017-12-18 RX ORDER — DILTIAZEM HCL 120 MG
5 CAPSULE, EXT RELEASE 24 HR ORAL
Qty: 125 | Refills: 0 | Status: DISCONTINUED | OUTPATIENT
Start: 2017-12-18 | End: 2017-12-18

## 2017-12-18 RX ORDER — MAGNESIUM SULFATE 500 MG/ML
1 VIAL (ML) INJECTION ONCE
Qty: 0 | Refills: 0 | Status: COMPLETED | OUTPATIENT
Start: 2017-12-18 | End: 2017-12-18

## 2017-12-18 RX ORDER — POTASSIUM CHLORIDE 20 MEQ
40 PACKET (EA) ORAL EVERY 4 HOURS
Qty: 0 | Refills: 0 | Status: DISCONTINUED | OUTPATIENT
Start: 2017-12-18 | End: 2017-12-18

## 2017-12-18 RX ORDER — IPRATROPIUM BROMIDE 0.2 MG/ML
500 SOLUTION, NON-ORAL INHALATION EVERY 6 HOURS
Qty: 0 | Refills: 0 | Status: DISCONTINUED | OUTPATIENT
Start: 2017-12-18 | End: 2018-01-05

## 2017-12-18 RX ORDER — POTASSIUM CHLORIDE 20 MEQ
10 PACKET (EA) ORAL ONCE
Qty: 0 | Refills: 0 | Status: COMPLETED | OUTPATIENT
Start: 2017-12-18 | End: 2017-12-18

## 2017-12-18 RX ORDER — POTASSIUM CHLORIDE 20 MEQ
40 PACKET (EA) ORAL ONCE
Qty: 0 | Refills: 0 | Status: COMPLETED | OUTPATIENT
Start: 2017-12-18 | End: 2017-12-18

## 2017-12-18 RX ADMIN — ERYTHROPOIETIN 10000 UNIT(S): 10000 INJECTION, SOLUTION INTRAVENOUS; SUBCUTANEOUS at 15:23

## 2017-12-18 RX ADMIN — Medication 5 MG/HR: at 11:57

## 2017-12-18 RX ADMIN — BUMETANIDE 2 MILLIGRAM(S): 0.25 INJECTION INTRAMUSCULAR; INTRAVENOUS at 18:49

## 2017-12-18 RX ADMIN — ATORVASTATIN CALCIUM 20 MILLIGRAM(S): 80 TABLET, FILM COATED ORAL at 22:16

## 2017-12-18 RX ADMIN — Medication 0.12 MILLIGRAM(S): at 15:24

## 2017-12-18 RX ADMIN — HEPARIN SODIUM 5000 UNIT(S): 5000 INJECTION INTRAVENOUS; SUBCUTANEOUS at 15:23

## 2017-12-18 RX ADMIN — CLOPIDOGREL BISULFATE 75 MILLIGRAM(S): 75 TABLET, FILM COATED ORAL at 15:23

## 2017-12-18 RX ADMIN — Medication 100 MILLIEQUIVALENT(S): at 11:56

## 2017-12-18 RX ADMIN — ROFLUMILAST 500 MICROGRAM(S): 500 TABLET ORAL at 10:30

## 2017-12-18 RX ADMIN — Medication 10 MG/HR: at 16:56

## 2017-12-18 RX ADMIN — Medication 15 MG/HR: at 20:40

## 2017-12-18 RX ADMIN — HEPARIN SODIUM 5000 UNIT(S): 5000 INJECTION INTRAVENOUS; SUBCUTANEOUS at 08:16

## 2017-12-18 RX ADMIN — Medication 200 MILLIGRAM(S): at 15:23

## 2017-12-18 RX ADMIN — Medication 120 MILLIGRAM(S): at 08:33

## 2017-12-18 RX ADMIN — TIOTROPIUM BROMIDE AND OLODATEROL 2 PUFF(S): 3.124; 2.736 SPRAY, METERED RESPIRATORY (INHALATION) at 10:31

## 2017-12-18 RX ADMIN — Medication 5 MILLIGRAM(S): at 10:30

## 2017-12-18 RX ADMIN — Medication 81 MILLIGRAM(S): at 15:23

## 2017-12-18 RX ADMIN — Medication 20 MILLIGRAM(S): at 15:23

## 2017-12-18 RX ADMIN — Medication 40 MILLIEQUIVALENT(S): at 10:32

## 2017-12-18 RX ADMIN — Medication 100 GRAM(S): at 18:30

## 2017-12-18 RX ADMIN — BUMETANIDE 2 MILLIGRAM(S): 0.25 INJECTION INTRAMUSCULAR; INTRAVENOUS at 06:24

## 2017-12-18 NOTE — PROGRESS NOTE ADULT - PROBLEM SELECTOR PLAN 1
monitor telemetry   keep K+>4, MG++>2   check TSH and digoxin level , MG level  supplement as needed   start Cardizem infusion , hold for systolic less than 90 HR less than 100  continue digoxin   Hold oral  Cardizem    no coffee no caffeine diet monitor telemetry   keep K+>4, MG++>2   check TSH and digoxin level , MG level  supplement as needed   start Cardizem infusion , hold for systolic less than 90 HR less than 100  continue digoxin   Hold oral  Cardizem    consider Anticoagualtion in this patient with CHADS VASC score of 5  no coffee no caffeine diet

## 2017-12-18 NOTE — PROGRESS NOTE ADULT - SUBJECTIVE AND OBJECTIVE BOX
MEDICINE, PROGRESS NOTE [phone]    FRAN ALEXANDRA 71y [id number]    Patient seen and examined.  Patient is a 71y old  Female who presents with a chief complaint of shortness of breath, volume overload and ascites (11 Dec 2017 11:41)  Pt has no current complaints. doesn't feel the palpitations    PAST MEDICAL & SURGICAL HISTORY:  Hyperthyroidism  JOSE (obstructive sleep apnea)  Epistaxis  GIB (gastrointestinal bleeding)  CAD S/P percutaneous coronary angioplasty  Afib  Pulmonary HTN  GERD (gastroesophageal reflux disease)  Obesity  Cardiomegaly  Valvular heart disease  COPD (chronic obstructive pulmonary disease): Home O2  Sleep Apnea: by criteria  Loose, teeth: 3 bottom front loose teeth  Female stress incontinence  Shoulder pain, right  Constipation  Arthritis of Knee  H/O heartburn  History of lung cancer  Obese  Hx of hyperlipidemia  Asthma  Diabetes mellitus: type 2  dx about 4-5 years ago   no daily fingerstick  H/O: HTN (hypertension)  Enlarged lymph nodes  Lymph nodes enlarged: s/p biopsy mediastinal  benign  H/O endoscopy  left upper lobectomy    MEDICATIONS  (STANDING):  allopurinol 200 milliGRAM(s) Oral daily  aspirin enteric coated 81 milliGRAM(s) Oral daily  atorvastatin 20 milliGRAM(s) Oral at bedtime  buMETAnide Injectable 2 milliGRAM(s) IV Push two times a day  clopidogrel Tablet 75 milliGRAM(s) Oral daily  digoxin     Tablet 0.125 milliGRAM(s) Oral every other day  diltiazem    milliGRAM(s) Oral daily  diltiazem Infusion 10 mG/Hr (10 mL/Hr) IV Continuous <Continuous>  docusate sodium 100 milliGRAM(s) Oral two times a day  epoetin hilda Injectable 81791 Unit(s) SubCutaneous <User Schedule>  heparin  Injectable 5000 Unit(s) SubCutaneous every 8 hours  macitentan (OPSUMIT) 10 milliGRAM(s) 10 milliGRAM(s) Oral daily  magnesium sulfate  IVPB 1 Gram(s) IV Intermittent once  methimazole 5 milliGRAM(s) Oral daily  methylPREDNISolone sodium succinate Injectable 20 milliGRAM(s) IV Push daily  metolazone 5 milliGRAM(s) Oral daily  pantoprazole    Tablet 40 milliGRAM(s) Oral before breakfast  predniSONE   Tablet 5 milliGRAM(s) Oral daily  roflumilast 500 MICROGram(s) Oral daily  tiotropium 2.5 MICROgram(s)/olodaterol 2.5 MICROgram(s) Inhaler 2 Puff(s) Inhalation daily    MEDICATIONS  (PRN):  acetaminophen   Tablet 650 milliGRAM(s) Oral every 6 hours PRN Moderate Pain (4 - 6)  ipratropium    for Nebulization 500 MICROGram(s) Nebulizer every 6 hours PRN Shortness of Breath and/or Wheezing  ondansetron    Tablet 4 milliGRAM(s) Oral every 8 hours PRN Nausea and/or Vomiting  sodium chloride 0.65% Nasal 1 Spray(s) Both Nostrils five times a day PRN dry nose    Allergies    No Known Allergies    Intolerances    albuterol (Unknown)      PHYSICAL EXAM:  Constitutional: NAD  HEENT: Normocephalic, EOMI  Neck:  No JVD  Respiratory: CTA B/L, No wheezes  Cardiovascular: S1, S2, IRRR, + systolic murmur  Gastrointestinal: BS+, soft, NT/ND  Extremities: Dec peripheral edema le b/l  Neurological: AAOX3, no focal deficits  Psychiatric: Normal mood, normal affect  : No Gottlieb    Vital Signs Last 24 Hrs  T(C): 36.7 (18 Dec 2017 11:48), Max: 36.8 (17 Dec 2017 22:46)  T(F): 98 (18 Dec 2017 11:48), Max: 98.2 (17 Dec 2017 22:46)  HR: 137 (18 Dec 2017 15:21) (76 - 154)  BP: 115/72 (18 Dec 2017 15:21) (96/60 - 125/71)  BP(mean): --  RR: 18 (18 Dec 2017 11:48) (18 - 19)  SpO2: 98% (18 Dec 2017 11:48) (97% - 100%)  I&O's Summary    17 Dec 2017 07:01  -  18 Dec 2017 07:00  --------------------------------------------------------  IN: 180 mL / OUT: 2450 mL / NET: -2270 mL    18 Dec 2017 07:01  -  18 Dec 2017 18:10  --------------------------------------------------------  IN: 480 mL / OUT: 1010 mL / NET: -530 mL        LABS:                        8.4    4.56  )-----------( 192      ( 18 Dec 2017 06:50 )             27.1     12-18    139  |  87<L>  |  57<H>  ----------------------------<  91  3.2<L>   |  37<H>  |  1.58<H>    Ca    9.2      18 Dec 2017 07:12  Mg     1.8     12-18          Magnesium, Serum: 1.8 mg/dL (12-18 @ 12:09)

## 2017-12-18 NOTE — PROGRESS NOTE ADULT - SUBJECTIVE AND OBJECTIVE BOX
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 Pt had paracentesis which removed 8L. Patient currently being diuresed on Bumex and Zaroxolyn.  Dobutamine stopped due to and HR increased to 170's. I was called for A Fib w/.

## 2017-12-18 NOTE — PROGRESS NOTE ADULT - PROBLEM SELECTOR PLAN 1
2nd compressive atelectasis from pleural effusions, pulmonary edema and restrictive disease from ascites 2nd cor pulmonale   -Keep sp02>90% on supplemental oxygen  -s/p 8.1L paracentesis 12/6  -output>input with bumex/zaroxolyn 2nd compressive atelectasis from pleural effusions, pulmonary edema and restrictive disease from ascites 2nd cor pulmonale   -Keep sp02>90% on supplemental oxygen  -s/p 8.1L paracentesis 12/6  -output>input with bumex/zaroxolyn  -CXR in AM

## 2017-12-18 NOTE — PROGRESS NOTE ADULT - SUBJECTIVE AND OBJECTIVE BOX
HPI: Afib flutter 120 -150 brief bursts with activity over night     MEDICATIONS  (STANDING):  ALBUTerol/ipratropium for Nebulization 3 milliLiter(s) Nebulizer every 6 hours  allopurinol 200 milliGRAM(s) Oral daily  aspirin enteric coated 81 milliGRAM(s) Oral daily  atorvastatin 20 milliGRAM(s) Oral at bedtime  buMETAnide Injectable 2 milliGRAM(s) IV Push two times a day  clopidogrel Tablet 75 milliGRAM(s) Oral daily  digoxin     Tablet 0.125 milliGRAM(s) Oral every other day  diltiazem    milliGRAM(s) Oral daily  docusate sodium 100 milliGRAM(s) Oral two times a day  epoetin hilda Injectable 18757 Unit(s) SubCutaneous <User Schedule>  heparin  Injectable 5000 Unit(s) SubCutaneous every 8 hours  macitentan (OPSUMIT) 10 milliGRAM(s) 10 milliGRAM(s) Oral daily  methimazole 5 milliGRAM(s) Oral daily  metolazone 5 milliGRAM(s) Oral daily  pantoprazole    Tablet 40 milliGRAM(s) Oral before breakfast  potassium chloride  10 mEq/100 mL IVPB 10 milliEquivalent(s) IV Intermittent once  predniSONE   Tablet 5 milliGRAM(s) Oral daily  roflumilast 500 MICROGram(s) Oral daily  tiotropium 2.5 MICROgram(s)/olodaterol 2.5 MICROgram(s) Inhaler 2 Puff(s) Inhalation daily    MEDICATIONS  (PRN):  acetaminophen   Tablet 650 milliGRAM(s) Oral every 6 hours PRN Moderate Pain (4 - 6)  ondansetron    Tablet 4 milliGRAM(s) Oral every 8 hours PRN Nausea and/or Vomiting  sodium chloride 0.65% Nasal 1 Spray(s) Both Nostrils five times a day PRN dry nose    Allergies No Known Allergies    REVIEW OF SYSTEM:    Constitutional: denies fever, chills, fatigue  Neuro: denies headache, weakness, dizziness  Resp: states dry cough no change in SOB   CVS: denies chest pain, palpitations, leg swelling, states HR to 120 -150 common  GI: denies abdominal pain, nausea, vomiting, diarrhea   : denies dysuria, frequency, incontinence  Skin: denies itching, burning, rashes, or lesions   Msk: c/o pain right foot center foot with ambulation     Vital Signs Last 24 Hrs  T(C): 36.8 (18 Dec 2017 08:54), Max: 36.8 (17 Dec 2017 22:46)  T(F): 98.2 (18 Dec 2017 08:54), Max: 98.2 (17 Dec 2017 22:46)  HR: 124 (18 Dec 2017 10:43) (76 - 154)  BP: 125/71 (18 Dec 2017 10:43) (96/60 - 125/71)  BP(mean): --  RR: 19 (18 Dec 2017 10:43) (17 - 19)  SpO2: 97% (18 Dec 2017 10:43) (95% - 100%)    Physical Exam:  General : sitting on edge bed pursed lip breathing, NAD having just used commode  Neuro : Alert and oriented x 3, no focal deficits  HEENT : Sclera clear, neck supple  Lungs: Clear to Ascultation diminished , no wheezing , rales or rhonchi   Cardiovascular : irreg po and rhythm , no murmurs, no rubs  GI : abdomen soft, NT, ND, + BS   : no suprapubic tenderness  Extremities : + 2 feet calves  feet warm , right foot   Skin : intact     TELE: afib flutter 120- -150   EKG:     LABS:                        8.4    4.56  )-----------( 192      ( 18 Dec 2017 06:50 )             27.1     12-18    139  |  87<L>  |  57<H>  ----------------------------<  91  3.2<L>   |  37<H>  |  1.58<H>    Ca    9.2      18 Dec 2017 07:12  Mg     2.0     12-17    RADIOLOGY & ADDITIONAL TESTS:  < from: Transthoracic Echocardiogram (12.13.17 @ 18:58) >  Patient name: FRAN ALEXANDRA  YOB: 1945   Age: 71 (F)   MR#: [id number]  Study Date: 12/13/2017  Location: [address] [place], [place] [id number]: Thalia Amezquita RDCS  Study quality: Technically fair  Referring Physician: Pierce Cobb MD  Blood Pressure: 106/58 mmHg  Height: 160 cm  Weight: 64 kg  BSA: 1.7 m2  ------------------------------------------------------------------------  PROCEDURE: Transthoracic echocardiogram with 2-D, M-Mode  and complete spectral and color flow Doppler.  INDICATION: Other specified pulmonary heart diseases  (I27.89)  ------------------------------------------------------------------------  Dimensions:    Normal Values:  LA:     3.6    2.0 - 4.0 cm  Ao:     2.8    2.0 - 3.8 cm  SEPTUM: 0.7    0.6 - 1.2 cm  PWT:    0.9    0.6 - 1.1 cm  LVIDd:  4.1    3.0 - 5.6 cm  LVIDs:  1.9    1.8 - 4.0 cm  Derived variables:  LVMI: 58 g/m2  RWT: 0.43  Fractional short: 54 %  Doppler Peak Velocity (m/sec): AoV=1.8  ------------------------------------------------------------------------  Observations:  Mitral Valve: Mitral annular calcification, otherwise  normal mitral valve. Minimal mitral regurgitation.  Aortic Valve/Aorta: Calcified trileaflet aortic valve with  normal opening. Peak transaortic valve gradient equals 13  mm Hg, mean transaortic valve gradient equals 8 mm Hg. Peak  left ventricular outflow tract gradient equals 6 mm Hg,  mean gradient is equal to 3 mm Hg, LVOT velocity time  integral equals 19 cm.  Aortic Root: 2.8 cm.  LVOT diameter: 1.9 cm.  Left Atrium: Normal left atrium.  LA volume index = 25  cc/m2.  Left Ventricle: Hyperdynamic left ventricular systolic  function. Flattening of the interventricular septum in both  systole and diastole is  consistent with right ventricular  pressure overload. Normal left ventricular internal  dimensions and wall thicknesses.  Right Heart: Severe right atrial enlargement. Right  ventricular enlargement with decreased right ventricular  systolic function. Normal tricuspid valve. Mild-moderate  tricuspid regurgitation. Normal pulmonic valve.  Pericardium/Pleura: Normal pericardium with trace  pericardial effusion.  Left pleural effusion.  Hemodynamic: Estimated right atrial pressure is 8 mm Hg.  Estimated right ventricular systolic pressure equals 72 mm  Hg, assuming right atrial pressure equals 8 mm Hg,  consistent with severe pulmonary hypertension.  ------------------------------------------------------------------------  Conclusions:  1. Calcified trileaflet aortic valve with normal opening.  2. Normal left ventricular internal dimensions and wall  thicknesses.  3. Hyperdynamic left ventricular systolic function.  Flattening of the interventricular septum in both systole  and diastole is  consistent with right ventricular pressure  overload.  4. Severe right atrial enlargement.  5. Right ventricular enlargement with decreased right  ventricular systolic function.  6. Normal tricuspid valve. Mild-moderate tricuspid  regurgitation.  7. Estimated pulmonary artery systolic pressure equals 72  mm Hg, assuming right atrial pressure equals 8 mm Hg,  consistent with severe pulmonary pressures.  *** Compared with echocardiogram of 4/25/2017, no  significant changes noted.  ------------------------------------------------------------------------  Confirmed on  12/13/2017 - 13:15:09 by Justin Cohen M.D.  ------------------------------------------------------------------------

## 2017-12-18 NOTE — PROGRESS NOTE ADULT - ASSESSMENT
71 year old female  CAD s/p PCI, COPD, T2DM history of severe pHTN complicated by RV failure (on 3L NC at home), Afib not on AC, a/w SOB and abdominal distention, which had been going on for about 4 weeks.  afib since admission, on PO diltiazem daily and digoxin qod.  ADHF, dobutamine stopped due to HR 170s. received 1 dose Digoxin  0.125 IV, continues with Afib flutter 120 -150's on telemetry

## 2017-12-18 NOTE — PROGRESS NOTE ADULT - ASSESSMENT
71 year old female with CAD, sev pHTN, dCHF, HTN, DM, Afib, lung CA, COPD and CKD 3 p/w acute on chronic dCHF, ascites, pleural effusion, pulm edema and acute on chronic kidney injury c/b A.Fib with RVR.    - CKD stage 3: likely due to HTN and DM  - CASSANDRA: non oliguric. Likely prerenal due to ineffective circulating volume. Renal function overall stable  - acute on chronic dCHF: volume overload slowly improving with good diuresis, weight loss and net negative balance.   - GI: s/p LVP  -Hypokalemia    Recommendation  - continue bumex 2 mg IV BID and metolazone 5 mg po daily  - dobutamine will not be entertained again and I doubt Primacor will help  - Can we change cardizem 60mg po q 6 hours OR Cardiazem gtt?  and digoxin per primary  - Pt has had nose bleeds and stopped her A/C as outpt and is aware of 10 percent of CVA    Daughter updated at bedside

## 2017-12-18 NOTE — PROGRESS NOTE ADULT - SUBJECTIVE AND OBJECTIVE BOX
Follow-up Pulm Progress Note    No new respiratory events overnight.  Denies SOB/CP.   Tele: Afib 90's, up to 150's earlier  99% on 4.5L NC  c/o R foot pain     Medications:  MEDICATIONS  (STANDING):  ALBUTerol/ipratropium for Nebulization 3 milliLiter(s) Nebulizer every 6 hours  allopurinol 200 milliGRAM(s) Oral daily  aspirin enteric coated 81 milliGRAM(s) Oral daily  atorvastatin 20 milliGRAM(s) Oral at bedtime  buMETAnide Injectable 2 milliGRAM(s) IV Push two times a day  clopidogrel Tablet 75 milliGRAM(s) Oral daily  digoxin     Tablet 0.125 milliGRAM(s) Oral every other day  diltiazem    milliGRAM(s) Oral daily  diltiazem Infusion 5 mG/Hr (5 mL/Hr) IV Continuous <Continuous>  docusate sodium 100 milliGRAM(s) Oral two times a day  epoetin hilda Injectable 27641 Unit(s) SubCutaneous <User Schedule>  heparin  Injectable 5000 Unit(s) SubCutaneous every 8 hours  macitentan (OPSUMIT) 10 milliGRAM(s) 10 milliGRAM(s) Oral daily  methimazole 5 milliGRAM(s) Oral daily  metolazone 5 milliGRAM(s) Oral daily  pantoprazole    Tablet 40 milliGRAM(s) Oral before breakfast  predniSONE   Tablet 5 milliGRAM(s) Oral daily  roflumilast 500 MICROGram(s) Oral daily  tiotropium 2.5 MICROgram(s)/olodaterol 2.5 MICROgram(s) Inhaler 2 Puff(s) Inhalation daily    MEDICATIONS  (PRN):  acetaminophen   Tablet 650 milliGRAM(s) Oral every 6 hours PRN Moderate Pain (4 - 6)  ondansetron    Tablet 4 milliGRAM(s) Oral every 8 hours PRN Nausea and/or Vomiting  sodium chloride 0.65% Nasal 1 Spray(s) Both Nostrils five times a day PRN dry nose          Vital Signs Last 24 Hrs  T(C): 36.7 (18 Dec 2017 11:48), Max: 36.8 (17 Dec 2017 22:46)  T(F): 98 (18 Dec 2017 11:48), Max: 98.2 (17 Dec 2017 22:46)  HR: 100 (18 Dec 2017 12:26) (76 - 154)  BP: 108/72 (18 Dec 2017 12:26) (96/60 - 125/71)  BP(mean): --  RR: 18 (18 Dec 2017 11:48) (18 - 19)  SpO2: 98% (18 Dec 2017 11:48) (97% - 100%) on 4.5L NC          12-17 @ 07:01  -  12-18 @ 07:00  --------------------------------------------------------  IN: 180 mL / OUT: 2450 mL / NET: -2270 mL          LABS:                        8.4    4.56  )-----------( 192      ( 18 Dec 2017 06:50 )             27.1     12-18    139  |  87<L>  |  57<H>  ----------------------------<  91  3.2<L>   |  37<H>  |  1.58<H>    Ca    9.2      18 Dec 2017 07:12  Mg     1.8     12-18            CAPILLARY BLOOD GLUCOSE                GAGAN Negative 12-08 @ 07:39  Anti SS-1 --  Anti SS-2 --  Anti RNP --  RF 15.0 12-08 @ 07:39    Atypical ANCA -- 12-08 @ 07:39  c-ANCA titer -- 12-08 @ 07:39  c-ANCA -- 12-08 @ 07:39  p-ANCA -- 12-08 @ 07:39          Physical Examination:  PULM: Decreased BS L base  CVS: S1, S2 irreg    RADIOLOGY REVIEWED

## 2017-12-18 NOTE — PROGRESS NOTE ADULT - PROBLEM SELECTOR PLAN 4
-Duoneb q6  -Stiolto BID (patient will use home med)  -Daliresp qd -Duoneb q6, change to Atrovent PRN 2nd Afib with RVR  -Stiolto BID (patient will use home med)  -Daliresp qd  -Prednisone 5mg qd

## 2017-12-18 NOTE — PROGRESS NOTE ADULT - PROBLEM SELECTOR PLAN 5
-c/w Digoxin, Cardizem, Metoprolol  -Now on cardizem gtt -c/w Digoxin, Cardizem, Metoprolol  -Now on cardizem gtt  -EP following

## 2017-12-18 NOTE — PROGRESS NOTE ADULT - ASSESSMENT
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 Recent admission at [facility] approximately 5 weeks ago for large volume paracentesis (6.1L removed). Now presents again with RV failure-worsened LE edema, recurrent ascites, pleural effusions and pulmonary edema with worsened dyspnea and CASSANDRA on CKD. s/p 8.1 L paracentesis on 12/6. Started on dobutamine gtt for pHTN, discontinued 2nd Afib with RVR to 170s.

## 2017-12-18 NOTE — PROGRESS NOTE ADULT - SUBJECTIVE AND OBJECTIVE BOX
[facility] [phone]        Patient seen and examined in bed.  She is still tachycardic.  Not SOB at rest          MEDICATIONS  (STANDING):  ALBUTerol/ipratropium for Nebulization 3 milliLiter(s) Nebulizer every 6 hours  allopurinol 200 milliGRAM(s) Oral daily  aspirin enteric coated 81 milliGRAM(s) Oral daily  atorvastatin 20 milliGRAM(s) Oral at bedtime  buMETAnide Injectable 2 milliGRAM(s) IV Push two times a day  clopidogrel Tablet 75 milliGRAM(s) Oral daily  digoxin     Tablet 0.125 milliGRAM(s) Oral every other day  diltiazem    milliGRAM(s) Oral daily  diltiazem Injectable 5 milliGRAM(s) IV Push once  docusate sodium 100 milliGRAM(s) Oral two times a day  epoetin hilda Injectable 10443 Unit(s) SubCutaneous <User Schedule>  heparin  Injectable 5000 Unit(s) SubCutaneous every 8 hours  macitentan (OPSUMIT) 10 milliGRAM(s) 10 milliGRAM(s) Oral daily  methimazole 5 milliGRAM(s) Oral daily  metolazone 5 milliGRAM(s) Oral daily  pantoprazole    Tablet 40 milliGRAM(s) Oral before breakfast  predniSONE   Tablet 5 milliGRAM(s) Oral daily  roflumilast 500 MICROGram(s) Oral daily  tiotropium 2.5 MICROgram(s)/olodaterol 2.5 MICROgram(s) Inhaler 2 Puff(s) Inhalation daily      VITAL:  T(C): , Max: 36.8 (12-17-17 @ 22:46)  T(F): , Max: 98.2 (12-17-17 @ 22:46)  HR: 109 (12-18-17 @ 09:09)  BP: 114/61 (12-18-17 @ 09:09)  BP(mean): --  RR: 19 (12-18-17 @ 09:09)  SpO2: 98% (12-18-17 @ 09:09)  Wt(kg): --    I and O's:    12-17 @ 07:01  -  12-18 @ 07:00  --------------------------------------------------------  IN: 180 mL / OUT: 2450 mL / NET: -2270 mL          PHYSICAL EXAM:    Constitutional: NAD  HEENT: PERRLA    Neck:  +  JVD  Respiratory: reduced breath sounds  Cardiovascular: S1 and S2  Gastrointestinal: BS+, soft, NT  Extremities: +  peripheral edema  Neurological: A/O x 3, no focal deficits  Psychiatric: Normal mood, normal affect  : No Gottlieb  Skin: No rashes  Access: Not applicable    LABS:                        8.4    4.56  )-----------( 192      ( 18 Dec 2017 06:50 )             27.1     12-18    139  |  87<L>  |  57<H>  ----------------------------<  91  3.2<L>   |  37<H>  |  1.58<H>    Ca    9.2      18 Dec 2017 07:12  Mg     2.0     12-17            Urine Studies:          RADIOLOGY & ADDITIONAL STUDIES:

## 2017-12-18 NOTE — PROGRESS NOTE ADULT - PROBLEM SELECTOR PLAN 3
-Appreciate house pulmonary consult for pHTN mgmt. No additional pHTN medications are indicated   -Vasodilator non-responder on RHC from 5/2017  -Dobutamine gtt discontinued 2nd Afib with RVR

## 2017-12-18 NOTE — PROGRESS NOTE ADULT - ASSESSMENT
71 year old female with PMH of severe pHTN complicated by RV failure,  (on 3L NC at home), Afib not on AC due to epistasis, CAD s/p PCI, COPD, T2DM who is in A fib w/, asymptomatic.

## 2017-12-18 NOTE — PROGRESS NOTE ADULT - ASSESSMENT
severe copd  severe phtn  bucky  anemia  vol overload  s/p large volume paracentesis  Afib with rvr    appreciate ep input  continue current care  titrate up on cardizem if needed  replete electrolytes

## 2017-12-19 LAB
ALBUMIN SERPL ELPH-MCNC: 3.1 G/DL — LOW (ref 3.3–5)
ALP SERPL-CCNC: 44 U/L — SIGNIFICANT CHANGE UP (ref 40–120)
ALT FLD-CCNC: 4 U/L — LOW (ref 10–45)
ANION GAP SERPL CALC-SCNC: 13 MMOL/L — SIGNIFICANT CHANGE UP (ref 5–17)
ANISOCYTOSIS BLD QL: SLIGHT — SIGNIFICANT CHANGE UP
AST SERPL-CCNC: 15 U/L — SIGNIFICANT CHANGE UP (ref 10–40)
BASOPHILS # BLD AUTO: 0 K/UL — SIGNIFICANT CHANGE UP (ref 0–0.2)
BASOPHILS NFR BLD AUTO: 0 % — SIGNIFICANT CHANGE UP (ref 0–2)
BILIRUB SERPL-MCNC: 0.4 MG/DL — SIGNIFICANT CHANGE UP (ref 0.2–1.2)
BUN SERPL-MCNC: 64 MG/DL — HIGH (ref 7–23)
CALCIUM SERPL-MCNC: 9.3 MG/DL — SIGNIFICANT CHANGE UP (ref 8.4–10.5)
CHLORIDE SERPL-SCNC: 85 MMOL/L — LOW (ref 96–108)
CO2 SERPL-SCNC: 38 MMOL/L — HIGH (ref 22–31)
CREAT SERPL-MCNC: 1.61 MG/DL — HIGH (ref 0.5–1.3)
EOSINOPHIL # BLD AUTO: 0 K/UL — SIGNIFICANT CHANGE UP (ref 0–0.5)
EOSINOPHIL NFR BLD AUTO: 0 % — SIGNIFICANT CHANGE UP (ref 0–6)
GLUCOSE SERPL-MCNC: 181 MG/DL — HIGH (ref 70–99)
HCT VFR BLD CALC: 28.6 % — LOW (ref 34.5–45)
HGB BLD-MCNC: 8.6 G/DL — LOW (ref 11.5–15.5)
IMM GRANULOCYTES NFR BLD AUTO: 0.2 % — SIGNIFICANT CHANGE UP (ref 0–1.5)
LYMPHOCYTES # BLD AUTO: 0.19 K/UL — LOW (ref 1–3.3)
LYMPHOCYTES # BLD AUTO: 3.8 % — LOW (ref 13–44)
MACROCYTES BLD QL: SLIGHT — SIGNIFICANT CHANGE UP
MAGNESIUM SERPL-MCNC: 2.3 MG/DL — SIGNIFICANT CHANGE UP (ref 1.6–2.6)
MANUAL SMEAR VERIFICATION: SIGNIFICANT CHANGE UP
MCHC RBC-ENTMCNC: 26.4 PG — LOW (ref 27–34)
MCHC RBC-ENTMCNC: 30.1 GM/DL — LOW (ref 32–36)
MCV RBC AUTO: 87.7 FL — SIGNIFICANT CHANGE UP (ref 80–100)
MONOCYTES # BLD AUTO: 0.29 K/UL — SIGNIFICANT CHANGE UP (ref 0–0.9)
MONOCYTES NFR BLD AUTO: 5.8 % — SIGNIFICANT CHANGE UP (ref 2–14)
NEUTROPHILS # BLD AUTO: 4.51 K/UL — SIGNIFICANT CHANGE UP (ref 1.8–7.4)
NEUTROPHILS NFR BLD AUTO: 90.2 % — HIGH (ref 43–77)
PHOSPHATE SERPL-MCNC: 3.2 MG/DL — SIGNIFICANT CHANGE UP (ref 2.5–4.5)
PLAT MORPH BLD: NORMAL — SIGNIFICANT CHANGE UP
PLATELET # BLD AUTO: 224 K/UL — SIGNIFICANT CHANGE UP (ref 150–400)
POIKILOCYTOSIS BLD QL AUTO: SLIGHT — SIGNIFICANT CHANGE UP
POLYCHROMASIA BLD QL SMEAR: SLIGHT — SIGNIFICANT CHANGE UP
POTASSIUM SERPL-MCNC: 4.3 MMOL/L — SIGNIFICANT CHANGE UP (ref 3.5–5.3)
POTASSIUM SERPL-SCNC: 4.3 MMOL/L — SIGNIFICANT CHANGE UP (ref 3.5–5.3)
PROT SERPL-MCNC: 6.1 G/DL — SIGNIFICANT CHANGE UP (ref 6–8.3)
RBC # BLD: 3.26 M/UL — LOW (ref 3.8–5.2)
RBC # FLD: 18.3 % — HIGH (ref 10.3–14.5)
RBC BLD AUTO: ABNORMAL
SODIUM SERPL-SCNC: 136 MMOL/L — SIGNIFICANT CHANGE UP (ref 135–145)
TSH SERPL-MCNC: 1.36 UIU/ML — SIGNIFICANT CHANGE UP (ref 0.27–4.2)
WBC # BLD: 5 K/UL — SIGNIFICANT CHANGE UP (ref 3.8–10.5)
WBC # FLD AUTO: 5 K/UL — SIGNIFICANT CHANGE UP (ref 3.8–10.5)

## 2017-12-19 PROCEDURE — 99233 SBSQ HOSP IP/OBS HIGH 50: CPT

## 2017-12-19 PROCEDURE — 71010: CPT | Mod: 26

## 2017-12-19 RX ADMIN — ATORVASTATIN CALCIUM 20 MILLIGRAM(S): 80 TABLET, FILM COATED ORAL at 23:28

## 2017-12-19 RX ADMIN — Medication 81 MILLIGRAM(S): at 13:13

## 2017-12-19 RX ADMIN — HEPARIN SODIUM 5000 UNIT(S): 5000 INJECTION INTRAVENOUS; SUBCUTANEOUS at 18:29

## 2017-12-19 RX ADMIN — Medication 20 MILLIGRAM(S): at 05:41

## 2017-12-19 RX ADMIN — HEPARIN SODIUM 5000 UNIT(S): 5000 INJECTION INTRAVENOUS; SUBCUTANEOUS at 02:27

## 2017-12-19 RX ADMIN — HEPARIN SODIUM 5000 UNIT(S): 5000 INJECTION INTRAVENOUS; SUBCUTANEOUS at 23:28

## 2017-12-19 RX ADMIN — BUMETANIDE 2 MILLIGRAM(S): 0.25 INJECTION INTRAMUSCULAR; INTRAVENOUS at 05:41

## 2017-12-19 RX ADMIN — CLOPIDOGREL BISULFATE 75 MILLIGRAM(S): 75 TABLET, FILM COATED ORAL at 13:14

## 2017-12-19 RX ADMIN — Medication 200 MILLIGRAM(S): at 13:13

## 2017-12-19 RX ADMIN — BUMETANIDE 2 MILLIGRAM(S): 0.25 INJECTION INTRAMUSCULAR; INTRAVENOUS at 18:30

## 2017-12-19 RX ADMIN — HEPARIN SODIUM 5000 UNIT(S): 5000 INJECTION INTRAVENOUS; SUBCUTANEOUS at 10:10

## 2017-12-19 RX ADMIN — Medication 15 MG/HR: at 10:20

## 2017-12-19 RX ADMIN — Medication 5 MILLIGRAM(S): at 10:12

## 2017-12-19 RX ADMIN — TIOTROPIUM BROMIDE AND OLODATEROL 2 PUFF(S): 3.124; 2.736 SPRAY, METERED RESPIRATORY (INHALATION) at 10:20

## 2017-12-19 RX ADMIN — ROFLUMILAST 500 MICROGRAM(S): 500 TABLET ORAL at 10:12

## 2017-12-19 NOTE — CHART NOTE - NSCHARTNOTEFT_GEN_A_CORE
Source: Patient [X ]    Family [ ]     other [ X] RN, Medical record,     Pt seen for nutritional follow up. Pt reports a good PO intake but states food options in house are limited. RD reviewed menu, reviewed menu alternatives, and updated some preferences. Pt reports food services related issues, issues addressed. Pt denies GI related distress.    Per chart, Pt with Abdominal distention, severe pulmonary HTN, s/p paracentesis with 8.1L removed. Pt now with Afib with RVR. Hypokalemia 12/18, repleted.      Diet : Low Na, No caffiene, No Chocolate.       Patient reports no GI distress     PO intake: 75-80%      Source for PO intake [X ] Patient       Current Weight: Weight (kg): 133.3lbs Wt stable.   % Weight Change    Pertinent Medications: MEDICATIONS  (STANDING):  allopurinol 200 milliGRAM(s) Oral daily  aspirin enteric coated 81 milliGRAM(s) Oral daily  atorvastatin 20 milliGRAM(s) Oral at bedtime  buMETAnide Injectable 2 milliGRAM(s) IV Push two times a day  clopidogrel Tablet 75 milliGRAM(s) Oral daily  digoxin     Tablet 0.125 milliGRAM(s) Oral every other day  diltiazem    milliGRAM(s) Oral daily  diltiazem Infusion 15 mG/Hr (15 mL/Hr) IV Continuous <Continuous>  docusate sodium 100 milliGRAM(s) Oral two times a day  epoetin hilda Injectable 73534 Unit(s) SubCutaneous <User Schedule>  heparin  Injectable 5000 Unit(s) SubCutaneous every 8 hours  macitentan (OPSUMIT) 10 milliGRAM(s) 10 milliGRAM(s) Oral daily  methimazole 5 milliGRAM(s) Oral daily  methylPREDNISolone sodium succinate Injectable 20 milliGRAM(s) IV Push daily  metolazone 5 milliGRAM(s) Oral daily  pantoprazole    Tablet 40 milliGRAM(s) Oral before breakfast  predniSONE   Tablet 5 milliGRAM(s) Oral daily  roflumilast 500 MICROGram(s) Oral daily  tiotropium 2.5 MICROgram(s)/olodaterol 2.5 MICROgram(s) Inhaler 2 Puff(s) Inhalation daily    MEDICATIONS  (PRN):  acetaminophen   Tablet 650 milliGRAM(s) Oral every 6 hours PRN Moderate Pain (4 - 6)  ipratropium    for Nebulization 500 MICROGram(s) Nebulizer every 6 hours PRN Shortness of Breath and/or Wheezing  ondansetron    Tablet 4 milliGRAM(s) Oral every 8 hours PRN Nausea and/or Vomiting  sodium chloride 0.65% Nasal 1 Spray(s) Both Nostrils five times a day PRN dry nose    Pertinent Labs:  12-19 Na136 mmol/L Glu 181 mg/dL<H> K+ 4.3 mmol/L Cr  1.61 mg/dL<H> BUN 64 mg/dL<H> Phos 3.2 mg/dL Alb 3.1 g/dL<L>       Skin: intact, +2 bernardo leg edema     Estimated Needs:   [X ] no change since previous assessment  [ ] recalculated:       Previous Nutrition Diagnosis:      [X ]Inadequate Oral Intake        Nutrition Diagnosis is [ X] ongoing; addressed with food preferences        New Nutrition Diagnosis: [X ] not applicable    Recommend    1. Provide food preferences as requested by Pt/family within diet restrictions    2. Encourage PO intake during meal times   3. food preferences and food services issues were addressed.          Monitoring and Evaluation:     [X ] PO intake [ X] Tolerance to diet prescription [X ] weights [X ] follow up per protocol    [X ] other: RD remains available Sarah Siegler RD. Pager #[phone]

## 2017-12-19 NOTE — PROGRESS NOTE ADULT - SUBJECTIVE AND OBJECTIVE BOX
Follow-up Pulm Progress Note    No new respiratory events overnight.   99% on 4.5L NC    Medications:  MEDICATIONS  (STANDING):  allopurinol 200 milliGRAM(s) Oral daily  aspirin enteric coated 81 milliGRAM(s) Oral daily  atorvastatin 20 milliGRAM(s) Oral at bedtime  buMETAnide Injectable 2 milliGRAM(s) IV Push two times a day  clopidogrel Tablet 75 milliGRAM(s) Oral daily  digoxin     Tablet 0.125 milliGRAM(s) Oral every other day  diltiazem Infusion 15 mG/Hr (15 mL/Hr) IV Continuous <Continuous>  docusate sodium 100 milliGRAM(s) Oral two times a day  epoetin hilda Injectable 38614 Unit(s) SubCutaneous <User Schedule>  heparin  Injectable 5000 Unit(s) SubCutaneous every 8 hours  macitentan (OPSUMIT) 10 milliGRAM(s) 10 milliGRAM(s) Oral daily  methimazole 5 milliGRAM(s) Oral daily  methylPREDNISolone sodium succinate Injectable 20 milliGRAM(s) IV Push daily  metolazone 5 milliGRAM(s) Oral daily  pantoprazole    Tablet 40 milliGRAM(s) Oral before breakfast  predniSONE   Tablet 5 milliGRAM(s) Oral daily  roflumilast 500 MICROGram(s) Oral daily  tiotropium 2.5 MICROgram(s)/olodaterol 2.5 MICROgram(s) Inhaler 2 Puff(s) Inhalation daily    MEDICATIONS  (PRN):  acetaminophen   Tablet 650 milliGRAM(s) Oral every 6 hours PRN Moderate Pain (4 - 6)  ipratropium    for Nebulization 500 MICROGram(s) Nebulizer every 6 hours PRN Shortness of Breath and/or Wheezing  ondansetron    Tablet 4 milliGRAM(s) Oral every 8 hours PRN Nausea and/or Vomiting  sodium chloride 0.65% Nasal 1 Spray(s) Both Nostrils five times a day PRN dry nose          Vital Signs Last 24 Hrs  T(C): 37 (19 Dec 2017 13:12), Max: 37 (19 Dec 2017 13:12)  T(F): 98.6 (19 Dec 2017 13:12), Max: 98.6 (19 Dec 2017 13:12)  HR: 90 (19 Dec 2017 13:12) (90 - 134)  BP: 118/59 (19 Dec 2017 13:12) (90/60 - 138/76)  BP(mean): --  RR: 19 (19 Dec 2017 13:12) (18 - 19)  SpO2: 97% (19 Dec 2017 13:12) (97% - 97%) on 4.5L NC          12-18 @ 07:01  -  12-19 @ 07:00  --------------------------------------------------------  IN: 1375 mL / OUT: 1610 mL / NET: -235 mL          LABS:                        8.6    5.00  )-----------( 224      ( 19 Dec 2017 07:48 )             28.6     12-19    136  |  85<L>  |  64<H>  ----------------------------<  181<H>  4.3   |  38<H>  |  1.61<H>    Ca    9.3      19 Dec 2017 07:33  Phos  3.2     12-19  Mg     2.3     12-19    TPro  6.1  /  Alb  3.1<L>  /  TBili  0.4  /  DBili  x   /  AST  15  /  ALT  4<L>  /  AlkPhos  44  12-19          CAPILLARY BLOOD GLUCOSE                GAGAN Negative 12-08 @ 07:39  Anti SS-1 --  Anti SS-2 --  Anti RNP --  RF 15.0 12-08 @ 07:39    Atypical ANCA -- 12-08 @ 07:39  c-ANCA titer -- 12-08 @ 07:39  c-ANCA -- 12-08 @ 07:39  p-ANCA -- 12-08 @ 07:39              CULTURES: (if applicable)          Physical Examination:  PULM: Decreased BS L base  CVS: S1, S2 RRR    RADIOLOGY REVIEWED  CXR: 12/19: Improved L pleural effusion

## 2017-12-19 NOTE — PROGRESS NOTE ADULT - SUBJECTIVE AND OBJECTIVE BOX
MEDICINE, PROGRESS NOTE [phone]    FRAN ALEXANDRA 71y [id number]    Patient seen and examined.  Patient is a 71y old  Female who presents with a chief complaint of shortness of breath, volume overload and ascites (11 Dec 2017 11:41)  Pt feels better.    PAST MEDICAL & SURGICAL HISTORY:  Hyperthyroidism  JOSE (obstructive sleep apnea)  Epistaxis  GIB (gastrointestinal bleeding)  CAD S/P percutaneous coronary angioplasty  Afib  Pulmonary HTN  GERD (gastroesophageal reflux disease)  Obesity  Cardiomegaly  Valvular heart disease  COPD (chronic obstructive pulmonary disease): Home O2  Sleep Apnea: by criteria  Loose, teeth: 3 bottom front loose teeth  Female stress incontinence  Shoulder pain, right  Constipation  Arthritis of Knee  H/O heartburn  History of lung cancer  Obese  Hx of hyperlipidemia  Asthma  Diabetes mellitus: type 2  dx about 4-5 years ago   no daily fingerstick  H/O: HTN (hypertension)  Enlarged lymph nodes  Lymph nodes enlarged: s/p biopsy mediastinal  benign  H/O endoscopy  left upper lobectomy    MEDICATIONS  (STANDING):  allopurinol 200 milliGRAM(s) Oral daily  aspirin enteric coated 81 milliGRAM(s) Oral daily  atorvastatin 20 milliGRAM(s) Oral at bedtime  buMETAnide Injectable 2 milliGRAM(s) IV Push two times a day  clopidogrel Tablet 75 milliGRAM(s) Oral daily  digoxin     Tablet 0.125 milliGRAM(s) Oral every other day  diltiazem Infusion 15 mG/Hr (15 mL/Hr) IV Continuous <Continuous>  docusate sodium 100 milliGRAM(s) Oral two times a day  epoetin hilda Injectable 18245 Unit(s) SubCutaneous <User Schedule>  heparin  Injectable 5000 Unit(s) SubCutaneous every 8 hours  macitentan (OPSUMIT) 10 milliGRAM(s) 10 milliGRAM(s) Oral daily  methimazole 5 milliGRAM(s) Oral daily  methylPREDNISolone sodium succinate Injectable 20 milliGRAM(s) IV Push daily  metolazone 5 milliGRAM(s) Oral daily  pantoprazole    Tablet 40 milliGRAM(s) Oral before breakfast  predniSONE   Tablet 5 milliGRAM(s) Oral daily  roflumilast 500 MICROGram(s) Oral daily  tiotropium 2.5 MICROgram(s)/olodaterol 2.5 MICROgram(s) Inhaler 2 Puff(s) Inhalation daily    MEDICATIONS  (PRN):  acetaminophen   Tablet 650 milliGRAM(s) Oral every 6 hours PRN Moderate Pain (4 - 6)  ipratropium    for Nebulization 500 MICROGram(s) Nebulizer every 6 hours PRN Shortness of Breath and/or Wheezing  ondansetron    Tablet 4 milliGRAM(s) Oral every 8 hours PRN Nausea and/or Vomiting  sodium chloride 0.65% Nasal 1 Spray(s) Both Nostrils five times a day PRN dry nose    Allergies    No Known Allergies    Intolerances    albuterol (Unknown)      PHYSICAL EXAM:  Constitutional: NAD  HEENT: Normocephalic, EOMI  Neck:  No JVD  Respiratory: CTA B/L, No wheezes  Cardiovascular: S1, S2, RRR, + systolic murmur  Gastrointestinal: BS+, soft, NT/ND  Extremities: No peripheral edema  Neurological: AAOX3, no focal deficits  Psychiatric: Normal mood, normal affect  : No Gottlieb    Vital Signs Last 24 Hrs  T(C): 37 (19 Dec 2017 13:12), Max: 37 (19 Dec 2017 13:12)  T(F): 98.6 (19 Dec 2017 13:12), Max: 98.6 (19 Dec 2017 13:12)  HR: 96 (19 Dec 2017 18:26) (90 - 134)  BP: 124/68 (19 Dec 2017 18:26) (90/60 - 124/68)  BP(mean): --  RR: 19 (19 Dec 2017 18:26) (18 - 19)  SpO2: 96% (19 Dec 2017 18:26) (96% - 97%)  I&O's Summary    18 Dec 2017 07:01  -  19 Dec 2017 07:00  --------------------------------------------------------  IN: 1375 mL / OUT: 1610 mL / NET: -235 mL    19 Dec 2017 07:01  -  19 Dec 2017 20:32  --------------------------------------------------------  IN: 150 mL / OUT: 900 mL / NET: -750 mL        LABS:                        8.6    5.00  )-----------( 224      ( 19 Dec 2017 07:48 )             28.6     12-19    136  |  85<L>  |  64<H>  ----------------------------<  181<H>  4.3   |  38<H>  |  1.61<H>    Ca    9.3      19 Dec 2017 07:33  Phos  3.2     12-19  Mg     2.3     12-19    TPro  6.1  /  Alb  3.1<L>  /  TBili  0.4  /  DBili  x   /  AST  15  /  ALT  4<L>  /  AlkPhos  44  12-19        Magnesium, Serum: 2.3 mg/dL (12-19 @ 07:33)

## 2017-12-19 NOTE — PROGRESS NOTE ADULT - PROBLEM SELECTOR PLAN 1
2nd compressive atelectasis from pleural effusions, pulmonary edema and restrictive disease from ascites 2nd cor pulmonale   -Keep sp02>90% on supplemental oxygen  -s/p 8.1L paracentesis 12/6  -output>input with bumex/zaroxolyn  -CXR with improved L pleural effusion

## 2017-12-19 NOTE — PROGRESS NOTE ADULT - PROBLEM SELECTOR PLAN 1
monitor telemetry   keep K+>4, MG++>2   supplement as needed   start Cardizem infusion , hold for systolic less than 90 HR less than 100  continue digoxin   Hold oral  Cardizem  while on IV cardizem  consider Anticoagulation in this patient with CHADS VASC score of 5  no coffee no caffeine diet

## 2017-12-19 NOTE — PROGRESS NOTE ADULT - SUBJECTIVE AND OBJECTIVE BOX
HPI: HR 1-00 120's aflutter over night on Cardizem infusion at 15mg hr    MEDICATIONS  (STANDING):  allopurinol 200 milliGRAM(s) Oral daily  aspirin enteric coated 81 milliGRAM(s) Oral daily  atorvastatin 20 milliGRAM(s) Oral at bedtime  buMETAnide Injectable 2 milliGRAM(s) IV Push two times a day  clopidogrel Tablet 75 milliGRAM(s) Oral daily  digoxin     Tablet 0.125 milliGRAM(s) Oral every other day  diltiazem    milliGRAM(s) Oral daily  diltiazem Infusion 15 mG/Hr (15 mL/Hr) IV Continuous <Continuous>  docusate sodium 100 milliGRAM(s) Oral two times a day  epoetin hilda Injectable 92880 Unit(s) SubCutaneous <User Schedule>  heparin  Injectable 5000 Unit(s) SubCutaneous every 8 hours  macitentan (OPSUMIT) 10 milliGRAM(s) 10 milliGRAM(s) Oral daily  methimazole 5 milliGRAM(s) Oral daily  methylPREDNISolone sodium succinate Injectable 20 milliGRAM(s) IV Push daily  metolazone 5 milliGRAM(s) Oral daily  pantoprazole    Tablet 40 milliGRAM(s) Oral before breakfast  predniSONE   Tablet 5 milliGRAM(s) Oral daily  roflumilast 500 MICROGram(s) Oral daily  tiotropium 2.5 MICROgram(s)/olodaterol 2.5 MICROgram(s) Inhaler 2 Puff(s) Inhalation daily    MEDICATIONS  (PRN):  acetaminophen   Tablet 650 milliGRAM(s) Oral every 6 hours PRN Moderate Pain (4 - 6)  ipratropium    for Nebulization 500 MICROGram(s) Nebulizer every 6 hours PRN Shortness of Breath and/or Wheezing  ondansetron    Tablet 4 milliGRAM(s) Oral every 8 hours PRN Nausea and/or Vomiting  sodium chloride 0.65% Nasal 1 Spray(s) Both Nostrils five times a day PRN dry nose    Allergies No Known Allergies, intolerance to albuterol    Constitutional: denies fever, chills, fatigue  Neuro: denies headache, weakness, dizziness  Resp: states dry cough no change in SOB   CVS: denies chest pain, palpitations, leg swelling  GI: denies abdominal pain, nausea, vomiting, diarrhea   : denies dysuria, frequency, incontinence  Skin: denies itching, burning, rashes, or lesions   Msk: link/o pain right foot center foot with ambulation     Vital Signs Last 24 Hrs  T(C): 36.7 (19 Dec 2017 09:56), Max: 36.7 (18 Dec 2017 11:48)  T(F): 98 (19 Dec 2017 09:56), Max: 98 (18 Dec 2017 11:48)  HR: 123 (19 Dec 2017 09:56) (100 - 137)  BP: 123/78 (19 Dec 2017 09:56) (90/60 - 138/76)  RR: 19 (19 Dec 2017 09:56) (18 - 19)  SpO2: 97% (19 Dec 2017 09:56) (97% - 98%)    Physical Exam:  General : sitting on edge bed pursed lip breathing, NAD having just used commode  Neuro : Alert and oriented x 3, no focal deficits  HEENT : Sclera clear, neck supple  Lungs: Clear to Ascultation diminished , no wheezing , rales or rhonchi   Cardiovascular : irreg po and rhythm , no murmurs, no rubs  GI : abdomen soft, NT, ND, + BS   : no suprapubic tenderness  Extremities : + 2 feet calves  feet warm , right foot   Skin : intact     TELE: A Flutter 120's  EKG:    LABS:                        8.6    5.00  )-----------( 224      ( 19 Dec 2017 07:48 )             28.6     12-19    136  |  85<L>  |  64<H>  ----------------------------<  181<H>  4.3   |  38<H>  |  1.61<H>    Ca    9.3      19 Dec 2017 07:33  Phos  3.2     12-19  Mg     2.3     12-19  TSH Receptor Antibody: <1.00: -------------------ADDITIONAL INFORMATION-------------------  At a decision limit of 1.75 IU/L, this assay  has 97% sensitivity and 99% specificity for  detection of Graves' disease. In healthy  individuals and in patients with thyroid disease  without diagnosis of Graves' disease, the upper  limit of anti-TSHR values are 1.22 IU/L and  1.58 IU/L, respectively (97.5th percentiles).  Test Performed by:  [place] [id number]  : J Luis Lyman II, M.D., Ph.D. IU/L (05.26.16 @ 23:53)  TPro  6.1  /  Alb  3.1<L>  /  TBili  0.4  /  DBili  x   /  AST  15  /  ALT  4<L>  /  AlkPhos  44  12-19    RADIOLOGY & ADDITIONAL TESTS:

## 2017-12-19 NOTE — PROGRESS NOTE ADULT - ASSESSMENT
71 year old female with CAD, sev pHTN, dCHF, HTN, DM, Afib, lung CA, COPD and CKD 3 p/w acute on chronic dCHF, ascites, pleural effusion, pulm edema and acute on chronic kidney injury c/b A.Fib with RVR.    - CKD stage 3: likely due to HTN and DM  - CASSANDRA: non oliguric. Likely prerenal due to ineffective circulating volume. Renal function overall stable  - acute on chronic dCHF: volume overload slowly improving with good diuresis, weight loss and net negative balance.   - GI: s/p LVP  -Hypokalemia    Recommendation  - continue bumex 2 mg IV BID and metolazone 5 mg po daily  - dobutamine will not be entertained again and I doubt Primacor will help  - Can we change cardizem 60mg po q 6 hours.  At present on cardizem gtt  and digoxin per primary  - Pt has had nose bleeds and stopped her A/C as outpt and is aware of 10 percent of CVA with afib

## 2017-12-19 NOTE — PROGRESS NOTE ADULT - ASSESSMENT
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 Recent admission at [facility] approximately 5 weeks ago for large volume paracentesis (6.1L removed). Now presents again with RV failure-worsened LE edema, recurrent ascites, pleural effusions and pulmonary edema with worsened dyspnea and CASSANDRA on CKD. s/p 8.1 L paracentesis on 12/6. Started on dobutamine gtt for pHTN, discontinued 2nd Afib with RVR to 170s.

## 2017-12-19 NOTE — PROGRESS NOTE ADULT - ASSESSMENT
71 year old female  CAD s/p PCI, COPD, T2DM history of severe pHTN complicated by RV failure (on 3L NC at home), Afib not on AC, a/w SOB and abdominal distention, which had been going on for about 4 weeks.  afib since admission, on PO diltiazem daily and digoxin qod.  ADHF, dobutamine stopped due to HR 170s. received 1 dose Digoxin  0.125 IV, continues with Afib flutter 120 's on telemetry , patient CHADs vasc 5 states she has been on coumdain in the past and has had multiple "hemorrhages" from her nose/PRBC x 2 , frequent packing and visits to the ENT multiple times  and refuses to take AC.

## 2017-12-19 NOTE — PROGRESS NOTE ADULT - SUBJECTIVE AND OBJECTIVE BOX
NEPHROLOGY-[facility] [phone]        Patient seen and examined in bed.  She felt well and offered no complaints.  On cardizem gtt and the heart rate is better controlled        MEDICATIONS  (STANDING):  allopurinol 200 milliGRAM(s) Oral daily  aspirin enteric coated 81 milliGRAM(s) Oral daily  atorvastatin 20 milliGRAM(s) Oral at bedtime  buMETAnide Injectable 2 milliGRAM(s) IV Push two times a day  clopidogrel Tablet 75 milliGRAM(s) Oral daily  digoxin     Tablet 0.125 milliGRAM(s) Oral every other day  diltiazem    milliGRAM(s) Oral daily  diltiazem Infusion 15 mG/Hr (15 mL/Hr) IV Continuous <Continuous>  docusate sodium 100 milliGRAM(s) Oral two times a day  epoetin hilda Injectable 96623 Unit(s) SubCutaneous <User Schedule>  heparin  Injectable 5000 Unit(s) SubCutaneous every 8 hours  macitentan (OPSUMIT) 10 milliGRAM(s) 10 milliGRAM(s) Oral daily  methimazole 5 milliGRAM(s) Oral daily  methylPREDNISolone sodium succinate Injectable 20 milliGRAM(s) IV Push daily  metolazone 5 milliGRAM(s) Oral daily  pantoprazole    Tablet 40 milliGRAM(s) Oral before breakfast  predniSONE   Tablet 5 milliGRAM(s) Oral daily  roflumilast 500 MICROGram(s) Oral daily  tiotropium 2.5 MICROgram(s)/olodaterol 2.5 MICROgram(s) Inhaler 2 Puff(s) Inhalation daily      VITAL:  T(C): , Max: 36.7 (12-18-17 @ 11:48)  T(F): , Max: 98 (12-18-17 @ 11:48)  HR: 123 (12-19-17 @ 09:56)  BP: 123/78 (12-19-17 @ 09:56)  BP(mean): --  RR: 19 (12-19-17 @ 09:56)  SpO2: 97% (12-19-17 @ 09:56)  Wt(kg): --    I and O's:    12-18 @ 07:01  -  12-19 @ 07:00  --------------------------------------------------------  IN: 1375 mL / OUT: 1610 mL / NET: -235 mL          PHYSICAL EXAM:    Constitutional: NAD  HEENT: PERRLA    Neck:  No JVD  Respiratory: CTAB/L  Cardiovascular: S1 and S2  Gastrointestinal: BS+, soft, NT+ distention;  Extremities: + peripheral edema but wrinkled  Neurological: A/O x 3, no focal deficits  Psychiatric: Normal mood, normal affect  : No Gottlieb  Skin: No rashes  Access: Not applicable    LABS:                        8.6    5.00  )-----------( 224      ( 19 Dec 2017 07:48 )             28.6     12-19    136  |  85<L>  |  64<H>  ----------------------------<  181<H>  4.3   |  38<H>  |  1.61<H>    Ca    9.3      19 Dec 2017 07:33  Phos  3.2     12-19  Mg     2.3     12-19    TPro  6.1  /  Alb  3.1<L>  /  TBili  0.4  /  DBili  x   /  AST  15  /  ALT  4<L>  /  AlkPhos  44  12-19          Urine Studies:          RADIOLOGY & ADDITIONAL STUDIES:

## 2017-12-19 NOTE — PROGRESS NOTE ADULT - PROBLEM SELECTOR PLAN 5
-c/w Digoxin, Metoprolol  -Now on cardizem gtt  -d/c CD cardizem  -Consider q6 PO dosing, d/w Dr. Cobb

## 2017-12-19 NOTE — PROGRESS NOTE ADULT - ASSESSMENT
severe copd  severe phtn  bucky  anemia  vol overload  s/p large volume paracentesis  Afib with rvr    continue current care  will consider switch to cardizem 90 mg q6 and taper off drip by alix.  will consider possible abd drain

## 2017-12-19 NOTE — PROGRESS NOTE ADULT - PROBLEM SELECTOR PLAN 4
-Duoneb q6, change to Atrovent PRN 2nd Afib with RVR  -Stiolto BID (patient will use home med)  -Daliresp qd  -Prednisone 5mg qd

## 2017-12-20 LAB
ANION GAP SERPL CALC-SCNC: 14 MMOL/L — SIGNIFICANT CHANGE UP (ref 5–17)
BASOPHILS # BLD AUTO: 0 K/UL — SIGNIFICANT CHANGE UP (ref 0–0.2)
BASOPHILS NFR BLD AUTO: 0 % — SIGNIFICANT CHANGE UP (ref 0–2)
BUN SERPL-MCNC: 75 MG/DL — HIGH (ref 7–23)
CALCIUM SERPL-MCNC: 9.6 MG/DL — SIGNIFICANT CHANGE UP (ref 8.4–10.5)
CHLORIDE SERPL-SCNC: 83 MMOL/L — LOW (ref 96–108)
CO2 SERPL-SCNC: 39 MMOL/L — HIGH (ref 22–31)
CREAT SERPL-MCNC: 2.04 MG/DL — HIGH (ref 0.5–1.3)
EOSINOPHIL # BLD AUTO: 0.02 K/UL — SIGNIFICANT CHANGE UP (ref 0–0.5)
EOSINOPHIL NFR BLD AUTO: 0.3 % — SIGNIFICANT CHANGE UP (ref 0–6)
GLUCOSE SERPL-MCNC: 135 MG/DL — HIGH (ref 70–99)
HCT VFR BLD CALC: 27.8 % — LOW (ref 34.5–45)
HGB BLD-MCNC: 8.5 G/DL — LOW (ref 11.5–15.5)
IMM GRANULOCYTES NFR BLD AUTO: 0.2 % — SIGNIFICANT CHANGE UP (ref 0–1.5)
LYMPHOCYTES # BLD AUTO: 0.27 K/UL — LOW (ref 1–3.3)
LYMPHOCYTES # BLD AUTO: 4.2 % — LOW (ref 13–44)
MCHC RBC-ENTMCNC: 26.9 PG — LOW (ref 27–34)
MCHC RBC-ENTMCNC: 30.6 GM/DL — LOW (ref 32–36)
MCV RBC AUTO: 88 FL — SIGNIFICANT CHANGE UP (ref 80–100)
MONOCYTES # BLD AUTO: 0.49 K/UL — SIGNIFICANT CHANGE UP (ref 0–0.9)
MONOCYTES NFR BLD AUTO: 7.6 % — SIGNIFICANT CHANGE UP (ref 2–14)
NEUTROPHILS # BLD AUTO: 5.67 K/UL — SIGNIFICANT CHANGE UP (ref 1.8–7.4)
NEUTROPHILS NFR BLD AUTO: 87.7 % — HIGH (ref 43–77)
PLATELET # BLD AUTO: 226 K/UL — SIGNIFICANT CHANGE UP (ref 150–400)
POTASSIUM SERPL-MCNC: 4.1 MMOL/L — SIGNIFICANT CHANGE UP (ref 3.5–5.3)
POTASSIUM SERPL-SCNC: 4.1 MMOL/L — SIGNIFICANT CHANGE UP (ref 3.5–5.3)
RBC # BLD: 3.16 M/UL — LOW (ref 3.8–5.2)
RBC # FLD: 18.5 % — HIGH (ref 10.3–14.5)
SODIUM SERPL-SCNC: 136 MMOL/L — SIGNIFICANT CHANGE UP (ref 135–145)
WBC # BLD: 6.46 K/UL — SIGNIFICANT CHANGE UP (ref 3.8–10.5)
WBC # FLD AUTO: 6.46 K/UL — SIGNIFICANT CHANGE UP (ref 3.8–10.5)

## 2017-12-20 RX ORDER — METOPROLOL TARTRATE 50 MG
25 TABLET ORAL EVERY 8 HOURS
Qty: 0 | Refills: 0 | Status: DISCONTINUED | OUTPATIENT
Start: 2017-12-20 | End: 2018-01-05

## 2017-12-20 RX ORDER — BUMETANIDE 0.25 MG/ML
1 INJECTION INTRAMUSCULAR; INTRAVENOUS
Qty: 0 | Refills: 0 | Status: DISCONTINUED | OUTPATIENT
Start: 2017-12-21 | End: 2017-12-22

## 2017-12-20 RX ADMIN — Medication 15 MG/HR: at 17:29

## 2017-12-20 RX ADMIN — Medication 200 MILLIGRAM(S): at 11:54

## 2017-12-20 RX ADMIN — HEPARIN SODIUM 5000 UNIT(S): 5000 INJECTION INTRAVENOUS; SUBCUTANEOUS at 08:13

## 2017-12-20 RX ADMIN — Medication 5 MILLIGRAM(S): at 08:13

## 2017-12-20 RX ADMIN — HEPARIN SODIUM 5000 UNIT(S): 5000 INJECTION INTRAVENOUS; SUBCUTANEOUS at 17:22

## 2017-12-20 RX ADMIN — ATORVASTATIN CALCIUM 20 MILLIGRAM(S): 80 TABLET, FILM COATED ORAL at 23:12

## 2017-12-20 RX ADMIN — Medication 25 MILLIGRAM(S): at 23:12

## 2017-12-20 RX ADMIN — Medication 0.12 MILLIGRAM(S): at 11:54

## 2017-12-20 RX ADMIN — Medication 81 MILLIGRAM(S): at 11:54

## 2017-12-20 RX ADMIN — TIOTROPIUM BROMIDE AND OLODATEROL 2 PUFF(S): 3.124; 2.736 SPRAY, METERED RESPIRATORY (INHALATION) at 08:15

## 2017-12-20 RX ADMIN — HEPARIN SODIUM 5000 UNIT(S): 5000 INJECTION INTRAVENOUS; SUBCUTANEOUS at 23:12

## 2017-12-20 RX ADMIN — ROFLUMILAST 500 MICROGRAM(S): 500 TABLET ORAL at 11:36

## 2017-12-20 RX ADMIN — BUMETANIDE 2 MILLIGRAM(S): 0.25 INJECTION INTRAMUSCULAR; INTRAVENOUS at 05:52

## 2017-12-20 RX ADMIN — Medication 20 MILLIGRAM(S): at 05:52

## 2017-12-20 RX ADMIN — Medication 15 MG/HR: at 08:12

## 2017-12-20 RX ADMIN — CLOPIDOGREL BISULFATE 75 MILLIGRAM(S): 75 TABLET, FILM COATED ORAL at 11:53

## 2017-12-20 RX ADMIN — ERYTHROPOIETIN 10000 UNIT(S): 10000 INJECTION, SOLUTION INTRAVENOUS; SUBCUTANEOUS at 11:54

## 2017-12-20 NOTE — PROGRESS NOTE ADULT - SUBJECTIVE AND OBJECTIVE BOX
Follow-up Pulm Progress Note    No new respiratory events overnight.  Denies SOB/CP.   97% on 4.5L NC    Medications:  MEDICATIONS  (STANDING):  allopurinol 200 milliGRAM(s) Oral daily  aspirin enteric coated 81 milliGRAM(s) Oral daily  atorvastatin 20 milliGRAM(s) Oral at bedtime  clopidogrel Tablet 75 milliGRAM(s) Oral daily  digoxin     Tablet 0.125 milliGRAM(s) Oral every other day  diltiazem    Tablet 90 milliGRAM(s) Oral every 6 hours  diltiazem Infusion 15 mG/Hr (15 mL/Hr) IV Continuous <Continuous>  docusate sodium 100 milliGRAM(s) Oral two times a day  epoetin hilda Injectable 30741 Unit(s) SubCutaneous <User Schedule>  heparin  Injectable 5000 Unit(s) SubCutaneous every 8 hours  macitentan (OPSUMIT) 10 milliGRAM(s) 10 milliGRAM(s) Oral daily  methimazole 5 milliGRAM(s) Oral daily  methylPREDNISolone sodium succinate Injectable 20 milliGRAM(s) IV Push daily  metolazone 5 milliGRAM(s) Oral daily  metoprolol     tartrate 25 milliGRAM(s) Oral every 8 hours  pantoprazole    Tablet 40 milliGRAM(s) Oral before breakfast  predniSONE   Tablet 5 milliGRAM(s) Oral daily  roflumilast 500 MICROGram(s) Oral daily  tiotropium 2.5 MICROgram(s)/olodaterol 2.5 MICROgram(s) Inhaler 2 Puff(s) Inhalation daily    MEDICATIONS  (PRN):  acetaminophen   Tablet 650 milliGRAM(s) Oral every 6 hours PRN Moderate Pain (4 - 6)  ipratropium    for Nebulization 500 MICROGram(s) Nebulizer every 6 hours PRN Shortness of Breath and/or Wheezing  ondansetron    Tablet 4 milliGRAM(s) Oral every 8 hours PRN Nausea and/or Vomiting  sodium chloride 0.65% Nasal 1 Spray(s) Both Nostrils five times a day PRN dry nose          Vital Signs Last 24 Hrs  T(C): 36.6 (20 Dec 2017 11:33), Max: 36.8 (19 Dec 2017 21:26)  T(F): 97.9 (20 Dec 2017 11:33), Max: 98.3 (19 Dec 2017 21:26)  HR: 110 (20 Dec 2017 11:33) (84 - 118)  BP: 124/64 (20 Dec 2017 11:33) (108/64 - 126/59)  BP(mean): --  RR: 17 (20 Dec 2017 11:33) (17 - 19)  SpO2: 94% (20 Dec 2017 11:33) (94% - 100%) on 4.5L NC          12-19 @ 07:01  -  12-20 @ 07:00  --------------------------------------------------------  IN: 530 mL / OUT: 2150 mL / NET: -1620 mL          LABS:                        8.5    6.46  )-----------( 226      ( 20 Dec 2017 07:26 )             27.8     12-20    136  |  83<L>  |  75<H>  ----------------------------<  135<H>  4.1   |  39<H>  |  2.04<H>    Ca    9.6      20 Dec 2017 07:26  Phos  3.2     12-19  Mg     2.3     12-19    TPro  6.1  /  Alb  3.1<L>  /  TBili  0.4  /  DBili  x   /  AST  15  /  ALT  4<L>  /  AlkPhos  44  12-19          CAPILLARY BLOOD GLUCOSE                GAGAN Negative 12-08 @ 07:39  Anti SS-1 --  Anti SS-2 --  Anti RNP --  RF 15.0 12-08 @ 07:39    Atypical ANCA -- 12-08 @ 07:39  c-ANCA titer -- 12-08 @ 07:39  c-ANCA -- 12-08 @ 07:39  p-ANCA -- 12-08 @ 07:39              CULTURES: (if applicable)          Physical Examination:  PULM: Decreased BS L base  CVS: S1, S2 heard    RADIOLOGY REVIEWED  CXR: Improved L pleural effusion

## 2017-12-20 NOTE — PROGRESS NOTE ADULT - SUBJECTIVE AND OBJECTIVE BOX
MEDICINE, PROGRESS NOTE [phone]    FRAN ALEXANDRA 72y [id number]    Patient seen and examined.  Patient is a 72y old  Female who presents with a chief complaint of shortness of breath, volume overload and ascites (11 Dec 2017 11:41)  Pt feels a little more full in abd.    PAST MEDICAL & SURGICAL HISTORY:  Hyperthyroidism  JOSE (obstructive sleep apnea)  Epistaxis  GIB (gastrointestinal bleeding)  CAD S/P percutaneous coronary angioplasty  Afib  Pulmonary HTN  GERD (gastroesophageal reflux disease)  Obesity  Cardiomegaly  Valvular heart disease  COPD (chronic obstructive pulmonary disease): Home O2  Sleep Apnea: by criteria  Loose, teeth: 3 bottom front loose teeth  Female stress incontinence  Shoulder pain, right  Constipation  Arthritis of Knee  H/O heartburn  History of lung cancer  Obese  Hx of hyperlipidemia  Asthma  Diabetes mellitus: type 2  dx about 4-5 years ago   no daily fingerstick  H/O: HTN (hypertension)  Enlarged lymph nodes  Lymph nodes enlarged: s/p biopsy mediastinal  benign  H/O endoscopy  left upper lobectomy    MEDICATIONS  (STANDING):  allopurinol 200 milliGRAM(s) Oral daily  aspirin enteric coated 81 milliGRAM(s) Oral daily  atorvastatin 20 milliGRAM(s) Oral at bedtime  clopidogrel Tablet 75 milliGRAM(s) Oral daily  digoxin     Tablet 0.125 milliGRAM(s) Oral every other day  diltiazem Infusion 15 mG/Hr (15 mL/Hr) IV Continuous <Continuous>  docusate sodium 100 milliGRAM(s) Oral two times a day  epoetin hilda Injectable 73863 Unit(s) SubCutaneous <User Schedule>  heparin  Injectable 5000 Unit(s) SubCutaneous every 8 hours  macitentan (OPSUMIT) 10 milliGRAM(s) 10 milliGRAM(s) Oral daily  methimazole 5 milliGRAM(s) Oral daily  methylPREDNISolone sodium succinate Injectable 20 milliGRAM(s) IV Push daily  metolazone 5 milliGRAM(s) Oral daily  pantoprazole    Tablet 40 milliGRAM(s) Oral before breakfast  predniSONE   Tablet 5 milliGRAM(s) Oral daily  roflumilast 500 MICROGram(s) Oral daily  tiotropium 2.5 MICROgram(s)/olodaterol 2.5 MICROgram(s) Inhaler 2 Puff(s) Inhalation daily    MEDICATIONS  (PRN):  acetaminophen   Tablet 650 milliGRAM(s) Oral every 6 hours PRN Moderate Pain (4 - 6)  ipratropium    for Nebulization 500 MICROGram(s) Nebulizer every 6 hours PRN Shortness of Breath and/or Wheezing  ondansetron    Tablet 4 milliGRAM(s) Oral every 8 hours PRN Nausea and/or Vomiting  sodium chloride 0.65% Nasal 1 Spray(s) Both Nostrils five times a day PRN dry nose    Allergies    No Known Allergies    Intolerances    albuterol (Unknown)      PHYSICAL EXAM:  Constitutional: NAD  HEENT: Normocephalic, EOMI  Neck:  No JVD  Respiratory: CTA B/L, No wheezes  Cardiovascular: S1, S2, RRR, + systolic murmur  Gastrointestinal: BS+, soft, NT, distended  Extremities: +  peripheral edema le b/l mild  Neurological: AAOX3, no focal deficits  Psychiatric: Normal mood, normal affect  : No Gottlieb    Vital Signs Last 24 Hrs  T(C): 36.6 (20 Dec 2017 11:33), Max: 36.8 (19 Dec 2017 21:26)  T(F): 97.9 (20 Dec 2017 11:33), Max: 98.3 (19 Dec 2017 21:26)  HR: 110 (20 Dec 2017 11:33) (84 - 118)  BP: 124/64 (20 Dec 2017 11:33) (108/64 - 126/59)  BP(mean): --  RR: 17 (20 Dec 2017 11:33) (17 - 19)  SpO2: 94% (20 Dec 2017 11:33) (94% - 100%)  I&O's Summary    19 Dec 2017 07:01  -  20 Dec 2017 07:00  --------------------------------------------------------  IN: 530 mL / OUT: 2150 mL / NET: -1620 mL    20 Dec 2017 07:01  -  20 Dec 2017 16:18  --------------------------------------------------------  IN: 0 mL / OUT: 1100 mL / NET: -1100 mL        LABS:                        8.5    6.46  )-----------( 226      ( 20 Dec 2017 07:26 )             27.8     12-20    136  |  83<L>  |  75<H>  ----------------------------<  135<H>  4.1   |  39<H>  |  2.04<H>    Ca    9.6      20 Dec 2017 07:26  Phos  3.2     12-19  Mg     2.3     12-19    TPro  6.1  /  Alb  3.1<L>  /  TBili  0.4  /  DBili  x   /  AST  15  /  ALT  4<L>  /  AlkPhos  44  12-19

## 2017-12-20 NOTE — PROGRESS NOTE ADULT - PROBLEM SELECTOR PLAN 5
-c/w Digoxin, Metoprolol  -Now on cardizem gtt  -PO cardizem now restarted 90mg q6  -Consider q6 PO dosing, d/w Dr. Cobb

## 2017-12-20 NOTE — CHART NOTE - NSCHARTNOTEFT_GEN_A_CORE
[id number] Called  by  Rn  that  pt  had  8  beats  of  wide  complex.   Pt  was seen  and  examined.  Pt  denies  any  pain  cp  or  palpitation.  Pt  hemodynamically  stable.  Follow  up  with  primary  team  in  the  am.

## 2017-12-20 NOTE — PROGRESS NOTE ADULT - ASSESSMENT
severe copd  severe phtn  bucky  anemia  vol overload  s/p large volume paracentesis  Afib with rvr    continue current care  transition to po diuretics  transition to cardizem 90 mg po q 6  may add beta-blocker as needed  d/c planning

## 2017-12-20 NOTE — PROGRESS NOTE ADULT - SUBJECTIVE AND OBJECTIVE BOX
[facility] [phone]        Patient seen and examined in bed.  She is still on Cardizem gtt  Breathing is about the same        MEDICATIONS  (STANDING):  allopurinol 200 milliGRAM(s) Oral daily  aspirin enteric coated 81 milliGRAM(s) Oral daily  atorvastatin 20 milliGRAM(s) Oral at bedtime  clopidogrel Tablet 75 milliGRAM(s) Oral daily  digoxin     Tablet 0.125 milliGRAM(s) Oral every other day  diltiazem Infusion 15 mG/Hr (15 mL/Hr) IV Continuous <Continuous>  docusate sodium 100 milliGRAM(s) Oral two times a day  epoetin hilda Injectable 96464 Unit(s) SubCutaneous <User Schedule>  heparin  Injectable 5000 Unit(s) SubCutaneous every 8 hours  macitentan (OPSUMIT) 10 milliGRAM(s) 10 milliGRAM(s) Oral daily  methimazole 5 milliGRAM(s) Oral daily  methylPREDNISolone sodium succinate Injectable 20 milliGRAM(s) IV Push daily  metolazone 5 milliGRAM(s) Oral daily  pantoprazole    Tablet 40 milliGRAM(s) Oral before breakfast  predniSONE   Tablet 5 milliGRAM(s) Oral daily  roflumilast 500 MICROGram(s) Oral daily  tiotropium 2.5 MICROgram(s)/olodaterol 2.5 MICROgram(s) Inhaler 2 Puff(s) Inhalation daily      VITAL:  T(C): , Max: 37 (12-19-17 @ 13:12)  T(F): , Max: 98.6 (12-19-17 @ 13:12)  HR: 84 (12-20-17 @ 04:57)  BP: 108/64 (12-20-17 @ 04:57)  BP(mean): --  RR: 18 (12-20-17 @ 04:57)  SpO2: 100% (12-20-17 @ 04:57)  Wt(kg): --    I and O's:    12-19 @ 07:01  -  12-20 @ 07:00  --------------------------------------------------------  IN: 530 mL / OUT: 2150 mL / NET: -1620 mL    12-20 @ 07:01  -  12-20 @ 10:32  --------------------------------------------------------  IN: 0 mL / OUT: 400 mL / NET: -400 mL          PHYSICAL EXAM:    Constitutional: NAD  HEENT: PERRLA    Neck:  +  JVD  Respiratory: CTAB/L  Cardiovascular: S1 and S2  Gastrointestinal: BS+, soft, NT   Extremities: wrinkled peripheral edema  Neurological: A/O x 3, no focal deficits  Psychiatric: Normal mood, normal affect  : No Gottlieb  Skin: No rashes  Access: Not applicable    LABS:                        8.5    6.46  )-----------( 226      ( 20 Dec 2017 07:26 )             27.8     12-20    136  |  83<L>  |  75<H>  ----------------------------<  135<H>  4.1   |  39<H>  |  2.04<H>    Ca    9.6      20 Dec 2017 07:26  Phos  3.2     12-19  Mg     2.3     12-19    TPro  6.1  /  Alb  3.1<L>  /  TBili  0.4  /  DBili  x   /  AST  15  /  ALT  4<L>  /  AlkPhos  44  12-19          Urine Studies:          RADIOLOGY & ADDITIONAL STUDIES:

## 2017-12-20 NOTE — PROGRESS NOTE ADULT - ASSESSMENT
71 year old female with CAD, sev pHTN, dCHF, HTN, DM, Afib, lung CA, COPD and CKD 3 p/w acute on chronic dCHF, ascites, pleural effusion, pulm edema and acute on chronic kidney injury c/b A.Fib with RVR.    - CKD stage 3: likely due to HTN and DM  - CASSANDRA: non oliguric. Likely prerenal due to ineffective circulating volume. Renal function overall stable  - acute on chronic dCHF: volume overload slowly improving with good diuresis, weight loss and net negative balance.   - GI: s/p LVP  - Possible contraction alkalosis? with renal azotemia  - Rapid aflutter    Recommendation  - DC  bumex 2 mg IV BID and change to PO route and 1mg po bid in am/  Cont metolazone 5 mg po daily  - dobutamine will not be entertained again and I doubt Primacor will help  - Can we change cardizem 60mg po q 6 hours.  At present on cardizem gtt  and digoxin per primary  - Pt has had nose bleeds and stopped her A/C as outpt and is aware of  around 10 percent of CVA with afib  -Check VBG to ascertain acid status

## 2017-12-20 NOTE — PROGRESS NOTE ADULT - ASSESSMENT
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 Recent admission at [facility] approximately 5 weeks ago for large volume paracentesis (6.1L removed). Now presents again with RV failure-worsened LE edema, recurrent ascites, pleural effusions and pulmonary edema with worsened dyspnea and CASSANDRA on CKD. s/p 8.1 L paracentesis on 12/6. Started on dobutamine gtt for pHTN, discontinued 2nd Afib with RVR to 170s.

## 2017-12-21 LAB
ANION GAP SERPL CALC-SCNC: 10 MMOL/L — SIGNIFICANT CHANGE UP (ref 5–17)
BASOPHILS # BLD AUTO: 0 K/UL — SIGNIFICANT CHANGE UP (ref 0–0.2)
BASOPHILS NFR BLD AUTO: 0 % — SIGNIFICANT CHANGE UP (ref 0–2)
BUN SERPL-MCNC: 87 MG/DL — HIGH (ref 7–23)
CALCIUM SERPL-MCNC: 9.4 MG/DL — SIGNIFICANT CHANGE UP (ref 8.4–10.5)
CHLORIDE SERPL-SCNC: 86 MMOL/L — LOW (ref 96–108)
CO2 SERPL-SCNC: 41 MMOL/L — HIGH (ref 22–31)
CREAT SERPL-MCNC: 1.98 MG/DL — HIGH (ref 0.5–1.3)
EOSINOPHIL # BLD AUTO: 0.03 K/UL — SIGNIFICANT CHANGE UP (ref 0–0.5)
EOSINOPHIL NFR BLD AUTO: 0.4 % — SIGNIFICANT CHANGE UP (ref 0–6)
GAS PNL BLDV: SIGNIFICANT CHANGE UP
GLUCOSE SERPL-MCNC: 102 MG/DL — HIGH (ref 70–99)
HCT VFR BLD CALC: 28.3 % — LOW (ref 34.5–45)
HGB BLD-MCNC: 8.3 G/DL — LOW (ref 11.5–15.5)
IMM GRANULOCYTES NFR BLD AUTO: 0.4 % — SIGNIFICANT CHANGE UP (ref 0–1.5)
LYMPHOCYTES # BLD AUTO: 0.34 K/UL — LOW (ref 1–3.3)
LYMPHOCYTES # BLD AUTO: 5 % — LOW (ref 13–44)
MCHC RBC-ENTMCNC: 26.2 PG — LOW (ref 27–34)
MCHC RBC-ENTMCNC: 29.3 GM/DL — LOW (ref 32–36)
MCV RBC AUTO: 89.3 FL — SIGNIFICANT CHANGE UP (ref 80–100)
MONOCYTES # BLD AUTO: 0.68 K/UL — SIGNIFICANT CHANGE UP (ref 0–0.9)
MONOCYTES NFR BLD AUTO: 10.1 % — SIGNIFICANT CHANGE UP (ref 2–14)
NEUTROPHILS # BLD AUTO: 5.68 K/UL — SIGNIFICANT CHANGE UP (ref 1.8–7.4)
NEUTROPHILS NFR BLD AUTO: 84.1 % — HIGH (ref 43–77)
PLATELET # BLD AUTO: 216 K/UL — SIGNIFICANT CHANGE UP (ref 150–400)
POTASSIUM SERPL-MCNC: 3.5 MMOL/L — SIGNIFICANT CHANGE UP (ref 3.5–5.3)
POTASSIUM SERPL-SCNC: 3.5 MMOL/L — SIGNIFICANT CHANGE UP (ref 3.5–5.3)
RBC # BLD: 3.17 M/UL — LOW (ref 3.8–5.2)
RBC # FLD: 19.2 % — HIGH (ref 10.3–14.5)
SODIUM SERPL-SCNC: 137 MMOL/L — SIGNIFICANT CHANGE UP (ref 135–145)
WBC # BLD: 6.76 K/UL — SIGNIFICANT CHANGE UP (ref 3.8–10.5)
WBC # FLD AUTO: 6.76 K/UL — SIGNIFICANT CHANGE UP (ref 3.8–10.5)

## 2017-12-21 PROCEDURE — 99233 SBSQ HOSP IP/OBS HIGH 50: CPT

## 2017-12-21 RX ADMIN — ROFLUMILAST 500 MICROGRAM(S): 500 TABLET ORAL at 08:34

## 2017-12-21 RX ADMIN — CLOPIDOGREL BISULFATE 75 MILLIGRAM(S): 75 TABLET, FILM COATED ORAL at 12:44

## 2017-12-21 RX ADMIN — Medication 25 MILLIGRAM(S): at 06:34

## 2017-12-21 RX ADMIN — Medication 25 MILLIGRAM(S): at 14:56

## 2017-12-21 RX ADMIN — BUMETANIDE 1 MILLIGRAM(S): 0.25 INJECTION INTRAMUSCULAR; INTRAVENOUS at 06:34

## 2017-12-21 RX ADMIN — BUMETANIDE 1 MILLIGRAM(S): 0.25 INJECTION INTRAMUSCULAR; INTRAVENOUS at 18:36

## 2017-12-21 RX ADMIN — Medication 5 MILLIGRAM(S): at 08:33

## 2017-12-21 RX ADMIN — ATORVASTATIN CALCIUM 20 MILLIGRAM(S): 80 TABLET, FILM COATED ORAL at 23:05

## 2017-12-21 RX ADMIN — TIOTROPIUM BROMIDE AND OLODATEROL 2 PUFF(S): 3.124; 2.736 SPRAY, METERED RESPIRATORY (INHALATION) at 08:34

## 2017-12-21 RX ADMIN — Medication 200 MILLIGRAM(S): at 12:44

## 2017-12-21 RX ADMIN — Medication 81 MILLIGRAM(S): at 12:44

## 2017-12-21 RX ADMIN — Medication 25 MILLIGRAM(S): at 23:05

## 2017-12-21 RX ADMIN — Medication 20 MILLIGRAM(S): at 06:35

## 2017-12-21 RX ADMIN — HEPARIN SODIUM 5000 UNIT(S): 5000 INJECTION INTRAVENOUS; SUBCUTANEOUS at 08:33

## 2017-12-21 NOTE — PROGRESS NOTE ADULT - ASSESSMENT
71 year old female with CAD, sev pHTN, dCHF, HTN, DM, Afib, lung CA, COPD and CKD 3 p/w acute on chronic dCHF, ascites, pleural effusion, pulm edema and acute on chronic kidney injury c/b A.Fib with RVR.    - CKD stage 3: likely due to HTN and DM  - CASSANDRA: non oliguric. Likely prerenal due to ineffective circulating volume. Renal function overall stable  - acute on chronic dCHF: volume overload slowly improving with good diuresis, weight loss and net negative balance.   - GI: s/p LVP  - Possible contraction alkalosis? with renal azotemia  - Rapid aflutter    Recommendation  - Bumex 1 mg po BID ; Cont metolazone 5 mg po daily  - Dobutamine will not be entertained again and I doubt Primacor will help  - Cardizem 90mg po q 6 hours.   - Pt has had nose bleeds and stopped her A/C as outpt and is aware of  around 10 percent of CVA with afib  -Check VBG to ascertain acid status.  She may need diamox as the hco3 is worse.  Will speak with NP on the floor

## 2017-12-21 NOTE — PROGRESS NOTE ADULT - SUBJECTIVE AND OBJECTIVE BOX
MEDICINE, PROGRESS NOTE [phone]    FRAN ALEXANDRA 72y [id number]    Patient seen and examined.  Patient is a 72y old  Female who presents with a chief complaint of shortness of breath, volume overload and ascites (11 Dec 2017 11:41)      PAST MEDICAL & SURGICAL HISTORY:  Hyperthyroidism  JOSE (obstructive sleep apnea)  Epistaxis  GIB (gastrointestinal bleeding)  CAD S/P percutaneous coronary angioplasty  Afib  Pulmonary HTN  GERD (gastroesophageal reflux disease)  Obesity  Cardiomegaly  Valvular heart disease  COPD (chronic obstructive pulmonary disease): Home O2  Sleep Apnea: by criteria  Loose, teeth: 3 bottom front loose teeth  Female stress incontinence  Shoulder pain, right  Constipation  Arthritis of Knee  H/O heartburn  History of lung cancer  Obese  Hx of hyperlipidemia  Asthma  Diabetes mellitus: type 2  dx about 4-5 years ago   no daily fingerstick  H/O: HTN (hypertension)  Enlarged lymph nodes  Lymph nodes enlarged: s/p biopsy mediastinal  benign  H/O endoscopy  left upper lobectomy    MEDICATIONS  (STANDING):  allopurinol 200 milliGRAM(s) Oral daily  aspirin enteric coated 81 milliGRAM(s) Oral daily  atorvastatin 20 milliGRAM(s) Oral at bedtime  buMETAnide 1 milliGRAM(s) Oral two times a day  clopidogrel Tablet 75 milliGRAM(s) Oral daily  digoxin     Tablet 0.125 milliGRAM(s) Oral every other day  diltiazem    Tablet 90 milliGRAM(s) Oral every 6 hours  docusate sodium 100 milliGRAM(s) Oral two times a day  epoetin hilda Injectable 40243 Unit(s) SubCutaneous <User Schedule>  heparin  Injectable 5000 Unit(s) SubCutaneous every 8 hours  macitentan (OPSUMIT) 10 milliGRAM(s) 10 milliGRAM(s) Oral daily  methimazole 5 milliGRAM(s) Oral daily  metolazone 5 milliGRAM(s) Oral daily  metoprolol     tartrate 25 milliGRAM(s) Oral every 8 hours  pantoprazole    Tablet 40 milliGRAM(s) Oral before breakfast  predniSONE   Tablet 5 milliGRAM(s) Oral daily  roflumilast 500 MICROGram(s) Oral daily  tiotropium 2.5 MICROgram(s)/olodaterol 2.5 MICROgram(s) Inhaler 2 Puff(s) Inhalation daily    MEDICATIONS  (PRN):  acetaminophen   Tablet 650 milliGRAM(s) Oral every 6 hours PRN Moderate Pain (4 - 6)  ipratropium    for Nebulization 500 MICROGram(s) Nebulizer every 6 hours PRN Shortness of Breath and/or Wheezing  ondansetron    Tablet 4 milliGRAM(s) Oral every 8 hours PRN Nausea and/or Vomiting  sodium chloride 0.65% Nasal 1 Spray(s) Both Nostrils five times a day PRN dry nose    Allergies    No Known Allergies    Intolerances    albuterol (Unknown)      PHYSICAL EXAM:  Constitutional: NAD  HEENT: Normocephalic, EOMI  Neck:  No JVD  Respiratory: CTA B/L, No wheezes  Cardiovascular: S1, S2, RRR, + systolic murmur  Gastrointestinal: BS+, soft, NT, + distention, + fluid wave  Extremities: mild peripheral edema le b/l but improved since admission  Neurological: AAOX3, no focal deficits  Psychiatric: Normal mood, normal affect  : No Gottlieb    Vital Signs Last 24 Hrs  T(C): 37.2 (21 Dec 2017 12:41), Max: 37.4 (21 Dec 2017 05:46)  T(F): 98.9 (21 Dec 2017 12:41), Max: 99.4 (21 Dec 2017 05:46)  HR: 111 (21 Dec 2017 18:35) (59 - 126)  BP: 101/70 (21 Dec 2017 18:35) (101/70 - 127/72)  BP(mean): --  RR: 18 (21 Dec 2017 14:44) (18 - 18)  SpO2: 96% (21 Dec 2017 14:44) (96% - 100%)  I&O's Summary    20 Dec 2017 07:01  -  21 Dec 2017 07:00  --------------------------------------------------------  IN: 730 mL / OUT: 2550 mL / NET: -1820 mL    21 Dec 2017 07:01  -  21 Dec 2017 19:26  --------------------------------------------------------  IN: 100 mL / OUT: 350 mL / NET: -250 mL        LABS:                        8.3    6.76  )-----------( 216      ( 21 Dec 2017 07:34 )             28.3     12-21    137  |  86<L>  |  87<H>  ----------------------------<  102<H>  3.5   |  41<H>  |  1.98<H>    Ca    9.4      21 Dec 2017 07:33

## 2017-12-21 NOTE — PROGRESS NOTE ADULT - ASSESSMENT
severe copd  severe phtn  bucky  anemia  vol overload  s/p large volume paracentesis  Afib with rvr    Continue current care  transition ing to po meds  monitor volume status  poss d/c in 48 hoours

## 2017-12-21 NOTE — PROGRESS NOTE ADULT - SUBJECTIVE AND OBJECTIVE BOX
HPI: now with rate control , states breathing is improved  MEDICATIONS  (STANDING):  allopurinol 200 milliGRAM(s) Oral daily  aspirin enteric coated 81 milliGRAM(s) Oral daily  atorvastatin 20 milliGRAM(s) Oral at bedtime  buMETAnide 1 milliGRAM(s) Oral two times a day  clopidogrel Tablet 75 milliGRAM(s) Oral daily  digoxin     Tablet 0.125 milliGRAM(s) Oral every other day  diltiazem    Tablet 90 milliGRAM(s) Oral every 6 hours  docusate sodium 100 milliGRAM(s) Oral two times a day  epoetin hilda Injectable 77427 Unit(s) SubCutaneous <User Schedule>  heparin  Injectable 5000 Unit(s) SubCutaneous every 8 hours  macitentan (OPSUMIT) 10 milliGRAM(s) 10 milliGRAM(s) Oral daily  methimazole 5 milliGRAM(s) Oral daily  metolazone 5 milliGRAM(s) Oral daily  metoprolol     tartrate 25 milliGRAM(s) Oral every 8 hours  pantoprazole    Tablet 40 milliGRAM(s) Oral before breakfast  predniSONE   Tablet 5 milliGRAM(s) Oral daily  roflumilast 500 MICROGram(s) Oral daily  tiotropium 2.5 MICROgram(s)/olodaterol 2.5 MICROgram(s) Inhaler 2 Puff(s) Inhalation daily    MEDICATIONS  (PRN):  acetaminophen   Tablet 650 milliGRAM(s) Oral every 6 hours PRN Moderate Pain (4 - 6)  ipratropium    for Nebulization 500 MICROGram(s) Nebulizer every 6 hours PRN Shortness of Breath and/or Wheezing  ondansetron    Tablet 4 milliGRAM(s) Oral every 8 hours PRN Nausea and/or Vomiting  sodium chloride 0.65% Nasal 1 Spray(s) Both Nostrils five times a day PRN dry nose    Allergies   No Known Allergies  intolerance  albuterol (Unknown)     REVIEW OF SYSTEM:    Constitutional: denies fever, chills, fatigue  Neuro: denies headache, numbness, weakness, dizziness  Resp: states less SOB deneis wheezing   CVS: denies chest pain, palpitations, swelling to legs decreased but abd girth still big  GI: denies abdominal pain, nausea, vomiting, diarrhea   : denies dysuria, frequency, incontinence  Skin: denies itching, burning, rashes, or lesions   Msk: denies joint pain or swelling    Vital Signs Last 24 Hrs  T(C): 37.2 (21 Dec 2017 12:41), Max: 37.4 (21 Dec 2017 05:46)  T(F): 98.9 (21 Dec 2017 12:41), Max: 99.4 (21 Dec 2017 05:46)  HR: 95 (21 Dec 2017 12:41) (59 - 126)  BP: 112/57 (21 Dec 2017 12:41) (110/66 - 127/72)  BP(mean): --  RR: 18 (21 Dec 2017 12:41) (18 - 18)  SpO2: 98% (21 Dec 2017 12:41) (97% - 100%)    Physical Exam:  General : frail no acute distress  Neuro : Alert and oriented x 3, no focal deficits  HEENT : Sclera clear, no JVD, no Lymphadeopathy, no carotid bruits, neck supple  Lungs: Clear to Ascultation, no wheezing , rales or rhonchi , appears more comfortable   Cardiovascular : irreg rate and rhythm   GI : abdomen soft, NT, ND, + BS   : no suprapubic tenderness  Extremities : No edema, + 1 DP and +1 PT, feet warm   Skin : skin dry BLE     TELE: afib 60's   EKG:    LABS:                        8.3    6.76  )-----------( 216      ( 21 Dec 2017 07:34 )             28.3     12-21    137  |  86<L>  |  87<H>  ----------------------------<  102<H>  3.5   |  41<H>  |  1.98<H>    Ca    9.4      21 Dec 2017 07:33       RADIOLOGY & ADDITIONAL TESTS:

## 2017-12-21 NOTE — PROGRESS NOTE ADULT - SUBJECTIVE AND OBJECTIVE BOX
[facility] [phone]        Patient seen and examined in bed.  She was off the Cardizem gtt.  No new complaints        MEDICATIONS  (STANDING):  allopurinol 200 milliGRAM(s) Oral daily  aspirin enteric coated 81 milliGRAM(s) Oral daily  atorvastatin 20 milliGRAM(s) Oral at bedtime  buMETAnide 1 milliGRAM(s) Oral two times a day  clopidogrel Tablet 75 milliGRAM(s) Oral daily  digoxin     Tablet 0.125 milliGRAM(s) Oral every other day  diltiazem    Tablet 90 milliGRAM(s) Oral every 6 hours  docusate sodium 100 milliGRAM(s) Oral two times a day  epoetin hilda Injectable 82663 Unit(s) SubCutaneous <User Schedule>  heparin  Injectable 5000 Unit(s) SubCutaneous every 8 hours  macitentan (OPSUMIT) 10 milliGRAM(s) 10 milliGRAM(s) Oral daily  methimazole 5 milliGRAM(s) Oral daily  metolazone 5 milliGRAM(s) Oral daily  metoprolol     tartrate 25 milliGRAM(s) Oral every 8 hours  pantoprazole    Tablet 40 milliGRAM(s) Oral before breakfast  predniSONE   Tablet 5 milliGRAM(s) Oral daily  roflumilast 500 MICROGram(s) Oral daily  tiotropium 2.5 MICROgram(s)/olodaterol 2.5 MICROgram(s) Inhaler 2 Puff(s) Inhalation daily      VITAL:  T(C): , Max: 37.4 (12-21-17 @ 05:46)  T(F): , Max: 99.4 (12-21-17 @ 05:46)  HR: 66 (12-21-17 @ 06:28)  BP: 110/66 (12-21-17 @ 05:46)  BP(mean): --  RR: 18 (12-21-17 @ 05:46)  SpO2: 100% (12-21-17 @ 05:46)  Wt(kg): --    I and O's:    12-20 @ 07:01  -  12-21 @ 07:00  --------------------------------------------------------  IN: 730 mL / OUT: 2550 mL / NET: -1820 mL    12-21 @ 07:01  -  12-21 @ 10:08  --------------------------------------------------------  IN: 40 mL / OUT: 200 mL / NET: -160 mL          PHYSICAL EXAM:    Constitutional: NAD  HEENT: PERRLA    Neck:  No JVD  Respiratory: reduced breath sounds  Cardiovascular: S1 and S2 irreg  Gastrointestinal: BS+, soft, NT/ND  Extremities: No peripheral edema  Neurological: A/O x 3, no focal deficits  Psychiatric: Normal mood, normal affect  : No Gottlieb  Skin: No rashes  Access: Not applicable    LABS:                        8.3    6.76  )-----------( 216      ( 21 Dec 2017 07:34 )             28.3     12-21    137  |  86<L>  |  87<H>  ----------------------------<  102<H>  3.5   |  41<H>  |  1.98<H>    Ca    9.4      21 Dec 2017 07:33            Urine Studies:          RADIOLOGY & ADDITIONAL STUDIES:

## 2017-12-21 NOTE — PROGRESS NOTE ADULT - PROBLEM SELECTOR PLAN 1
monitor telemetry rate controlled  keep K+>4, MG++>2   supplement as needed   c/w oral Cardizem 90 mg Q 6hrs ,lopressor  25 mg q 8 hrs  continue digoxin 0.125mg QOD  not on Anticoagulation although  CHADS VASC score of 5 due to severe ENT hemorrhage requiring hospitalization and PRBC   no coffee no caffeine diet    [id number]

## 2017-12-21 NOTE — PROGRESS NOTE ADULT - SUBJECTIVE AND OBJECTIVE BOX
Follow-up Pulm Progress Note    No new respiratory events overnight.  Denies SOB/CP.   97% on 4.5L NC  Better rate controlled-80-100s on tele    Medications:  MEDICATIONS  (STANDING):  allopurinol 200 milliGRAM(s) Oral daily  aspirin enteric coated 81 milliGRAM(s) Oral daily  atorvastatin 20 milliGRAM(s) Oral at bedtime  buMETAnide 1 milliGRAM(s) Oral two times a day  clopidogrel Tablet 75 milliGRAM(s) Oral daily  digoxin     Tablet 0.125 milliGRAM(s) Oral every other day  diltiazem    Tablet 90 milliGRAM(s) Oral every 6 hours  docusate sodium 100 milliGRAM(s) Oral two times a day  epoetin hilda Injectable 50563 Unit(s) SubCutaneous <User Schedule>  heparin  Injectable 5000 Unit(s) SubCutaneous every 8 hours  macitentan (OPSUMIT) 10 milliGRAM(s) 10 milliGRAM(s) Oral daily  methimazole 5 milliGRAM(s) Oral daily  metolazone 5 milliGRAM(s) Oral daily  metoprolol     tartrate 25 milliGRAM(s) Oral every 8 hours  pantoprazole    Tablet 40 milliGRAM(s) Oral before breakfast  predniSONE   Tablet 5 milliGRAM(s) Oral daily  roflumilast 500 MICROGram(s) Oral daily  tiotropium 2.5 MICROgram(s)/olodaterol 2.5 MICROgram(s) Inhaler 2 Puff(s) Inhalation daily    MEDICATIONS  (PRN):  acetaminophen   Tablet 650 milliGRAM(s) Oral every 6 hours PRN Moderate Pain (4 - 6)  ipratropium    for Nebulization 500 MICROGram(s) Nebulizer every 6 hours PRN Shortness of Breath and/or Wheezing  ondansetron    Tablet 4 milliGRAM(s) Oral every 8 hours PRN Nausea and/or Vomiting  sodium chloride 0.65% Nasal 1 Spray(s) Both Nostrils five times a day PRN dry nose          Vital Signs Last 24 Hrs  T(C): 37.4 (21 Dec 2017 05:46), Max: 37.4 (21 Dec 2017 05:46)  T(F): 99.4 (21 Dec 2017 05:46), Max: 99.4 (21 Dec 2017 05:46)  HR: 66 (21 Dec 2017 06:28) (59 - 126)  BP: 110/66 (21 Dec 2017 05:46) (110/66 - 127/72)  BP(mean): --  RR: 18 (21 Dec 2017 05:46) (18 - 18)  SpO2: 100% (21 Dec 2017 05:46) (97% - 100%) on 4.5L NC          12-20 @ 07:01  -  12-21 @ 07:00  --------------------------------------------------------  IN: 730 mL / OUT: 2550 mL / NET: -1820 mL          LABS:                        8.3    6.76  )-----------( 216      ( 21 Dec 2017 07:34 )             28.3     12-21    137  |  86<L>  |  87<H>  ----------------------------<  102<H>  3.5   |  41<H>  |  1.98<H>    Ca    9.4      21 Dec 2017 07:33            CAPILLARY BLOOD GLUCOSE                GAGAN Negative 12-08 @ 07:39  Anti SS-1 --  Anti SS-2 --  Anti RNP --  RF 15.0 12-08 @ 07:39    Atypical ANCA -- 12-08 @ 07:39  c-ANCA titer -- 12-08 @ 07:39  c-ANCA -- 12-08 @ 07:39  p-ANCA -- 12-08 @ 07:39              CULTURES: (if applicable)          Physical Examination:  PULM: Decreased BS L Base  CVS: S1, S2 irreg    RADIOLOGY REVIEWED  CXR:     CT chest:    TTE:

## 2017-12-22 LAB
ANION GAP SERPL CALC-SCNC: 14 MMOL/L — SIGNIFICANT CHANGE UP (ref 5–17)
BASOPHILS # BLD AUTO: 0.01 K/UL — SIGNIFICANT CHANGE UP (ref 0–0.2)
BASOPHILS NFR BLD AUTO: 0.1 % — SIGNIFICANT CHANGE UP (ref 0–2)
BUN SERPL-MCNC: 91 MG/DL — HIGH (ref 7–23)
CALCIUM SERPL-MCNC: 9 MG/DL — SIGNIFICANT CHANGE UP (ref 8.4–10.5)
CHLORIDE SERPL-SCNC: 85 MMOL/L — LOW (ref 96–108)
CO2 SERPL-SCNC: 38 MMOL/L — HIGH (ref 22–31)
COMMENT - FLUIDS: SIGNIFICANT CHANGE UP
CREAT SERPL-MCNC: 2.06 MG/DL — HIGH (ref 0.5–1.3)
DIGOXIN SERPL-MCNC: 1.2 NG/ML — SIGNIFICANT CHANGE UP (ref 0.8–2)
EOSINOPHIL # BLD AUTO: 0.03 K/UL — SIGNIFICANT CHANGE UP (ref 0–0.5)
EOSINOPHIL NFR BLD AUTO: 0.4 % — SIGNIFICANT CHANGE UP (ref 0–6)
GLUCOSE SERPL-MCNC: 129 MG/DL — HIGH (ref 70–99)
HCT VFR BLD CALC: 29.4 % — LOW (ref 34.5–45)
HGB BLD-MCNC: 8.8 G/DL — LOW (ref 11.5–15.5)
IMM GRANULOCYTES NFR BLD AUTO: 0.9 % — SIGNIFICANT CHANGE UP (ref 0–1.5)
LYMPHOCYTES # BLD AUTO: 0.44 K/UL — LOW (ref 1–3.3)
LYMPHOCYTES # BLD AUTO: 5.4 % — LOW (ref 13–44)
MCHC RBC-ENTMCNC: 26.7 PG — LOW (ref 27–34)
MCHC RBC-ENTMCNC: 29.9 GM/DL — LOW (ref 32–36)
MCV RBC AUTO: 89.4 FL — SIGNIFICANT CHANGE UP (ref 80–100)
MONOCYTES # BLD AUTO: 0.66 K/UL — SIGNIFICANT CHANGE UP (ref 0–0.9)
MONOCYTES NFR BLD AUTO: 8.1 % — SIGNIFICANT CHANGE UP (ref 2–14)
NEUTROPHILS # BLD AUTO: 6.95 K/UL — SIGNIFICANT CHANGE UP (ref 1.8–7.4)
NEUTROPHILS NFR BLD AUTO: 85.1 % — HIGH (ref 43–77)
PLATELET # BLD AUTO: 246 K/UL — SIGNIFICANT CHANGE UP (ref 150–400)
POTASSIUM SERPL-MCNC: 3.6 MMOL/L — SIGNIFICANT CHANGE UP (ref 3.5–5.3)
POTASSIUM SERPL-SCNC: 3.6 MMOL/L — SIGNIFICANT CHANGE UP (ref 3.5–5.3)
RBC # BLD: 3.29 M/UL — LOW (ref 3.8–5.2)
RBC # FLD: 19.2 % — HIGH (ref 10.3–14.5)
SODIUM SERPL-SCNC: 137 MMOL/L — SIGNIFICANT CHANGE UP (ref 135–145)
WBC # BLD: 8.16 K/UL — SIGNIFICANT CHANGE UP (ref 3.8–10.5)
WBC # FLD AUTO: 8.16 K/UL — SIGNIFICANT CHANGE UP (ref 3.8–10.5)

## 2017-12-22 PROCEDURE — 99233 SBSQ HOSP IP/OBS HIGH 50: CPT

## 2017-12-22 RX ORDER — BUMETANIDE 0.25 MG/ML
1.5 INJECTION INTRAMUSCULAR; INTRAVENOUS
Qty: 0 | Refills: 0 | Status: DISCONTINUED | OUTPATIENT
Start: 2017-12-22 | End: 2017-12-26

## 2017-12-22 RX ADMIN — Medication 81 MILLIGRAM(S): at 13:53

## 2017-12-22 RX ADMIN — CLOPIDOGREL BISULFATE 75 MILLIGRAM(S): 75 TABLET, FILM COATED ORAL at 13:53

## 2017-12-22 RX ADMIN — HEPARIN SODIUM 5000 UNIT(S): 5000 INJECTION INTRAVENOUS; SUBCUTANEOUS at 21:25

## 2017-12-22 RX ADMIN — HEPARIN SODIUM 5000 UNIT(S): 5000 INJECTION INTRAVENOUS; SUBCUTANEOUS at 09:25

## 2017-12-22 RX ADMIN — ATORVASTATIN CALCIUM 20 MILLIGRAM(S): 80 TABLET, FILM COATED ORAL at 21:25

## 2017-12-22 RX ADMIN — BUMETANIDE 1.5 MILLIGRAM(S): 0.25 INJECTION INTRAMUSCULAR; INTRAVENOUS at 17:52

## 2017-12-22 RX ADMIN — Medication 0.12 MILLIGRAM(S): at 13:54

## 2017-12-22 RX ADMIN — Medication 25 MILLIGRAM(S): at 09:26

## 2017-12-22 RX ADMIN — Medication 25 MILLIGRAM(S): at 21:25

## 2017-12-22 RX ADMIN — Medication 25 MILLIGRAM(S): at 13:54

## 2017-12-22 RX ADMIN — TIOTROPIUM BROMIDE AND OLODATEROL 2 PUFF(S): 3.124; 2.736 SPRAY, METERED RESPIRATORY (INHALATION) at 09:28

## 2017-12-22 RX ADMIN — Medication 5 MILLIGRAM(S): at 09:26

## 2017-12-22 RX ADMIN — ROFLUMILAST 500 MICROGRAM(S): 500 TABLET ORAL at 09:26

## 2017-12-22 RX ADMIN — Medication 200 MILLIGRAM(S): at 13:53

## 2017-12-22 RX ADMIN — ERYTHROPOIETIN 10000 UNIT(S): 10000 INJECTION, SOLUTION INTRAVENOUS; SUBCUTANEOUS at 17:52

## 2017-12-22 RX ADMIN — BUMETANIDE 1 MILLIGRAM(S): 0.25 INJECTION INTRAMUSCULAR; INTRAVENOUS at 06:30

## 2017-12-22 NOTE — PROGRESS NOTE ADULT - ASSESSMENT
severe copd  severe phtn  bucky  anemia  vol overload  s/p large volume paracentesis  Afib with rvr not on a/c secondary bleeding issues    continue current care  continue tele  monitor weights   diuretics per renal   may need to consider repeat paracentesis

## 2017-12-22 NOTE — PROGRESS NOTE ADULT - SUBJECTIVE AND OBJECTIVE BOX
MEDICINE, PROGRESS NOTE [phone]    FRAN ALEXANDRA 72y [id number]    Patient seen and examined.  Patient is a 72y old  Female who presents with a chief complaint of shortness of breath, volume overload and ascites (11 Dec 2017 11:41)  Pt feels worse today.    PAST MEDICAL & SURGICAL HISTORY:  Hyperthyroidism  JOSE (obstructive sleep apnea)  Epistaxis  GIB (gastrointestinal bleeding)  CAD S/P percutaneous coronary angioplasty  Afib  Pulmonary HTN  GERD (gastroesophageal reflux disease)  Obesity  Cardiomegaly  Valvular heart disease  COPD (chronic obstructive pulmonary disease): Home O2  Sleep Apnea: by criteria  Loose, teeth: 3 bottom front loose teeth  Female stress incontinence  Shoulder pain, right  Constipation  Arthritis of Knee  H/O heartburn  History of lung cancer  Obese  Hx of hyperlipidemia  Asthma  Diabetes mellitus: type 2  dx about 4-5 years ago   no daily fingerstick  H/O: HTN (hypertension)  Enlarged lymph nodes  Lymph nodes enlarged: s/p biopsy mediastinal  benign  H/O endoscopy  left upper lobectomy    MEDICATIONS  (STANDING):  allopurinol 200 milliGRAM(s) Oral daily  aspirin enteric coated 81 milliGRAM(s) Oral daily  atorvastatin 20 milliGRAM(s) Oral at bedtime  buMETAnide 1.5 milliGRAM(s) Oral two times a day  clopidogrel Tablet 75 milliGRAM(s) Oral daily  digoxin     Tablet 0.125 milliGRAM(s) Oral every other day  diltiazem    Tablet 90 milliGRAM(s) Oral every 6 hours  docusate sodium 100 milliGRAM(s) Oral two times a day  epoetin hilda Injectable 73679 Unit(s) SubCutaneous <User Schedule>  heparin  Injectable 5000 Unit(s) SubCutaneous every 8 hours  macitentan (OPSUMIT) 10 milliGRAM(s) 10 milliGRAM(s) Oral daily  methimazole 5 milliGRAM(s) Oral daily  metolazone 5 milliGRAM(s) Oral daily  metoprolol     tartrate 25 milliGRAM(s) Oral every 8 hours  pantoprazole    Tablet 40 milliGRAM(s) Oral before breakfast  predniSONE   Tablet 5 milliGRAM(s) Oral daily  roflumilast 500 MICROGram(s) Oral daily  tiotropium 2.5 MICROgram(s)/olodaterol 2.5 MICROgram(s) Inhaler 2 Puff(s) Inhalation daily    MEDICATIONS  (PRN):  acetaminophen   Tablet 650 milliGRAM(s) Oral every 6 hours PRN Moderate Pain (4 - 6)  ipratropium    for Nebulization 500 MICROGram(s) Nebulizer every 6 hours PRN Shortness of Breath and/or Wheezing  ondansetron    Tablet 4 milliGRAM(s) Oral every 8 hours PRN Nausea and/or Vomiting  sodium chloride 0.65% Nasal 1 Spray(s) Both Nostrils five times a day PRN dry nose    Allergies    No Known Allergies    Intolerances    albuterol (Unknown)      PHYSICAL EXAM:  Constitutional: NAD  HEENT: Normocephalic, EOMI  Neck:  No JVD  Respiratory: CTA B/L, No wheezes  Cardiovascular: S1, S2, RRR, + systolic murmur  Gastrointestinal: BS+, soft, NT, + distention, + fluid wave  Extremities: + peripheral edema le b/l   Neurological: AAOX3, no focal deficits  Psychiatric: Normal mood, normal affect  : No Gottlieb    Vital Signs Last 24 Hrs  T(C): 36.6 (22 Dec 2017 11:54), Max: 36.7 (21 Dec 2017 20:47)  T(F): 97.8 (22 Dec 2017 11:54), Max: 98.1 (21 Dec 2017 20:47)  HR: 63 (22 Dec 2017 17:49) (60 - 111)  BP: 113/65 (22 Dec 2017 17:49) (97/57 - 122/60)  BP(mean): --  RR: 18 (22 Dec 2017 11:54) (18 - 18)  SpO2: 98% (22 Dec 2017 11:54) (98% - 99%)  I&O's Summary    21 Dec 2017 07:01  -  22 Dec 2017 07:00  --------------------------------------------------------  IN: 300 mL / OUT: 900 mL / NET: -600 mL    22 Dec 2017 07:01  -  22 Dec 2017 17:58  --------------------------------------------------------  IN: 360 mL / OUT: 680 mL / NET: -320 mL        LABS:                        8.8    8.16  )-----------( 246      ( 22 Dec 2017 07:16 )             29.4     12-22    137  |  85<L>  |  91<H>  ----------------------------<  129<H>  3.6   |  38<H>  |  2.06<H>    Ca    9.0      22 Dec 2017 07:16

## 2017-12-22 NOTE — PROGRESS NOTE ADULT - PROBLEM SELECTOR PLAN 1
2nd compressive atelectasis from pleural effusions, pulmonary edema and restrictive disease from ascites 2nd cor pulmonale   -Keep sp02>90% on supplemental oxygen  -s/p 8.1L paracentesis 12/6  -output>input with bumex/zaroxolyn  -CXR with improved L pleural effusion  -Bumex increased 1.5mg BID

## 2017-12-22 NOTE — PROGRESS NOTE ADULT - SUBJECTIVE AND OBJECTIVE BOX
INTERVAL HPI/OVERNIGHT EVENTS: Resting comfortably in chair, at times of dyspnea on exertion    MEDICATIONS  (STANDING):  allopurinol 200 milliGRAM(s) Oral daily  aspirin enteric coated 81 milliGRAM(s) Oral daily  atorvastatin 20 milliGRAM(s) Oral at bedtime  buMETAnide 1.5 milliGRAM(s) Oral two times a day  clopidogrel Tablet 75 milliGRAM(s) Oral daily  digoxin     Tablet 0.125 milliGRAM(s) Oral every other day  diltiazem    Tablet 90 milliGRAM(s) Oral every 6 hours  docusate sodium 100 milliGRAM(s) Oral two times a day  epoetin hilda Injectable 10306 Unit(s) SubCutaneous <User Schedule>  heparin  Injectable 5000 Unit(s) SubCutaneous every 8 hours  macitentan (OPSUMIT) 10 milliGRAM(s) 10 milliGRAM(s) Oral daily  methimazole 5 milliGRAM(s) Oral daily  metolazone 5 milliGRAM(s) Oral daily  metoprolol     tartrate 25 milliGRAM(s) Oral every 8 hours  pantoprazole    Tablet 40 milliGRAM(s) Oral before breakfast  predniSONE   Tablet 5 milliGRAM(s) Oral daily  roflumilast 500 MICROGram(s) Oral daily  tiotropium 2.5 MICROgram(s)/olodaterol 2.5 MICROgram(s) Inhaler 2 Puff(s) Inhalation daily    MEDICATIONS  (PRN):  acetaminophen   Tablet 650 milliGRAM(s) Oral every 6 hours PRN Moderate Pain (4 - 6)  ipratropium    for Nebulization 500 MICROGram(s) Nebulizer every 6 hours PRN Shortness of Breath and/or Wheezing  ondansetron    Tablet 4 milliGRAM(s) Oral every 8 hours PRN Nausea and/or Vomiting  sodium chloride 0.65% Nasal 1 Spray(s) Both Nostrils five times a day PRN dry nose      Allergies    No Known Allergies    Intolerances    albuterol (Unknown)    ROS:  General: Pt denies fever/chills  Cardiovascular: denies chest pain/palpitations/leg edema  Respiratory and Thorax: denies SOB/cough/wheezing  Hematologic: denies abnormal bleeding    Vital Signs Last 24 Hrs  T(C): 36.6 (22 Dec 2017 11:54), Max: 37.2 (21 Dec 2017 12:41)  T(F): 97.8 (22 Dec 2017 11:54), Max: 98.9 (21 Dec 2017 12:41)  HR: 85 (22 Dec 2017 11:54) (60 - 111)  BP: 111/57 (22 Dec 2017 11:54) (97/57 - 113/66)  BP(mean): --  RR: 18 (22 Dec 2017 11:54) (18 - 18)  SpO2: 98% (22 Dec 2017 11:54) (96% - 99%)      Physical Exam:  Neurological: Alert & Oriented x 3  Respiratory: CTA B/L, No wheezing/crackles/rhonchi  Cardiovascular: (+) S1 & S2,  Gastrointestinal: soft, NT, nondistended, (+) BS  Extremities: No pedal edema, No clubbing, No cyanosis  Skin:  normal skin color and pigmentation, no skin lesions      LABS:                        8.8    8.16  )-----------( 246      ( 22 Dec 2017 07:16 )             29.4     12-22    137  |  85<L>  |  91<H>  ----------------------------<  129<H>  3.6   |  38<H>  |  2.06<H>    Ca    9.0      22 Dec 2017 07:16            RADIOLOGY & ADDITIONAL TESTS:    TELE: A- flutter 60- 100's

## 2017-12-22 NOTE — PROGRESS NOTE ADULT - SUBJECTIVE AND OBJECTIVE BOX
NEPHROLOGY-[facility] [phone]        Patient seen and examined in bed.  She felt weak but no specific complaints        MEDICATIONS  (STANDING):  allopurinol 200 milliGRAM(s) Oral daily  aspirin enteric coated 81 milliGRAM(s) Oral daily  atorvastatin 20 milliGRAM(s) Oral at bedtime  buMETAnide 1 milliGRAM(s) Oral two times a day  clopidogrel Tablet 75 milliGRAM(s) Oral daily  digoxin     Tablet 0.125 milliGRAM(s) Oral every other day  diltiazem    Tablet 90 milliGRAM(s) Oral every 6 hours  docusate sodium 100 milliGRAM(s) Oral two times a day  epoetin hilda Injectable 41185 Unit(s) SubCutaneous <User Schedule>  heparin  Injectable 5000 Unit(s) SubCutaneous every 8 hours  macitentan (OPSUMIT) 10 milliGRAM(s) 10 milliGRAM(s) Oral daily  methimazole 5 milliGRAM(s) Oral daily  metolazone 5 milliGRAM(s) Oral daily  metoprolol     tartrate 25 milliGRAM(s) Oral every 8 hours  pantoprazole    Tablet 40 milliGRAM(s) Oral before breakfast  predniSONE   Tablet 5 milliGRAM(s) Oral daily  roflumilast 500 MICROGram(s) Oral daily  tiotropium 2.5 MICROgram(s)/olodaterol 2.5 MICROgram(s) Inhaler 2 Puff(s) Inhalation daily      VITAL:  T(C): , Max: 37.2 (12-21-17 @ 12:41)  T(F): , Max: 98.9 (12-21-17 @ 12:41)  HR: 90 (12-22-17 @ 09:24)  BP: 113/66 (12-22-17 @ 09:24)  BP(mean): --  RR: 18 (12-22-17 @ 06:33)  SpO2: 99% (12-22-17 @ 06:33)  Wt(kg): --    I and O's:    12-21 @ 07:01  -  12-22 @ 07:00  --------------------------------------------------------  IN: 300 mL / OUT: 900 mL / NET: -600 mL          PHYSICAL EXAM:    Constitutional: NAD  HEENT: PERRLA    Neck:  No JVD  Respiratory: CTAB/L  Cardiovascular: S1 and S2  Gastrointestinal: BS+, soft, distended  Extremities: wrinkled legs.    Neurological: A/O x 3, no focal deficits  Psychiatric: Normal mood, normal affect  : No Gottlieb  Skin: No rashes  Access: Not applicable    LABS:                        8.8    8.16  )-----------( 246      ( 22 Dec 2017 07:16 )             29.4     12-22    137  |  85<L>  |  91<H>  ----------------------------<  129<H>  3.6   |  38<H>  |  2.06<H>    Ca    9.0      22 Dec 2017 07:16            Urine Studies:          RADIOLOGY & ADDITIONAL STUDIES:

## 2017-12-22 NOTE — PROGRESS NOTE ADULT - ASSESSMENT
71 year old female with CAD, sev pHTN, dCHF, HTN, DM, Afib, lung CA, COPD and CKD 3 p/w acute on chronic dCHF, ascites, pleural effusion, pulm edema and acute on chronic kidney injury c/b A.Fib with RVR.    - CKD stage 3: likely due to HTN and DM  - CASSANDRA: non oliguric. Likely prerenal due to ineffective circulating volume. Renal function overall stable  - acute on chronic dCHF: volume overload slowly improving with good diuresis, weight loss and net negative balance.   - GI: s/p LVP  - Chronic respiratory acidosis  - Rapid aflutter    Recommendation  - Increase Bumex 1.5mg po BID as her wt is increasing; Cont metolazone 5 mg po daily  - Dobutamine will not be entertained again and I doubt Primacor will help  - Cardizem 90mg po q 6 hours along with lopressor for heart rate control  - Pt has had nose bleeds and stopped her A/C as outpt and is aware of  around 10 percent of CVA with afib  -Chronic CO2 retention is a problem in her.

## 2017-12-22 NOTE — PROGRESS NOTE ADULT - PROBLEM SELECTOR PLAN 1
Continue with  telemetry monitoring  c/w oral Cardizem 90 mg Q 6hrs ,lopressor  25 mg q 8 hrs for rate control  continue digoxin 0.125mg QOD  Patient is not on Anticoagulation although  CHADS VASC score of 5 due to severe ENT hemorrhage requiring hospitalization and PRBC   Will discuss any [place]  [id number]

## 2017-12-22 NOTE — PROGRESS NOTE ADULT - ASSESSMENT
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 Recent admission at [facility] approximately 5 weeks ago for large volume paracentesis (6.1L removed). Now presents again with RV failure-worsened LE edema, recurrent ascites, pleural effusions and pulmonary edema with worsened dyspnea and CASSANDRA on CKD. s/p 8.1 L paracentesis on 12/6. Started on dobutamine gtt for pHTN, discontinued 2nd Afib with RVR to 170s.

## 2017-12-22 NOTE — PROGRESS NOTE ADULT - SUBJECTIVE AND OBJECTIVE BOX
Follow-up Pulm Progress Note    No new respiratory events overnight.  Denies SOB/CP.   99% on 4.5L NC  Tele: Aflutter 80-90s, as high as 130 but overall much improved     Medications:  MEDICATIONS  (STANDING):  allopurinol 200 milliGRAM(s) Oral daily  aspirin enteric coated 81 milliGRAM(s) Oral daily  atorvastatin 20 milliGRAM(s) Oral at bedtime  buMETAnide 1.5 milliGRAM(s) Oral two times a day  clopidogrel Tablet 75 milliGRAM(s) Oral daily  digoxin     Tablet 0.125 milliGRAM(s) Oral every other day  diltiazem    Tablet 90 milliGRAM(s) Oral every 6 hours  docusate sodium 100 milliGRAM(s) Oral two times a day  epoetin hilda Injectable 51793 Unit(s) SubCutaneous <User Schedule>  heparin  Injectable 5000 Unit(s) SubCutaneous every 8 hours  macitentan (OPSUMIT) 10 milliGRAM(s) 10 milliGRAM(s) Oral daily  methimazole 5 milliGRAM(s) Oral daily  metolazone 5 milliGRAM(s) Oral daily  metoprolol     tartrate 25 milliGRAM(s) Oral every 8 hours  pantoprazole    Tablet 40 milliGRAM(s) Oral before breakfast  predniSONE   Tablet 5 milliGRAM(s) Oral daily  roflumilast 500 MICROGram(s) Oral daily  tiotropium 2.5 MICROgram(s)/olodaterol 2.5 MICROgram(s) Inhaler 2 Puff(s) Inhalation daily    MEDICATIONS  (PRN):  acetaminophen   Tablet 650 milliGRAM(s) Oral every 6 hours PRN Moderate Pain (4 - 6)  ipratropium    for Nebulization 500 MICROGram(s) Nebulizer every 6 hours PRN Shortness of Breath and/or Wheezing  ondansetron    Tablet 4 milliGRAM(s) Oral every 8 hours PRN Nausea and/or Vomiting  sodium chloride 0.65% Nasal 1 Spray(s) Both Nostrils five times a day PRN dry nose          Vital Signs Last 24 Hrs  T(C): 36.6 (22 Dec 2017 11:54), Max: 37.2 (21 Dec 2017 12:41)  T(F): 97.8 (22 Dec 2017 11:54), Max: 98.9 (21 Dec 2017 12:41)  HR: 85 (22 Dec 2017 11:54) (60 - 111)  BP: 111/57 (22 Dec 2017 11:54) (97/57 - 113/66)  BP(mean): --  RR: 18 (22 Dec 2017 11:54) (18 - 18)  SpO2: 98% (22 Dec 2017 11:54) (96% - 99%) on 4.5L NC      VBG pH 7.40 12-21 @ 12:58    VBG pCO2 70 12-21 @ 12:58    VBG O2 sat 31 12-21 @ 12:58    VBG lactate 1.6 12-21 @ 12:58      12-21 @ 07:01  -  12-22 @ 07:00  --------------------------------------------------------  IN: 300 mL / OUT: 900 mL / NET: -600 mL          LABS:                        8.8    8.16  )-----------( 246      ( 22 Dec 2017 07:16 )             29.4     12-22    137  |  85<L>  |  91<H>  ----------------------------<  129<H>  3.6   |  38<H>  |  2.06<H>    Ca    9.0      22 Dec 2017 07:16            CAPILLARY BLOOD GLUCOSE                GAGAN Negative 12-08 @ 07:39  Anti SS-1 --  Anti SS-2 --  Anti RNP --  RF 15.0 12-08 @ 07:39    Atypical ANCA -- 12-08 @ 07:39  c-ANCA titer -- 12-08 @ 07:39  c-ANCA -- 12-08 @ 07:39  p-ANCA -- 12-08 @ 07:39              CULTURES: (if applicable)          Physical Examination:  PULM: Decreased BS at bases  CVS: S1, S2 RRR    RADIOLOGY REVIEWED

## 2017-12-22 NOTE — PROGRESS NOTE ADULT - ASSESSMENT
Patient is 71 year old female  CAD s/p PCI, COPD, T2DM history of severe pHTN complicated by RV failure (on 3L NC at home), Afib not on AC, a/w SOB and abdominal distention, which had been going on for about 4 weeks.  afib since admission, on PO diltiazem daily and digoxin qod.  ADHF, dobutamine stopped due to HR 170s. received 1 dose Digoxin  0.125 IV, continues with Afib flutter 120 's on telemetry , patient CHADs vasc 5 states she has been on coumdain in the past and has had multiple "hemorrhages" from her nose/PRBC x 2 , frequent packing and visits to the ENT multiple times  and refuses to take AC.

## 2017-12-23 LAB
ANION GAP SERPL CALC-SCNC: 17 MMOL/L — SIGNIFICANT CHANGE UP (ref 5–17)
BUN SERPL-MCNC: 104 MG/DL — HIGH (ref 7–23)
CALCIUM SERPL-MCNC: 9.4 MG/DL — SIGNIFICANT CHANGE UP (ref 8.4–10.5)
CHLORIDE SERPL-SCNC: 85 MMOL/L — LOW (ref 96–108)
CO2 SERPL-SCNC: 35 MMOL/L — HIGH (ref 22–31)
CREAT SERPL-MCNC: 2.14 MG/DL — HIGH (ref 0.5–1.3)
GLUCOSE SERPL-MCNC: 99 MG/DL — SIGNIFICANT CHANGE UP (ref 70–99)
HCT VFR BLD CALC: 29.8 % — LOW (ref 34.5–45)
HGB BLD-MCNC: 9.1 G/DL — LOW (ref 11.5–15.5)
MCHC RBC-ENTMCNC: 27.5 PG — SIGNIFICANT CHANGE UP (ref 27–34)
MCHC RBC-ENTMCNC: 30.5 GM/DL — LOW (ref 32–36)
MCV RBC AUTO: 90 FL — SIGNIFICANT CHANGE UP (ref 80–100)
PLATELET # BLD AUTO: 194 K/UL — SIGNIFICANT CHANGE UP (ref 150–400)
POTASSIUM SERPL-MCNC: 3.5 MMOL/L — SIGNIFICANT CHANGE UP (ref 3.5–5.3)
POTASSIUM SERPL-SCNC: 3.5 MMOL/L — SIGNIFICANT CHANGE UP (ref 3.5–5.3)
RBC # BLD: 3.31 M/UL — LOW (ref 3.8–5.2)
RBC # FLD: 19.9 % — HIGH (ref 10.3–14.5)
SODIUM SERPL-SCNC: 137 MMOL/L — SIGNIFICANT CHANGE UP (ref 135–145)
WBC # BLD: 7.36 K/UL — SIGNIFICANT CHANGE UP (ref 3.8–10.5)
WBC # FLD AUTO: 7.36 K/UL — SIGNIFICANT CHANGE UP (ref 3.8–10.5)

## 2017-12-23 RX ORDER — SUCRALFATE 1 G
1 TABLET ORAL ONCE
Qty: 0 | Refills: 0 | Status: COMPLETED | OUTPATIENT
Start: 2017-12-23 | End: 2017-12-23

## 2017-12-23 RX ADMIN — Medication 25 MILLIGRAM(S): at 06:55

## 2017-12-23 RX ADMIN — HEPARIN SODIUM 5000 UNIT(S): 5000 INJECTION INTRAVENOUS; SUBCUTANEOUS at 06:54

## 2017-12-23 RX ADMIN — HEPARIN SODIUM 5000 UNIT(S): 5000 INJECTION INTRAVENOUS; SUBCUTANEOUS at 22:20

## 2017-12-23 RX ADMIN — CLOPIDOGREL BISULFATE 75 MILLIGRAM(S): 75 TABLET, FILM COATED ORAL at 12:47

## 2017-12-23 RX ADMIN — ROFLUMILAST 500 MICROGRAM(S): 500 TABLET ORAL at 10:47

## 2017-12-23 RX ADMIN — TIOTROPIUM BROMIDE AND OLODATEROL 2 PUFF(S): 3.124; 2.736 SPRAY, METERED RESPIRATORY (INHALATION) at 08:47

## 2017-12-23 RX ADMIN — BUMETANIDE 1.5 MILLIGRAM(S): 0.25 INJECTION INTRAMUSCULAR; INTRAVENOUS at 18:34

## 2017-12-23 RX ADMIN — Medication 25 MILLIGRAM(S): at 22:20

## 2017-12-23 RX ADMIN — Medication 81 MILLIGRAM(S): at 12:47

## 2017-12-23 RX ADMIN — Medication 1 GRAM(S): at 17:00

## 2017-12-23 RX ADMIN — Medication 200 MILLIGRAM(S): at 17:00

## 2017-12-23 RX ADMIN — ATORVASTATIN CALCIUM 20 MILLIGRAM(S): 80 TABLET, FILM COATED ORAL at 22:20

## 2017-12-23 RX ADMIN — BUMETANIDE 1.5 MILLIGRAM(S): 0.25 INJECTION INTRAMUSCULAR; INTRAVENOUS at 06:55

## 2017-12-23 RX ADMIN — Medication 5 MILLIGRAM(S): at 10:48

## 2017-12-23 NOTE — PROGRESS NOTE ADULT - SUBJECTIVE AND OBJECTIVE BOX
NEPHROLOGY - [facility]    Patient seen and examined     MEDICATIONS  (STANDING):  allopurinol 200 milliGRAM(s) Oral daily  aspirin enteric coated 81 milliGRAM(s) Oral daily  atorvastatin 20 milliGRAM(s) Oral at bedtime  buMETAnide 1.5 milliGRAM(s) Oral two times a day  clopidogrel Tablet 75 milliGRAM(s) Oral daily  digoxin     Tablet 0.125 milliGRAM(s) Oral every other day  diltiazem    Tablet 90 milliGRAM(s) Oral every 6 hours  docusate sodium 100 milliGRAM(s) Oral two times a day  epoetin hilda Injectable 61197 Unit(s) SubCutaneous <User Schedule>  heparin  Injectable 5000 Unit(s) SubCutaneous every 8 hours  macitentan (OPSUMIT) 10 milliGRAM(s) 10 milliGRAM(s) Oral daily  methimazole 5 milliGRAM(s) Oral daily  metolazone 5 milliGRAM(s) Oral daily  metoprolol     tartrate 25 milliGRAM(s) Oral every 8 hours  pantoprazole    Tablet 40 milliGRAM(s) Oral before breakfast  predniSONE   Tablet 5 milliGRAM(s) Oral daily  roflumilast 500 MICROGram(s) Oral daily  tiotropium 2.5 MICROgram(s)/olodaterol 2.5 MICROgram(s) Inhaler 2 Puff(s) Inhalation daily    VITAL:  T(C): , Max: 36.6 (12-22-17 @ 20:43)  T(F): , Max: 97.9 (12-22-17 @ 20:43)  HR: 64 (12-23-17 @ 05:50)  BP: 104/59 (12-23-17 @ 05:50)  BP(mean): --  RR: 18 (12-23-17 @ 05:50)  SpO2: 100% (12-23-17 @ 05:50)  Wt(kg): --  I and O's:    12-22 @ 07:01  -  12-23 @ 07:00  --------------------------------------------------------  IN: 960 mL / OUT: 2780 mL / NET: -1820 mL    12-23 @ 07:01 - 12-23 @ 14:16  --------------------------------------------------------  IN: 0 mL / OUT: 100 mL / NET: -100 mL          REVIEW OF SYSTEMS:  CONSTITUTIONAL: No fevers or chills  RESPIRATORY: No SOB/BRO  CARDIOVASCULAR: No CP or palpitations  GASTROINTESTINAL: No abd pain, n/v/d/constipation  GENITOURINARY: No dysuria, frequency or hematuria  NEUROLOGICAL: No numbness or weakness  All other review of systems is negative unless indicated above.    PHYSICAL EXAM:  Constitutional: NAD  Respiratory: CTA B/L  Cardiovascular: S1 and S2  Gastrointestinal: BS+, soft, NT/ND  Extremities: No peripheral edema  Neurological: AAO x 3  : No Gottlieb  Access: Not applicable    LABS:                        9.1    7.36  )-----------( 194      ( 23 Dec 2017 08:39 )             29.8     12-23    137  |  85<L>  |  104<H>  ----------------------------<  99  3.5   |  35<H>  |  2.14<H>    Ca    9.4      23 Dec 2017 10:08        Urine Studies:        RADIOLOGY & ADDITIONAL STUDIES: NEPHROLOGY - [facility]    Patient seen and examined, does not want to take her medications because the "new pill burns". She is lying in bed, appears in no distress.     MEDICATIONS  (STANDING):  allopurinol 200 milliGRAM(s) Oral daily  aspirin enteric coated 81 milliGRAM(s) Oral daily  atorvastatin 20 milliGRAM(s) Oral at bedtime  buMETAnide 1.5 milliGRAM(s) Oral two times a day  clopidogrel Tablet 75 milliGRAM(s) Oral daily  digoxin     Tablet 0.125 milliGRAM(s) Oral every other day  diltiazem    Tablet 90 milliGRAM(s) Oral every 6 hours  docusate sodium 100 milliGRAM(s) Oral two times a day  epoetin hilda Injectable 06082 Unit(s) SubCutaneous <User Schedule>  heparin  Injectable 5000 Unit(s) SubCutaneous every 8 hours  macitentan (OPSUMIT) 10 milliGRAM(s) 10 milliGRAM(s) Oral daily  methimazole 5 milliGRAM(s) Oral daily  metolazone 5 milliGRAM(s) Oral daily  metoprolol     tartrate 25 milliGRAM(s) Oral every 8 hours  pantoprazole    Tablet 40 milliGRAM(s) Oral before breakfast  predniSONE   Tablet 5 milliGRAM(s) Oral daily  roflumilast 500 MICROGram(s) Oral daily  tiotropium 2.5 MICROgram(s)/olodaterol 2.5 MICROgram(s) Inhaler 2 Puff(s) Inhalation daily    VITAL:  T(C): , Max: 36.6 (12-22-17 @ 20:43)  T(F): , Max: 97.9 (12-22-17 @ 20:43)  HR: 64 (12-23-17 @ 05:50)  BP: 104/59 (12-23-17 @ 05:50)  BP(mean): --  RR: 18 (12-23-17 @ 05:50)  SpO2: 100% (12-23-17 @ 05:50)  Wt(kg): --  I and O's:    12-22 @ 07:01  -  12-23 @ 07:00  --------------------------------------------------------  IN: 960 mL / OUT: 2780 mL / NET: -1820 mL    12-23 @ 07:01  -  12-23 @ 14:16  --------------------------------------------------------  IN: 0 mL / OUT: 100 mL / NET: -100 mL    PHYSICAL EXAM:  Constitutional: NAD  Respiratory: decreased BS B/L  Cardiovascular: S1 and S2  Gastrointestinal: BS+, soft, NT, + distended  Extremities: No peripheral edema  Neurological: AAO x 3  : No Gottlieb  Access: Not applicable    LABS:                        9.1    7.36  )-----------( 194      ( 23 Dec 2017 08:39 )             29.8     12-23    137  |  85<L>  |  104<H>  ----------------------------<  99  3.5   |  35<H>  |  2.14<H>    Ca    9.4      23 Dec 2017 10:08

## 2017-12-23 NOTE — PROGRESS NOTE ADULT - SUBJECTIVE AND OBJECTIVE BOX
MEDICINE, PROGRESS NOTE [phone]    FRAN ALEXANDRA 72y [id number]    Patient seen and examined.  Patient is a 72y old  Female who presents with a chief complaint of shortness of breath, volume overload and ascites (11 Dec 2017 11:41)  pt feels ok.     PAST MEDICAL & SURGICAL HISTORY:  Hyperthyroidism  JOSE (obstructive sleep apnea)  Epistaxis  GIB (gastrointestinal bleeding)  CAD S/P percutaneous coronary angioplasty  Afib  Pulmonary HTN  GERD (gastroesophageal reflux disease)  Obesity  Cardiomegaly  Valvular heart disease  COPD (chronic obstructive pulmonary disease): Home O2  Sleep Apnea: by criteria  Loose, teeth: 3 bottom front loose teeth  Female stress incontinence  Shoulder pain, right  Constipation  Arthritis of Knee  H/O heartburn  History of lung cancer  Obese  Hx of hyperlipidemia  Asthma  Diabetes mellitus: type 2  dx about 4-5 years ago   no daily fingerstick  H/O: HTN (hypertension)  Enlarged lymph nodes  Lymph nodes enlarged: s/p biopsy mediastinal  benign  H/O endoscopy  left upper lobectomy    MEDICATIONS  (STANDING):  allopurinol 200 milliGRAM(s) Oral daily  aspirin enteric coated 81 milliGRAM(s) Oral daily  atorvastatin 20 milliGRAM(s) Oral at bedtime  buMETAnide 1.5 milliGRAM(s) Oral two times a day  clopidogrel Tablet 75 milliGRAM(s) Oral daily  digoxin     Tablet 0.125 milliGRAM(s) Oral every other day  diltiazem    Tablet 90 milliGRAM(s) Oral every 6 hours  docusate sodium 100 milliGRAM(s) Oral two times a day  epoetin hilda Injectable 59197 Unit(s) SubCutaneous <User Schedule>  heparin  Injectable 5000 Unit(s) SubCutaneous every 8 hours  macitentan (OPSUMIT) 10 milliGRAM(s) 10 milliGRAM(s) Oral daily  methimazole 5 milliGRAM(s) Oral daily  metolazone 5 milliGRAM(s) Oral daily  metoprolol     tartrate 25 milliGRAM(s) Oral every 8 hours  pantoprazole    Tablet 40 milliGRAM(s) Oral before breakfast  predniSONE   Tablet 5 milliGRAM(s) Oral daily  roflumilast 500 MICROGram(s) Oral daily  tiotropium 2.5 MICROgram(s)/olodaterol 2.5 MICROgram(s) Inhaler 2 Puff(s) Inhalation daily    MEDICATIONS  (PRN):  acetaminophen   Tablet 650 milliGRAM(s) Oral every 6 hours PRN Moderate Pain (4 - 6)  ipratropium    for Nebulization 500 MICROGram(s) Nebulizer every 6 hours PRN Shortness of Breath and/or Wheezing  ondansetron    Tablet 4 milliGRAM(s) Oral every 8 hours PRN Nausea and/or Vomiting  sodium chloride 0.65% Nasal 1 Spray(s) Both Nostrils five times a day PRN dry nose    Allergies    No Known Allergies    Intolerances    albuterol (Unknown)      PHYSICAL EXAM:  Constitutional: NAD  HEENT: Normocephalic, EOMI  Neck:  No JVD  Respiratory: CTA B/L, No wheezes  Cardiovascular: S1, S2, RRR, + systolic murmur  Gastrointestinal: BS+, soft, NT, + distention, + fluid wave  Extremities: mild peripheral edema  Neurological: AAOX3, no focal deficits  Psychiatric: Normal mood, normal affect  : No Gottlieb    Vital Signs Last 24 Hrs  T(C): 36.7 (23 Dec 2017 14:45), Max: 36.7 (23 Dec 2017 14:45)  T(F): 98 (23 Dec 2017 14:45), Max: 98 (23 Dec 2017 14:45)  HR: 79 (23 Dec 2017 14:45) (62 - 79)  BP: 96/60 (23 Dec 2017 14:45) (96/60 - 104/59)  BP(mean): --  RR: 18 (23 Dec 2017 14:45) (18 - 18)  SpO2: 97% (23 Dec 2017 14:45) (97% - 100%)  I&O's Summary    22 Dec 2017 07:01  -  23 Dec 2017 07:00  --------------------------------------------------------  IN: 960 mL / OUT: 2780 mL / NET: -1820 mL    23 Dec 2017 07:01  -  23 Dec 2017 18:48  --------------------------------------------------------  IN: 240 mL / OUT: 100 mL / NET: 140 mL        LABS:                        9.1    7.36  )-----------( 194      ( 23 Dec 2017 08:39 )             29.8     12-23    137  |  85<L>  |  104<H>  ----------------------------<  99  3.5   |  35<H>  |  2.14<H>    Ca    9.4      23 Dec 2017 10:08

## 2017-12-23 NOTE — PROGRESS NOTE ADULT - ASSESSMENT
severe copd  severe phtn  bucky  anemia  vol overload  s/p large volume paracentesis  Afib with rvr not on a/c secondary bleeding issues    continue current care  appreciate renal input  continue tele  will consider increasing diuresis possibly alix.

## 2017-12-23 NOTE — PROGRESS NOTE ADULT - ASSESSMENT
71 year old female with CAD, sev pHTN, dCHF, HTN, DM, Afib, lung CA, COPD and CKD 3 p/w acute on chronic dCHF, ascites, pleural effusion, pulm edema and acute on chronic kidney injury c/b A.Fib with RVR.    - CKD stage 3: likely due to HTN and DM  - CASSANDRA: non oliguric. Likely prerenal due to ineffective circulating volume. Renal function overall stable  - acute on chronic dCHF: volume overload slowly improving with good diuresis, weight loss and net negative balance.   - GI: s/p LVP  - Chronic respiratory acidosis  - Rapid aflutter    Recommendation  - Increase Bumex 1.5mg po BID as her wt is increasing; Cont metolazone 5 mg po daily  - Dobutamine will not be entertained again and I doubt Primacor will help  - Cardizem 90mg po q 6 hours along with lopressor for heart rate control  - Pt has had nose bleeds and stopped her A/C as outpt and is aware of  around 10 percent of CVA with afib  -Chronic CO2 retention is a problem in her. 71 year old female with CAD, sev pHTN, dCHF, HTN, DM, Afib, lung CA, COPD and CKD 3 p/w acute on chronic dCHF, ascites, pleural effusion, pulm edema and acute on chronic kidney injury c/b A.Fib with RVR.  - CKD stage 3: likely due to HTN and DM  - CASSANDRA: non oliguric. Likely prerenal due to ineffective circulating volume now +diuresis. Azotemia slightly higher today;  overall stable  - acute on chronic dCHF: volume overload slowly improving  - GI: s/p LVP  - Chronic respiratory acidosis; CO2 improving  - Rapid aflutter; rate controlled - not on a/c (self d/c'd)    Recommendation  - continue Bumex 1.5mg po BID  - continue metolazone 5 mg po daily  - continue Cardizem 90mg po q 6 hours along with lopressor   - monitor intake and output and daily standing weights  - daily BMPs  - compliance with medication encouraged  - dose medication for a CrCl ~25-30 cc/min    Lucille Alexander NP  [facility]  [phone]

## 2017-12-24 LAB
ANION GAP SERPL CALC-SCNC: 16 MMOL/L — SIGNIFICANT CHANGE UP (ref 5–17)
BUN SERPL-MCNC: 95 MG/DL — HIGH (ref 7–23)
CALCIUM SERPL-MCNC: 9.4 MG/DL — SIGNIFICANT CHANGE UP (ref 8.4–10.5)
CHLORIDE SERPL-SCNC: 85 MMOL/L — LOW (ref 96–108)
CO2 SERPL-SCNC: 36 MMOL/L — HIGH (ref 22–31)
CREAT SERPL-MCNC: 1.9 MG/DL — HIGH (ref 0.5–1.3)
GLUCOSE SERPL-MCNC: 111 MG/DL — HIGH (ref 70–99)
HCT VFR BLD CALC: 29.8 % — LOW (ref 34.5–45)
HGB BLD-MCNC: 9 G/DL — LOW (ref 11.5–15.5)
MCHC RBC-ENTMCNC: 26.9 PG — LOW (ref 27–34)
MCHC RBC-ENTMCNC: 30.2 GM/DL — LOW (ref 32–36)
MCV RBC AUTO: 89.2 FL — SIGNIFICANT CHANGE UP (ref 80–100)
PLATELET # BLD AUTO: 203 K/UL — SIGNIFICANT CHANGE UP (ref 150–400)
POTASSIUM SERPL-MCNC: 3.4 MMOL/L — LOW (ref 3.5–5.3)
POTASSIUM SERPL-SCNC: 3.4 MMOL/L — LOW (ref 3.5–5.3)
RBC # BLD: 3.34 M/UL — LOW (ref 3.8–5.2)
RBC # FLD: 20.1 % — HIGH (ref 10.3–14.5)
SODIUM SERPL-SCNC: 137 MMOL/L — SIGNIFICANT CHANGE UP (ref 135–145)
WBC # BLD: 7.07 K/UL — SIGNIFICANT CHANGE UP (ref 3.8–10.5)
WBC # FLD AUTO: 7.07 K/UL — SIGNIFICANT CHANGE UP (ref 3.8–10.5)

## 2017-12-24 RX ORDER — SUCRALFATE 1 G
1 TABLET ORAL ONCE
Qty: 0 | Refills: 0 | Status: COMPLETED | OUTPATIENT
Start: 2017-12-24 | End: 2017-12-24

## 2017-12-24 RX ORDER — POTASSIUM CHLORIDE 20 MEQ
20 PACKET (EA) ORAL ONCE
Qty: 0 | Refills: 0 | Status: COMPLETED | OUTPATIENT
Start: 2017-12-24 | End: 2017-12-24

## 2017-12-24 RX ADMIN — Medication 25 MILLIGRAM(S): at 16:21

## 2017-12-24 RX ADMIN — HEPARIN SODIUM 5000 UNIT(S): 5000 INJECTION INTRAVENOUS; SUBCUTANEOUS at 06:33

## 2017-12-24 RX ADMIN — Medication 20 MILLIEQUIVALENT(S): at 16:22

## 2017-12-24 RX ADMIN — BUMETANIDE 1.5 MILLIGRAM(S): 0.25 INJECTION INTRAMUSCULAR; INTRAVENOUS at 18:16

## 2017-12-24 RX ADMIN — Medication 1 GRAM(S): at 06:33

## 2017-12-24 RX ADMIN — ATORVASTATIN CALCIUM 20 MILLIGRAM(S): 80 TABLET, FILM COATED ORAL at 23:29

## 2017-12-24 RX ADMIN — Medication 5 MILLIGRAM(S): at 08:26

## 2017-12-24 RX ADMIN — ROFLUMILAST 500 MICROGRAM(S): 500 TABLET ORAL at 08:26

## 2017-12-24 RX ADMIN — Medication 25 MILLIGRAM(S): at 08:26

## 2017-12-24 RX ADMIN — CLOPIDOGREL BISULFATE 75 MILLIGRAM(S): 75 TABLET, FILM COATED ORAL at 11:35

## 2017-12-24 RX ADMIN — Medication 81 MILLIGRAM(S): at 11:35

## 2017-12-24 RX ADMIN — Medication 25 MILLIGRAM(S): at 23:29

## 2017-12-24 RX ADMIN — TIOTROPIUM BROMIDE AND OLODATEROL 2 PUFF(S): 3.124; 2.736 SPRAY, METERED RESPIRATORY (INHALATION) at 08:26

## 2017-12-24 RX ADMIN — Medication 1 GRAM(S): at 11:46

## 2017-12-24 RX ADMIN — Medication 200 MILLIGRAM(S): at 11:34

## 2017-12-24 RX ADMIN — Medication 0.12 MILLIGRAM(S): at 11:35

## 2017-12-24 RX ADMIN — BUMETANIDE 1.5 MILLIGRAM(S): 0.25 INJECTION INTRAMUSCULAR; INTRAVENOUS at 06:33

## 2017-12-24 NOTE — PROGRESS NOTE ADULT - ASSESSMENT
severe copd  severe phtn  bucky  anemia  vol overload  s/p large volume paracentesis  Afib with rvr not on a/c secondary bleeding issues    continue current care  family to attempt to calm pt down  monitor for flu symptoms  strict i/o

## 2017-12-24 NOTE — PROGRESS NOTE ADULT - SUBJECTIVE AND OBJECTIVE BOX
MEDICINE, PROGRESS NOTE [phone]    FRAN ALEXANDRA 72y [id number]  note did not save.  Patient seen and examined.  Patient is a 72y old  Female who presents with a chief complaint of shortness of breath, volume overload and ascites (11 Dec 2017 11:41)  Pt was feeling anxious due to exposure to flu. Family at bedside trying to calm her down.    PAST MEDICAL & SURGICAL HISTORY:  Hyperthyroidism  JOSE (obstructive sleep apnea)  Epistaxis  GIB (gastrointestinal bleeding)  CAD S/P percutaneous coronary angioplasty  Afib  Pulmonary HTN  GERD (gastroesophageal reflux disease)  Obesity  Cardiomegaly  Valvular heart disease  COPD (chronic obstructive pulmonary disease): Home O2  Sleep Apnea: by criteria  Loose, teeth: 3 bottom front loose teeth  Female stress incontinence  Shoulder pain, right  Constipation  Arthritis of Knee  H/O heartburn  History of lung cancer  Obese  Hx of hyperlipidemia  Asthma  Diabetes mellitus: type 2  dx about 4-5 years ago   no daily fingerstick  H/O: HTN (hypertension)  Enlarged lymph nodes  Lymph nodes enlarged: s/p biopsy mediastinal  benign  H/O endoscopy  left upper lobectomy    MEDICATIONS  (STANDING):  allopurinol 200 milliGRAM(s) Oral daily  aspirin enteric coated 81 milliGRAM(s) Oral daily  atorvastatin 20 milliGRAM(s) Oral at bedtime  buMETAnide 1.5 milliGRAM(s) Oral two times a day  clopidogrel Tablet 75 milliGRAM(s) Oral daily  digoxin     Tablet 0.125 milliGRAM(s) Oral every other day  diltiazem    Tablet 90 milliGRAM(s) Oral every 6 hours  docusate sodium 100 milliGRAM(s) Oral two times a day  epoetin hilda Injectable 99756 Unit(s) SubCutaneous <User Schedule>  heparin  Injectable 5000 Unit(s) SubCutaneous every 8 hours  macitentan (OPSUMIT) 10 milliGRAM(s) 10 milliGRAM(s) Oral daily  methimazole 5 milliGRAM(s) Oral daily  metolazone 5 milliGRAM(s) Oral daily  metoprolol     tartrate 25 milliGRAM(s) Oral every 8 hours  pantoprazole    Tablet 40 milliGRAM(s) Oral before breakfast  predniSONE   Tablet 5 milliGRAM(s) Oral daily  roflumilast 500 MICROGram(s) Oral daily  sucralfate suspension 1 Gram(s) Oral three times a day  tiotropium 2.5 MICROgram(s)/olodaterol 2.5 MICROgram(s) Inhaler 2 Puff(s) Inhalation daily    MEDICATIONS  (PRN):  acetaminophen   Tablet 650 milliGRAM(s) Oral every 6 hours PRN Moderate Pain (4 - 6)  ipratropium    for Nebulization 500 MICROGram(s) Nebulizer every 6 hours PRN Shortness of Breath and/or Wheezing  ondansetron    Tablet 4 milliGRAM(s) Oral every 8 hours PRN Nausea and/or Vomiting  sodium chloride 0.65% Nasal 1 Spray(s) Both Nostrils five times a day PRN dry nose    Allergies    No Known Allergies    Intolerances    albuterol (Unknown)    vs - 97.5, 76,113/71, 98%on NC  PHYSICAL EXAM:  Constitutional: NAD  HEENT: Normocephalic, EOMI  Neck:  No JVD  Respiratory: CTA B/L, No wheezes  Cardiovascular: S1, S2, IRRR, + systolic murmur  Gastrointestinal: BS+, soft, NT, + distention, + fluid wave  Extremities: + peripheral edema le b/l  Neurological: AAOX3, no focal deficits  Psychiatric: Normal mood, normal affect  : No Gottlieb            LABS:                        12-24    137  |  85<L>  |  95<H>  ----------------------------<  111<H>  3.4<L>   |  36<H>  |  1.90<H>    Ca    9.4      24 Dec 2017 08:42

## 2017-12-24 NOTE — PROVIDER CONTACT NOTE (MEDICATION) - ASSESSMENT
pt A&Ox4, requesting Carafate suspension for AM. Pt states that she takes this medication at home, everyday, before breakfast to help with her digestion. Pt states that she does not take the pill form, she takes the liquid form of medication.

## 2017-12-24 NOTE — CHART NOTE - NSCHARTNOTEFT_GEN_A_CORE
3:30  12/24/17    Notified by RN pt. with bradycardia on tele monitor, sustaining HR of 47 for 2-3 minutes. Patient seen and examined at bedside. Resting comfortably. Denies weakness, syncope, dyspnea, chest pain, lightheadedness, dizziness. Patients HR returned back to baseline 60s after examination. Will continue to monitor HR on tele.    Renetta Gimenez PA-C  #[id number]

## 2017-12-25 LAB
ANION GAP SERPL CALC-SCNC: 20 MMOL/L — HIGH (ref 5–17)
BUN SERPL-MCNC: 92 MG/DL — HIGH (ref 7–23)
CALCIUM SERPL-MCNC: 9.5 MG/DL — SIGNIFICANT CHANGE UP (ref 8.4–10.5)
CHLORIDE SERPL-SCNC: 85 MMOL/L — LOW (ref 96–108)
CO2 SERPL-SCNC: 35 MMOL/L — HIGH (ref 22–31)
CREAT SERPL-MCNC: 2.04 MG/DL — HIGH (ref 0.5–1.3)
GLUCOSE SERPL-MCNC: 103 MG/DL — HIGH (ref 70–99)
HCT VFR BLD CALC: 29.5 % — LOW (ref 34.5–45)
HGB BLD-MCNC: 9.2 G/DL — LOW (ref 11.5–15.5)
MCHC RBC-ENTMCNC: 27.5 PG — SIGNIFICANT CHANGE UP (ref 27–34)
MCHC RBC-ENTMCNC: 31.2 GM/DL — LOW (ref 32–36)
MCV RBC AUTO: 88.1 FL — SIGNIFICANT CHANGE UP (ref 80–100)
PLATELET # BLD AUTO: 140 K/UL — LOW (ref 150–400)
POTASSIUM SERPL-MCNC: 3.8 MMOL/L — SIGNIFICANT CHANGE UP (ref 3.5–5.3)
POTASSIUM SERPL-SCNC: 3.8 MMOL/L — SIGNIFICANT CHANGE UP (ref 3.5–5.3)
RAPID RVP RESULT: SIGNIFICANT CHANGE UP
RBC # BLD: 3.35 M/UL — LOW (ref 3.8–5.2)
RBC # FLD: 20.4 % — HIGH (ref 10.3–14.5)
SODIUM SERPL-SCNC: 140 MMOL/L — SIGNIFICANT CHANGE UP (ref 135–145)
WBC # BLD: 7.14 K/UL — SIGNIFICANT CHANGE UP (ref 3.8–10.5)
WBC # FLD AUTO: 7.14 K/UL — SIGNIFICANT CHANGE UP (ref 3.8–10.5)

## 2017-12-25 RX ORDER — SUCRALFATE 1 G
1 TABLET ORAL THREE TIMES A DAY
Qty: 0 | Refills: 0 | Status: DISCONTINUED | OUTPATIENT
Start: 2017-12-25 | End: 2017-12-26

## 2017-12-25 RX ORDER — HYDROMORPHONE HYDROCHLORIDE 2 MG/ML
1 INJECTION INTRAMUSCULAR; INTRAVENOUS; SUBCUTANEOUS ONCE
Qty: 0 | Refills: 0 | Status: DISCONTINUED | OUTPATIENT
Start: 2017-12-25 | End: 2017-12-25

## 2017-12-25 RX ORDER — SUCRALFATE 1 G
1 TABLET ORAL ONCE
Qty: 0 | Refills: 0 | Status: COMPLETED | OUTPATIENT
Start: 2017-12-25 | End: 2017-12-25

## 2017-12-25 RX ADMIN — BUMETANIDE 1.5 MILLIGRAM(S): 0.25 INJECTION INTRAMUSCULAR; INTRAVENOUS at 17:04

## 2017-12-25 RX ADMIN — Medication 5 MILLIGRAM(S): at 09:08

## 2017-12-25 RX ADMIN — Medication 1 GRAM(S): at 17:02

## 2017-12-25 RX ADMIN — BUMETANIDE 1.5 MILLIGRAM(S): 0.25 INJECTION INTRAMUSCULAR; INTRAVENOUS at 06:36

## 2017-12-25 RX ADMIN — HEPARIN SODIUM 5000 UNIT(S): 5000 INJECTION INTRAVENOUS; SUBCUTANEOUS at 13:43

## 2017-12-25 RX ADMIN — CLOPIDOGREL BISULFATE 75 MILLIGRAM(S): 75 TABLET, FILM COATED ORAL at 12:27

## 2017-12-25 RX ADMIN — Medication 25 MILLIGRAM(S): at 21:39

## 2017-12-25 RX ADMIN — Medication 1 GRAM(S): at 13:43

## 2017-12-25 RX ADMIN — ERYTHROPOIETIN 10000 UNIT(S): 10000 INJECTION, SOLUTION INTRAVENOUS; SUBCUTANEOUS at 12:27

## 2017-12-25 RX ADMIN — ATORVASTATIN CALCIUM 20 MILLIGRAM(S): 80 TABLET, FILM COATED ORAL at 21:39

## 2017-12-25 RX ADMIN — Medication 81 MILLIGRAM(S): at 12:27

## 2017-12-25 RX ADMIN — Medication 1 GRAM(S): at 06:36

## 2017-12-25 RX ADMIN — Medication 200 MILLIGRAM(S): at 12:27

## 2017-12-25 RX ADMIN — Medication 25 MILLIGRAM(S): at 09:08

## 2017-12-25 RX ADMIN — TIOTROPIUM BROMIDE AND OLODATEROL 2 PUFF(S): 3.124; 2.736 SPRAY, METERED RESPIRATORY (INHALATION) at 09:12

## 2017-12-25 RX ADMIN — Medication 25 MILLIGRAM(S): at 13:43

## 2017-12-25 RX ADMIN — ROFLUMILAST 500 MICROGRAM(S): 500 TABLET ORAL at 09:08

## 2017-12-25 NOTE — CHART NOTE - NSCHARTNOTEFT_GEN_A_CORE
Bradycardia 47    Patient HR sustained from 47-48 while asleep. patient asymptomatic in NAD. Denies chest pain, dizziness, shortness of breath, lightheadedness, nausea, vomiting,, Seen and examined. patient hr back to 60 during assessment.    Vital Signs Last 24 Hrs  T(C): 36.4 (25 Dec 2017 04:02), Max: 36.5 (24 Dec 2017 06:19)  T(F): 97.5 (25 Dec 2017 04:02), Max: 97.7 (24 Dec 2017 06:19)  HR: 59 (25 Dec 2017 04:02) (59 - 78)  BP: 91/53 (25 Dec 2017 04:02) (91/53 - 119/66)  BP(mean): --  RR: 18 (25 Dec 2017 04:02) (18 - 19)  SpO2: 100% (25 Dec 2017 04:02) (98% - 100%)  MEDICATIONS  (STANDING):  allopurinol 200 milliGRAM(s) Oral daily  aspirin enteric coated 81 milliGRAM(s) Oral daily  atorvastatin 20 milliGRAM(s) Oral at bedtime  buMETAnide 1.5 milliGRAM(s) Oral two times a day  clopidogrel Tablet 75 milliGRAM(s) Oral daily  digoxin     Tablet 0.125 milliGRAM(s) Oral every other day  diltiazem    Tablet 90 milliGRAM(s) Oral every 6 hours  docusate sodium 100 milliGRAM(s) Oral two times a day  epoetin hilda Injectable 84529 Unit(s) SubCutaneous <User Schedule>  heparin  Injectable 5000 Unit(s) SubCutaneous every 8 hours  macitentan (OPSUMIT) 10 milliGRAM(s) 10 milliGRAM(s) Oral daily  methimazole 5 milliGRAM(s) Oral daily  metolazone 5 milliGRAM(s) Oral daily  metoprolol     tartrate 25 milliGRAM(s) Oral every 8 hours  pantoprazole    Tablet 40 milliGRAM(s) Oral before breakfast  predniSONE   Tablet 5 milliGRAM(s) Oral daily  roflumilast 500 MICROGram(s) Oral daily  sucralfate suspension 1 Gram(s) Oral once  tiotropium 2.5 MICROgram(s)/olodaterol 2.5 MICROgram(s) Inhaler 2 Puff(s) Inhalation daily    MEDICATIONS  (PRN):  acetaminophen   Tablet 650 milliGRAM(s) Oral every 6 hours PRN Moderate Pain (4 - 6)  ipratropium    for Nebulization 500 MICROGram(s) Nebulizer every 6 hours PRN Shortness of Breath and/or Wheezing  ondansetron    Tablet 4 milliGRAM(s) Oral every 8 hours PRN Nausea and/or Vomiting  sodium chloride 0.65% Nasal 1 Spray(s) Both Nostrils five times a day PRN dry nose    Plan  Monitor HR and BP prior to administration of am medication administration  close monitoring    Endorse to primary day team, attending to follow

## 2017-12-25 NOTE — PROGRESS NOTE ADULT - ASSESSMENT
severe copd  severe phtn  bucky  anemia  vol overload  s/p large volume paracentesis  Afib/flutter with rvr not on a/c secondary bleeding issues    continue current care  continue diuresis  carafate before meals  discussed with family and pt at bedside  if continues to develop ascites may consider repeat para

## 2017-12-25 NOTE — PROGRESS NOTE ADULT - SUBJECTIVE AND OBJECTIVE BOX
Patient seen and examined in bed with no new complaints.  No new events overnight.  NAD noted.    REVIEW OF SYSTEMS:  As per HPI, otherwise 8 full 10 ROS were unremarkable    MEDICATIONS  (STANDING):  allopurinol 200 milliGRAM(s) Oral daily  aspirin enteric coated 81 milliGRAM(s) Oral daily  atorvastatin 20 milliGRAM(s) Oral at bedtime  buMETAnide 1.5 milliGRAM(s) Oral two times a day  clopidogrel Tablet 75 milliGRAM(s) Oral daily  digoxin     Tablet 0.125 milliGRAM(s) Oral every other day  diltiazem    Tablet 90 milliGRAM(s) Oral every 6 hours  docusate sodium 100 milliGRAM(s) Oral two times a day  epoetin hilda Injectable 30427 Unit(s) SubCutaneous <User Schedule>  heparin  Injectable 5000 Unit(s) SubCutaneous every 8 hours  macitentan (OPSUMIT) 10 milliGRAM(s) 10 milliGRAM(s) Oral daily  methimazole 5 milliGRAM(s) Oral daily  metolazone 5 milliGRAM(s) Oral daily  metoprolol     tartrate 25 milliGRAM(s) Oral every 8 hours  pantoprazole    Tablet 40 milliGRAM(s) Oral before breakfast  predniSONE   Tablet 5 milliGRAM(s) Oral daily  roflumilast 500 MICROGram(s) Oral daily  sucralfate suspension 1 Gram(s) Oral three times a day  tiotropium 2.5 MICROgram(s)/olodaterol 2.5 MICROgram(s) Inhaler 2 Puff(s) Inhalation daily      VITAL:  T(C): , Max: 36.5 (12-24-17 @ 20:32)  T(F): , Max: 97.7 (12-24-17 @ 20:32)  HR: 66 (12-25-17 @ 09:17)  BP: 107/66 (12-25-17 @ 09:17)  BP(mean): --  RR: 18 (12-25-17 @ 04:02)  SpO2: 100% (12-25-17 @ 04:02)  Wt(kg): --    I and O's:    12-24 @ 07:01  -  12-25 @ 07:00  --------------------------------------------------------  IN: 1110 mL / OUT: 2650 mL / NET: -1540 mL    12-25 @ 07:01  -  12-25 @ 14:02  --------------------------------------------------------  IN: 0 mL / OUT: 800 mL / NET: -800 mL          PHYSICAL EXAM:    Constitutional: NAD  HEENT: PERRLA, EOMI,  MMM  Neck: No LAD, No JVD  Respiratory: CTAB  Cardiovascular: S1 and S2  Gastrointestinal: BS+, soft, +abdominal distension   Extremities: No peripheral edema  Neurological: A/O x 3, no focal deficits  Psychiatric: Normal mood, normal affect  : No Gottlieb  Skin: No rashes  Access: Not applicable    LABS:                        9.2    7.14  )-----------( 140      ( 25 Dec 2017 08:27 )             29.5     12-25    140  |  85<L>  |  92<H>  ----------------------------<  103<H>  3.8   |  35<H>  |  2.04<H>    Ca    9.5      25 Dec 2017 08:26    Assessment and Plan:   · Assessment	  71 year old female with CAD, sev pHTN, dCHF, HTN, DM, Afib, lung CA, COPD and CKD 3 p/w acute on chronic dCHF, ascites, pleural effusion, pulm edema and acute on chronic kidney injury c/b A.Fib with RVR.  - CKD stage 3: likely due to HTN and DM  - CASSANDRA: non oliguric. Likely prerenal due to ineffective circulating volume now +diuresis.  Numbers have been fluctuating but remain overall stable.  - acute on chronic dCHF: volume overload slowly improving; remains on PO diuretics; diuresing well; u/o2 650mL/24hrs  - GI: s/p LVP although continues to report abdominal distention  - Chronic respiratory acidosis; CO2 slowly improving  - Rapid aflutter; rate controlled - not on a/c (self d/c'd)    Recommendation  - BMP daily  - continue Bumex 1.5mg po BID for now  - continue metolazone 5 mg po daily  - continue Cardizem 90mg po q 6 hours along with lopressor 25 mg PO q8 hours if BP/HR permits  - monitor intake and output   - daily standing weights  - dose medication for a CrCl ~25-30 cc/min    Daphnie Cameron NP  [facility]  [phone] Patient seen and examined in bed with no new complaints.  No new events overnight.  NAD noted.    REVIEW OF SYSTEMS:  As per HPI, otherwise 8 full 10 ROS were unremarkable    MEDICATIONS  (STANDING):  allopurinol 200 milliGRAM(s) Oral daily  aspirin enteric coated 81 milliGRAM(s) Oral daily  atorvastatin 20 milliGRAM(s) Oral at bedtime  buMETAnide 1.5 milliGRAM(s) Oral two times a day  clopidogrel Tablet 75 milliGRAM(s) Oral daily  digoxin     Tablet 0.125 milliGRAM(s) Oral every other day  diltiazem    Tablet 90 milliGRAM(s) Oral every 6 hours  docusate sodium 100 milliGRAM(s) Oral two times a day  epoetin hilda Injectable 56530 Unit(s) SubCutaneous <User Schedule>  heparin  Injectable 5000 Unit(s) SubCutaneous every 8 hours  macitentan (OPSUMIT) 10 milliGRAM(s) 10 milliGRAM(s) Oral daily  methimazole 5 milliGRAM(s) Oral daily  metolazone 5 milliGRAM(s) Oral daily  metoprolol     tartrate 25 milliGRAM(s) Oral every 8 hours  pantoprazole    Tablet 40 milliGRAM(s) Oral before breakfast  predniSONE   Tablet 5 milliGRAM(s) Oral daily  roflumilast 500 MICROGram(s) Oral daily  sucralfate suspension 1 Gram(s) Oral three times a day  tiotropium 2.5 MICROgram(s)/olodaterol 2.5 MICROgram(s) Inhaler 2 Puff(s) Inhalation daily      VITAL:  T(C): , Max: 36.5 (12-24-17 @ 20:32)  T(F): , Max: 97.7 (12-24-17 @ 20:32)  HR: 66 (12-25-17 @ 09:17)  BP: 107/66 (12-25-17 @ 09:17)  BP(mean): --  RR: 18 (12-25-17 @ 04:02)  SpO2: 100% (12-25-17 @ 04:02)  Wt(kg): --    I and O's:    12-24 @ 07:01  -  12-25 @ 07:00  --------------------------------------------------------  IN: 1110 mL / OUT: 2650 mL / NET: -1540 mL    12-25 @ 07:01  -  12-25 @ 14:02  --------------------------------------------------------  IN: 0 mL / OUT: 800 mL / NET: -800 mL          PHYSICAL EXAM:    Constitutional: NAD  HEENT: PERRLA, EOMI,  MMM  Neck: No LAD, No JVD  Respiratory: CTAB  Cardiovascular: S1 and S2  Gastrointestinal: BS+, soft, +abdominal distension   Extremities: Trace l/e edema  Neurological: A/O x 3, no focal deficits  Psychiatric: Normal mood, normal affect  : No Gottlieb  Skin: No rashes  Access: Not applicable    LABS:                        9.2    7.14  )-----------( 140      ( 25 Dec 2017 08:27 )             29.5     12-25    140  |  85<L>  |  92<H>  ----------------------------<  103<H>  3.8   |  35<H>  |  2.04<H>    Ca    9.5      25 Dec 2017 08:26    Assessment and Plan:   · Assessment	  71 year old female with CAD, sev pHTN, dCHF, HTN, DM, Afib, lung CA, COPD and CKD 3 p/w acute on chronic dCHF, ascites, pleural effusion, pulm edema and acute on chronic kidney injury c/b A.Fib with RVR.  - CKD stage 3: likely due to HTN and DM  - CASSANDRA: non oliguric. Likely prerenal due to ineffective circulating volume now +diuresis.  Numbers have been fluctuating but remain overall stable.  - acute on chronic dCHF: volume overload slowly improving; remains on PO diuretics; diuresing well; u/o2 650mL/24hrs with notable improvement in edema  - GI: s/p LVP although continues to report abdominal distention  - Chronic respiratory acidosis; CO2 slowly improving  - Rapid aflutter; rate controlled - not on a/c (self d/c'd)    Recommendation  - BMP daily  - continue Bumex 1.5mg po BID for now  - continue metolazone 5 mg po daily  - continue Cardizem 90mg po q 6 hours along with lopressor 25 mg PO q8 hours if BP/HR permits  - will continue to monitor adnominal distention; ?repeat paracentesis; GI on board  - monitor intake and output   - daily standing weights  - dose medication for a CrCl ~25-30 cc/min    Daphnie Cameron NP  [facility]  [phone]

## 2017-12-25 NOTE — PROGRESS NOTE ADULT - SUBJECTIVE AND OBJECTIVE BOX
MEDICINE, PROGRESS NOTE [phone]    FRAN ALEXANDRA 72y [id number]    Patient seen and examined.  Patient is a 72y old  Female who presents with a chief complaint of shortness of breath, volume overload and ascites (11 Dec 2017 11:41)      PAST MEDICAL & SURGICAL HISTORY:  Hyperthyroidism  JOSE (obstructive sleep apnea)  Epistaxis  GIB (gastrointestinal bleeding)  CAD S/P percutaneous coronary angioplasty  Afib  Pulmonary HTN  GERD (gastroesophageal reflux disease)  Obesity  Cardiomegaly  Valvular heart disease  COPD (chronic obstructive pulmonary disease): Home O2  Sleep Apnea: by criteria  Loose, teeth: 3 bottom front loose teeth  Female stress incontinence  Shoulder pain, right  Constipation  Arthritis of Knee  H/O heartburn  History of lung cancer  Obese  Hx of hyperlipidemia  Asthma  Diabetes mellitus: type 2  dx about 4-5 years ago   no daily fingerstick  H/O: HTN (hypertension)  Enlarged lymph nodes  Lymph nodes enlarged: s/p biopsy mediastinal  benign  H/O endoscopy  left upper lobectomy    MEDICATIONS  (STANDING):  allopurinol 200 milliGRAM(s) Oral daily  aspirin enteric coated 81 milliGRAM(s) Oral daily  atorvastatin 20 milliGRAM(s) Oral at bedtime  buMETAnide 1.5 milliGRAM(s) Oral two times a day  clopidogrel Tablet 75 milliGRAM(s) Oral daily  digoxin     Tablet 0.125 milliGRAM(s) Oral every other day  diltiazem    Tablet 90 milliGRAM(s) Oral every 6 hours  docusate sodium 100 milliGRAM(s) Oral two times a day  epoetin hilda Injectable 05493 Unit(s) SubCutaneous <User Schedule>  heparin  Injectable 5000 Unit(s) SubCutaneous every 8 hours  macitentan (OPSUMIT) 10 milliGRAM(s) 10 milliGRAM(s) Oral daily  methimazole 5 milliGRAM(s) Oral daily  metolazone 5 milliGRAM(s) Oral daily  metoprolol     tartrate 25 milliGRAM(s) Oral every 8 hours  pantoprazole    Tablet 40 milliGRAM(s) Oral before breakfast  predniSONE   Tablet 5 milliGRAM(s) Oral daily  roflumilast 500 MICROGram(s) Oral daily  sucralfate suspension 1 Gram(s) Oral three times a day  tiotropium 2.5 MICROgram(s)/olodaterol 2.5 MICROgram(s) Inhaler 2 Puff(s) Inhalation daily    MEDICATIONS  (PRN):  acetaminophen   Tablet 650 milliGRAM(s) Oral every 6 hours PRN Moderate Pain (4 - 6)  ipratropium    for Nebulization 500 MICROGram(s) Nebulizer every 6 hours PRN Shortness of Breath and/or Wheezing  ondansetron    Tablet 4 milliGRAM(s) Oral every 8 hours PRN Nausea and/or Vomiting  sodium chloride 0.65% Nasal 1 Spray(s) Both Nostrils five times a day PRN dry nose    Allergies    No Known Allergies    Intolerances    albuterol (Unknown)    tele - aflutter 50-60, as low as 44  PHYSICAL EXAM:  Constitutional: NAD  HEENT: Normocephalic, EOMI  Neck:  No JVD  Respiratory: CTA B/L, No wheezes  Cardiovascular: S1, S2, iRRR, + systolic murmur  Gastrointestinal: BS+, soft, Nt, +distention, + fluid wave  Extremities: + peripheral edema le b/l   Neurological: AAOX3, no focal deficits  Psychiatric: Normal mood, normal affect  : No Gottlieb    Vital Signs Last 24 Hrs  T(C): 36.4 (25 Dec 2017 04:02), Max: 36.5 (24 Dec 2017 20:32)  T(F): 97.5 (25 Dec 2017 04:02), Max: 97.7 (24 Dec 2017 20:32)  HR: 66 (25 Dec 2017 09:17) (59 - 78)  BP: 107/66 (25 Dec 2017 09:17) (91/53 - 114/70)  BP(mean): --  RR: 18 (25 Dec 2017 04:02) (18 - 18)  SpO2: 100% (25 Dec 2017 04:02) (100% - 100%)  I&O's Summary    24 Dec 2017 07:01  -  25 Dec 2017 07:00  --------------------------------------------------------  IN: 1110 mL / OUT: 2650 mL / NET: -1540 mL    25 Dec 2017 07:01  -  25 Dec 2017 12:58  --------------------------------------------------------  IN: 0 mL / OUT: 800 mL / NET: -800 mL        LABS:                        9.2    7.14  )-----------( 140      ( 25 Dec 2017 08:27 )             29.5     12-24    137  |  85<L>  |  95<H>  ----------------------------<  111<H>  3.4<L>   |  36<H>  |  1.90<H>    Ca    9.4      24 Dec 2017 08:42

## 2017-12-26 LAB
ANION GAP SERPL CALC-SCNC: 10 MMOL/L — SIGNIFICANT CHANGE UP (ref 5–17)
BUN SERPL-MCNC: 98 MG/DL — HIGH (ref 7–23)
CALCIUM SERPL-MCNC: 9.2 MG/DL — SIGNIFICANT CHANGE UP (ref 8.4–10.5)
CHLORIDE SERPL-SCNC: 85 MMOL/L — LOW (ref 96–108)
CO2 SERPL-SCNC: 44 MMOL/L — HIGH (ref 22–31)
CREAT SERPL-MCNC: 1.89 MG/DL — HIGH (ref 0.5–1.3)
GLUCOSE SERPL-MCNC: 99 MG/DL — SIGNIFICANT CHANGE UP (ref 70–99)
HCT VFR BLD CALC: 31.4 % — LOW (ref 34.5–45)
HGB BLD-MCNC: 10 G/DL — LOW (ref 11.5–15.5)
MCHC RBC-ENTMCNC: 29.3 PG — SIGNIFICANT CHANGE UP (ref 27–34)
MCHC RBC-ENTMCNC: 31.9 GM/DL — LOW (ref 32–36)
MCV RBC AUTO: 91.9 FL — SIGNIFICANT CHANGE UP (ref 80–100)
PLATELET # BLD AUTO: 163 K/UL — SIGNIFICANT CHANGE UP (ref 150–400)
POTASSIUM SERPL-MCNC: 3.1 MMOL/L — LOW (ref 3.5–5.3)
POTASSIUM SERPL-SCNC: 3.1 MMOL/L — LOW (ref 3.5–5.3)
RBC # BLD: 3.42 M/UL — LOW (ref 3.8–5.2)
RBC # FLD: 20 % — HIGH (ref 10.3–14.5)
SODIUM SERPL-SCNC: 139 MMOL/L — SIGNIFICANT CHANGE UP (ref 135–145)
WBC # BLD: 6.8 K/UL — SIGNIFICANT CHANGE UP (ref 3.8–10.5)
WBC # FLD AUTO: 6.8 K/UL — SIGNIFICANT CHANGE UP (ref 3.8–10.5)

## 2017-12-26 PROCEDURE — 99233 SBSQ HOSP IP/OBS HIGH 50: CPT

## 2017-12-26 RX ORDER — SUCRALFATE 1 G
1 TABLET ORAL
Qty: 0 | Refills: 0 | Status: DISCONTINUED | OUTPATIENT
Start: 2017-12-26 | End: 2018-01-05

## 2017-12-26 RX ORDER — POTASSIUM CHLORIDE 20 MEQ
20 PACKET (EA) ORAL ONCE
Qty: 0 | Refills: 0 | Status: COMPLETED | OUTPATIENT
Start: 2017-12-26 | End: 2017-12-26

## 2017-12-26 RX ORDER — BUMETANIDE 0.25 MG/ML
1 INJECTION INTRAMUSCULAR; INTRAVENOUS
Qty: 0 | Refills: 0 | Status: DISCONTINUED | OUTPATIENT
Start: 2017-12-26 | End: 2018-01-02

## 2017-12-26 RX ADMIN — Medication 0.12 MILLIGRAM(S): at 12:43

## 2017-12-26 RX ADMIN — BUMETANIDE 1 MILLIGRAM(S): 0.25 INJECTION INTRAMUSCULAR; INTRAVENOUS at 17:08

## 2017-12-26 RX ADMIN — Medication 25 MILLIGRAM(S): at 15:12

## 2017-12-26 RX ADMIN — HEPARIN SODIUM 5000 UNIT(S): 5000 INJECTION INTRAVENOUS; SUBCUTANEOUS at 15:12

## 2017-12-26 RX ADMIN — CLOPIDOGREL BISULFATE 75 MILLIGRAM(S): 75 TABLET, FILM COATED ORAL at 15:13

## 2017-12-26 RX ADMIN — TIOTROPIUM BROMIDE AND OLODATEROL 2 PUFF(S): 3.124; 2.736 SPRAY, METERED RESPIRATORY (INHALATION) at 08:21

## 2017-12-26 RX ADMIN — Medication 81 MILLIGRAM(S): at 15:13

## 2017-12-26 RX ADMIN — BUMETANIDE 1.5 MILLIGRAM(S): 0.25 INJECTION INTRAMUSCULAR; INTRAVENOUS at 08:13

## 2017-12-26 RX ADMIN — Medication 25 MILLIGRAM(S): at 23:10

## 2017-12-26 RX ADMIN — Medication 5 MILLIGRAM(S): at 08:13

## 2017-12-26 RX ADMIN — ROFLUMILAST 500 MICROGRAM(S): 500 TABLET ORAL at 08:13

## 2017-12-26 RX ADMIN — ATORVASTATIN CALCIUM 20 MILLIGRAM(S): 80 TABLET, FILM COATED ORAL at 23:10

## 2017-12-26 RX ADMIN — Medication 1 GRAM(S): at 06:28

## 2017-12-26 RX ADMIN — Medication 25 MILLIGRAM(S): at 08:17

## 2017-12-26 NOTE — PROGRESS NOTE ADULT - SUBJECTIVE AND OBJECTIVE BOX
[facility] [phone]        Patient seen and examined in bed.  He was in good spirits.    Breathing about the same.  Running nose        MEDICATIONS  (STANDING):  allopurinol 200 milliGRAM(s) Oral daily  aspirin enteric coated 81 milliGRAM(s) Oral daily  atorvastatin 20 milliGRAM(s) Oral at bedtime  buMETAnide 1.5 milliGRAM(s) Oral two times a day  clopidogrel Tablet 75 milliGRAM(s) Oral daily  digoxin     Tablet 0.125 milliGRAM(s) Oral every other day  diltiazem    Tablet 90 milliGRAM(s) Oral every 6 hours  docusate sodium 100 milliGRAM(s) Oral two times a day  epoetin hilda Injectable 75184 Unit(s) SubCutaneous <User Schedule>  heparin  Injectable 5000 Unit(s) SubCutaneous every 8 hours  macitentan (OPSUMIT) 10 milliGRAM(s) 10 milliGRAM(s) Oral daily  methimazole 5 milliGRAM(s) Oral daily  metolazone 5 milliGRAM(s) Oral daily  metoprolol     tartrate 25 milliGRAM(s) Oral every 8 hours  pantoprazole    Tablet 40 milliGRAM(s) Oral before breakfast  predniSONE   Tablet 5 milliGRAM(s) Oral daily  roflumilast 500 MICROGram(s) Oral daily  sucralfate suspension 1 Gram(s) Oral three times a day  tiotropium 2.5 MICROgram(s)/olodaterol 2.5 MICROgram(s) Inhaler 2 Puff(s) Inhalation daily      VITAL:  T(C): , Max: 36.7 (12-25-17 @ 21:34)  T(F): , Max: 98.1 (12-25-17 @ 21:34)  HR: 62 (12-26-17 @ 08:20)  BP: 106/62 (12-26-17 @ 08:20)  BP(mean): --  RR: 18 (12-26-17 @ 06:25)  SpO2: 98% (12-26-17 @ 06:25)  Wt(kg): --    I and O's:    12-25 @ 07:01  -  12-26 @ 07:00  --------------------------------------------------------  IN: 180 mL / OUT: 2200 mL / NET: -2020 mL          PHYSICAL EXAM:    Constitutional: NAD  HEENT: PERRLA    Neck:  No JVD  Respiratory: CTAB/L  Cardiovascular: S1 and S2  Gastrointestinal: BS+, soft, distended  Extremities: wrinkled legs  Neurological: A/O x 3, no focal deficits  Psychiatric: Normal mood, normal affect  : No Gottlieb  Skin: No rashes  Access: Not applicable    LABS:                        9.2    7.14  )-----------( 140      ( 25 Dec 2017 08:27 )             29.5     12-25    140  |  85<L>  |  92<H>  ----------------------------<  103<H>  3.8   |  35<H>  |  2.04<H>    Ca    9.5      25 Dec 2017 08:26            Urine Studies:          RADIOLOGY & ADDITIONAL STUDIES:

## 2017-12-26 NOTE — PROGRESS NOTE ADULT - ASSESSMENT
severe copd  severe phtn  bucky  anemia  vol overload  s/p large volume paracentesis  Afib/flutter with rvr not on a/c secondary bleeding issues    continue current care  appreciate renal/ep/pulm input  cardizem decreased  bumex decreased  check abd sono for possible repeat para prior to d/c planning

## 2017-12-26 NOTE — PROGRESS NOTE ADULT - SUBJECTIVE AND OBJECTIVE BOX
HPI: no over night event     MEDICATIONS  (STANDING):  allopurinol 200 milliGRAM(s) Oral daily  aspirin enteric coated 81 milliGRAM(s) Oral daily  atorvastatin 20 milliGRAM(s) Oral at bedtime  buMETAnide 1 milliGRAM(s) Oral two times a day  clopidogrel Tablet 75 milliGRAM(s) Oral daily  digoxin     Tablet 0.125 milliGRAM(s) Oral every other day  diltiazem    Tablet 90 milliGRAM(s) Oral every 6 hours  docusate sodium 100 milliGRAM(s) Oral two times a day  epoetin hilda Injectable 31692 Unit(s) SubCutaneous <User Schedule>  heparin  Injectable 5000 Unit(s) SubCutaneous every 8 hours  macitentan (OPSUMIT) 10 milliGRAM(s) 10 milliGRAM(s) Oral daily  methimazole 5 milliGRAM(s) Oral daily  metolazone 5 milliGRAM(s) Oral daily  metoprolol     tartrate 25 milliGRAM(s) Oral every 8 hours  pantoprazole    Tablet 40 milliGRAM(s) Oral before breakfast  potassium chloride    Tablet ER 20 milliEquivalent(s) Oral once  predniSONE   Tablet 5 milliGRAM(s) Oral daily  roflumilast 500 MICROGram(s) Oral daily  sucralfate suspension 1 Gram(s) Oral three times a day  tiotropium 2.5 MICROgram(s)/olodaterol 2.5 MICROgram(s) Inhaler 2 Puff(s) Inhalation daily    MEDICATIONS  (PRN):  acetaminophen   Tablet 650 milliGRAM(s) Oral every 6 hours PRN Moderate Pain (4 - 6)  ipratropium    for Nebulization 500 MICROGram(s) Nebulizer every 6 hours PRN Shortness of Breath and/or Wheezing  ondansetron    Tablet 4 milliGRAM(s) Oral every 8 hours PRN Nausea and/or Vomiting  sodium chloride 0.65% Nasal 1 Spray(s) Both Nostrils five times a day PRN dry nose    Allergies  No Known Allergies  Intolerances   albuterol (Unknown)     REVIEW OF SYSTEM:    Constitutional: denies fever, chills, fatigue  Neuro: denies headache, numbness, weakness, dizziness  Resp: states less SOB deneis wheezing   CVS: denies chest pain, palpitations, swelling to legs decreased but abd girth still big  GI: denies abdominal pain, nausea, vomiting, diarrhea   : denies dysuria, frequency, incontinence  Skin: denies itching, burning, rashes, or lesions     Vital Signs Last 24 Hrs  T(C): 36.6 (26 Dec 2017 14:13), Max: 36.7 (25 Dec 2017 21:34)  T(F): 97.9 (26 Dec 2017 14:13), Max: 98.1 (25 Dec 2017 21:34)  HR: 58 (26 Dec 2017 14:13) (58 - 64)  BP: 110/63 (26 Dec 2017 14:13) (104/66 - 121/67)  BP(mean): --  RR: 18 (26 Dec 2017 14:13) (18 - 18)  SpO2: 97% (26 Dec 2017 14:13) (97% - 100%)    Physical Exam:  General : frail no acute distress  Neuro : Alert and oriented x 3, no focal deficits  HEENT : Sclera clear, no JVD, no Lymphadeopathy, no carotid bruits, neck supple  Lungs: Clear to Ascultation, no wheezing , rales or rhonchi , appears more comfortable   Cardiovascular : irreg rate and rhythm   GI : abdomen soft, NT, ND, + BS   : no suprapubic tenderness  Extremities : No edema, + 1 DP and +1 PT, feet warm   Skin : skin dry BLE     TELE: a flutter 65 -70's  EKG:    LABS:                        10.0   6.8   )-----------( 163      ( 26 Dec 2017 12:01 )             31.4     12-26    139  |  85<L>  |  98<H>  ----------------------------<  99  3.1<L>   |  44<H>  |  1.89<H>    Ca    9.2      26 Dec 2017 12:01    RADIOLOGY & ADDITIONAL TESTS:

## 2017-12-26 NOTE — PROGRESS NOTE ADULT - NSHPATTENDINGPLANDISCUSS_GEN_ALL_CORE
Jordyn yesterday
Dr Youssef and Dr Cobb in detail along with the 
NP
NP
Outpt heart failure MD Dr Yin Luis
JOEY and Dr Cobb
NP

## 2017-12-26 NOTE — PROGRESS NOTE ADULT - ASSESSMENT
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 Recent admission at [facility] approximately 5 weeks ago for large volume paracentesis (6.1L removed). Now presents again with RV failure-worsened LE edema, recurrent ascites, pleural effusions and pulmonary edema with worsened dyspnea and CASSANDRA on CKD. s/p 8.1 L paracentesis on 12/6. Started on dobutamine gtt for pHTN, discontinued 2nd Afib with RVR to 170s.

## 2017-12-26 NOTE — PROGRESS NOTE ADULT - SUBJECTIVE AND OBJECTIVE BOX
Follow-up Pulm Progress Note    c/o R ear fullness  c/o nasal congestion  c/o not getting her carafate on time   food not to her liking   96% on 4L NC    Medications:  MEDICATIONS  (STANDING):  allopurinol 200 milliGRAM(s) Oral daily  aspirin enteric coated 81 milliGRAM(s) Oral daily  atorvastatin 20 milliGRAM(s) Oral at bedtime  buMETAnide 1 milliGRAM(s) Oral two times a day  clopidogrel Tablet 75 milliGRAM(s) Oral daily  digoxin     Tablet 0.125 milliGRAM(s) Oral every other day  diltiazem    Tablet 90 milliGRAM(s) Oral every 6 hours  docusate sodium 100 milliGRAM(s) Oral two times a day  epoetin hilda Injectable 90921 Unit(s) SubCutaneous <User Schedule>  heparin  Injectable 5000 Unit(s) SubCutaneous every 8 hours  macitentan (OPSUMIT) 10 milliGRAM(s) 10 milliGRAM(s) Oral daily  methimazole 5 milliGRAM(s) Oral daily  metolazone 5 milliGRAM(s) Oral daily  metoprolol     tartrate 25 milliGRAM(s) Oral every 8 hours  pantoprazole    Tablet 40 milliGRAM(s) Oral before breakfast  predniSONE   Tablet 5 milliGRAM(s) Oral daily  roflumilast 500 MICROGram(s) Oral daily  sucralfate suspension 1 Gram(s) Oral three times a day before meals  tiotropium 2.5 MICROgram(s)/olodaterol 2.5 MICROgram(s) Inhaler 2 Puff(s) Inhalation daily    MEDICATIONS  (PRN):  acetaminophen   Tablet 650 milliGRAM(s) Oral every 6 hours PRN Moderate Pain (4 - 6)  ipratropium    for Nebulization 500 MICROGram(s) Nebulizer every 6 hours PRN Shortness of Breath and/or Wheezing  ondansetron    Tablet 4 milliGRAM(s) Oral every 8 hours PRN Nausea and/or Vomiting  sodium chloride 0.65% Nasal 1 Spray(s) Both Nostrils five times a day PRN dry nose          Vital Signs Last 24 Hrs  T(C): 36.6 (26 Dec 2017 14:13), Max: 36.7 (25 Dec 2017 21:34)  T(F): 97.9 (26 Dec 2017 14:13), Max: 98.1 (25 Dec 2017 21:34)  HR: 62 (26 Dec 2017 17:08) (58 - 62)  BP: 113/69 (26 Dec 2017 17:08) (106/62 - 121/67)  BP(mean): --  RR: 18 (26 Dec 2017 14:13) (18 - 18)  SpO2: 97% (26 Dec 2017 14:13) (97% - 100%) on 4L NC        12-25 @ 07:01  -  12-26 @ 07:00  --------------------------------------------------------  IN: 180 mL / OUT: 2200 mL / NET: -2020 mL          LABS:                        10.0   6.8   )-----------( 163      ( 26 Dec 2017 12:01 )             31.4     12-26    139  |  85<L>  |  98<H>  ----------------------------<  99  3.1<L>   |  44<H>  |  1.89<H>    Ca    9.2      26 Dec 2017 12:01            CAPILLARY BLOOD GLUCOSE                GAGAN Negative 12-08 @ 07:39  Anti SS-1 --  Anti SS-2 --  Anti RNP --  RF 15.0 12-08 @ 07:39    Atypical ANCA -- 12-08 @ 07:39  c-ANCA titer -- 12-08 @ 07:39  c-ANCA -- 12-08 @ 07:39  p-ANCA -- 12-08 @ 07:39              CULTURES: (if applicable)          Physical Examination:  PULM: Decreased BS L base  CVS: S1, S2 RRR    RADIOLOGY REVIEWED  CXR: 12/19: L pleural effusion

## 2017-12-26 NOTE — PROGRESS NOTE ADULT - ASSESSMENT
71 year old female with CAD, sev pHTN, dCHF, HTN, DM, Afib, lung CA, COPD and CKD 3 p/w acute on chronic dCHF, ascites, pleural effusion, pulm edema and acute on chronic kidney injury c/b A.Fib with RVR.  - CKD stage 3: likely due to HTN and DM  - CASSANDRA: non oliguric. Likely prerenal due to ineffective circulating volume now +diuresis.    - acute on chronic dCHF: volume overload slowly improving; remains on PO diuretics; wt is stable  - GI: s/p LVP a   - Chronic respiratory acidosis; CO2 slowly improving  - Rapid aflutter; rate controlled - not on a/c (self d/c'd)    Recommendation  - BMP daily  - reduce Bumex 1.0mg po BID for now.  The Bun us rising and need to reduce the dose  - continue metolazone 5 mg po daily  - continue Cardizem 90mg po q 6 hours along with lopressor 25 mg PO q8 hours if BP/HR permits.  Heart rate is good.  ?reduce cardizem to 60 q 6hours  - will continue to monitor adnominal distention; ?repeat paracentesis; GI on board  - monitor intake and output   - daily standing weights  - dose medication for a CrCl ~20 cc/min

## 2017-12-26 NOTE — PROGRESS NOTE ADULT - PROBLEM SELECTOR PLAN 1
2nd compressive atelectasis from pleural effusions, pulmonary edema and restrictive disease from ascites 2nd cor pulmonale   -Keep sp02>90% on supplemental oxygen  -s/p 8.1L paracentesis 12/6  -output>input with bumex/zaroxolyn  -CXR with improved L pleural effusion  -Bumex increased 1mg BID

## 2017-12-26 NOTE — PROGRESS NOTE ADULT - SUBJECTIVE AND OBJECTIVE BOX
MEDICINE, PROGRESS NOTE [phone]    FRAN ALEXANDRA 72y [id number]    Patient seen and examined.  Patient is a 72y old  Female who presents with a chief complaint of shortness of breath, volume overload and ascites (11 Dec 2017 11:41)  Pt feels more bloated.    PAST MEDICAL & SURGICAL HISTORY:  Hyperthyroidism  JOSE (obstructive sleep apnea)  Epistaxis  GIB (gastrointestinal bleeding)  CAD S/P percutaneous coronary angioplasty  Afib  Pulmonary HTN  GERD (gastroesophageal reflux disease)  Obesity  Cardiomegaly  Valvular heart disease  COPD (chronic obstructive pulmonary disease): Home O2  Sleep Apnea: by criteria  Loose, teeth: 3 bottom front loose teeth  Female stress incontinence  Shoulder pain, right  Constipation  Arthritis of Knee  H/O heartburn  History of lung cancer  Obese  Hx of hyperlipidemia  Asthma  Diabetes mellitus: type 2  dx about 4-5 years ago   no daily fingerstick  H/O: HTN (hypertension)  Enlarged lymph nodes  Lymph nodes enlarged: s/p biopsy mediastinal  benign  H/O endoscopy  left upper lobectomy    MEDICATIONS  (STANDING):  allopurinol 200 milliGRAM(s) Oral daily  aspirin enteric coated 81 milliGRAM(s) Oral daily  atorvastatin 20 milliGRAM(s) Oral at bedtime  buMETAnide 1 milliGRAM(s) Oral two times a day  clopidogrel Tablet 75 milliGRAM(s) Oral daily  digoxin     Tablet 0.125 milliGRAM(s) Oral every other day  diltiazem    Tablet 90 milliGRAM(s) Oral every 6 hours  docusate sodium 100 milliGRAM(s) Oral two times a day  epoetin hilda Injectable 52156 Unit(s) SubCutaneous <User Schedule>  heparin  Injectable 5000 Unit(s) SubCutaneous every 8 hours  macitentan (OPSUMIT) 10 milliGRAM(s) 10 milliGRAM(s) Oral daily  methimazole 5 milliGRAM(s) Oral daily  metolazone 5 milliGRAM(s) Oral daily  metoprolol     tartrate 25 milliGRAM(s) Oral every 8 hours  pantoprazole    Tablet 40 milliGRAM(s) Oral before breakfast  predniSONE   Tablet 5 milliGRAM(s) Oral daily  roflumilast 500 MICROGram(s) Oral daily  sucralfate suspension 1 Gram(s) Oral three times a day before meals  tiotropium 2.5 MICROgram(s)/olodaterol 2.5 MICROgram(s) Inhaler 2 Puff(s) Inhalation daily    MEDICATIONS  (PRN):  acetaminophen   Tablet 650 milliGRAM(s) Oral every 6 hours PRN Moderate Pain (4 - 6)  ipratropium    for Nebulization 500 MICROGram(s) Nebulizer every 6 hours PRN Shortness of Breath and/or Wheezing  ondansetron    Tablet 4 milliGRAM(s) Oral every 8 hours PRN Nausea and/or Vomiting  sodium chloride 0.65% Nasal 1 Spray(s) Both Nostrils five times a day PRN dry nose    Allergies    No Known Allergies    Intolerances    albuterol (Unknown)      PHYSICAL EXAM:  Constitutional: NAD  HEENT: Normocephalic, EOMI  Neck:  No JVD  Respiratory: CTA B/L, No wheezes  Cardiovascular: S1, S2, RRR, + systolic murmur  Gastrointestinal: BS+, soft, NT, + distention, + fluid wave  Extremities: + peripheral edema le b/l pitting 1 +  Neurological: AAOX3, no focal deficits  Psychiatric: Normal mood, normal affect  : No Gottlieb    Vital Signs Last 24 Hrs  T(C): 36.6 (26 Dec 2017 14:13), Max: 36.7 (25 Dec 2017 21:34)  T(F): 97.9 (26 Dec 2017 14:13), Max: 98.1 (25 Dec 2017 21:34)  HR: 62 (26 Dec 2017 17:08) (58 - 62)  BP: 113/69 (26 Dec 2017 17:08) (106/62 - 121/67)  BP(mean): --  RR: 18 (26 Dec 2017 14:13) (18 - 18)  SpO2: 97% (26 Dec 2017 14:13) (97% - 100%)  I&O's Summary    25 Dec 2017 07:01  -  26 Dec 2017 07:00  --------------------------------------------------------  IN: 180 mL / OUT: 2200 mL / NET: -2020 mL    26 Dec 2017 07:01  -  26 Dec 2017 19:54  --------------------------------------------------------  IN: 540 mL / OUT: 1200 mL / NET: -660 mL        LABS:                        10.0   6.8   )-----------( 163      ( 26 Dec 2017 12:01 )             31.4     12-26    139  |  85<L>  |  98<H>  ----------------------------<  99  3.1<L>   |  44<H>  |  1.89<H>    Ca    9.2      26 Dec 2017 12:01

## 2017-12-26 NOTE — PROGRESS NOTE ADULT - PROBLEM SELECTOR PLAN 1
Continue with  telemetry monitoring  c/w oral Cardizem 90 mg Q 6hrs ,lopressor  25 mg q 8 hrs for rate control  continue digoxin 0.125mg QOD  Patient is not on Anticoagulation although  CHADS VASC score of 5 due to severe ENT hemorrhage requiring hospitalization and PRBC         [id number]

## 2017-12-27 LAB
ANION GAP SERPL CALC-SCNC: 13 MMOL/L — SIGNIFICANT CHANGE UP (ref 5–17)
APTT BLD: 25.5 SEC — LOW (ref 27.5–37.4)
BUN SERPL-MCNC: 96 MG/DL — HIGH (ref 7–23)
CALCIUM SERPL-MCNC: 9 MG/DL — SIGNIFICANT CHANGE UP (ref 8.4–10.5)
CHLORIDE SERPL-SCNC: 86 MMOL/L — LOW (ref 96–108)
CO2 SERPL-SCNC: 39 MMOL/L — HIGH (ref 22–31)
CREAT SERPL-MCNC: 1.77 MG/DL — HIGH (ref 0.5–1.3)
GLUCOSE SERPL-MCNC: 102 MG/DL — HIGH (ref 70–99)
HCT VFR BLD CALC: 30.5 % — LOW (ref 34.5–45)
HGB BLD-MCNC: 9.8 G/DL — LOW (ref 11.5–15.5)
INR BLD: 1.06 RATIO — SIGNIFICANT CHANGE UP (ref 0.88–1.16)
MCHC RBC-ENTMCNC: 29.3 PG — SIGNIFICANT CHANGE UP (ref 27–34)
MCHC RBC-ENTMCNC: 32 GM/DL — SIGNIFICANT CHANGE UP (ref 32–36)
MCV RBC AUTO: 91.5 FL — SIGNIFICANT CHANGE UP (ref 80–100)
PLATELET # BLD AUTO: 155 K/UL — SIGNIFICANT CHANGE UP (ref 150–400)
POTASSIUM SERPL-MCNC: 3.5 MMOL/L — SIGNIFICANT CHANGE UP (ref 3.5–5.3)
POTASSIUM SERPL-SCNC: 3.5 MMOL/L — SIGNIFICANT CHANGE UP (ref 3.5–5.3)
PROTHROM AB SERPL-ACNC: 12 SEC — SIGNIFICANT CHANGE UP (ref 10–13.1)
RBC # BLD: 3.33 M/UL — LOW (ref 3.8–5.2)
RBC # FLD: 19.8 % — HIGH (ref 10.3–14.5)
SODIUM SERPL-SCNC: 138 MMOL/L — SIGNIFICANT CHANGE UP (ref 135–145)
WBC # BLD: 6.6 K/UL — SIGNIFICANT CHANGE UP (ref 3.8–10.5)
WBC # FLD AUTO: 6.6 K/UL — SIGNIFICANT CHANGE UP (ref 3.8–10.5)

## 2017-12-27 PROCEDURE — 76705 ECHO EXAM OF ABDOMEN: CPT | Mod: 26

## 2017-12-27 RX ADMIN — BUMETANIDE 1 MILLIGRAM(S): 0.25 INJECTION INTRAMUSCULAR; INTRAVENOUS at 06:37

## 2017-12-27 RX ADMIN — ERYTHROPOIETIN 10000 UNIT(S): 10000 INJECTION, SOLUTION INTRAVENOUS; SUBCUTANEOUS at 13:26

## 2017-12-27 RX ADMIN — TIOTROPIUM BROMIDE AND OLODATEROL 2 PUFF(S): 3.124; 2.736 SPRAY, METERED RESPIRATORY (INHALATION) at 09:09

## 2017-12-27 RX ADMIN — BUMETANIDE 1 MILLIGRAM(S): 0.25 INJECTION INTRAMUSCULAR; INTRAVENOUS at 17:16

## 2017-12-27 RX ADMIN — Medication 25 MILLIGRAM(S): at 13:25

## 2017-12-27 RX ADMIN — Medication 81 MILLIGRAM(S): at 13:25

## 2017-12-27 RX ADMIN — HEPARIN SODIUM 5000 UNIT(S): 5000 INJECTION INTRAVENOUS; SUBCUTANEOUS at 06:41

## 2017-12-27 RX ADMIN — Medication 25 MILLIGRAM(S): at 06:37

## 2017-12-27 RX ADMIN — Medication 1 GRAM(S): at 06:38

## 2017-12-27 RX ADMIN — ROFLUMILAST 500 MICROGRAM(S): 500 TABLET ORAL at 09:10

## 2017-12-27 RX ADMIN — Medication 25 MILLIGRAM(S): at 22:25

## 2017-12-27 RX ADMIN — Medication 5 MILLIGRAM(S): at 09:10

## 2017-12-27 RX ADMIN — Medication 200 MILLIGRAM(S): at 13:24

## 2017-12-27 RX ADMIN — Medication 1 GRAM(S): at 17:16

## 2017-12-27 RX ADMIN — ATORVASTATIN CALCIUM 20 MILLIGRAM(S): 80 TABLET, FILM COATED ORAL at 22:25

## 2017-12-27 RX ADMIN — HEPARIN SODIUM 5000 UNIT(S): 5000 INJECTION INTRAVENOUS; SUBCUTANEOUS at 13:25

## 2017-12-27 RX ADMIN — Medication 1 GRAM(S): at 13:24

## 2017-12-27 RX ADMIN — CLOPIDOGREL BISULFATE 75 MILLIGRAM(S): 75 TABLET, FILM COATED ORAL at 13:25

## 2017-12-27 NOTE — PROGRESS NOTE ADULT - SUBJECTIVE AND OBJECTIVE BOX
NEPHROLOGY-[facility] [phone]        Patient seen and examined in bed.  She feels about the same.  Wt is unchanged        MEDICATIONS  (STANDING):  allopurinol 200 milliGRAM(s) Oral daily  aspirin enteric coated 81 milliGRAM(s) Oral daily  atorvastatin 20 milliGRAM(s) Oral at bedtime  buMETAnide 1 milliGRAM(s) Oral two times a day  clopidogrel Tablet 75 milliGRAM(s) Oral daily  digoxin     Tablet 0.125 milliGRAM(s) Oral every other day  diltiazem    Tablet 60 milliGRAM(s) Oral every 6 hours  docusate sodium 100 milliGRAM(s) Oral two times a day  epoetin hilda Injectable 22275 Unit(s) SubCutaneous <User Schedule>  heparin  Injectable 5000 Unit(s) SubCutaneous every 8 hours  macitentan (OPSUMIT) 10 milliGRAM(s) 10 milliGRAM(s) Oral daily  methimazole 5 milliGRAM(s) Oral daily  metolazone 5 milliGRAM(s) Oral daily  metoprolol     tartrate 25 milliGRAM(s) Oral every 8 hours  pantoprazole    Tablet 40 milliGRAM(s) Oral before breakfast  predniSONE   Tablet 5 milliGRAM(s) Oral daily  roflumilast 500 MICROGram(s) Oral daily  sucralfate suspension 1 Gram(s) Oral three times a day before meals  tiotropium 2.5 MICROgram(s)/olodaterol 2.5 MICROgram(s) Inhaler 2 Puff(s) Inhalation daily      VITAL:  T(C): , Max: 37 (12-26-17 @ 21:19)  T(F): , Max: 98.6 (12-26-17 @ 21:19)  HR: 81 (12-27-17 @ 06:24)  BP: 126/64 (12-27-17 @ 06:24)  BP(mean): --  RR: 17 (12-27-17 @ 06:24)  SpO2: 100% (12-27-17 @ 06:24)  Wt(kg): --    I and O's:    12-26 @ 07:01  -  12-27 @ 07:00  --------------------------------------------------------  IN: 540 mL / OUT: 2300 mL / NET: -1760 mL    12-27 @ 07:01  -  12-27 @ 11:10  --------------------------------------------------------  IN: 120 mL / OUT: 350 mL / NET: -230 mL          PHYSICAL EXAM:    Constitutional: NAD  HEENT: PERRLA    Neck:  No JVD  Respiratory: reduced breath sounds  Cardiovascular: S1 and S2  Gastrointestinal: BS+, soft, distended  Extremities: No peripheral edema  Neurological: A/O x 3, no focal deficits  Psychiatric: Normal mood, normal affect  : No Gottlieb  Skin: No rashes  Access: Not applicable    LABS:                        9.8    6.6   )-----------( 155      ( 27 Dec 2017 06:46 )             30.5     12-27    138  |  86<L>  |  96<H>  ----------------------------<  102<H>  3.5   |  39<H>  |  1.77<H>    Ca    9.0      27 Dec 2017 08:48            Urine Studies:          RADIOLOGY & ADDITIONAL STUDIES:

## 2017-12-27 NOTE — PROGRESS NOTE ADULT - ASSESSMENT
severe copd  severe phtn  bucky  anemia  vol overload  s/p large volume paracentesis  Afib/flutter with rvr not on a/c secondary bleeding issues    continue current care  monitor weight  pt may need para by alix will review with dr hargrove

## 2017-12-27 NOTE — PROGRESS NOTE ADULT - ASSESSMENT
71 year old female with CAD, sev pHTN, dCHF, HTN, DM, Afib, lung CA, COPD and CKD 3 p/w acute on chronic dCHF, ascites, pleural effusion, pulm edema and acute on chronic kidney injury c/b A.Fib with RVR.  - CKD stage 3: likely due to HTN and DM  - CASSANDRA: non oliguric. Likely prerenal due to ineffective circulating volume now +diuresis.    - acute on chronic dCHF: volume overload slowly improving; remains on PO diuretics; wt is stable  - GI: s/p LVP  - Chronic respiratory acidosis;   - Rapid aflutter; rate controlled - not on a/c (self d/c'd)    Recommendation  - BMP daily  -  Bumex 1.0mg po BID for now and the wt is stable.  The Bun is rising but the creatinine is improving  - continue metolazone 5 mg po daily  - continue Cardizem 60mg po q 6 hours along with lopressor 25 mg PO q8 hours if BP/HR permits.   - will continue to monitor adnominal distention;  GI on board  - monitor intake and output   - daily standing weights  - dose medication for a CrCl ~20 cc/min

## 2017-12-27 NOTE — PROGRESS NOTE ADULT - SUBJECTIVE AND OBJECTIVE BOX
MEDICINE, PROGRESS NOTE [phone]    FRAN ALEXANDRA 72y [id number]    Patient seen and examined.  Patient is a 72y old  Female who presents with a chief complaint of shortness of breath, volume overload and ascites (11 Dec 2017 11:41)  Pt feels ok.    PAST MEDICAL & SURGICAL HISTORY:  Hyperthyroidism  JOSE (obstructive sleep apnea)  Epistaxis  GIB (gastrointestinal bleeding)  CAD S/P percutaneous coronary angioplasty  Afib  Pulmonary HTN  GERD (gastroesophageal reflux disease)  Obesity  Cardiomegaly  Valvular heart disease  COPD (chronic obstructive pulmonary disease): Home O2  Sleep Apnea: by criteria  Loose, teeth: 3 bottom front loose teeth  Female stress incontinence  Shoulder pain, right  Constipation  Arthritis of Knee  H/O heartburn  History of lung cancer  Obese  Hx of hyperlipidemia  Asthma  Diabetes mellitus: type 2  dx about 4-5 years ago   no daily fingerstick  H/O: HTN (hypertension)  Enlarged lymph nodes  Lymph nodes enlarged: s/p biopsy mediastinal  benign  H/O endoscopy  left upper lobectomy    MEDICATIONS  (STANDING):  allopurinol 200 milliGRAM(s) Oral daily  aspirin enteric coated 81 milliGRAM(s) Oral daily  atorvastatin 20 milliGRAM(s) Oral at bedtime  buMETAnide 1 milliGRAM(s) Oral two times a day  clopidogrel Tablet 75 milliGRAM(s) Oral daily  digoxin     Tablet 0.125 milliGRAM(s) Oral every other day  diltiazem    Tablet 60 milliGRAM(s) Oral every 6 hours  docusate sodium 100 milliGRAM(s) Oral two times a day  epoetin hilda Injectable 22836 Unit(s) SubCutaneous <User Schedule>  heparin  Injectable 5000 Unit(s) SubCutaneous every 8 hours  macitentan (OPSUMIT) 10 milliGRAM(s) 10 milliGRAM(s) Oral daily  methimazole 5 milliGRAM(s) Oral daily  metolazone 5 milliGRAM(s) Oral daily  metoprolol     tartrate 25 milliGRAM(s) Oral every 8 hours  pantoprazole    Tablet 40 milliGRAM(s) Oral before breakfast  predniSONE   Tablet 5 milliGRAM(s) Oral daily  roflumilast 500 MICROGram(s) Oral daily  sucralfate suspension 1 Gram(s) Oral three times a day before meals  tiotropium 2.5 MICROgram(s)/olodaterol 2.5 MICROgram(s) Inhaler 2 Puff(s) Inhalation daily    MEDICATIONS  (PRN):  acetaminophen   Tablet 650 milliGRAM(s) Oral every 6 hours PRN Moderate Pain (4 - 6)  ipratropium    for Nebulization 500 MICROGram(s) Nebulizer every 6 hours PRN Shortness of Breath and/or Wheezing  ondansetron    Tablet 4 milliGRAM(s) Oral every 8 hours PRN Nausea and/or Vomiting  sodium chloride 0.65% Nasal 1 Spray(s) Both Nostrils five times a day PRN dry nose    Allergies    No Known Allergies    Intolerances    albuterol (Unknown)      PHYSICAL EXAM:  Constitutional: NAD  HEENT: Normocephalic, EOMI  Neck:  No JVD  Respiratory: CTA B/L, No wheezes  Cardiovascular: S1, S2, RRR, + systolic murmur  Gastrointestinal: BS+, soft, NT, + distention, + fluid wave  Extremities: No peripheral edema  Neurological: AAOX3, no focal deficits  Psychiatric: Normal mood, normal affect  : No Gottlieb    Vital Signs Last 24 Hrs  T(C): 36.4 (27 Dec 2017 13:45), Max: 37 (26 Dec 2017 21:19)  T(F): 97.5 (27 Dec 2017 13:45), Max: 98.6 (26 Dec 2017 21:19)  HR: 126 (27 Dec 2017 13:45) (61 - 126)  BP: 127/79 (27 Dec 2017 13:45) (102/62 - 127/79)  BP(mean): --  RR: 18 (27 Dec 2017 13:45) (17 - 18)  SpO2: 100% (27 Dec 2017 13:45) (100% - 100%)  I&O's Summary    26 Dec 2017 07:01  -  27 Dec 2017 07:00  --------------------------------------------------------  IN: 540 mL / OUT: 2300 mL / NET: -1760 mL    27 Dec 2017 07:01  -  27 Dec 2017 18:23  --------------------------------------------------------  IN: 360 mL / OUT: 750 mL / NET: -390 mL        LABS:                        9.8    6.6   )-----------( 155      ( 27 Dec 2017 06:46 )             30.5     12-27    138  |  86<L>  |  96<H>  ----------------------------<  102<H>  3.5   |  39<H>  |  1.77<H>    Ca    9.0      27 Dec 2017 08:48

## 2017-12-28 DIAGNOSIS — Z51.5 ENCOUNTER FOR PALLIATIVE CARE: ICD-10-CM

## 2017-12-28 LAB
ANION GAP SERPL CALC-SCNC: 9 MMOL/L — SIGNIFICANT CHANGE UP (ref 5–17)
BUN SERPL-MCNC: 102 MG/DL — HIGH (ref 7–23)
CALCIUM SERPL-MCNC: 9.3 MG/DL — SIGNIFICANT CHANGE UP (ref 8.4–10.5)
CHLORIDE SERPL-SCNC: 85 MMOL/L — LOW (ref 96–108)
CO2 SERPL-SCNC: 43 MMOL/L — HIGH (ref 22–31)
CREAT SERPL-MCNC: 2.01 MG/DL — HIGH (ref 0.5–1.3)
GLUCOSE SERPL-MCNC: 97 MG/DL — SIGNIFICANT CHANGE UP (ref 70–99)
HCT VFR BLD CALC: 30.9 % — LOW (ref 34.5–45)
HGB BLD-MCNC: 9.6 G/DL — LOW (ref 11.5–15.5)
MCHC RBC-ENTMCNC: 27.7 PG — SIGNIFICANT CHANGE UP (ref 27–34)
MCHC RBC-ENTMCNC: 31.1 GM/DL — LOW (ref 32–36)
MCV RBC AUTO: 89.3 FL — SIGNIFICANT CHANGE UP (ref 80–100)
PLATELET # BLD AUTO: 182 K/UL — SIGNIFICANT CHANGE UP (ref 150–400)
POTASSIUM SERPL-MCNC: 3.7 MMOL/L — SIGNIFICANT CHANGE UP (ref 3.5–5.3)
POTASSIUM SERPL-SCNC: 3.7 MMOL/L — SIGNIFICANT CHANGE UP (ref 3.5–5.3)
RBC # BLD: 3.46 M/UL — LOW (ref 3.8–5.2)
RBC # FLD: 20.2 % — HIGH (ref 10.3–14.5)
SODIUM SERPL-SCNC: 137 MMOL/L — SIGNIFICANT CHANGE UP (ref 135–145)
WBC # BLD: 6.14 K/UL — SIGNIFICANT CHANGE UP (ref 3.8–10.5)
WBC # FLD AUTO: 6.14 K/UL — SIGNIFICANT CHANGE UP (ref 3.8–10.5)

## 2017-12-28 RX ORDER — ALLOPURINOL 300 MG
100 TABLET ORAL DAILY
Qty: 0 | Refills: 0 | Status: DISCONTINUED | OUTPATIENT
Start: 2017-12-28 | End: 2018-01-05

## 2017-12-28 RX ORDER — DILTIAZEM HCL 120 MG
240 CAPSULE, EXT RELEASE 24 HR ORAL DAILY
Qty: 0 | Refills: 0 | Status: DISCONTINUED | OUTPATIENT
Start: 2017-12-29 | End: 2018-01-05

## 2017-12-28 RX ADMIN — Medication 25 MILLIGRAM(S): at 06:48

## 2017-12-28 RX ADMIN — ROFLUMILAST 500 MICROGRAM(S): 500 TABLET ORAL at 09:18

## 2017-12-28 RX ADMIN — Medication 25 MILLIGRAM(S): at 14:13

## 2017-12-28 RX ADMIN — Medication 25 MILLIGRAM(S): at 21:55

## 2017-12-28 RX ADMIN — BUMETANIDE 1 MILLIGRAM(S): 0.25 INJECTION INTRAMUSCULAR; INTRAVENOUS at 06:48

## 2017-12-28 RX ADMIN — Medication 0.12 MILLIGRAM(S): at 12:41

## 2017-12-28 RX ADMIN — Medication 81 MILLIGRAM(S): at 12:41

## 2017-12-28 RX ADMIN — Medication 5 MILLIGRAM(S): at 09:18

## 2017-12-28 RX ADMIN — BUMETANIDE 1 MILLIGRAM(S): 0.25 INJECTION INTRAMUSCULAR; INTRAVENOUS at 18:02

## 2017-12-28 RX ADMIN — ATORVASTATIN CALCIUM 20 MILLIGRAM(S): 80 TABLET, FILM COATED ORAL at 21:55

## 2017-12-28 RX ADMIN — CLOPIDOGREL BISULFATE 75 MILLIGRAM(S): 75 TABLET, FILM COATED ORAL at 12:41

## 2017-12-28 RX ADMIN — Medication 100 MILLIGRAM(S): at 14:12

## 2017-12-28 NOTE — PROGRESS NOTE ADULT - ASSESSMENT
71 year old female with CAD, sev pHTN, dCHF, HTN, DM, Afib, lung CA, COPD and CKD 3 p/w acute on chronic dCHF, ascites, pleural effusion, pulm edema and acute on chronic kidney injury c/b A.Fib with RVR.  - CKD stage 3: likely due to HTN and DM  - CASSANDRA: non oliguric. Likely prerenal due to ineffective circulating volume now +diuresis.    - acute on chronic dCHF: volume overload slowly improving; remains on PO diuretics; wt is stable  - GI: s/p LVP  - Chronic respiratory acidosis;   - Rapid aflutter; rate controlled - not on a/c (self d/c'd)    Recommendation  - BMP daily  -  Bumex 1.0mg po BID for now and the wt is stable.  The Bun is rising-->No choice but reduce zaroxolyn to qod.    - metolazone 5 mg po qod  - continue Cardizem 60mg po q 6 hours along with lopressor 25 mg PO q8 hours if BP/HR permits.   Can we change to Cardizem CD?  - will continue to monitor adnominal distention;  GI on board  - monitor intake and output   - daily standing weights  -Reduce Allopurinol to 100 mg po qd  - dose medication for a CrCl ~20 cc/min      Elevated serum HCO3 is from pCO2 retention as opposed to metabolic alkalosisi 71 year old female with CAD, sev pHTN, dCHF, HTN, DM, Afib, lung CA, COPD and CKD 3 p/w acute on chronic dCHF, ascites, pleural effusion, pulm edema and acute on chronic kidney injury c/b A.Fib with RVR.  - CKD stage 3: likely due to HTN and DM  - CASSANDRA: non oliguric. Likely prerenal due to ineffective circulating volume now +diuresis.    - acute on chronic dCHF: volume overload slowly improving; remains on PO diuretics; wt is stable  - GI: s/p LVP  - Chronic respiratory acidosis;   - Rapid aflutter; rate controlled - not on a/c (self d/c'd)    Recommendation  - BMP daily  -  Bumex 1.0mg po BID for now and the wt is stable.  The Bun is rising-->No choice but reduce zaroxolyn to qod.    - metolazone 5 mg po qod  - continue Cardizem 60mg po q 6 hours along with lopressor 25 mg PO q8 hours if BP/HR permits.   Can we change to Cardizem CD?  - will continue to monitor adnominal distention;  GI on board  - monitor intake and output   - daily standing weights  -Reduce Allopurinol to 100 mg po qd  - dose medication for a CrCl ~20 cc/min      Elevated serum HCO3 is from pCO2 retention as opposed to metabolic alkalosis

## 2017-12-28 NOTE — PROGRESS NOTE ADULT - ASSESSMENT
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 Recent admission at [facility] approximately 5 weeks ago for large volume paracentesis (6.1L removed). Now presents again with RV failure-worsened LE edema, recurrent ascites, pleural effusions and pulmonary edema with worsened dyspnea and CASSANDRA on CKD. s/p 8.1 L paracentesis on 12/6. Started on dobutamine gtt for pHTN, discontinued 2nd Afib with RVR to 170s.

## 2017-12-28 NOTE — PROGRESS NOTE ADULT - ASSESSMENT
severe copd  severe phtn  bucky  anemia  vol overload  s/p large volume paracentesis with recurrence of moderate ascites  Afib/flutter with rvr not on a/c secondary bleeding issues  uremia    continue current care  discussed with dr delvin gabriel adjusted   if pt remains stable on current regimen will consider d/c home in next 24-48 hrs

## 2017-12-28 NOTE — CHART NOTE - NSCHARTNOTEFT_GEN_A_CORE
Source: Patient [X ]    Family [ ]     other [X ] RN, medical record    Pt seen for nutritional follow up. Pt reports a fair appetite, states due to length of stay her appetite for our menu is decreasing. Pt reports family has been providing low NA food options at times. Pt denies any food preferences or PO supplements at this time. Pt denies GI distress at this time.     Per chart, Pt with Abdominal distention, severe pulmonary HTN, s/p paracentesis with 8.1L removed. Pt now with Afib with RVR. Possible plan for repeat paracentesis     Diet: low Na.      Patient reports no GI distress      PO intake: 50-75%   Source for PO intake [ X] Patient [ ] family [ ] chart [ ] staff [ ] other      Current Weight: 132.9lbs. appears stable.   % Weight Change    Pertinent Medications: MEDICATIONS  (STANDING):  allopurinol 200 milliGRAM(s) Oral daily  aspirin enteric coated 81 milliGRAM(s) Oral daily  atorvastatin 20 milliGRAM(s) Oral at bedtime  buMETAnide 1 milliGRAM(s) Oral two times a day  clopidogrel Tablet 75 milliGRAM(s) Oral daily  digoxin     Tablet 0.125 milliGRAM(s) Oral every other day  diltiazem    Tablet 60 milliGRAM(s) Oral every 6 hours  docusate sodium 100 milliGRAM(s) Oral two times a day  epoetin hilda Injectable 76922 Unit(s) SubCutaneous <User Schedule>  heparin  Injectable 5000 Unit(s) SubCutaneous every 8 hours  macitentan (OPSUMIT) 10 milliGRAM(s) 10 milliGRAM(s) Oral daily  methimazole 5 milliGRAM(s) Oral daily  metolazone 5 milliGRAM(s) Oral daily  metoprolol     tartrate 25 milliGRAM(s) Oral every 8 hours  pantoprazole    Tablet 40 milliGRAM(s) Oral before breakfast  predniSONE   Tablet 5 milliGRAM(s) Oral daily  roflumilast 500 MICROGram(s) Oral daily  sucralfate suspension 1 Gram(s) Oral three times a day before meals  tiotropium 2.5 MICROgram(s)/olodaterol 2.5 MICROgram(s) Inhaler 2 Puff(s) Inhalation daily    MEDICATIONS  (PRN):  acetaminophen   Tablet 650 milliGRAM(s) Oral every 6 hours PRN Moderate Pain (4 - 6)  ipratropium    for Nebulization 500 MICROGram(s) Nebulizer every 6 hours PRN Shortness of Breath and/or Wheezing  ondansetron    Tablet 4 milliGRAM(s) Oral every 8 hours PRN Nausea and/or Vomiting  sodium chloride 0.65% Nasal 1 Spray(s) Both Nostrils five times a day PRN dry nose    Pertinent Labs:  12-28 Na137 mmol/L Glu 97 mg/dL K+ 3.7 mmol/L Cr  2.01 mg/dL<H>  mg/dL<H>       Skin: intact, +1 bernardo leg edema     Estimated Needs:   [ X] no change since previous assessment  [ ] recalculated:       Previous Nutrition Diagnosis:      [ X]Inadequate Oral Intake      Nutrition Diagnosis is [X ] ongoing  [ ] resolved [ ] not applicable          New Nutrition Diagnosis: [X ] not applicable    Recommend    1. Provide food preferences as requested by Pt/family within diet restrictions  2. Encourage PO intake during meal times   3. Discussed menu ordering/food preferences        Monitoring and Evaluation:     [X ] PO intake [X ] Tolerance to diet prescription [ X] weights [ X] follow up per protocol    [X ] other: RD remains available Sarah Siegler RD. Pager #[phone]

## 2017-12-28 NOTE — PROGRESS NOTE ADULT - SUBJECTIVE AND OBJECTIVE BOX
Follow-up Pulm Progress Note    c/o nausea   dyspnea at baseline  96% on 4.5L NC    Medications:  MEDICATIONS  (STANDING):  allopurinol 100 milliGRAM(s) Oral daily  aspirin enteric coated 81 milliGRAM(s) Oral daily  atorvastatin 20 milliGRAM(s) Oral at bedtime  buMETAnide 1 milliGRAM(s) Oral two times a day  clopidogrel Tablet 75 milliGRAM(s) Oral daily  digoxin     Tablet 0.125 milliGRAM(s) Oral every other day  docusate sodium 100 milliGRAM(s) Oral two times a day  epoetin hilda Injectable 53944 Unit(s) SubCutaneous <User Schedule>  heparin  Injectable 5000 Unit(s) SubCutaneous every 8 hours  macitentan (OPSUMIT) 10 milliGRAM(s) 10 milliGRAM(s) Oral daily  methimazole 5 milliGRAM(s) Oral daily  metolazone 5 milliGRAM(s) Oral daily  metoprolol     tartrate 25 milliGRAM(s) Oral every 8 hours  pantoprazole    Tablet 40 milliGRAM(s) Oral before breakfast  predniSONE   Tablet 5 milliGRAM(s) Oral daily  roflumilast 500 MICROGram(s) Oral daily  sucralfate suspension 1 Gram(s) Oral three times a day before meals  tiotropium 2.5 MICROgram(s)/olodaterol 2.5 MICROgram(s) Inhaler 2 Puff(s) Inhalation daily    MEDICATIONS  (PRN):  acetaminophen   Tablet 650 milliGRAM(s) Oral every 6 hours PRN Moderate Pain (4 - 6)  ipratropium    for Nebulization 500 MICROGram(s) Nebulizer every 6 hours PRN Shortness of Breath and/or Wheezing  ondansetron    Tablet 4 milliGRAM(s) Oral every 8 hours PRN Nausea and/or Vomiting  sodium chloride 0.65% Nasal 1 Spray(s) Both Nostrils five times a day PRN dry nose          Vital Signs Last 24 Hrs  T(C): 36.6 (28 Dec 2017 11:18), Max: 36.6 (28 Dec 2017 11:18)  T(F): 97.9 (28 Dec 2017 11:18), Max: 97.9 (28 Dec 2017 11:18)  HR: 73 (28 Dec 2017 11:18) (73 - 100)  BP: 128/80 (28 Dec 2017 11:18) (100/56 - 147/90)  BP(mean): --  RR: 17 (28 Dec 2017 11:18) (17 - 18)  SpO2: 95% (28 Dec 2017 11:18) (95% - 100%) on 4.5L NC          12-27 @ 07:01  -  12-28 @ 07:00  --------------------------------------------------------  IN: 840 mL / OUT: 1950 mL / NET: -1110 mL          LABS:                        9.6    6.14  )-----------( 182      ( 28 Dec 2017 08:58 )             30.9     12-28    137  |  85<L>  |  102<H>  ----------------------------<  97  3.7   |  43<H>  |  2.01<H>    Ca    9.3      28 Dec 2017 08:58            CAPILLARY BLOOD GLUCOSE        PT/INR - ( 27 Dec 2017 07:28 )   PT: 12.0 sec;   INR: 1.06 ratio         PTT - ( 27 Dec 2017 07:28 )  PTT:25.5 sec        GAGAN Negative 12-08 @ 07:39  Anti SS-1 --  Anti SS-2 --  Anti RNP --  RF 15.0 12-08 @ 07:39    Atypical ANCA -- 12-08 @ 07:39  c-ANCA titer -- 12-08 @ 07:39  c-ANCA -- 12-08 @ 07:39  p-ANCA -- 12-08 @ 07:39              CULTURES: (if applicable)          Physical Examination:  PULM: Decreased L pleural effusion  CVS: S1, S2 heard    RADIOLOGY REVIEWED  Abd US: < from: US Abdomen Limited (12.27.17 @ 12:52) >  FINDINGS:  There is moderate ascites.    IMPRESSION:  Moderate ascites.    < end of copied text >

## 2017-12-28 NOTE — PROGRESS NOTE ADULT - PROBLEM SELECTOR PLAN 1
We discussed GOC  Discussed that her pulmonary hypertension is end stage, her recurrent ascites and pulmonary edema that we are managing with diuretics and paracentesis are symptom mgmt and not treating the underlying disease.   -Patient states she would like to remain full code for now  -Will discuss further tomorrow, may be open to family meeting to discuss her overall prognosis and goals of care

## 2017-12-28 NOTE — PROGRESS NOTE ADULT - SUBJECTIVE AND OBJECTIVE BOX
MEDICINE, PROGRESS NOTE [phone]    FRAN ALEXANDRA 72y [id number]    Patient seen and examined.  Patient is a 72y old  Female who presents with a chief complaint of shortness of breath, volume overload and ascites (11 Dec 2017 11:41)  Pt feels ok. Distressed about conversation with consultants.    PAST MEDICAL & SURGICAL HISTORY:  Hyperthyroidism  JOSE (obstructive sleep apnea)  Epistaxis  GIB (gastrointestinal bleeding)  CAD S/P percutaneous coronary angioplasty  Afib  Pulmonary HTN  GERD (gastroesophageal reflux disease)  Obesity  Cardiomegaly  Valvular heart disease  COPD (chronic obstructive pulmonary disease): Home O2  Sleep Apnea: by criteria  Loose, teeth: 3 bottom front loose teeth  Female stress incontinence  Shoulder pain, right  Constipation  Arthritis of Knee  H/O heartburn  History of lung cancer  Obese  Hx of hyperlipidemia  Asthma  Diabetes mellitus: type 2  dx about 4-5 years ago   no daily fingerstick  H/O: HTN (hypertension)  Enlarged lymph nodes  Lymph nodes enlarged: s/p biopsy mediastinal  benign  H/O endoscopy  left upper lobectomy    MEDICATIONS  (STANDING):  allopurinol 100 milliGRAM(s) Oral daily  aspirin enteric coated 81 milliGRAM(s) Oral daily  atorvastatin 20 milliGRAM(s) Oral at bedtime  buMETAnide 1 milliGRAM(s) Oral two times a day  clopidogrel Tablet 75 milliGRAM(s) Oral daily  digoxin     Tablet 0.125 milliGRAM(s) Oral every other day  docusate sodium 100 milliGRAM(s) Oral two times a day  epoetin hilda Injectable 81864 Unit(s) SubCutaneous <User Schedule>  heparin  Injectable 5000 Unit(s) SubCutaneous every 8 hours  macitentan (OPSUMIT) 10 milliGRAM(s) 10 milliGRAM(s) Oral daily  methimazole 5 milliGRAM(s) Oral daily  metoprolol     tartrate 25 milliGRAM(s) Oral every 8 hours  pantoprazole    Tablet 40 milliGRAM(s) Oral before breakfast  predniSONE   Tablet 5 milliGRAM(s) Oral daily  roflumilast 500 MICROGram(s) Oral daily  sucralfate suspension 1 Gram(s) Oral three times a day before meals  tiotropium 2.5 MICROgram(s)/olodaterol 2.5 MICROgram(s) Inhaler 2 Puff(s) Inhalation daily    MEDICATIONS  (PRN):  acetaminophen   Tablet 650 milliGRAM(s) Oral every 6 hours PRN Moderate Pain (4 - 6)  ipratropium    for Nebulization 500 MICROGram(s) Nebulizer every 6 hours PRN Shortness of Breath and/or Wheezing  ondansetron    Tablet 4 milliGRAM(s) Oral every 8 hours PRN Nausea and/or Vomiting  sodium chloride 0.65% Nasal 1 Spray(s) Both Nostrils five times a day PRN dry nose    Allergies    No Known Allergies    Intolerances    albuterol (Unknown)      PHYSICAL EXAM:  Constitutional: NAD  HEENT: Normocephalic, EOMI  Neck:  No JVD  Respiratory: CTA B/L, No wheezes  Cardiovascular: S1, S2, RRR, + systolic murmur  Gastrointestinal: BS+, soft, NT/ND  Extremities: No peripheral edema  Neurological: AAOX3, no focal deficits  Psychiatric: Normal mood, normal affect  : No Gottlieb    Vital Signs Last 24 Hrs  T(C): 36.6 (28 Dec 2017 11:18), Max: 36.6 (28 Dec 2017 11:18)  T(F): 97.9 (28 Dec 2017 11:18), Max: 97.9 (28 Dec 2017 11:18)  HR: 73 (28 Dec 2017 11:18) (73 - 100)  BP: 128/80 (28 Dec 2017 11:18) (100/56 - 128/80)  BP(mean): --  RR: 17 (28 Dec 2017 11:18) (17 - 18)  SpO2: 95% (28 Dec 2017 11:18) (95% - 100%)  I&O's Summary    27 Dec 2017 07:01  -  28 Dec 2017 07:00  --------------------------------------------------------  IN: 840 mL / OUT: 1950 mL / NET: -1110 mL    28 Dec 2017 07:01  -  28 Dec 2017 20:14  --------------------------------------------------------  IN: 480 mL / OUT: 300 mL / NET: 180 mL        LABS:                        9.6    6.14  )-----------( 182      ( 28 Dec 2017 08:58 )             30.9     12-28    137  |  85<L>  |  102<H>  ----------------------------<  97  3.7   |  43<H>  |  2.01<H>    Ca    9.3      28 Dec 2017 08:58

## 2017-12-29 DIAGNOSIS — R05 COUGH: ICD-10-CM

## 2017-12-29 LAB
ANION GAP SERPL CALC-SCNC: 14 MMOL/L — SIGNIFICANT CHANGE UP (ref 5–17)
ANION GAP SERPL CALC-SCNC: 15 MMOL/L — SIGNIFICANT CHANGE UP (ref 5–17)
BUN SERPL-MCNC: 104 MG/DL — HIGH (ref 7–23)
BUN SERPL-MCNC: 97 MG/DL — HIGH (ref 7–23)
CALCIUM SERPL-MCNC: 8.8 MG/DL — SIGNIFICANT CHANGE UP (ref 8.4–10.5)
CALCIUM SERPL-MCNC: 9.5 MG/DL — SIGNIFICANT CHANGE UP (ref 8.4–10.5)
CHLORIDE SERPL-SCNC: 84 MMOL/L — LOW (ref 96–108)
CHLORIDE SERPL-SCNC: 86 MMOL/L — LOW (ref 96–108)
CO2 SERPL-SCNC: 38 MMOL/L — HIGH (ref 22–31)
CO2 SERPL-SCNC: 39 MMOL/L — HIGH (ref 22–31)
CREAT SERPL-MCNC: 1.7 MG/DL — HIGH (ref 0.5–1.3)
CREAT SERPL-MCNC: 2 MG/DL — HIGH (ref 0.5–1.3)
GLUCOSE SERPL-MCNC: 145 MG/DL — HIGH (ref 70–99)
GLUCOSE SERPL-MCNC: 91 MG/DL — SIGNIFICANT CHANGE UP (ref 70–99)
HCT VFR BLD CALC: 32.3 % — LOW (ref 34.5–45)
HGB BLD-MCNC: 9.8 G/DL — LOW (ref 11.5–15.5)
MCHC RBC-ENTMCNC: 27.4 PG — SIGNIFICANT CHANGE UP (ref 27–34)
MCHC RBC-ENTMCNC: 30.3 GM/DL — LOW (ref 32–36)
MCV RBC AUTO: 90.2 FL — SIGNIFICANT CHANGE UP (ref 80–100)
PLATELET # BLD AUTO: 188 K/UL — SIGNIFICANT CHANGE UP (ref 150–400)
POTASSIUM SERPL-MCNC: 3.1 MMOL/L — LOW (ref 3.5–5.3)
POTASSIUM SERPL-MCNC: 3.1 MMOL/L — LOW (ref 3.5–5.3)
POTASSIUM SERPL-SCNC: 3.1 MMOL/L — LOW (ref 3.5–5.3)
POTASSIUM SERPL-SCNC: 3.1 MMOL/L — LOW (ref 3.5–5.3)
RBC # BLD: 3.58 M/UL — LOW (ref 3.8–5.2)
RBC # FLD: 20.1 % — HIGH (ref 10.3–14.5)
SODIUM SERPL-SCNC: 138 MMOL/L — SIGNIFICANT CHANGE UP (ref 135–145)
SODIUM SERPL-SCNC: 138 MMOL/L — SIGNIFICANT CHANGE UP (ref 135–145)
WBC # BLD: 5.03 K/UL — SIGNIFICANT CHANGE UP (ref 3.8–10.5)
WBC # FLD AUTO: 5.03 K/UL — SIGNIFICANT CHANGE UP (ref 3.8–10.5)

## 2017-12-29 RX ORDER — POTASSIUM CHLORIDE 20 MEQ
20 PACKET (EA) ORAL ONCE
Qty: 0 | Refills: 0 | Status: COMPLETED | OUTPATIENT
Start: 2017-12-29 | End: 2017-12-29

## 2017-12-29 RX ORDER — POTASSIUM CHLORIDE 20 MEQ
20 PACKET (EA) ORAL
Qty: 0 | Refills: 0 | Status: DISCONTINUED | OUTPATIENT
Start: 2017-12-29 | End: 2017-12-29

## 2017-12-29 RX ADMIN — Medication 1 GRAM(S): at 06:19

## 2017-12-29 RX ADMIN — TIOTROPIUM BROMIDE AND OLODATEROL 2 PUFF(S): 3.124; 2.736 SPRAY, METERED RESPIRATORY (INHALATION) at 09:39

## 2017-12-29 RX ADMIN — ROFLUMILAST 500 MICROGRAM(S): 500 TABLET ORAL at 09:41

## 2017-12-29 RX ADMIN — Medication 81 MILLIGRAM(S): at 12:41

## 2017-12-29 RX ADMIN — Medication 5 MILLIGRAM(S): at 09:41

## 2017-12-29 RX ADMIN — BUMETANIDE 1 MILLIGRAM(S): 0.25 INJECTION INTRAMUSCULAR; INTRAVENOUS at 06:16

## 2017-12-29 RX ADMIN — Medication 20 MILLIEQUIVALENT(S): at 21:16

## 2017-12-29 RX ADMIN — Medication 240 MILLIGRAM(S): at 06:18

## 2017-12-29 RX ADMIN — Medication 1 GRAM(S): at 16:56

## 2017-12-29 RX ADMIN — ERYTHROPOIETIN 10000 UNIT(S): 10000 INJECTION, SOLUTION INTRAVENOUS; SUBCUTANEOUS at 12:39

## 2017-12-29 RX ADMIN — Medication 25 MILLIGRAM(S): at 23:05

## 2017-12-29 RX ADMIN — ATORVASTATIN CALCIUM 20 MILLIGRAM(S): 80 TABLET, FILM COATED ORAL at 23:04

## 2017-12-29 RX ADMIN — Medication 1 GRAM(S): at 12:40

## 2017-12-29 RX ADMIN — Medication 20 MILLIEQUIVALENT(S): at 12:55

## 2017-12-29 RX ADMIN — Medication 100 MILLIGRAM(S): at 12:42

## 2017-12-29 RX ADMIN — CLOPIDOGREL BISULFATE 75 MILLIGRAM(S): 75 TABLET, FILM COATED ORAL at 12:42

## 2017-12-29 RX ADMIN — Medication 25 MILLIGRAM(S): at 06:16

## 2017-12-29 RX ADMIN — Medication 25 MILLIGRAM(S): at 15:11

## 2017-12-29 RX ADMIN — BUMETANIDE 1 MILLIGRAM(S): 0.25 INJECTION INTRAMUSCULAR; INTRAVENOUS at 17:03

## 2017-12-29 NOTE — PROGRESS NOTE ADULT - SUBJECTIVE AND OBJECTIVE BOX
Follow-up Pulm Progress Note    c/o cough, mostly non-productive   Rhinorrhea and R ear fullness     Medications:  MEDICATIONS  (STANDING):  allopurinol 100 milliGRAM(s) Oral daily  aspirin enteric coated 81 milliGRAM(s) Oral daily  atorvastatin 20 milliGRAM(s) Oral at bedtime  buMETAnide 1 milliGRAM(s) Oral two times a day  clopidogrel Tablet 75 milliGRAM(s) Oral daily  digoxin     Tablet 0.125 milliGRAM(s) Oral every other day  diltiazem    milliGRAM(s) Oral daily  docusate sodium 100 milliGRAM(s) Oral two times a day  epoetin hilda Injectable 94701 Unit(s) SubCutaneous <User Schedule>  heparin  Injectable 5000 Unit(s) SubCutaneous every 8 hours  macitentan (OPSUMIT) 10 milliGRAM(s) 10 milliGRAM(s) Oral daily  methimazole 5 milliGRAM(s) Oral daily  metoprolol     tartrate 25 milliGRAM(s) Oral every 8 hours  pantoprazole    Tablet 40 milliGRAM(s) Oral before breakfast  predniSONE   Tablet 5 milliGRAM(s) Oral daily  roflumilast 500 MICROGram(s) Oral daily  sucralfate suspension 1 Gram(s) Oral three times a day before meals  tiotropium 2.5 MICROgram(s)/olodaterol 2.5 MICROgram(s) Inhaler 2 Puff(s) Inhalation daily    MEDICATIONS  (PRN):  acetaminophen   Tablet 650 milliGRAM(s) Oral every 6 hours PRN Moderate Pain (4 - 6)  ipratropium    for Nebulization 500 MICROGram(s) Nebulizer every 6 hours PRN Shortness of Breath and/or Wheezing  ondansetron    Tablet 4 milliGRAM(s) Oral every 8 hours PRN Nausea and/or Vomiting  sodium chloride 0.65% Nasal 1 Spray(s) Both Nostrils five times a day PRN dry nose          Vital Signs Last 24 Hrs  T(C): 36.4 (29 Dec 2017 12:02), Max: 36.4 (28 Dec 2017 21:24)  T(F): 97.5 (29 Dec 2017 12:02), Max: 97.6 (29 Dec 2017 05:55)  HR: 89 (29 Dec 2017 12:02) (89 - 127)  BP: 122/68 (29 Dec 2017 12:02) (112/72 - 122/68)  BP(mean): --  RR: 17 (29 Dec 2017 12:02) (17 - 18)  SpO2: 100% (29 Dec 2017 12:02) (99% - 100%) on 4.5L NC          12-28 @ 07:01  -  12-29 @ 07:00  --------------------------------------------------------  IN: 480 mL / OUT: 600 mL / NET: -120 mL          LABS:                        9.8    5.03  )-----------( 188      ( 29 Dec 2017 07:19 )             32.3     12-29    138  |  86<L>  |  97<H>  ----------------------------<  91  3.1<L>   |  38<H>  |  1.70<H>    Ca    8.8      29 Dec 2017 07:28            CAPILLARY BLOOD GLUCOSE                GAGAN Negative 12-08 @ 07:39  Anti SS-1 --  Anti SS-2 --  Anti RNP --  RF 15.0 12-08 @ 07:39    Atypical ANCA -- 12-08 @ 07:39  c-ANCA titer -- 12-08 @ 07:39  c-ANCA -- 12-08 @ 07:39  p-ANCA -- 12-08 @ 07:39              CULTURES: (if applicable)          Physical Examination:  PULM: Clear to auscultation bilaterally, no significant sputum production  CVS: S1, S2 heard    RADIOLOGY REVIEWED  CXR:     CT chest:    TTE: Follow-up Pulm Progress Note    c/o cough, mostly non-productive   Rhinorrhea and R ear fullness     Medications:  MEDICATIONS  (STANDING):  allopurinol 100 milliGRAM(s) Oral daily  aspirin enteric coated 81 milliGRAM(s) Oral daily  atorvastatin 20 milliGRAM(s) Oral at bedtime  buMETAnide 1 milliGRAM(s) Oral two times a day  clopidogrel Tablet 75 milliGRAM(s) Oral daily  digoxin     Tablet 0.125 milliGRAM(s) Oral every other day  diltiazem    milliGRAM(s) Oral daily  docusate sodium 100 milliGRAM(s) Oral two times a day  epoetin hilda Injectable 61143 Unit(s) SubCutaneous <User Schedule>  heparin  Injectable 5000 Unit(s) SubCutaneous every 8 hours  macitentan (OPSUMIT) 10 milliGRAM(s) 10 milliGRAM(s) Oral daily  methimazole 5 milliGRAM(s) Oral daily  metoprolol     tartrate 25 milliGRAM(s) Oral every 8 hours  pantoprazole    Tablet 40 milliGRAM(s) Oral before breakfast  predniSONE   Tablet 5 milliGRAM(s) Oral daily  roflumilast 500 MICROGram(s) Oral daily  sucralfate suspension 1 Gram(s) Oral three times a day before meals  tiotropium 2.5 MICROgram(s)/olodaterol 2.5 MICROgram(s) Inhaler 2 Puff(s) Inhalation daily    MEDICATIONS  (PRN):  acetaminophen   Tablet 650 milliGRAM(s) Oral every 6 hours PRN Moderate Pain (4 - 6)  ipratropium    for Nebulization 500 MICROGram(s) Nebulizer every 6 hours PRN Shortness of Breath and/or Wheezing  ondansetron    Tablet 4 milliGRAM(s) Oral every 8 hours PRN Nausea and/or Vomiting  sodium chloride 0.65% Nasal 1 Spray(s) Both Nostrils five times a day PRN dry nose          Vital Signs Last 24 Hrs  T(C): 36.4 (29 Dec 2017 12:02), Max: 36.4 (28 Dec 2017 21:24)  T(F): 97.5 (29 Dec 2017 12:02), Max: 97.6 (29 Dec 2017 05:55)  HR: 89 (29 Dec 2017 12:02) (89 - 127)  BP: 122/68 (29 Dec 2017 12:02) (112/72 - 122/68)  BP(mean): --  RR: 17 (29 Dec 2017 12:02) (17 - 18)  SpO2: 100% (29 Dec 2017 12:02) (99% - 100%) on 4.5L NC          12-28 @ 07:01  -  12-29 @ 07:00  --------------------------------------------------------  IN: 480 mL / OUT: 600 mL / NET: -120 mL          LABS:                        9.8    5.03  )-----------( 188      ( 29 Dec 2017 07:19 )             32.3     12-29    138  |  86<L>  |  97<H>  ----------------------------<  91  3.1<L>   |  38<H>  |  1.70<H>    Ca    8.8      29 Dec 2017 07:28            CAPILLARY BLOOD GLUCOSE                GAGAN Negative 12-08 @ 07:39  Anti SS-1 --  Anti SS-2 --  Anti RNP --  RF 15.0 12-08 @ 07:39    Atypical ANCA -- 12-08 @ 07:39  c-ANCA titer -- 12-08 @ 07:39  c-ANCA -- 12-08 @ 07:39  p-ANCA -- 12-08 @ 07:39            Physical Examination:  PULM: Rhonchi bilaterally   CVS: S1, S2 heard    RADIOLOGY REVIEWED

## 2017-12-29 NOTE — PROGRESS NOTE ADULT - ASSESSMENT
71 year old female with CAD, sev pHTN, dCHF, HTN, DM, Afib, lung CA, COPD and CKD 3 p/w acute on chronic dCHF, ascites, pleural effusion, pulm edema and acute on chronic kidney injury c/b A.Fib with RVR.  - CKD stage 3: likely due to HTN and DM  - CASSANDRA: non oliguric. Likely prerenal due to ineffective circulating volume now +diuresis.    - acute on chronic dCHF: volume overload slowly improving; remains on PO diuretics; wt is stable  - GI: s/p LVP  - Chronic respiratory acidosis;   - Rapid aflutter; rate controlled - not on a/c (self d/c'd)    Recommendation  - BMP daily  -  Bumex 1.0mg po BID for now and the wt is stable.   -->No choice but reduce zaroxolyn to qod.    - metolazone 5 mg po qod  - continue Cardizem .  monitor heart rate  - will continue to monitor adnominal distention;  GI on board  - monitor intake and output   - daily standing weights  -Reduce Allopurinol to 100 mg po qd  - dose medication for a CrCl ~20 cc/min      Elevated serum HCO3 is from pCO2 retention as opposed to metabolic alkalosis     Overall prognosis is gaurded

## 2017-12-29 NOTE — PROGRESS NOTE ADULT - SUBJECTIVE AND OBJECTIVE BOX
[facility] [phone]        Patient seen and examined in the bed.  SOB is about the same        MEDICATIONS  (STANDING):  allopurinol 100 milliGRAM(s) Oral daily  aspirin enteric coated 81 milliGRAM(s) Oral daily  atorvastatin 20 milliGRAM(s) Oral at bedtime  buMETAnide 1 milliGRAM(s) Oral two times a day  clopidogrel Tablet 75 milliGRAM(s) Oral daily  digoxin     Tablet 0.125 milliGRAM(s) Oral every other day  diltiazem    milliGRAM(s) Oral daily  docusate sodium 100 milliGRAM(s) Oral two times a day  epoetin hilda Injectable 79138 Unit(s) SubCutaneous <User Schedule>  heparin  Injectable 5000 Unit(s) SubCutaneous every 8 hours  macitentan (OPSUMIT) 10 milliGRAM(s) 10 milliGRAM(s) Oral daily  methimazole 5 milliGRAM(s) Oral daily  metoprolol     tartrate 25 milliGRAM(s) Oral every 8 hours  pantoprazole    Tablet 40 milliGRAM(s) Oral before breakfast  potassium chloride    Tablet ER 20 milliEquivalent(s) Oral once  predniSONE   Tablet 5 milliGRAM(s) Oral daily  roflumilast 500 MICROGram(s) Oral daily  sucralfate suspension 1 Gram(s) Oral three times a day before meals  tiotropium 2.5 MICROgram(s)/olodaterol 2.5 MICROgram(s) Inhaler 2 Puff(s) Inhalation daily      VITAL:  T(C): , Max: 36.6 (12-28-17 @ 11:18)  T(F): , Max: 97.9 (12-28-17 @ 11:18)  HR: 127 (12-29-17 @ 05:55)  BP: 113/78 (12-29-17 @ 05:55)  BP(mean): --  RR: 18 (12-29-17 @ 05:55)  SpO2: 99% (12-29-17 @ 05:55)  Wt(kg): --    I and O's:    12-28 @ 07:01  -  12-29 @ 07:00  --------------------------------------------------------  IN: 480 mL / OUT: 600 mL / NET: -120 mL          PHYSICAL EXAM:    Constitutional: NAD  HEENT: PERRLA    Neck:  No JVD  Respiratory: reduced breath sounds  Cardiovascular: S1 and S2 tachycardic  Gastrointestinal: BS+, soft, distended  Extremities: No peripheral edema  Neurological: A/O x 3, no focal deficits  Psychiatric: Normal mood, normal affect  : No Gottlieb  Skin: No rashes  Access: Not applicable    LABS:                        9.8    5.03  )-----------( 188      ( 29 Dec 2017 07:19 )             32.3     12-29    138  |  86<L>  |  97<H>  ----------------------------<  91  3.1<L>   |  38<H>  |  1.70<H>    Ca    8.8      29 Dec 2017 07:28            Urine Studies:          RADIOLOGY & ADDITIONAL STUDIES:

## 2017-12-29 NOTE — PROGRESS NOTE ADULT - PROBLEM SELECTOR PLAN 1
c/o cough, mostly non-productive   -Check CXR in AM  -Re-send RVP (negative on 12/25)   -Atrovent PRN (patient has palpitations with albuterol)

## 2017-12-29 NOTE — PROGRESS NOTE ADULT - SUBJECTIVE AND OBJECTIVE BOX
MEDICINE, PROGRESS NOTE [phone]    FRAN ALEXANDRA 72y [id number]    Patient seen and examined.  Patient is a 72y old  Female who presents with a chief complaint of shortness of breath, volume overload and ascites (11 Dec 2017 11:41)  Pt has no current complaints.    PAST MEDICAL & SURGICAL HISTORY:  Hyperthyroidism  JOSE (obstructive sleep apnea)  Epistaxis  GIB (gastrointestinal bleeding)  CAD S/P percutaneous coronary angioplasty  Afib  Pulmonary HTN  GERD (gastroesophageal reflux disease)  Obesity  Cardiomegaly  Valvular heart disease  COPD (chronic obstructive pulmonary disease): Home O2  Sleep Apnea: by criteria  Loose, teeth: 3 bottom front loose teeth  Female stress incontinence  Shoulder pain, right  Constipation  Arthritis of Knee  H/O heartburn  History of lung cancer  Obese  Hx of hyperlipidemia  Asthma  Diabetes mellitus: type 2  dx about 4-5 years ago   no daily fingerstick  H/O: HTN (hypertension)  Enlarged lymph nodes  Lymph nodes enlarged: s/p biopsy mediastinal  benign  H/O endoscopy  left upper lobectomy    MEDICATIONS  (STANDING):  allopurinol 100 milliGRAM(s) Oral daily  aspirin enteric coated 81 milliGRAM(s) Oral daily  atorvastatin 20 milliGRAM(s) Oral at bedtime  buMETAnide 1 milliGRAM(s) Oral two times a day  clopidogrel Tablet 75 milliGRAM(s) Oral daily  digoxin     Tablet 0.125 milliGRAM(s) Oral every other day  diltiazem    milliGRAM(s) Oral daily  docusate sodium 100 milliGRAM(s) Oral two times a day  epoetin hilda Injectable 04178 Unit(s) SubCutaneous <User Schedule>  heparin  Injectable 5000 Unit(s) SubCutaneous every 8 hours  macitentan (OPSUMIT) 10 milliGRAM(s) 10 milliGRAM(s) Oral daily  methimazole 5 milliGRAM(s) Oral daily  metoprolol     tartrate 25 milliGRAM(s) Oral every 8 hours  pantoprazole    Tablet 40 milliGRAM(s) Oral before breakfast  predniSONE   Tablet 5 milliGRAM(s) Oral daily  roflumilast 500 MICROGram(s) Oral daily  sucralfate suspension 1 Gram(s) Oral three times a day before meals  tiotropium 2.5 MICROgram(s)/olodaterol 2.5 MICROgram(s) Inhaler 2 Puff(s) Inhalation daily    MEDICATIONS  (PRN):  acetaminophen   Tablet 650 milliGRAM(s) Oral every 6 hours PRN Moderate Pain (4 - 6)  ipratropium    for Nebulization 500 MICROGram(s) Nebulizer every 6 hours PRN Shortness of Breath and/or Wheezing  ondansetron    Tablet 4 milliGRAM(s) Oral every 8 hours PRN Nausea and/or Vomiting  sodium chloride 0.65% Nasal 1 Spray(s) Both Nostrils five times a day PRN dry nose    Allergies    No Known Allergies    Intolerances    albuterol (Unknown)      PHYSICAL EXAM:  Constitutional: NAD  HEENT: Normocephalic, EOMI  Neck:  No JVD  Respiratory: CTA B/L, No wheezes  Cardiovascular: S1, S2, RRR, + systolic murmur  Gastrointestinal: BS+, soft, NT, + distention, + fluid wave  Extremities: + peripheral edema le b/l  Neurological: AAOX3, no focal deficits  Psychiatric: Normal mood, normal affect  : No Gottlieb    Vital Signs Last 24 Hrs  T(C): 36.4 (29 Dec 2017 12:02), Max: 36.4 (28 Dec 2017 21:24)  T(F): 97.5 (29 Dec 2017 12:02), Max: 97.6 (29 Dec 2017 05:55)  HR: 89 (29 Dec 2017 12:02) (89 - 127)  BP: 122/68 (29 Dec 2017 12:02) (112/72 - 122/68)  BP(mean): --  RR: 17 (29 Dec 2017 12:02) (17 - 18)  SpO2: 100% (29 Dec 2017 12:02) (99% - 100%)  I&O's Summary    28 Dec 2017 07:01  -  29 Dec 2017 07:00  --------------------------------------------------------  IN: 480 mL / OUT: 600 mL / NET: -120 mL    29 Dec 2017 07:01  -  29 Dec 2017 17:26  --------------------------------------------------------  IN: 240 mL / OUT: 600 mL / NET: -360 mL        LABS:                        9.8    5.03  )-----------( 188      ( 29 Dec 2017 07:19 )             32.3     12-29    138  |  86<L>  |  97<H>  ----------------------------<  91  3.1<L>   |  38<H>  |  1.70<H>    Ca    8.8      29 Dec 2017 07:28

## 2017-12-29 NOTE — PROGRESS NOTE ADULT - ASSESSMENT
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 Recent admission at [facility] approximately 5 weeks ago for large volume paracentesis (6.1L removed). Now presents again with RV failure-worsened LE edema, recurrent ascites, pleural effusions and pulmonary edema with worsened dyspnea and CASSANDRA on CKD. s/p 8.1 L paracentesis on 12/6. Started on dobutamine gtt for pHTN, discontinued 2nd Afib with RVR to 170s.

## 2017-12-29 NOTE — PROGRESS NOTE ADULT - ASSESSMENT
severe copd  severe phtn  bucky  anemia  vol overload  s/p large volume paracentesis with recurrence of moderate ascites  Afib/flutter with rvr not on a/c secondary bleeding issues  uremia    continue current care  if tolerating current po regimen will consider d/c alix.  discussed with dr hargrove

## 2017-12-30 DIAGNOSIS — H69.81 OTHER SPECIFIED DISORDERS OF EUSTACHIAN TUBE, RIGHT EAR: ICD-10-CM

## 2017-12-30 LAB
ANION GAP SERPL CALC-SCNC: 18 MMOL/L — HIGH (ref 5–17)
BUN SERPL-MCNC: 109 MG/DL — HIGH (ref 7–23)
CALCIUM SERPL-MCNC: 9.5 MG/DL — SIGNIFICANT CHANGE UP (ref 8.4–10.5)
CHLORIDE SERPL-SCNC: 87 MMOL/L — LOW (ref 96–108)
CO2 SERPL-SCNC: 33 MMOL/L — HIGH (ref 22–31)
CREAT SERPL-MCNC: 2.01 MG/DL — HIGH (ref 0.5–1.3)
GLUCOSE SERPL-MCNC: 95 MG/DL — SIGNIFICANT CHANGE UP (ref 70–99)
HCT VFR BLD CALC: 30.2 % — LOW (ref 34.5–45)
HGB BLD-MCNC: 9.2 G/DL — LOW (ref 11.5–15.5)
HMPV RNA SPEC QL NAA+PROBE: DETECTED
MCHC RBC-ENTMCNC: 27.4 PG — SIGNIFICANT CHANGE UP (ref 27–34)
MCHC RBC-ENTMCNC: 30.5 GM/DL — LOW (ref 32–36)
MCV RBC AUTO: 89.9 FL — SIGNIFICANT CHANGE UP (ref 80–100)
PLATELET # BLD AUTO: 157 K/UL — SIGNIFICANT CHANGE UP (ref 150–400)
POTASSIUM SERPL-MCNC: 4 MMOL/L — SIGNIFICANT CHANGE UP (ref 3.5–5.3)
POTASSIUM SERPL-SCNC: 4 MMOL/L — SIGNIFICANT CHANGE UP (ref 3.5–5.3)
RAPID RVP RESULT: DETECTED
RBC # BLD: 3.36 M/UL — LOW (ref 3.8–5.2)
RBC # FLD: 20.2 % — HIGH (ref 10.3–14.5)
SODIUM SERPL-SCNC: 138 MMOL/L — SIGNIFICANT CHANGE UP (ref 135–145)
WBC # BLD: 5.34 K/UL — SIGNIFICANT CHANGE UP (ref 3.8–10.5)
WBC # FLD AUTO: 5.34 K/UL — SIGNIFICANT CHANGE UP (ref 3.8–10.5)

## 2017-12-30 PROCEDURE — 71010: CPT | Mod: 26

## 2017-12-30 RX ADMIN — Medication 25 MILLIGRAM(S): at 09:25

## 2017-12-30 RX ADMIN — BUMETANIDE 1 MILLIGRAM(S): 0.25 INJECTION INTRAMUSCULAR; INTRAVENOUS at 18:09

## 2017-12-30 RX ADMIN — ROFLUMILAST 500 MICROGRAM(S): 500 TABLET ORAL at 09:14

## 2017-12-30 RX ADMIN — Medication 25 MILLIGRAM(S): at 13:13

## 2017-12-30 RX ADMIN — Medication 25 MILLIGRAM(S): at 22:09

## 2017-12-30 RX ADMIN — TIOTROPIUM BROMIDE AND OLODATEROL 2 PUFF(S): 3.124; 2.736 SPRAY, METERED RESPIRATORY (INHALATION) at 09:25

## 2017-12-30 RX ADMIN — CLOPIDOGREL BISULFATE 75 MILLIGRAM(S): 75 TABLET, FILM COATED ORAL at 13:12

## 2017-12-30 RX ADMIN — Medication 100 MILLIGRAM(S): at 22:08

## 2017-12-30 RX ADMIN — ATORVASTATIN CALCIUM 20 MILLIGRAM(S): 80 TABLET, FILM COATED ORAL at 22:09

## 2017-12-30 RX ADMIN — BUMETANIDE 1 MILLIGRAM(S): 0.25 INJECTION INTRAMUSCULAR; INTRAVENOUS at 06:50

## 2017-12-30 RX ADMIN — Medication 100 MILLIGRAM(S): at 13:11

## 2017-12-30 RX ADMIN — Medication 240 MILLIGRAM(S): at 06:51

## 2017-12-30 RX ADMIN — Medication 0.12 MILLIGRAM(S): at 13:13

## 2017-12-30 RX ADMIN — Medication 5 MILLIGRAM(S): at 09:14

## 2017-12-30 RX ADMIN — Medication 81 MILLIGRAM(S): at 13:12

## 2017-12-30 NOTE — PROVIDER CONTACT NOTE (OTHER) - RECOMMENDATIONS
seen and assessed by STEPHANIE Leary , x ray B/L knee urgent ordered, will continue to monitor
will continue to monitor, will discuss with primary team in AM
Give Cardizem 5 mg IV push. Take vitals beforehand.
Notified the provider
Observe patient for symptomatic ectopies
continue to monitor, notify NP
monitor

## 2017-12-30 NOTE — CONSULT NOTE ADULT - PROBLEM SELECTOR RECOMMENDATION 9
2nd pleural effusions/pulmonary edema and restrictive disease from ascities   -Keep sp02>90% on supplemental oxygen (currently requiring 6L to maintain which is slightly above normal)   -Output > input with lasix gtt
Continue Nasal Saline b/l 4x/day  Start Flonase 1gtts 2x/day x 7 days  Bacitracin ointment b/l nares 2x day, avoid nasal trauma  f/u outpatient in ENT clinic for Audiogram [phone]
- etiology of Pulm HTN is unclear- do not believe it is type 2 as she had a normal LVEDP on most recent cath and no evidence of left heart disease otherwise; could be type 1 or type 3; unclear if she had a V/Q scan- would attempt to get records  - it also unclear why she decompensated a few months ago with worsening fluid overload state  - would continue w/ macitentan and diuresis for now   - she may need a new agent given that she is still has BRO and still has significant fluid overload but will discuss with Dr. Youssef if starting remodulin would be appropriate

## 2017-12-30 NOTE — PROGRESS NOTE ADULT - SUBJECTIVE AND OBJECTIVE BOX
NEPHROLOGY-[facility]  [phone]  Luba Fischer NP      Patient seen and examined in bed. Awake, alert. No events noted. Denies sob, chest pain. Reports breathing about the same.      MEDICATIONS  (STANDING):  allopurinol 100 milliGRAM(s) Oral daily  aspirin enteric coated 81 milliGRAM(s) Oral daily  atorvastatin 20 milliGRAM(s) Oral at bedtime  buMETAnide 1 milliGRAM(s) Oral two times a day  clopidogrel Tablet 75 milliGRAM(s) Oral daily  digoxin     Tablet 0.125 milliGRAM(s) Oral every other day  diltiazem    milliGRAM(s) Oral daily  docusate sodium 100 milliGRAM(s) Oral two times a day  epoetin hilda Injectable 53173 Unit(s) SubCutaneous <User Schedule>  heparin  Injectable 5000 Unit(s) SubCutaneous every 8 hours  macitentan (OPSUMIT) 10 milliGRAM(s) 10 milliGRAM(s) Oral daily  methimazole 5 milliGRAM(s) Oral daily  metolazone 5 milliGRAM(s) Oral every 48 hours  metoprolol     tartrate 25 milliGRAM(s) Oral every 8 hours  pantoprazole    Tablet 40 milliGRAM(s) Oral before breakfast  predniSONE   Tablet 5 milliGRAM(s) Oral daily  roflumilast 500 MICROGram(s) Oral daily  sucralfate suspension 1 Gram(s) Oral three times a day before meals  tiotropium 2.5 MICROgram(s)/olodaterol 2.5 MICROgram(s) Inhaler 2 Puff(s) Inhalation daily      VITAL:  T(C): , Max: 36.8 (12-30-17 @ 06:57)  T(F): , Max: 98.3 (12-30-17 @ 06:57)  HR: 100 (12-30-17 @ 09:18)  BP: 120/68 (12-30-17 @ 09:18)  BP(mean): --  RR: 18 (12-30-17 @ 09:18)  SpO2: 98% (12-30-17 @ 09:18)  Wt(kg): --    I and O's:    12-29 @ 07:01  -  12-30 @ 07:00  --------------------------------------------------------  IN: 860 mL / OUT: 1650 mL / NET: -790 mL          PHYSICAL EXAM:    Constitutional: NAD  Neck:  No JVD  Respiratory: CTAB/L  Cardiovascular: S1 and S2  Gastrointestinal: BS+, soft, NT/ND  Extremities: No peripheral edema  : No Gottlieb  Skin: No rashes  Access: Not applicable    LABS:                        9.2    5.34  )-----------( 157      ( 30 Dec 2017 08:09 )             30.2     30 Dec 2017 09:01    138    |  87<L>  |  109<H>  ----------------------------<  95     4.0     |  33<H>  |  2.01<H>  29 Dec 2017 19:28    138    |  84<L>  |  104<H>  ----------------------------<  145<H>  3.1<L>   |  39<H>  |  2.00<H>    Ca    9.5        30 Dec 2017 09:01  Ca    9.5        29 Dec 2017 19:28      Urine Studies:      RADIOLOGY & ADDITIONAL STUDIES:      ASSESSMENT:  71 year old female with CAD, severe pHTN, dCHF, HTN, DM, Afib, lung CA, COPD and CKD 3 p/w acute on chronic dCHF, ascites, pleural effusion, pulmonary edema and acute on chronic kidney injury c/b A.Fib with RVR.  - CKD stage 3: likely due to HTN and DM  - CASSANDRA: non oliguric. Likely prerenal due to ineffective circulating volume now +diuresis.    -Elevated serum HCO3 is from pCO2 retention as opposed to metabolic alkalosis  - acute on chronic dCHF: volume overload slowly improving; remains on PO diuretics; wt is stable  - GI: s/p LVP  - Chronic respiratory acidosis;   - Rapid aflutter; rate controlled - not on a/c (self d/c'd)    RECOMMENDATION:  - BMP daily  -  Continue Bumex 1.0mg po BID for now    - Continue Metolazone 5 mg po qod  - Continue Cardizem .  monitor heart rate  - will continue to monitor abdominal distention;  GI following  - Strict intake and output   - daily standing weights  - dose medication for a CrCl ~20 cc/min

## 2017-12-30 NOTE — PROGRESS NOTE ADULT - SUBJECTIVE AND OBJECTIVE BOX
MEDICINE, PROGRESS NOTE [phone]    FRAN ALEXANDRA 72y [id number]    Patient seen and examined.  Patient is a 72y old  Female who presents with a chief complaint of shortness of breath, volume overload and ascites (11 Dec 2017 11:41)  Pt c/o feeling worse, cough, and right ear pain.    PAST MEDICAL & SURGICAL HISTORY:  Hyperthyroidism  JOSE (obstructive sleep apnea)  Epistaxis  GIB (gastrointestinal bleeding)  CAD S/P percutaneous coronary angioplasty  Afib  Pulmonary HTN  GERD (gastroesophageal reflux disease)  Obesity  Cardiomegaly  Valvular heart disease  COPD (chronic obstructive pulmonary disease): Home O2  Sleep Apnea: by criteria  Loose, teeth: 3 bottom front loose teeth  Female stress incontinence  Shoulder pain, right  Constipation  Arthritis of Knee  H/O heartburn  History of lung cancer  Obese  Hx of hyperlipidemia  Asthma  Diabetes mellitus: type 2  dx about 4-5 years ago   no daily fingerstick  H/O: HTN (hypertension)  Enlarged lymph nodes  Lymph nodes enlarged: s/p biopsy mediastinal  benign  H/O endoscopy  left upper lobectomy    MEDICATIONS  (STANDING):  allopurinol 100 milliGRAM(s) Oral daily  aspirin enteric coated 81 milliGRAM(s) Oral daily  atorvastatin 20 milliGRAM(s) Oral at bedtime  buMETAnide 1 milliGRAM(s) Oral two times a day  clopidogrel Tablet 75 milliGRAM(s) Oral daily  digoxin     Tablet 0.125 milliGRAM(s) Oral every other day  diltiazem    milliGRAM(s) Oral daily  docusate sodium 100 milliGRAM(s) Oral two times a day  epoetin hilda Injectable 64511 Unit(s) SubCutaneous <User Schedule>  heparin  Injectable 5000 Unit(s) SubCutaneous every 8 hours  macitentan (OPSUMIT) 10 milliGRAM(s) 10 milliGRAM(s) Oral daily  methimazole 5 milliGRAM(s) Oral daily  metolazone 5 milliGRAM(s) Oral every 48 hours  metoprolol     tartrate 25 milliGRAM(s) Oral every 8 hours  pantoprazole    Tablet 40 milliGRAM(s) Oral before breakfast  predniSONE   Tablet 5 milliGRAM(s) Oral daily  roflumilast 500 MICROGram(s) Oral daily  sucralfate suspension 1 Gram(s) Oral three times a day before meals  tiotropium 2.5 MICROgram(s)/olodaterol 2.5 MICROgram(s) Inhaler 2 Puff(s) Inhalation daily    MEDICATIONS  (PRN):  acetaminophen   Tablet 650 milliGRAM(s) Oral every 6 hours PRN Moderate Pain (4 - 6)  guaiFENesin    Syrup 100 milliGRAM(s) Oral every 8 hours PRN Cough  ipratropium    for Nebulization 500 MICROGram(s) Nebulizer every 6 hours PRN Shortness of Breath and/or Wheezing  ondansetron    Tablet 4 milliGRAM(s) Oral every 8 hours PRN Nausea and/or Vomiting  sodium chloride 0.65% Nasal 1 Spray(s) Both Nostrils five times a day PRN dry nose    Allergies    No Known Allergies    Intolerances    albuterol (Unknown)      PHYSICAL EXAM:  Constitutional: NAD  HEENT: Normocephalic, EOMI  Neck:  No JVD  Respiratory: CTA B/L, No wheezes  Cardiovascular: S1, S2, RRR, + systolic murmur  Gastrointestinal: BS+, soft, Nt, + distention, + fluid wave  Extremities: No peripheral edema  Neurological: AAOX3, no focal deficits  Psychiatric: Normal mood, normal affect  : No Gottlieb    Vital Signs Last 24 Hrs  T(C): 36.7 (30 Dec 2017 14:31), Max: 36.8 (30 Dec 2017 06:57)  T(F): 98 (30 Dec 2017 14:31), Max: 98.3 (30 Dec 2017 06:57)  HR: 99 (30 Dec 2017 14:31) (92 - 116)  BP: 104/67 (30 Dec 2017 14:31) (94/61 - 120/68)  BP(mean): --  RR: 18 (30 Dec 2017 14:31) (18 - 18)  SpO2: 98% (30 Dec 2017 14:31) (97% - 100%)  I&O's Summary    29 Dec 2017 07:01  -  30 Dec 2017 07:00  --------------------------------------------------------  IN: 860 mL / OUT: 1650 mL / NET: -790 mL        LABS:                        9.2    5.34  )-----------( 157      ( 30 Dec 2017 08:09 )             30.2     12-30    138  |  87<L>  |  109<H>  ----------------------------<  95  4.0   |  33<H>  |  2.01<H>    Ca    9.5      30 Dec 2017 09:01

## 2017-12-30 NOTE — CONSULT NOTE ADULT - SUBJECTIVE AND OBJECTIVE BOX
CC: Clogged Right Ear x 3 days    HPI: Patient is a 72y old  Female with below hx  who presents with a chief complaint of shortness of breath, volume overload and ascites (11 Dec 2017 11:41)  We are asked to evaluate the patient for sensation of clogged right ear x 3 days no associated otalgia/drainage/tinnitus/ear trauma/f/c/n/v/+difficulty hearing pt also c/o dry nose, states she sometimes blows her nose, and has bleeding. Actively patient denies any bleeding  no modifying factors. denies further Dysphagia/odynophagia/hemoptysis/unintentional weight loss. Any other relevant history/symptoms.    PAST MEDICAL & SURGICAL HISTORY:  Hyperthyroidism  JOSE (obstructive sleep apnea)  Epistaxis  GIB (gastrointestinal bleeding)  CAD S/P percutaneous coronary angioplasty  Afib  Pulmonary HTN  GERD (gastroesophageal reflux disease)  Obesity  Cardiomegaly  Valvular heart disease  COPD (chronic obstructive pulmonary disease): Home O2  Sleep Apnea: by criteria  Loose, teeth: 3 bottom front loose teeth  Female stress incontinence  Shoulder pain, right  Constipation  Arthritis of Knee  H/O heartburn  History of lung cancer  Obese  Hx of hyperlipidemia  Asthma  Diabetes mellitus: type 2  dx about 4-5 years ago   no daily fingerstick  H/O: HTN (hypertension)  Enlarged lymph nodes  Lymph nodes enlarged: s/p biopsy mediastinal  benign  H/O endoscopy  left upper lobectomy    Allergies    No Known Allergies    Intolerances    albuterol (Unknown)    MEDICATIONS  (STANDING):  allopurinol 100 milliGRAM(s) Oral daily  aspirin enteric coated 81 milliGRAM(s) Oral daily  atorvastatin 20 milliGRAM(s) Oral at bedtime  buMETAnide 1 milliGRAM(s) Oral two times a day  clopidogrel Tablet 75 milliGRAM(s) Oral daily  digoxin     Tablet 0.125 milliGRAM(s) Oral every other day  diltiazem    milliGRAM(s) Oral daily  docusate sodium 100 milliGRAM(s) Oral two times a day  epoetin hilda Injectable 83651 Unit(s) SubCutaneous <User Schedule>  heparin  Injectable 5000 Unit(s) SubCutaneous every 8 hours  macitentan (OPSUMIT) 10 milliGRAM(s) 10 milliGRAM(s) Oral daily  methimazole 5 milliGRAM(s) Oral daily  metolazone 5 milliGRAM(s) Oral every 48 hours  metoprolol     tartrate 25 milliGRAM(s) Oral every 8 hours  pantoprazole    Tablet 40 milliGRAM(s) Oral before breakfast  predniSONE   Tablet 5 milliGRAM(s) Oral daily  roflumilast 500 MICROGram(s) Oral daily  sucralfate suspension 1 Gram(s) Oral three times a day before meals  tiotropium 2.5 MICROgram(s)/olodaterol 2.5 MICROgram(s) Inhaler 2 Puff(s) Inhalation daily    MEDICATIONS  (PRN):  acetaminophen   Tablet 650 milliGRAM(s) Oral every 6 hours PRN Moderate Pain (4 - 6)  guaiFENesin    Syrup 100 milliGRAM(s) Oral every 8 hours PRN Cough  ipratropium    for Nebulization 500 MICROGram(s) Nebulizer every 6 hours PRN Shortness of Breath and/or Wheezing  ondansetron    Tablet 4 milliGRAM(s) Oral every 8 hours PRN Nausea and/or Vomiting  sodium chloride 0.65% Nasal 1 Spray(s) Both Nostrils five times a day PRN dry nose    Social History: no etoh/tobacco/ivdu    ROS: ENT, GI, , CV, Pulm, Neuro, Psych, MS, Heme, Endo, Constitutional all negative except as noted in HPI    Vital Signs Last 24 Hrs  T(C): 36.7 (30 Dec 2017 14:31), Max: 36.8 (30 Dec 2017 06:57)  T(F): 98 (30 Dec 2017 14:31), Max: 98.3 (30 Dec 2017 06:57)  HR: 99 (30 Dec 2017 14:31) (92 - 116)  BP: 104/67 (30 Dec 2017 14:31) (94/61 - 120/68)  BP(mean): --  RR: 18 (30 Dec 2017 14:31) (18 - 18)  SpO2: 98% (30 Dec 2017 14:31) (97% - 100%)                          9.2    5.34  )-----------( 157      ( 30 Dec 2017 08:09 )             30.2    12-30    138  |  87<L>  |  109<H>  ----------------------------<  95  4.0   |  33<H>  |  2.01<H>    Ca    9.5      30 Dec 2017 09:01         PHYSICAL EXAM:  Gen: NAD, well-developed  Head: Normocephalic, Atraumatic  Face: no edema/erythema/fluctuance, parotid glands soft without mass  Eyes: PERRL, EOMI, no scleral injection  Ears: Right - ear canal clear, TM intact without effusion no mastoid tenderness            Left - ear canal clear, TM intact without effusion no mastoid tenderness  Nose: Nares bilaterally patent, no discharge dry nasal mucosa  Mouth: Mucosa moist, tongue/uvula midline, oropharynx clear  Neck: Flat, supple, no lymphadenopathy, trachea midline, no masses  Resp: breathing easily, no stridor  CV: no peripheral edema/cyanosis

## 2017-12-30 NOTE — PROGRESS NOTE ADULT - ASSESSMENT
hMPV viral uri  Eustachian tube dysfunction  severe copd  severe phtn  bucky  anemia  vol overload  s/p large volume paracentesis with recurrence of moderate ascites  Afib/flutter with rvr - improved    continue current care  continue diuretics  appreciate ent input  cough suppressants as needed

## 2017-12-30 NOTE — PROGRESS NOTE ADULT - SUBJECTIVE AND OBJECTIVE BOX
Chron and severe pulmonary hypertension  multifactorial copd, heart fialure  multiple paracentesis   still complains of dyspnea but not in repsiratory distress  recently identified metapneumonia virus and will require isolation  lungs clear heart no murmurs or tachycardia identified  does not want beta agonists due  airway irritation   continue current supportive maangement

## 2017-12-31 LAB
ANION GAP SERPL CALC-SCNC: 14 MMOL/L — SIGNIFICANT CHANGE UP (ref 5–17)
ANION GAP SERPL CALC-SCNC: 14 MMOL/L — SIGNIFICANT CHANGE UP (ref 5–17)
ANION GAP SERPL CALC-SCNC: 15 MMOL/L — SIGNIFICANT CHANGE UP (ref 5–17)
BUN SERPL-MCNC: 105 MG/DL — HIGH (ref 7–23)
BUN SERPL-MCNC: 107 MG/DL — HIGH (ref 7–23)
BUN SERPL-MCNC: 111 MG/DL — HIGH (ref 7–23)
CALCIUM SERPL-MCNC: 8.9 MG/DL — SIGNIFICANT CHANGE UP (ref 8.4–10.5)
CALCIUM SERPL-MCNC: 9.3 MG/DL — SIGNIFICANT CHANGE UP (ref 8.4–10.5)
CALCIUM SERPL-MCNC: 9.4 MG/DL — SIGNIFICANT CHANGE UP (ref 8.4–10.5)
CHLORIDE SERPL-SCNC: 81 MMOL/L — LOW (ref 96–108)
CHLORIDE SERPL-SCNC: 84 MMOL/L — LOW (ref 96–108)
CHLORIDE SERPL-SCNC: 84 MMOL/L — LOW (ref 96–108)
CO2 SERPL-SCNC: 35 MMOL/L — HIGH (ref 22–31)
CO2 SERPL-SCNC: 37 MMOL/L — HIGH (ref 22–31)
CO2 SERPL-SCNC: 39 MMOL/L — HIGH (ref 22–31)
CREAT SERPL-MCNC: 2.1 MG/DL — HIGH (ref 0.5–1.3)
CREAT SERPL-MCNC: 2.19 MG/DL — HIGH (ref 0.5–1.3)
CREAT SERPL-MCNC: 2.21 MG/DL — HIGH (ref 0.5–1.3)
GLUCOSE SERPL-MCNC: 102 MG/DL — HIGH (ref 70–99)
GLUCOSE SERPL-MCNC: 125 MG/DL — HIGH (ref 70–99)
GLUCOSE SERPL-MCNC: 95 MG/DL — SIGNIFICANT CHANGE UP (ref 70–99)
HCT VFR BLD CALC: 29.9 % — LOW (ref 34.5–45)
HGB BLD-MCNC: 9.3 G/DL — LOW (ref 11.5–15.5)
MAGNESIUM SERPL-MCNC: 1.9 MG/DL — SIGNIFICANT CHANGE UP (ref 1.6–2.6)
MCHC RBC-ENTMCNC: 27.9 PG — SIGNIFICANT CHANGE UP (ref 27–34)
MCHC RBC-ENTMCNC: 31.1 GM/DL — LOW (ref 32–36)
MCV RBC AUTO: 89.8 FL — SIGNIFICANT CHANGE UP (ref 80–100)
PHOSPHATE SERPL-MCNC: 4 MG/DL — SIGNIFICANT CHANGE UP (ref 2.5–4.5)
PLATELET # BLD AUTO: 158 K/UL — SIGNIFICANT CHANGE UP (ref 150–400)
POTASSIUM SERPL-MCNC: 3 MMOL/L — LOW (ref 3.5–5.3)
POTASSIUM SERPL-MCNC: 3.1 MMOL/L — LOW (ref 3.5–5.3)
POTASSIUM SERPL-MCNC: 3.4 MMOL/L — LOW (ref 3.5–5.3)
POTASSIUM SERPL-SCNC: 3 MMOL/L — LOW (ref 3.5–5.3)
POTASSIUM SERPL-SCNC: 3.1 MMOL/L — LOW (ref 3.5–5.3)
POTASSIUM SERPL-SCNC: 3.4 MMOL/L — LOW (ref 3.5–5.3)
RBC # BLD: 3.33 M/UL — LOW (ref 3.8–5.2)
RBC # FLD: 19.8 % — HIGH (ref 10.3–14.5)
SODIUM SERPL-SCNC: 131 MMOL/L — LOW (ref 135–145)
SODIUM SERPL-SCNC: 135 MMOL/L — SIGNIFICANT CHANGE UP (ref 135–145)
SODIUM SERPL-SCNC: 137 MMOL/L — SIGNIFICANT CHANGE UP (ref 135–145)
WBC # BLD: 4.03 K/UL — SIGNIFICANT CHANGE UP (ref 3.8–10.5)
WBC # FLD AUTO: 4.03 K/UL — SIGNIFICANT CHANGE UP (ref 3.8–10.5)

## 2017-12-31 RX ORDER — POTASSIUM CHLORIDE 20 MEQ
40 PACKET (EA) ORAL ONCE
Qty: 0 | Refills: 0 | Status: COMPLETED | OUTPATIENT
Start: 2017-12-31 | End: 2017-12-31

## 2017-12-31 RX ORDER — POTASSIUM CHLORIDE 20 MEQ
20 PACKET (EA) ORAL ONCE
Qty: 0 | Refills: 0 | Status: COMPLETED | OUTPATIENT
Start: 2017-12-31 | End: 2017-12-31

## 2017-12-31 RX ADMIN — Medication 25 MILLIGRAM(S): at 13:11

## 2017-12-31 RX ADMIN — ROFLUMILAST 500 MICROGRAM(S): 500 TABLET ORAL at 09:00

## 2017-12-31 RX ADMIN — BUMETANIDE 1 MILLIGRAM(S): 0.25 INJECTION INTRAMUSCULAR; INTRAVENOUS at 06:35

## 2017-12-31 RX ADMIN — Medication 240 MILLIGRAM(S): at 06:35

## 2017-12-31 RX ADMIN — Medication 20 MILLIEQUIVALENT(S): at 11:51

## 2017-12-31 RX ADMIN — CLOPIDOGREL BISULFATE 75 MILLIGRAM(S): 75 TABLET, FILM COATED ORAL at 11:33

## 2017-12-31 RX ADMIN — TIOTROPIUM BROMIDE AND OLODATEROL 2 PUFF(S): 3.124; 2.736 SPRAY, METERED RESPIRATORY (INHALATION) at 08:57

## 2017-12-31 RX ADMIN — Medication 40 MILLIEQUIVALENT(S): at 17:18

## 2017-12-31 RX ADMIN — BUMETANIDE 1 MILLIGRAM(S): 0.25 INJECTION INTRAMUSCULAR; INTRAVENOUS at 17:01

## 2017-12-31 RX ADMIN — Medication 25 MILLIGRAM(S): at 21:32

## 2017-12-31 RX ADMIN — Medication 40 MILLIEQUIVALENT(S): at 23:10

## 2017-12-31 RX ADMIN — Medication 25 MILLIGRAM(S): at 06:35

## 2017-12-31 RX ADMIN — ATORVASTATIN CALCIUM 20 MILLIGRAM(S): 80 TABLET, FILM COATED ORAL at 21:31

## 2017-12-31 RX ADMIN — Medication 100 MILLIGRAM(S): at 09:00

## 2017-12-31 RX ADMIN — Medication 5 MILLIGRAM(S): at 09:00

## 2017-12-31 RX ADMIN — Medication 81 MILLIGRAM(S): at 11:32

## 2017-12-31 NOTE — PROGRESS NOTE ADULT - SUBJECTIVE AND OBJECTIVE BOX
MEDICINE, PROGRESS NOTE [phone]    FRAN ALEXANDRA 72y [id number]    Patient seen and examined.  Patient is a 72y old  Female who presents with a chief complaint of shortness of breath, volume overload and ascites (11 Dec 2017 11:41)  Pt has been having uncontrolled diarrhea and urination all night.    PAST MEDICAL & SURGICAL HISTORY:  Hyperthyroidism  JOSE (obstructive sleep apnea)  Epistaxis  GIB (gastrointestinal bleeding)  CAD S/P percutaneous coronary angioplasty  Afib  Pulmonary HTN  GERD (gastroesophageal reflux disease)  Obesity  Cardiomegaly  Valvular heart disease  COPD (chronic obstructive pulmonary disease): Home O2  Sleep Apnea: by criteria  Loose, teeth: 3 bottom front loose teeth  Female stress incontinence  Shoulder pain, right  Constipation  Arthritis of Knee  H/O heartburn  History of lung cancer  Obese  Hx of hyperlipidemia  Asthma  Diabetes mellitus: type 2  dx about 4-5 years ago   no daily fingerstick  H/O: HTN (hypertension)  Enlarged lymph nodes  Lymph nodes enlarged: s/p biopsy mediastinal  benign  H/O endoscopy  left upper lobectomy    MEDICATIONS  (STANDING):  allopurinol 100 milliGRAM(s) Oral daily  aspirin enteric coated 81 milliGRAM(s) Oral daily  atorvastatin 20 milliGRAM(s) Oral at bedtime  buMETAnide 1 milliGRAM(s) Oral two times a day  clopidogrel Tablet 75 milliGRAM(s) Oral daily  digoxin     Tablet 0.125 milliGRAM(s) Oral every other day  diltiazem    milliGRAM(s) Oral daily  docusate sodium 100 milliGRAM(s) Oral two times a day  epoetin hilda Injectable 60351 Unit(s) SubCutaneous <User Schedule>  heparin  Injectable 5000 Unit(s) SubCutaneous every 8 hours  macitentan (OPSUMIT) 10 milliGRAM(s) 10 milliGRAM(s) Oral daily  methimazole 5 milliGRAM(s) Oral daily  metolazone 5 milliGRAM(s) Oral every 48 hours  metoprolol     tartrate 25 milliGRAM(s) Oral every 8 hours  pantoprazole    Tablet 40 milliGRAM(s) Oral before breakfast  potassium chloride    Tablet ER 40 milliEquivalent(s) Oral once  predniSONE   Tablet 5 milliGRAM(s) Oral daily  roflumilast 500 MICROGram(s) Oral daily  sucralfate suspension 1 Gram(s) Oral three times a day before meals  tiotropium 2.5 MICROgram(s)/olodaterol 2.5 MICROgram(s) Inhaler 2 Puff(s) Inhalation daily    MEDICATIONS  (PRN):  acetaminophen   Tablet 650 milliGRAM(s) Oral every 6 hours PRN Moderate Pain (4 - 6)  guaiFENesin    Syrup 100 milliGRAM(s) Oral every 8 hours PRN Cough  ipratropium    for Nebulization 500 MICROGram(s) Nebulizer every 6 hours PRN Shortness of Breath and/or Wheezing  ondansetron    Tablet 4 milliGRAM(s) Oral every 8 hours PRN Nausea and/or Vomiting  sodium chloride 0.65% Nasal 1 Spray(s) Both Nostrils five times a day PRN dry nose    Allergies    No Known Allergies    Intolerances    albuterol (Unknown)      PHYSICAL EXAM:  Constitutional: NAD  HEENT: Normocephalic, EOMI  Neck:  No JVD  Respiratory: dec bs at bases, poor air entry  Cardiovascular: S1, S2, IRRR, + systolic murmur  Gastrointestinal: BS+, soft, NT/ND  Extremities: No peripheral edema  Neurological: AAOX3, no focal deficits  Psychiatric: Normal mood, normal affect  : No Gottlieb    Vital Signs Last 24 Hrs  T(C): 37 (31 Dec 2017 12:56), Max: 37.7 (31 Dec 2017 06:32)  T(F): 98.6 (31 Dec 2017 12:56), Max: 99.8 (31 Dec 2017 06:32)  HR: 92 (31 Dec 2017 12:56) (85 - 103)  BP: 102/60 (31 Dec 2017 12:56) (101/64 - 120/74)  BP(mean): --  RR: 20 (31 Dec 2017 12:56) (18 - 20)  SpO2: 100% (31 Dec 2017 12:56) (100% - 100%)  I&O's Summary    30 Dec 2017 07:01  -  31 Dec 2017 07:00  --------------------------------------------------------  IN: 1090 mL / OUT: 1600 mL / NET: -510 mL    31 Dec 2017 07:01  -  31 Dec 2017 16:48  --------------------------------------------------------  IN: 180 mL / OUT: 400 mL / NET: -220 mL        LABS:                        9.3    4.03  )-----------( 158      ( 31 Dec 2017 08:41 )             29.9     12-31    131<L>  |  81<L>  |  111<H>  ----------------------------<  95  3.1<L>   |  35<H>  |  2.10<H>    Ca    9.4      31 Dec 2017 08:44  Phos  4.0     12-31  Mg     1.9     12-31          Magnesium, Serum: 1.9 mg/dL (12-31 @ 08:44)

## 2017-12-31 NOTE — CHART NOTE - NSCHARTNOTEFT_GEN_A_CORE
Renal called as per Dr. BEBETO Cobb for hypokalemia and spoke with Dr. Quevedo and will see pt tomorrow AM. KLC 20 meq PO ordered, additional KCL 40 meq given as per attending. Will recheck BMP today and in the AM. Continue to closely monitor.     Tommy Brewer  spectra-link [phone]

## 2017-12-31 NOTE — PROVIDER CONTACT NOTE (MEDICATION) - RECOMMENDATIONS
Encourage patient to take Potassium tablets because she is being diuresis with medications that will affect her electrolytes

## 2017-12-31 NOTE — PROGRESS NOTE ADULT - ASSESSMENT
uncontrolled diarrhea  hMPV viral uri  Eustachian tube dysfunction  severe copd  severe phtn  bucky  anemia  vol overload  s/p large volume paracentesis with recurrence of moderate ascites  Afib/flutter with rvr - improved  electrolyte disturbance    continue current care  replete potassium  recheck bmp after supplementation  check stool studies and c diff  f/u with renal  continue isolation for now  discussed with Np geraldine

## 2017-12-31 NOTE — PROVIDER CONTACT NOTE (MEDICATION) - SITUATION
Pt. refusing to take Allopurinol 100mg po daily and patient verbalize she will take her  own potassium tablet

## 2018-01-01 LAB
ANION GAP SERPL CALC-SCNC: 16 MMOL/L — SIGNIFICANT CHANGE UP (ref 5–17)
BUN SERPL-MCNC: 112 MG/DL — HIGH (ref 7–23)
CALCIUM SERPL-MCNC: 9.5 MG/DL — SIGNIFICANT CHANGE UP (ref 8.4–10.5)
CHLORIDE SERPL-SCNC: 86 MMOL/L — LOW (ref 96–108)
CO2 SERPL-SCNC: 36 MMOL/L — HIGH (ref 22–31)
CREAT SERPL-MCNC: 2.22 MG/DL — HIGH (ref 0.5–1.3)
GLUCOSE SERPL-MCNC: 104 MG/DL — HIGH (ref 70–99)
HCT VFR BLD CALC: 33.5 % — LOW (ref 34.5–45)
HGB BLD-MCNC: 10.2 G/DL — LOW (ref 11.5–15.5)
MCHC RBC-ENTMCNC: 27.5 PG — SIGNIFICANT CHANGE UP (ref 27–34)
MCHC RBC-ENTMCNC: 30.4 GM/DL — LOW (ref 32–36)
MCV RBC AUTO: 90.3 FL — SIGNIFICANT CHANGE UP (ref 80–100)
PLATELET # BLD AUTO: 192 K/UL — SIGNIFICANT CHANGE UP (ref 150–400)
POTASSIUM SERPL-MCNC: 3.7 MMOL/L — SIGNIFICANT CHANGE UP (ref 3.5–5.3)
POTASSIUM SERPL-SCNC: 3.7 MMOL/L — SIGNIFICANT CHANGE UP (ref 3.5–5.3)
RBC # BLD: 3.71 M/UL — LOW (ref 3.8–5.2)
RBC # FLD: 19.8 % — HIGH (ref 10.3–14.5)
SODIUM SERPL-SCNC: 138 MMOL/L — SIGNIFICANT CHANGE UP (ref 135–145)
WBC # BLD: 4.77 K/UL — SIGNIFICANT CHANGE UP (ref 3.8–10.5)
WBC # FLD AUTO: 4.77 K/UL — SIGNIFICANT CHANGE UP (ref 3.8–10.5)

## 2018-01-01 RX ADMIN — TIOTROPIUM BROMIDE AND OLODATEROL 2 PUFF(S): 3.124; 2.736 SPRAY, METERED RESPIRATORY (INHALATION) at 09:06

## 2018-01-01 RX ADMIN — Medication 81 MILLIGRAM(S): at 11:12

## 2018-01-01 RX ADMIN — Medication 100 MILLIGRAM(S): at 11:12

## 2018-01-01 RX ADMIN — ERYTHROPOIETIN 10000 UNIT(S): 10000 INJECTION, SOLUTION INTRAVENOUS; SUBCUTANEOUS at 11:12

## 2018-01-01 RX ADMIN — CLOPIDOGREL BISULFATE 75 MILLIGRAM(S): 75 TABLET, FILM COATED ORAL at 11:12

## 2018-01-01 RX ADMIN — Medication 100 MILLIGRAM(S): at 09:04

## 2018-01-01 RX ADMIN — BUMETANIDE 1 MILLIGRAM(S): 0.25 INJECTION INTRAMUSCULAR; INTRAVENOUS at 06:37

## 2018-01-01 RX ADMIN — ATORVASTATIN CALCIUM 20 MILLIGRAM(S): 80 TABLET, FILM COATED ORAL at 21:31

## 2018-01-01 RX ADMIN — Medication 25 MILLIGRAM(S): at 14:26

## 2018-01-01 RX ADMIN — Medication 25 MILLIGRAM(S): at 06:37

## 2018-01-01 RX ADMIN — Medication 0.12 MILLIGRAM(S): at 11:12

## 2018-01-01 RX ADMIN — ROFLUMILAST 500 MICROGRAM(S): 500 TABLET ORAL at 09:03

## 2018-01-01 RX ADMIN — Medication 25 MILLIGRAM(S): at 21:30

## 2018-01-01 RX ADMIN — Medication 5 MILLIGRAM(S): at 09:03

## 2018-01-01 RX ADMIN — Medication 240 MILLIGRAM(S): at 06:37

## 2018-01-01 RX ADMIN — Medication 100 MILLIGRAM(S): at 21:31

## 2018-01-01 NOTE — PROGRESS NOTE ADULT - ASSESSMENT
uncontrolled diarrhea  hMPV viral uri  Eustachian tube dysfunction  severe copd  severe phtn  bucky  anemia  vol overload  s/p large volume paracentesis with recurrence of moderate ascites  Afib/flutter with rvr - improved  electrolyte disturbance  hypokalemia - improved.    continue current care  await stool studies  will review with renal as to poss repeat para

## 2018-01-01 NOTE — PROGRESS NOTE ADULT - SUBJECTIVE AND OBJECTIVE BOX
MEDICINE, PROGRESS NOTE [phone]    FRAN ALEXANDRA 72y [id number]    Patient seen and examined.  Patient is a 72y old  Female who presents with a chief complaint of shortness of breath, volume overload and ascites (11 Dec 2017 11:41)  Pt feels worse in belly.    PAST MEDICAL & SURGICAL HISTORY:  Hyperthyroidism  JOSE (obstructive sleep apnea)  Epistaxis  GIB (gastrointestinal bleeding)  CAD S/P percutaneous coronary angioplasty  Afib  Pulmonary HTN  GERD (gastroesophageal reflux disease)  Obesity  Cardiomegaly  Valvular heart disease  COPD (chronic obstructive pulmonary disease): Home O2  Sleep Apnea: by criteria  Loose, teeth: 3 bottom front loose teeth  Female stress incontinence  Shoulder pain, right  Constipation  Arthritis of Knee  H/O heartburn  History of lung cancer  Obese  Hx of hyperlipidemia  Asthma  Diabetes mellitus: type 2  dx about 4-5 years ago   no daily fingerstick  H/O: HTN (hypertension)  Enlarged lymph nodes  Lymph nodes enlarged: s/p biopsy mediastinal  benign  H/O endoscopy  left upper lobectomy    MEDICATIONS  (STANDING):  allopurinol 100 milliGRAM(s) Oral daily  aspirin enteric coated 81 milliGRAM(s) Oral daily  atorvastatin 20 milliGRAM(s) Oral at bedtime  buMETAnide 1 milliGRAM(s) Oral two times a day  clopidogrel Tablet 75 milliGRAM(s) Oral daily  digoxin     Tablet 0.125 milliGRAM(s) Oral every other day  diltiazem    milliGRAM(s) Oral daily  docusate sodium 100 milliGRAM(s) Oral two times a day  epoetin hilda Injectable 78919 Unit(s) SubCutaneous <User Schedule>  heparin  Injectable 5000 Unit(s) SubCutaneous every 8 hours  macitentan (OPSUMIT) 10 milliGRAM(s) 10 milliGRAM(s) Oral daily  methimazole 5 milliGRAM(s) Oral daily  metolazone 5 milliGRAM(s) Oral every 48 hours  metoprolol     tartrate 25 milliGRAM(s) Oral every 8 hours  pantoprazole    Tablet 40 milliGRAM(s) Oral before breakfast  predniSONE   Tablet 5 milliGRAM(s) Oral daily  roflumilast 500 MICROGram(s) Oral daily  sucralfate suspension 1 Gram(s) Oral three times a day before meals  tiotropium 2.5 MICROgram(s)/olodaterol 2.5 MICROgram(s) Inhaler 2 Puff(s) Inhalation daily    MEDICATIONS  (PRN):  acetaminophen   Tablet 650 milliGRAM(s) Oral every 6 hours PRN Moderate Pain (4 - 6)  guaiFENesin    Syrup 100 milliGRAM(s) Oral every 8 hours PRN Cough  ipratropium    for Nebulization 500 MICROGram(s) Nebulizer every 6 hours PRN Shortness of Breath and/or Wheezing  ondansetron    Tablet 4 milliGRAM(s) Oral every 8 hours PRN Nausea and/or Vomiting  sodium chloride 0.65% Nasal 1 Spray(s) Both Nostrils five times a day PRN dry nose    Allergies    No Known Allergies    Intolerances    albuterol (Unknown)      PHYSICAL EXAM:  Constitutional: NAD  HEENT: Normocephalic, EOMI  Neck:  No JVD  Respiratory: CTA B/L, No wheezes  Cardiovascular: S1, S2, RRR, + systolic murmur  Gastrointestinal: BS+, soft, mild tenderness, + distention, + fluid wave  Extremities: + peripheral edema le b/l  Neurological: AAOX3, no focal deficits  Psychiatric: Normal mood, normal affect  : No Gottlieb    Vital Signs Last 24 Hrs  T(C): 36.6 (01 Jan 2018 14:27), Max: 37.4 (31 Dec 2017 20:34)  T(F): 97.8 (01 Jan 2018 14:27), Max: 99.4 (31 Dec 2017 20:34)  HR: 83 (01 Jan 2018 14:27) (83 - 109)  BP: 114/57 (01 Jan 2018 14:27) (100/56 - 114/57)  BP(mean): --  RR: 18 (01 Jan 2018 14:27) (18 - 18)  SpO2: 97% (01 Jan 2018 14:27) (97% - 100%)  I&O's Summary    31 Dec 2017 07:01  -  01 Jan 2018 07:00  --------------------------------------------------------  IN: 420 mL / OUT: 1450 mL / NET: -1030 mL    01 Jan 2018 07:01  -  01 Jan 2018 19:36  --------------------------------------------------------  IN: 480 mL / OUT: 700 mL / NET: -220 mL        LABS:                        10.2   4.77  )-----------( 192      ( 01 Jan 2018 08:40 )             33.5     01-01    138  |  86<L>  |  112<H>  ----------------------------<  104<H>  3.7   |  36<H>  |  2.22<H>    Ca    9.5      01 Jan 2018 08:40  Phos  4.0     12-31  Mg     1.9     12-31                REVIEW OF SYSTEMS:      RADIOLOGY & ADDITIONAL STUDIES:      ASSESSMENT/PLAN:

## 2018-01-02 DIAGNOSIS — J06.9 ACUTE UPPER RESPIRATORY INFECTION, UNSPECIFIED: ICD-10-CM

## 2018-01-02 LAB
ANION GAP SERPL CALC-SCNC: 17 MMOL/L — SIGNIFICANT CHANGE UP (ref 5–17)
BUN SERPL-MCNC: 109 MG/DL — HIGH (ref 7–23)
CALCIUM SERPL-MCNC: 9.2 MG/DL — SIGNIFICANT CHANGE UP (ref 8.4–10.5)
CHLORIDE SERPL-SCNC: 89 MMOL/L — LOW (ref 96–108)
CO2 SERPL-SCNC: 34 MMOL/L — HIGH (ref 22–31)
CREAT SERPL-MCNC: 2.28 MG/DL — HIGH (ref 0.5–1.3)
GLUCOSE SERPL-MCNC: 93 MG/DL — SIGNIFICANT CHANGE UP (ref 70–99)
HCT VFR BLD CALC: 31.8 % — LOW (ref 34.5–45)
HGB BLD-MCNC: 9.8 G/DL — LOW (ref 11.5–15.5)
MCHC RBC-ENTMCNC: 27.7 PG — SIGNIFICANT CHANGE UP (ref 27–34)
MCHC RBC-ENTMCNC: 30.8 GM/DL — LOW (ref 32–36)
MCV RBC AUTO: 89.8 FL — SIGNIFICANT CHANGE UP (ref 80–100)
PLATELET # BLD AUTO: 195 K/UL — SIGNIFICANT CHANGE UP (ref 150–400)
POTASSIUM SERPL-MCNC: 3.8 MMOL/L — SIGNIFICANT CHANGE UP (ref 3.5–5.3)
POTASSIUM SERPL-SCNC: 3.8 MMOL/L — SIGNIFICANT CHANGE UP (ref 3.5–5.3)
RBC # BLD: 3.54 M/UL — LOW (ref 3.8–5.2)
RBC # FLD: 19.2 % — HIGH (ref 10.3–14.5)
SODIUM SERPL-SCNC: 140 MMOL/L — SIGNIFICANT CHANGE UP (ref 135–145)
WBC # BLD: 4.9 K/UL — SIGNIFICANT CHANGE UP (ref 3.8–10.5)
WBC # FLD AUTO: 4.9 K/UL — SIGNIFICANT CHANGE UP (ref 3.8–10.5)

## 2018-01-02 RX ORDER — FLUTICASONE PROPIONATE 50 MCG
1 SPRAY, SUSPENSION NASAL
Qty: 0 | Refills: 0 | Status: DISCONTINUED | OUTPATIENT
Start: 2018-01-02 | End: 2018-01-05

## 2018-01-02 RX ORDER — BUMETANIDE 0.25 MG/ML
0.5 INJECTION INTRAMUSCULAR; INTRAVENOUS EVERY 12 HOURS
Qty: 0 | Refills: 0 | Status: DISCONTINUED | OUTPATIENT
Start: 2018-01-02 | End: 2018-01-05

## 2018-01-02 RX ADMIN — BUMETANIDE 1 MILLIGRAM(S): 0.25 INJECTION INTRAMUSCULAR; INTRAVENOUS at 06:29

## 2018-01-02 RX ADMIN — Medication 240 MILLIGRAM(S): at 06:29

## 2018-01-02 RX ADMIN — Medication 5 MILLIGRAM(S): at 09:28

## 2018-01-02 RX ADMIN — CLOPIDOGREL BISULFATE 75 MILLIGRAM(S): 75 TABLET, FILM COATED ORAL at 12:22

## 2018-01-02 RX ADMIN — Medication 100 MILLIGRAM(S): at 12:31

## 2018-01-02 RX ADMIN — Medication 25 MILLIGRAM(S): at 22:18

## 2018-01-02 RX ADMIN — BUMETANIDE 0.5 MILLIGRAM(S): 0.25 INJECTION INTRAMUSCULAR; INTRAVENOUS at 14:00

## 2018-01-02 RX ADMIN — ATORVASTATIN CALCIUM 20 MILLIGRAM(S): 80 TABLET, FILM COATED ORAL at 22:18

## 2018-01-02 RX ADMIN — Medication 1 SPRAY(S): at 22:17

## 2018-01-02 RX ADMIN — Medication 81 MILLIGRAM(S): at 12:31

## 2018-01-02 RX ADMIN — Medication 25 MILLIGRAM(S): at 14:00

## 2018-01-02 RX ADMIN — Medication 25 MILLIGRAM(S): at 06:29

## 2018-01-02 RX ADMIN — ROFLUMILAST 500 MICROGRAM(S): 500 TABLET ORAL at 09:28

## 2018-01-02 NOTE — PROVIDER CONTACT NOTE (OTHER) - ASSESSMENT
Pt without complaint, VSS, no palpitations, dizziness, chest pain or pressure.
Patient found kneeling on the floor by PCA and RN, patient alert and oriented x4, VSS
B/P 108/71, HR 62 on monitor, pts HR was bradycardic overnight, 47 BPM. Pt now due for Metoprolol & Cardizem, should pt receive medications as scheduled?
patient alert and oriented x3,patient refused Lasix 60 mg at night, patient on Lasix 60 mg 8 hrly. patient verbalized the importance of taking iv Lasix 8 hrly, still refusing to take night dose
All vital signs WDL except for HR.  V/s charted on flowsheet.  Pt denies SOB and chest pain.  PT was up and using commode, but at rest for more than 5 minutes but still sustaining.  PT states she feels fine and knows her heart, even said she went up to 150s in the past.
Pt a/ox4, vss, aware of indications for heparin, continues to refuse
Pt asymptomatic, no c/o SOB, CP or palps
Pt bradycardic on tele monitor, HR sustaining 47-48 BPM. Pt A&Ox3, asymptomatic. Pt was sleeping when event occurred. Pt has been as low as 35 BPM during hospitalization. Pt currently receiving Cardizem 90mg QID, and Metoprolol 25mg q8hr.
Pt was sleeping peacefully.  Pt is asymptomatic, denies dizziness, SOB. VSS.
Pt. AOX4 sleeping comfortable, denies chest pain pr discomfort
Pt. alert, no s/s of distress no SOB, No chest pain
Pt. refused dubois cathether as a part of the protocol. Pt. educated on risks of refusing dubois.
Sustained  - 150s on tele. Pt no c/o pain, chest pain, palpitations, sob, distress.
patient stable, vitals stable,
pt asymptomatic, vss, HR now 60s-70s afib on monitor. Briefly went down to 39, monitor leads were not on properly.
vitals taken

## 2018-01-02 NOTE — PROVIDER CONTACT NOTE (OTHER) - SITUATION
Patient found kneeling on the floor by PCA and RN
patient refused Lasix 60 mg at 2200, patient on Lasix 60 mg 8 hrly
pt due for Metoprolol & Cardizem, should pt receive medications now
Patient is refusing subq heparin
Pt came back from abdominal us.  On tele, pt was sustaining HR of 120s.
Pt found to be bradycardic on monitor, sustaining as low as 47 bpm.
Pt. on cardiac monitor 6WTC
Pt. refused
Pt. with 5 beats of wide complex on telemetry monitoring
Sustained  - 150s A Flutter
patient refusing heparin, pantoprazole, and carafate
pt bradycardic on tele monitor, HR 47 BPM
pt had 9beats aivr on tele-
pts HR 39 on cardiac monitor
tachycardia: 120s-170s on Telemetry
Pt had 7 beats wide complex during bowel movement.

## 2018-01-02 NOTE — PROGRESS NOTE ADULT - ASSESSMENT
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 Recent admission at [facility] approximately 5 weeks ago for large volume paracentesis (6.1L removed). Now presents again with RV failure-worsened LE edema, recurrent ascites, pleural effusions and pulmonary edema with worsened dyspnea and CASSANDRA on CKD. s/p 8.1 L paracentesis on 12/6. Started on dobutamine gtt for pHTN, discontinued 2nd Afib with RVR to 170s.

## 2018-01-02 NOTE — PROVIDER CONTACT NOTE (OTHER) - BACKGROUND
HR exacerbation
HF exacerbation
pt admitted for R sided HF exacerbation, pulmonary hypertension
Dz of CHF exacerbation
HF
Patient admitted for HF exacerbation
Pt admitted for R sided HF.  Pt has been tachycardic majority of admission.
Pt admitted with HF exac
Pt diagnosed with HF exacerbation. Pt A flutter 100 - 140s all day today.
Pt. admitted with CHF exacerbation
Pt. hx GIB, HTN, Afib
patient admit with SOB and COPD exacerbation
pt adm with-heart failure,hx jesus,gib,cad
pt admitted for HF
pt admitted for R sided HF exacerbation, severe pulmonary hypertension
Pt with history of pulmonary hypertension, admitted 11/29 with right-sided HF and abdominal ascites.

## 2018-01-02 NOTE — PROGRESS NOTE ADULT - SUBJECTIVE AND OBJECTIVE BOX
NEPHROLOGY-[facility] [phone]        Patient seen and examined in bed.  She feels about the same  On isolation at present        MEDICATIONS  (STANDING):  allopurinol 100 milliGRAM(s) Oral daily  aspirin enteric coated 81 milliGRAM(s) Oral daily  atorvastatin 20 milliGRAM(s) Oral at bedtime  buMETAnide 1 milliGRAM(s) Oral two times a day  clopidogrel Tablet 75 milliGRAM(s) Oral daily  digoxin     Tablet 0.125 milliGRAM(s) Oral every other day  diltiazem    milliGRAM(s) Oral daily  docusate sodium 100 milliGRAM(s) Oral two times a day  epoetin hilda Injectable 89793 Unit(s) SubCutaneous <User Schedule>  fluticasone propionate 50 MICROgram(s)/spray Nasal Spray 1 Spray(s) Both Nostrils two times a day  heparin  Injectable 5000 Unit(s) SubCutaneous every 8 hours  macitentan (OPSUMIT) 10 milliGRAM(s) 10 milliGRAM(s) Oral daily  methimazole 5 milliGRAM(s) Oral daily  metolazone 5 milliGRAM(s) Oral every 48 hours  metoprolol     tartrate 25 milliGRAM(s) Oral every 8 hours  pantoprazole    Tablet 40 milliGRAM(s) Oral before breakfast  predniSONE   Tablet 5 milliGRAM(s) Oral daily  roflumilast 500 MICROGram(s) Oral daily  sucralfate suspension 1 Gram(s) Oral three times a day before meals  tiotropium 2.5 MICROgram(s)/olodaterol 2.5 MICROgram(s) Inhaler 2 Puff(s) Inhalation daily      VITAL:  T(C): , Max: 36.7 (01-02-18 @ 06:00)  T(F): , Max: 98.1 (01-02-18 @ 06:00)  HR: 78 (01-02-18 @ 06:00)  BP: 108/68 (01-02-18 @ 06:00)  BP(mean): --  RR: 18 (01-02-18 @ 06:00)  SpO2: 99% (01-02-18 @ 06:00)  Wt(kg): --    I and O's:    01-01 @ 07:01  -  01-02 @ 07:00  --------------------------------------------------------  IN: 720 mL / OUT: 1870 mL / NET: -1150 mL          PHYSICAL EXAM:    Constitutional: NAD  HEENT: PERRLA    Neck:  No JVD  Respiratory: CTAB/L  Cardiovascular: S1 and S2  Gastrointestinal: BS+, soft, NT/ND  Extremities: No peripheral edema  Neurological: A/O x 3, no focal deficits  Psychiatric: Normal mood, normal affect  : No Gottlieb  Skin: No rashes  Access: Not applicable    LABS:                        9.8    4.90  )-----------( 195      ( 02 Jan 2018 07:59 )             31.8     01-02    140  |  89<L>  |  109<H>  ----------------------------<  93  3.8   |  34<H>  |  2.28<H>    Ca    9.2      02 Jan 2018 08:27            Urine Studies:          RADIOLOGY & ADDITIONAL STUDIES:

## 2018-01-02 NOTE — PROGRESS NOTE ADULT - SUBJECTIVE AND OBJECTIVE BOX
Follow-up Pulm Progress Note    Unable to sleep 2nd cough  100% on 4.5L NC  Refuses to be examined  "doesn't want to be bothered with nothing no more"     Medications:  MEDICATIONS  (STANDING):  allopurinol 100 milliGRAM(s) Oral daily  aspirin enteric coated 81 milliGRAM(s) Oral daily  atorvastatin 20 milliGRAM(s) Oral at bedtime  buMETAnide 1 milliGRAM(s) Oral two times a day  clopidogrel Tablet 75 milliGRAM(s) Oral daily  digoxin     Tablet 0.125 milliGRAM(s) Oral every other day  diltiazem    milliGRAM(s) Oral daily  docusate sodium 100 milliGRAM(s) Oral two times a day  epoetin hilda Injectable 70162 Unit(s) SubCutaneous <User Schedule>  fluticasone propionate 50 MICROgram(s)/spray Nasal Spray 1 Spray(s) Both Nostrils two times a day  heparin  Injectable 5000 Unit(s) SubCutaneous every 8 hours  macitentan (OPSUMIT) 10 milliGRAM(s) 10 milliGRAM(s) Oral daily  methimazole 5 milliGRAM(s) Oral daily  metolazone 5 milliGRAM(s) Oral every 48 hours  metoprolol     tartrate 25 milliGRAM(s) Oral every 8 hours  pantoprazole    Tablet 40 milliGRAM(s) Oral before breakfast  predniSONE   Tablet 5 milliGRAM(s) Oral daily  roflumilast 500 MICROGram(s) Oral daily  sucralfate suspension 1 Gram(s) Oral three times a day before meals  tiotropium 2.5 MICROgram(s)/olodaterol 2.5 MICROgram(s) Inhaler 2 Puff(s) Inhalation daily    MEDICATIONS  (PRN):  acetaminophen   Tablet 650 milliGRAM(s) Oral every 6 hours PRN Moderate Pain (4 - 6)  guaiFENesin    Syrup 100 milliGRAM(s) Oral every 8 hours PRN Cough  ipratropium    for Nebulization 500 MICROGram(s) Nebulizer every 6 hours PRN Shortness of Breath and/or Wheezing  ondansetron    Tablet 4 milliGRAM(s) Oral every 8 hours PRN Nausea and/or Vomiting  sodium chloride 0.65% Nasal 1 Spray(s) Both Nostrils five times a day PRN dry nose          Vital Signs Last 24 Hrs  T(C): 36.7 (02 Jan 2018 06:00), Max: 36.7 (02 Jan 2018 06:00)  T(F): 98.1 (02 Jan 2018 06:00), Max: 98.1 (02 Jan 2018 06:00)  HR: 78 (02 Jan 2018 06:00) (78 - 88)  BP: 108/68 (02 Jan 2018 06:00) (100/62 - 114/57)  BP(mean): --  RR: 18 (02 Jan 2018 06:00) (18 - 18)  SpO2: 99% (02 Jan 2018 06:00) (97% - 100%) on 4.5L NC          01-01 @ 07:01  -  01-02 @ 07:00  --------------------------------------------------------  IN: 720 mL / OUT: 1870 mL / NET: -1150 mL          LABS:                        9.8    4.90  )-----------( 195      ( 02 Jan 2018 07:59 )             31.8     01-02    140  |  89<L>  |  109<H>  ----------------------------<  93  3.8   |  34<H>  |  2.28<H>    Ca    9.2      02 Jan 2018 08:27              Physical Examination:  PULM: Refusing to be examined  CVS:   RADIOLOGY REVIEWED  CXR: Elevated L hemidiaphragm   Grossly clear Follow-up Pulm Progress Note    Unable to sleep 2nd cough  100% on 4.5L NC  Refuses to be examined  "doesn't want to be bothered with nothing no more"     Medications:  MEDICATIONS  (STANDING):  allopurinol 100 milliGRAM(s) Oral daily  aspirin enteric coated 81 milliGRAM(s) Oral daily  atorvastatin 20 milliGRAM(s) Oral at bedtime  buMETAnide 1 milliGRAM(s) Oral two times a day  clopidogrel Tablet 75 milliGRAM(s) Oral daily  digoxin     Tablet 0.125 milliGRAM(s) Oral every other day  diltiazem    milliGRAM(s) Oral daily  docusate sodium 100 milliGRAM(s) Oral two times a day  epoetin hilda Injectable 84265 Unit(s) SubCutaneous <User Schedule>  fluticasone propionate 50 MICROgram(s)/spray Nasal Spray 1 Spray(s) Both Nostrils two times a day  heparin  Injectable 5000 Unit(s) SubCutaneous every 8 hours  macitentan (OPSUMIT) 10 milliGRAM(s) 10 milliGRAM(s) Oral daily  methimazole 5 milliGRAM(s) Oral daily  metolazone 5 milliGRAM(s) Oral every 48 hours  metoprolol     tartrate 25 milliGRAM(s) Oral every 8 hours  pantoprazole    Tablet 40 milliGRAM(s) Oral before breakfast  predniSONE   Tablet 5 milliGRAM(s) Oral daily  roflumilast 500 MICROGram(s) Oral daily  sucralfate suspension 1 Gram(s) Oral three times a day before meals  tiotropium 2.5 MICROgram(s)/olodaterol 2.5 MICROgram(s) Inhaler 2 Puff(s) Inhalation daily    MEDICATIONS  (PRN):  acetaminophen   Tablet 650 milliGRAM(s) Oral every 6 hours PRN Moderate Pain (4 - 6)  guaiFENesin    Syrup 100 milliGRAM(s) Oral every 8 hours PRN Cough  ipratropium    for Nebulization 500 MICROGram(s) Nebulizer every 6 hours PRN Shortness of Breath and/or Wheezing  ondansetron    Tablet 4 milliGRAM(s) Oral every 8 hours PRN Nausea and/or Vomiting  sodium chloride 0.65% Nasal 1 Spray(s) Both Nostrils five times a day PRN dry nose    Vital Signs Last 24 Hrs  T(C): 36.7 (02 Jan 2018 06:00), Max: 36.7 (02 Jan 2018 06:00)  T(F): 98.1 (02 Jan 2018 06:00), Max: 98.1 (02 Jan 2018 06:00)  HR: 78 (02 Jan 2018 06:00) (78 - 88)  BP: 108/68 (02 Jan 2018 06:00) (100/62 - 114/57)  BP(mean): --  RR: 18 (02 Jan 2018 06:00) (18 - 18)  SpO2: 99% (02 Jan 2018 06:00) (97% - 100%) on 4.5L NC    01-01 @ 07:01  -  01-02 @ 07:00  --------------------------------------------------------  IN: 720 mL / OUT: 1870 mL / NET: -1150 mL    LABS:                        9.8    4.90  )-----------( 195      ( 02 Jan 2018 07:59 )             31.8     01-02    140  |  89<L>  |  109<H>  ----------------------------<  93  3.8   |  34<H>  |  2.28<H>    Ca    9.2      02 Jan 2018 08:27    Physical Examination:  PULM: Refusing to be examined  CVS:   RADIOLOGY REVIEWED  CXR: Elevated L hemidiaphragm   Grossly clear

## 2018-01-02 NOTE — PROVIDER CONTACT NOTE (OTHER) - NAME OF MD/NP/PA/DO NOTIFIED:
Lucille Waller, NP
STEPHANIE Sandra
Adam Mcfarland, NP
Alma BROWN, Jordyn CELIS
Gibran Forbes, NP
JOEY Carney
[place]
Les Forbes, NP
Lucille Waller, NP
Nataliia Becerra NP
Renetta Beauchamp, NP
STEPHANIE Ramirez
Yana Ramsey NP
casie(JOEY)
JOEY Ramirez
KAROLINE BRYAN

## 2018-01-02 NOTE — PROGRESS NOTE ADULT - ASSESSMENT
uncontrolled diarrhea  hMPV viral uri  Eustachian tube dysfunction  severe copd  severe phtn  bucky  anemia  vol overload  s/p large volume paracentesis with recurrence of moderate ascites  Afib/flutter with rvr - improved  electrolyte disturbance  hypokalemia - improved.    continue current care  appreciate pulm and renal input  if pt remains stable on current regimen for 24 hrs may consider d/c home

## 2018-01-02 NOTE — PROGRESS NOTE ADULT - ASSESSMENT
71 year old female with CAD, severe pHTN, dCHF, HTN, DM, Afib, lung CA, COPD and CKD 3 p/w acute on chronic dCHF, ascites, pleural effusion, pulmonary edema and acute on chronic kidney injury c/b A.Fib with RVR.  + hMPV  - CKD stage 3: likely due to HTN and DM  - CASSANDRA: non oliguric. Likely prerenal due to ineffective circulating volume now +diuresis.    -Elevated serum HCO3 is from pCO2 retention as opposed to metabolic alkalosis  - acute on chronic dCHF: volume overload slowly improving; remains on PO diuretics; wt is decreasing  - GI: s/p LVP  - Chronic respiratory acidosis;   - Rapid aflutter; rate controlled - not on a/c (self d/c'd)    RECOMMENDATION:  - BMP daily  - Reduce the  Bumex 0.5mg po BID for now.  Want to maintain the current wt    - Continue Metolazone 5 mg po qod  - Continue Cardizem .  monitor heart rate  - will continue to monitor abdominal distention;  GI following  - Strict intake and output   - daily standing weights  - dose medication for a CrCl ~20 cc/min

## 2018-01-02 NOTE — PROGRESS NOTE ADULT - SUBJECTIVE AND OBJECTIVE BOX
MEDICINE, PROGRESS NOTE [phone]    FRAN ALEXANDRA 72y [id number]    Patient seen and examined.  Patient is a 72y old  Female who presents with a chief complaint of shortness of breath, volume overload and ascites (11 Dec 2017 11:41)  Pt still feels bad.    PAST MEDICAL & SURGICAL HISTORY:  Hyperthyroidism  JOSE (obstructive sleep apnea)  Epistaxis  GIB (gastrointestinal bleeding)  CAD S/P percutaneous coronary angioplasty  Afib  Pulmonary HTN  GERD (gastroesophageal reflux disease)  Obesity  Cardiomegaly  Valvular heart disease  COPD (chronic obstructive pulmonary disease): Home O2  Sleep Apnea: by criteria  Loose, teeth: 3 bottom front loose teeth  Female stress incontinence  Shoulder pain, right  Constipation  Arthritis of Knee  H/O heartburn  History of lung cancer  Obese  Hx of hyperlipidemia  Asthma  Diabetes mellitus: type 2  dx about 4-5 years ago   no daily fingerstick  H/O: HTN (hypertension)  Enlarged lymph nodes  Lymph nodes enlarged: s/p biopsy mediastinal  benign  H/O endoscopy  left upper lobectomy    MEDICATIONS  (STANDING):  allopurinol 100 milliGRAM(s) Oral daily  aspirin enteric coated 81 milliGRAM(s) Oral daily  atorvastatin 20 milliGRAM(s) Oral at bedtime  buMETAnide 0.5 milliGRAM(s) Oral every 12 hours  clopidogrel Tablet 75 milliGRAM(s) Oral daily  digoxin     Tablet 0.125 milliGRAM(s) Oral every other day  diltiazem    milliGRAM(s) Oral daily  docusate sodium 100 milliGRAM(s) Oral two times a day  epoetin hilda Injectable 22655 Unit(s) SubCutaneous <User Schedule>  fluticasone propionate 50 MICROgram(s)/spray Nasal Spray 1 Spray(s) Both Nostrils two times a day  heparin  Injectable 5000 Unit(s) SubCutaneous every 8 hours  macitentan (OPSUMIT) 10 milliGRAM(s) 10 milliGRAM(s) Oral daily  methimazole 5 milliGRAM(s) Oral daily  metolazone 5 milliGRAM(s) Oral every 48 hours  metoprolol     tartrate 25 milliGRAM(s) Oral every 8 hours  pantoprazole    Tablet 40 milliGRAM(s) Oral before breakfast  predniSONE   Tablet 5 milliGRAM(s) Oral daily  roflumilast 500 MICROGram(s) Oral daily  sucralfate suspension 1 Gram(s) Oral three times a day before meals  tiotropium 2.5 MICROgram(s)/olodaterol 2.5 MICROgram(s) Inhaler 2 Puff(s) Inhalation daily    MEDICATIONS  (PRN):  acetaminophen   Tablet 650 milliGRAM(s) Oral every 6 hours PRN Moderate Pain (4 - 6)  HYDROcodone/homatropine Syrup 5 milliLiter(s) Oral at bedtime PRN Cough  ipratropium    for Nebulization 500 MICROGram(s) Nebulizer every 6 hours PRN Shortness of Breath and/or Wheezing  ondansetron    Tablet 4 milliGRAM(s) Oral every 8 hours PRN Nausea and/or Vomiting  sodium chloride 0.65% Nasal 1 Spray(s) Both Nostrils five times a day PRN dry nose    Allergies    No Known Allergies    Intolerances    albuterol (Unknown)      PHYSICAL EXAM:  Constitutional: NAD  HEENT: Normocephalic, EOMI  Neck:  No JVD  Respiratory: CTA B/L, No wheezes  Cardiovascular: S1, S2, RRR, + systolic murmur  Gastrointestinal: BS+, soft, NT/ND  Extremities: No peripheral edema  Neurological: AAOX3, no focal deficits  Psychiatric: Normal mood, normal affect  : No Gottlieb    Vital Signs Last 24 Hrs  T(C): 36.6 (02 Jan 2018 14:08), Max: 36.7 (02 Jan 2018 06:00)  T(F): 97.9 (02 Jan 2018 14:08), Max: 98.1 (02 Jan 2018 06:00)  HR: 79 (02 Jan 2018 14:08) (78 - 88)  BP: 108/68 (02 Jan 2018 14:08) (100/62 - 108/68)  BP(mean): --  RR: 18 (02 Jan 2018 14:08) (18 - 18)  SpO2: 99% (02 Jan 2018 14:08) (99% - 100%)  I&O's Summary    01 Jan 2018 07:01  -  02 Jan 2018 07:00  --------------------------------------------------------  IN: 720 mL / OUT: 1870 mL / NET: -1150 mL    02 Jan 2018 07:01  -  02 Jan 2018 19:41  --------------------------------------------------------  IN: 480 mL / OUT: 400 mL / NET: 80 mL        LABS:                        9.8    4.90  )-----------( 195      ( 02 Jan 2018 07:59 )             31.8     01-02    140  |  89<L>  |  109<H>  ----------------------------<  93  3.8   |  34<H>  |  2.28<H>    Ca    9.2      02 Jan 2018 08:27

## 2018-01-02 NOTE — PROVIDER CONTACT NOTE (OTHER) - ACTION/TREATMENT ORDERED:
Will monitor am labs and continue to monitor pt on telemetry. No further orders at this time.
seen and assessed by STEPHANIE Leary , x ray B/L knee urgent ordered, will continue to monitor
NP notified, administer Cardizem as ordered with AM medications, hold Metoprolol until 8am & reevaluate B/P & HR. Will continue to monitor
Cardizem given as scheduled. Continue to Monitor for now,
Inform provider if bradycardia persist.
MD
NP notified, RN to obtain B/P and HR before administering medications in AM & follow up with provider.
NP notified, monitor leads were changed & placed properly. No further interventions at this time. Will continue to monitor
STEPHANIE Ramirez notified, day team aware as patient has been refusing heparin all day, teaching reinforced, pt continues to refuse, continue to monitor pt
no new orders given,will continue to monitor pt closely.
will continue to monitor,
JOEY Carney aware and order Mg and Phosphorus lab stat. Will continue to monitor
Medications given as per provider order with vitals beforehand. Pt resting comfortably. No c/o distress, palpitations. HR still fluctuating but has improved. NP notified when HR high.
NP made aware, continue to monitor, encourage meds later in day
NP ordered: increase cardizem drip to 15ml/hr
No actions ordered by provider

## 2018-01-02 NOTE — PROVIDER CONTACT NOTE (OTHER) - DATE AND TIME:
07-Dec-2017 22:30
25-Dec-2017 06:30
02-Jan-2018 22:26
05-Dec-2017 14:00
06-Dec-2017 04:30
14-Dec-2017 03:13
17-Dec-2017 23:00
18-Dec-2017 19:55
21-Dec-2017 14:30
24-Dec-2017 03:30
25-Dec-2017 01:34
27-Dec-2017 13:54
29-Nov-2017 15:30
30-Dec-2017 06:45
30-Nov-2017 06:55
08-Dec-2017 04:30

## 2018-01-02 NOTE — PROVIDER CONTACT NOTE (OTHER) - REASON
AFlutter sustaining HR of 120s
Bradycardia
Pt refusing subq heparin
Pt. on cardiac monitor 6WTC
Pt. with 5 beats of wide complex  on telemetry monitoring
Sustained HR 130s - 150s A flutter
pt bradycardic on tele monitor
pt had 9beats aivr on tele
pt refusing AM med
pt. refusing dubois cathether
pts HR 39
tachycardia: 120s-170s on Telemetry
Pt had 7 beats wide complex during bowel movement.

## 2018-01-03 LAB
ANION GAP SERPL CALC-SCNC: 17 MMOL/L — SIGNIFICANT CHANGE UP (ref 5–17)
BUN SERPL-MCNC: 103 MG/DL — HIGH (ref 7–23)
CALCIUM SERPL-MCNC: 9.2 MG/DL — SIGNIFICANT CHANGE UP (ref 8.4–10.5)
CHLORIDE SERPL-SCNC: 86 MMOL/L — LOW (ref 96–108)
CO2 SERPL-SCNC: 31 MMOL/L — SIGNIFICANT CHANGE UP (ref 22–31)
CREAT SERPL-MCNC: 2.03 MG/DL — HIGH (ref 0.5–1.3)
GLUCOSE SERPL-MCNC: 91 MG/DL — SIGNIFICANT CHANGE UP (ref 70–99)
HCT VFR BLD CALC: 31.3 % — LOW (ref 34.5–45)
HGB BLD-MCNC: 9.7 G/DL — LOW (ref 11.5–15.5)
MCHC RBC-ENTMCNC: 27.5 PG — SIGNIFICANT CHANGE UP (ref 27–34)
MCHC RBC-ENTMCNC: 31 GM/DL — LOW (ref 32–36)
MCV RBC AUTO: 88.7 FL — SIGNIFICANT CHANGE UP (ref 80–100)
PLATELET # BLD AUTO: 192 K/UL — SIGNIFICANT CHANGE UP (ref 150–400)
POTASSIUM SERPL-MCNC: 3.7 MMOL/L — SIGNIFICANT CHANGE UP (ref 3.5–5.3)
POTASSIUM SERPL-SCNC: 3.7 MMOL/L — SIGNIFICANT CHANGE UP (ref 3.5–5.3)
RBC # BLD: 3.53 M/UL — LOW (ref 3.8–5.2)
RBC # FLD: 19.2 % — HIGH (ref 10.3–14.5)
SODIUM SERPL-SCNC: 134 MMOL/L — LOW (ref 135–145)
WBC # BLD: 5.23 K/UL — SIGNIFICANT CHANGE UP (ref 3.8–10.5)
WBC # FLD AUTO: 5.23 K/UL — SIGNIFICANT CHANGE UP (ref 3.8–10.5)

## 2018-01-03 RX ADMIN — ATORVASTATIN CALCIUM 20 MILLIGRAM(S): 80 TABLET, FILM COATED ORAL at 22:11

## 2018-01-03 RX ADMIN — CLOPIDOGREL BISULFATE 75 MILLIGRAM(S): 75 TABLET, FILM COATED ORAL at 11:47

## 2018-01-03 RX ADMIN — Medication 5 MILLIGRAM(S): at 09:04

## 2018-01-03 RX ADMIN — Medication 0.12 MILLIGRAM(S): at 11:47

## 2018-01-03 RX ADMIN — Medication 81 MILLIGRAM(S): at 11:47

## 2018-01-03 RX ADMIN — ERYTHROPOIETIN 10000 UNIT(S): 10000 INJECTION, SOLUTION INTRAVENOUS; SUBCUTANEOUS at 11:52

## 2018-01-03 RX ADMIN — Medication 25 MILLIGRAM(S): at 14:43

## 2018-01-03 RX ADMIN — TIOTROPIUM BROMIDE AND OLODATEROL 2 PUFF(S): 3.124; 2.736 SPRAY, METERED RESPIRATORY (INHALATION) at 08:07

## 2018-01-03 RX ADMIN — Medication 240 MILLIGRAM(S): at 06:43

## 2018-01-03 RX ADMIN — Medication 100 MILLIGRAM(S): at 11:47

## 2018-01-03 RX ADMIN — Medication 25 MILLIGRAM(S): at 06:43

## 2018-01-03 RX ADMIN — Medication 1 SPRAY(S): at 18:02

## 2018-01-03 RX ADMIN — Medication 25 MILLIGRAM(S): at 22:11

## 2018-01-03 RX ADMIN — BUMETANIDE 0.5 MILLIGRAM(S): 0.25 INJECTION INTRAMUSCULAR; INTRAVENOUS at 08:03

## 2018-01-03 RX ADMIN — ROFLUMILAST 500 MICROGRAM(S): 500 TABLET ORAL at 09:05

## 2018-01-03 RX ADMIN — Medication 1 SPRAY(S): at 06:42

## 2018-01-03 NOTE — PROGRESS NOTE ADULT - SUBJECTIVE AND OBJECTIVE BOX
MEDICINE, PROGRESS NOTE [phone]    FRAN ALEXANDRA 72y [id number]    Patient seen and examined.  Patient is a 72y old  Female who presents with a chief complaint of shortness of breath, volume overload and ascites (11 Dec 2017 11:41)  Pt feels ok. upset over conversations with other docs.    PAST MEDICAL & SURGICAL HISTORY:  Hyperthyroidism  JOSE (obstructive sleep apnea)  Epistaxis  GIB (gastrointestinal bleeding)  CAD S/P percutaneous coronary angioplasty  Afib  Pulmonary HTN  GERD (gastroesophageal reflux disease)  Obesity  Cardiomegaly  Valvular heart disease  COPD (chronic obstructive pulmonary disease): Home O2  Sleep Apnea: by criteria  Loose, teeth: 3 bottom front loose teeth  Female stress incontinence  Shoulder pain, right  Constipation  Arthritis of Knee  H/O heartburn  History of lung cancer  Obese  Hx of hyperlipidemia  Asthma  Diabetes mellitus: type 2  dx about 4-5 years ago   no daily fingerstick  H/O: HTN (hypertension)  Enlarged lymph nodes  Lymph nodes enlarged: s/p biopsy mediastinal  benign  H/O endoscopy  left upper lobectomy    MEDICATIONS  (STANDING):  allopurinol 100 milliGRAM(s) Oral daily  aspirin enteric coated 81 milliGRAM(s) Oral daily  atorvastatin 20 milliGRAM(s) Oral at bedtime  buMETAnide 0.5 milliGRAM(s) Oral every 12 hours  clopidogrel Tablet 75 milliGRAM(s) Oral daily  digoxin     Tablet 0.125 milliGRAM(s) Oral every other day  diltiazem    milliGRAM(s) Oral daily  docusate sodium 100 milliGRAM(s) Oral two times a day  epoetin hilda Injectable 05489 Unit(s) SubCutaneous <User Schedule>  fluticasone propionate 50 MICROgram(s)/spray Nasal Spray 1 Spray(s) Both Nostrils two times a day  heparin  Injectable 5000 Unit(s) SubCutaneous every 8 hours  macitentan (OPSUMIT) 10 milliGRAM(s) 10 milliGRAM(s) Oral daily  methimazole 5 milliGRAM(s) Oral daily  metoprolol     tartrate 25 milliGRAM(s) Oral every 8 hours  pantoprazole    Tablet 40 milliGRAM(s) Oral before breakfast  predniSONE   Tablet 5 milliGRAM(s) Oral daily  roflumilast 500 MICROGram(s) Oral daily  sucralfate suspension 1 Gram(s) Oral three times a day before meals  tiotropium 2.5 MICROgram(s)/olodaterol 2.5 MICROgram(s) Inhaler 2 Puff(s) Inhalation daily    MEDICATIONS  (PRN):  acetaminophen   Tablet 650 milliGRAM(s) Oral every 6 hours PRN Moderate Pain (4 - 6)  HYDROcodone/homatropine Syrup 5 milliLiter(s) Oral at bedtime PRN Cough  ipratropium    for Nebulization 500 MICROGram(s) Nebulizer every 6 hours PRN Shortness of Breath and/or Wheezing  ondansetron    Tablet 4 milliGRAM(s) Oral every 8 hours PRN Nausea and/or Vomiting  sodium chloride 0.65% Nasal 1 Spray(s) Both Nostrils five times a day PRN dry nose    Allergies    No Known Allergies    Intolerances    albuterol (Unknown)      PHYSICAL EXAM:  Constitutional: NAD  HEENT: Normocephalic, EOMI  Neck:  No JVD  Respiratory: CTA B/L, No wheezes  Cardiovascular: S1, S2, RRR, + systolic murmur  Gastrointestinal: BS+, soft, NT/ND  Extremities: No peripheral edema  Neurological: AAOX3, no focal deficits  Psychiatric: Normal mood, normal affect  : No Gottlieb    Vital Signs Last 24 Hrs  T(C): 36.5 (03 Jan 2018 11:39), Max: 36.9 (03 Jan 2018 06:32)  T(F): 97.7 (03 Jan 2018 11:39), Max: 98.5 (03 Jan 2018 06:32)  HR: 71 (03 Jan 2018 16:45) (63 - 81)  BP: 107/69 (03 Jan 2018 16:45) (100/64 - 113/76)  BP(mean): --  RR: 18 (03 Jan 2018 11:39) (18 - 18)  SpO2: 98% (03 Jan 2018 11:39) (96% - 100%)  I&O's Summary    02 Jan 2018 07:01  -  03 Jan 2018 07:00  --------------------------------------------------------  IN: 1180 mL / OUT: 1200 mL / NET: -20 mL    03 Jan 2018 07:01  -  03 Jan 2018 18:08  --------------------------------------------------------  IN: 480 mL / OUT: 500 mL / NET: -20 mL        LABS:                        9.7    5.23  )-----------( 192      ( 03 Jan 2018 08:50 )             31.3     01-03    134<L>  |  86<L>  |  103<H>  ----------------------------<  91  3.7   |  31  |  2.03<H>    Ca    9.2      03 Jan 2018 08:46

## 2018-01-03 NOTE — PROGRESS NOTE ADULT - ASSESSMENT
uncontrolled diarrhea  hMPV viral uri  Eustachian tube dysfunction  severe copd  severe phtn  bucky  anemia  vol overload  s/p large volume paracentesis with recurrence of moderate ascites  Afib/flutter with rvr - improved  electrolyte disturbance  hypokalemia - improved.    continue current care  diuretics changed by renal  d/c planning alix if stable on regimen

## 2018-01-03 NOTE — PROGRESS NOTE ADULT - SUBJECTIVE AND OBJECTIVE BOX
[facility] [phone]        Patient seen and examined in bed.  She was in good spirits        MEDICATIONS  (STANDING):  allopurinol 100 milliGRAM(s) Oral daily  aspirin enteric coated 81 milliGRAM(s) Oral daily  atorvastatin 20 milliGRAM(s) Oral at bedtime  buMETAnide 0.5 milliGRAM(s) Oral every 12 hours  clopidogrel Tablet 75 milliGRAM(s) Oral daily  digoxin     Tablet 0.125 milliGRAM(s) Oral every other day  diltiazem    milliGRAM(s) Oral daily  docusate sodium 100 milliGRAM(s) Oral two times a day  epoetin hilda Injectable 29437 Unit(s) SubCutaneous <User Schedule>  fluticasone propionate 50 MICROgram(s)/spray Nasal Spray 1 Spray(s) Both Nostrils two times a day  heparin  Injectable 5000 Unit(s) SubCutaneous every 8 hours  macitentan (OPSUMIT) 10 milliGRAM(s) 10 milliGRAM(s) Oral daily  methimazole 5 milliGRAM(s) Oral daily  metolazone 5 milliGRAM(s) Oral every 48 hours  metoprolol     tartrate 25 milliGRAM(s) Oral every 8 hours  pantoprazole    Tablet 40 milliGRAM(s) Oral before breakfast  predniSONE   Tablet 5 milliGRAM(s) Oral daily  roflumilast 500 MICROGram(s) Oral daily  sucralfate suspension 1 Gram(s) Oral three times a day before meals  tiotropium 2.5 MICROgram(s)/olodaterol 2.5 MICROgram(s) Inhaler 2 Puff(s) Inhalation daily      VITAL:  T(C): , Max: 36.9 (01-03-18 @ 06:32)  T(F): , Max: 98.5 (01-03-18 @ 06:32)  HR: 71 (01-03-18 @ 08:02)  BP: 108/64 (01-03-18 @ 08:02)  BP(mean): --  RR: 18 (01-03-18 @ 06:32)  SpO2: 100% (01-03-18 @ 06:32)  Wt(kg): --    I and O's:    01-02 @ 07:01  -  01-03 @ 07:00  --------------------------------------------------------  IN: 1180 mL / OUT: 1200 mL / NET: -20 mL    01-03 @ 07:01  -  01-03 @ 11:07  --------------------------------------------------------  IN: 420 mL / OUT: 0 mL / NET: 420 mL          PHYSICAL EXAM:    Constitutional: NAD  HEENT: PERRLA    Neck:  + JVD  Respiratory: CTAB/L  Cardiovascular: S1 and S2  Gastrointestinal: BS+, soft, distended  Extremities: No peripheral edema  Neurological: A/O x 3, no focal deficits  Psychiatric: Normal mood, normal affect  : No Gottlieb  Skin: No rashes  Access: Not applicable    LABS:                        9.7    5.23  )-----------( 192      ( 03 Jan 2018 08:50 )             31.3     01-03    134<L>  |  86<L>  |  103<H>  ----------------------------<  91  3.7   |  31  |  2.03<H>    Ca    9.2      03 Jan 2018 08:46            Urine Studies:          RADIOLOGY & ADDITIONAL STUDIES:

## 2018-01-03 NOTE — PROGRESS NOTE ADULT - ASSESSMENT
71 year old female with CAD, severe pHTN, dCHF, HTN, DM, Afib, lung CA, COPD and CKD 3 p/w acute on chronic dCHF, ascites, pleural effusion, pulmonary edema and acute on chronic kidney injury c/b A.Fib with RVR.  + hMPV  - CKD stage 3: likely due to HTN and DM  - CASSANDRA: non oliguric. Likely prerenal due to ineffective circulating volume now +diuresis.    -Elevated serum HCO3 is from pCO2 retention as opposed to metabolic alkalosis  - acute on chronic dCHF: volume overload slowly improving; remains on PO diuretics; wt is decreasing  - GI: s/p LVP  - Chronic respiratory acidosis;   - Rapid aflutter; rate controlled - not on a/c (self d/c'd)    RECOMMENDATION:  - BMP daily  -  Bumex 0.5mg po BID for now.  Want to maintain the current wt.  DC zaroxoyln and observe off.  Use PRN as outpt    Continue Cardizem .  monitor heart rate  - will continue to monitor abdominal distention;  GI following  - Strict intake and output   - daily standing weights  - dose medication for a CrCl ~20 cc/min

## 2018-01-04 RX ORDER — ROFLUMILAST 500 UG/1
500 TABLET ORAL DAILY
Qty: 0 | Refills: 0 | Status: DISCONTINUED | OUTPATIENT
Start: 2018-01-05 | End: 2018-01-05

## 2018-01-04 RX ADMIN — Medication 240 MILLIGRAM(S): at 07:03

## 2018-01-04 RX ADMIN — TIOTROPIUM BROMIDE AND OLODATEROL 2 PUFF(S): 3.124; 2.736 SPRAY, METERED RESPIRATORY (INHALATION) at 11:55

## 2018-01-04 RX ADMIN — Medication 81 MILLIGRAM(S): at 11:52

## 2018-01-04 RX ADMIN — Medication 5 MILLIGRAM(S): at 08:35

## 2018-01-04 RX ADMIN — Medication 100 MILLIGRAM(S): at 11:52

## 2018-01-04 RX ADMIN — BUMETANIDE 0.5 MILLIGRAM(S): 0.25 INJECTION INTRAMUSCULAR; INTRAVENOUS at 16:35

## 2018-01-04 RX ADMIN — CLOPIDOGREL BISULFATE 75 MILLIGRAM(S): 75 TABLET, FILM COATED ORAL at 11:52

## 2018-01-04 RX ADMIN — Medication 25 MILLIGRAM(S): at 07:03

## 2018-01-04 RX ADMIN — ATORVASTATIN CALCIUM 20 MILLIGRAM(S): 80 TABLET, FILM COATED ORAL at 21:58

## 2018-01-04 RX ADMIN — Medication 1 SPRAY(S): at 07:03

## 2018-01-04 RX ADMIN — Medication 25 MILLIGRAM(S): at 21:58

## 2018-01-04 RX ADMIN — Medication 25 MILLIGRAM(S): at 14:31

## 2018-01-04 RX ADMIN — BUMETANIDE 0.5 MILLIGRAM(S): 0.25 INJECTION INTRAMUSCULAR; INTRAVENOUS at 08:36

## 2018-01-04 NOTE — PROGRESS NOTE ADULT - SUBJECTIVE AND OBJECTIVE BOX
MEDICINE, PROGRESS NOTE [phone]    FRAN ALEXANDRA 72y [id number]    Patient seen and examined.  Patient is a 72y old  Female who presents with a chief complaint of shortness of breath, volume overload and ascites (11 Dec 2017 11:41)      PAST MEDICAL & SURGICAL HISTORY:  Hyperthyroidism  JOSE (obstructive sleep apnea)  Epistaxis  GIB (gastrointestinal bleeding)  CAD S/P percutaneous coronary angioplasty  Afib  Pulmonary HTN  GERD (gastroesophageal reflux disease)  Obesity  Cardiomegaly  Valvular heart disease  COPD (chronic obstructive pulmonary disease): Home O2  Sleep Apnea: by criteria  Loose, teeth: 3 bottom front loose teeth  Female stress incontinence  Shoulder pain, right  Constipation  Arthritis of Knee  H/O heartburn  History of lung cancer  Obese  Hx of hyperlipidemia  Asthma  Diabetes mellitus: type 2  dx about 4-5 years ago   no daily fingerstick  H/O: HTN (hypertension)  Enlarged lymph nodes  Lymph nodes enlarged: s/p biopsy mediastinal  benign  H/O endoscopy  left upper lobectomy    MEDICATIONS  (STANDING):  allopurinol 100 milliGRAM(s) Oral daily  aspirin enteric coated 81 milliGRAM(s) Oral daily  atorvastatin 20 milliGRAM(s) Oral at bedtime  buMETAnide 0.5 milliGRAM(s) Oral every 12 hours  clopidogrel Tablet 75 milliGRAM(s) Oral daily  digoxin     Tablet 0.125 milliGRAM(s) Oral every other day  diltiazem    milliGRAM(s) Oral daily  docusate sodium 100 milliGRAM(s) Oral two times a day  fluticasone propionate 50 MICROgram(s)/spray Nasal Spray 1 Spray(s) Both Nostrils two times a day  heparin  Injectable 5000 Unit(s) SubCutaneous every 8 hours  macitentan (OPSUMIT) 10 milliGRAM(s) 10 milliGRAM(s) Oral daily  methimazole 5 milliGRAM(s) Oral daily  metoprolol     tartrate 25 milliGRAM(s) Oral every 8 hours  pantoprazole    Tablet 40 milliGRAM(s) Oral before breakfast  predniSONE   Tablet 5 milliGRAM(s) Oral daily  sucralfate suspension 1 Gram(s) Oral three times a day before meals  tiotropium 2.5 MICROgram(s)/olodaterol 2.5 MICROgram(s) Inhaler 2 Puff(s) Inhalation daily    MEDICATIONS  (PRN):  acetaminophen   Tablet 650 milliGRAM(s) Oral every 6 hours PRN Moderate Pain (4 - 6)  HYDROcodone/homatropine Syrup 5 milliLiter(s) Oral at bedtime PRN Cough  ipratropium    for Nebulization 500 MICROGram(s) Nebulizer every 6 hours PRN Shortness of Breath and/or Wheezing  ondansetron    Tablet 4 milliGRAM(s) Oral every 8 hours PRN Nausea and/or Vomiting  sodium chloride 0.65% Nasal 1 Spray(s) Both Nostrils five times a day PRN dry nose    Allergies    No Known Allergies    Intolerances    albuterol (Unknown)      PHYSICAL EXAM:  Constitutional: NAD  HEENT: Normocephalic, EOMI  Neck:  No JVD  Respiratory: CTA B/L, No wheezes  Cardiovascular: S1, S2, RRR, + systolic murmur  Gastrointestinal: BS+, soft, NT/ND  Extremities: No peripheral edema  Neurological: AAOX3, no focal deficits  Psychiatric: Normal mood, normal affect  : No Gottlieb    Vital Signs Last 24 Hrs  T(C): 36.3 (04 Jan 2018 11:50), Max: 37 (03 Jan 2018 20:54)  T(F): 97.3 (04 Jan 2018 11:50), Max: 98.6 (03 Jan 2018 20:54)  HR: 97 (04 Jan 2018 16:38) (75 - 97)  BP: 110/73 (04 Jan 2018 16:38) (103/64 - 110/73)  BP(mean): --  RR: 18 (04 Jan 2018 11:50) (18 - 20)  SpO2: 99% (04 Jan 2018 11:50) (95% - 100%)  I&O's Summary    03 Jan 2018 07:01  -  04 Jan 2018 07:00  --------------------------------------------------------  IN: 820 mL / OUT: 500 mL / NET: 320 mL    04 Jan 2018 07:01  -  04 Jan 2018 18:34  --------------------------------------------------------  IN: 260 mL / OUT: 0 mL / NET: 260 mL        LABS:                        9.7    5.23  )-----------( 192      ( 03 Jan 2018 08:50 )             31.3     01-03    134<L>  |  86<L>  |  103<H>  ----------------------------<  91  3.7   |  31  |  2.03<H>    Ca    9.2      03 Jan 2018 08:46

## 2018-01-04 NOTE — PROGRESS NOTE ADULT - PROBLEM SELECTOR PLAN 3
-Appreciate house pulmonary consult for pHTN mgmt. No additional pHTN medications are indicated   -Vasodilator non-responder on RHC from 5/2017  -Dobutamine gtt discontinued 2nd Afib with RVR  on opsumit

## 2018-01-04 NOTE — PROGRESS NOTE ADULT - PROBLEM SELECTOR PLAN 4
-Atrovent PRN 2nd Afib with RVR  -Stiolto BID (patient will use home med)  -Daliresp qd  -Prednisone 5mg qd

## 2018-01-04 NOTE — PROGRESS NOTE ADULT - ASSESSMENT
71 year old female with CAD, severe pHTN, dCHF, HTN, DM, Afib, lung CA, COPD and CKD 3 p/w acute on chronic dCHF, ascites, pleural effusion, pulmonary edema and acute on chronic kidney injury c/b A.Fib with RVR.  + hMPV  - CKD stage 3: likely due to HTN and DM  - CASSANDRA: non oliguric. Likely prerenal due to ineffective circulating volume now +diuresis.    -Elevated serum HCO3 is from pCO2 retention as opposed to metabolic alkalosis  - acute on chronic dCHF: volume overload slowly improving; remains on PO diuretics; wt is decreasing  - GI: s/p LVP  - Chronic respiratory acidosis;   - Rapid aflutter; rate controlled - not on a/c (self d/c'd)    RECOMMENDATION:  - BMP daily  -   Bumex 0.5mg po BID for now.  Want to maintain the current wt .  She is off the zaroxoyln.    - Continue Cardizem .  monitor heart rate  - will continue to monitor abdominal distention;  GI following  - DC the Epogen on discharge  - daily standing weights  - dose medication for a CrCl ~20 cc/min

## 2018-01-04 NOTE — PROVIDER CONTACT NOTE (MEDICATION) - SITUATION
Daliresp 500 mg patient does not have own medication. Pharmacy unable to dispense until tomorrow. No exchange available.

## 2018-01-04 NOTE — PROGRESS NOTE ADULT - SUBJECTIVE AND OBJECTIVE BOX
Follow-up Pulm Progress Note    No new respiratory events overnight.  Denies SOB/CP. o2 sat 96% on 4.5l nc,, breathing comfortable    Medications:  MEDICATIONS  (STANDING):  allopurinol 100 milliGRAM(s) Oral daily  aspirin enteric coated 81 milliGRAM(s) Oral daily  atorvastatin 20 milliGRAM(s) Oral at bedtime  buMETAnide 0.5 milliGRAM(s) Oral every 12 hours  clopidogrel Tablet 75 milliGRAM(s) Oral daily  digoxin     Tablet 0.125 milliGRAM(s) Oral every other day  diltiazem    milliGRAM(s) Oral daily  docusate sodium 100 milliGRAM(s) Oral two times a day  fluticasone propionate 50 MICROgram(s)/spray Nasal Spray 1 Spray(s) Both Nostrils two times a day  heparin  Injectable 5000 Unit(s) SubCutaneous every 8 hours  macitentan (OPSUMIT) 10 milliGRAM(s) 10 milliGRAM(s) Oral daily  methimazole 5 milliGRAM(s) Oral daily  metoprolol     tartrate 25 milliGRAM(s) Oral every 8 hours  pantoprazole    Tablet 40 milliGRAM(s) Oral before breakfast  predniSONE   Tablet 5 milliGRAM(s) Oral daily  roflumilast 500 MICROGram(s) Oral daily  sucralfate suspension 1 Gram(s) Oral three times a day before meals  tiotropium 2.5 MICROgram(s)/olodaterol 2.5 MICROgram(s) Inhaler 2 Puff(s) Inhalation daily    MEDICATIONS  (PRN):  acetaminophen   Tablet 650 milliGRAM(s) Oral every 6 hours PRN Moderate Pain (4 - 6)  HYDROcodone/homatropine Syrup 5 milliLiter(s) Oral at bedtime PRN Cough  ipratropium    for Nebulization 500 MICROGram(s) Nebulizer every 6 hours PRN Shortness of Breath and/or Wheezing  ondansetron    Tablet 4 milliGRAM(s) Oral every 8 hours PRN Nausea and/or Vomiting  sodium chloride 0.65% Nasal 1 Spray(s) Both Nostrils five times a day PRN dry nose          Vital Signs Last 24 Hrs  T(C): 36.4 (04 Jan 2018 06:59), Max: 37 (03 Jan 2018 20:54)  T(F): 97.6 (04 Jan 2018 06:59), Max: 98.6 (03 Jan 2018 20:54)  HR: 75 (04 Jan 2018 06:59) (71 - 81)  BP: 103/64 (04 Jan 2018 06:59) (100/64 - 108/67)  BP(mean): --  RR: 18 (04 Jan 2018 06:59) (18 - 20)  SpO2: 100% (04 Jan 2018 06:59) (95% - 100%)          01-03 @ 07:01  -  01-04 @ 07:00  --------------------------------------------------------  IN: 820 mL / OUT: 500 mL / NET: 320 mL          LABS:                        9.7    5.23  )-----------( 192      ( 03 Jan 2018 08:50 )             31.3     01-03    134<L>  |  86<L>  |  103<H>  ----------------------------<  91  3.7   |  31  |  2.03<H>    Ca    9.2      03 Jan 2018 08:46            CAPILLARY BLOOD GLUCOSE                            CULTURES: (if applicable)          Physical Examination:  PULM: decreased bs bilaterally, no wheeze, no crackle no significant sputum production  CVS: S1, S2 heard  abd: + ascites    RADIOLOGY REVIEWED  CXR: < from: Xray Chest 1 View AP -PORTABLE-Routine (12.30.17 @ 08:48) >    IMPRESSION:   Slight interval improvement of a small left pleural effusion.    < end of copied text >      CT chest:

## 2018-01-04 NOTE — PROGRESS NOTE ADULT - PROBLEM SELECTOR PLAN 1
2nd metapnuemovirus   -cough improved  -Hycodan qHS PRN cough  -Flonase/Nasal saline for eustachian tube dysfunction 2nd viral illness   -No evidence of PNA on CXR, no leukocytosis or fever

## 2018-01-04 NOTE — PROGRESS NOTE ADULT - SUBJECTIVE AND OBJECTIVE BOX
[facility] [phone]        Patient seen and examined in bed.  She felt a little dizzy this am but feels better now.          MEDICATIONS  (STANDING):  allopurinol 100 milliGRAM(s) Oral daily  aspirin enteric coated 81 milliGRAM(s) Oral daily  atorvastatin 20 milliGRAM(s) Oral at bedtime  buMETAnide 0.5 milliGRAM(s) Oral every 12 hours  clopidogrel Tablet 75 milliGRAM(s) Oral daily  digoxin     Tablet 0.125 milliGRAM(s) Oral every other day  diltiazem    milliGRAM(s) Oral daily  docusate sodium 100 milliGRAM(s) Oral two times a day  epoetin hilda Injectable 69823 Unit(s) SubCutaneous <User Schedule>  fluticasone propionate 50 MICROgram(s)/spray Nasal Spray 1 Spray(s) Both Nostrils two times a day  heparin  Injectable 5000 Unit(s) SubCutaneous every 8 hours  macitentan (OPSUMIT) 10 milliGRAM(s) 10 milliGRAM(s) Oral daily  methimazole 5 milliGRAM(s) Oral daily  metoprolol     tartrate 25 milliGRAM(s) Oral every 8 hours  pantoprazole    Tablet 40 milliGRAM(s) Oral before breakfast  predniSONE   Tablet 5 milliGRAM(s) Oral daily  roflumilast 500 MICROGram(s) Oral daily  sucralfate suspension 1 Gram(s) Oral three times a day before meals  tiotropium 2.5 MICROgram(s)/olodaterol 2.5 MICROgram(s) Inhaler 2 Puff(s) Inhalation daily      VITAL:  T(C): , Max: 37 (01-03-18 @ 20:54)  T(F): , Max: 98.6 (01-03-18 @ 20:54)  HR: 75 (01-04-18 @ 06:59)  BP: 103/64 (01-04-18 @ 06:59)  BP(mean): --  RR: 18 (01-04-18 @ 06:59)  SpO2: 100% (01-04-18 @ 06:59)  Wt(kg): --    I and O's:    01-03 @ 07:01  -  01-04 @ 07:00  --------------------------------------------------------  IN: 820 mL / OUT: 500 mL / NET: 320 mL          PHYSICAL EXAM:    Constitutional: NAD  HEENT: PERRLA    Neck:  No JVD  Respiratory: reduced breath sounds  Cardiovascular: S1 and S2  Gastrointestinal: BS+, soft, distended  Extremities: No peripheral edema  Neurological: A/O x 3, no focal deficits  Psychiatric: Normal mood, normal affect  : No Gottlieb  Skin: No rashes  Access: Not applicable    LABS:                        9.7    5.23  )-----------( 192      ( 03 Jan 2018 08:50 )             31.3     01-03    134<L>  |  86<L>  |  103<H>  ----------------------------<  91  3.7   |  31  |  2.03<H>    Ca    9.2      03 Jan 2018 08:46            Urine Studies:          RADIOLOGY & ADDITIONAL STUDIES:

## 2018-01-04 NOTE — PROVIDER CONTACT NOTE (MEDICATION) - ACTION/TREATMENT ORDERED:
Explained to pt. about the importance of taking Bumex 1mg IV/ DrRikki's order. Pt. cont. to refuse. STEPHANIE Mora aware. Cont. to monitor pt.
NP notified, no further interventions at this time. Will continue to monitor
NP notified, retake B/P manually, if B/P above parameters, give medication as ordered. Will continue to monitor
NP notified, will order Carafate suspension for AM. Will continue to monitor
np aware of refusal despite offering multiple times throughout the day. this includes medications carafate and potassium supplement. pt also refuses assistance despite multiple offerings.
Continue to monitor.
NP made aware that pt is refusing to take hospital medication. Pt will be closely monitored. Rn to double check what medication pt took. RN will supervise medication administration.
No actions ordered by provider
PA made aware. VSS. Will recheck pt and assess need for medication. RN to continue watch pt closely.
Provider aware of patient refusal to take hospital brand  potassium and is also aware of patient refusing to adhere to medication regimen
pt education done, however pt still refused above listed meds.  called [facility], spoke to Leanna, stated she would try to reschedule pt for later time

## 2018-01-05 VITALS
HEART RATE: 59 BPM | RESPIRATION RATE: 18 BRPM | DIASTOLIC BLOOD PRESSURE: 67 MMHG | TEMPERATURE: 99 F | SYSTOLIC BLOOD PRESSURE: 101 MMHG | OXYGEN SATURATION: 96 %

## 2018-01-05 LAB
ANION GAP SERPL CALC-SCNC: 15 MMOL/L — SIGNIFICANT CHANGE UP (ref 5–17)
BUN SERPL-MCNC: 104 MG/DL — HIGH (ref 7–23)
CALCIUM SERPL-MCNC: 9.1 MG/DL — SIGNIFICANT CHANGE UP (ref 8.4–10.5)
CHLORIDE SERPL-SCNC: 87 MMOL/L — LOW (ref 96–108)
CO2 SERPL-SCNC: 35 MMOL/L — HIGH (ref 22–31)
CREAT SERPL-MCNC: 2.04 MG/DL — HIGH (ref 0.5–1.3)
GLUCOSE SERPL-MCNC: 126 MG/DL — HIGH (ref 70–99)
HCT VFR BLD CALC: 32.7 % — LOW (ref 34.5–45)
HGB BLD-MCNC: 10 G/DL — LOW (ref 11.5–15.5)
MCHC RBC-ENTMCNC: 27.4 PG — SIGNIFICANT CHANGE UP (ref 27–34)
MCHC RBC-ENTMCNC: 30.6 GM/DL — LOW (ref 32–36)
MCV RBC AUTO: 89.6 FL — SIGNIFICANT CHANGE UP (ref 80–100)
PLATELET # BLD AUTO: 194 K/UL — SIGNIFICANT CHANGE UP (ref 150–400)
POTASSIUM SERPL-MCNC: 3.3 MMOL/L — LOW (ref 3.5–5.3)
POTASSIUM SERPL-SCNC: 3.3 MMOL/L — LOW (ref 3.5–5.3)
RBC # BLD: 3.65 M/UL — LOW (ref 3.8–5.2)
RBC # FLD: 18.7 % — HIGH (ref 10.3–14.5)
SODIUM SERPL-SCNC: 137 MMOL/L — SIGNIFICANT CHANGE UP (ref 135–145)
WBC # BLD: 6.27 K/UL — SIGNIFICANT CHANGE UP (ref 3.8–10.5)
WBC # FLD AUTO: 6.27 K/UL — SIGNIFICANT CHANGE UP (ref 3.8–10.5)

## 2018-01-05 PROCEDURE — 87015 SPECIMEN INFECT AGNT CONCNTJ: CPT

## 2018-01-05 PROCEDURE — 71045 X-RAY EXAM CHEST 1 VIEW: CPT

## 2018-01-05 PROCEDURE — 97161 PT EVAL LOW COMPLEX 20 MIN: CPT

## 2018-01-05 PROCEDURE — 85014 HEMATOCRIT: CPT

## 2018-01-05 PROCEDURE — 88305 TISSUE EXAM BY PATHOLOGIST: CPT

## 2018-01-05 PROCEDURE — 87389 HIV-1 AG W/HIV-1&-2 AB AG IA: CPT

## 2018-01-05 PROCEDURE — 87206 SMEAR FLUORESCENT/ACID STAI: CPT

## 2018-01-05 PROCEDURE — 85730 THROMBOPLASTIN TIME PARTIAL: CPT

## 2018-01-05 PROCEDURE — 93306 TTE W/DOPPLER COMPLETE: CPT

## 2018-01-05 PROCEDURE — 88112 CYTOPATH CELL ENHANCE TECH: CPT

## 2018-01-05 PROCEDURE — 85027 COMPLETE CBC AUTOMATED: CPT

## 2018-01-05 PROCEDURE — 87075 CULTR BACTERIA EXCEPT BLOOD: CPT

## 2018-01-05 PROCEDURE — 96374 THER/PROPH/DIAG INJ IV PUSH: CPT

## 2018-01-05 PROCEDURE — 87486 CHLMYD PNEUM DNA AMP PROBE: CPT

## 2018-01-05 PROCEDURE — 80162 ASSAY OF DIGOXIN TOTAL: CPT

## 2018-01-05 PROCEDURE — 84550 ASSAY OF BLOOD/URIC ACID: CPT

## 2018-01-05 PROCEDURE — 80053 COMPREHEN METABOLIC PANEL: CPT

## 2018-01-05 PROCEDURE — 82040 ASSAY OF SERUM ALBUMIN: CPT

## 2018-01-05 PROCEDURE — 82330 ASSAY OF CALCIUM: CPT

## 2018-01-05 PROCEDURE — 87070 CULTURE OTHR SPECIMN AEROBIC: CPT

## 2018-01-05 PROCEDURE — 76705 ECHO EXAM OF ABDOMEN: CPT

## 2018-01-05 PROCEDURE — 84132 ASSAY OF SERUM POTASSIUM: CPT

## 2018-01-05 PROCEDURE — 82042 OTHER SOURCE ALBUMIN QUAN EA: CPT

## 2018-01-05 PROCEDURE — 99285 EMERGENCY DEPT VISIT HI MDM: CPT | Mod: 25

## 2018-01-05 PROCEDURE — 82728 ASSAY OF FERRITIN: CPT

## 2018-01-05 PROCEDURE — 82140 ASSAY OF AMMONIA: CPT

## 2018-01-05 PROCEDURE — 74018 RADEX ABDOMEN 1 VIEW: CPT

## 2018-01-05 PROCEDURE — 76700 US EXAM ABDOM COMPLETE: CPT

## 2018-01-05 PROCEDURE — 83615 LACTATE (LD) (LDH) ENZYME: CPT

## 2018-01-05 PROCEDURE — 49083 ABD PARACENTESIS W/IMAGING: CPT

## 2018-01-05 PROCEDURE — 86038 ANTINUCLEAR ANTIBODIES: CPT

## 2018-01-05 PROCEDURE — 87633 RESP VIRUS 12-25 TARGETS: CPT

## 2018-01-05 PROCEDURE — 82945 GLUCOSE OTHER FLUID: CPT

## 2018-01-05 PROCEDURE — 87102 FUNGUS ISOLATION CULTURE: CPT

## 2018-01-05 PROCEDURE — 85610 PROTHROMBIN TIME: CPT

## 2018-01-05 PROCEDURE — 83605 ASSAY OF LACTIC ACID: CPT

## 2018-01-05 PROCEDURE — 73522 X-RAY EXAM HIPS BI 3-4 VIEWS: CPT

## 2018-01-05 PROCEDURE — 89051 BODY FLUID CELL COUNT: CPT

## 2018-01-05 PROCEDURE — 87116 MYCOBACTERIA CULTURE: CPT

## 2018-01-05 PROCEDURE — 84295 ASSAY OF SERUM SODIUM: CPT

## 2018-01-05 PROCEDURE — 73560 X-RAY EXAM OF KNEE 1 OR 2: CPT

## 2018-01-05 PROCEDURE — 82947 ASSAY GLUCOSE BLOOD QUANT: CPT

## 2018-01-05 PROCEDURE — 87205 SMEAR GRAM STAIN: CPT

## 2018-01-05 PROCEDURE — 83735 ASSAY OF MAGNESIUM: CPT

## 2018-01-05 PROCEDURE — 82435 ASSAY OF BLOOD CHLORIDE: CPT

## 2018-01-05 PROCEDURE — 87798 DETECT AGENT NOS DNA AMP: CPT

## 2018-01-05 PROCEDURE — 83880 ASSAY OF NATRIURETIC PEPTIDE: CPT

## 2018-01-05 PROCEDURE — 87581 M.PNEUMON DNA AMP PROBE: CPT

## 2018-01-05 PROCEDURE — 82803 BLOOD GASES ANY COMBINATION: CPT

## 2018-01-05 PROCEDURE — 82248 BILIRUBIN DIRECT: CPT

## 2018-01-05 PROCEDURE — 93005 ELECTROCARDIOGRAM TRACING: CPT

## 2018-01-05 PROCEDURE — 84157 ASSAY OF PROTEIN OTHER: CPT

## 2018-01-05 PROCEDURE — 84443 ASSAY THYROID STIM HORMONE: CPT

## 2018-01-05 PROCEDURE — 80048 BASIC METABOLIC PNL TOTAL CA: CPT

## 2018-01-05 PROCEDURE — 94640 AIRWAY INHALATION TREATMENT: CPT

## 2018-01-05 PROCEDURE — 83550 IRON BINDING TEST: CPT

## 2018-01-05 PROCEDURE — C1729: CPT

## 2018-01-05 PROCEDURE — 84484 ASSAY OF TROPONIN QUANT: CPT

## 2018-01-05 PROCEDURE — 86431 RHEUMATOID FACTOR QUANT: CPT

## 2018-01-05 PROCEDURE — 84100 ASSAY OF PHOSPHORUS: CPT

## 2018-01-05 RX ORDER — TIOTROPIUM BROMIDE AND OLODATEROL 3.124; 2.736 UG/1; UG/1
2 SPRAY, METERED RESPIRATORY (INHALATION)
Qty: 1 | Refills: 0 | OUTPATIENT
Start: 2018-01-05

## 2018-01-05 RX ORDER — DILTIAZEM HCL 120 MG
1 CAPSULE, EXT RELEASE 24 HR ORAL
Qty: 30 | Refills: 0 | OUTPATIENT
Start: 2018-01-05 | End: 2018-02-03

## 2018-01-05 RX ORDER — METHIMAZOLE 10 MG/1
1 TABLET ORAL
Qty: 0 | Refills: 0 | COMMUNITY

## 2018-01-05 RX ORDER — CLOPIDOGREL BISULFATE 75 MG/1
1 TABLET, FILM COATED ORAL
Qty: 30 | Refills: 0 | OUTPATIENT
Start: 2018-01-05 | End: 2018-02-03

## 2018-01-05 RX ORDER — POTASSIUM CHLORIDE 20 MEQ
1 PACKET (EA) ORAL
Qty: 0 | Refills: 0 | COMMUNITY

## 2018-01-05 RX ORDER — ROFLUMILAST 500 UG/1
1 TABLET ORAL
Qty: 30 | Refills: 0 | OUTPATIENT
Start: 2018-01-05

## 2018-01-05 RX ORDER — ROFLUMILAST 500 UG/1
1 TABLET ORAL
Qty: 0 | Refills: 0 | COMMUNITY

## 2018-01-05 RX ORDER — TIOTROPIUM BROMIDE AND OLODATEROL 3.124; 2.736 UG/1; UG/1
2 SPRAY, METERED RESPIRATORY (INHALATION)
Qty: 0 | Refills: 0 | COMMUNITY

## 2018-01-05 RX ORDER — ALBUTEROL 90 UG/1
2 AEROSOL, METERED ORAL
Qty: 1 | Refills: 0 | OUTPATIENT
Start: 2018-01-05

## 2018-01-05 RX ORDER — MACITENTAN 10 MG/1
1 TABLET, FILM COATED ORAL
Qty: 30 | Refills: 0 | OUTPATIENT
Start: 2018-01-05

## 2018-01-05 RX ORDER — ATORVASTATIN CALCIUM 80 MG/1
1 TABLET, FILM COATED ORAL
Qty: 30 | Refills: 0 | OUTPATIENT
Start: 2018-01-05 | End: 2018-02-03

## 2018-01-05 RX ORDER — PANTOPRAZOLE SODIUM 20 MG/1
1 TABLET, DELAYED RELEASE ORAL
Qty: 0 | Refills: 0 | COMMUNITY

## 2018-01-05 RX ORDER — DIGOXIN 250 MCG
1 TABLET ORAL
Qty: 15 | Refills: 0 | OUTPATIENT
Start: 2018-01-05 | End: 2018-02-03

## 2018-01-05 RX ORDER — ALLOPURINOL 300 MG
1 TABLET ORAL
Qty: 30 | Refills: 0 | OUTPATIENT
Start: 2018-01-05

## 2018-01-05 RX ORDER — METHIMAZOLE 10 MG/1
1 TABLET ORAL
Qty: 30 | Refills: 0 | OUTPATIENT
Start: 2018-01-05

## 2018-01-05 RX ORDER — POTASSIUM CHLORIDE 20 MEQ
1 PACKET (EA) ORAL
Qty: 15 | Refills: 0 | OUTPATIENT
Start: 2018-01-05

## 2018-01-05 RX ORDER — MACITENTAN 10 MG/1
1 TABLET, FILM COATED ORAL
Qty: 0 | Refills: 0 | COMMUNITY

## 2018-01-05 RX ORDER — ALLOPURINOL 300 MG
1 TABLET ORAL
Qty: 0 | Refills: 0 | COMMUNITY
Start: 2018-01-05

## 2018-01-05 RX ORDER — SUCRALFATE 1 G
10 TABLET ORAL
Qty: 1200 | Refills: 0 | OUTPATIENT
Start: 2018-01-05 | End: 2018-02-03

## 2018-01-05 RX ORDER — BUMETANIDE 0.25 MG/ML
1 INJECTION INTRAMUSCULAR; INTRAVENOUS
Qty: 60 | Refills: 0 | OUTPATIENT
Start: 2018-01-05

## 2018-01-05 RX ORDER — POTASSIUM CHLORIDE 20 MEQ
20 PACKET (EA) ORAL ONCE
Qty: 0 | Refills: 0 | Status: DISCONTINUED | OUTPATIENT
Start: 2018-01-05 | End: 2018-01-05

## 2018-01-05 RX ORDER — PANTOPRAZOLE SODIUM 20 MG/1
1 TABLET, DELAYED RELEASE ORAL
Qty: 30 | Refills: 0 | OUTPATIENT
Start: 2018-01-05 | End: 2018-02-03

## 2018-01-05 RX ADMIN — Medication 5 MILLIGRAM(S): at 08:15

## 2018-01-05 RX ADMIN — Medication 0.12 MILLIGRAM(S): at 12:29

## 2018-01-05 RX ADMIN — Medication 25 MILLIGRAM(S): at 14:10

## 2018-01-05 RX ADMIN — Medication 240 MILLIGRAM(S): at 06:14

## 2018-01-05 RX ADMIN — Medication 25 MILLIGRAM(S): at 06:14

## 2018-01-05 RX ADMIN — BUMETANIDE 0.5 MILLIGRAM(S): 0.25 INJECTION INTRAMUSCULAR; INTRAVENOUS at 08:15

## 2018-01-05 RX ADMIN — Medication 81 MILLIGRAM(S): at 12:30

## 2018-01-05 RX ADMIN — Medication 100 MILLIGRAM(S): at 14:10

## 2018-01-05 RX ADMIN — CLOPIDOGREL BISULFATE 75 MILLIGRAM(S): 75 TABLET, FILM COATED ORAL at 12:29

## 2018-01-05 NOTE — PROGRESS NOTE ADULT - ASSESSMENT
uncontrolled diarrhea - improved  hMPV viral uri  Eustachian tube dysfunction  severe copd  severe phtn  bucky  anemia  vol overload  s/p large volume paracentesis with recurrence of moderate ascites  Afib/flutter with rvr - improved  electrolyte disturbance    continue current care  appreciate renal input  d/c home today.

## 2018-01-05 NOTE — PROGRESS NOTE ADULT - PROVIDER SPECIALTY LIST ADULT
Electrophysiology
Internal Medicine
Intervent Radiology
Intervent Radiology
Nephrology
Pulmonology
Nephrology
Pulmonology
Pulmonology
Internal Medicine
Nephrology
Nephrology
Pulmonology

## 2018-01-05 NOTE — PROGRESS NOTE ADULT - PROBLEM SELECTOR PROBLEM 1
Dyspnea
Atrial fibrillation with RVR
Cough
Dyspnea
Palliative care encounter
Viral URI with cough
Viral URI with cough
Dyspnea
Viral URI with cough
Dyspnea

## 2018-01-05 NOTE — PROGRESS NOTE ADULT - PROBLEM SELECTOR PROBLEM 5
Afib
COPD (chronic obstructive pulmonary disease)

## 2018-01-05 NOTE — PROGRESS NOTE ADULT - SUBJECTIVE AND OBJECTIVE BOX
MEDICINE, PROGRESS NOTE [phone]    FRAN ALEXANDRA 72y [id number]    Patient seen and examined.  Patient is a 72y old  Female who presents with a chief complaint of shortness of breath, volume overload and ascites (11 Dec 2017 11:41). Pt still with cough.      PAST MEDICAL & SURGICAL HISTORY:  Hyperthyroidism  JOSE (obstructive sleep apnea)  Epistaxis  GIB (gastrointestinal bleeding)  CAD S/P percutaneous coronary angioplasty  Afib  Pulmonary HTN  GERD (gastroesophageal reflux disease)  Obesity  Cardiomegaly  Valvular heart disease  COPD (chronic obstructive pulmonary disease): Home O2  Sleep Apnea: by criteria  Loose, teeth: 3 bottom front loose teeth  Female stress incontinence  Shoulder pain, right  Constipation  Arthritis of Knee  H/O heartburn  History of lung cancer  Obese  Hx of hyperlipidemia  Asthma  Diabetes mellitus: type 2  dx about 4-5 years ago   no daily fingerstick  H/O: HTN (hypertension)  Enlarged lymph nodes  Lymph nodes enlarged: s/p biopsy mediastinal  benign  H/O endoscopy  left upper lobectomy    MEDICATIONS  (STANDING):  allopurinol 100 milliGRAM(s) Oral daily  aspirin enteric coated 81 milliGRAM(s) Oral daily  atorvastatin 20 milliGRAM(s) Oral at bedtime  buMETAnide 0.5 milliGRAM(s) Oral every 12 hours  clopidogrel Tablet 75 milliGRAM(s) Oral daily  digoxin     Tablet 0.125 milliGRAM(s) Oral every other day  diltiazem    milliGRAM(s) Oral daily  docusate sodium 100 milliGRAM(s) Oral two times a day  fluticasone propionate 50 MICROgram(s)/spray Nasal Spray 1 Spray(s) Both Nostrils two times a day  heparin  Injectable 5000 Unit(s) SubCutaneous every 8 hours  macitentan (OPSUMIT) 10 milliGRAM(s) 10 milliGRAM(s) Oral daily  methimazole 5 milliGRAM(s) Oral daily  metoprolol     tartrate 25 milliGRAM(s) Oral every 8 hours  pantoprazole    Tablet 40 milliGRAM(s) Oral before breakfast  potassium chloride    Tablet ER 20 milliEquivalent(s) Oral once  predniSONE   Tablet 5 milliGRAM(s) Oral daily  roflumilast 500 MICROGram(s) Oral daily  sucralfate suspension 1 Gram(s) Oral three times a day before meals  tiotropium 2.5 MICROgram(s)/olodaterol 2.5 MICROgram(s) Inhaler 2 Puff(s) Inhalation daily    MEDICATIONS  (PRN):  acetaminophen   Tablet 650 milliGRAM(s) Oral every 6 hours PRN Moderate Pain (4 - 6)  HYDROcodone/homatropine Syrup 5 milliLiter(s) Oral at bedtime PRN Cough  ipratropium    for Nebulization 500 MICROGram(s) Nebulizer every 6 hours PRN Shortness of Breath and/or Wheezing  ondansetron    Tablet 4 milliGRAM(s) Oral every 8 hours PRN Nausea and/or Vomiting  sodium chloride 0.65% Nasal 1 Spray(s) Both Nostrils five times a day PRN dry nose    Allergies    No Known Allergies    Intolerances    albuterol (Unknown)      PHYSICAL EXAM:  Constitutional: NAD  HEENT: Normocephalic, EOMI  Neck:  No JVD  Respiratory: CTA B/L, No wheezes  Cardiovascular: S1, S2, RRR, + systolic murmur  Gastrointestinal: BS+, soft, NT, + distention, + fluid wave  Extremities: No peripheral edema le b/l   Neurological: AAOX3, no focal deficits  Psychiatric: Normal mood, normal affect  : No Gottlieb    Vital Signs Last 24 Hrs  T(C): 37.4 (05 Jan 2018 11:03), Max: 37.4 (05 Jan 2018 11:03)  T(F): 99.3 (05 Jan 2018 11:03), Max: 99.3 (05 Jan 2018 11:03)  HR: 59 (05 Jan 2018 11:03) (59 - 97)  BP: 101/67 (05 Jan 2018 11:03) (101/67 - 113/68)  BP(mean): --  RR: 18 (05 Jan 2018 11:03) (18 - 18)  SpO2: 96% (05 Jan 2018 11:03) (96% - 100%)  I&O's Summary    04 Jan 2018 07:01  -  05 Jan 2018 07:00  --------------------------------------------------------  IN: 420 mL / OUT: 400 mL / NET: 20 mL        LABS:                        10.0   6.27  )-----------( 194      ( 05 Jan 2018 09:37 )             32.7     01-05    137  |  87<L>  |  104<H>  ----------------------------<  126<H>  3.3<L>   |  35<H>  |  2.04<H>    Ca    9.1      05 Jan 2018 09:17

## 2018-01-05 NOTE — PROGRESS NOTE ADULT - ASSESSMENT
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 Recent admission at [facility] approximately 5 weeks ago for large volume paracentesis (6.1L removed). Now presents again with RV failure-worsened LE edema, recurrent ascites, pleural effusions and pulmonary edema with worsened dyspnea and CASSANDRA on CKD. s/p 8.1 L paracentesis on 12/6. Started on dobutamine gtt for pHTN, discontinued 2nd Afib with RVR to 170s.

## 2018-01-05 NOTE — PROGRESS NOTE ADULT - PROBLEM SELECTOR PLAN 2
-s/p 8.1L paracentesis on 12/6  -Output>input with Bumex  -Monitor serum co2  -Check pH on VBG in AM
-s/p 8.1L paracentesis on 12/6  -Output>input with Bumex  -Monitor serum co2, mixed metabolic alkalosis and resp acidosis
-Still with large volume ascites on Abd US  -s/p 8.1L paracentesis on 12/6  -Output>input with lasix
on Lasix gtt, maintain negative fluid balance  -1L negative since yesterday  -CXR in AM
-s/p 8.1L paracentesis on 12/6  -Output>input with Bumex
-s/p 8.1L paracentesis on 12/6  -Output>input with Bumex  -Monitor serum co2, mixed metabolic alkalosis and resp acidosis
-s/p 8.1L paracentesis on 12/6  -Output>input with Bumex  -Monitor serum co2, mixed metabolic alkalosis and resp acidosis  -Check VBG in AM
-s/p 8.1L paracentesis on 12/6  -Output>input with Bumex  -VBG: respiratory acidosis, metabolic alkalosis
-s/p 8.1L paracentesis on 12/6  -Output>input with Bumex  -f/u pH on VBG, may need diamox
-s/p 8.1L paracentesis on 12/6  -Output>input with bumex  -Ascites seems to already be re-accumulating
-s/p 8.1L paracentesis on 12/6  -Weights have been stable   -Bumex BID, Zaroxolyn QOD
-s/p 8.1L paracentesis on 12/6  -Weights have been stable   diuretic management as per renal
After discussions yesterday with patient regarding code status and GOC she verbalized that she was very upset and thinks it was inappropriate and does not wish to discuss the matter further.   -She desires to be full code   -End stage R heart failure
K 3.2  KCL supplement and follow up repeat level
Now off lasix gtt 2nd CASSANDRA  -Still with large volume ascites on Abd US  -Plan for paracentesis
Now off lasix gtt 2nd CASSANDRA  -Still with large volume ascites on Abd US  -s/p paracentesis today
on Lasix gtt, maintain negative fluid balance
on Lasix gtt, maintain negative fluid balance  -Still with large volume ascites on Abd US
supplement / recheck K+>4
-s/p 8.1L paracentesis on 12/6  -Output>input with Bumex
-s/p 8.1L paracentesis on 12/6  -Weights have been stable   -Bumex BID,   diuretic management as per renal

## 2018-01-05 NOTE — PROGRESS NOTE ADULT - ATTENDING COMMENTS
72 yo F with advanced pulm htn (WHO group 1 v 3) resulting in RV failure and anasarca, Afib, CAD, COPD.  Net negative fluid balance though clinically is unchanged.    --repeat echo ordered  --discussed PO carmela with Dr. Youssef at length, he recommends that macitentran (or other pulmonary vasodilators) would be insufficient to help offload the RV in this case, and recommends trial of inotropes with dobutamine as this would be more effective.  Await echo to ensure there is no contraindication to using inotropes at this time (hyperdynamic LV, pericardial effusion)  --discussed this with pt
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  Unable to achieve adequately diuresis despite high dose diuretics.      --RHC from May 2017 and echo from April 2017 noted  --repeat echo   --will discuss KYALENE casey with Dr. Youssef when he returns 12/12, though would hold off at this time
Continue on rate control for AF
awaiting PHTN service recs  cont rest of current care
cont fluid removal with diuresis  placed on ERB by Dr. Gutierrez at [place]  unclear benefit from this given multiple comorbidities  This could be Group II/III mix
d/c planning  few other long-term options available for her
f/u 2nd opinion recs re: WAQAS meds
Mixed PHTN picture  Dr. Gutierrez tried Opsumit at [place]  Dr. Smith would like to see if remodulin is an option  Would have Dr. Youssef advise  d/w Dr. Cobb
appreciate PHTN service recs  no real options left  consider palliative/hospice care  started to discuss with patient
pt refusing to be examined  does not want to accept palliative course  recovering from HMPV  does not want to try Tessalon or Mucinex

## 2018-01-05 NOTE — PROGRESS NOTE ADULT - ASSESSMENT
71 year old female with CAD, severe pHTN, dCHF, HTN, DM, Afib, lung CA, COPD and CKD 3 p/w acute on chronic dCHF, ascites, pleural effusion, pulmonary edema and acute on chronic kidney injury c/b A.Fib with RVR.  + hMPV  - CKD stage 3: likely due to HTN and DM  - CASSANDRA: non oliguric. Likely prerenal due to ineffective circulating volume    -Elevated serum HCO3 is from pCO2 retention as opposed to metabolic alkalosis  - acute on chronic dCHF: volume overload slowly improving; remains on PO diuretics; wt is decreasing  - GI: s/p LVP  - Chronic respiratory acidosis;   - Rapid aflutter; rate controlled - not on a/c (self d/c'd)    RECOMMENDATION:    -  Bumex 0.5mg po BID for now.  Want to maintain the current wt .  She is off the zaroxoyln.    - Continue Cardizem .  monitor heart rate  - will continue to monitor abdominal distention;  GI following--No need for paracentesis  -  Epogen as outpt  - daily standing weights  - dose medication for a CrCl ~20 cc/min

## 2018-01-05 NOTE — PROGRESS NOTE ADULT - PROBLEM SELECTOR PROBLEM 4
COPD (chronic obstructive pulmonary disease)
Pulmonary hypertension
COPD (chronic obstructive pulmonary disease)

## 2018-01-05 NOTE — PROGRESS NOTE ADULT - SUBJECTIVE AND OBJECTIVE BOX
Follow-up Pulm Progress Note    No new events. Wants to go home.     Medications:  MEDICATIONS  (STANDING):  allopurinol 100 milliGRAM(s) Oral daily  aspirin enteric coated 81 milliGRAM(s) Oral daily  atorvastatin 20 milliGRAM(s) Oral at bedtime  buMETAnide 0.5 milliGRAM(s) Oral every 12 hours  clopidogrel Tablet 75 milliGRAM(s) Oral daily  digoxin     Tablet 0.125 milliGRAM(s) Oral every other day  diltiazem    milliGRAM(s) Oral daily  docusate sodium 100 milliGRAM(s) Oral two times a day  fluticasone propionate 50 MICROgram(s)/spray Nasal Spray 1 Spray(s) Both Nostrils two times a day  heparin  Injectable 5000 Unit(s) SubCutaneous every 8 hours  macitentan (OPSUMIT) 10 milliGRAM(s) 10 milliGRAM(s) Oral daily  methimazole 5 milliGRAM(s) Oral daily  metoprolol     tartrate 25 milliGRAM(s) Oral every 8 hours  pantoprazole    Tablet 40 milliGRAM(s) Oral before breakfast  potassium chloride    Tablet ER 20 milliEquivalent(s) Oral once  predniSONE   Tablet 5 milliGRAM(s) Oral daily  roflumilast 500 MICROGram(s) Oral daily  sucralfate suspension 1 Gram(s) Oral three times a day before meals  tiotropium 2.5 MICROgram(s)/olodaterol 2.5 MICROgram(s) Inhaler 2 Puff(s) Inhalation daily    MEDICATIONS  (PRN):  acetaminophen   Tablet 650 milliGRAM(s) Oral every 6 hours PRN Moderate Pain (4 - 6)  HYDROcodone/homatropine Syrup 5 milliLiter(s) Oral at bedtime PRN Cough  ipratropium    for Nebulization 500 MICROGram(s) Nebulizer every 6 hours PRN Shortness of Breath and/or Wheezing  ondansetron    Tablet 4 milliGRAM(s) Oral every 8 hours PRN Nausea and/or Vomiting  sodium chloride 0.65% Nasal 1 Spray(s) Both Nostrils five times a day PRN dry nose    Vital Signs Last 24 Hrs  T(C): 37.4 (05 Jan 2018 11:03), Max: 37.4 (05 Jan 2018 11:03)  T(F): 99.3 (05 Jan 2018 11:03), Max: 99.3 (05 Jan 2018 11:03)  HR: 59 (05 Jan 2018 11:03) (59 - 74)  BP: 101/67 (05 Jan 2018 11:03) (101/67 - 113/68)  BP(mean): --  RR: 18 (05 Jan 2018 11:03) (18 - 18)  SpO2: 96% (05 Jan 2018 11:03) (96% - 100%)    01-04 @ 07:01  -  01-05 @ 07:00  --------------------------------------------------------  IN: 420 mL / OUT: 400 mL / NET: 20 mL    LABS:                        10.0   6.27  )-----------( 194      ( 05 Jan 2018 09:37 )             32.7     01-05    137  |  87<L>  |  104<H>  ----------------------------<  126<H>  3.3<L>   |  35<H>  |  2.04<H>    Ca    9.1      05 Jan 2018 09:17    CAPILLARY BLOOD GLUCOSE    CULTURES: (if applicable)    Physical Examination:  PULM: Decreased BS at bases  CVS: S1, S2 heard    RADIOLOGY REVIEWED  CXR:     CT chest:    TTE:

## 2018-01-05 NOTE — PROGRESS NOTE ADULT - PROBLEM SELECTOR PROBLEM 2
Hypervolemia
Hypokalemia
Hypokalemia
Palliative care encounter
Hypervolemia

## 2018-01-05 NOTE — PROGRESS NOTE ADULT - SUBJECTIVE AND OBJECTIVE BOX
NEPHROLOGY-[facility] [phone]        Patient seen and examined in bed.  She felt about the same        MEDICATIONS  (STANDING):  allopurinol 100 milliGRAM(s) Oral daily  aspirin enteric coated 81 milliGRAM(s) Oral daily  atorvastatin 20 milliGRAM(s) Oral at bedtime  buMETAnide 0.5 milliGRAM(s) Oral every 12 hours  clopidogrel Tablet 75 milliGRAM(s) Oral daily  digoxin     Tablet 0.125 milliGRAM(s) Oral every other day  diltiazem    milliGRAM(s) Oral daily  docusate sodium 100 milliGRAM(s) Oral two times a day  fluticasone propionate 50 MICROgram(s)/spray Nasal Spray 1 Spray(s) Both Nostrils two times a day  heparin  Injectable 5000 Unit(s) SubCutaneous every 8 hours  macitentan (OPSUMIT) 10 milliGRAM(s) 10 milliGRAM(s) Oral daily  methimazole 5 milliGRAM(s) Oral daily  metoprolol     tartrate 25 milliGRAM(s) Oral every 8 hours  pantoprazole    Tablet 40 milliGRAM(s) Oral before breakfast  predniSONE   Tablet 5 milliGRAM(s) Oral daily  roflumilast 500 MICROGram(s) Oral daily  sucralfate suspension 1 Gram(s) Oral three times a day before meals  tiotropium 2.5 MICROgram(s)/olodaterol 2.5 MICROgram(s) Inhaler 2 Puff(s) Inhalation daily      VITAL:  T(C): , Max: 37.4 (01-05-18 @ 11:03)  T(F): , Max: 99.3 (01-05-18 @ 11:03)  HR: 59 (01-05-18 @ 11:03)  BP: 101/67 (01-05-18 @ 11:03)  BP(mean): --  RR: 18 (01-05-18 @ 11:03)  SpO2: 96% (01-05-18 @ 11:03)  Wt(kg): --    I and O's:    01-04 @ 07:01  -  01-05 @ 07:00  --------------------------------------------------------  IN: 420 mL / OUT: 400 mL / NET: 20 mL          PHYSICAL EXAM:    Constitutional: NAD  HEENT: PERRLA    Neck:  No JVD  Respiratory: reduced breath sounds  Cardiovascular: S1 and S2  Gastrointestinal: BS+, soft,distended  Extremities: No peripheral edema  Neurological: A/O x 3, no focal deficits  Psychiatric: Normal mood, normal affect  : No Gottlieb  Skin: No rashes  Access: Not applicable    LABS:                        10.0   6.27  )-----------( 194      ( 05 Jan 2018 09:37 )             32.7     01-05    137  |  87<L>  |  104<H>  ----------------------------<  126<H>  3.3<L>   |  35<H>  |  2.04<H>    Ca    9.1      05 Jan 2018 09:17            Urine Studies:          RADIOLOGY & ADDITIONAL STUDIES:

## 2018-01-06 LAB
CULTURE RESULTS: SIGNIFICANT CHANGE UP
SPECIMEN SOURCE: SIGNIFICANT CHANGE UP

## 2018-01-26 ENCOUNTER — RESULT REVIEW (OUTPATIENT)
Age: 73
End: 2018-01-26

## 2018-01-26 ENCOUNTER — OUTPATIENT (OUTPATIENT)
Dept: OUTPATIENT SERVICES | Facility: HOSPITAL | Age: 73
LOS: 1 days | End: 2018-01-26
Payer: COMMERCIAL

## 2018-01-26 DIAGNOSIS — R18.8 OTHER ASCITES: ICD-10-CM

## 2018-01-26 LAB
ALBUMIN FLD-MCNC: 2 G/DL — SIGNIFICANT CHANGE UP
B PERT IGG+IGM PNL SER: CLEAR — SIGNIFICANT CHANGE UP
COLOR FLD: YELLOW — SIGNIFICANT CHANGE UP
FLUID INTAKE SUBSTANCE CLASS: SIGNIFICANT CHANGE UP
FLUID SEGMENTED GRANULOCYTES: 12 % — SIGNIFICANT CHANGE UP
GLUCOSE FLD-MCNC: 140 MG/DL — SIGNIFICANT CHANGE UP
GRAM STN FLD: SIGNIFICANT CHANGE UP
LDH SERPL L TO P-CCNC: 88 U/L — SIGNIFICANT CHANGE UP
LYMPHOCYTES # FLD: 19 % — SIGNIFICANT CHANGE UP
MESOTHL CELL # FLD: 13 % — SIGNIFICANT CHANGE UP
MONOS+MACROS # FLD: 56 % — SIGNIFICANT CHANGE UP
PROT FLD-MCNC: 3.3 G/DL — SIGNIFICANT CHANGE UP
RCV VOL RI: 11 /UL — HIGH (ref 0–5)
SPECIMEN SOURCE: SIGNIFICANT CHANGE UP
TOTAL NUCLEATED CELL COUNT, BODY FLUID: 52 /UL — HIGH (ref 0–5)
TUBE TYPE: SIGNIFICANT CHANGE UP

## 2018-01-26 PROCEDURE — 88305 TISSUE EXAM BY PATHOLOGIST: CPT | Mod: 26

## 2018-01-26 PROCEDURE — 88112 CYTOPATH CELL ENHANCE TECH: CPT | Mod: 26

## 2018-01-27 LAB
CULTURE RESULTS: SIGNIFICANT CHANGE UP
NIGHT BLUE STAIN TISS: SIGNIFICANT CHANGE UP
SPECIMEN SOURCE: SIGNIFICANT CHANGE UP
SPECIMEN SOURCE: SIGNIFICANT CHANGE UP

## 2018-01-29 PROCEDURE — 87070 CULTURE OTHR SPECIMN AEROBIC: CPT

## 2018-01-29 PROCEDURE — 87075 CULTR BACTERIA EXCEPT BLOOD: CPT

## 2018-01-29 PROCEDURE — 89051 BODY FLUID CELL COUNT: CPT

## 2018-01-29 PROCEDURE — P9047: CPT

## 2018-01-29 PROCEDURE — 84157 ASSAY OF PROTEIN OTHER: CPT

## 2018-01-29 PROCEDURE — 49083 ABD PARACENTESIS W/IMAGING: CPT

## 2018-01-29 PROCEDURE — 88112 CYTOPATH CELL ENHANCE TECH: CPT

## 2018-01-29 PROCEDURE — 87015 SPECIMEN INFECT AGNT CONCNTJ: CPT

## 2018-01-29 PROCEDURE — 87205 SMEAR GRAM STAIN: CPT

## 2018-01-29 PROCEDURE — 82945 GLUCOSE OTHER FLUID: CPT

## 2018-01-29 PROCEDURE — 88305 TISSUE EXAM BY PATHOLOGIST: CPT

## 2018-01-29 PROCEDURE — C1729: CPT

## 2018-01-29 PROCEDURE — 82042 OTHER SOURCE ALBUMIN QUAN EA: CPT

## 2018-01-29 PROCEDURE — 87206 SMEAR FLUORESCENT/ACID STAI: CPT

## 2018-01-29 PROCEDURE — 87116 MYCOBACTERIA CULTURE: CPT

## 2018-01-29 PROCEDURE — 83615 LACTATE (LD) (LDH) ENZYME: CPT

## 2018-01-31 DIAGNOSIS — R18.8 OTHER ASCITES: ICD-10-CM

## 2018-01-31 LAB
CULTURE RESULTS: SIGNIFICANT CHANGE UP
NON-GYNECOLOGICAL CYTOLOGY STUDY: SIGNIFICANT CHANGE UP
SPECIMEN SOURCE: SIGNIFICANT CHANGE UP

## 2018-03-01 ENCOUNTER — INPATIENT (INPATIENT)
Facility: HOSPITAL | Age: 73
LOS: 33 days | DRG: 314 | End: 2018-04-04
Attending: INTERNAL MEDICINE | Admitting: INTERNAL MEDICINE
Payer: COMMERCIAL

## 2018-03-01 VITALS
DIASTOLIC BLOOD PRESSURE: 60 MMHG | RESPIRATION RATE: 20 BRPM | HEART RATE: 87 BPM | OXYGEN SATURATION: 95 % | SYSTOLIC BLOOD PRESSURE: 107 MMHG | TEMPERATURE: 98 F

## 2018-03-01 DIAGNOSIS — I50.9 HEART FAILURE, UNSPECIFIED: ICD-10-CM

## 2018-03-01 LAB
ALBUMIN SERPL ELPH-MCNC: 3.1 G/DL — LOW (ref 3.3–5)
ALP SERPL-CCNC: 38 U/L — LOW (ref 40–120)
ALT FLD-CCNC: 32 U/L RC — SIGNIFICANT CHANGE UP (ref 10–45)
ANION GAP SERPL CALC-SCNC: 12 MMOL/L — SIGNIFICANT CHANGE UP (ref 5–17)
ANION GAP SERPL CALC-SCNC: 13 MMOL/L — SIGNIFICANT CHANGE UP (ref 5–17)
APTT BLD: 28 SEC — SIGNIFICANT CHANGE UP (ref 27.5–37.4)
AST SERPL-CCNC: 34 U/L — SIGNIFICANT CHANGE UP (ref 10–40)
BASOPHILS # BLD AUTO: 0 K/UL — SIGNIFICANT CHANGE UP (ref 0–0.2)
BASOPHILS NFR BLD AUTO: 0.4 % — SIGNIFICANT CHANGE UP (ref 0–2)
BILIRUB SERPL-MCNC: 0.5 MG/DL — SIGNIFICANT CHANGE UP (ref 0.2–1.2)
BUN SERPL-MCNC: 73 MG/DL — HIGH (ref 7–23)
BUN SERPL-MCNC: 73 MG/DL — HIGH (ref 7–23)
CALCIUM SERPL-MCNC: 8.9 MG/DL — SIGNIFICANT CHANGE UP (ref 8.4–10.5)
CALCIUM SERPL-MCNC: 9 MG/DL — SIGNIFICANT CHANGE UP (ref 8.4–10.5)
CHLORIDE SERPL-SCNC: 89 MMOL/L — LOW (ref 96–108)
CHLORIDE SERPL-SCNC: 91 MMOL/L — LOW (ref 96–108)
CK MB CFR SERPL CALC: 5.4 NG/ML — HIGH (ref 0–3.8)
CO2 SERPL-SCNC: 29 MMOL/L — SIGNIFICANT CHANGE UP (ref 22–31)
CO2 SERPL-SCNC: 31 MMOL/L — SIGNIFICANT CHANGE UP (ref 22–31)
CREAT SERPL-MCNC: 2.25 MG/DL — HIGH (ref 0.5–1.3)
CREAT SERPL-MCNC: 2.38 MG/DL — HIGH (ref 0.5–1.3)
DIGOXIN SERPL-MCNC: 1.4 NG/ML — SIGNIFICANT CHANGE UP (ref 0.8–2)
EOSINOPHIL # BLD AUTO: 0 K/UL — SIGNIFICANT CHANGE UP (ref 0–0.5)
EOSINOPHIL NFR BLD AUTO: 0.4 % — SIGNIFICANT CHANGE UP (ref 0–6)
GLUCOSE SERPL-MCNC: 124 MG/DL — HIGH (ref 70–99)
GLUCOSE SERPL-MCNC: 129 MG/DL — HIGH (ref 70–99)
HCT VFR BLD CALC: 36.1 % — SIGNIFICANT CHANGE UP (ref 34.5–45)
HGB BLD-MCNC: 11.6 G/DL — SIGNIFICANT CHANGE UP (ref 11.5–15.5)
INR BLD: 1.15 RATIO — SIGNIFICANT CHANGE UP (ref 0.88–1.16)
LYMPHOCYTES # BLD AUTO: 0.4 K/UL — LOW (ref 1–3.3)
LYMPHOCYTES # BLD AUTO: 3.7 % — LOW (ref 13–44)
MCHC RBC-ENTMCNC: 27.9 PG — SIGNIFICANT CHANGE UP (ref 27–34)
MCHC RBC-ENTMCNC: 32.2 GM/DL — SIGNIFICANT CHANGE UP (ref 32–36)
MCV RBC AUTO: 86.5 FL — SIGNIFICANT CHANGE UP (ref 80–100)
MONOCYTES # BLD AUTO: 0.4 K/UL — SIGNIFICANT CHANGE UP (ref 0–0.9)
MONOCYTES NFR BLD AUTO: 3.9 % — SIGNIFICANT CHANGE UP (ref 2–14)
NEUTROPHILS # BLD AUTO: 9.5 K/UL — HIGH (ref 1.8–7.4)
NEUTROPHILS NFR BLD AUTO: 91.6 % — HIGH (ref 43–77)
NT-PROBNP SERPL-SCNC: HIGH PG/ML (ref 0–300)
PLATELET # BLD AUTO: 283 K/UL — SIGNIFICANT CHANGE UP (ref 150–400)
POTASSIUM SERPL-MCNC: 3.4 MMOL/L — LOW (ref 3.5–5.3)
POTASSIUM SERPL-MCNC: 7.9 MMOL/L — CRITICAL HIGH (ref 3.5–5.3)
POTASSIUM SERPL-SCNC: 3.4 MMOL/L — LOW (ref 3.5–5.3)
POTASSIUM SERPL-SCNC: 7.9 MMOL/L — CRITICAL HIGH (ref 3.5–5.3)
PROT SERPL-MCNC: 7.2 G/DL — SIGNIFICANT CHANGE UP (ref 6–8.3)
PROTHROM AB SERPL-ACNC: 12.5 SEC — SIGNIFICANT CHANGE UP (ref 9.8–12.7)
RBC # BLD: 4.18 M/UL — SIGNIFICANT CHANGE UP (ref 3.8–5.2)
RBC # FLD: 16.9 % — HIGH (ref 10.3–14.5)
SODIUM SERPL-SCNC: 130 MMOL/L — LOW (ref 135–145)
SODIUM SERPL-SCNC: 135 MMOL/L — SIGNIFICANT CHANGE UP (ref 135–145)
TROPONIN T SERPL-MCNC: 0.06 NG/ML — SIGNIFICANT CHANGE UP (ref 0–0.06)
WBC # BLD: 10.3 K/UL — SIGNIFICANT CHANGE UP (ref 3.8–10.5)
WBC # FLD AUTO: 10.3 K/UL — SIGNIFICANT CHANGE UP (ref 3.8–10.5)

## 2018-03-01 PROCEDURE — 99285 EMERGENCY DEPT VISIT HI MDM: CPT | Mod: 25

## 2018-03-01 PROCEDURE — 71046 X-RAY EXAM CHEST 2 VIEWS: CPT | Mod: 26

## 2018-03-01 PROCEDURE — 93010 ELECTROCARDIOGRAM REPORT: CPT

## 2018-03-01 RX ORDER — SUCRALFATE 1 G
1 TABLET ORAL
Qty: 0 | Refills: 0 | Status: DISCONTINUED | OUTPATIENT
Start: 2018-03-01 | End: 2018-03-02

## 2018-03-01 RX ORDER — ATORVASTATIN CALCIUM 80 MG/1
20 TABLET, FILM COATED ORAL AT BEDTIME
Qty: 0 | Refills: 0 | Status: DISCONTINUED | OUTPATIENT
Start: 2018-03-01 | End: 2018-04-04

## 2018-03-01 RX ORDER — PANTOPRAZOLE SODIUM 20 MG/1
40 TABLET, DELAYED RELEASE ORAL
Qty: 0 | Refills: 0 | Status: DISCONTINUED | OUTPATIENT
Start: 2018-03-01 | End: 2018-03-07

## 2018-03-01 RX ORDER — DILTIAZEM HCL 120 MG
240 CAPSULE, EXT RELEASE 24 HR ORAL DAILY
Qty: 0 | Refills: 0 | Status: DISCONTINUED | OUTPATIENT
Start: 2018-03-01 | End: 2018-03-12

## 2018-03-01 RX ORDER — CLOPIDOGREL BISULFATE 75 MG/1
75 TABLET, FILM COATED ORAL DAILY
Qty: 0 | Refills: 0 | Status: DISCONTINUED | OUTPATIENT
Start: 2018-03-01 | End: 2018-04-04

## 2018-03-01 RX ORDER — ALLOPURINOL 300 MG
100 TABLET ORAL DAILY
Qty: 0 | Refills: 0 | Status: DISCONTINUED | OUTPATIENT
Start: 2018-03-01 | End: 2018-03-14

## 2018-03-01 RX ORDER — DIGOXIN 250 MCG
0.12 TABLET ORAL DAILY
Qty: 0 | Refills: 0 | Status: DISCONTINUED | OUTPATIENT
Start: 2018-03-01 | End: 2018-03-02

## 2018-03-01 RX ORDER — POTASSIUM CHLORIDE 20 MEQ
10 PACKET (EA) ORAL DAILY
Qty: 0 | Refills: 0 | Status: DISCONTINUED | OUTPATIENT
Start: 2018-03-01 | End: 2018-03-06

## 2018-03-01 RX ORDER — FUROSEMIDE 40 MG
60 TABLET ORAL ONCE
Qty: 0 | Refills: 0 | Status: COMPLETED | OUTPATIENT
Start: 2018-03-01 | End: 2018-03-01

## 2018-03-01 RX ORDER — BUMETANIDE 0.25 MG/ML
0.5 INJECTION INTRAMUSCULAR; INTRAVENOUS EVERY 12 HOURS
Qty: 0 | Refills: 0 | Status: DISCONTINUED | OUTPATIENT
Start: 2018-03-01 | End: 2018-03-03

## 2018-03-01 RX ORDER — DOCUSATE SODIUM 100 MG
100 CAPSULE ORAL
Qty: 0 | Refills: 0 | Status: DISCONTINUED | OUTPATIENT
Start: 2018-03-01 | End: 2018-03-02

## 2018-03-01 RX ORDER — ASPIRIN/CALCIUM CARB/MAGNESIUM 324 MG
81 TABLET ORAL DAILY
Qty: 0 | Refills: 0 | Status: DISCONTINUED | OUTPATIENT
Start: 2018-03-01 | End: 2018-04-04

## 2018-03-01 RX ORDER — METOPROLOL TARTRATE 50 MG
25 TABLET ORAL EVERY 8 HOURS
Qty: 0 | Refills: 0 | Status: DISCONTINUED | OUTPATIENT
Start: 2018-03-01 | End: 2018-03-12

## 2018-03-01 RX ORDER — HEPARIN SODIUM 5000 [USP'U]/ML
5000 INJECTION INTRAVENOUS; SUBCUTANEOUS EVERY 8 HOURS
Qty: 0 | Refills: 0 | Status: DISCONTINUED | OUTPATIENT
Start: 2018-03-01 | End: 2018-04-04

## 2018-03-01 RX ORDER — ONDANSETRON 8 MG/1
4 TABLET, FILM COATED ORAL EVERY 8 HOURS
Qty: 0 | Refills: 0 | Status: DISCONTINUED | OUTPATIENT
Start: 2018-03-01 | End: 2018-04-04

## 2018-03-01 RX ADMIN — Medication 25 MILLIGRAM(S): at 22:41

## 2018-03-01 RX ADMIN — Medication 60 MILLIGRAM(S): at 17:38

## 2018-03-01 RX ADMIN — ATORVASTATIN CALCIUM 20 MILLIGRAM(S): 80 TABLET, FILM COATED ORAL at 22:41

## 2018-03-01 NOTE — ED PROVIDER NOTE - MEDICAL DECISION MAKING DETAILS
History of CHF. Presentation c/w an exacerbation of the very same. Appropriate studies ordered. Lasix provided. Will follow our studies, reassess and treat/dispo accordingly.

## 2018-03-01 NOTE — H&P ADULT - ASSESSMENT
71 y/o female with history as listed with complaint of worsening sob and inabiltiy to eat due to early satiety and discomfort.    decompensated dist HF  severe PHTN  Large vol ascites elevating diaphragms and causing pleural effusions  atrial fibrillation     continue current care  abd sono  IR for large volume paracentesis  tele monitoring  renal eval   monitor dig level  pulm eval with Dr james per husbands request

## 2018-03-01 NOTE — ED PROVIDER NOTE - OBJECTIVE STATEMENT
72F with PMHx of AFib, asthma, CAD, cardiomegaly, enlarged lymph nodes, COPD, DM type II, PSHx of endoscopy, upper lobectomy, sent to the ED by PMD to be admitted for heart failure. Pt presents with complaints of shortness of breath secondary to fluid in her lungs, abdominal distension and LE edema. Pt endorses Hx of fluid in her lungs relieved by paracentesis. Denies fever, chills, cough, chest pain, abdominal pain, nausea, emesis, urinary symptoms, or any other complaints at this time.   Dr. Pierce Cobb (PMD) #[phone] 72F with PMHx of AFib, asthma, CAD, cardiomegaly, enlarged lymph nodes, COPD, DM type II, PSHx of endoscopy, upper lobectomy, sent to the ED by PMD to be admitted for heart failure. Pt presents with complaints of shortness of breath secondary to fluid in her lungs, abdominal distension and LE edema. Her SOB has been constant, moderate to severe, without clear relieving or exacerbating factor and not associated with any fever, chills, cough, chest pain, abdominal pain, nausea, emesis, urinary symptoms, or any other complaints at this time.   Dr. Pierce Cobb (PMD) #[phone]

## 2018-03-01 NOTE — H&P ADULT - NSHPLABSRESULTS_GEN_ALL_CORE
CBC Full  -  ( 01 Mar 2018 16:29 )  WBC Count : 10.3 K/uL  Hemoglobin : 11.6 g/dL  Hematocrit : 36.1 %  Platelet Count - Automated : 283 K/uL  Mean Cell Volume : 86.5 fl  Mean Cell Hemoglobin : 27.9 pg  Mean Cell Hemoglobin Concentration : 32.2 gm/dL  Auto Neutrophil # : 9.5 K/uL  Auto Lymphocyte # : 0.4 K/uL  Auto Monocyte # : 0.4 K/uL  Auto Eosinophil # : 0.0 K/uL  Auto Basophil # : 0.0 K/uL  Auto Neutrophil % : 91.6 %  Auto Lymphocyte % : 3.7 %  Auto Monocyte % : 3.9 %  Auto Eosinophil % : 0.4 %  Auto Basophil % : 0.4 %  PT/INR - ( 01 Mar 2018 16:29 )   PT: 12.5 sec;   INR: 1.15 ratio    PTT - ( 01 Mar 2018 16:29 )  PTT:28.0 sec  03-01    135  |  91<L>  |  73<H>  ----------------------------<  124<H>  3.4<L>   |  31  |  2.38<H>    Ca    9.0      01 Mar 2018 17:50    TPro  7.2  /  Alb  3.1<L>  /  TBili  0.5  /  DBili  x   /  AST  34  /  ALT  32  /  AlkPhos  38<L>  03-01  dig 1.4  cxr - left pleural effsuion

## 2018-03-01 NOTE — H&P ADULT - HISTORY OF PRESENT ILLNESS
72F with PMHx of AFib, asthma, CAD, cardiomegaly, enlarged lymph nodes, COPD, DM type II, PSHx of endoscopy, upper lobectomy, sent to the ED by PMD to be admitted for heart failure. Pt presents with complaints of shortness of breath secondary to fluid in her lungs, abdominal distension and LE edema. Her SOB has been constant, moderate to severe, without clear relieving or exacerbating factor and not associated with any fever, chills, cough, chest pain, abdominal pain, nausea, emesis, urinary symptoms, or any other complaints at this time.

## 2018-03-01 NOTE — ED ADULT NURSE REASSESSMENT NOTE - NS ED NURSE REASSESS COMMENT FT1
Lasix given as ordered. Commode at bedside per patient request. Comfort and safety measures in place. Family at bedside.

## 2018-03-01 NOTE — ED ADULT NURSE NOTE - OBJECTIVE STATEMENT
73 yo F presents to ED A+OX3 accompanied by her family c/o SOB. Pt. reports right side heart failure, states "I need to get my abdomen drained again." Reports having abdominal fluid drained at the end of January, states "I noticed I was filling up again a week after." Pt. reports increasing SOB with activity, at baseline wears 3L NC at home. Pt. reports increased abdominal distention and lower extremity swelling. Breathing unlabored on 3L NC. Skin warm pink and dry. Abdomen soft, rounded. +3 pitting edema noted to lower extremities. Afebrile in ED. Pt. denies chest pain, fever, chills, N/V. Comfort and safety measures in place. Family at bedside.

## 2018-03-01 NOTE — H&P ADULT - NSHPPHYSICALEXAM_GEN_ALL_CORE
AAOX3, eomi, normocephalic  + jvd  + crackles at bases b/l, poor air entry  s1s2, irreg, + sm  distended, tympanitic, + fluid wave, bs hypoactive  + edema LE b/l 3+  PP +  + arom and prom X extrem  cn 2-12 grossly intact

## 2018-03-01 NOTE — H&P ADULT - NSHPREVIEWOFSYSTEMS_GEN_ALL_CORE
no ha or dizziness  + sob and occasional cough  no cp, + palpitations  no n/v/d/c, early satiety  no fevers/chills/rigors/night sweats  no heat intol or cold intol  no dysuria or hematuria, + incontinence  + swelling all over

## 2018-03-01 NOTE — ED PROVIDER NOTE - PROGRESS NOTE DETAILS
Discussed with PCP who requested admission to his service and to telemetry. BMP appreciated. Repeat ordered.

## 2018-03-01 NOTE — ED PROVIDER NOTE - SKIN, MLM
Skin normal color for race, warm, dry and intact. No evidence of rash. 2+ B/L LE pitting edema. No calf asymmetry, tenderness or palpable cord.

## 2018-03-02 ENCOUNTER — RESULT REVIEW (OUTPATIENT)
Age: 73
End: 2018-03-02

## 2018-03-02 LAB
ALBUMIN FLD-MCNC: 1.9 G/DL — SIGNIFICANT CHANGE UP
ALBUMIN SERPL ELPH-MCNC: 2.9 G/DL — LOW (ref 3.3–5)
ALP SERPL-CCNC: 49 U/L — SIGNIFICANT CHANGE UP (ref 40–120)
ALT FLD-CCNC: <5 U/L RC — LOW (ref 10–45)
ANION GAP SERPL CALC-SCNC: 14 MMOL/L — SIGNIFICANT CHANGE UP (ref 5–17)
AST SERPL-CCNC: 8 U/L — LOW (ref 10–40)
B PERT IGG+IGM PNL SER: ABNORMAL
BILIRUB SERPL-MCNC: 0.4 MG/DL — SIGNIFICANT CHANGE UP (ref 0.2–1.2)
BUN SERPL-MCNC: 86 MG/DL — HIGH (ref 7–23)
CALCIUM SERPL-MCNC: 8.8 MG/DL — SIGNIFICANT CHANGE UP (ref 8.4–10.5)
CHLORIDE SERPL-SCNC: 93 MMOL/L — LOW (ref 96–108)
CO2 SERPL-SCNC: 31 MMOL/L — SIGNIFICANT CHANGE UP (ref 22–31)
COLOR FLD: YELLOW — SIGNIFICANT CHANGE UP
CREAT SERPL-MCNC: 2.58 MG/DL — HIGH (ref 0.5–1.3)
DIGOXIN SERPL-MCNC: 1.4 NG/ML — SIGNIFICANT CHANGE UP (ref 0.8–2)
FLUID INTAKE SUBSTANCE CLASS: SIGNIFICANT CHANGE UP
FLUID SEGMENTED GRANULOCYTES: 48 % — SIGNIFICANT CHANGE UP
GLUCOSE FLD-MCNC: 145 MG/DL — SIGNIFICANT CHANGE UP
GLUCOSE SERPL-MCNC: 103 MG/DL — HIGH (ref 70–99)
HCT VFR BLD CALC: 31.4 % — LOW (ref 34.5–45)
HGB BLD-MCNC: 9.9 G/DL — LOW (ref 11.5–15.5)
LDH SERPL L TO P-CCNC: 87 U/L — SIGNIFICANT CHANGE UP
LYMPHOCYTES # FLD: 14 % — SIGNIFICANT CHANGE UP
MAGNESIUM SERPL-MCNC: 2.1 MG/DL — SIGNIFICANT CHANGE UP (ref 1.6–2.6)
MCHC RBC-ENTMCNC: 26.8 PG — LOW (ref 27–34)
MCHC RBC-ENTMCNC: 31.5 GM/DL — LOW (ref 32–36)
MCV RBC AUTO: 85.1 FL — SIGNIFICANT CHANGE UP (ref 80–100)
MESOTHL CELL # FLD: 11 % — SIGNIFICANT CHANGE UP
MONOS+MACROS # FLD: 27 % — SIGNIFICANT CHANGE UP
PHOSPHATE SERPL-MCNC: 3.6 MG/DL — SIGNIFICANT CHANGE UP (ref 2.5–4.5)
PLATELET # BLD AUTO: 243 K/UL — SIGNIFICANT CHANGE UP (ref 150–400)
POTASSIUM SERPL-MCNC: 3.7 MMOL/L — SIGNIFICANT CHANGE UP (ref 3.5–5.3)
POTASSIUM SERPL-SCNC: 3.7 MMOL/L — SIGNIFICANT CHANGE UP (ref 3.5–5.3)
PROT FLD-MCNC: 3.3 G/DL — SIGNIFICANT CHANGE UP
PROT SERPL-MCNC: 5.7 G/DL — LOW (ref 6–8.3)
RBC # BLD: 3.69 M/UL — LOW (ref 3.8–5.2)
RBC # FLD: 17.5 % — HIGH (ref 10.3–14.5)
RCV VOL RI: 8 /UL — HIGH (ref 0–5)
SODIUM SERPL-SCNC: 138 MMOL/L — SIGNIFICANT CHANGE UP (ref 135–145)
SPECIMEN SOURCE FLD: SIGNIFICANT CHANGE UP
TOTAL NUCLEATED CELL COUNT, BODY FLUID: 88 /UL — HIGH (ref 0–5)
TUBE TYPE: SIGNIFICANT CHANGE UP
WBC # BLD: 7.49 K/UL — SIGNIFICANT CHANGE UP (ref 3.8–10.5)
WBC # FLD AUTO: 7.49 K/UL — SIGNIFICANT CHANGE UP (ref 3.8–10.5)

## 2018-03-02 PROCEDURE — 88112 CYTOPATH CELL ENHANCE TECH: CPT | Mod: 26

## 2018-03-02 PROCEDURE — 49083 ABD PARACENTESIS W/IMAGING: CPT

## 2018-03-02 PROCEDURE — 88305 TISSUE EXAM BY PATHOLOGIST: CPT | Mod: 26

## 2018-03-02 PROCEDURE — 76705 ECHO EXAM OF ABDOMEN: CPT | Mod: 26

## 2018-03-02 RX ORDER — DOCUSATE SODIUM 100 MG
1 CAPSULE ORAL
Qty: 0 | Refills: 0 | COMMUNITY

## 2018-03-02 RX ORDER — ACETAMINOPHEN 500 MG
1000 TABLET ORAL ONCE
Qty: 0 | Refills: 0 | Status: COMPLETED | OUTPATIENT
Start: 2018-03-02 | End: 2018-03-02

## 2018-03-02 RX ORDER — DIGOXIN 250 MCG
0.12 TABLET ORAL EVERY OTHER DAY
Qty: 0 | Refills: 0 | Status: DISCONTINUED | OUTPATIENT
Start: 2018-03-03 | End: 2018-04-02

## 2018-03-02 RX ORDER — SUCRALFATE 1 G
1 TABLET ORAL
Qty: 0 | Refills: 0 | Status: DISCONTINUED | OUTPATIENT
Start: 2018-03-02 | End: 2018-03-07

## 2018-03-02 RX ORDER — OXYCODONE HYDROCHLORIDE 5 MG/1
5 TABLET ORAL ONCE
Qty: 0 | Refills: 0 | Status: DISCONTINUED | OUTPATIENT
Start: 2018-03-02 | End: 2018-03-02

## 2018-03-02 RX ORDER — ONDANSETRON 8 MG/1
1 TABLET, FILM COATED ORAL
Qty: 0 | Refills: 0 | COMMUNITY

## 2018-03-02 RX ORDER — DOCUSATE SODIUM 100 MG
100 CAPSULE ORAL
Qty: 0 | Refills: 0 | Status: DISCONTINUED | OUTPATIENT
Start: 2018-03-02 | End: 2018-04-04

## 2018-03-02 RX ADMIN — Medication 100 MILLIGRAM(S): at 12:42

## 2018-03-02 RX ADMIN — ATORVASTATIN CALCIUM 20 MILLIGRAM(S): 80 TABLET, FILM COATED ORAL at 22:22

## 2018-03-02 RX ADMIN — Medication 5 MILLIGRAM(S): at 06:07

## 2018-03-02 RX ADMIN — BUMETANIDE 0.5 MILLIGRAM(S): 0.25 INJECTION INTRAMUSCULAR; INTRAVENOUS at 06:07

## 2018-03-02 RX ADMIN — Medication 400 MILLIGRAM(S): at 19:38

## 2018-03-02 RX ADMIN — Medication 25 MILLIGRAM(S): at 06:07

## 2018-03-02 RX ADMIN — BUMETANIDE 0.5 MILLIGRAM(S): 0.25 INJECTION INTRAMUSCULAR; INTRAVENOUS at 19:40

## 2018-03-02 RX ADMIN — Medication 25 MILLIGRAM(S): at 12:43

## 2018-03-02 RX ADMIN — CLOPIDOGREL BISULFATE 75 MILLIGRAM(S): 75 TABLET, FILM COATED ORAL at 12:42

## 2018-03-02 RX ADMIN — Medication 81 MILLIGRAM(S): at 12:42

## 2018-03-02 RX ADMIN — OXYCODONE HYDROCHLORIDE 5 MILLIGRAM(S): 5 TABLET ORAL at 22:22

## 2018-03-02 RX ADMIN — Medication 25 MILLIGRAM(S): at 22:22

## 2018-03-02 RX ADMIN — Medication 240 MILLIGRAM(S): at 06:07

## 2018-03-02 RX ADMIN — OXYCODONE HYDROCHLORIDE 5 MILLIGRAM(S): 5 TABLET ORAL at 23:00

## 2018-03-02 RX ADMIN — Medication 1000 MILLIGRAM(S): at 20:30

## 2018-03-02 RX ADMIN — Medication 10 MILLIEQUIVALENT(S): at 12:43

## 2018-03-02 NOTE — PROGRESS NOTE ADULT - SUBJECTIVE AND OBJECTIVE BOX
MEDICINE, PROGRESS NOTE [phone]    FRAN ALEXANDRA 72y [id number]    Patient seen and examined.  Patient is a 72y old  Female who presents with a chief complaint of shortness of breath and difficulty eating due to fullness (01 Mar 2018 19:30)  Pt     PAST MEDICAL & SURGICAL HISTORY:  Hyperthyroidism  JOSE (obstructive sleep apnea)  Epistaxis  GIB (gastrointestinal bleeding)  CAD S/P percutaneous coronary angioplasty  Afib  Pulmonary HTN  GERD (gastroesophageal reflux disease)  Obesity  Cardiomegaly  Valvular heart disease  COPD (chronic obstructive pulmonary disease): Home O2  Sleep Apnea: by criteria  Loose, teeth: 3 bottom front loose teeth  Female stress incontinence  Shoulder pain, right  Constipation  Arthritis of Knee  H/O heartburn  History of lung cancer  Obese  Hx of hyperlipidemia  Asthma  Diabetes mellitus: type 2  dx about 4-5 years ago   no daily fingerstick  H/O: HTN (hypertension)  Enlarged lymph nodes  Lymph nodes enlarged: s/p biopsy mediastinal  benign  H/O endoscopy  left upper lobectomy    MEDICATIONS  (STANDING):  allopurinol 100 milliGRAM(s) Oral daily  aspirin enteric coated 81 milliGRAM(s) Oral daily  atorvastatin 20 milliGRAM(s) Oral at bedtime  buMETAnide 0.5 milliGRAM(s) Oral every 12 hours  clopidogrel Tablet 75 milliGRAM(s) Oral daily  diltiazem    milliGRAM(s) Oral daily  heparin  Injectable 5000 Unit(s) SubCutaneous every 8 hours  methimazole 5 milliGRAM(s) Oral daily  metoprolol     tartrate 25 milliGRAM(s) Oral every 8 hours  pantoprazole    Tablet 40 milliGRAM(s) Oral before breakfast  potassium chloride    Tablet ER 10 milliEquivalent(s) Oral daily  predniSONE   Tablet 5 milliGRAM(s) Oral daily    MEDICATIONS  (PRN):  docusate sodium 100 milliGRAM(s) Oral two times a day PRN Constipation  HYDROcodone/homatropine Syrup 5 milliLiter(s) Oral at bedtime PRN Cough  ondansetron Injectable 4 milliGRAM(s) IV Push every 8 hours PRN Nausea and/or Vomiting  sucralfate suspension 1 Gram(s) Oral four times a day PRN acid reflux    Allergies    No Known Allergies    Intolerances    albuterol (Unknown)      PHYSICAL EXAM:  Constitutional: NAD  HEENT: Normocephalic, EOMI  Neck:  + JVD  Respiratory: CTA B/L, No wheezes  Cardiovascular: S1, S2, IRRR, + systolic murmur  Gastrointestinal: BS hypo, distended, + fluid wave, + tenderness  Extremities: + peripheral edema le b/l  Neurological: AAOX3, no focal deficits  Psychiatric: Normal mood, normal affect  : No Gottlieb    Vital Signs Last 24 Hrs  T(C): 36.8 (02 Mar 2018 12:54), Max: 36.8 (02 Mar 2018 12:54)  T(F): 98.2 (02 Mar 2018 12:54), Max: 98.2 (02 Mar 2018 12:54)  HR: 91 (02 Mar 2018 12:54) (73 - 128)  BP: 103/70 (02 Mar 2018 12:54) (95/63 - 116/76)  BP(mean): --  RR: 20 (02 Mar 2018 12:54) (18 - 22)  SpO2: 92% (02 Mar 2018 12:54) (92% - 98%)  I&O's Summary    01 Mar 2018 07:01  -  02 Mar 2018 07:00  --------------------------------------------------------  IN: 0 mL / OUT: 100 mL / NET: -100 mL    02 Mar 2018 07:01  -  02 Mar 2018 16:45  --------------------------------------------------------  IN: 0 mL / OUT: 0 mL / NET: 0 mL        LABS:                        9.9    7.49  )-----------( 243      ( 02 Mar 2018 07:28 )             31.4     03-02    138  |  93<L>  |  86<H>  ----------------------------<  103<H>  3.7   |  31  |  2.58<H>    Ca    8.8      02 Mar 2018 06:30  Phos  3.6     03-02  Mg     2.1     03-02    TPro  5.7<L>  /  Alb  2.9<L>  /  TBili  0.4  /  DBili  x   /  AST  8<L>  /  ALT  <5<L>  /  AlkPhos  49  03-02    CARDIAC MARKERS ( 01 Mar 2018 16:29 )  x     / 0.06 ng/mL / x     / x     / 5.4 ng/mL      Magnesium, Serum: 2.1 mg/dL (03-02 @ 06:30)

## 2018-03-02 NOTE — PROGRESS NOTE ADULT - ASSESSMENT
volume overload/ascites/LE edema  PHTN  decomp diast hf    continue current care  await paracentesis  plan for iv diuretics alix

## 2018-03-02 NOTE — PROVIDER CONTACT NOTE (OTHER) - SITUATION
notified provider pt refusing colace, protonix, digoxin, SQ heparin.  pt states takes digoxin every other day.  does not take colace, protonix or heparin SQ when in hospital.

## 2018-03-02 NOTE — PROGRESS NOTE ADULT - SUBJECTIVE AND OBJECTIVE BOX
72F with PMHx of AFib, asthma, CAD, cardiomegaly, enlarged lymph nodes, COPD, DM type II, PSHx of endoscopy, upper lobectomy, sent to the ED by PMD to be admitted for heart failure with recurrent ascites presented to IR for paracentesis.     Allergies:albuterol (Unknown)      PAST MEDICAL & SURGICAL HISTORY:  Hyperthyroidism  JOSE (obstructive sleep apnea)  Epistaxis  GIB (gastrointestinal bleeding)  CAD S/P percutaneous coronary angioplasty  Afib  Pulmonary HTN  GERD (gastroesophageal reflux disease)  Obesity  Cardiomegaly  Valvular heart disease  COPD (chronic obstructive pulmonary disease): Home O2  Sleep Apnea: by criteria  Loose, teeth: 3 bottom front loose teeth  Female stress incontinence  Shoulder pain, right  Constipation  Arthritis of Knee  H/O heartburn  History of lung cancer  Obese  Hx of hyperlipidemia  Asthma  Diabetes mellitus: type 2  dx about 4-5 years ago   no daily fingerstick  H/O: HTN (hypertension)  Enlarged lymph nodes  Lymph nodes enlarged: s/p biopsy mediastinal  benign  H/O endoscopy  left upper lobectomy        Pertinent labs:                      9.9    7.49  )-----------( 243      ( 02 Mar 2018 07:28 )             31.4   03-02    138  |  93<L>  |  86<H>  ----------------------------<  103<H>  3.7   |  31  |  2.58<H>    Ca    8.8      02 Mar 2018 06:30  Phos  3.6     03-02  Mg     2.1     03-02    TPro  5.7<L>  /  Alb  2.9<L>  /  TBili  0.4  /  DBili  x   /  AST  8<L>  /  ALT  <5<L>  /  AlkPhos  49  03-02  PT/INR - ( 01 Mar 2018 16:29 )   PT: 12.5 sec;   INR: 1.15 ratio         PTT - ( 01 Mar 2018 16:29 )  PTT:28.0 sec    Consent: Procedure/risks/ Benefits explained. Informed consent obtained. Pt verbalizes understanding.

## 2018-03-02 NOTE — CHART NOTE - NSCHARTNOTEFT_GEN_A_CORE
A 71-year-old female with past medical history of diabetes, hypertension, severe pulmonary hypertension, right ventricular dysfunction, presents with SOB.  The patient's acute renal failure is likely hemodynamic in nature.  Goals of therapy are to improve her hemodynamics in order to improve her forward flow and renal perfusion.  CASSANDRA  Severe PHTN  Cor Pulm  Failure to thrive  Abd pain -R/O SBP    Renal: In am change to Bumex 1mg IVP bid.  (Will not do today as she is having a paracentesis done).  JVD is improving but still present  Gastroenterology: No renal objection to LARGE VOLUME PARACENTESIS;  IV alb to follow procedure please  Please check cx as well;    Pulmonary: Pulmonary nebulizers.  Continue with nasal cannula oxygen at all times.  Cardiology: Monitor hemodynamics and volume status.     Options for her PHTN are limited    Sayed Sarah  [phone]

## 2018-03-02 NOTE — CONSULT NOTE ADULT - SUBJECTIVE AND OBJECTIVE BOX
[facility] - [facility]  CONSULT NOTE    HPI: 72 year old obese gentlewoman, former smoker, with history of oxygen dependent COPD, lung cancer (carcinoid) s/p left upper lobectomy in , obesity with JOSE but intolerant of CPAP, diastolic CHF and severe secondary pulmonary hypertension resulting in severe ascites. She recently had a large volume paracentesis. She is sent to the ER with worsening of her chronic shortness of breath now with minimal exertion associated with abdominal distension and marked lower extremity swelling. She has no cough, sputum production, chest congestion or wheeze. No fevers, chills or sweats. No chest pain/pressure or palpitations    PMHX:  Afib    Arthritis of Knee    Asthma    CAD S/P percutaneous coronary angioplasty    Cardiomegaly    Constipation    COPD (chronic obstructive pulmonary disease)  Home O2  Diabetes mellitus  type 2  Enlarged lymph nodes    Epistaxis    Female stress incontinence    GERD (gastroesophageal reflux disease)    GIB (gastrointestinal bleeding)    H/O heartburn    H/O: HTN (hypertension)    History of lung cancer    Hx of hyperlipidemia    Hyperthyroidism    Loose, teeth  3 bottom front loose teeth  Obesity    JOSE (obstructive sleep apnea)    Pulmonary HTN    Shoulder pain, right    Valvular heart disease.    PSHX:  H/O endoscopy    left upper lobectomy    s/p biopsy mediastinal adenopathy - benign    FAMILY HISTORY:  No pertinent family history in first degree relatives      SOCIAL HISTORY: ; lives with ; ;former smoker    Pulmonary Medications:   HYDROcodone/homatropine Syrup 5 milliLiter(s) Oral at bedtime PRN      Antimicrobials:      Cardiology:  buMETAnide 0.5 milliGRAM(s) Oral every 12 hours  diltiazem    milliGRAM(s) Oral daily  metoprolol     tartrate 25 milliGRAM(s) Oral every 8 hours      Other:  allopurinol 100 milliGRAM(s) Oral daily  aspirin enteric coated 81 milliGRAM(s) Oral daily  atorvastatin 20 milliGRAM(s) Oral at bedtime  clopidogrel Tablet 75 milliGRAM(s) Oral daily  docusate sodium 100 milliGRAM(s) Oral two times a day PRN  heparin  Injectable 5000 Unit(s) SubCutaneous every 8 hours  methimazole 5 milliGRAM(s) Oral daily  ondansetron Injectable 4 milliGRAM(s) IV Push every 8 hours PRN  pantoprazole    Tablet 40 milliGRAM(s) Oral before breakfast  potassium chloride    Tablet ER 10 milliEquivalent(s) Oral daily  predniSONE   Tablet 5 milliGRAM(s) Oral daily  sucralfate suspension 1 Gram(s) Oral four times a day PRN      Allergies    No Known Allergies    Intolerances    albuterol (Unknown)      HOME MEDICATIONS: see  H & P    REVIEW OF SYSTEMS:  Constitutional: As per HPI  HEENT: Within normal limits  CV: As per HPI  Resp: As per HPI  GI: Within normal limits   : Within normal limits  Musculoskeletal: Within normal limits  Skin: Within normal limits  Neurological: Within normal limits  Psychiatric: Within normal limits  Endocrine: Within normal limits  Hematologic/Lymphatic: Within normal limits  Allergic/Immunologic: Within normal limits    [ ] All other systems negative                                                                                                                                                                              [ ]Unable to obtain    OBJECTIVE:      I&O's Detail    01 Mar 2018 07:01  -  02 Mar 2018 07:00  --------------------------------------------------------  IN:  Total IN: 0 mL    OUT:    Voided: 100 mL  Total OUT: 100 mL    Total NET: -100 mL      02 Mar 2018 07:01  -  02 Mar 2018 14:48  --------------------------------------------------------  IN:  Total IN: 0 mL    OUT:  Total OUT: 0 mL    Total NET: 0 mL      Daily Height in cm: 160.02 (02 Mar 2018 02:35)    Daily Weight in k.7 (02 Mar 2018 05:17)      PHYSICAL EXAM:  ICU Vital Signs Last 24 Hrs  T(C): 36.8 (02 Mar 2018 12:54), Max: 36.8 (02 Mar 2018 12:54)  T(F): 98.2 (02 Mar 2018 12:54), Max: 98.2 (02 Mar 2018 12:54)  HR: 91 (02 Mar 2018 12:54) (73 - 128)  BP: 103/70 (02 Mar 2018 12:54) (95/63 - 116/76)  BP(mean): --  ABP: --  ABP(mean): --  RR: 20 (02 Mar 2018 12:54) (18 - 22)  SpO2: 92% (02 Mar 2018 12:54) (92% - 98%)    General: Awake. Alert. Cooperative. No distress. Appears stated age 	  HEENT:   Atraumatic. Normocephalic. Anicteric. Normal oral mucosa, PERRL, EOMI  Neck: Supple. Trachea midline. Thyroid without enlargement/tenderness/nodules. No carotid bruit. No JVD	  Cardiovascular: Regular rate and rhythm. S1 S2 normal. No murmurs, rubs or gallops  Respiratory: Respirations unlabored. Clear to auscultation and percussion bilaterally  Abdomen: Soft. Non-tender. Non-distended. No organomegaly. No masses. Normal bowel sounds	  Extremities: Warm to touch. No clubbing or cyanosis. No pedal edema.  Pulses: 2+ peripheral pulses all extremities	  Skin: Normal skin color. No rashes or lesions. No ecchymoses, No cyanosis. Warm to touch  Lymph Nodes: Cervical, supraclavicular and axillary nodes normal  Neurological: Motor and sensory examination equal and normal. A and O x 3  Psychiatry: Appropriate mood and affect.      LABS:                          9.9    7.49  )-----------( 243      ( 02 Mar 2018 07:28 )             31.4                         11.6   10.3  )-----------( 283      ( 01 Mar 2018 16:29 )             36.1     03-    138  |  93<L>  |  86<H>  ----------------------------<  103<H>  3.7   |  31  |  2.58<H>        135  |  91<L>  |  73<H>  ----------------------------<  124<H>  3.4<L>   |  31  |  2.38<H>    Ca      8.8      03-    Ca      9.0      -    Phos    3.6     03-      Mg       2.1     03-    TPro  5.7<L>  /  Alb  2.9<L>  /  TBili  0.4  /  DBili  x   /  AST  8<L>  /  ALT  <5<L>  /  AlkPhos  49  -    TPro  7.2  /  Alb  3.1<L>  /  TBili  0.5  /  DBili  x   /  AST  34  /  ALT  32  /  AlkPhos  38<L>      PT/INR - ( 01 Mar 2018 16:29 )   PT: 12.5 sec;   INR: 1.15 ratio       PTT - ( 01 Mar 2018 16:29 )  PTT:28.0 sec    Serum Pro-Brain Natriuretic Peptide: 27249 pg/mL ( @ 16:29)    CARDIAC MARKERS ( 01 Mar 2018 16:29 )  x     / 0.06 ng/mL / x     / x     / 5.4 ng/mL    < from: Transthoracic Echocardiogram (17 @ 18:58) >    Patient name: FRAN ALEXANDRA  YOB: 1945   Age: 71 (F)   MR#: [id number]  Study Date: 2017  Location: [address] [place], [place] [id number]: Thalia Amezquita RDCS  Study quality: Technically fair  Referring Physician: Pierce Cobb MD  Blood Pressure: 106/58 mmHg  Height: 160 cm  Weight: 64 kg  BSA: 1.7 m2  ------------------------------------------------------------------------  PROCEDURE: Transthoracic echocardiogram with 2-D, M-Mode  and complete spectral and color flow Doppler.  INDICATION: Other specified pulmonary heart diseases  (I27.89)  ------------------------------------------------------------------------  Dimensions:    Normal Values:  LA:     3.6    2.0 - 4.0 cm  Ao:     2.8    2.0 - 3.8 cm  SEPTUM: 0.7    0.6 - 1.2 cm  PWT:    0.9    0.6 - 1.1 cm  LVIDd:  4.1    3.0 - 5.6 cm  LVIDs:  1.9    1.8 - 4.0 cm  Derived variables:  LVMI: 58 g/m2  RWT: 0.43  Fractional short: 54 %  Doppler Peak Velocity (m/sec): AoV=1.8  ------------------------------------------------------------------------  Observations:  Mitral Valve: Mitral annular calcification, otherwise  normal mitral valve. Minimal mitral regurgitation.  Aortic Valve/Aorta: Calcified trileaflet aortic valve with  normal opening. Peak transaortic valve gradient equals 13  mm Hg, mean transaortic valve gradient equals 8 mm Hg. Peak  left ventricular outflow tract gradient equals 6 mm Hg,  mean gradient is equal to 3 mm Hg, LVOT velocity time  integral equals 19 cm.  Aortic Root: 2.8 cm.  LVOT diameter: 1.9 cm.  Left Atrium: Normal left atrium.  LA volume index = 25  cc/m2.  Left Ventricle: Hyperdynamic left ventricular systolic  function. Flattening of the interventricular septum in both  systole and diastole is  consistent with right ventricular  pressure overload. Normal left ventricular internal  dimensions and wall thicknesses.  Right Heart: Severe right atrial enlargement. Right  ventricular enlargement with decreased right ventricular  systolic function. Normal tricuspid valve. Mild-moderate  tricuspid regurgitation. Normal pulmonic valve.  Pericardium/Pleura: Normal pericardium with trace  pericardial effusion.  Left pleural effusion.  Hemodynamic: Estimated right atrial pressure is 8 mm Hg.  Estimated right ventricular systolic pressure equals 72 mm  Hg, assuming right atrial pressure equals 8 mm Hg,  consistent with severe pulmonary hypertension.  ------------------------------------------------------------------------  Conclusions:  1. Calcified trileaflet aortic valve with normal opening.  2. Normal left ventricular internal dimensions and wall  thicknesses.  3. Hyperdynamic left ventricular systolic function.  Flattening of the interventricular septum in both systole  and diastole is  consistent with right ventricular pressure  overload.  4. Severe right atrial enlargement.  5. Right ventricular enlargement with decreased right  ventricular systolic function.  6. Normal tricuspid valve. Mild-moderate tricuspid  regurgitation.  7. Estimated pulmonary artery systolic pressure equals 72  mm Hg, assuming right atrial pressure equals 8 mm Hg,  consistent with severe pulmonary pressures.  *** Compared with echocardiogram of 2017, no  significant changes noted.  ------------------------------------------------------------------------  Confirmed on  2017 - 13:15:09 by Justin Cohen M.D.  ------------------------------------------------------------------------    < end of copied text >  ---------------------------------------------------------------------------------------------------------  MICROBIOLOGY:     Culture - Body Fluid with Gram Stain (18 @ 22:18)    Gram Stain:   polymorphonuclear leukocytes seen  No organisms seen per oil power field  by cytocentrifuge    Specimen Source: Peritoneal Peritoneal Fluid    Culture Results:   No growth at 5 days    Culture - Acid Fast - Body Fluid w/Smear (18 @ 22:18)    Specimen Source: Peritoneal Peritoneal Fluid    Acid Fast Bacilli Smear:   No acid fast bacilli seen by fluorochrome stain    Culture Results:   No growth at 1 week.    RADIOLOGY:  [x ] Chest radiographs reviewed and interpreted by me    < from: Xray Chest 2 Views PA/Lat (18 @ 17:19) >    EXAM:  XR CHEST PA LAT 2V                            PROCEDURE DATE:  2018        INTERPRETATION:  CLINICAL INFORMATION: Dyspnea.     EXAM: PA and lateral view chest radiograph    COMPARISON: Chest radiograph 2017    FINDINGS:   Thecardiac silhouette is normal.  Mediastinal surgical clips unchanged.  Aortic calcification.    Left pleural effusion similar in appearance to the prior.    No acute osseous abnormalities.      IMPRESSION:   Clear lungs.    SUGEY RODRIGUEZ, RADIOLOGY RESIDENT  This document has been electronically signed.  RODO SYED M.D., ATTENDING RADIOLOGIST  This document has been electronically signed. Mar  2 2018  9:51AM      < end of copied text >  ---------------------------------------------------------------------------------------------------------    < from: US Abdomen Limited (.18 @ 08:50) >    EXAM:  US ABDOMEN LIMITED                            PROCEDURE DATE:  2018      INTERPRETATION:  Clinical information: Assess for ascites for   paracentesis.    Comparison: Ultrasound 2017.    Findings: Targeted ultrasound was performed for purposes of ascites   evaluation. A large amount of ascites is noted in all 4 quadrants.    IMPRESSION:    Diffuse, large volume abdominal ascites.    GAEL SCHERER M.D., ATTENDING RADIOLOGIST  This document has been electronically signed. Mar  2 2018  9:16AM      < end of copied text >  --------------------------------------------------------------------------------------------------------- [facility] - [facility]  CONSULT NOTE    HPI: 72 year old formerly obese gentlewoman who has lost over 100 pounds due to chronic illness, former smoker, followed by Dr. Joceline Carranza of our office for oxygen dependent COPD, lung cancer (carcinoid) s/p left upper lobectomy in , JOSE but intolerant of CPAP, diastolic CHF and severe secondary pulmonary hypertension resulting in severe ascites. She underwent right heart catheterization in 2016 but did not respond to pulmonary artery vasodilators. She had a large volume paracentesis ~ 6 weeks ago. She is sent to the ER with worsening of her chronic shortness of breath now with minimal exertion associated associated with severe abdominal distension and lower extremity swelling. She spends most of her time on the couch and is unable to get outside other than to go to doctors appointments. She has no cough, sputum production, chest congestion or wheeze. No fevers, chills or sweats. No chest pain/pressure or palpitations    PMHX:  Afib    Arthritis of Knee    Asthma    CAD S/P percutaneous coronary angioplasty    Cardiomegaly    Constipation    COPD (chronic obstructive pulmonary disease)  Home O2  Diabetes mellitus  type 2  Enlarged lymph nodes    Epistaxis    Female stress incontinence    GERD (gastroesophageal reflux disease)    GIB (gastrointestinal bleeding)    H/O heartburn    H/O: HTN (hypertension)    History of lung cancer    Hx of hyperlipidemia    Hyperthyroidism    Loose, teeth  3 bottom front loose teeth  Obesity    JOSE (obstructive sleep apnea)    Pulmonary HTN    Shoulder pain, right    Valvular heart disease.    PSHX:  H/O endoscopy    left upper lobectomy    s/p biopsy mediastinal adenopathy - benign    FAMILY HISTORY:  No pertinent family history in first degree relatives      SOCIAL HISTORY: ; lives with ; ;former smoker    Pulmonary Medications:   HYDROcodone/homatropine Syrup 5 milliLiter(s) Oral at bedtime PRN      Antimicrobials:      Cardiology:  buMETAnide 0.5 milliGRAM(s) Oral every 12 hours  diltiazem    milliGRAM(s) Oral daily  metoprolol     tartrate 25 milliGRAM(s) Oral every 8 hours      Other:  allopurinol 100 milliGRAM(s) Oral daily  aspirin enteric coated 81 milliGRAM(s) Oral daily  atorvastatin 20 milliGRAM(s) Oral at bedtime  clopidogrel Tablet 75 milliGRAM(s) Oral daily  docusate sodium 100 milliGRAM(s) Oral two times a day PRN  heparin  Injectable 5000 Unit(s) SubCutaneous every 8 hours  methimazole 5 milliGRAM(s) Oral daily  ondansetron Injectable 4 milliGRAM(s) IV Push every 8 hours PRN  pantoprazole    Tablet 40 milliGRAM(s) Oral before breakfast  potassium chloride    Tablet ER 10 milliEquivalent(s) Oral daily  predniSONE   Tablet 5 milliGRAM(s) Oral daily  sucralfate suspension 1 Gram(s) Oral four times a day PRN      Allergies    No Known Allergies    Intolerances    albuterol (Unknown)      HOME MEDICATIONS: see  H & P    REVIEW OF SYSTEMS:  Constitutional: As per HPI  HEENT: Within normal limits  CV: As per HPI  Resp: As per HPI  GI: Within normal limits   : urinary incontinence  Musculoskeletal: Within normal limits  Skin: Within normal limits  Neurological: Within normal limits  Psychiatric: Within normal limits  Endocrine: Within normal limits  Hematologic/Lymphatic: Within normal limits  Allergic/Immunologic: Within normal limits    [x] All other systems negative    OBJECTIVE:      I&O's Detail    01 Mar 2018 07:  -  02 Mar 2018 07:00  --------------------------------------------------------  IN:  Total IN: 0 mL    OUT:    Voided: 100 mL  Total OUT: 100 mL    Total NET: -100 mL      02 Mar 2018 07:01  -  02 Mar 2018 14:48  --------------------------------------------------------  IN:  Total IN: 0 mL    OUT:  Total OUT: 0 mL    Total NET: 0 mL      Daily Height in cm: 160.02 (02 Mar 2018 02:35)    Daily Weight in k.7 (02 Mar 2018 05:17)      PHYSICAL EXAM:  ICU Vital Signs Last 24 Hrs  T(C): 36.8 (02 Mar 2018 12:54), Max: 36.8 (02 Mar 2018 12:54)  T(F): 98.2 (02 Mar 2018 12:54), Max: 98.2 (02 Mar 2018 12:54)  HR: 91 (02 Mar 2018 12:54) (73 - 128)  BP: 103/70 (02 Mar 2018 12:54) (95/63 - 116/76)  BP(mean): --  ABP: --  ABP(mean): --  RR: 20 (02 Mar 2018 12:54) (18 - 22)  SpO2: 92% (02 Mar 2018 12:54) (92% - 98%) on 2lpm    General: Awake. Alert. Cooperative. Tachypneic. Chronically ill appearing	  HEENT:   Atraumatic. Bitemporal wasting. Anicteric. Normal oral mucosa, PERRL, EOMI  Neck: Supple. Trachea midline. Thyroid without enlargement/tenderness/nodules. No carotid bruit. (+) JVD; loss of bilateral supraclavicular fat pads	  Cardiovascular: Irregularly irregular rate and rhythm. S1 S2 normal. II/VI systolic murmur  Respiratory: Respirations unlabored. Diffuse faint rales  Abdomen: Soft. Non-tender. Markedly distended with fluid wave. No organomegaly. No masses. Normal bowel sounds	  Extremities: Warm to touch. No clubbing or cyanosis. Mild to moderate lower extremity edema up to the thigh. Loss of extremity muscle mass  Pulses: Decreased lower extremity peripheral pulses  Skin: Normal skin color. No rashes or lesions. No ecchymoses, No cyanosis. Warm to touch  Lymph Nodes: Cervical, supraclavicular and axillary nodes normal  Neurological: Motor and sensory examination equal and normal. A and O x 3  Psychiatry: Depressed      LABS:                          9.9    7.49  )-----------( 243      ( 02 Mar 2018 07:28 )             31.4                         11.6   10.3  )-----------( 283      ( 01 Mar 2018 16:29 )             36.1     03    138  |  93<L>  |  86<H>  ----------------------------<  103<H>  3.7   |  31  |  2.58<H>        135  |  91<L>  |  73<H>  ----------------------------<  124<H>  3.4<L>   |  31  |  2.38<H>    Ca      8.8          Ca      9.0          Phos    3.6           Mg       2.1     03-    TPro  5.7<L>  /  Alb  2.9<L>  /  TBili  0.4  /  DBili  x   /  AST  8<L>  /  ALT  <5<L>  /  AlkPhos  49      TPro  7.2  /  Alb  3.1<L>  /  TBili  0.5  /  DBili  x   /  AST  34  /  ALT  32  /  AlkPhos  38<L>      PT/INR - ( 01 Mar 2018 16:29 )   PT: 12.5 sec;   INR: 1.15 ratio       PTT - ( 01 Mar 2018 16:29 )  PTT:28.0 sec    Serum Pro-Brain Natriuretic Peptide: 80258 pg/mL ( @ 16:29)    CARDIAC MARKERS ( 01 Mar 2018 16:29 )  x     / 0.06 ng/mL / x     / x     / 5.4 ng/mL    < from: Transthoracic Echocardiogram (17 @ 18:58) >    Patient name: FRAN ALEXANDRA  YOB: 1945   Age: 71 (F)   MR#: [id number]  Study Date: 2017  Location: [address] [place], [place] [id number]: Thalia Amezquita [facility]  Study quality: Technically fair  Referring Physician: Pierce Cobb MD  Blood Pressure: 106/58 mmHg  Height: 160 cm  Weight: 64 kg  BSA: 1.7 m2  ------------------------------------------------------------------------  PROCEDURE: Transthoracic echocardiogram with 2-D, M-Mode  and complete spectral and color flow Doppler.  INDICATION: Other specified pulmonary heart diseases  (I27.89)  ------------------------------------------------------------------------  Dimensions:    Normal Values:  LA:     3.6    2.0 - 4.0 cm  Ao:     2.8    2.0 - 3.8 cm  SEPTUM: 0.7    0.6 - 1.2 cm  PWT:    0.9    0.6 - 1.1 cm  LVIDd:  4.1    3.0 - 5.6 cm  LVIDs:  1.9    1.8 - 4.0 cm  Derived variables:  LVMI: 58 g/m2  RWT: 0.43  Fractional short: 54 %  Doppler Peak Velocity (m/sec): AoV=1.8  ------------------------------------------------------------------------  Observations:  Mitral Valve: Mitral annular calcification, otherwise  normal mitral valve. Minimal mitral regurgitation.  Aortic Valve/Aorta: Calcified trileaflet aortic valve with  normal opening. Peak transaortic valve gradient equals 13  mm Hg, mean transaortic valve gradient equals 8 mm Hg. Peak  left ventricular outflow tract gradient equals 6 mm Hg,  mean gradient is equal to 3 mm Hg, LVOT velocity time  integral equals 19 cm.  Aortic Root: 2.8 cm.  LVOT diameter: 1.9 cm.  Left Atrium: Normal left atrium.  LA volume index = 25  cc/m2.  Left Ventricle: Hyperdynamic left ventricular systolic  function. Flattening of the interventricular septum in both  systole and diastole is  consistent with right ventricular  pressure overload. Normal left ventricular internal  dimensions and wall thicknesses.  Right Heart: Severe right atrial enlargement. Right  ventricular enlargement with decreased right ventricular  systolic function. Normal tricuspid valve. Mild-moderate  tricuspid regurgitation. Normal pulmonic valve.  Pericardium/Pleura: Normal pericardium with trace  pericardial effusion.  Left pleural effusion.  Hemodynamic: Estimated right atrial pressure is 8 mm Hg.  Estimated right ventricular systolic pressure equals 72 mm  Hg, assuming right atrial pressure equals 8 mm Hg,  consistent with severe pulmonary hypertension.  ------------------------------------------------------------------------  Conclusions:  1. Calcified trileaflet aortic valve with normal opening.  2. Normal left ventricular internal dimensions and wall  thicknesses.  3. Hyperdynamic left ventricular systolic function.  Flattening of the interventricular septum in both systole  and diastole is  consistent with right ventricular pressure  overload.  4. Severe right atrial enlargement.  5. Right ventricular enlargement with decreased right  ventricular systolic function.  6. Normal tricuspid valve. Mild-moderate tricuspid  regurgitation.  7. Estimated pulmonary artery systolic pressure equals 72  mm Hg, assuming right atrial pressure equals 8 mm Hg,  consistent with severe pulmonary pressures.  *** Compared with echocardiogram of 2017, no  significant changes noted.  ------------------------------------------------------------------------  Confirmed on  2017 - 13:15:09 by Justin Cohen M.D.  ------------------------------------------------------------------------    < end of copied text >  ---------------------------------------------------------------------------------------------------------  MICROBIOLOGY:     Culture - Body Fluid with Gram Stain (18 @ 22:18)    Gram Stain:   polymorphonuclear leukocytes seen  No organisms seen per oil power field  by cytocentrifuge    Specimen Source: Peritoneal Peritoneal Fluid    Culture Results:   No growth at 5 days    Culture - Acid Fast - Body Fluid w/Smear (18 @ 22:18)    Specimen Source: Peritoneal Peritoneal Fluid    Acid Fast Bacilli Smear:   No acid fast bacilli seen by fluorochrome stain    Culture Results:   No growth at 1 week.    RADIOLOGY:  [x ] Chest radiographs reviewed and interpreted by me    < from: Xray Chest 2 Views PA/Lat (18 @ 17:19) >    EXAM:  XR CHEST PA LAT 2V                            PROCEDURE DATE:  2018        INTERPRETATION:  CLINICAL INFORMATION: Dyspnea.     EXAM: PA and lateral view chest radiograph    COMPARISON: Chest radiograph 2017    FINDINGS:   Thecardiac silhouette is normal.  Mediastinal surgical clips unchanged.  Aortic calcification.    Left pleural effusion similar in appearance to the prior.    No acute osseous abnormalities.      IMPRESSION:   Clear lungs.    SUGEY RODRIGUEZ, RADIOLOGY RESIDENT  This document has been electronically signed.  RODO SYED M.D., ATTENDING RADIOLOGIST  This document has been electronically signed. Mar  2 2018  9:51AM      < end of copied text >  ---------------------------------------------------------------------------------------------------------    < from: US Abdomen Limited (18 @ 08:50) >    EXAM:  US ABDOMEN LIMITED                            PROCEDURE DATE:  2018      INTERPRETATION:  Clinical information: Assess for ascites for   paracentesis.    Comparison: Ultrasound 2017.    Findings: Targeted ultrasound was performed for purposes of ascites   evaluation. A large amount of ascites is noted in all 4 quadrants.    IMPRESSION:    Diffuse, large volume abdominal ascites.    GAEL SCHERER M.D., ATTENDING RADIOLOGIST  This document has been electronically signed. Mar  2 2018  9:16AM      < end of copied text >  --------------------------------------------------------------------------------------------------------- [facility] - [facility]  CONSULT NOTE    HPI: 72 year old formerly obese gentlewoman who has lost over 100 pounds due to chronic illness, former smoker, followed by Dr. Joceline Carranza of our office for oxygen dependent COPD, lung cancer (carcinoid) s/p left upper lobectomy in , JOSE but intolerant of CPAP, diastolic CHF and severe secondary pulmonary hypertension resulting in severe ascites. She underwent right heart catheterization in 2016 but did not respond to pulmonary artery vasodilators. She had a large volume paracentesis ~ 6 weeks ago (third time). She is sent to the ER with worsening of her chronic shortness of breath now with minimal exertion associated with severe abdominal distension and moderate lower extremity swelling. She spends most of her time on the couch and is unable to get outside other than to go to doctors appointments with the help of her . She has no cough, sputum production, chest congestion or wheeze. No fevers, chills or sweats. No chest pain/pressure or palpitations    PMHX:  Afib    Arthritis of Knee    Asthma    CAD S/P percutaneous coronary angioplasty    Cardiomegaly    Constipation    COPD (chronic obstructive pulmonary disease)  Home O2  Diabetes mellitus  type 2  Enlarged lymph nodes    Epistaxis    Female stress incontinence    GERD (gastroesophageal reflux disease)    GIB (gastrointestinal bleeding)    H/O heartburn    H/O: HTN (hypertension)    History of lung cancer    Hx of hyperlipidemia    Hyperthyroidism    Loose, teeth  3 bottom front loose teeth  Obesity    JOSE (obstructive sleep apnea)    Pulmonary HTN    Shoulder pain, right    Valvular heart disease.    PSHX:  H/O endoscopy    left upper lobectomy    s/p biopsy mediastinal adenopathy - benign    FAMILY HISTORY:  No pertinent family history in first degree relatives      SOCIAL HISTORY: ; lives with ; ;former smoker    Pulmonary Medications:   HYDROcodone/homatropine Syrup 5 milliLiter(s) Oral at bedtime PRN      Antimicrobials:      Cardiology:  buMETAnide 0.5 milliGRAM(s) Oral every 12 hours  diltiazem    milliGRAM(s) Oral daily  metoprolol     tartrate 25 milliGRAM(s) Oral every 8 hours      Other:  allopurinol 100 milliGRAM(s) Oral daily  aspirin enteric coated 81 milliGRAM(s) Oral daily  atorvastatin 20 milliGRAM(s) Oral at bedtime  clopidogrel Tablet 75 milliGRAM(s) Oral daily  docusate sodium 100 milliGRAM(s) Oral two times a day PRN  heparin  Injectable 5000 Unit(s) SubCutaneous every 8 hours  methimazole 5 milliGRAM(s) Oral daily  ondansetron Injectable 4 milliGRAM(s) IV Push every 8 hours PRN  pantoprazole    Tablet 40 milliGRAM(s) Oral before breakfast  potassium chloride    Tablet ER 10 milliEquivalent(s) Oral daily  predniSONE   Tablet 5 milliGRAM(s) Oral daily  sucralfate suspension 1 Gram(s) Oral four times a day PRN      Allergies    No Known Allergies    Intolerances    albuterol (Unknown)      HOME MEDICATIONS: see  H & P    REVIEW OF SYSTEMS:  Constitutional: As per HPI  HEENT: Within normal limits  CV: As per HPI  Resp: As per HPI  GI: Within normal limits   : urinary incontinence  Musculoskeletal: Within normal limits  Skin: Within normal limits  Neurological: Within normal limits  Psychiatric: Within normal limits  Endocrine: Within normal limits  Hematologic/Lymphatic: Within normal limits  Allergic/Immunologic: Within normal limits    [x] All other systems negative    OBJECTIVE:      I&O's Detail    01 Mar 2018 07:  -  02 Mar 2018 07:00  --------------------------------------------------------  IN:  Total IN: 0 mL    OUT:    Voided: 100 mL  Total OUT: 100 mL    Total NET: -100 mL      02 Mar 2018 07:01  -  02 Mar 2018 14:48  --------------------------------------------------------  IN:  Total IN: 0 mL    OUT:  Total OUT: 0 mL    Total NET: 0 mL      Daily Height in cm: 160.02 (02 Mar 2018 02:35)    Daily Weight in k.7 (02 Mar 2018 05:17)      PHYSICAL EXAM:  ICU Vital Signs Last 24 Hrs  T(C): 36.8 (02 Mar 2018 12:54), Max: 36.8 (02 Mar 2018 12:54)  T(F): 98.2 (02 Mar 2018 12:54), Max: 98.2 (02 Mar 2018 12:54)  HR: 91 (02 Mar 2018 12:54) (73 - 128)  BP: 103/70 (02 Mar 2018 12:54) (95/63 - 116/76)  BP(mean): --  ABP: --  ABP(mean): --  RR: 20 (02 Mar 2018 12:54) (18 - 22)  SpO2: 92% (02 Mar 2018 12:54) (92% - 98%) on 2lpm    General: Awake. Alert. Cooperative. Tachypneic. Chronically ill appearing	  HEENT:   Atraumatic. Bitemporal wasting. Anicteric. Normal oral mucosa, PERRL, EOMI  Neck: Supple. Trachea midline. Thyroid without enlargement/tenderness/nodules. No carotid bruit. (+) JVD; loss of bilateral supraclavicular fat pads	  Cardiovascular: Irregularly irregular rate and rhythm. S1 S2 normal. II/VI systolic murmur  Respiratory: Respirations unlabored. Diffuse faint rales  Abdomen: Soft. Non-tender. Markedly distended with fluid wave. No organomegaly. No masses. Normal bowel sounds	  Extremities: Warm to touch. No clubbing or cyanosis. Mild to moderate lower extremity edema up to the thigh. Loss of extremity muscle mass  Pulses: Decreased lower extremity peripheral pulses  Skin: Normal skin color. No rashes or lesions. No ecchymoses, No cyanosis. Warm to touch  Lymph Nodes: Cervical, supraclavicular and axillary nodes normal  Neurological: Motor and sensory examination equal and normal. A and O x 3  Psychiatry: Depressed      LABS:                          9.9    7.49  )-----------( 243      ( 02 Mar 2018 07:28 )             31.4                         11.6   10.3  )-----------( 283      ( 01 Mar 2018 16:29 )             36.1     03-02    138  |  93<L>  |  86<H>  ----------------------------<  103<H>  3.7   |  31  |  2.58<H>    03    135  |  91<L>  |  73<H>  ----------------------------<  124<H>  3.4<L>   |  31  |  2.38<H>    Ca      8.8      03    Ca      9.0          Phos    3.6     03-      Mg       2.1     03-    TPro  5.7<L>  /  Alb  2.9<L>  /  TBili  0.4  /  DBili  x   /  AST  8<L>  /  ALT  <5<L>  /  AlkPhos  49  03-    TPro  7.2  /  Alb  3.1<L>  /  TBili  0.5  /  DBili  x   /  AST  34  /  ALT  32  /  AlkPhos  38<L>      PT/INR - ( 01 Mar 2018 16:29 )   PT: 12.5 sec;   INR: 1.15 ratio       PTT - ( 01 Mar 2018 16:29 )  PTT:28.0 sec    Serum Pro-Brain Natriuretic Peptide: 22839 pg/mL ( @ 16:29)    CARDIAC MARKERS ( 01 Mar 2018 16:29 )  x     / 0.06 ng/mL / x     / x     / 5.4 ng/mL    < from: Transthoracic Echocardiogram (17 @ 18:58) >    Patient name: FRAN ALEXANDRA  YOB: 1945   Age: 71 (F)   MR#: [id number]  Study Date: 2017  Location: [address] [place], [place] [id number]: Thalia Amezquita RDCS  Study quality: Technically fair  Referring Physician: Pierce Cobb MD  Blood Pressure: 106/58 mmHg  Height: 160 cm  Weight: 64 kg  BSA: 1.7 m2  ------------------------------------------------------------------------  PROCEDURE: Transthoracic echocardiogram with 2-D, M-Mode  and complete spectral and color flow Doppler.  INDICATION: Other specified pulmonary heart diseases  (I27.89)  ------------------------------------------------------------------------  Dimensions:    Normal Values:  LA:     3.6    2.0 - 4.0 cm  Ao:     2.8    2.0 - 3.8 cm  SEPTUM: 0.7    0.6 - 1.2 cm  PWT:    0.9    0.6 - 1.1 cm  LVIDd:  4.1    3.0 - 5.6 cm  LVIDs:  1.9    1.8 - 4.0 cm  Derived variables:  LVMI: 58 g/m2  RWT: 0.43  Fractional short: 54 %  Doppler Peak Velocity (m/sec): AoV=1.8  ------------------------------------------------------------------------  Observations:  Mitral Valve: Mitral annular calcification, otherwise  normal mitral valve. Minimal mitral regurgitation.  Aortic Valve/Aorta: Calcified trileaflet aortic valve with  normal opening. Peak transaortic valve gradient equals 13  mm Hg, mean transaortic valve gradient equals 8 mm Hg. Peak  left ventricular outflow tract gradient equals 6 mm Hg,  mean gradient is equal to 3 mm Hg, LVOT velocity time  integral equals 19 cm.  Aortic Root: 2.8 cm.  LVOT diameter: 1.9 cm.  Left Atrium: Normal left atrium.  LA volume index = 25  cc/m2.  Left Ventricle: Hyperdynamic left ventricular systolic  function. Flattening of the interventricular septum in both  systole and diastole is  consistent with right ventricular  pressure overload. Normal left ventricular internal  dimensions and wall thicknesses.  Right Heart: Severe right atrial enlargement. Right  ventricular enlargement with decreased right ventricular  systolic function. Normal tricuspid valve. Mild-moderate  tricuspid regurgitation. Normal pulmonic valve.  Pericardium/Pleura: Normal pericardium with trace  pericardial effusion.  Left pleural effusion.  Hemodynamic: Estimated right atrial pressure is 8 mm Hg.  Estimated right ventricular systolic pressure equals 72 mm  Hg, assuming right atrial pressure equals 8 mm Hg,  consistent with severe pulmonary hypertension.  ------------------------------------------------------------------------  Conclusions:  1. Calcified trileaflet aortic valve with normal opening.  2. Normal left ventricular internal dimensions and wall  thicknesses.  3. Hyperdynamic left ventricular systolic function.  Flattening of the interventricular septum in both systole  and diastole is  consistent with right ventricular pressure  overload.  4. Severe right atrial enlargement.  5. Right ventricular enlargement with decreased right  ventricular systolic function.  6. Normal tricuspid valve. Mild-moderate tricuspid  regurgitation.  7. Estimated pulmonary artery systolic pressure equals 72  mm Hg, assuming right atrial pressure equals 8 mm Hg,  consistent with severe pulmonary pressures.  *** Compared with echocardiogram of 2017, no  significant changes noted.  ------------------------------------------------------------------------  Confirmed on  2017 - 13:15:09 by Justin Cohen M.D.  ------------------------------------------------------------------------    < end of copied text >  ---------------------------------------------------------------------------------------------------------  MICROBIOLOGY:     Culture - Body Fluid with Gram Stain (18 @ 22:18)    Gram Stain:   polymorphonuclear leukocytes seen  No organisms seen per oil power field  by cytocentrifuge    Specimen Source: Peritoneal Peritoneal Fluid    Culture Results:   No growth at 5 days    Culture - Acid Fast - Body Fluid w/Smear (18 @ 22:18)    Specimen Source: Peritoneal Peritoneal Fluid    Acid Fast Bacilli Smear:   No acid fast bacilli seen by fluorochrome stain    Culture Results:   No growth at 1 week.    RADIOLOGY:  [x ] Chest radiographs reviewed and interpreted by me    < from: Xray Chest 2 Views PA/Lat (18 @ 17:19) >    EXAM:  XR CHEST PA LAT 2V                            PROCEDURE DATE:  2018        INTERPRETATION:  CLINICAL INFORMATION: Dyspnea.     EXAM: PA and lateral view chest radiograph    COMPARISON: Chest radiograph 2017    FINDINGS:   Thecardiac silhouette is normal.  Mediastinal surgical clips unchanged.  Aortic calcification.    Left pleural effusion similar in appearance to the prior.    No acute osseous abnormalities.      IMPRESSION:   Clear lungs.    SUGEY RODRIGUEZ, RADIOLOGY RESIDENT  This document has been electronically signed.  RODO SYED M.D., ATTENDING RADIOLOGIST  This document has been electronically signed. Mar  2 2018  9:51AM      < end of copied text >  ---------------------------------------------------------------------------------------------------------    < from: US Abdomen Limited (18 @ 08:50) >    EXAM:  US ABDOMEN LIMITED                            PROCEDURE DATE:  2018      INTERPRETATION:  Clinical information: Assess for ascites for   paracentesis.    Comparison: Ultrasound 2017.    Findings: Targeted ultrasound was performed for purposes of ascites   evaluation. A large amount of ascites is noted in all 4 quadrants.    IMPRESSION:    Diffuse, large volume abdominal ascites.    GAEL SCHERER M.D., ATTENDING RADIOLOGIST  This document has been electronically signed. Mar  2 2018  9:16AM      < end of copied text >  ---------------------------------------------------------------------------------------------------------

## 2018-03-03 LAB
ANION GAP SERPL CALC-SCNC: 11 MMOL/L — SIGNIFICANT CHANGE UP (ref 5–17)
BUN SERPL-MCNC: 96 MG/DL — HIGH (ref 7–23)
CALCIUM SERPL-MCNC: 8.6 MG/DL — SIGNIFICANT CHANGE UP (ref 8.4–10.5)
CHLORIDE SERPL-SCNC: 94 MMOL/L — LOW (ref 96–108)
CO2 SERPL-SCNC: 28 MMOL/L — SIGNIFICANT CHANGE UP (ref 22–31)
CREAT SERPL-MCNC: 2.84 MG/DL — HIGH (ref 0.5–1.3)
GLUCOSE SERPL-MCNC: 129 MG/DL — HIGH (ref 70–99)
GRAM STN FLD: SIGNIFICANT CHANGE UP
HCT VFR BLD CALC: 31.9 % — LOW (ref 34.5–45)
HGB BLD-MCNC: 10.1 G/DL — LOW (ref 11.5–15.5)
MCHC RBC-ENTMCNC: 27.1 PG — SIGNIFICANT CHANGE UP (ref 27–34)
MCHC RBC-ENTMCNC: 31.7 GM/DL — LOW (ref 32–36)
MCV RBC AUTO: 85.5 FL — SIGNIFICANT CHANGE UP (ref 80–100)
NIGHT BLUE STAIN TISS: SIGNIFICANT CHANGE UP
PLATELET # BLD AUTO: 267 K/UL — SIGNIFICANT CHANGE UP (ref 150–400)
POTASSIUM SERPL-MCNC: 4.7 MMOL/L — SIGNIFICANT CHANGE UP (ref 3.5–5.3)
POTASSIUM SERPL-SCNC: 4.7 MMOL/L — SIGNIFICANT CHANGE UP (ref 3.5–5.3)
RBC # BLD: 3.73 M/UL — LOW (ref 3.8–5.2)
RBC # FLD: 17.4 % — HIGH (ref 10.3–14.5)
SODIUM SERPL-SCNC: 133 MMOL/L — LOW (ref 135–145)
SPECIMEN SOURCE: SIGNIFICANT CHANGE UP
SPECIMEN SOURCE: SIGNIFICANT CHANGE UP
WBC # BLD: 8.23 K/UL — SIGNIFICANT CHANGE UP (ref 3.8–10.5)
WBC # FLD AUTO: 8.23 K/UL — SIGNIFICANT CHANGE UP (ref 3.8–10.5)

## 2018-03-03 PROCEDURE — 71250 CT THORAX DX C-: CPT | Mod: 26

## 2018-03-03 PROCEDURE — 93010 ELECTROCARDIOGRAM REPORT: CPT

## 2018-03-03 RX ORDER — BUMETANIDE 0.25 MG/ML
1 INJECTION INTRAMUSCULAR; INTRAVENOUS EVERY 12 HOURS
Qty: 0 | Refills: 0 | Status: DISCONTINUED | OUTPATIENT
Start: 2018-03-03 | End: 2018-03-07

## 2018-03-03 RX ADMIN — ATORVASTATIN CALCIUM 20 MILLIGRAM(S): 80 TABLET, FILM COATED ORAL at 22:34

## 2018-03-03 RX ADMIN — Medication 81 MILLIGRAM(S): at 13:40

## 2018-03-03 RX ADMIN — HEPARIN SODIUM 5000 UNIT(S): 5000 INJECTION INTRAVENOUS; SUBCUTANEOUS at 07:00

## 2018-03-03 RX ADMIN — HEPARIN SODIUM 5000 UNIT(S): 5000 INJECTION INTRAVENOUS; SUBCUTANEOUS at 22:34

## 2018-03-03 RX ADMIN — CLOPIDOGREL BISULFATE 75 MILLIGRAM(S): 75 TABLET, FILM COATED ORAL at 13:40

## 2018-03-03 RX ADMIN — Medication 10 MILLIEQUIVALENT(S): at 13:40

## 2018-03-03 RX ADMIN — Medication 25 MILLIGRAM(S): at 22:34

## 2018-03-03 RX ADMIN — Medication 240 MILLIGRAM(S): at 09:13

## 2018-03-03 RX ADMIN — BUMETANIDE 1 MILLIGRAM(S): 0.25 INJECTION INTRAMUSCULAR; INTRAVENOUS at 17:39

## 2018-03-03 RX ADMIN — Medication 0.12 MILLIGRAM(S): at 13:40

## 2018-03-03 RX ADMIN — Medication 100 MILLIGRAM(S): at 13:40

## 2018-03-03 RX ADMIN — HEPARIN SODIUM 5000 UNIT(S): 5000 INJECTION INTRAVENOUS; SUBCUTANEOUS at 13:40

## 2018-03-03 RX ADMIN — Medication 25 MILLIGRAM(S): at 09:12

## 2018-03-03 RX ADMIN — BUMETANIDE 0.5 MILLIGRAM(S): 0.25 INJECTION INTRAMUSCULAR; INTRAVENOUS at 09:11

## 2018-03-03 RX ADMIN — Medication 5 MILLIGRAM(S): at 09:13

## 2018-03-03 NOTE — PROGRESS NOTE ADULT - ASSESSMENT
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  3) chronic diastolic CHF    PLAN/RECOMMENDATIONS    oxygen supplementation ATC to keep saturation greater than 92%  chest CT to better evaluate lung parenchyma and pleural space  s/p large volume paracentesis  may benefit from a chronic indwelling intraperitoneal catheter despite its inherent risks to improve quality of life   diurese as tolerated by renal function and hemodynamics  albuterol/atrovent/pulmicort nebs  cardiac meds: bumex IVP/cardizem CD/lopressor/digoxin/ASA/plavix/lipitor  GI/DVT prophylaxis - protonix/carafate prn/SQ heparin  prednisone 5mg daily (?)  patient has been treated with endothelin antagonists and phosphodiesterase inhibitors without improvement in PAPs in the past  KATY stockings placed on patient    Will follow with you. Plan of care discussed with the patient at bedside and nursing staff    Chacho Gallego MD, [facility] - [phone]  Pulmonary Medicine

## 2018-03-03 NOTE — PROGRESS NOTE ADULT - SUBJECTIVE AND OBJECTIVE BOX
MEDICINE, PROGRESS NOTE [phone]    FRAN ALEXANDRA 72y [id number]    Patient seen and examined.  Patient is a 72y old  Female who presents with a chief complaint of shortness of breath and difficulty eating due to fullness (01 Mar 2018 19:30)  Pt feels better. breathing is more comfortable.    PAST MEDICAL & SURGICAL HISTORY:  Hyperthyroidism  JOSE (obstructive sleep apnea)  Epistaxis  GIB (gastrointestinal bleeding)  CAD S/P percutaneous coronary angioplasty  Afib  Pulmonary HTN  GERD (gastroesophageal reflux disease)  Obesity  Cardiomegaly  Valvular heart disease  COPD (chronic obstructive pulmonary disease): Home O2  Sleep Apnea: by criteria  Loose, teeth: 3 bottom front loose teeth  Female stress incontinence  Shoulder pain, right  Constipation  Arthritis of Knee  H/O heartburn  History of lung cancer  Obese  Hx of hyperlipidemia  Asthma  Diabetes mellitus: type 2  dx about 4-5 years ago   no daily fingerstick  H/O: HTN (hypertension)  Enlarged lymph nodes  Lymph nodes enlarged: s/p biopsy mediastinal  benign  H/O endoscopy  left upper lobectomy    MEDICATIONS  (STANDING):  allopurinol 100 milliGRAM(s) Oral daily  aspirin enteric coated 81 milliGRAM(s) Oral daily  atorvastatin 20 milliGRAM(s) Oral at bedtime  buMETAnide Injectable 1 milliGRAM(s) IV Push every 12 hours  clopidogrel Tablet 75 milliGRAM(s) Oral daily  digoxin     Tablet 0.125 milliGRAM(s) Oral every other day  diltiazem    milliGRAM(s) Oral daily  heparin  Injectable 5000 Unit(s) SubCutaneous every 8 hours  methimazole 5 milliGRAM(s) Oral daily  metoprolol     tartrate 25 milliGRAM(s) Oral every 8 hours  pantoprazole    Tablet 40 milliGRAM(s) Oral before breakfast  potassium chloride    Tablet ER 10 milliEquivalent(s) Oral daily  predniSONE   Tablet 5 milliGRAM(s) Oral daily    MEDICATIONS  (PRN):  docusate sodium 100 milliGRAM(s) Oral two times a day PRN Constipation  HYDROcodone/homatropine Syrup 5 milliLiter(s) Oral at bedtime PRN Cough  ondansetron Injectable 4 milliGRAM(s) IV Push every 8 hours PRN Nausea and/or Vomiting  sucralfate suspension 1 Gram(s) Oral four times a day PRN acid reflux    Allergies    No Known Allergies    Intolerances    albuterol (Unknown)      PHYSICAL EXAM:  Constitutional: NAD  HEENT: Normocephalic, EOMI  Neck:  No JVD  Respiratory: CTA B/L, No wheezes  Cardiovascular: S1, S2, RRR, + systolic murmur  Gastrointestinal: BS+, soft, NT/ND  Extremities: No peripheral edema  Neurological: AAOX3, no focal deficits  Psychiatric: Normal mood, normal affect  : No Gottlieb    Vital Signs Last 24 Hrs  T(C): 36.3 (03 Mar 2018 13:09), Max: 36.6 (03 Mar 2018 05:28)  T(F): 97.4 (03 Mar 2018 13:09), Max: 97.8 (03 Mar 2018 05:28)  HR: 65 (03 Mar 2018 13:09) (65 - 127)  BP: 84/60 (03 Mar 2018 13:46) (80/58 - 106/71)  BP(mean): --  RR: 20 (03 Mar 2018 13:09) (20 - 20)  SpO2: 93% (03 Mar 2018 13:09) (93% - 100%)  I&O's Summary    02 Mar 2018 07:01  -  03 Mar 2018 07:00  --------------------------------------------------------  IN: 0 mL / OUT: 0 mL / NET: 0 mL    03 Mar 2018 07:01  -  03 Mar 2018 14:11  --------------------------------------------------------  IN: 480 mL / OUT: 200 mL / NET: 280 mL        LABS:                        10.1   8.23  )-----------( 267      ( 03 Mar 2018 07:43 )             31.9     03-03    133<L>  |  94<L>  |  96<H>  ----------------------------<  129<H>  4.7   |  28  |  2.84<H>    Ca    8.6      03 Mar 2018 05:37  Phos  3.6     03-02  Mg     2.1     03-02    TPro  5.7<L>  /  Alb  2.9<L>  /  TBili  0.4  /  DBili  x   /  AST  8<L>  /  ALT  <5<L>  /  AlkPhos  49  03-02    CARDIAC MARKERS ( 01 Mar 2018 16:29 )  x     / 0.06 ng/mL / x     / x     / 5.4 ng/mL

## 2018-03-03 NOTE — PROGRESS NOTE ADULT - SUBJECTIVE AND OBJECTIVE BOX
[facility]-Symone Bardales NP- PROGRESS NOTE    Patient seen and examined sitting up in bed c/o BRO. Patient had paracentesis yesterday with 9.6 L drained.      Hospital Medications:   MEDICATIONS  (STANDING):  allopurinol 100 milliGRAM(s) Oral daily  aspirin enteric coated 81 milliGRAM(s) Oral daily  atorvastatin 20 milliGRAM(s) Oral at bedtime  buMETAnide Injectable 1 milliGRAM(s) IV Push every 12 hours  clopidogrel Tablet 75 milliGRAM(s) Oral daily  digoxin     Tablet 0.125 milliGRAM(s) Oral every other day  diltiazem    milliGRAM(s) Oral daily  heparin  Injectable 5000 Unit(s) SubCutaneous every 8 hours  methimazole 5 milliGRAM(s) Oral daily  metoprolol     tartrate 25 milliGRAM(s) Oral every 8 hours  pantoprazole    Tablet 40 milliGRAM(s) Oral before breakfast  potassium chloride    Tablet ER 10 milliEquivalent(s) Oral daily  predniSONE   Tablet 5 milliGRAM(s) Oral daily      REVIEW OF SYSTEMS:  As per HPI, 8 out of 10 ROS were unremarkable    VITALS:  T(F): 98.6 (03-03-18 @ 21:24), Max: 98.6 (03-03-18 @ 21:24)  HR: 75 (03-03-18 @ 21:24)  BP: 100/64 (03-03-18 @ 21:24)  RR: 18 (03-03-18 @ 21:24)  SpO2: 95% (03-03-18 @ 21:24)  Wt(kg): --    03-02 @ 07:01  -  03-03 @ 07:00  --------------------------------------------------------  IN: 0 mL / OUT: 0 mL / NET: 0 mL    03-03 @ 07:01  -  03-03 @ 21:51  --------------------------------------------------------  IN: 780 mL / OUT: 400 mL / NET: 380 mL          PHYSICAL EXAM:  Constitutional: NAD  HEENT: PERRL  Neck: + JVD  Respiratory: CTAB, no wheezes, rales or rhonchi  Cardiovascular: S1, S2, RRR  Gastrointestinal: BS+, softly distended  Extremities: No peripheral edema  Neurological: A/O x 3, no focal deficits  Psychiatric: Normal mood, normal affect  : No CVA tenderness. No dubois.   Skin: No rashes    LABS:      03-03    133<L>  |  94<L>  |  96<H>  ----------------------------<  129<H>  4.7   |  28  |  2.84<H>  03-02    138  |  93<L>  |  86<H>  ----------------------------<  103<H>  3.7   |  31  |  2.58<H>  03-01    135  |  91<L>  |  73<H>  ----------------------------<  124<H>  3.4<L>   |  31  |  2.38<H>    Ca    8.6      03 Mar 2018 05:37  Phos  3.6     03-02  Mg     2.1     03-02    TPro  5.7<L>  /  Alb  2.9<L>  /  TBili  0.4  /  DBili  x   /  AST  8<L>  /  ALT  <5<L>  /  AlkPhos  49  03-02  TPro  7.2  /  Alb  3.1<L>  /  TBili  0.5  /  DBili  x   /  AST  34  /  ALT  32  /  AlkPhos  38<L>  03-01                            10.1   8.23  )-----------( 267      ( 03 Mar 2018 07:43 )             31.9

## 2018-03-03 NOTE — PROGRESS NOTE ADULT - ASSESSMENT
Patient is a 71-year-old female with past medical history of diabetes, hypertension, severe pulmonary hypertension, right ventricular dysfunction, presents with SOB.  The patient's acute renal failure is likely hemodynamic in nature.  Goals of therapy are to improve her hemodynamics in order to improve her forward flow and renal perfusion.  CASSANDRA  Severe PHTN  Cor Pulm  Failure to thrive  Abd pain    Renal: Continue Bumex 1mg IVP bid for now. Azotemia rising. BMP in AM.  Gastroenterology: s/p LARGE VOLUME PARACENTESIS.    Pulmonary: Pulmonary nebulizers.  Continue with nasal cannula oxygen at all times.  Cardiology: Monitor hemodynamics and volume status.

## 2018-03-03 NOTE — PROGRESS NOTE ADULT - SUBJECTIVE AND OBJECTIVE BOX
[facility] - [facility]     PROGRESS NOTE    CHIEF COMPLAINT: CRF (hypoxic); COPD; emphysema; JOSE; secondary pulmonary hypertension; right heart failure; ascites    INTERVAL HISTORY: moderate - severe abdominal discomfort following large volume paracentesis (9500cc) yesterday; less short of breath without cough, sputum production, chest congestion or wheeze; no fevers, chills or sweats; no chest pain/pressure or palpitations; legs less swollen    REVIEW OF SYSTEMS:  Constitutional: As per interval history  HEENT: Within normal limits  CV: As per interval history  Resp: As per interval history  GI: Within normal limits   : Within normal limits  Musculoskeletal: Within normal limits  Skin: Within normal limits  Neurological: Within normal limits  Psychiatric: Within normal limits  Endocrine: Within normal limits  Hematologic/Lymphatic: Within normal limits  Allergic/Immunologic: Within normal limits      MEDICATIONS:     Pulmonary "  HYDROcodone/homatropine Syrup 5 milliLiter(s) Oral at bedtime PRN      Anti-microbials:      Cardiovascular:  buMETAnide Injectable 1 milliGRAM(s) IV Push every 12 hours  digoxin     Tablet 0.125 milliGRAM(s) Oral every other day  diltiazem    milliGRAM(s) Oral daily  metoprolol     tartrate 25 milliGRAM(s) Oral every 8 hours      Other:  allopurinol 100 milliGRAM(s) Oral daily  aspirin enteric coated 81 milliGRAM(s) Oral daily  atorvastatin 20 milliGRAM(s) Oral at bedtime  clopidogrel Tablet 75 milliGRAM(s) Oral daily  docusate sodium 100 milliGRAM(s) Oral two times a day PRN  heparin  Injectable 5000 Unit(s) SubCutaneous every 8 hours  methimazole 5 milliGRAM(s) Oral daily  ondansetron Injectable 4 milliGRAM(s) IV Push every 8 hours PRN  pantoprazole    Tablet 40 milliGRAM(s) Oral before breakfast  potassium chloride    Tablet ER 10 milliEquivalent(s) Oral daily  predniSONE   Tablet 5 milliGRAM(s) Oral daily  sucralfate suspension 1 Gram(s) Oral four times a day PRN        OBJECTIVE:        I&O's Detail    02 Mar 2018 07:01  -  03 Mar 2018 07:00  --------------------------------------------------------  IN:  Total IN: 0 mL    OUT:  Total OUT: 0 mL    Total NET: 0 mL      03 Mar 2018 07:01  -  03 Mar 2018 16:14  --------------------------------------------------------  IN:    Oral Fluid: 480 mL  Total IN: 480 mL    OUT:    Voided: 200 mL  Total OUT: 200 mL    Total NET: 280 mL    Daily Weight in k.8 (03 Mar 2018 09:09)    PHYSICAL EXAM:       ICU Vital Signs Last 24 Hrs  T(C): 36.3 (03 Mar 2018 13:09), Max: 36.6 (03 Mar 2018 05:28)  T(F): 97.4 (03 Mar 2018 13:09), Max: 97.8 (03 Mar 2018 05:28)  HR: 65 (03 Mar 2018 13:09) (65 - 127)  BP: 84/60 (03 Mar 2018 13:46) (80/58 - 106/71)  BP(mean): --  ABP: --  ABP(mean): --  RR: 20 (03 Mar 2018 13:09) (20 - 20)  SpO2: 93% (03 Mar 2018 13:09) (93% - 100%)       General: Awake. Alert. Cooperative. No distress. Chronically ill appearing	  HEENT:   Atraumatic. Bitemporal wasting. Anicteric. Normal oral mucosa, PERRL, EOMI  Neck: Supple. Trachea midline. Thyroid without enlargement/tenderness/nodules. No carotid bruit. (+) JVD; loss of bilateral supraclavicular fat pads	  Cardiovascular: Irregularly irregular rate and rhythm. S1 S2 normal. II/VI systolic murmur  Respiratory: Respirations unlabored. Diffuse faint rales at the bases  Abdomen: Soft. Non-tender. Moderately distended with fluid wave. No organomegaly. No masses. Normal bowel sounds	  Extremities: Warm to touch. No clubbing or cyanosis. Mild to moderate lower extremity edema up to the thigh. Loss of extremity muscle mass  Pulses: Decreased lower extremity peripheral pulses  Skin: Normal skin color. No rashes or lesions. No ecchymoses, No cyanosis. Warm to touch  Lymph Nodes: Cervical, supraclavicular and axillary nodes normal  Neurological: Motor and sensory examination equal and normal. A and O x 3  Psychiatry: Depressed    LABS:                        10.1   8.23  )-----------( 267      ( 03 Mar 2018 07:43 )             31.9                         9.9    7.49  )-----------( 243      ( 02 Mar 2018 07:28 )             31.4     03-    133<L>  |  94<L>  |  96<H>  ----------------------------<  129<H>  4.7   |  28  |  2.84<H>        138  |  93<L>  |  86<H>  ----------------------------<  103<H>  3.7   |  31  |  2.58<H>    Ca      8.6      -    Ca      8.8          Phos    3.6     -      Mg       2.1     -    TPro  5.7<L>  /  Alb  2.9<L>  /  TBili  0.4  /  DBili  x   /  AST  8<L>  /  ALT  <5<L>  /  AlkPhos  49  -    TPro  7.2  /  Alb  3.1<L>  /  TBili  0.5  /  DBili  x   /  AST  34  /  ALT  32  /  AlkPhos  38<L>      PT/INR - ( 01 Mar 2018 16:29 )   PT: 12.5 sec;   INR: 1.15 ratio       PTT - ( 01 Mar 2018 16:29 )  PTT:28.0 sec    Serum Pro-Brain Natriuretic Peptide: 33126 pg/mL ( @ 16:29)    CARDIAC MARKERS ( 01 Mar 2018 16:29 )  x     / 0.06 ng/mL / x     / x     / 5.4 ng/mL    < from: Transthoracic Echocardiogram (17 @ 18:58) >    Patient name: FRAN ALEXANDRA  YOB: 1945   Age: 71 (F)   MR#: [id number]  Study Date: 2017  Location: [address] [place], [place] [id number]: Thalia Amezquita RDCS  Study quality: Technically fair  Referring Physician: Pierce Cobb MD  Blood Pressure: 106/58 mmHg  Height: 160 cm  Weight: 64 kg  BSA: 1.7 m2  ------------------------------------------------------------------------  PROCEDURE: Transthoracic echocardiogram with 2-D, M-Mode  and complete spectral and color flow Doppler.  INDICATION: Other specified pulmonary heart diseases  (I27.89)  ------------------------------------------------------------------------  Dimensions:    Normal Values:  LA:     3.6    2.0 - 4.0 cm  Ao:     2.8    2.0 - 3.8 cm  SEPTUM: 0.7    0.6 - 1.2 cm  PWT:    0.9    0.6 - 1.1 cm  LVIDd:  4.1    3.0 - 5.6 cm  LVIDs:  1.9    1.8 - 4.0 cm  Derived variables:  LVMI: 58 g/m2  RWT: 0.43  Fractional short: 54 %  Doppler Peak Velocity (m/sec): AoV=1.8  ------------------------------------------------------------------------  Observations:  Mitral Valve: Mitral annular calcification, otherwise  normal mitral valve. Minimal mitral regurgitation.  Aortic Valve/Aorta: Calcified trileaflet aortic valve with  normal opening. Peak transaortic valve gradient equals 13  mm Hg, mean transaortic valve gradient equals 8 mm Hg. Peak  left ventricular outflow tract gradient equals 6 mm Hg,  mean gradient is equal to 3 mm Hg, LVOT velocity time  integral equals 19 cm.  Aortic Root: 2.8 cm.  LVOT diameter: 1.9 cm.  Left Atrium: Normal left atrium.  LA volume index = 25  cc/m2.  Left Ventricle: Hyperdynamic left ventricular systolic  function. Flattening of the interventricular septum in both  systole and diastole is  consistent with right ventricular  pressure overload. Normal left ventricular internal  dimensions and wall thicknesses.  Right Heart: Severe right atrial enlargement. Right  ventricular enlargement with decreased right ventricular  systolic function. Normal tricuspid valve. Mild-moderate  tricuspid regurgitation. Normal pulmonic valve.  Pericardium/Pleura: Normal pericardium with trace  pericardial effusion.  Left pleural effusion.  Hemodynamic: Estimated right atrial pressure is 8 mm Hg.  Estimated right ventricular systolic pressure equals 72 mm  Hg, assuming right atrial pressure equals 8 mm Hg,  consistent with severe pulmonary hypertension.  ------------------------------------------------------------------------  Conclusions:  1. Calcified trileaflet aortic valve with normal opening.  2. Normal left ventricular internal dimensions and wall  thicknesses.  3. Hyperdynamic left ventricular systolic function.  Flattening of the interventricular septum in both systole  and diastole is  consistent with right ventricular pressure  overload.  4. Severe right atrial enlargement.  5. Right ventricular enlargement with decreased right  ventricular systolic function.  6. Normal tricuspid valve. Mild-moderate tricuspid  regurgitation.  7. Estimated pulmonary artery systolic pressure equals 72  mm Hg, assuming right atrial pressure equals 8 mm Hg,  consistent with severe pulmonary pressures.  *** Compared with echocardiogram of 2017, no  significant changes noted.  ------------------------------------------------------------------------  Confirmed on  2017 - 13:15:09 by Justin Cohen M.D.  ------------------------------------------------------------------------    < end of copied text >  ---------------------------------------------------------------------------------------------------------      MICROBIOLOGY:     Culture - Body Fluid with Gram Stain (18 @ 21:16)    Gram Stain:   polymorphonuclear leukocytes seen per low power field  No organisms seen per oil power field  by cytocentrifuge    Specimen Source: Peritoneal Peritoneal Fluid    Culture - Acid Fast - Body Fluid w/Smear (18 @ 21:16)    Specimen Source: .Body Fluid Peritoneal Fluid    Acid Fast Bacilli Smear:   No acid fast bacilli seen by fluorochrome stain    RADIOLOGY:  [x] Chest radiographs reviewed and interpreted by me    < from: Xray Chest 2 Views PA/Lat (18 @ 17:19) >    EXAM:  XR CHEST PA LAT 2V                            PROCEDURE DATE:  2018        INTERPRETATION:  CLINICAL INFORMATION: Dyspnea.     EXAM: PA and lateral view chest radiograph    COMPARISON: Chest radiograph 2017    FINDINGS:   Thecardiac silhouette is normal.  Mediastinal surgical clips unchanged.  Aortic calcification.    Left pleural effusion similar in appearance to the prior.    No acute osseous abnormalities.      IMPRESSION:   Clear lungs.    SUGEY RODRIGUEZ, RADIOLOGY RESIDENT  This document has been electronically signed.  RODO SYED M.D., ATTENDING RADIOLOGIST  This document has been electronically signed. Mar  2 2018  9:51AM      < end of copied text >  ---------------------------------------------------------------------------------------------------------    < from: US Abdomen Limited (18 @ 08:50) >    EXAM:  US ABDOMEN LIMITED                            PROCEDURE DATE:  2018      INTERPRETATION:  Clinical information: Assess for ascites for   paracentesis.    Comparison: Ultrasound 2017.    Findings: Targeted ultrasound was performed for purposes of ascites   evaluation. A large amount of ascites is noted in all 4 quadrants.    IMPRESSION:    Diffuse, large volume abdominal ascites.    GAEL SCHERER M.D., ATTENDING RADIOLOGIST  This document has been electronically signed. Mar  2 2018  9:16AM      < end of copied text >  ---------------------------------------------------------------------------------------------------------

## 2018-03-03 NOTE — PROGRESS NOTE ADULT - ASSESSMENT
volume overload/ascites/LE edema  PHTN  decomp diast hf    continue current care  change to iv bumex.  continue fluid restriction  attempt to diurese pt.

## 2018-03-04 LAB
ANION GAP SERPL CALC-SCNC: 15 MMOL/L — SIGNIFICANT CHANGE UP (ref 5–17)
BUN SERPL-MCNC: 96 MG/DL — HIGH (ref 7–23)
CALCIUM SERPL-MCNC: 8.4 MG/DL — SIGNIFICANT CHANGE UP (ref 8.4–10.5)
CHLORIDE SERPL-SCNC: 91 MMOL/L — LOW (ref 96–108)
CO2 SERPL-SCNC: 26 MMOL/L — SIGNIFICANT CHANGE UP (ref 22–31)
CREAT SERPL-MCNC: 2.7 MG/DL — HIGH (ref 0.5–1.3)
GLUCOSE SERPL-MCNC: 160 MG/DL — HIGH (ref 70–99)
HCT VFR BLD CALC: 29 % — LOW (ref 34.5–45)
HGB BLD-MCNC: 9.2 G/DL — LOW (ref 11.5–15.5)
MAGNESIUM SERPL-MCNC: 2 MG/DL — SIGNIFICANT CHANGE UP (ref 1.6–2.6)
MCHC RBC-ENTMCNC: 26.9 PG — LOW (ref 27–34)
MCHC RBC-ENTMCNC: 31.7 GM/DL — LOW (ref 32–36)
MCV RBC AUTO: 84.8 FL — SIGNIFICANT CHANGE UP (ref 80–100)
PHOSPHATE SERPL-MCNC: 4.5 MG/DL — SIGNIFICANT CHANGE UP (ref 2.5–4.5)
PLATELET # BLD AUTO: 225 K/UL — SIGNIFICANT CHANGE UP (ref 150–400)
POTASSIUM SERPL-MCNC: 4.1 MMOL/L — SIGNIFICANT CHANGE UP (ref 3.5–5.3)
POTASSIUM SERPL-SCNC: 4.1 MMOL/L — SIGNIFICANT CHANGE UP (ref 3.5–5.3)
RBC # BLD: 3.42 M/UL — LOW (ref 3.8–5.2)
RBC # FLD: 17.1 % — HIGH (ref 10.3–14.5)
SODIUM SERPL-SCNC: 132 MMOL/L — LOW (ref 135–145)
WBC # BLD: 7.76 K/UL — SIGNIFICANT CHANGE UP (ref 3.8–10.5)
WBC # FLD AUTO: 7.76 K/UL — SIGNIFICANT CHANGE UP (ref 3.8–10.5)

## 2018-03-04 PROCEDURE — 74018 RADEX ABDOMEN 1 VIEW: CPT | Mod: 26

## 2018-03-04 RX ORDER — SODIUM CHLORIDE 0.65 %
1 AEROSOL, SPRAY (ML) NASAL THREE TIMES A DAY
Qty: 0 | Refills: 0 | Status: DISCONTINUED | OUTPATIENT
Start: 2018-03-04 | End: 2018-03-20

## 2018-03-04 RX ADMIN — ATORVASTATIN CALCIUM 20 MILLIGRAM(S): 80 TABLET, FILM COATED ORAL at 21:32

## 2018-03-04 RX ADMIN — HEPARIN SODIUM 5000 UNIT(S): 5000 INJECTION INTRAVENOUS; SUBCUTANEOUS at 21:32

## 2018-03-04 RX ADMIN — Medication 10 MILLIEQUIVALENT(S): at 11:41

## 2018-03-04 RX ADMIN — BUMETANIDE 1 MILLIGRAM(S): 0.25 INJECTION INTRAMUSCULAR; INTRAVENOUS at 17:33

## 2018-03-04 RX ADMIN — Medication 25 MILLIGRAM(S): at 07:59

## 2018-03-04 RX ADMIN — BUMETANIDE 1 MILLIGRAM(S): 0.25 INJECTION INTRAMUSCULAR; INTRAVENOUS at 06:52

## 2018-03-04 RX ADMIN — Medication 81 MILLIGRAM(S): at 11:41

## 2018-03-04 RX ADMIN — Medication 100 MILLIGRAM(S): at 11:41

## 2018-03-04 RX ADMIN — Medication 5 MILLIGRAM(S): at 07:58

## 2018-03-04 RX ADMIN — Medication 25 MILLIGRAM(S): at 11:43

## 2018-03-04 RX ADMIN — Medication 1 SPRAY(S): at 17:33

## 2018-03-04 RX ADMIN — Medication 25 MILLIGRAM(S): at 21:32

## 2018-03-04 RX ADMIN — Medication 240 MILLIGRAM(S): at 07:59

## 2018-03-04 RX ADMIN — HEPARIN SODIUM 5000 UNIT(S): 5000 INJECTION INTRAVENOUS; SUBCUTANEOUS at 11:41

## 2018-03-04 RX ADMIN — CLOPIDOGREL BISULFATE 75 MILLIGRAM(S): 75 TABLET, FILM COATED ORAL at 11:41

## 2018-03-04 NOTE — PROGRESS NOTE ADULT - SUBJECTIVE AND OBJECTIVE BOX
[facility] - [facility]     PROGRESS NOTE    CHIEF COMPLAINT: CRF (hypoxic); COPD; emphysema; JOSE; secondary pulmonary hypertension; right heart failure; ascites    INTERVAL HISTORY: moderate - severe abdominal discomfort following large volume paracentesis (9500cc) 2 days ago; "soft" blood pressure; feels like she urinates more after lasix rather than bumex; worsening shortness of breath with increased supplemental oxygen requirements; no cough, sputum production, chest congestion or wheeze; no fevers, chills or sweats; no chest pain/pressure or palpitations; legs less swollen    REVIEW OF SYSTEMS:  Constitutional: As per interval history  HEENT: Within normal limits  CV: As per interval history  Resp: As per interval history  GI: Within normal limits   : Within normal limits  Musculoskeletal: Within normal limits  Skin: Within normal limits  Neurological: Within normal limits  Psychiatric: Within normal limits  Endocrine: Within normal limits  Hematologic/Lymphatic: Within normal limits  Allergic/Immunologic: Within normal limits      MEDICATIONS:     Pulmonary "  HYDROcodone/homatropine Syrup 5 milliLiter(s) Oral at bedtime PRN      Anti-microbials:      Cardiovascular:  buMETAnide Injectable 1 milliGRAM(s) IV Push every 12 hours  digoxin     Tablet 0.125 milliGRAM(s) Oral every other day  diltiazem    milliGRAM(s) Oral daily  metoprolol     tartrate 25 milliGRAM(s) Oral every 8 hours      Other:  allopurinol 100 milliGRAM(s) Oral daily  aspirin enteric coated 81 milliGRAM(s) Oral daily  atorvastatin 20 milliGRAM(s) Oral at bedtime  clopidogrel Tablet 75 milliGRAM(s) Oral daily  docusate sodium 100 milliGRAM(s) Oral two times a day PRN  heparin  Injectable 5000 Unit(s) SubCutaneous every 8 hours  methimazole 5 milliGRAM(s) Oral daily  ondansetron Injectable 4 milliGRAM(s) IV Push every 8 hours PRN  pantoprazole    Tablet 40 milliGRAM(s) Oral before breakfast  potassium chloride    Tablet ER 10 milliEquivalent(s) Oral daily  predniSONE   Tablet 5 milliGRAM(s) Oral daily  sucralfate suspension 1 Gram(s) Oral four times a day PRN        OBJECTIVE:    I&O's Detail    03 Mar 2018 07:01  -  04 Mar 2018 07:00  --------------------------------------------------------  IN:    Oral Fluid: 780 mL  Total IN: 780 mL    OUT:    Voided: 1100 mL  Total OUT: 1100 mL    Total NET: -320 mL      04 Mar 2018 07:01  -  04 Mar 2018 14:28  --------------------------------------------------------  IN:    Oral Fluid: 720 mL  Total IN: 720 mL    OUT:    Voided: 350 mL  Total OUT: 350 mL    Total NET: 370 mL    Daily Weight in k.9 (04 Mar 2018 11:45)    PHYSICAL EXAM:       ICU Vital Signs Last 24 Hrs  T(C): 36.8 (04 Mar 2018 11:45), Max: 37 (03 Mar 2018 21:24)  T(F): 98.3 (04 Mar 2018 11:45), Max: 98.6 (03 Mar 2018 21:24)  HR: 80 (04 Mar 2018 11:45) (71 - 80)  BP: 98/51 (04 Mar 2018 11:45) (96/56 - 102/60)  BP(mean): --  ABP: --  ABP(mean): --  RR: 16 (04 Mar 2018 11:45) (16 - 18)  SpO2: 96% (04 Mar 2018 11:45) 90% on 5lpm at rest     General: Awake. Alert. Cooperative. No distress. Chronically ill appearing	  HEENT:   Atraumatic. Bitemporal wasting. Anicteric. Normal oral mucosa, PERRL, EOMI  Neck: Supple. Trachea midline. Thyroid without enlargement/tenderness/nodules. No carotid bruit. (+) JVD; loss of bilateral supraclavicular fat pads	  Cardiovascular: Irregularly irregular rate and rhythm. S1 S2 normal. II/VI systolic murmur  Respiratory: Respirations unlabored. Decreased breath sounds throughout  Abdomen: Soft. Non-tender. Moderately distended with fluid wave. No organomegaly. No masses. Normal bowel sounds	  Extremities: Warm to touch. No clubbing or cyanosis. Mild to moderate lower extremity edema up to the thigh. Loss of extremity muscle mass  Pulses: Decreased lower extremity peripheral pulses  Skin: Normal skin color. No rashes or lesions. No ecchymoses, No cyanosis. Warm to touch  Lymph Nodes: Cervical, supraclavicular and axillary nodes normal  Neurological: Motor and sensory examination equal and normal. A and O x 3  Psychiatry: Depressed        LABS:                        9.2    7.76  )-----------( 225      ( 04 Mar 2018 06:44 )             29.0                         10.1   8.23  )-----------( 267      ( 03 Mar 2018 07:43 )             31.9     03-04    132<L>  |  91<L>  |  96<H>  ----------------------------<  160<H>  4.1   |  26  |  2.70<H>    03-03    133<L>  |  94<L>  |  96<H>  ----------------------------<  129<H>  4.7   |  28  |  2.84<H>    Ca      8.4      03-04    Ca      8.6      03-03    Phos    4.5     03-04    Phos    3.6     03-02      Mg       2.0     03-04    Mg       2.1     03-02    TPro  5.7<L>  /  Alb  2.9<L>  /  TBili  0.4  /  DBili  x   /  AST  8<L>  /  ALT  <5<L>  /  AlkPhos  49  03-02    TPro  7.2  /  Alb  3.1<L>  /  TBili  0.5  /  DBili  x   /  AST  34  /  ALT  32  /  AlkPhos  38<L>  -    Serum Pro-Brain Natriuretic Peptide: 10132 pg/mL ( @ 16:29)    CARDIAC MARKERS ( 01 Mar 2018 16:29 )  x     / 0.06 ng/mL / x     / x     / 5.4 ng/mL    < from: Transthoracic Echocardiogram (17 @ 18:58) >    Patient name: FRAN ALEXANDRA  YOB: 1945   Age: 71 (F)   MR#: [id number]  Study Date: 2017  Location: [address] [place], [place] [id number]: Thalia Amezquita [facility] [[email or web address]]  Study quality: Technically fair  Referring Physician: Pierce Cobb MD  Blood Pressure: 106/58 mmHg  Height: 160 cm  Weight: 64 kg  BSA: 1.7 m2  ------------------------------------------------------------------------  PROCEDURE: Transthoracic echocardiogram with 2-D, M-Mode  and complete spectral and color flow Doppler.  INDICATION: Other specified pulmonary heart diseases  (I27.89)  ------------------------------------------------------------------------  Dimensions:    Normal Values:  LA:     3.6    2.0 - 4.0 cm  Ao:     2.8    2.0 - 3.8 cm  SEPTUM: 0.7    0.6 - 1.2 cm  PWT:    0.9    0.6 - 1.1 cm  LVIDd:  4.1    3.0 - 5.6 cm  LVIDs:  1.9    1.8 - 4.0 cm  Derived variables:  LVMI: 58 g/m2  RWT: 0.43  Fractional short: 54 %  Doppler Peak Velocity (m/sec): AoV=1.8  ------------------------------------------------------------------------  Observations:  Mitral Valve: Mitral annular calcification, otherwise  normal mitral valve. Minimal mitral regurgitation.  Aortic Valve/Aorta: Calcified trileaflet aortic valve with  normal opening. Peak transaortic valve gradient equals 13  mm Hg, mean transaortic valve gradient equals 8 mm Hg. Peak  left ventricular outflow tract gradient equals 6 mm Hg,  mean gradient is equal to 3 mm Hg, LVOT velocity time  integral equals 19 cm.  Aortic Root: 2.8 cm.  LVOT diameter: 1.9 cm.  Left Atrium: Normal left atrium.  LA volume index = 25  cc/m2.  Left Ventricle: Hyperdynamic left ventricular systolic  function. Flattening of the interventricular septum in both  systole and diastole is  consistent with right ventricular  pressure overload. Normal left ventricular internal  dimensions and wall thicknesses.  Right Heart: Severe right atrial enlargement. Right  ventricular enlargement with decreased right ventricular  systolic function. Normal tricuspid valve. Mild-moderate  tricuspid regurgitation. Normal pulmonic valve.  Pericardium/Pleura: Normal pericardium with trace  pericardial effusion.  Left pleural effusion.  Hemodynamic: Estimated right atrial pressure is 8 mm Hg.  Estimated right ventricular systolic pressure equals 72 mm  Hg, assuming right atrial pressure equals 8 mm Hg,  consistent with severe pulmonary hypertension.  ------------------------------------------------------------------------  Conclusions:  1. Calcified trileaflet aortic valve with normal opening.  2. Normal left ventricular internal dimensions and wall  thicknesses.  3. Hyperdynamic left ventricular systolic function.  Flattening of the interventricular septum in both systole  and diastole is  consistent with right ventricular pressure  overload.  4. Severe right atrial enlargement.  5. Right ventricular enlargement with decreased right  ventricular systolic function.  6. Normal tricuspid valve. Mild-moderate tricuspid  regurgitation.  7. Estimated pulmonary artery systolic pressure equals 72  mm Hg, assuming right atrial pressure equals 8 mm Hg,  consistent with severe pulmonary pressures.  *** Compared with echocardiogram of 2017, no  significant changes noted.  ------------------------------------------------------------------------  Confirmed on  2017 - 13:15:09 by Justin Cohen M.D.  ------------------------------------------------------------------------    < end of copied text >  ---------------------------------------------------------------------------------------------------------  MICROBIOLOGY:     Culture - Body Fluid with Gram Stain (18 @ 21:16)    Gram Stain:   polymorphonuclear leukocytes seen per low power field  No organisms seen per oil power field  by cytocentrifuge    Specimen Source: Peritoneal Peritoneal Fluid    Culture Results:   No growth to date.    Culture - Acid Fast - Body Fluid w/Smear (18 @ 21:16)    Specimen Source: .Body Fluid Peritoneal Fluid    Acid Fast Bacilli Smear:   No acid fast bacilli seen by fluorochrome stain    RADIOLOGY:  [x] Chest radiographs reviewed and interpreted by me    < from: CT Chest No Cont (18 @ 18:24) >    EXAM:  CT CHEST                            PROCEDURE DATE:  2018            INTERPRETATION:  CLINICAL INFORMATION: Evaluate pleural effusions.   Shortness of breath.    COMPARISON: Chest CT dated 2017. Chest x-ray dated 3/1/2018.    PROCEDURE:   CT of the Chest was performed without intravenous contrast.  Sagittal and coronal reformats were performed.  Axial MIP reformats were also performed.    FINDINGS:    CHEST:     LUNGS AND LARGE AIRWAYS: Patent central airways.  Bibasilar subsegmental   atelectasis, right greater than left.  PLEURA: Trace bilateral pleural effusions.  VESSELS: Thoracic aortic and coronary artery atherosclerosis.  HEART: Cardiomegaly. Small pericardial effusion. Mitral annular   calcification. A densely calcified or metallic density measuring 1.1 x   0.4 cm overlies the basilar left pulmonary artery (3:52, 6:80).  MEDIASTINUM AND STEFANIA: No lymphadenopathy.  CHEST WALL AND LOWER NECK: Within normal limits.  VISUALIZED UPPER ABDOMEN: Small volume ascites. Atherosclerotic changes.  BONES: Multilevel degenerative disease.    IMPRESSION: Trace bilateral pleural effusions.  Bibasilar subsegmental atelectasis, right greater than left.  Densely calcified or metallic density overlies the left pulmonary artery.  See above..      JUAN JUAREZ M.D., RADIOLOGY RESIDENT  This document has been electronically signed.  MADONNA MORGAN M.D., ATTENDING RADIOLOGIST  This document has been electronically signed. Mar  4 2018 12:28PM      < end of copied text >  ---------------------------------------------------------------------------------------------------------    < from: Xray Chest 2 Views PA/Lat (18 @ 17:19) >    EXAM:  XR CHEST PA LAT 2V                            PROCEDURE DATE:  2018        INTERPRETATION:  CLINICAL INFORMATION: Dyspnea.     EXAM: PA and lateral view chest radiograph    COMPARISON: Chest radiograph 2017    FINDINGS:   Thecardiac silhouette is normal.  Mediastinal surgical clips unchanged.  Aortic calcification.    Left pleural effusion similar in appearance to the prior.    No acute osseous abnormalities.      IMPRESSION:   Clear lungs.    SUGEY RODRIGUEZ, RADIOLOGY RESIDENT  This document has been electronically signed.  RODO SYED M.D., ATTENDING RADIOLOGIST  This document has been electronically signed. Mar  2 2018  9:51AM      < end of copied text >  ---------------------------------------------------------------------------------------------------------    < from: US Abdomen Limited (18 @ 08:50) >    EXAM:  US ABDOMEN LIMITED                            PROCEDURE DATE:  2018      INTERPRETATION:  Clinical information: Assess for ascites for   paracentesis.    Comparison: Ultrasound 2017.    Findings: Targeted ultrasound was performed for purposes of ascites   evaluation. A large amount of ascites is noted in all 4 quadrants.    IMPRESSION:    Diffuse, large volume abdominal ascites.    GAEL SCHERER M.D., ATTENDING RADIOLOGIST  This document has been electronically signed. Mar  2 2018  9:16AM      < end of copied text >  ---------------------------------------------------------------------------------------------------------

## 2018-03-04 NOTE — PROGRESS NOTE ADULT - ASSESSMENT
Patient is a 71-year-old female with past medical history of diabetes, hypertension, severe pulmonary hypertension, right ventricular dysfunction, presents with SOB.  The patient's acute renal failure is likely hemodynamic in nature.  Goals of therapy are to improve her hemodynamics in order to improve her forward flow and renal perfusion.  CASSANDRA  Severe PHTN  Cor Pulm  Failure to thrive  Abd pain    Renal: Continue Bumex 1mg IVP bid for now. Creatinine trending down again slowly. BMP in AM.  Gastroenterology: s/p 9.6 L paracentesis this past Friday.  Awaiting Abd xray results to r/o obstruction.  Pulmonary: Pulmonary nebulizers.  Continue with nasal cannula oxygen at all times.  CV: Monitor hemodynamics and volume status. Daily weights.

## 2018-03-04 NOTE — PHYSICAL THERAPY INITIAL EVALUATION ADULT - PERTINENT HX OF CURRENT PROBLEM, REHAB EVAL
Pt sent to the ED by PMD to be admitted for heart failure. Pt presents with complaints of shortness of breath secondary to fluid in her lungs, abdominal distension and LE edema. Her SOB has been constant, moderate to severe, without clear relieving or exacerbating factor. + acute renal failure- likely hemodynamic in nature.

## 2018-03-04 NOTE — PHYSICAL THERAPY INITIAL EVALUATION ADULT - LIVES WITH, PROFILE
who is in and out of the house. Pt lives in a house with 2 steps to enter. Ambulatory without device PTA/spouse

## 2018-03-04 NOTE — PROGRESS NOTE ADULT - ASSESSMENT
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  3) chronic diastolic CHF (little evidence left sided heart failure on chest CT - trace pleural effusions - minimal ground glass opacities)    PLAN/RECOMMENDATIONS    oxygen supplementation ATC to keep saturation greater than 92%  chest CT reviewed  s/p large volume paracentesis  may benefit from a chronic indwelling intraperitoneal catheter despite its inherent risks to improve quality of life   diurese as tolerated by renal function and hemodynamics  albuterol/atrovent/pulmicort nebs  cardiac meds: bumex IVP/cardizem CD/lopressor/digoxin/ASA/plavix/lipitor  GI/DVT prophylaxis - protonix/carafate prn/SQ heparin  prednisone 5mg daily (?)  patient has been treated with endothelin antagonists and phosphodiesterase inhibitors without improvement in PAPs in the past  KATY stockings     Will follow with you. Plan of care discussed with the patient at bedside and nursing staff    Chacho Gallego MD, [facility] - [phone]  Pulmonary Medicine

## 2018-03-04 NOTE — PROGRESS NOTE ADULT - ASSESSMENT
volume overload/ascites/LE edema  sev PHTN  decomp diast hf  sev copd  restrictive lung disease sec to ascites which diminishes diaphragmatic excrusion    continue current care  appreciate pulm input  continue diuresis  check urgent abd film r/o obstruction  strict I/o   daily weights  discussed with NP royce

## 2018-03-04 NOTE — PHYSICAL THERAPY INITIAL EVALUATION ADULT - BALANCE TRAINING, PT EVAL
Pt will improve static/dynamic standing balance by 1/2 grade to improve level of independence with mobility skills and ADLs in 2 weeks.

## 2018-03-04 NOTE — PHYSICAL THERAPY INITIAL EVALUATION ADULT - DISCHARGE DISPOSITION, PT EVAL
Home with outpatient PT for gait & endurance training. No AD recommendation. Supervision with ambulation at this time. *CM Brea made aware of d/c plan./home w/ outpatient services

## 2018-03-04 NOTE — PHYSICAL THERAPY INITIAL EVALUATION ADULT - PLANNED THERAPY INTERVENTIONS, PT EVAL
Pt will negotiate 2 stairs with 1 HR and step to pattern with independence in 2 weeks./gait training/balance training

## 2018-03-04 NOTE — PHYSICAL THERAPY INITIAL EVALUATION ADULT - ADDITIONAL COMMENTS
TTE: Severe right atrial enlargement. Right ventricular enlargement with decreased right ventricular systolic function. Estimated pulmonary artery systolic pressure equals 72 mm Hg, assuming right atrial pressure equals 8 mm Hg, consistent with severe pulmonary pressures.  CXR: clear lungs  CT chest: Trace pleural effusions. Trace pericardial effusion. Trace ascites. Trace right basilar atelectasis. Cardiomegaly.  US abdomen: Diffuse, large volume abdominal ascites.  3/2 IR for paracentesis: 9600 cc of ascites were drained.

## 2018-03-04 NOTE — PROGRESS NOTE ADULT - SUBJECTIVE AND OBJECTIVE BOX
Jonathan Levy is a 54year old female presenting for   Chief Complaint   Patient presents with   â¢ Physical     Denies Latex allergy or sensitivity. Medication verified and med list updated  Refills needed today: No    Health Maintenance Summary     Topic Due On Due Status Completed On    MAMMOGRAM - BREAST CANCER SCREENING Dec 5, 2017 Due On Dec 5, 2016    Pap Smear - Cervical Cancer Screening  Nov 29, 2021 Not Due Nov 29, 2016    Immunization - TDAP Pregnancy  Hidden     IMMUNIZATION - DTaP/Tdap/Td Jun 1, 2021 Not Due Jun 1, 2011    Immunization-Influenza Sep 1, 2017 Overdue Nov 1, 2011          Patient is due for topics as listed above, she wishes to discuss with provider .   Declines flu MEDICINE, PROGRESS NOTE [phone]    FRAN ALEXANDRA 72y [id number]    Patient seen and examined.  Patient is a 72y old  Female who presents with a chief complaint of shortness of breath and difficulty eating due to fullness (01 Mar 2018 19:30)  Pt has no current complaints. Pt feels well.       PAST MEDICAL & SURGICAL HISTORY:  Hyperthyroidism  JOSE (obstructive sleep apnea)  Epistaxis  GIB (gastrointestinal bleeding)  CAD S/P percutaneous coronary angioplasty  Afib  Pulmonary HTN  GERD (gastroesophageal reflux disease)  Obesity  Cardiomegaly  Valvular heart disease  COPD (chronic obstructive pulmonary disease): Home O2  Sleep Apnea: by criteria  Loose, teeth: 3 bottom front loose teeth  Female stress incontinence  Shoulder pain, right  Constipation  Arthritis of Knee  H/O heartburn  History of lung cancer  Obese  Hx of hyperlipidemia  Asthma  Diabetes mellitus: type 2  dx about 4-5 years ago   no daily fingerstick  H/O: HTN (hypertension)  Enlarged lymph nodes  Lymph nodes enlarged: s/p biopsy mediastinal  benign  H/O endoscopy  left upper lobectomy    MEDICATIONS  (STANDING):  allopurinol 100 milliGRAM(s) Oral daily  aspirin enteric coated 81 milliGRAM(s) Oral daily  atorvastatin 20 milliGRAM(s) Oral at bedtime  buMETAnide Injectable 1 milliGRAM(s) IV Push every 12 hours  clopidogrel Tablet 75 milliGRAM(s) Oral daily  digoxin     Tablet 0.125 milliGRAM(s) Oral every other day  diltiazem    milliGRAM(s) Oral daily  heparin  Injectable 5000 Unit(s) SubCutaneous every 8 hours  methimazole 5 milliGRAM(s) Oral daily  metoprolol     tartrate 25 milliGRAM(s) Oral every 8 hours  pantoprazole    Tablet 40 milliGRAM(s) Oral before breakfast  potassium chloride    Tablet ER 10 milliEquivalent(s) Oral daily  predniSONE   Tablet 5 milliGRAM(s) Oral daily    MEDICATIONS  (PRN):  docusate sodium 100 milliGRAM(s) Oral two times a day PRN Constipation  HYDROcodone/homatropine Syrup 5 milliLiter(s) Oral at bedtime PRN Cough  ondansetron Injectable 4 milliGRAM(s) IV Push every 8 hours PRN Nausea and/or Vomiting  sucralfate suspension 1 Gram(s) Oral four times a day PRN acid reflux    Allergies    No Known Allergies    Intolerances    albuterol (Unknown)      PHYSICAL EXAM:  Constitutional: NAD  HEENT: Normocephalic, EOMI  Neck:  + JVD  Respiratory: CTA B/L, No wheezes, dec bs at bases  Cardiovascular: S1, S2, RRR, + systolic murmur  Gastrointestinal: BS+, soft, + tenderness, + distention, tympanitic, + fluid wave  Extremities: + peripheral edema 2+ pitting le b/l  Neurological: AAOX3, no focal deficits  Psychiatric: Normal mood, normal affect  : No Gottlieb    Vital Signs Last 24 Hrs  T(C): 36.8 (04 Mar 2018 11:45), Max: 37 (03 Mar 2018 21:24)  T(F): 98.3 (04 Mar 2018 11:45), Max: 98.6 (03 Mar 2018 21:24)  HR: 80 (04 Mar 2018 11:45) (71 - 80)  BP: 98/51 (04 Mar 2018 11:45) (96/56 - 102/60)  BP(mean): --  RR: 16 (04 Mar 2018 11:45) (16 - 18)  SpO2: 96% (04 Mar 2018 11:45) (95% - 96%)  I&O's Summary    03 Mar 2018 07:01  -  04 Mar 2018 07:00  --------------------------------------------------------  IN: 780 mL / OUT: 1100 mL / NET: -320 mL    04 Mar 2018 07:01  -  04 Mar 2018 16:06  --------------------------------------------------------  IN: 720 mL / OUT: 450 mL / NET: 270 mL        LABS:                        9.2    7.76  )-----------( 225      ( 04 Mar 2018 06:44 )             29.0     03-04    132<L>  |  91<L>  |  96<H>  ----------------------------<  160<H>  4.1   |  26  |  2.70<H>    Ca    8.4      04 Mar 2018 05:24  Phos  4.5     03-04  Mg     2.0     03-04          Magnesium, Serum: 2.0 mg/dL (03-04 @ 05:24)

## 2018-03-05 DIAGNOSIS — R04.0 EPISTAXIS: ICD-10-CM

## 2018-03-05 LAB
ANION GAP SERPL CALC-SCNC: 10 MMOL/L — SIGNIFICANT CHANGE UP (ref 5–17)
BUN SERPL-MCNC: 97 MG/DL — HIGH (ref 7–23)
CALCIUM SERPL-MCNC: 8.6 MG/DL — SIGNIFICANT CHANGE UP (ref 8.4–10.5)
CHLORIDE SERPL-SCNC: 92 MMOL/L — LOW (ref 96–108)
CO2 SERPL-SCNC: 31 MMOL/L — SIGNIFICANT CHANGE UP (ref 22–31)
CREAT SERPL-MCNC: 2.71 MG/DL — HIGH (ref 0.5–1.3)
GLUCOSE SERPL-MCNC: 99 MG/DL — SIGNIFICANT CHANGE UP (ref 70–99)
HCT VFR BLD CALC: 30.2 % — LOW (ref 34.5–45)
HGB BLD-MCNC: 9.8 G/DL — LOW (ref 11.5–15.5)
MCHC RBC-ENTMCNC: 28.2 PG — SIGNIFICANT CHANGE UP (ref 27–34)
MCHC RBC-ENTMCNC: 32.5 GM/DL — SIGNIFICANT CHANGE UP (ref 32–36)
MCV RBC AUTO: 86.9 FL — SIGNIFICANT CHANGE UP (ref 80–100)
NON-GYNECOLOGICAL CYTOLOGY STUDY: SIGNIFICANT CHANGE UP
PLATELET # BLD AUTO: 264 K/UL — SIGNIFICANT CHANGE UP (ref 150–400)
POTASSIUM SERPL-MCNC: 4.5 MMOL/L — SIGNIFICANT CHANGE UP (ref 3.5–5.3)
POTASSIUM SERPL-SCNC: 4.5 MMOL/L — SIGNIFICANT CHANGE UP (ref 3.5–5.3)
RBC # BLD: 3.48 M/UL — LOW (ref 3.8–5.2)
RBC # FLD: 16.6 % — HIGH (ref 10.3–14.5)
SODIUM SERPL-SCNC: 133 MMOL/L — LOW (ref 135–145)
WBC # BLD: 9.2 K/UL — SIGNIFICANT CHANGE UP (ref 3.8–10.5)
WBC # FLD AUTO: 9.2 K/UL — SIGNIFICANT CHANGE UP (ref 3.8–10.5)

## 2018-03-05 PROCEDURE — 93970 EXTREMITY STUDY: CPT | Mod: 26

## 2018-03-05 RX ORDER — SIMETHICONE 80 MG/1
80 TABLET, CHEWABLE ORAL THREE TIMES A DAY
Qty: 0 | Refills: 0 | Status: DISCONTINUED | OUTPATIENT
Start: 2018-03-05 | End: 2018-03-06

## 2018-03-05 RX ORDER — BACITRACIN ZINC 500 UNIT/G
1 OINTMENT IN PACKET (EA) TOPICAL
Qty: 0 | Refills: 0 | Status: DISCONTINUED | OUTPATIENT
Start: 2018-03-05 | End: 2018-04-04

## 2018-03-05 RX ADMIN — ATORVASTATIN CALCIUM 20 MILLIGRAM(S): 80 TABLET, FILM COATED ORAL at 22:39

## 2018-03-05 RX ADMIN — Medication 5 MILLIGRAM(S): at 06:27

## 2018-03-05 RX ADMIN — BUMETANIDE 1 MILLIGRAM(S): 0.25 INJECTION INTRAMUSCULAR; INTRAVENOUS at 18:33

## 2018-03-05 RX ADMIN — Medication 100 MILLIGRAM(S): at 12:27

## 2018-03-05 RX ADMIN — HEPARIN SODIUM 5000 UNIT(S): 5000 INJECTION INTRAVENOUS; SUBCUTANEOUS at 06:27

## 2018-03-05 RX ADMIN — Medication 10 MILLIEQUIVALENT(S): at 12:27

## 2018-03-05 RX ADMIN — Medication 0.12 MILLIGRAM(S): at 12:27

## 2018-03-05 RX ADMIN — CLOPIDOGREL BISULFATE 75 MILLIGRAM(S): 75 TABLET, FILM COATED ORAL at 12:27

## 2018-03-05 RX ADMIN — BUMETANIDE 1 MILLIGRAM(S): 0.25 INJECTION INTRAMUSCULAR; INTRAVENOUS at 06:27

## 2018-03-05 RX ADMIN — Medication 25 MILLIGRAM(S): at 07:55

## 2018-03-05 RX ADMIN — Medication 240 MILLIGRAM(S): at 07:54

## 2018-03-05 RX ADMIN — Medication 25 MILLIGRAM(S): at 12:28

## 2018-03-05 RX ADMIN — Medication 81 MILLIGRAM(S): at 12:27

## 2018-03-05 NOTE — PROGRESS NOTE ADULT - SUBJECTIVE AND OBJECTIVE BOX
[facility] - [facility]     PROGRESS NOTE    CHIEF COMPLAINT: CRF (hypoxic); COPD; emphysema; JOSE; secondary pulmonary hypertension; right heart failure; ascites    INTERVAL HISTORY: weak, tired and frail; upset about a nosebleed earlier and another Duplex examination of her legs; slowly improving abdominal discomfort following large volume paracentesis (9500cc) 3 days ago; still with mild to moderate abdominal fullness; better blood pressure; feels like she urinates more after lasix than bumex; shortness of breath somewhat improved but still with increased supplemental oxygen requirements; no cough, sputum production, chest congestion or wheeze; no fevers, chills or sweats; no chest pain/pressure or palpitations; legs less swollen    REVIEW OF SYSTEMS:  Constitutional: As per interval history  HEENT: Within normal limits  CV: As per interval history  Resp: As per interval history  GI: Within normal limits   : Within normal limits  Musculoskeletal: Within normal limits  Skin: Within normal limits  Neurological: Within normal limits  Psychiatric: Within normal limits  Endocrine: Within normal limits  Hematologic/Lymphatic: Within normal limits  Allergic/Immunologic: Within normal limits      MEDICATIONS:     Pulmonary "  HYDROcodone/homatropine Syrup 5 milliLiter(s) Oral at bedtime PRN      Anti-microbials:      Cardiovascular:  buMETAnide Injectable 1 milliGRAM(s) IV Push every 12 hours  digoxin     Tablet 0.125 milliGRAM(s) Oral every other day  diltiazem    milliGRAM(s) Oral daily  metolazone 5 milliGRAM(s) Oral daily  metoprolol     tartrate 25 milliGRAM(s) Oral every 8 hours      Other:  allopurinol 100 milliGRAM(s) Oral daily  aspirin enteric coated 81 milliGRAM(s) Oral daily  atorvastatin 20 milliGRAM(s) Oral at bedtime  clopidogrel Tablet 75 milliGRAM(s) Oral daily  docusate sodium 100 milliGRAM(s) Oral two times a day PRN  heparin  Injectable 5000 Unit(s) SubCutaneous every 8 hours  methimazole 5 milliGRAM(s) Oral daily  ondansetron Injectable 4 milliGRAM(s) IV Push every 8 hours PRN  pantoprazole    Tablet 40 milliGRAM(s) Oral before breakfast  potassium chloride    Tablet ER 10 milliEquivalent(s) Oral daily  predniSONE   Tablet 5 milliGRAM(s) Oral daily  simethicone 80 milliGRAM(s) Chew three times a day  sodium chloride 0.65% Nasal 1 Spray(s) Both Nostrils three times a day PRN  sucralfate suspension 1 Gram(s) Oral four times a day PRN        OBJECTIVE:    I&O's Detail    04 Mar 2018 07:01  -  05 Mar 2018 07:00  --------------------------------------------------------  IN:    Oral Fluid: 1020 mL  Total IN: 1020 mL    OUT:    Voided: 725 mL  Total OUT: 725 mL    Total NET: 295 mL      05 Mar 2018 07:01  -  05 Mar 2018 14:26  --------------------------------------------------------  IN:    Oral Fluid: 380 mL  Total IN: 380 mL    OUT:  Total OUT: 0 mL    Total NET: 380 mL     Daily Weight in k.1 (05 Mar 2018 06:13)    PHYSICAL EXAM:       ICU Vital Signs Last 24 Hrs  T(C): 36.4 (05 Mar 2018 12:23), Max: 36.8 (04 Mar 2018 20:27)  T(F): 97.5 (05 Mar 2018 12:23), Max: 98.3 (04 Mar 2018 20:27)  HR: 74 (05 Mar 2018 12:23) (60 - 74)  BP: 100/66 (05 Mar 2018 12:23) (99/65 - 125/76)  BP(mean): 65 (05 Mar 2018 06:13) (65 - 65)  ABP: --  ABP(mean): --  RR: 17 (05 Mar 2018 12:23) (16 - 18)  SpO2: 98% (05 Mar 2018 06:13) (95% - 98%) on 4lpm     General: Awake. Alert. Cooperative. Weak and tired. No distress. Chronically ill appearing	  HEENT:   Atraumatic. Bitemporal wasting. Anicteric. Normal oral mucosa, PERRL, EOMI  Neck: Supple. Trachea midline. Thyroid without enlargement/tenderness/nodules. No carotid bruit. (+) JVD; loss of bilateral supraclavicular fat pads	  Cardiovascular: Irregularly irregular rate and rhythm. S1 S2 normal. II/VI systolic murmur  Respiratory: Respirations unlabored. Decreased breath sounds throughout  Abdomen: Soft. Non-tender. Moderately distended with fluid wave. No organomegaly. No masses. Normal bowel sounds	  Extremities: Warm to touch. No clubbing or cyanosis. Mild to moderate lower extremity edema up to the thigh. Loss of extremity muscle mass  Pulses: Decreased lower extremity peripheral pulses  Skin: Normal skin color. No rashes or lesions. No ecchymoses, No cyanosis. Warm to touch  Lymph Nodes: Cervical, supraclavicular and axillary nodes normal  Neurological: Motor and sensory examination equal and normal. A and O x 3  Psychiatry: Depressed      LABS:                        9.2    7.76  )-----------( 225      ( 04 Mar 2018 06:44 )             29.0     03-05    133<L>  |  92<L>  |  97<H>  ----------------------------<  99  4.5   |  31  |  2.71<H>    03-04    132<L>  |  91<L>  |  96<H>  ----------------------------<  160<H>  4.1   |  26  |  2.70<H>    Ca      8.6      03-05    Ca      8.4      03-04    Phos    4.5     03-04    Phos    3.6     03-02      Mg       2.0     03-04    Mg       2.1     03-02    TPro  5.7<L>  /  Alb  2.9<L>  /  TBili  0.4  /  DBili  x   /  AST  8<L>  /  ALT  <5<L>  /  AlkPhos  49  03-02    TPro  7.2  /  Alb  3.1<L>  /  TBili  0.5  /  DBili  x   /  AST  34  /  ALT  32  /  AlkPhos  38<L>  -    Serum Pro-Brain Natriuretic Peptide: 21746 pg/mL ( @ 16:29)    CARDIAC MARKERS ( 01 Mar 2018 16:29 )  x     / 0.06 ng/mL / x     / x     / 5.4 ng/mL    < from: Transthoracic Echocardiogram (17 @ 18:58) >    Patient name: FRAN ALEXANDRA  YOB: 1945   Age: 71 (F)   MR#: [id number]  Study Date: 2017  Location: [address] [place], [place] [id number]: Thalia Amezquita [facility]  Study quality: Technically fair  Referring Physician: Pierce Cobb MD  Blood Pressure: 106/58 mmHg  Height: 160 cm  Weight: 64 kg  BSA: 1.7 m2  ------------------------------------------------------------------------  PROCEDURE: Transthoracic echocardiogram with 2-D, M-Mode  and complete spectral and color flow Doppler.  INDICATION: Other specified pulmonary heart diseases  (I27.89)  ------------------------------------------------------------------------  Dimensions:    Normal Values:  LA:     3.6    2.0 - 4.0 cm  Ao:     2.8    2.0 - 3.8 cm  SEPTUM: 0.7    0.6 - 1.2 cm  PWT:    0.9    0.6 - 1.1 cm  LVIDd:  4.1    3.0 - 5.6 cm  LVIDs:  1.9    1.8 - 4.0 cm  Derived variables:  LVMI: 58 g/m2  RWT: 0.43  Fractional short: 54 %  Doppler Peak Velocity (m/sec): AoV=1.8  ------------------------------------------------------------------------  Observations:  Mitral Valve: Mitral annular calcification, otherwise  normal mitral valve. Minimal mitral regurgitation.  Aortic Valve/Aorta: Calcified trileaflet aortic valve with  normal opening. Peak transaortic valve gradient equals 13  mm Hg, mean transaortic valve gradient equals 8 mm Hg. Peak  left ventricular outflow tract gradient equals 6 mm Hg,  mean gradient is equal to 3 mm Hg, LVOT velocity time  integral equals 19 cm.  Aortic Root: 2.8 cm.  LVOT diameter: 1.9 cm.  Left Atrium: Normal left atrium.  LA volume index = 25  cc/m2.  Left Ventricle: Hyperdynamic left ventricular systolic  function. Flattening of the interventricular septum in both  systole and diastole is  consistent with right ventricular  pressure overload. Normal left ventricular internal  dimensions and wall thicknesses.  Right Heart: Severe right atrial enlargement. Right  ventricular enlargement with decreased right ventricular  systolic function. Normal tricuspid valve. Mild-moderate  tricuspid regurgitation. Normal pulmonic valve.  Pericardium/Pleura: Normal pericardium with trace  pericardial effusion.  Left pleural effusion.  Hemodynamic: Estimated right atrial pressure is 8 mm Hg.  Estimated right ventricular systolic pressure equals 72 mm  Hg, assuming right atrial pressure equals 8 mm Hg,  consistent with severe pulmonary hypertension.  ------------------------------------------------------------------------  Conclusions:  1. Calcified trileaflet aortic valve with normal opening.  2. Normal left ventricular internal dimensions and wall  thicknesses.  3. Hyperdynamic left ventricular systolic function.  Flattening of the interventricular septum in both systole  and diastole is  consistent with right ventricular pressure  overload.  4. Severe right atrial enlargement.  5. Right ventricular enlargement with decreased right  ventricular systolic function.  6. Normal tricuspid valve. Mild-moderate tricuspid  regurgitation.  7. Estimated pulmonary artery systolic pressure equals 72  mm Hg, assuming right atrial pressure equals 8 mm Hg,  consistent with severe pulmonary pressures.  *** Compared with echocardiogram of 2017, no  significant changes noted.  ------------------------------------------------------------------------  Confirmed on  2017 - 13:15:09 by Justin Cohen M.D.  ------------------------------------------------------------------------    < end of copied text >  ---------------------------------------------------------------------------------------------------------  MICROBIOLOGY:     Culture - Body Fluid with Gram Stain (18 @ 21:16)    Gram Stain:   polymorphonuclear leukocytes seen per low power field  No organisms seen per oil power field  by cytocentrifuge    Specimen Source: Peritoneal Peritoneal Fluid    Culture Results:   No growth to date.    Culture - Acid Fast - Body Fluid w/Smear (18 @ 21:16)    Specimen Source: .Body Fluid Peritoneal Fluid    Acid Fast Bacilli Smear:   No acid fast bacilli seen by fluorochrome stain    RADIOLOGY:  [x] Chest radiographs reviewed and interpreted by me    < from: Xray Abdomen 1 View PORTABLE -Urgent (18 @ 17:11) >    EXAM:  XR ABDOMEN PORTABLE URGENT 1V                            PROCEDURE DATE:  2018      INTERPRETATION:  CLINICAL INFORMATION: Abdominal distention. Evaluate for   obstruction.    TECHNIQUE: Portable abdominal radiograph dated 3/4/2018.    COMPARISON: Femoral ultrasound dated 3/2/2018. CT chest dated 3/3/2018.    FINDINGS: Top of Form 1      Large volume ascites.  There are no dilated loops of small bowel.   Bowel gas and stool identified throughout the colon and rectum.   There is no free air visualized.  The visualized osseous structures are unremarkable for patient's stated   age.    IMPRESSION:    Nonobstructive bowel gas pattern.    Ascites.    RACHEL VELARDE M.D., RADIOLOGY RESIDENT  This document has been electronically signed.  PIERRE BAILEY M.D., ATTENDING RADIOLOGIST  This document has been electronically signed. Mar  5 2018 10:22AM      < end of copied text >  ---------------------------------------------------------------------------------------------------------  < from: CT Chest No Cont (18 @ 18:24) >    EXAM:  CT CHEST                            PROCEDURE DATE:  2018            INTERPRETATION:  CLINICAL INFORMATION: Evaluate pleural effusions.   Shortness of breath.    COMPARISON: Chest CT dated 2017. Chest x-ray dated 3/1/2018.    PROCEDURE:   CT of the Chest was performed without intravenous contrast.  Sagittal and coronal reformats were performed.  Axial MIP reformats were also performed.    FINDINGS:    CHEST:     LUNGS AND LARGE AIRWAYS: Patent central airways.  Bibasilar subsegmental   atelectasis, right greater than left.  PLEURA: Trace bilateral pleural effusions.  VESSELS: Thoracic aortic and coronary artery atherosclerosis.  HEART: Cardiomegaly. Small pericardial effusion. Mitral annular   calcification. A densely calcified or metallic density measuring 1.1 x   0.4 cm overlies the basilar left pulmonary artery (3:52, 6:80).  MEDIASTINUM AND STEFANIA: No lymphadenopathy.  CHEST WALL AND LOWER NECK: Within normal limits.  VISUALIZED UPPER ABDOMEN: Small volume ascites. Atherosclerotic changes.  BONES: Multilevel degenerative disease.    IMPRESSION: Trace bilateral pleural effusions.  Bibasilar subsegmental atelectasis, right greater than left.  Densely calcified or metallic density overlies the left pulmonary artery.  See above..      JUAN JUAREZ M.D., RADIOLOGY RESIDENT  This document has been electronically signed.  MADONNA MORGAN M.D., ATTENDING RADIOLOGIST  This document has been electronically signed. Mar  4 2018 12:28PM      < end of copied text >  ---------------------------------------------------------------------------------------------------------    < from: Xray Chest 2 Views PA/Lat (18 @ 17:19) >    EXAM:  XR CHEST PA LAT 2V                            PROCEDURE DATE:  2018        INTERPRETATION:  CLINICAL INFORMATION: Dyspnea.     EXAM: PA and lateral view chest radiograph    COMPARISON: Chest radiograph 2017    FINDINGS:   Thecardiac silhouette is normal.  Mediastinal surgical clips unchanged.  Aortic calcification.    Left pleural effusion similar in appearance to the prior.    No acute osseous abnormalities.      IMPRESSION:   Clear lungs.    SUGEY RODRIGUEZ, RADIOLOGY RESIDENT  This document has been electronically signed.  RODO SYED M.D., ATTENDING RADIOLOGIST  This document has been electronically signed. Mar  2 2018  9:51AM      < end of copied text >  ---------------------------------------------------------------------------------------------------------    < from: US Abdomen Limited (18 @ 08:50) >    EXAM:  US ABDOMEN LIMITED                            PROCEDURE DATE:  2018      INTERPRETATION:  Clinical information: Assess for ascites for   paracentesis.    Comparison: Ultrasound 2017.    Findings: Targeted ultrasound was performed for purposes of ascites   evaluation. A large amount of ascites is noted in all 4 quadrants.    IMPRESSION:    Diffuse, large volume abdominal ascites.    GAEL SCHERER M.D., ATTENDING RADIOLOGIST  This document has been electronically signed. Mar  2 2018  9:16AM      < end of copied text >  ---------------------------------------------------------------------------------------------------------

## 2018-03-05 NOTE — CONSULT NOTE ADULT - ASSESSMENT
71 yo female on aspirin and plavix with multiple episodes of epistaxis, previously resolving on their own. ENT called to evaluate pt for persistent nose bleed this evening. Left nare packed with dissolvable surgicel gauze.

## 2018-03-05 NOTE — CONSULT NOTE ADULT - SUBJECTIVE AND OBJECTIVE BOX
CC: Epistaxis    HPI: Patient is a 72F with PMHx of AFib, asthma, CAD, cardiomegaly, enlarged lymph nodes, COPD, DM type II, PSHx of endoscopy, upper lobectomy, sent to the ED by PMD to be admitted for heart failure. ENT called to evaluate pt for multiple episodes of epistaxis in the past two days. Previous episodes resolved with pressure, however, today's was more persistent. Pt presently on aspirin and plavix. Pt admits to a hx of epistaxis. Pt currently denies new  dizziness, headache, hemoptysis, SOB, chest pain. Pt on continuous supplemental O2 via nasal cannula.    PAST MEDICAL & SURGICAL HISTORY:  Hyperthyroidism  JOSE (obstructive sleep apnea)  Epistaxis  GIB (gastrointestinal bleeding)  CAD S/P percutaneous coronary angioplasty  Afib  Pulmonary HTN  GERD (gastroesophageal reflux disease)  Obesity  Cardiomegaly  Valvular heart disease  COPD (chronic obstructive pulmonary disease): Home O2  Sleep Apnea: by criteria  Loose, teeth: 3 bottom front loose teeth  Female stress incontinence  Shoulder pain, right  Constipation  Arthritis of Knee  H/O heartburn  History of lung cancer  Obese  Hx of hyperlipidemia  Asthma  Diabetes mellitus: type 2  dx about 4-5 years ago   no daily fingerstick  H/O: HTN (hypertension)  Enlarged lymph nodes  Lymph nodes enlarged: s/p biopsy mediastinal  benign  H/O endoscopy  left upper lobectomy    Allergies    No Known Allergies    Intolerances    albuterol (Unknown)    MEDICATIONS  (STANDING):  allopurinol 100 milliGRAM(s) Oral daily  aspirin enteric coated 81 milliGRAM(s) Oral daily  atorvastatin 20 milliGRAM(s) Oral at bedtime  BACItracin   Ointment 1 Application(s) Topical two times a day  buMETAnide Injectable 1 milliGRAM(s) IV Push every 12 hours  clopidogrel Tablet 75 milliGRAM(s) Oral daily  digoxin     Tablet 0.125 milliGRAM(s) Oral every other day  diltiazem    milliGRAM(s) Oral daily  heparin  Injectable 5000 Unit(s) SubCutaneous every 8 hours  methimazole 5 milliGRAM(s) Oral daily  metolazone 5 milliGRAM(s) Oral daily  metoprolol     tartrate 25 milliGRAM(s) Oral every 8 hours  pantoprazole    Tablet 40 milliGRAM(s) Oral before breakfast  potassium chloride    Tablet ER 10 milliEquivalent(s) Oral daily  predniSONE   Tablet 5 milliGRAM(s) Oral daily  simethicone 80 milliGRAM(s) Chew three times a day    MEDICATIONS  (PRN):  docusate sodium 100 milliGRAM(s) Oral two times a day PRN Constipation  HYDROcodone/homatropine Syrup 5 milliLiter(s) Oral at bedtime PRN Cough  ondansetron Injectable 4 milliGRAM(s) IV Push every 8 hours PRN Nausea and/or Vomiting  sodium chloride 0.65% Nasal 1 Spray(s) Both Nostrils three times a day PRN Nasal Congestion  sucralfate suspension 1 Gram(s) Oral four times a day PRN acid reflux    Social History: previous smoker    ROS: ENT, GI, , CV, Pulm, Neuro, Psych, MS, Heme, Endo, Constitutional; all negative except as noted in HPI    Vital Signs Last 24 Hrs  T(C): 36.4 (05 Mar 2018 12:23), Max: 36.8 (04 Mar 2018 20:27)  T(F): 97.5 (05 Mar 2018 12:23), Max: 98.3 (04 Mar 2018 20:27)  HR: 74 (05 Mar 2018 12:23) (63 - 74)  BP: 100/66 (05 Mar 2018 12:23) (99/65 - 125/76)  BP(mean): 65 (05 Mar 2018 06:13) (65 - 65)  RR: 17 (05 Mar 2018 12:23) (16 - 18)  SpO2: 98% (05 Mar 2018 06:13) (95% - 98%)                          9.2    7.76  )-----------( 225      ( 04 Mar 2018 06:44 )             29.0    03-05    133<L>  |  92<L>  |  97<H>  ----------------------------<  99  4.5   |  31  |  2.71<H>    Ca    8.6      05 Mar 2018 06:28  Phos  4.5     03-04  Mg     2.0     03-04         PHYSICAL EXAM:  Gen: NAD, well-developed  Head: Normocephalic, Atraumatic  Face: no edema/erythema/fluctuance, parotid glands soft without mass  Eyes: No scleral injection  Nose: Nasal cannula in place, left nose with BRB packed with dissolvable surgicel gauze, bleeding resolved, right nare with old blood, no active bleed  Mouth: Mucosa moist, tongue/uvula midline, oropharynx clear, no active bleed or clots  Neck: Flat, supple, no lymphadenopathy, trachea midline, no masses  Resp: no use of accessory muscles, no stridor  CV: no peripheral edema/cyanosis

## 2018-03-05 NOTE — PROGRESS NOTE ADULT - ASSESSMENT
Patient is a 71-year-old female with past medical history of diabetes, hypertension, severe pulmonary hypertension, right ventricular dysfunction, presents with SOB.  The patient's acute renal failure is likely hemodynamic in nature.  Goals of therapy are to improve her hemodynamics in order to improve her forward flow and renal perfusion.  CASSANDRA  Severe PHTN  Cor Pulm  Failure to thrive  Abd pain    Renal: Continue Bumex 1mg IVP bid for now.  Add zaroxoyln 5mg po qd and assess urine output.   BMP in AM.  Gastroenterology: s/p LARGE VOLUME PARACENTESIS.    Pulmonary: Pulmonary nebulizers.  Continue with nasal cannula oxygen at all times.  Cardiology: Monitor hemodynamics and volume status.

## 2018-03-05 NOTE — PROGRESS NOTE ADULT - SUBJECTIVE AND OBJECTIVE BOX
[facility] [phone]        Patient seen and examined in bed.  She states that she had a nose bleed this am.  She still has discomfort at the incision site        MEDICATIONS  (STANDING):  allopurinol 100 milliGRAM(s) Oral daily  aspirin enteric coated 81 milliGRAM(s) Oral daily  atorvastatin 20 milliGRAM(s) Oral at bedtime  buMETAnide Injectable 1 milliGRAM(s) IV Push every 12 hours  clopidogrel Tablet 75 milliGRAM(s) Oral daily  digoxin     Tablet 0.125 milliGRAM(s) Oral every other day  diltiazem    milliGRAM(s) Oral daily  heparin  Injectable 5000 Unit(s) SubCutaneous every 8 hours  methimazole 5 milliGRAM(s) Oral daily  metoprolol     tartrate 25 milliGRAM(s) Oral every 8 hours  pantoprazole    Tablet 40 milliGRAM(s) Oral before breakfast  potassium chloride    Tablet ER 10 milliEquivalent(s) Oral daily  predniSONE   Tablet 5 milliGRAM(s) Oral daily      VITAL:  T(C): , Max: 36.8 (03-04-18 @ 11:45)  T(F): , Max: 98.3 (03-04-18 @ 11:45)  HR: 68 (03-05-18 @ 06:13)  BP: 99/65 (03-05-18 @ 06:13)  BP(mean): 65 (03-05-18 @ 06:13)  RR: 16 (03-05-18 @ 06:13)  SpO2: 98% (03-05-18 @ 06:13)  Wt(kg): --    I and O's:    03-04 @ 07:01  -  03-05 @ 07:00  --------------------------------------------------------  IN: 1020 mL / OUT: 725 mL / NET: 295 mL    03-05 @ 07:01  -  03-05 @ 09:21  --------------------------------------------------------  IN: 20 mL / OUT: 0 mL / NET: 20 mL          PHYSICAL EXAM:    Constitutional: NAD  HEENT: PERRLA    Neck:  No JVD  Respiratory: reduced breath sounds  Cardiovascular: S1 and S2  Gastrointestinal: BS+, soft, NT/ND  Extremities: +2 peripheral edema  Neurological: A/O x 3, no focal deficits  Psychiatric: Normal mood, normal affect  : No Gottlieb  Skin: No rashes  Access: Not applicable    LABS:                        9.2    7.76  )-----------( 225      ( 04 Mar 2018 06:44 )             29.0     03-05    133<L>  |  92<L>  |  97<H>  ----------------------------<  99  4.5   |  31  |  2.71<H>    Ca    8.6      05 Mar 2018 06:28  Phos  4.5     03-04  Mg     2.0     03-04            Urine Studies:          RADIOLOGY & ADDITIONAL STUDIES:

## 2018-03-05 NOTE — PROGRESS NOTE ADULT - SUBJECTIVE AND OBJECTIVE BOX
MEDICINE, PROGRESS NOTE [phone]    FRAN ALEXANDRA 72y [id number]    Patient seen and examined.  Patient is a 72y old  Female who presents with a chief complaint of shortness of breath and difficulty eating due to fullness (01 Mar 2018 19:30)  Pt having current epistaxis.    PAST MEDICAL & SURGICAL HISTORY:  Hyperthyroidism  JOSE (obstructive sleep apnea)  Epistaxis  GIB (gastrointestinal bleeding)  CAD S/P percutaneous coronary angioplasty  Afib  Pulmonary HTN  GERD (gastroesophageal reflux disease)  Obesity  Cardiomegaly  Valvular heart disease  COPD (chronic obstructive pulmonary disease): Home O2  Sleep Apnea: by criteria  Loose, teeth: 3 bottom front loose teeth  Female stress incontinence  Shoulder pain, right  Constipation  Arthritis of Knee  H/O heartburn  History of lung cancer  Obese  Hx of hyperlipidemia  Asthma  Diabetes mellitus: type 2  dx about 4-5 years ago   no daily fingerstick  H/O: HTN (hypertension)  Enlarged lymph nodes  Lymph nodes enlarged: s/p biopsy mediastinal  benign  H/O endoscopy  left upper lobectomy    MEDICATIONS  (STANDING):  allopurinol 100 milliGRAM(s) Oral daily  aspirin enteric coated 81 milliGRAM(s) Oral daily  atorvastatin 20 milliGRAM(s) Oral at bedtime  buMETAnide Injectable 1 milliGRAM(s) IV Push every 12 hours  clopidogrel Tablet 75 milliGRAM(s) Oral daily  digoxin     Tablet 0.125 milliGRAM(s) Oral every other day  diltiazem    milliGRAM(s) Oral daily  heparin  Injectable 5000 Unit(s) SubCutaneous every 8 hours  methimazole 5 milliGRAM(s) Oral daily  metolazone 5 milliGRAM(s) Oral daily  metoprolol     tartrate 25 milliGRAM(s) Oral every 8 hours  pantoprazole    Tablet 40 milliGRAM(s) Oral before breakfast  potassium chloride    Tablet ER 10 milliEquivalent(s) Oral daily  predniSONE   Tablet 5 milliGRAM(s) Oral daily  simethicone 80 milliGRAM(s) Chew three times a day    MEDICATIONS  (PRN):  docusate sodium 100 milliGRAM(s) Oral two times a day PRN Constipation  HYDROcodone/homatropine Syrup 5 milliLiter(s) Oral at bedtime PRN Cough  ondansetron Injectable 4 milliGRAM(s) IV Push every 8 hours PRN Nausea and/or Vomiting  sodium chloride 0.65% Nasal 1 Spray(s) Both Nostrils three times a day PRN Nasal Congestion  sucralfate suspension 1 Gram(s) Oral four times a day PRN acid reflux    Allergies    No Known Allergies    Intolerances    albuterol (Unknown)      PHYSICAL EXAM:  Constitutional: NAD  HEENT: Normocephalic, EOMI  Neck:  No JVD  Respiratory: CTA B/L, No wheezes  Cardiovascular: S1, S2, RRR, + systolic murmur  Gastrointestinal: BS+, soft, mild tenderness, distended, + fluid wave  Extremities: + peripheral edema le b/l  Neurological: AAOX3, no focal deficits  Psychiatric: Normal mood, normal affect  : No Gottlieb    Vital Signs Last 24 Hrs  T(C): 36.4 (05 Mar 2018 12:23), Max: 36.8 (04 Mar 2018 20:27)  T(F): 97.5 (05 Mar 2018 12:23), Max: 98.3 (04 Mar 2018 20:27)  HR: 74 (05 Mar 2018 12:23) (63 - 74)  BP: 100/66 (05 Mar 2018 12:23) (99/65 - 125/76)  BP(mean): 65 (05 Mar 2018 06:13) (65 - 65)  RR: 17 (05 Mar 2018 12:23) (16 - 18)  SpO2: 98% (05 Mar 2018 06:13) (95% - 98%)  I&O's Summary    04 Mar 2018 07:01  -  05 Mar 2018 07:00  --------------------------------------------------------  IN: 1020 mL / OUT: 725 mL / NET: 295 mL    05 Mar 2018 07:01  -  05 Mar 2018 18:54  --------------------------------------------------------  IN: 380 mL / OUT: 0 mL / NET: 380 mL        LABS:                        9.2    7.76  )-----------( 225      ( 04 Mar 2018 06:44 )             29.0     03-05    133<L>  |  92<L>  |  97<H>  ----------------------------<  99  4.5   |  31  |  2.71<H>    Ca    8.6      05 Mar 2018 06:28  Phos  4.5     03-04  Mg     2.0     03-04

## 2018-03-05 NOTE — PROGRESS NOTE ADULT - ASSESSMENT
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  3) chronic diastolic CHF (little evidence left sided heart failure on chest CT - trace pleural effusions - minimal ground glass opacities)    PLAN/RECOMMENDATIONS    oxygen supplementation ATC to keep saturation greater than 92%  chest CT reviewed  s/p large volume paracentesis  may benefit from a chronic indwelling intraperitoneal catheter despite its inherent risks to improve quality of life - I have reached out to IR to discuss the risks and benefits  diurese as tolerated by renal function and hemodynamics  albuterol/atrovent/pulmicort nebs  cardiac meds: bumex IVP/metolazone/cardizem CD/lopressor/digoxin/ASA/plavix/lipitor  GI/DVT prophylaxis - protonix/carafate prn/SQ heparin  prednisone 5mg daily (?)  patient has been treated with endothelin antagonists and phosphodiesterase inhibitors without improvement in PAPs in the past  KATY stockings     Will follow with you. Plan of care discussed with the patient at bedside and nursing staff    Chacho Gallego MD, [facility] - [phone]  Pulmonary Medicine

## 2018-03-05 NOTE — PROGRESS NOTE ADULT - ASSESSMENT
volume overload/ascites/LE edema  sev PHTN  decomp diast hf  sev copd  restrictive lung disease sec to ascites which diminishes diaphragmatic excrusion  epistaxis    continue current care  appreciate renal input  continue diuresis  ENT eval for epistaxis.

## 2018-03-06 LAB
ANION GAP SERPL CALC-SCNC: 13 MMOL/L — SIGNIFICANT CHANGE UP (ref 5–17)
BUN SERPL-MCNC: 101 MG/DL — HIGH (ref 7–23)
CALCIUM SERPL-MCNC: 8.7 MG/DL — SIGNIFICANT CHANGE UP (ref 8.4–10.5)
CHLORIDE SERPL-SCNC: 92 MMOL/L — LOW (ref 96–108)
CO2 SERPL-SCNC: 30 MMOL/L — SIGNIFICANT CHANGE UP (ref 22–31)
CREAT SERPL-MCNC: 2.73 MG/DL — HIGH (ref 0.5–1.3)
GLUCOSE SERPL-MCNC: 98 MG/DL — SIGNIFICANT CHANGE UP (ref 70–99)
MAGNESIUM SERPL-MCNC: 2 MG/DL — SIGNIFICANT CHANGE UP (ref 1.6–2.6)
PHOSPHATE SERPL-MCNC: 4.1 MG/DL — SIGNIFICANT CHANGE UP (ref 2.5–4.5)
POTASSIUM SERPL-MCNC: 4.2 MMOL/L — SIGNIFICANT CHANGE UP (ref 3.5–5.3)
POTASSIUM SERPL-SCNC: 4.2 MMOL/L — SIGNIFICANT CHANGE UP (ref 3.5–5.3)
SODIUM SERPL-SCNC: 135 MMOL/L — SIGNIFICANT CHANGE UP (ref 135–145)

## 2018-03-06 RX ORDER — POTASSIUM CHLORIDE 20 MEQ
10 PACKET (EA) ORAL DAILY
Qty: 0 | Refills: 0 | Status: DISCONTINUED | OUTPATIENT
Start: 2018-03-06 | End: 2018-03-11

## 2018-03-06 RX ORDER — ACETAMINOPHEN 500 MG
1000 TABLET ORAL ONCE
Qty: 0 | Refills: 0 | Status: COMPLETED | OUTPATIENT
Start: 2018-03-06 | End: 2018-03-06

## 2018-03-06 RX ADMIN — Medication 1 APPLICATION(S): at 13:14

## 2018-03-06 RX ADMIN — Medication 1 APPLICATION(S): at 18:52

## 2018-03-06 RX ADMIN — Medication 5 MILLIGRAM(S): at 11:08

## 2018-03-06 RX ADMIN — Medication 1000 MILLIGRAM(S): at 13:45

## 2018-03-06 RX ADMIN — Medication 400 MILLIGRAM(S): at 13:12

## 2018-03-06 RX ADMIN — Medication 240 MILLIGRAM(S): at 08:33

## 2018-03-06 RX ADMIN — Medication 100 MILLIGRAM(S): at 11:07

## 2018-03-06 RX ADMIN — Medication 81 MILLIGRAM(S): at 11:07

## 2018-03-06 RX ADMIN — BUMETANIDE 1 MILLIGRAM(S): 0.25 INJECTION INTRAMUSCULAR; INTRAVENOUS at 18:52

## 2018-03-06 RX ADMIN — Medication 25 MILLIGRAM(S): at 21:42

## 2018-03-06 RX ADMIN — Medication 10 MILLIEQUIVALENT(S): at 17:54

## 2018-03-06 RX ADMIN — BUMETANIDE 1 MILLIGRAM(S): 0.25 INJECTION INTRAMUSCULAR; INTRAVENOUS at 08:33

## 2018-03-06 RX ADMIN — CLOPIDOGREL BISULFATE 75 MILLIGRAM(S): 75 TABLET, FILM COATED ORAL at 11:07

## 2018-03-06 RX ADMIN — ATORVASTATIN CALCIUM 20 MILLIGRAM(S): 80 TABLET, FILM COATED ORAL at 21:42

## 2018-03-06 RX ADMIN — Medication 25 MILLIGRAM(S): at 11:12

## 2018-03-06 NOTE — PROGRESS NOTE ADULT - ASSESSMENT
volume overload/ascites/LE edema  sev PHTN  decomp diast hf  sev copd  restrictive lung disease sec to ascites which diminishes diaphragmatic excrusion  epistaxis resolved    contiue current care  appreciate renal and pulm input  pt not ready to discuss goals of care and doesn't want to be dnr or see palliative care(which was offered)  will attempt diuresis  will wait to discuss with renal and pulm alix.

## 2018-03-06 NOTE — PROGRESS NOTE ADULT - SUBJECTIVE AND OBJECTIVE BOX
MEDICINE, PROGRESS NOTE [phone]    FRAN ALEXANDRA 72y [id number]    Patient seen and examined.  Patient is a 72y old  Female who presents with a chief complaint of shortness of breath and difficulty eating due to fullness (01 Mar 2018 19:30)  Pt still with abd pain, nose packed yesterday    PAST MEDICAL & SURGICAL HISTORY:  Hyperthyroidism  JOSE (obstructive sleep apnea)  Epistaxis  GIB (gastrointestinal bleeding)  CAD S/P percutaneous coronary angioplasty  Afib  Pulmonary HTN  GERD (gastroesophageal reflux disease)  Obesity  Cardiomegaly  Valvular heart disease  COPD (chronic obstructive pulmonary disease): Home O2  Sleep Apnea: by criteria  Loose, teeth: 3 bottom front loose teeth  Female stress incontinence  Shoulder pain, right  Constipation  Arthritis of Knee  H/O heartburn  History of lung cancer  Obese  Hx of hyperlipidemia  Asthma  Diabetes mellitus: type 2  dx about 4-5 years ago   no daily fingerstick  H/O: HTN (hypertension)  Enlarged lymph nodes  Lymph nodes enlarged: s/p biopsy mediastinal  benign  H/O endoscopy  left upper lobectomy    MEDICATIONS  (STANDING):  allopurinol 100 milliGRAM(s) Oral daily  aspirin enteric coated 81 milliGRAM(s) Oral daily  atorvastatin 20 milliGRAM(s) Oral at bedtime  BACItracin   Ointment 1 Application(s) Topical two times a day  buMETAnide Injectable 1 milliGRAM(s) IV Push every 12 hours  clopidogrel Tablet 75 milliGRAM(s) Oral daily  digoxin     Tablet 0.125 milliGRAM(s) Oral every other day  diltiazem    milliGRAM(s) Oral daily  heparin  Injectable 5000 Unit(s) SubCutaneous every 8 hours  methimazole 5 milliGRAM(s) Oral daily  metoprolol     tartrate 25 milliGRAM(s) Oral every 8 hours  pantoprazole    Tablet 40 milliGRAM(s) Oral before breakfast  potassium chloride    Tablet ER 10 milliEquivalent(s) Oral daily  predniSONE   Tablet 5 milliGRAM(s) Oral daily    MEDICATIONS  (PRN):  docusate sodium 100 milliGRAM(s) Oral two times a day PRN Constipation  Gas-X extra strength (simethicone 125 mg 1 Tablet(s) 1 Tablet(s) Chew three times a day PRN bloating  HYDROcodone/homatropine Syrup 5 milliLiter(s) Oral at bedtime PRN Cough  ondansetron Injectable 4 milliGRAM(s) IV Push every 8 hours PRN Nausea and/or Vomiting  sodium chloride 0.65% Nasal 1 Spray(s) Both Nostrils three times a day PRN Nasal Congestion  sucralfate suspension 1 Gram(s) Oral four times a day PRN acid reflux    Allergies    No Known Allergies    Intolerances    albuterol (Unknown)      PHYSICAL EXAM:  Constitutional: NAD  HEENT: Normocephalic, EOMI  Neck:  No JVD  Respiratory: CTA B/L, No wheezes, dec bs at bases  Cardiovascular: S1, S2, RRR, + systolic murmur  Gastrointestinal: BS hypo, distended, mild tenderness  Extremities: + peripheral edema le b/l  Neurological: AAOX3, no focal deficits  Psychiatric: Normal mood, normal affect  : No Gottlieb    Vital Signs Last 24 Hrs  T(C): 36.3 (06 Mar 2018 13:29), Max: 36.6 (05 Mar 2018 21:16)  T(F): 97.3 (06 Mar 2018 13:29), Max: 97.8 (05 Mar 2018 21:16)  HR: 126 (06 Mar 2018 13:29) (64 - 126)  BP: 91/62 (06 Mar 2018 13:29) (87/56 - 93/60)  BP(mean): --  RR: 22 (06 Mar 2018 13:29) (20 - 22)  SpO2: 95% (06 Mar 2018 13:29) (90% - 95%)  I&O's Summary    05 Mar 2018 07:01  -  06 Mar 2018 07:00  --------------------------------------------------------  IN: 580 mL / OUT: 1000 mL / NET: -420 mL    06 Mar 2018 07:01  -  06 Mar 2018 20:41  --------------------------------------------------------  IN: 1431 mL / OUT: 250 mL / NET: 1181 mL        LABS:                        9.8    9.2   )-----------( 264      ( 05 Mar 2018 20:38 )             30.2     03-06    135  |  92<L>  |  101<H>  ----------------------------<  98  4.2   |  30  |  2.73<H>    Ca    8.7      06 Mar 2018 06:43  Phos  4.1     03-06  Mg     2.0     03-06    Magnesium, Serum: 2.0 mg/dL (03-06 @ 06:43)

## 2018-03-06 NOTE — PROGRESS NOTE ADULT - ASSESSMENT
Patient is a 71-year-old female with past medical history of diabetes, hypertension, severe pulmonary hypertension, right ventricular dysfunction, presents with SOB.  The patient's acute renal failure is likely hemodynamic in nature.  Goals of therapy are to improve her hemodynamics in order to improve her forward flow and renal perfusion.  CASSANDRA  Severe PHTN  Cor Pulm  Failure to thrive  Abd pain    Renal: Continue Bumex 1mg IVP bid for now.  Will stop the zaroxoyln 5mg po qd.  Unfortunately her renal parameters are worse.  We can not add inotropes at present.  I may reduce the bumex to her home dose in am if the creatinine is up in the am.  Gastroenterology: s/p LARGE VOLUME PARACENTESIS.    Pulmonary: Pulmonary nebulizers.  Continue with nasal cannula oxygen at all times.  Cardiology: Monitor hemodynamics and volume status.     Overall prognosis is poor

## 2018-03-06 NOTE — PROVIDER CONTACT NOTE (OTHER) - SITUATION
Pt taking home medicine of simethicone. pt stated she dose not want to take our medication for gas pains she had her  bring in home medicine of simethicone.

## 2018-03-06 NOTE — PROGRESS NOTE ADULT - SUBJECTIVE AND OBJECTIVE BOX
[facility] [phone]        Patient seen and examined in bed.  Her nose was packed yesterday  Still with abd pain      MEDICATIONS  (STANDING):  allopurinol 100 milliGRAM(s) Oral daily  aspirin enteric coated 81 milliGRAM(s) Oral daily  atorvastatin 20 milliGRAM(s) Oral at bedtime  BACItracin   Ointment 1 Application(s) Topical two times a day  buMETAnide Injectable 1 milliGRAM(s) IV Push every 12 hours  clopidogrel Tablet 75 milliGRAM(s) Oral daily  digoxin     Tablet 0.125 milliGRAM(s) Oral every other day  diltiazem    milliGRAM(s) Oral daily  heparin  Injectable 5000 Unit(s) SubCutaneous every 8 hours  methimazole 5 milliGRAM(s) Oral daily  metolazone 5 milliGRAM(s) Oral daily  metoprolol     tartrate 25 milliGRAM(s) Oral every 8 hours  pantoprazole    Tablet 40 milliGRAM(s) Oral before breakfast  potassium chloride    Tablet ER 10 milliEquivalent(s) Oral daily  predniSONE   Tablet 5 milliGRAM(s) Oral daily  simethicone 80 milliGRAM(s) Chew three times a day      VITAL:  T(C): , Max: 36.6 (03-05-18 @ 21:16)  T(F): , Max: 97.8 (03-05-18 @ 21:16)  HR: 118 (03-06-18 @ 08:26)  BP: 93/60 (03-06-18 @ 08:26)  BP(mean): --  RR: 20 (03-06-18 @ 06:44)  SpO2: 95% (03-06-18 @ 06:44)  Wt(kg): --    I and O's:    03-05 @ 07:01  -  03-06 @ 07:00  --------------------------------------------------------  IN: 580 mL / OUT: 750 mL / NET: -170 mL    03-06 @ 07:01  -  03-06 @ 09:16  --------------------------------------------------------  IN: 240 mL / OUT: 0 mL / NET: 240 mL          PHYSICAL EXAM:    Constitutional: NAD  HEENT: PERRLA    Neck:  No JVD  Respiratory: CTAB/L  Cardiovascular: S1 and S2  Gastrointestinal: BS+, soft, distended  Extremities: +  peripheral edema  Neurological: A/O x 3, no focal deficits  Psychiatric: Normal mood, normal affect  : No Gottlieb  Skin: No rashes  Access: Not applicable    LABS:                        9.8    9.2   )-----------( 264      ( 05 Mar 2018 20:38 )             30.2     03-06    135  |  92<L>  |  101<H>  ----------------------------<  98  4.2   |  30  |  2.73<H>    Ca    8.7      06 Mar 2018 06:43  Phos  4.1     03-06  Mg     2.0     03-06            Urine Studies:          RADIOLOGY & ADDITIONAL STUDIES:

## 2018-03-06 NOTE — CHART NOTE - NSCHARTNOTEFT_GEN_A_CORE
Late Entry    Patient with complaints of 6/10 abdominal pain today. Patient seen and examined at bedside. Patient reports having 6/10 abdominal pain diffusely. Patient reports she had similar pain that worse 2 days ago. Patient admits to having abdominal pain on and off. Patient had BM today. Patient denies N/V, diarrhea/constipation. On exam, patient distended (baseline), normoactive BS, mildly tender to palpation diffusely. Patient received IV tylenol a couple days ago that worked for pain - IV tylenol given. Encouraged patient to take simethicone but patient refused at the time. Discussed with attending Dr. Cobb - AXR flat/upright ordered to further evaluate. Discussed with patient need for AXR to further evaluate and patient was refusing AXR at this time. Patient reports pain was better and was able to tolerate her food. Can attempt AXR later today if patient agrees for it. Will continue to monitor. Discussed with Dr. Cobb.    ALYSHA RhoadesC  Department of Medicine  #[id number]

## 2018-03-06 NOTE — PROGRESS NOTE ADULT - ASSESSMENT
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  3) chronic diastolic CHF (little evidence left sided heart failure on chest CT - trace pleural effusions - minimal ground glass opacities)  4) epistaxis now with left nostril packed limiting utility of the nasal canula    PLAN/RECOMMENDATIONS    oxygen supplementation ATC with a face tent to keep saturation greater than 92%  s/p large volume paracentesis  may benefit from a chronic indwelling intraperitoneal catheter to improve quality of life - I have reached out to IR to discuss the risks and benefits  diurese as tolerated by renal function and hemodynamics  albuterol/atrovent/pulmicort nebs  cardiac meds: bumex IVP/cardizem CD/lopressor/digoxin/ASA/plavix/lipitor  GI/DVT prophylaxis - protonix/carafate prn/SQ heparin  prednisone 5mg daily (?)  patient has been treated with endothelin antagonists and phosphodiesterase inhibitors without improvement in PAPs in the past  KATY stockings     Will follow with you. Plan of care discussed with the patient at bedside and nursing staff. Prognosis is poor    Chacho Gallego MD, [facility] - [phone]  Pulmonary Medicine

## 2018-03-07 LAB
ALBUMIN SERPL ELPH-MCNC: 2.6 G/DL — LOW (ref 3.3–5)
ALP SERPL-CCNC: 50 U/L — SIGNIFICANT CHANGE UP (ref 40–120)
ALT FLD-CCNC: 6 U/L RC — LOW (ref 10–45)
ANION GAP SERPL CALC-SCNC: 10 MMOL/L — SIGNIFICANT CHANGE UP (ref 5–17)
AST SERPL-CCNC: 9 U/L — LOW (ref 10–40)
BILIRUB SERPL-MCNC: 0.2 MG/DL — SIGNIFICANT CHANGE UP (ref 0.2–1.2)
BUN SERPL-MCNC: 101 MG/DL — HIGH (ref 7–23)
CALCIUM SERPL-MCNC: 8.8 MG/DL — SIGNIFICANT CHANGE UP (ref 8.4–10.5)
CHLORIDE SERPL-SCNC: 90 MMOL/L — LOW (ref 96–108)
CO2 SERPL-SCNC: 33 MMOL/L — HIGH (ref 22–31)
CREAT SERPL-MCNC: 2.51 MG/DL — HIGH (ref 0.5–1.3)
CULTURE RESULTS: SIGNIFICANT CHANGE UP
GLUCOSE SERPL-MCNC: 98 MG/DL — SIGNIFICANT CHANGE UP (ref 70–99)
HCT VFR BLD CALC: 31.9 % — LOW (ref 34.5–45)
HGB BLD-MCNC: 10.1 G/DL — LOW (ref 11.5–15.5)
MAGNESIUM SERPL-MCNC: 2.1 MG/DL — SIGNIFICANT CHANGE UP (ref 1.6–2.6)
MCHC RBC-ENTMCNC: 27.5 PG — SIGNIFICANT CHANGE UP (ref 27–34)
MCHC RBC-ENTMCNC: 31.7 GM/DL — LOW (ref 32–36)
MCV RBC AUTO: 86.6 FL — SIGNIFICANT CHANGE UP (ref 80–100)
PHOSPHATE SERPL-MCNC: 4.1 MG/DL — SIGNIFICANT CHANGE UP (ref 2.5–4.5)
PLATELET # BLD AUTO: 267 K/UL — SIGNIFICANT CHANGE UP (ref 150–400)
POTASSIUM SERPL-MCNC: 3.7 MMOL/L — SIGNIFICANT CHANGE UP (ref 3.5–5.3)
POTASSIUM SERPL-SCNC: 3.7 MMOL/L — SIGNIFICANT CHANGE UP (ref 3.5–5.3)
PROT SERPL-MCNC: 5.6 G/DL — LOW (ref 6–8.3)
RBC # BLD: 3.68 M/UL — LOW (ref 3.8–5.2)
RBC # FLD: 16.8 % — HIGH (ref 10.3–14.5)
SODIUM SERPL-SCNC: 133 MMOL/L — LOW (ref 135–145)
SPECIMEN SOURCE: SIGNIFICANT CHANGE UP
WBC # BLD: 6.9 K/UL — SIGNIFICANT CHANGE UP (ref 3.8–10.5)
WBC # FLD AUTO: 6.9 K/UL — SIGNIFICANT CHANGE UP (ref 3.8–10.5)

## 2018-03-07 PROCEDURE — 74018 RADEX ABDOMEN 1 VIEW: CPT | Mod: 26

## 2018-03-07 RX ORDER — SUCRALFATE 1 G
1 TABLET ORAL THREE TIMES A DAY
Qty: 0 | Refills: 0 | Status: DISCONTINUED | OUTPATIENT
Start: 2018-03-07 | End: 2018-03-12

## 2018-03-07 RX ORDER — BUMETANIDE 0.25 MG/ML
0.5 INJECTION INTRAMUSCULAR; INTRAVENOUS
Qty: 0 | Refills: 0 | Status: DISCONTINUED | OUTPATIENT
Start: 2018-03-07 | End: 2018-03-10

## 2018-03-07 RX ORDER — PANTOPRAZOLE SODIUM 20 MG/1
40 TABLET, DELAYED RELEASE ORAL
Qty: 0 | Refills: 0 | Status: DISCONTINUED | OUTPATIENT
Start: 2018-03-07 | End: 2018-03-10

## 2018-03-07 RX ADMIN — ATORVASTATIN CALCIUM 20 MILLIGRAM(S): 80 TABLET, FILM COATED ORAL at 23:15

## 2018-03-07 RX ADMIN — Medication 1 APPLICATION(S): at 17:59

## 2018-03-07 RX ADMIN — Medication 240 MILLIGRAM(S): at 06:50

## 2018-03-07 RX ADMIN — Medication 81 MILLIGRAM(S): at 11:35

## 2018-03-07 RX ADMIN — Medication 0.12 MILLIGRAM(S): at 11:35

## 2018-03-07 RX ADMIN — Medication 1 APPLICATION(S): at 06:50

## 2018-03-07 RX ADMIN — Medication 25 MILLIGRAM(S): at 06:50

## 2018-03-07 RX ADMIN — ONDANSETRON 4 MILLIGRAM(S): 8 TABLET, FILM COATED ORAL at 16:28

## 2018-03-07 RX ADMIN — Medication 25 MILLIGRAM(S): at 23:16

## 2018-03-07 RX ADMIN — Medication 5 MILLIGRAM(S): at 06:50

## 2018-03-07 RX ADMIN — PANTOPRAZOLE SODIUM 40 MILLIGRAM(S): 20 TABLET, DELAYED RELEASE ORAL at 17:58

## 2018-03-07 RX ADMIN — Medication 10 MILLIEQUIVALENT(S): at 11:35

## 2018-03-07 RX ADMIN — BUMETANIDE 0.5 MILLIGRAM(S): 0.25 INJECTION INTRAMUSCULAR; INTRAVENOUS at 17:59

## 2018-03-07 RX ADMIN — CLOPIDOGREL BISULFATE 75 MILLIGRAM(S): 75 TABLET, FILM COATED ORAL at 11:35

## 2018-03-07 RX ADMIN — Medication 100 MILLIGRAM(S): at 11:35

## 2018-03-07 RX ADMIN — BUMETANIDE 1 MILLIGRAM(S): 0.25 INJECTION INTRAMUSCULAR; INTRAVENOUS at 06:50

## 2018-03-07 NOTE — PROGRESS NOTE ADULT - SUBJECTIVE AND OBJECTIVE BOX
[facility] - [facility]     PROGRESS NOTE    CHIEF COMPLAINT: CRF (hypoxic); COPD; emphysema; JOSE; secondary pulmonary hypertension; right heart failure; epistaxis; ascites;     INTERVAL HISTORY: difficulty with diuretics due to hemodynamic instability and tenuous renal function; could not tolerate face tent due to excessive humification now back on nasal canula; increasing abdominal girth; weak, tired and frail; no further epistaxis after packing; ongoing abdominal discomfort following large volume paracentesis (9500cc) 5 days ago; no cough, sputum production, chest congestion or wheeze; no fevers, chills or sweats; no chest pain/pressure or palpitations; legs swollen - could not tolerate KATY stockings    REVIEW OF SYSTEMS:  Constitutional: As per interval history  HEENT: Within normal limits  CV: As per interval history  Resp: As per interval history  GI: As per interval history  : Within normal limits  Musculoskeletal: Within normal limits  Skin: Within normal limits  Neurological: Within normal limits  Psychiatric: Within normal limits  Endocrine: Within normal limits  Hematologic/Lymphatic: Within normal limits  Allergic/Immunologic: Within normal limits    MEDICATIONS:     Pulmonary "  HYDROcodone/homatropine Syrup 5 milliLiter(s) Oral at bedtime PRN      Anti-microbials:      Cardiovascular:  buMETAnide Injectable 1 milliGRAM(s) IV Push every 12 hours  digoxin     Tablet 0.125 milliGRAM(s) Oral every other day  diltiazem    milliGRAM(s) Oral daily  metoprolol     tartrate 25 milliGRAM(s) Oral every 8 hours      Other:  allopurinol 100 milliGRAM(s) Oral daily  aspirin enteric coated 81 milliGRAM(s) Oral daily  atorvastatin 20 milliGRAM(s) Oral at bedtime  BACItracin   Ointment 1 Application(s) Topical two times a day  clopidogrel Tablet 75 milliGRAM(s) Oral daily  docusate sodium 100 milliGRAM(s) Oral two times a day PRN  Gas-X extra strength (simethicone 125 mg 1 Tablet(s) 1 Tablet(s) Chew three times a day PRN  heparin  Injectable 5000 Unit(s) SubCutaneous every 8 hours  methimazole 5 milliGRAM(s) Oral daily  ondansetron Injectable 4 milliGRAM(s) IV Push every 8 hours PRN  pantoprazole    Tablet 40 milliGRAM(s) Oral before breakfast  potassium chloride    Tablet ER 10 milliEquivalent(s) Oral daily  predniSONE   Tablet 5 milliGRAM(s) Oral daily  sodium chloride 0.65% Nasal 1 Spray(s) Both Nostrils three times a day PRN  sucralfate suspension 1 Gram(s) Oral four times a day PRN        OBJECTIVE:    I&O's Detail    06 Mar 2018 07:01  -  07 Mar 2018 07:00  --------------------------------------------------------  IN:    Oral Fluid: 1431 mL  Total IN: 1431 mL    OUT:    Voided: 650 mL  Total OUT: 650 mL    Total NET: 781 mL      07 Mar 2018 07:01  -  07 Mar 2018 09:35  --------------------------------------------------------  IN:    Oral Fluid: 200 mL  Total IN: 200 mL    OUT:  Total OUT: 0 mL    Total NET: 200 mL    Daily Weight in k.6 (07 Mar 2018 07:20)    PHYSICAL EXAM:       ICU Vital Signs Last 24 Hrs  T(C): 36.4 (07 Mar 2018 06:48), Max: 36.8 (06 Mar 2018 20:55)  T(F): 97.5 (07 Mar 2018 06:48), Max: 98.2 (06 Mar 2018 20:55)  HR: 121 (07 Mar 2018 06:48) (121 - 126)  BP: 97/62 (07 Mar 2018 06:48) (91/57 - 98/61)  BP(mean): --  ABP: --  ABP(mean): --  RR: 18 (07 Mar 2018 06:48) (18 - 22)  SpO2: 95% (07 Mar 2018 06:48) (94% - 95%) - on 5lpm    General: Awake. Alert. Cooperative. Weak and tired. No distress. Chronically ill appearing	  HEENT:   Atraumatic. Bitemporal wasting. Anicteric. Normal oral mucosa, PERRL, EOMI  Neck: Supple. Trachea midline. Thyroid without enlargement/tenderness/nodules. No carotid bruit. (+) JVD; loss of bilateral supraclavicular fat pads	  Cardiovascular: Irregularly irregular rate and rhythm. S1 S2 normal. II/VI systolic murmur  Respiratory: Respirations unlabored. Decreased breath sounds throughout especially at the bases. Decreased chest wall excursion  Abdomen: Soft. Non-tender. Moderately distended with fluid wave. No organomegaly. No masses. Normal bowel sounds	  Extremities: Warm to touch. No clubbing or cyanosis. Mild to moderate lower extremity edema up to the thigh. Loss of extremity muscle mass  Pulses: Decreased lower extremity peripheral pulses  Skin: Normal skin color. No rashes or lesions. No ecchymoses, No cyanosis. Warm to touch  Lymph Nodes: Cervical, supraclavicular and axillary nodes normal  Neurological: Motor and sensory examination equal and normal. A and O x 3  Psychiatry: Depressed      LABS:                        10.1   6.9   )-----------( 267      ( 07 Mar 2018 08:31 )             31.9                         9.8    9.2   )-----------( 264      ( 05 Mar 2018 20:38 )             30.2     03-07    133<L>  |  90<L>  |  101<H>  ----------------------------<  98  3.7   |  33<H>  |  2.51<H>    03-06    135  |  92<L>  |  101<H>  ----------------------------<  98  4.2   |  30  |  2.73<H>    Ca      8.8      03-07    Ca      8.7      03-06    Phos    4.1     03-07    Phos    4.1     03-06      Mg       2.1     03-07    Mg       2.0     03-06    TPro  5.6<L>  /  Alb  2.6<L>  /  TBili  0.2  /  DBili  x   /  AST  9<L>  /  ALT  6<L>  /  AlkPhos  50  03-07    Serum Pro-Brain Natriuretic Peptide: 17356 pg/mL ( @ 16:29)    CARDIAC MARKERS ( 01 Mar 2018 16:29 )  x     / 0.06 ng/mL / x     / x     / 5.4 ng/mL    < from: Transthoracic Echocardiogram (12.13.17 @ 18:58) >    Patient name: FRAN ALEXANDRA  YOB: 1945   Age: 71 (F)   MR#: [id number]  Study Date: 2017  Location: [address] [place], [place] [id number]: [facility]  Study quality: Technically fair  Referring Physician: Pierce Cobb MD  Blood Pressure: 106/58 mmHg  Height: 160 cm  Weight: 64 kg  BSA: 1.7 m2  ------------------------------------------------------------------------  PROCEDURE: Transthoracic echocardiogram with 2-D, M-Mode  and complete spectral and color flow Doppler.  INDICATION: Other specified pulmonary heart diseases  (I27.89)  ------------------------------------------------------------------------  Dimensions:    Normal Values:  LA:     3.6    2.0 - 4.0 cm  Ao:     2.8    2.0 - 3.8 cm  SEPTUM: 0.7    0.6 - 1.2 cm  PWT:    0.9    0.6 - 1.1 cm  LVIDd:  4.1    3.0 - 5.6 cm  LVIDs:  1.9    1.8 - 4.0 cm  Derived variables:  LVMI: 58 g/m2  RWT: 0.43  Fractional short: 54 %  Doppler Peak Velocity (m/sec): AoV=1.8  ------------------------------------------------------------------------  Observations:  Mitral Valve: Mitral annular calcification, otherwise  normal mitral valve. Minimal mitral regurgitation.  Aortic Valve/Aorta: Calcified trileaflet aortic valve with  normal opening. Peak transaortic valve gradient equals 13  mm Hg, mean transaortic valve gradient equals 8 mm Hg. Peak  left ventricular outflow tract gradient equals 6 mm Hg,  mean gradient is equal to 3 mm Hg, LVOT velocity time  integral equals 19 cm.  Aortic Root: 2.8 cm.  LVOT diameter: 1.9 cm.  Left Atrium: Normal left atrium.  LA volume index = 25  cc/m2.  Left Ventricle: Hyperdynamic left ventricular systolic  function. Flattening of the interventricular septum in both  systole and diastole is  consistent with right ventricular  pressure overload. Normal left ventricular internal  dimensions and wall thicknesses.  Right Heart: Severe right atrial enlargement. Right  ventricular enlargement with decreased right ventricular  systolic function. Normal tricuspid valve. Mild-moderate  tricuspid regurgitation. Normal pulmonic valve.  Pericardium/Pleura: Normal pericardium with trace  pericardial effusion.  Left pleural effusion.  Hemodynamic: Estimated right atrial pressure is 8 mm Hg.  Estimated right ventricular systolic pressure equals 72 mm  Hg, assuming right atrial pressure equals 8 mm Hg,  consistent with severe pulmonary hypertension.  ------------------------------------------------------------------------  Conclusions:  1. Calcified trileaflet aortic valve with normal opening.  2. Normal left ventricular internal dimensions and wall  thicknesses.  3. Hyperdynamic left ventricular systolic function.  Flattening of the interventricular septum in both systole  and diastole is  consistent with right ventricular pressure  overload.  4. Severe right atrial enlargement.  5. Right ventricular enlargement with decreased right  ventricular systolic function.  6. Normal tricuspid valve. Mild-moderate tricuspid  regurgitation.  7. Estimated pulmonary artery systolic pressure equals 72  mm Hg, assuming right atrial pressure equals 8 mm Hg,  consistent with severe pulmonary pressures.  *** Compared with echocardiogram of 2017, no  significant changes noted.  ------------------------------------------------------------------------  Confirmed on  2017 - 13:15:09 by Justin Cohen M.D.  ------------------------------------------------------------------------    < end of copied text >  ---------------------------------------------------------------------------------------------------------  MICROBIOLOGY:     Culture - Body Fluid with Gram Stain (18 @ 21:16)    Gram Stain:   polymorphonuclear leukocytes seen per low power field  No organisms seen per oil power field  by cytocentrifuge    Specimen Source: Peritoneal Peritoneal Fluid    Culture Results:   No growth to date.    Culture - Acid Fast - Body Fluid w/Smear (18 @ 21:16)    Specimen Source: .Body Fluid Peritoneal Fluid    Acid Fast Bacilli Smear:   No acid fast bacilli seen by fluorochrome stain      RADIOLOGY:  [x] Chest radiographs reviewed and interpreted by me    < from: [facility] Duplex Lower Ext Vein ScanQuoc (18 @ 11:36) >    EXAM:  DUPLEX SCAN EXT VEINS LOWER BI                            PROCEDURE DATE:  2018      INTERPRETATION:  History:Severe pulmonary hypertension. COPD. Bilateral   lower extremity edema and shortness of breath. Evaluate for DVT.    Bilateral lower extremity venous duplex ultrasound from 2017   demonstrated no DVT.    There is edema of the superficial soft tissues of the lower extremities.    Both common femoral, superficial femoral and popliteal veins are patent   and compressible without evidence of thrombus.    The posterior tibial and peroneal veins are patent.  No thrombus is seen.    IMPRESSION: No evidence of deep vein thrombosis of either lower extremity.    JUDY DE LEON M.D., RADIOLOGY RESIDENT  This document has been electronically signed.  MARTIR VALVERDE M.D., ATTENDING RADIOLOGIST  This document has been electronically signed. Mar  5 2018  2:37PM      < end of copied text >  ---------------------------------------------------------------------------------------------------------  < from: Xray Abdomen 1 View PORTABLE -Urgent (18 @ 17:11) >    EXAM:  XR ABDOMEN PORTABLE URGENT 1V                            PROCEDURE DATE:  2018      INTERPRETATION:  CLINICAL INFORMATION: Abdominal distention. Evaluate for   obstruction.    TECHNIQUE: Portable abdominal radiograph dated 3/4/2018.    COMPARISON: Femoral ultrasound dated 3/2/2018. CT chest dated 3/3/2018.    FINDINGS: Top of Form 1      Large volume ascites.  There are no dilated loops of small bowel.   Bowel gas and stool identified throughout the colon and rectum.   There is no free air visualized.  The visualized osseous structures are unremarkable for patient's stated   age.    IMPRESSION:    Nonobstructive bowel gas pattern.    Ascites.    RACHEL VELARDE M.D., RADIOLOGY RESIDENT  This document has been electronically signed.  PIERRE BAILEY M.D., ATTENDING RADIOLOGIST  This document has been electronically signed. Mar  5 2018 10:22AM      < end of copied text >  ---------------------------------------------------------------------------------------------------------  < from: CT Chest No Cont (18 @ 18:24) >    EXAM:  CT CHEST                            PROCEDURE DATE:  2018            INTERPRETATION:  CLINICAL INFORMATION: Evaluate pleural effusions.   Shortness of breath.    COMPARISON: Chest CT dated 2017. Chest x-ray dated 3/1/2018.    PROCEDURE:   CT of the Chest was performed without intravenous contrast.  Sagittal and coronal reformats were performed.  Axial MIP reformats were also performed.    FINDINGS:    CHEST:     LUNGS AND LARGE AIRWAYS: Patent central airways.  Bibasilar subsegmental   atelectasis, right greater than left.  PLEURA: Trace bilateral pleural effusions.  VESSELS: Thoracic aortic and coronary artery atherosclerosis.  HEART: Cardiomegaly. Small pericardial effusion. Mitral annular   calcification. A densely calcified or metallic density measuring 1.1 x   0.4 cm overlies the basilar left pulmonary artery (3:52, 6:80).  MEDIASTINUM AND STEFANIA: No lymphadenopathy.  CHEST WALL AND LOWER NECK: Within normal limits.  VISUALIZED UPPER ABDOMEN: Small volume ascites. Atherosclerotic changes.  BONES: Multilevel degenerative disease.    IMPRESSION: Trace bilateral pleural effusions.  Bibasilar subsegmental atelectasis, right greater than left.  Densely calcified or metallic density overlies the left pulmonary artery.  See above..      JUAN JUAREZ M.D., RADIOLOGY RESIDENT  This document has been electronically signed.  MADONNA MORGAN M.D., ATTENDING RADIOLOGIST  This document has been electronically signed. Mar  4 2018 12:28PM      < end of copied text >  ---------------------------------------------------------------------------------------------------------    < from: Xray Chest 2 Views PA/Lat (18 @ 17:19) >    EXAM:  XR CHEST PA LAT 2V                            PROCEDURE DATE:  2018        INTERPRETATION:  CLINICAL INFORMATION: Dyspnea.     EXAM: PA and lateral view chest radiograph    COMPARISON: Chest radiograph 2017    FINDINGS:   Thecardiac silhouette is normal.  Mediastinal surgical clips unchanged.  Aortic calcification.    Left pleural effusion similar in appearance to the prior.    No acute osseous abnormalities.      IMPRESSION:   Clear lungs.    SUGEY RODRIGUEZ, RADIOLOGY RESIDENT  This document has been electronically signed.  RODO SYED M.D., ATTENDING RADIOLOGIST  This document has been electronically signed. Mar  2 2018  9:51AM      < end of copied text >  ---------------------------------------------------------------------------------------------------------    < from: US Abdomen Limited (18 @ 08:50) >    EXAM:  US ABDOMEN LIMITED                            PROCEDURE DATE:  2018      INTERPRETATION:  Clinical information: Assess for ascites for   paracentesis.    Comparison: Ultrasound 2017.    Findings: Targeted ultrasound was performed for purposes of ascites   evaluation. A large amount of ascites is noted in all 4 quadrants.    IMPRESSION:    Diffuse, large volume abdominal ascites.    GAEL SCHERER M.D., ATTENDING RADIOLOGIST  This document has been electronically signed. Mar  2 2018  9:16AM      < end of copied text >  ---------------------------------------------------------------------------------------------------------

## 2018-03-07 NOTE — PROGRESS NOTE ADULT - ASSESSMENT
Patient is a 71-year-old female with past medical history of diabetes, hypertension, severe pulmonary hypertension, right ventricular dysfunction, presents with SOB.  The patient's acute renal failure is likely hemodynamic in nature.  Goals of therapy are to improve her hemodynamics in order to improve her forward flow and renal perfusion.  CASSANDRA  Severe PHTN  Cor Pulm  Failure to thrive  Abd pain and likely recurrence of ascites    Renal: Reduce the Bumex 0.5mg PO bid for now which is her home dose.  Aggressive diuresis essentially is leading to azotemia.  It is also NOT preventing the reoccurrence of ascites .  Off zaroxoyln 5mg po qd.     We can not add inotropes at present.    Gastroenterology: I agree that a pig tail cathater for ascites may be a viable option for her to reduce morbidity   Pulmonary: Pulmonary nebulizers.  Continue with nasal cannula oxygen at all times.  Cardiology: Monitor hemodynamics and volume status.     Overall prognosis is poor

## 2018-03-07 NOTE — PROGRESS NOTE ADULT - ASSESSMENT
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  3) chronic diastolic CHF (little evidence left sided heart failure on chest CT - trace pleural effusions - minimal ground glass opacities)  4) epistaxis    PLAN/RECOMMENDATIONS    oxygen supplementation ATC with a face tent to keep saturation greater than 92%  s/p large volume paracentesis  may benefit from a chronic indwelling intraperitoneal catheter to improve quality of life - I have reached out to IR to discuss the risks and benefits  diurese as tolerated by renal function and hemodynamics  albuterol/atrovent/pulmicort nebs  cardiac meds: bumex IVP/cardizem CD/lopressor/digoxin/ASA/plavix/lipitor  GI/DVT prophylaxis - protonix/carafate prn/SQ heparin  prednisone 5mg daily (?)  patient has been treated with endothelin antagonists and phosphodiesterase inhibitors without improvement in PAPs in the past  KATY stockings     Will follow with you. Plan of care discussed with the patient at bedside and Dr. Smith. Prognosis is poor    Chacho Gallego MD, [facility] - [phone]  Pulmonary Medicine

## 2018-03-07 NOTE — PROGRESS NOTE ADULT - SUBJECTIVE AND OBJECTIVE BOX
MEDICINE, PROGRESS NOTE [phone]    FRAN ALEXANDRA 72y [id number]    Patient seen and examined.  Patient is a 72y old  Female who presents with a chief complaint of shortness of breath and difficulty eating due to fullness (01 Mar 2018 19:30)  Pt c/o abd discomfort, nausea, and difficulty breathing through nose.    PAST MEDICAL & SURGICAL HISTORY:  Hyperthyroidism  JOSE (obstructive sleep apnea)  Epistaxis  GIB (gastrointestinal bleeding)  CAD S/P percutaneous coronary angioplasty  Afib  Pulmonary HTN  GERD (gastroesophageal reflux disease)  Obesity  Cardiomegaly  Valvular heart disease  COPD (chronic obstructive pulmonary disease): Home O2  Sleep Apnea: by criteria  Loose, teeth: 3 bottom front loose teeth  Female stress incontinence  Shoulder pain, right  Constipation  Arthritis of Knee  H/O heartburn  History of lung cancer  Obese  Hx of hyperlipidemia  Asthma  Diabetes mellitus: type 2  dx about 4-5 years ago   no daily fingerstick  H/O: HTN (hypertension)  Enlarged lymph nodes  Lymph nodes enlarged: s/p biopsy mediastinal  benign  H/O endoscopy  left upper lobectomy    MEDICATIONS  (STANDING):  allopurinol 100 milliGRAM(s) Oral daily  aspirin enteric coated 81 milliGRAM(s) Oral daily  atorvastatin 20 milliGRAM(s) Oral at bedtime  BACItracin   Ointment 1 Application(s) Topical two times a day  buMETAnide 0.5 milliGRAM(s) Oral two times a day  clopidogrel Tablet 75 milliGRAM(s) Oral daily  digoxin     Tablet 0.125 milliGRAM(s) Oral every other day  diltiazem    milliGRAM(s) Oral daily  heparin  Injectable 5000 Unit(s) SubCutaneous every 8 hours  methimazole 5 milliGRAM(s) Oral daily  metoprolol     tartrate 25 milliGRAM(s) Oral every 8 hours  pantoprazole  Injectable 40 milliGRAM(s) IV Push two times a day  potassium chloride    Tablet ER 10 milliEquivalent(s) Oral daily  predniSONE   Tablet 5 milliGRAM(s) Oral daily  sucralfate 1 Gram(s) Oral three times a day    MEDICATIONS  (PRN):  docusate sodium 100 milliGRAM(s) Oral two times a day PRN Constipation  Gas-X extra strength (simethicone 125 mg 1 Tablet(s) 1 Tablet(s) Chew three times a day PRN bloating  HYDROcodone/homatropine Syrup 5 milliLiter(s) Oral at bedtime PRN Cough  ondansetron Injectable 4 milliGRAM(s) IV Push every 8 hours PRN Nausea and/or Vomiting  sodium chloride 0.65% Nasal 1 Spray(s) Both Nostrils three times a day PRN Nasal Congestion    Allergies    No Known Allergies    Intolerances    albuterol (Unknown)      PHYSICAL EXAM:  Constitutional: NAD  HEENT: Normocephalic, EOMI  Neck:  + JVD  Respiratory: CTA B/L, No wheezes, dec bs at bases  Cardiovascular: S1, S2, RRR, + systolic murmur  Gastrointestinal: BS+, soft, distended, NT  Extremities: + peripheral edema le b/l  Neurological: AAOX3, no focal deficits  Psychiatric: Normal mood, normal affect  : No Gottlieb    Vital Signs Last 24 Hrs  T(C): 36.6 (07 Mar 2018 13:04), Max: 36.8 (06 Mar 2018 20:55)  T(F): 97.9 (07 Mar 2018 13:04), Max: 98.2 (06 Mar 2018 20:55)  HR: 63 (07 Mar 2018 15:27) (63 - 123)  BP: 104/66 (07 Mar 2018 15:27) (95/60 - 104/66)  BP(mean): --  RR: 20 (07 Mar 2018 13:04) (18 - 20)  SpO2: 93% (07 Mar 2018 15:27) (91% - 95%)  I&O's Summary    06 Mar 2018 07:01  -  07 Mar 2018 07:00  --------------------------------------------------------  IN: 1431 mL / OUT: 650 mL / NET: 781 mL    07 Mar 2018 07:01  -  07 Mar 2018 17:24  --------------------------------------------------------  IN: 440 mL / OUT: 100 mL / NET: 340 mL        LABS:                        10.1   6.9   )-----------( 267      ( 07 Mar 2018 08:31 )             31.9     03-07    133<L>  |  90<L>  |  101<H>  ----------------------------<  98  3.7   |  33<H>  |  2.51<H>    Ca    8.8      07 Mar 2018 08:19  Phos  4.1     03-07  Mg     2.1     03-07    TPro  5.6<L>  /  Alb  2.6<L>  /  TBili  0.2  /  DBili  x   /  AST  9<L>  /  ALT  6<L>  /  AlkPhos  50  03-07        Magnesium, Serum: 2.1 mg/dL (03-07 @ 08:19)

## 2018-03-07 NOTE — PROGRESS NOTE ADULT - ASSESSMENT
volume overload/ascites/LE edema  sev PHTN  decomp diast hf  sev copd  restrictive lung disease sec to ascites which diminishes diaphragmatic excrusion  epistaxis resolved  nausea    continue current care  Carafate three times a day  Protonix iv bid  appreciate renal and pulm input  continue gasx  pt encouraged to eat.

## 2018-03-07 NOTE — PROGRESS NOTE ADULT - SUBJECTIVE AND OBJECTIVE BOX
NEPHROLOGY-[facility] [phone]        Patient seen and examined in bed.  She is still having abd pain        MEDICATIONS  (STANDING):  allopurinol 100 milliGRAM(s) Oral daily  aspirin enteric coated 81 milliGRAM(s) Oral daily  atorvastatin 20 milliGRAM(s) Oral at bedtime  BACItracin   Ointment 1 Application(s) Topical two times a day  buMETAnide Injectable 1 milliGRAM(s) IV Push every 12 hours  clopidogrel Tablet 75 milliGRAM(s) Oral daily  digoxin     Tablet 0.125 milliGRAM(s) Oral every other day  diltiazem    milliGRAM(s) Oral daily  heparin  Injectable 5000 Unit(s) SubCutaneous every 8 hours  methimazole 5 milliGRAM(s) Oral daily  metoprolol     tartrate 25 milliGRAM(s) Oral every 8 hours  pantoprazole    Tablet 40 milliGRAM(s) Oral before breakfast  potassium chloride    Tablet ER 10 milliEquivalent(s) Oral daily  predniSONE   Tablet 5 milliGRAM(s) Oral daily      VITAL:  T(C): , Max: 36.8 (03-06-18 @ 20:55)  T(F): , Max: 98.2 (03-06-18 @ 20:55)  HR: 121 (03-07-18 @ 06:48)  BP: 97/62 (03-07-18 @ 06:48)  BP(mean): --  RR: 18 (03-07-18 @ 06:48)  SpO2: 95% (03-07-18 @ 06:48)  Wt(kg): --    I and O's:    03-06 @ 07:01  -  03-07 @ 07:00  --------------------------------------------------------  IN: 1431 mL / OUT: 650 mL / NET: 781 mL    03-07 @ 07:01  -  03-07 @ 10:17  --------------------------------------------------------  IN: 200 mL / OUT: 0 mL / NET: 200 mL          PHYSICAL EXAM:    Constitutional: NAD  HEENT: PERRLA    Neck:  No JVD  Respiratory: CTAB/L  Cardiovascular: S1 and S2  Gastrointestinal: BS+, soft, distended and + fluid shift  Extremities: trace peripheral edema  Neurological: A/O x 3, no focal deficits  Psychiatric: Normal mood, normal affect  : No Gottlieb  Skin: No rashes  Access: Not applicable    LABS:                        10.1   6.9   )-----------( 267      ( 07 Mar 2018 08:31 )             31.9     03-07    133<L>  |  90<L>  |  101<H>  ----------------------------<  98  3.7   |  33<H>  |  2.51<H>    Ca    8.8      07 Mar 2018 08:19  Phos  4.1     03-07  Mg     2.1     03-07    TPro  5.6<L>  /  Alb  2.6<L>  /  TBili  0.2  /  DBili  x   /  AST  9<L>  /  ALT  6<L>  /  AlkPhos  50  03-07          Urine Studies:          RADIOLOGY & ADDITIONAL STUDIES:      < from: Xray Abdomen 1 View PORTABLE -Urgent (03.04.18 @ 17:11) >    EXAM:  XR ABDOMEN PORTABLE URGENT 1V                            PROCEDURE DATE:  03/04/2018            INTERPRETATION:  CLINICAL INFORMATION: Abdominal distention. Evaluate for   obstruction.    TECHNIQUE: Portable abdominal radiograph dated 3/4/2018.    COMPARISON: Femoral ultrasound dated 3/2/2018. CT chest dated 3/3/2018.    FINDINGS: Top of Form 1      Large volume ascites.  There are no dilated loops of small bowel.   Bowel gas and stool identified throughout the colon and rectum.   There is no free air visualized.  The visualized osseous structures are unremarkable for patient's stated   age.    IMPRESSION:    Nonobstructive bowel gas pattern.    Ascites.                RACHEL VELARDE M.D., RADIOLOGY RESIDENT  This document has been electronically signed.  PIERRE BAILEY M.D., ATTENDING RADIOLOGIST  This document has been electronically signed. Mar  5 2018 10:22AM                < end of copied text >

## 2018-03-07 NOTE — PROVIDER CONTACT NOTE (OTHER) - RECOMMENDATIONS
Re-educated pt on the importance of the face tent mask as per Pulmonary, however pt still refusing. Will continue to monitor.

## 2018-03-08 LAB
ANION GAP SERPL CALC-SCNC: 11 MMOL/L — SIGNIFICANT CHANGE UP (ref 5–17)
BUN SERPL-MCNC: 102 MG/DL — HIGH (ref 7–23)
CALCIUM SERPL-MCNC: 8.5 MG/DL — SIGNIFICANT CHANGE UP (ref 8.4–10.5)
CHLORIDE SERPL-SCNC: 91 MMOL/L — LOW (ref 96–108)
CO2 SERPL-SCNC: 30 MMOL/L — SIGNIFICANT CHANGE UP (ref 22–31)
CREAT SERPL-MCNC: 2.62 MG/DL — HIGH (ref 0.5–1.3)
GLUCOSE SERPL-MCNC: 97 MG/DL — SIGNIFICANT CHANGE UP (ref 70–99)
HCT VFR BLD CALC: 30 % — LOW (ref 34.5–45)
HGB BLD-MCNC: 9 G/DL — LOW (ref 11.5–15.5)
MCHC RBC-ENTMCNC: 26 PG — LOW (ref 27–34)
MCHC RBC-ENTMCNC: 30 GM/DL — LOW (ref 32–36)
MCV RBC AUTO: 86.7 FL — SIGNIFICANT CHANGE UP (ref 80–100)
PLATELET # BLD AUTO: 229 K/UL — SIGNIFICANT CHANGE UP (ref 150–400)
POTASSIUM SERPL-MCNC: 4 MMOL/L — SIGNIFICANT CHANGE UP (ref 3.5–5.3)
POTASSIUM SERPL-SCNC: 4 MMOL/L — SIGNIFICANT CHANGE UP (ref 3.5–5.3)
RBC # BLD: 3.46 M/UL — LOW (ref 3.8–5.2)
RBC # FLD: 17.4 % — HIGH (ref 10.3–14.5)
SODIUM SERPL-SCNC: 132 MMOL/L — LOW (ref 135–145)
WBC # BLD: 6.84 K/UL — SIGNIFICANT CHANGE UP (ref 3.8–10.5)
WBC # FLD AUTO: 6.84 K/UL — SIGNIFICANT CHANGE UP (ref 3.8–10.5)

## 2018-03-08 RX ADMIN — ATORVASTATIN CALCIUM 20 MILLIGRAM(S): 80 TABLET, FILM COATED ORAL at 21:01

## 2018-03-08 RX ADMIN — CLOPIDOGREL BISULFATE 75 MILLIGRAM(S): 75 TABLET, FILM COATED ORAL at 11:51

## 2018-03-08 RX ADMIN — Medication 10 MILLIEQUIVALENT(S): at 11:52

## 2018-03-08 RX ADMIN — Medication 25 MILLIGRAM(S): at 21:01

## 2018-03-08 RX ADMIN — Medication 100 MILLIGRAM(S): at 11:51

## 2018-03-08 RX ADMIN — Medication 25 MILLIGRAM(S): at 05:42

## 2018-03-08 RX ADMIN — ONDANSETRON 4 MILLIGRAM(S): 8 TABLET, FILM COATED ORAL at 11:52

## 2018-03-08 RX ADMIN — BUMETANIDE 0.5 MILLIGRAM(S): 0.25 INJECTION INTRAMUSCULAR; INTRAVENOUS at 17:30

## 2018-03-08 RX ADMIN — PANTOPRAZOLE SODIUM 40 MILLIGRAM(S): 20 TABLET, DELAYED RELEASE ORAL at 17:31

## 2018-03-08 RX ADMIN — PANTOPRAZOLE SODIUM 40 MILLIGRAM(S): 20 TABLET, DELAYED RELEASE ORAL at 05:42

## 2018-03-08 RX ADMIN — Medication 5 MILLIGRAM(S): at 05:42

## 2018-03-08 RX ADMIN — Medication 240 MILLIGRAM(S): at 05:42

## 2018-03-08 RX ADMIN — Medication 1 APPLICATION(S): at 05:42

## 2018-03-08 RX ADMIN — Medication 81 MILLIGRAM(S): at 11:51

## 2018-03-08 RX ADMIN — BUMETANIDE 0.5 MILLIGRAM(S): 0.25 INJECTION INTRAMUSCULAR; INTRAVENOUS at 05:42

## 2018-03-08 RX ADMIN — Medication 25 MILLIGRAM(S): at 14:53

## 2018-03-08 NOTE — PROGRESS NOTE ADULT - ASSESSMENT
Patient is a 71-year-old female with past medical history of diabetes, hypertension, severe pulmonary hypertension, right ventricular dysfunction, presents with SOB.  The patient's acute renal failure is likely hemodynamic in nature.  Goals of therapy are to improve her hemodynamics in order to improve her forward flow and renal perfusion.  CASSANDRA  Severe PHTN  Cor Pulm  Failure to thrive  Abd pain and likely recurrence of ascites  Hyponatremia    Renal:  Bumex 0.5mg PO bid for now which is her home dose.  Aggressive diuresis essentially is leading to azotemia.  It is also NOT preventing the reoccurrence of ascites .  Restart  zaroxoyln 5mg po MWF.     We can not add inotropes at present.    Gastroenterology: I agree that a pig tail cathater for ascites may be a viable option for her to reduce morbidity   Pulmonary: Pulmonary nebulizers.  Continue with nasal cannula oxygen at all times.  Cardiology: Monitor hemodynamics and volume status.     Overall prognosis is poor

## 2018-03-08 NOTE — PROGRESS NOTE ADULT - ASSESSMENT
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  3) chronic diastolic CHF (little evidence left sided heart failure on chest CT - trace pleural effusions - minimal ground glass opacities)  4) epistaxis    PLAN/RECOMMENDATIONS    oxygen supplementation ATC with a face tent to keep saturation greater than 92%  s/p large volume paracentesis  may benefit from a chronic indwelling intraperitoneal catheter to improve quality of life - I have reached out to IR to discuss the risks and benefits  diuretics decreased - not helping recurrence of ascites and likely will lead to azotemia  albuterol/atrovent/pulmicort nebs  cardiac meds: bumex/cardizem CD/lopressor/digoxin/ASA/plavix/lipitor  GI/DVT prophylaxis - protonix/carafate/SQ heparin  prednisone 5mg daily (?)  patient has been treated with endothelin antagonists and phosphodiesterase inhibitors without improvement in PAPs in the past  KATY stockings     Will follow with you. Plan of care discussed with the patient at bedside and Dr. Smith. Prognosis is poor    Chacho Gallego MD, [facility] - [phone]  Pulmonary Medicine

## 2018-03-08 NOTE — PROGRESS NOTE ADULT - SUBJECTIVE AND OBJECTIVE BOX
[facility] - [facility]     PROGRESS NOTE    CHIEF COMPLAINT: CRF (hypoxic); COPD; emphysema; JOSE; secondary pulmonary hypertension; right heart failure; epistaxis; ascites;     INTERVAL HISTORY: quite short of breath - out of bed to chair - barely able to walk to the bathroom; abdominal fullness with anorexia; no further epistaxis and back on nasal canula; difficulty with diuretics due to hemodynamic instability and tenuous renal function; weak, tired and frail; no cough, sputum production, chest congestion or wheeze; no fevers, chills or sweats; no chest pain/pressure or palpitations; legs swollen - could not tolerate KATY stockings    REVIEW OF SYSTEMS:  Constitutional: As per interval history  HEENT: Within normal limits  CV: As per interval history  Resp: As per interval history  GI: As per interval history  : Within normal limits  Musculoskeletal: Within normal limits  Skin: Within normal limits  Neurological: Within normal limits  Psychiatric: Within normal limits  Endocrine: Within normal limits  Hematologic/Lymphatic: Within normal limits  Allergic/Immunologic: Within normal limits    MEDICATIONS:     Pulmonary "  HYDROcodone/homatropine Syrup 5 milliLiter(s) Oral at bedtime PRN      Anti-microbials:      Cardiovascular:  buMETAnide 0.5 milliGRAM(s) Oral two times a day  digoxin     Tablet 0.125 milliGRAM(s) Oral every other day  diltiazem    milliGRAM(s) Oral daily  metoprolol     tartrate 25 milliGRAM(s) Oral every 8 hours      Other:  allopurinol 100 milliGRAM(s) Oral daily  aspirin enteric coated 81 milliGRAM(s) Oral daily  atorvastatin 20 milliGRAM(s) Oral at bedtime  BACItracin   Ointment 1 Application(s) Topical two times a day  clopidogrel Tablet 75 milliGRAM(s) Oral daily  docusate sodium 100 milliGRAM(s) Oral two times a day PRN  Gas-X extra strength (simethicone 125 mg 1 Tablet(s) 1 Tablet(s) Chew three times a day PRN  heparin  Injectable 5000 Unit(s) SubCutaneous every 8 hours  methimazole 5 milliGRAM(s) Oral daily  ondansetron Injectable 4 milliGRAM(s) IV Push every 8 hours PRN  pantoprazole  Injectable 40 milliGRAM(s) IV Push two times a day  potassium chloride    Tablet ER 10 milliEquivalent(s) Oral daily  predniSONE   Tablet 5 milliGRAM(s) Oral daily  sodium chloride 0.65% Nasal 1 Spray(s) Both Nostrils three times a day PRN  sucralfate 1 Gram(s) Oral three times a day        OBJECTIVE:    I&O's Detail    07 Mar 2018 07:01  -  08 Mar 2018 07:00  --------------------------------------------------------  IN:    Oral Fluid: 760 mL  Total IN: 760 mL    OUT:    Voided: 850 mL  Total OUT: 850 mL    Total NET: -90 mL      08 Mar 2018 07:01  -  08 Mar 2018 08:45  --------------------------------------------------------  IN:    Oral Fluid: 100 mL  Total IN: 100 mL    OUT:  Total OUT: 0 mL    Total NET: 100 mL    PHYSICAL EXAM:       ICU Vital Signs Last 24 Hrs  T(C): 36.8 (08 Mar 2018 04:38), Max: 36.8 (07 Mar 2018 21:52)  T(F): 98.3 (08 Mar 2018 04:38), Max: 98.3 (07 Mar 2018 21:52)  HR: 90 (08 Mar 2018 04:38) (63 - 94)  BP: 104/60 (08 Mar 2018 04:38) (95/60 - 109/69)  BP(mean): --  ABP: --  ABP(mean): --  RR: 20 (08 Mar 2018 04:38) (20 - 20)  SpO2: 94% (08 Mar 2018 04:38) (91% - 94%) on 5lpm     General: Awake. Alert. Cooperative. Weak and tired. No distress. Chronically ill appearing	  HEENT:   Atraumatic. Bitemporal wasting. Anicteric. Normal oral mucosa, PERRL, EOMI  Neck: Supple. Trachea midline. Thyroid without enlargement/tenderness/nodules. No carotid bruit. (+) JVD; loss of bilateral supraclavicular fat pads	  Cardiovascular: Irregularly irregular rate and rhythm. S1 S2 normal. II/VI systolic murmur  Respiratory: Respirations unlabored. Decreased breath sounds throughout especially at the bases. Decreased chest wall excursion  Abdomen: Soft. Non-tender. Moderately distended with fluid wave. No organomegaly. No masses. Normal bowel sounds	  Extremities: Warm to touch. No clubbing or cyanosis. Mild to moderate lower extremity edema up to the thigh. Loss of extremity muscle mass  Pulses: Decreased lower extremity peripheral pulses  Skin: Normal skin color. No rashes or lesions. No ecchymoses, No cyanosis. Warm to touch  Lymph Nodes: Cervical, supraclavicular and axillary nodes normal  Neurological: Motor and sensory examination equal and normal. A and O x 3  Psychiatry: Depressed      LABS:                        9.0    6.84  )-----------( 229      ( 08 Mar 2018 07:35 )             30.0                         10.1   6.9   )-----------( 267      ( 07 Mar 2018 08:31 )             31.9     03-08    132<L>  |  91<L>  |  102<H>  ----------------------------<  97  4.0   |  30  |  2.62<H>    03-07    133<L>  |  90<L>  |  101<H>  ----------------------------<  98  3.7   |  33<H>  |  2.51<H>    Ca      8.5      03-08    Ca      8.8      03-07    Phos    4.1     03-07    Phos    4.1     03-06      Mg       2.1     03-07    Mg       2.0     03-06    TPro  5.6<L>  /  Alb  2.6<L>  /  TBili  0.2  /  DBili  x   /  AST  9<L>  /  ALT  6<L>  /  AlkPhos  50  03-07    Serum Pro-Brain Natriuretic Peptide: 69207 pg/mL (03-01 @ 16:29)    CARDIAC MARKERS ( 01 Mar 2018 16:29 )  x     / 0.06 ng/mL / x     / x     / 5.4 ng/mL    < from: Transthoracic Echocardiogram (12.13.17 @ 18:58) >    Patient name: FRAN ALEXANDRA  YOB: 1945   Age: 71 (F)   MR#: [id number]  Study Date: 12/13/2017  Location: [address] [place], [place] [id number]: Thalia Amezquita [facility]  Study quality: Technically fair  Referring Physician: Pierce Cobb MD  Blood Pressure: 106/58 mmHg  Height: 160 cm  Weight: 64 kg  BSA: 1.7 m2  ------------------------------------------------------------------------  PROCEDURE: Transthoracic echocardiogram with 2-D, M-Mode  and complete spectral and color flow Doppler.  INDICATION: Other specified pulmonary heart diseases  (I27.89)  ------------------------------------------------------------------------  Dimensions:    Normal Values:  LA:     3.6    2.0 - 4.0 cm  Ao:     2.8    2.0 - 3.8 cm  SEPTUM: 0.7    0.6 - 1.2 cm  PWT:    0.9    0.6 - 1.1 cm  LVIDd:  4.1    3.0 - 5.6 cm  LVIDs:  1.9    1.8 - 4.0 cm  Derived variables:  LVMI: 58 g/m2  RWT: 0.43  Fractional short: 54 %  Doppler Peak Velocity (m/sec): AoV=1.8  ------------------------------------------------------------------------  Observations:  Mitral Valve: Mitral annular calcification, otherwise  normal mitral valve. Minimal mitral regurgitation.  Aortic Valve/Aorta: Calcified trileaflet aortic valve with  normal opening. Peak transaortic valve gradient equals 13  mm Hg, mean transaortic valve gradient equals 8 mm Hg. Peak  left ventricular outflow tract gradient equals 6 mm Hg,  mean gradient is equal to 3 mm Hg, LVOT velocity time  integral equals 19 cm.  Aortic Root: 2.8 cm.  LVOT diameter: 1.9 cm.  Left Atrium: Normal left atrium.  LA volume index = 25  cc/m2.  Left Ventricle: Hyperdynamic left ventricular systolic  function. Flattening of the interventricular septum in both  systole and diastole is  consistent with right ventricular  pressure overload. Normal left ventricular internal  dimensions and wall thicknesses.  Right Heart: Severe right atrial enlargement. Right  ventricular enlargement with decreased right ventricular  systolic function. Normal tricuspid valve. Mild-moderate  tricuspid regurgitation. Normal pulmonic valve.  Pericardium/Pleura: Normal pericardium with trace  pericardial effusion.  Left pleural effusion.  Hemodynamic: Estimated right atrial pressure is 8 mm Hg.  Estimated right ventricular systolic pressure equals 72 mm  Hg, assuming right atrial pressure equals 8 mm Hg,  consistent with severe pulmonary hypertension.  ------------------------------------------------------------------------  Conclusions:  1. Calcified trileaflet aortic valve with normal opening.  2. Normal left ventricular internal dimensions and wall  thicknesses.  3. Hyperdynamic left ventricular systolic function.  Flattening of the interventricular septum in both systole  and diastole is  consistent with right ventricular pressure  overload.  4. Severe right atrial enlargement.  5. Right ventricular enlargement with decreased right  ventricular systolic function.  6. Normal tricuspid valve. Mild-moderate tricuspid  regurgitation.  7. Estimated pulmonary artery systolic pressure equals 72  mm Hg, assuming right atrial pressure equals 8 mm Hg,  consistent with severe pulmonary pressures.  *** Compared with echocardiogram of 4/25/2017, no  significant changes noted.  ------------------------------------------------------------------------  Confirmed on  12/13/2017 - 13:15:09 by Justin Cohen M.D.  ------------------------------------------------------------------------    < end of copied text >  ---------------------------------------------------------------------------------------------------------  MICROBIOLOGY:     Culture - Body Fluid with Gram Stain (03.02.18 @ 21:16)    Gram Stain:   polymorphonuclear leukocytes seen per low power field  No organisms seen per oil power field  by cytocentrifuge    Specimen Source: Peritoneal Peritoneal Fluid    Culture Results:   No growth to date.    Culture - Acid Fast - Body Fluid w/Smear (03.02.18 @ 21:16)    Specimen Source: .Body Fluid Peritoneal Fluid    Acid Fast Bacilli Smear:   No acid fast bacilli seen by fluorochrome stain    RADIOLOGY:  [x] Chest radiographs reviewed and interpreted by me    < from: [facility] Duplex Lower Ext Vein Scan, [place] (03.05.18 @ 11:36) >    EXAM:  DUPLEX SCAN EXT VEINS LOWER BI                            PROCEDURE DATE:  03/05/2018      INTERPRETATION:  History:Severe pulmonary hypertension. COPD. Bilateral   lower extremity edema and shortness of breath. Evaluate for DVT.    Bilateral lower extremity venous duplex ultrasound from 4/28/2017   demonstrated no DVT.    There is edema of the superficial soft tissues of the lower extremities.    Both common femoral, superficial femoral and popliteal veins are patent   and compressible without evidence of thrombus.    The posterior tibial and peroneal veins are patent.  No thrombus is seen.    IMPRESSION: No evidence of deep vein thrombosis of either lower extremity.    JUDY DE LEON M.D., RADIOLOGY RESIDENT  This document has been electronically signed.  MARTIR VALVERDE M.D., ATTENDING RADIOLOGIST  This document has been electronically signed. Mar  5 2018  2:37PM      < end of copied text >  ---------------------------------------------------------------------------------------------------------  < from: Xray Abdomen 1 View PORTABLE -Urgent (03.04.18 @ 17:11) >    EXAM:  XR ABDOMEN PORTABLE URGENT 1V                            PROCEDURE DATE:  03/04/2018      INTERPRETATION:  CLINICAL INFORMATION: Abdominal distention. Evaluate for   obstruction.    TECHNIQUE: Portable abdominal radiograph dated 3/4/2018.    COMPARISON: Femoral ultrasound dated 3/2/2018. CT chest dated 3/3/2018.    FINDINGS: Top of Form 1      Large volume ascites.  There are no dilated loops of small bowel.   Bowel gas and stool identified throughout the colon and rectum.   There is no free air visualized.  The visualized osseous structures are unremarkable for patient's stated   age.    IMPRESSION:    Nonobstructive bowel gas pattern.    Ascites.    RACHEL VELARDE M.D., RADIOLOGY RESIDENT  This document has been electronically signed.  PIERRE BAILEY M.D., ATTENDING RADIOLOGIST  This document has been electronically signed. Mar  5 2018 10:22AM      < end of copied text >  ---------------------------------------------------------------------------------------------------------  < from: CT Chest No Cont (03.03.18 @ 18:24) >    EXAM:  CT CHEST                            PROCEDURE DATE:  03/03/2018        INTERPRETATION:  CLINICAL INFORMATION: Evaluate pleural effusions.   Shortness of breath.    COMPARISON: Chest CT dated 4/25/2017. Chest x-ray dated 3/1/2018.    PROCEDURE:   CT of the Chest was performed without intravenous contrast.  Sagittal and coronal reformats were performed.  Axial MIP reformats were also performed.    FINDINGS:    CHEST:     LUNGS AND LARGE AIRWAYS: Patent central airways.  Bibasilar subsegmental   atelectasis, right greater than left.  PLEURA: Trace bilateral pleural effusions.  VESSELS: Thoracic aortic and coronary artery atherosclerosis.  HEART: Cardiomegaly. Small pericardial effusion. Mitral annular   calcification. A densely calcified or metallic density measuring 1.1 x   0.4 cm overlies the basilar left pulmonary artery (3:52, 6:80).  MEDIASTINUM AND STEFANIA: No lymphadenopathy.  CHEST WALL AND LOWER NECK: Within normal limits.  VISUALIZED UPPER ABDOMEN: Small volume ascites. Atherosclerotic changes.  BONES: Multilevel degenerative disease.    IMPRESSION: Trace bilateral pleural effusions.  Bibasilar subsegmental atelectasis, right greater than left.  Densely calcified or metallic density overlies the left pulmonary artery.  See above..      JUAN JUAREZ M.D., RADIOLOGY RESIDENT  This document has been electronically signed.  MADONNA MORGAN M.D., ATTENDING RADIOLOGIST  This document has been electronically signed. Mar  4 2018 12:28PM      < end of copied text >  ---------------------------------------------------------------------------------------------------------    < from: Xray Chest 2 Views PA/Lat (03.01.18 @ 17:19) >    EXAM:  XR CHEST PA LAT 2V                            PROCEDURE DATE:  03/01/2018        INTERPRETATION:  CLINICAL INFORMATION: Dyspnea.     EXAM: PA and lateral view chest radiograph    COMPARISON: Chest radiograph 12/30/2017    FINDINGS:   Thecardiac silhouette is normal.  Mediastinal surgical clips unchanged.  Aortic calcification.    Left pleural effusion similar in appearance to the prior.    No acute osseous abnormalities.      IMPRESSION:   Clear lungs.    SUGEY RODRIGUEZ, RADIOLOGY RESIDENT  This document has been electronically signed.  RODO SYED M.D., ATTENDING RADIOLOGIST  This document has been electronically signed. Mar  2 2018  9:51AM      < end of copied text >  ---------------------------------------------------------------------------------------------------------    < from: US Abdomen Limited (03.02.18 @ 08:50) >    EXAM:  US ABDOMEN LIMITED                            PROCEDURE DATE:  03/02/2018      INTERPRETATION:  Clinical information: Assess for ascites for   paracentesis.    Comparison: Ultrasound 12/27/2017.    Findings: Targeted ultrasound was performed for purposes of ascites   evaluation. A large amount of ascites is noted in all 4 quadrants.    IMPRESSION:    Diffuse, large volume abdominal ascites.    GAEL SCHERER M.D., ATTENDING RADIOLOGIST  This document has been electronically signed. Mar  2 2018  9:16AM      < end of copied text >  ---------------------------------------------------------------------------------------------------------

## 2018-03-08 NOTE — PROGRESS NOTE ADULT - ASSESSMENT
volume overload/ascites/LE edema  sev PHTN/ Cor pulm  decomp diast hf  sev copd  restrictive lung disease sec to ascites which diminishes diaphragmatic excrusion  epistaxis resolved  nausea  CASSANDRA  Failure to thrive  Abd pain and likely recurrence of ascites  Hyponatremia    appreciate renal and pulm input  continue current care  f/u renal recs  await decision on abd catheter  pt overall prognosis is poor however pt doesn't wish to be DNR or palliative care

## 2018-03-08 NOTE — PROGRESS NOTE ADULT - SUBJECTIVE AND OBJECTIVE BOX
MEDICINE, PROGRESS NOTE [phone]    FRAN ALEXANDRA 72y [id number]    Patient seen and examined.  Patient is a 72y old  Female who presents with a chief complaint of shortness of breath and difficulty eating due to fullness (01 Mar 2018 19:30)  Pt has abd discomfort, nausea is a little better.    PAST MEDICAL & SURGICAL HISTORY:  Hyperthyroidism  JOSE (obstructive sleep apnea)  Epistaxis  GIB (gastrointestinal bleeding)  CAD S/P percutaneous coronary angioplasty  Afib  Pulmonary HTN  GERD (gastroesophageal reflux disease)  Obesity  Cardiomegaly  Valvular heart disease  COPD (chronic obstructive pulmonary disease): Home O2  Sleep Apnea: by criteria  Loose, teeth: 3 bottom front loose teeth  Female stress incontinence  Shoulder pain, right  Constipation  Arthritis of Knee  H/O heartburn  History of lung cancer  Obese  Hx of hyperlipidemia  Asthma  Diabetes mellitus: type 2  dx about 4-5 years ago   no daily fingerstick  H/O: HTN (hypertension)  Enlarged lymph nodes  Lymph nodes enlarged: s/p biopsy mediastinal  benign  H/O endoscopy  left upper lobectomy    MEDICATIONS  (STANDING):  allopurinol 100 milliGRAM(s) Oral daily  aspirin enteric coated 81 milliGRAM(s) Oral daily  atorvastatin 20 milliGRAM(s) Oral at bedtime  BACItracin   Ointment 1 Application(s) Topical two times a day  buMETAnide 0.5 milliGRAM(s) Oral two times a day  clopidogrel Tablet 75 milliGRAM(s) Oral daily  digoxin     Tablet 0.125 milliGRAM(s) Oral every other day  diltiazem    milliGRAM(s) Oral daily  heparin  Injectable 5000 Unit(s) SubCutaneous every 8 hours  methimazole 5 milliGRAM(s) Oral daily  metolazone 5 milliGRAM(s) Oral <User Schedule>  metoprolol     tartrate 25 milliGRAM(s) Oral every 8 hours  pantoprazole  Injectable 40 milliGRAM(s) IV Push two times a day  potassium chloride    Tablet ER 10 milliEquivalent(s) Oral daily  predniSONE   Tablet 5 milliGRAM(s) Oral daily  sucralfate 1 Gram(s) Oral three times a day    MEDICATIONS  (PRN):  docusate sodium 100 milliGRAM(s) Oral two times a day PRN Constipation  Gas-X extra strength (simethicone 125 mg 1 Tablet(s) 1 Tablet(s) Chew three times a day PRN bloating  HYDROcodone/homatropine Syrup 5 milliLiter(s) Oral at bedtime PRN Cough  ondansetron Injectable 4 milliGRAM(s) IV Push every 8 hours PRN Nausea and/or Vomiting  sodium chloride 0.65% Nasal 1 Spray(s) Both Nostrils three times a day PRN Nasal Congestion    Allergies    No Known Allergies    Intolerances    albuterol (Unknown)      PHYSICAL EXAM:  Constitutional: NAD  HEENT: Normocephalic, EOMI  Neck:  No JVD  Respiratory: CTA B/L, No wheezes  Cardiovascular: S1, S2, RRR, + systolic murmur  Gastrointestinal: BS+ but hypoactive, distended, + fluid wave, soft  Extremities: + peripheral edema  Neurological: AAOX3, no focal deficits  Psychiatric: Normal mood, normal affect  : No Gottlieb    Vital Signs Last 24 Hrs  T(C): 36.8 (08 Mar 2018 04:38), Max: 36.8 (07 Mar 2018 21:52)  T(F): 98.3 (08 Mar 2018 04:38), Max: 98.3 (07 Mar 2018 21:52)  HR: 90 (08 Mar 2018 04:38) (63 - 94)  BP: 104/60 (08 Mar 2018 04:38) (104/60 - 109/69)  BP(mean): --  RR: 20 (08 Mar 2018 04:38) (20 - 20)  SpO2: 94% (08 Mar 2018 04:38) (93% - 94%)  I&O's Summary    07 Mar 2018 07:01  -  08 Mar 2018 07:00  --------------------------------------------------------  IN: 760 mL / OUT: 850 mL / NET: -90 mL    08 Mar 2018 07:01  -  08 Mar 2018 13:29  --------------------------------------------------------  IN: 100 mL / OUT: 0 mL / NET: 100 mL        LABS:                        9.0    6.84  )-----------( 229      ( 08 Mar 2018 07:35 )             30.0     03-08    132<L>  |  91<L>  |  102<H>  ----------------------------<  97  4.0   |  30  |  2.62<H>    Ca    8.5      08 Mar 2018 06:11  Phos  4.1     03-07  Mg     2.1     03-07    TPro  5.6<L>  /  Alb  2.6<L>  /  TBili  0.2  /  DBili  x   /  AST  9<L>  /  ALT  6<L>  /  AlkPhos  50  03-07

## 2018-03-08 NOTE — PROGRESS NOTE ADULT - SUBJECTIVE AND OBJECTIVE BOX
[facility] [phone]        Patient seen and examined in bed.  She did not feel well.  She still had abd pain/discomfort        MEDICATIONS  (STANDING):  allopurinol 100 milliGRAM(s) Oral daily  aspirin enteric coated 81 milliGRAM(s) Oral daily  atorvastatin 20 milliGRAM(s) Oral at bedtime  BACItracin   Ointment 1 Application(s) Topical two times a day  buMETAnide 0.5 milliGRAM(s) Oral two times a day  clopidogrel Tablet 75 milliGRAM(s) Oral daily  digoxin     Tablet 0.125 milliGRAM(s) Oral every other day  diltiazem    milliGRAM(s) Oral daily  heparin  Injectable 5000 Unit(s) SubCutaneous every 8 hours  methimazole 5 milliGRAM(s) Oral daily  metoprolol     tartrate 25 milliGRAM(s) Oral every 8 hours  pantoprazole  Injectable 40 milliGRAM(s) IV Push two times a day  potassium chloride    Tablet ER 10 milliEquivalent(s) Oral daily  predniSONE   Tablet 5 milliGRAM(s) Oral daily  sucralfate 1 Gram(s) Oral three times a day      VITAL:  T(C): , Max: 36.8 (03-07-18 @ 21:52)  T(F): , Max: 98.3 (03-07-18 @ 21:52)  HR: 90 (03-08-18 @ 04:38)  BP: 104/60 (03-08-18 @ 04:38)  BP(mean): --  RR: 20 (03-08-18 @ 04:38)  SpO2: 94% (03-08-18 @ 04:38)  Wt(kg): --    I and O's:    03-07 @ 07:01  -  03-08 @ 07:00  --------------------------------------------------------  IN: 760 mL / OUT: 850 mL / NET: -90 mL    03-08 @ 07:01  -  03-08 @ 09:57  --------------------------------------------------------  IN: 100 mL / OUT: 0 mL / NET: 100 mL          PHYSICAL EXAM:    Constitutional: NAD  HEENT: PERRLA    Neck:  No JVD  Respiratory: CTAB/L  Cardiovascular: S1 and S2  Gastrointestinal: BS+, soft, distended  Extremities: No peripheral edema  Neurological: A/O x 3, no focal deficits  Psychiatric: Normal mood, normal affect  : No Gottlieb  Skin: No rashes  Access: Not applicable    LABS:                        9.0    6.84  )-----------( 229      ( 08 Mar 2018 07:35 )             30.0     03-08    132<L>  |  91<L>  |  102<H>  ----------------------------<  97  4.0   |  30  |  2.62<H>    Ca    8.5      08 Mar 2018 06:11  Phos  4.1     03-07  Mg     2.1     03-07    TPro  5.6<L>  /  Alb  2.6<L>  /  TBili  0.2  /  DBili  x   /  AST  9<L>  /  ALT  6<L>  /  AlkPhos  50  03-07          Urine Studies:          RADIOLOGY & ADDITIONAL STUDIES:        < from: Xray Abdomen 1 View PORTABLE -Urgent (03.04.18 @ 17:11) >    EXAM:  XR ABDOMEN PORTABLE URGENT 1V                            PROCEDURE DATE:  03/04/2018            INTERPRETATION:  CLINICAL INFORMATION: Abdominal distention. Evaluate for   obstruction.    TECHNIQUE: Portable abdominal radiograph dated 3/4/2018.    COMPARISON: Femoral ultrasound dated 3/2/2018. CT chest dated 3/3/2018.    FINDINGS: Top of Form 1      Large volume ascites.  There are no dilated loops of small bowel.   Bowel gas and stool identified throughout the colon and rectum.   There is no free air visualized.  The visualized osseous structures are unremarkable for patient's stated   age.    IMPRESSION:    Nonobstructive bowel gas pattern.    Ascites.                RACHEL VELARDE M.D., RADIOLOGY RESIDENT  This document has been electronically signed.  PIERRE BAILEY M.D., ATTENDING RADIOLOGIST  This document has been electronically signed. Mar  5 2018 10:22AM                < end of copied text >

## 2018-03-09 LAB
ANION GAP SERPL CALC-SCNC: 12 MMOL/L — SIGNIFICANT CHANGE UP (ref 5–17)
BUN SERPL-MCNC: 103 MG/DL — HIGH (ref 7–23)
CALCIUM SERPL-MCNC: 8.3 MG/DL — LOW (ref 8.4–10.5)
CHLORIDE SERPL-SCNC: 92 MMOL/L — LOW (ref 96–108)
CO2 SERPL-SCNC: 29 MMOL/L — SIGNIFICANT CHANGE UP (ref 22–31)
CREAT SERPL-MCNC: 3.15 MG/DL — HIGH (ref 0.5–1.3)
GLUCOSE SERPL-MCNC: 145 MG/DL — HIGH (ref 70–99)
HCT VFR BLD CALC: 29.2 % — LOW (ref 34.5–45)
HGB BLD-MCNC: 9.7 G/DL — LOW (ref 11.5–15.5)
MCHC RBC-ENTMCNC: 29 PG — SIGNIFICANT CHANGE UP (ref 27–34)
MCHC RBC-ENTMCNC: 33.3 GM/DL — SIGNIFICANT CHANGE UP (ref 32–36)
MCV RBC AUTO: 87 FL — SIGNIFICANT CHANGE UP (ref 80–100)
PLATELET # BLD AUTO: 268 K/UL — SIGNIFICANT CHANGE UP (ref 150–400)
POTASSIUM SERPL-MCNC: 4.1 MMOL/L — SIGNIFICANT CHANGE UP (ref 3.5–5.3)
POTASSIUM SERPL-SCNC: 4.1 MMOL/L — SIGNIFICANT CHANGE UP (ref 3.5–5.3)
RBC # BLD: 3.35 M/UL — LOW (ref 3.8–5.2)
RBC # FLD: 17.3 % — HIGH (ref 10.3–14.5)
SODIUM SERPL-SCNC: 133 MMOL/L — LOW (ref 135–145)
WBC # BLD: 9.4 K/UL — SIGNIFICANT CHANGE UP (ref 3.8–10.5)
WBC # FLD AUTO: 9.4 K/UL — SIGNIFICANT CHANGE UP (ref 3.8–10.5)

## 2018-03-09 RX ORDER — IPRATROPIUM/ALBUTEROL SULFATE 18-103MCG
3 AEROSOL WITH ADAPTER (GRAM) INHALATION EVERY 6 HOURS
Qty: 0 | Refills: 0 | Status: DISCONTINUED | OUTPATIENT
Start: 2018-03-09 | End: 2018-03-28

## 2018-03-09 RX ORDER — BUDESONIDE, MICRONIZED 100 %
0.5 POWDER (GRAM) MISCELLANEOUS EVERY 12 HOURS
Qty: 0 | Refills: 0 | Status: DISCONTINUED | OUTPATIENT
Start: 2018-03-09 | End: 2018-03-28

## 2018-03-09 RX ADMIN — Medication 25 MILLIGRAM(S): at 06:43

## 2018-03-09 RX ADMIN — Medication 25 MILLIGRAM(S): at 22:22

## 2018-03-09 RX ADMIN — ONDANSETRON 4 MILLIGRAM(S): 8 TABLET, FILM COATED ORAL at 22:22

## 2018-03-09 RX ADMIN — ATORVASTATIN CALCIUM 20 MILLIGRAM(S): 80 TABLET, FILM COATED ORAL at 22:22

## 2018-03-09 RX ADMIN — Medication 1 APPLICATION(S): at 06:43

## 2018-03-09 RX ADMIN — Medication 5 MILLIGRAM(S): at 06:43

## 2018-03-09 RX ADMIN — Medication 3 MILLILITER(S): at 23:04

## 2018-03-09 RX ADMIN — Medication 3 MILLILITER(S): at 18:45

## 2018-03-09 RX ADMIN — Medication 0.5 MILLIGRAM(S): at 13:49

## 2018-03-09 RX ADMIN — Medication 0.12 MILLIGRAM(S): at 10:00

## 2018-03-09 RX ADMIN — Medication 100 MILLIGRAM(S): at 13:48

## 2018-03-09 RX ADMIN — PANTOPRAZOLE SODIUM 40 MILLIGRAM(S): 20 TABLET, DELAYED RELEASE ORAL at 06:43

## 2018-03-09 RX ADMIN — Medication 81 MILLIGRAM(S): at 13:48

## 2018-03-09 RX ADMIN — Medication 3 MILLILITER(S): at 13:49

## 2018-03-09 RX ADMIN — CLOPIDOGREL BISULFATE 75 MILLIGRAM(S): 75 TABLET, FILM COATED ORAL at 13:48

## 2018-03-09 RX ADMIN — PANTOPRAZOLE SODIUM 40 MILLIGRAM(S): 20 TABLET, DELAYED RELEASE ORAL at 18:44

## 2018-03-09 RX ADMIN — BUMETANIDE 0.5 MILLIGRAM(S): 0.25 INJECTION INTRAMUSCULAR; INTRAVENOUS at 06:43

## 2018-03-09 RX ADMIN — Medication 240 MILLIGRAM(S): at 06:43

## 2018-03-09 NOTE — CONSULT NOTE ADULT - SUBJECTIVE AND OBJECTIVE BOX
Chart reviewed and patient seen by undersigned    Asked to consult for depression    Historians include chart and the pt.     History of Present Illness  The patient is a 72 year old  WF who denies prior psychiatric and substance abuse history. She has an extensive past medical history. Admitted here on 3/1/18 for dyspnea and difficulty eating due to fullness. Problems consist of CHF, ascites, CASSANDRA, epistaxis, pulmonary HTN, COPD, chronic hypoxic respiratory failure, nausea. Pt. stressed by her breathing and her medical problems limiting her activity. She reports poor sleep (?due to breathing issues), low energy, and while she has an appetite, she cannot eat well as her ascites makes her feel full. Likes to eat "Maurisio Mints". No anhedonia nor suicidal thoughts.     Past Psychiatric History  Never seen by a psychiatrist nor psychotherapist. No karen.     Psychosocial History  Retired car saleswoman. Lives with  and she admits being irritated with him at times. Stopped smoking cigarettes 12 years ago. Social drinker. No illicit drugs. Has two adult children one who lives with her.     PAST MEDICAL & SURGICAL HISTORY:  Hyperthyroidism  JOSE (obstructive sleep apnea)  Epistaxis  GIB (gastrointestinal bleeding)  CAD S/P percutaneous coronary angioplasty  Afib  Pulmonary HTN  GERD (gastroesophageal reflux disease)  Obesity  Cardiomegaly  Valvular heart disease  COPD (chronic obstructive pulmonary disease): Home O2  Sleep Apnea: by criteria  Loose, teeth: 3 bottom front loose teeth  Female stress incontinence  Shoulder pain, right  Constipation  Arthritis of Knee  H/O heartburn  History of lung cancer  Obese  Hx of hyperlipidemia  Asthma  Diabetes mellitus: type 2  dx about 4-5 years ago   no daily fingerstick  H/O: HTN (hypertension)  Enlarged lymph nodes  Lymph nodes enlarged: s/p biopsy mediastinal  benign  H/O endoscopy  left upper lobectomy      FAMILY HISTORY:  No pertinent family history in first degree relatives      Vital Signs Last 24 Hrs  T(C): 36.3 (09 Mar 2018 12:48), Max: 37 (08 Mar 2018 20:39)  T(F): 97.4 (09 Mar 2018 12:48), Max: 98.6 (08 Mar 2018 20:39)  HR: 59 (09 Mar 2018 12:48) (59 - 110)  BP: 89/55 (09 Mar 2018 12:48) (89/55 - 130/83)  BP(mean): --  RR: 20 (09 Mar 2018 12:48) (19 - 20)  SpO2: 92% (09 Mar 2018 12:48) (92% - 96%)                          9.7    9.4   )-----------( 268      ( 09 Mar 2018 11:22 )             29.2       03-09    133<L>  |  92<L>  |  103<H>  ----------------------------<  145<H>  4.1   |  29  |  3.15<H>    Ca    8.3<L>      09 Mar 2018 10:55              MEDICATIONS  (STANDING):  ALBUTerol/ipratropium for Nebulization 3 milliLiter(s) Nebulizer every 6 hours  allopurinol 100 milliGRAM(s) Oral daily  aspirin enteric coated 81 milliGRAM(s) Oral daily  atorvastatin 20 milliGRAM(s) Oral at bedtime  BACItracin   Ointment 1 Application(s) Topical two times a day  buDESOnide   0.5 milliGRAM(s) Respule 0.5 milliGRAM(s) Inhalation every 12 hours  buMETAnide 0.5 milliGRAM(s) Oral two times a day  clopidogrel Tablet 75 milliGRAM(s) Oral daily  digoxin     Tablet 0.125 milliGRAM(s) Oral every other day  diltiazem    milliGRAM(s) Oral daily  heparin  Injectable 5000 Unit(s) SubCutaneous every 8 hours  methimazole 5 milliGRAM(s) Oral daily  metolazone 5 milliGRAM(s) Oral <User Schedule>  metoprolol     tartrate 25 milliGRAM(s) Oral every 8 hours  pantoprazole  Injectable 40 milliGRAM(s) IV Push two times a day  potassium chloride    Tablet ER 10 milliEquivalent(s) Oral daily  predniSONE   Tablet 5 milliGRAM(s) Oral daily  sucralfate 1 Gram(s) Oral three times a day    MEDICATIONS  (PRN):  docusate sodium 100 milliGRAM(s) Oral two times a day PRN Constipation  Gas-X extra strength (simethicone 125 mg 1 Tablet(s) 1 Tablet(s) Chew three times a day PRN bloating  ondansetron Injectable 4 milliGRAM(s) IV Push every 8 hours PRN Nausea and/or Vomiting  sodium chloride 0.65% Nasal 1 Spray(s) Both Nostrils three times a day PRN Nasal Congestion      Elderly WF sitting up in bed, bundled up and feels cold "always", calm, cooperative, alert and oriented x 3 .  No psychomotor abnormalities. Insight and judgment are fair. Speech is coherent with normal rate and volume. No hallucinations nor delusions. The patient denied suicidal and homicidal ideation and plan. Mood is frustrated and affect full range and appropriate. Attention and concentration, short term memory, and long term memory within normal limits.     Suicidal risk assessment  Risk factors include  Protective factors include

## 2018-03-09 NOTE — PROVIDER CONTACT NOTE (MEDICATION) - SITUATION
Pt. looks angry with medication administration time, from being different from home regimen. Pt. refused to reschedule as at home schedule. " I am not going to take any meds until I see ."

## 2018-03-09 NOTE — PROGRESS NOTE ADULT - ASSESSMENT
Patient is a 71-year-old female with past medical history of diabetes, hypertension, severe pulmonary hypertension, right ventricular dysfunction, presents with SOB.  The patient's acute renal failure is likely hemodynamic in nature.  Goals of therapy are to improve her hemodynamics in order to improve her forward flow and renal perfusion.  CASSANDRA  Severe PHTN  Cor Pulm  Failure to thrive  Abd pain and likely recurrence of ascites  Hyponatremia    Renal:  Bumex 0.5mg PO bid for now which is her home dose.  Aggressive diuresis essentially is leading to azotemia.  It is also NOT preventing the reoccurrence of ascites .  Restart  zaroxoyln 5mg po MWF.     We can not add inotropes at present.    Gastroenterology: I agree that a pig tail cathater for ascites may be a viable option for her to reduce morbidity;  Check abd sono to assess the amount of ascites.  If still moderate to large then possible pig tail cathater.  Again I can not diuresis this fluid out  Pulmonary: Pulmonary nebulizers.  Continue with nasal cannula oxygen at all times.  Cardiology: Monitor hemodynamics and volume status.     Overall prognosis is poor

## 2018-03-09 NOTE — PROGRESS NOTE ADULT - ASSESSMENT
Right elbow and hip pain  malnutrition  volume overload/ascites/LE edema  sev PHTN/ Cor pulm  decomp diast hf  sev copd  restrictive lung disease sec to ascites which diminishes diaphragmatic excrusion  epistaxis resolved  CASSANDRA  Failure to thrive  Abd pain and likely recurrence of ascites  Hyponatremia    continue current care  rearrange meds to how pt takes at home to see if resolves nausea  await pulm input as to catheter as according to their note they are in discussion with IR  check elbow and hip xrays on right  psych eval for anxiety/coping with general medical condition

## 2018-03-09 NOTE — PROGRESS NOTE ADULT - SUBJECTIVE AND OBJECTIVE BOX
[facility] [phone]        Patient seen and examined in bed.  She does still have abd pain.  No fevers        MEDICATIONS  (STANDING):  ALBUTerol/ipratropium for Nebulization 3 milliLiter(s) Nebulizer every 6 hours  allopurinol 100 milliGRAM(s) Oral daily  aspirin enteric coated 81 milliGRAM(s) Oral daily  atorvastatin 20 milliGRAM(s) Oral at bedtime  BACItracin   Ointment 1 Application(s) Topical two times a day  buDESOnide   0.5 milliGRAM(s) Respule 0.5 milliGRAM(s) Inhalation every 12 hours  buMETAnide 0.5 milliGRAM(s) Oral two times a day  clopidogrel Tablet 75 milliGRAM(s) Oral daily  digoxin     Tablet 0.125 milliGRAM(s) Oral every other day  diltiazem    milliGRAM(s) Oral daily  heparin  Injectable 5000 Unit(s) SubCutaneous every 8 hours  methimazole 5 milliGRAM(s) Oral daily  metolazone 5 milliGRAM(s) Oral <User Schedule>  metoprolol     tartrate 25 milliGRAM(s) Oral every 8 hours  pantoprazole  Injectable 40 milliGRAM(s) IV Push two times a day  potassium chloride    Tablet ER 10 milliEquivalent(s) Oral daily  predniSONE   Tablet 5 milliGRAM(s) Oral daily  sucralfate 1 Gram(s) Oral three times a day      VITAL:  T(C): , Max: 37 (03-08-18 @ 20:39)  T(F): , Max: 98.6 (03-08-18 @ 20:39)  HR: 110 (03-09-18 @ 06:40)  BP: 130/83 (03-09-18 @ 06:40)  BP(mean): --  RR: 19 (03-09-18 @ 06:40)  SpO2: 96% (03-09-18 @ 06:40)  Wt(kg): --    I and O's:    03-08 @ 07:01  -  03-09 @ 07:00  --------------------------------------------------------  IN: 520 mL / OUT: 700 mL / NET: -180 mL          PHYSICAL EXAM:    Constitutional: NAD  HEENT: PERRLA    Neck:  No JVD  Respiratory: reduced breath sounds  Cardiovascular: S1 and S2  Gastrointestinal: BS+, soft, distended  Extremities: No peripheral edema  Neurological: A/O x 3, no focal deficits  Psychiatric: Normal mood, normal affect  : No Gottlieb  Skin: No rashes  Access: Not applicable    LABS:                        9.0    6.84  )-----------( 229      ( 08 Mar 2018 07:35 )             30.0     03-08    132<L>  |  91<L>  |  102<H>  ----------------------------<  97  4.0   |  30  |  2.62<H>    Ca    8.5      08 Mar 2018 06:11            Urine Studies:          RADIOLOGY & ADDITIONAL STUDIES:

## 2018-03-09 NOTE — CONSULT NOTE ADULT - ASSESSMENT
Depression NOS. In part this is an adjustment disorder and in part her depression is due to medical illness.  Her hyponatremia and epistaxis make use of antidepressants relatively contraindicated. Poor eating is less anorexia but rather a feeling of fullness (from ascites).  Nonetheless, the pt. prefers no antidepressants as she feels she takes too many meds as is.    Recommend  Continue correction of Na. Check TSH. Avoid benzodiazepines given her JOSE, chronic hypoxic resp. failure.  If not eating despite improvement in ascites, consider a trial of Megace.   Will follow with you here.   Thank you.    Jose Craig M.D.  Psychiatry  [phone]

## 2018-03-09 NOTE — PROGRESS NOTE ADULT - SUBJECTIVE AND OBJECTIVE BOX
MEDICINE, PROGRESS NOTE [phone]    FRAN ALEXANDRA 72y [id number]    Patient seen and examined.  Patient is a 72y old  Female who presents with a chief complaint of shortness of breath and difficulty eating due to fullness (01 Mar 2018 19:30)  Pt feels nauseous. Pt c/o right elbow and hip pain.    PAST MEDICAL & SURGICAL HISTORY:  Hyperthyroidism  JOSE (obstructive sleep apnea)  Epistaxis  GIB (gastrointestinal bleeding)  CAD S/P percutaneous coronary angioplasty  Afib  Pulmonary HTN  GERD (gastroesophageal reflux disease)  Obesity  Cardiomegaly  Valvular heart disease  COPD (chronic obstructive pulmonary disease): Home O2  Sleep Apnea: by criteria  Loose, teeth: 3 bottom front loose teeth  Female stress incontinence  Shoulder pain, right  Constipation  Arthritis of Knee  H/O heartburn  History of lung cancer  Obese  Hx of hyperlipidemia  Asthma  Diabetes mellitus: type 2  dx about 4-5 years ago   no daily fingerstick  H/O: HTN (hypertension)  Enlarged lymph nodes  Lymph nodes enlarged: s/p biopsy mediastinal  benign  H/O endoscopy  left upper lobectomy    MEDICATIONS  (STANDING):  ALBUTerol/ipratropium for Nebulization 3 milliLiter(s) Nebulizer every 6 hours  allopurinol 100 milliGRAM(s) Oral daily  aspirin enteric coated 81 milliGRAM(s) Oral daily  atorvastatin 20 milliGRAM(s) Oral at bedtime  BACItracin   Ointment 1 Application(s) Topical two times a day  buDESOnide   0.5 milliGRAM(s) Respule 0.5 milliGRAM(s) Inhalation every 12 hours  buMETAnide 0.5 milliGRAM(s) Oral two times a day  clopidogrel Tablet 75 milliGRAM(s) Oral daily  digoxin     Tablet 0.125 milliGRAM(s) Oral every other day  diltiazem    milliGRAM(s) Oral daily  heparin  Injectable 5000 Unit(s) SubCutaneous every 8 hours  methimazole 5 milliGRAM(s) Oral daily  metolazone 5 milliGRAM(s) Oral <User Schedule>  metoprolol     tartrate 25 milliGRAM(s) Oral every 8 hours  pantoprazole  Injectable 40 milliGRAM(s) IV Push two times a day  potassium chloride    Tablet ER 10 milliEquivalent(s) Oral daily  predniSONE   Tablet 5 milliGRAM(s) Oral daily  sucralfate 1 Gram(s) Oral three times a day    MEDICATIONS  (PRN):  docusate sodium 100 milliGRAM(s) Oral two times a day PRN Constipation  Gas-X extra strength (simethicone 125 mg 1 Tablet(s) 1 Tablet(s) Chew three times a day PRN bloating  ondansetron Injectable 4 milliGRAM(s) IV Push every 8 hours PRN Nausea and/or Vomiting  sodium chloride 0.65% Nasal 1 Spray(s) Both Nostrils three times a day PRN Nasal Congestion    Allergies    No Known Allergies    Intolerances    albuterol (Unknown)      PHYSICAL EXAM:  Constitutional: NAD  HEENT: Normocephalic, EOMI  Neck:  No JVD  Respiratory: CTA B/L, No wheezes  Cardiovascular: S1, S2, RRR, + systolic murmur  Gastrointestinal: BS+ hypo, soft, distended, + fluid wave   Extremities: + peripheral edema le b/l,   Neurological: AAOX3, no focal deficits  Psychiatric: Normal mood, normal affect  : No Gottlieb    Vital Signs Last 24 Hrs  T(C): 36.3 (09 Mar 2018 12:48), Max: 37 (08 Mar 2018 20:39)  T(F): 97.4 (09 Mar 2018 12:48), Max: 98.6 (08 Mar 2018 20:39)  HR: 59 (09 Mar 2018 12:48) (59 - 110)  BP: 89/55 (09 Mar 2018 12:48) (89/55 - 130/83)  BP(mean): --  RR: 20 (09 Mar 2018 12:48) (19 - 20)  SpO2: 92% (09 Mar 2018 12:48) (92% - 96%)  I&O's Summary    08 Mar 2018 07:01  -  09 Mar 2018 07:00  --------------------------------------------------------  IN: 520 mL / OUT: 700 mL / NET: -180 mL    09 Mar 2018 07:01  -  09 Mar 2018 14:13  --------------------------------------------------------  IN: 660 mL / OUT: 200 mL / NET: 460 mL        LABS:                        9.7    9.4   )-----------( 268      ( 09 Mar 2018 11:22 )             29.2     03-09    133<L>  |  92<L>  |  103<H>  ----------------------------<  145<H>  4.1   |  29  |  3.15<H>    Ca    8.3<L>      09 Mar 2018 10:55

## 2018-03-09 NOTE — PROVIDER CONTACT NOTE (OTHER) - SITUATION
Notified provider, pt is refusing to go to x ray, pt stated that she does not feel up to it & is worried about her oxygen so she does not want to go, pt has an urgent x ray of the abdomen ordered

## 2018-03-09 NOTE — PROGRESS NOTE ADULT - ASSESSMENT
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  3) chronic diastolic CHF (little evidence left sided heart failure on chest CT - trace pleural effusions - minimal ground glass opacities)    PLAN/RECOMMENDATIONS    oxygen supplementation ATC with a face tent to keep saturation greater than 92%  s/p large volume paracentesis  may benefit from a chronic indwelling intraperitoneal catheter to improve quality of life - I have reached out to IR to discuss the risks and benefits  diuretics adjusted by renal - not helping recurrence of ascites and likely will lead to azotemia  albuterol/atrovent/pulmicort nebs  cardiac meds: bumex/cardizem CD/lopressor/digoxin/metolazone/ASA/plavix/lipitor  GI/DVT prophylaxis - protonix/carafate/SQ heparin  prednisone 5mg daily (?)  patient has been treated with endothelin antagonists and phosphodiesterase inhibitors without improvement in PAPs in the past  KATY stockings     Will follow with you. Plan of care discussed with the patient at bedside and Dr. Smith. Prognosis is poor    Chacho Gallego MD, [facility] - [phone]  Pulmonary Medicine

## 2018-03-09 NOTE — PROGRESS NOTE ADULT - SUBJECTIVE AND OBJECTIVE BOX
[facility] - [facility]     PROGRESS NOTE    CHIEF COMPLAINT: CRF (hypoxic); COPD; emphysema; JOSE; secondary pulmonary hypertension; right heart failure; epistaxis; ascites;     INTERVAL HISTORY: somewhat less short of breath - out of bed to chair - barely able to walk to the bathroom; abdominal fullness with anorexia; minimal epistaxis and back; difficulty with diuretics due to hemodynamic instability and tenuous renal function; weak, tired and frail; no cough, sputum production, chest congestion or wheeze; no fevers, chills or sweats; no chest pain/pressure or palpitations; legs swollen - could not tolerate KATY stockings    REVIEW OF SYSTEMS:  Constitutional: As per interval history  HEENT: Within normal limits  CV: As per interval history  Resp: As per interval history  GI: As per interval history  : Within normal limits  Musculoskeletal: Within normal limits  Skin: Within normal limits  Neurological: Within normal limits  Psychiatric: Within normal limits  Endocrine: Within normal limits  Hematologic/Lymphatic: Within normal limits  Allergic/Immunologic: Within normal limits      MEDICATIONS:     Pulmonary "      Anti-microbials:      Cardiovascular:  buMETAnide 0.5 milliGRAM(s) Oral two times a day  digoxin     Tablet 0.125 milliGRAM(s) Oral every other day  diltiazem    milliGRAM(s) Oral daily  metolazone 5 milliGRAM(s) Oral <User Schedule>  metoprolol     tartrate 25 milliGRAM(s) Oral every 8 hours      Other:  allopurinol 100 milliGRAM(s) Oral daily  aspirin enteric coated 81 milliGRAM(s) Oral daily  atorvastatin 20 milliGRAM(s) Oral at bedtime  BACItracin   Ointment 1 Application(s) Topical two times a day  clopidogrel Tablet 75 milliGRAM(s) Oral daily  docusate sodium 100 milliGRAM(s) Oral two times a day PRN  Gas-X extra strength (simethicone 125 mg 1 Tablet(s) 1 Tablet(s) Chew three times a day PRN  heparin  Injectable 5000 Unit(s) SubCutaneous every 8 hours  methimazole 5 milliGRAM(s) Oral daily  ondansetron Injectable 4 milliGRAM(s) IV Push every 8 hours PRN  pantoprazole  Injectable 40 milliGRAM(s) IV Push two times a day  potassium chloride    Tablet ER 10 milliEquivalent(s) Oral daily  predniSONE   Tablet 5 milliGRAM(s) Oral daily  sodium chloride 0.65% Nasal 1 Spray(s) Both Nostrils three times a day PRN  sucralfate 1 Gram(s) Oral three times a day        OBJECTIVE:    I&O's Detail    08 Mar 2018 07:01  -  09 Mar 2018 07:00  --------------------------------------------------------  IN:    Oral Fluid: 520 mL  Total IN: 520 mL    OUT:    Voided: 700 mL  Total OUT: 700 mL    Total NET: -180 mL    PHYSICAL EXAM:       ICU Vital Signs Last 24 Hrs  T(C): 36.4 (09 Mar 2018 06:40), Max: 37 (08 Mar 2018 20:39)  T(F): 97.6 (09 Mar 2018 06:40), Max: 98.6 (08 Mar 2018 20:39)  HR: 110 (09 Mar 2018 06:40) (66 - 110)  BP: 130/83 (09 Mar 2018 06:40) (90/58 - 130/83)  BP(mean): --  ABP: --  ABP(mean): --  RR: 19 (09 Mar 2018 06:40) (19 - 20)  SpO2: 96% (09 Mar 2018 06:40) (90% - 96%) on 5lpm nasal canula     General: Awake. Alert. Cooperative. Weak and tired. No distress. Chronically ill appearing	  HEENT:   Atraumatic. Bitemporal wasting. Anicteric. Normal oral mucosa, PERRL, EOMI  Neck: Supple. Trachea midline. Thyroid without enlargement/tenderness/nodules. No carotid bruit. (+) JVD; loss of bilateral supraclavicular fat pads	  Cardiovascular: Irregularly irregular rate and rhythm. S1 S2 normal. II/VI systolic murmur  Respiratory: Respirations unlabored. Decreased breath sounds throughout especially at the bases now with right sided rales. Decreased chest wall excursion  Abdomen: Soft. Non-tender. Moderately distended with fluid wave. No organomegaly. No masses. Normal bowel sounds	  Extremities: Warm to touch. No clubbing or cyanosis. Mild to moderate lower extremity edema up to the thigh. Loss of extremity muscle mass  Pulses: Decreased lower extremity peripheral pulses  Skin: Lower extremity venous stasis changes  Lymph Nodes: Cervical, supraclavicular and axillary nodes normal  Neurological: Motor and sensory examination equal and normal. A and O x 3  Psychiatry: Depressed      LABS:                        9.0    6.84  )-----------( 229      ( 08 Mar 2018 07:35 )             30.0     03-08    132<L>  |  91<L>  |  102<H>  ----------------------------<  97  4.0   |  30  |  2.62<H>    03-07    133<L>  |  90<L>  |  101<H>  ----------------------------<  98  3.7   |  33<H>  |  2.51<H>    Ca      8.5      03-08    Ca      8.8      03-07    Phos    4.1     03-07    Phos    4.1     03-06      Mg       2.1     03-07    Mg       2.0     03-06    TPro  5.6<L>  /  Alb  2.6<L>  /  TBili  0.2  /  DBili  x   /  AST  9<L>  /  ALT  6<L>  /  AlkPhos  50  03-07    Serum Pro-Brain Natriuretic Peptide: 17833 pg/mL (03-01 @ 16:29)    CARDIAC MARKERS ( 01 Mar 2018 16:29 )  x     / 0.06 ng/mL / x     / x     / 5.4 ng/mL    < from: Transthoracic Echocardiogram (12.13.17 @ 18:58) >    Patient name: FRAN ALEXANDRA  YOB: 1945   Age: 71 (F)   MR#: [id number]  Study Date: 12/13/2017  Location: [address] [place], [place] [id number]: Thalia Amezquita RDCS  Study quality: Technically fair  Referring Physician: Pierce Cobb MD  Blood Pressure: 106/58 mmHg  Height: 160 cm  Weight: 64 kg  BSA: 1.7 m2  ------------------------------------------------------------------------  PROCEDURE: Transthoracic echocardiogram with 2-D, M-Mode  and complete spectral and color flow Doppler.  INDICATION: Other specified pulmonary heart diseases  (I27.89)  ------------------------------------------------------------------------  Dimensions:    Normal Values:  LA:     3.6    2.0 - 4.0 cm  Ao:     2.8    2.0 - 3.8 cm  SEPTUM: 0.7    0.6 - 1.2 cm  PWT:    0.9    0.6 - 1.1 cm  LVIDd:  4.1    3.0 - 5.6 cm  LVIDs:  1.9    1.8 - 4.0 cm  Derived variables:  LVMI: 58 g/m2  RWT: 0.43  Fractional short: 54 %  Doppler Peak Velocity (m/sec): AoV=1.8  ------------------------------------------------------------------------  Observations:  Mitral Valve: Mitral annular calcification, otherwise  normal mitral valve. Minimal mitral regurgitation.  Aortic Valve/Aorta: Calcified trileaflet aortic valve with  normal opening. Peak transaortic valve gradient equals 13  mm Hg, mean transaortic valve gradient equals 8 mm Hg. Peak  left ventricular outflow tract gradient equals 6 mm Hg,  mean gradient is equal to 3 mm Hg, LVOT velocity time  integral equals 19 cm.  Aortic Root: 2.8 cm.  LVOT diameter: 1.9 cm.  Left Atrium: Normal left atrium.  LA volume index = 25  cc/m2.  Left Ventricle: Hyperdynamic left ventricular systolic  function. Flattening of the interventricular septum in both  systole and diastole is  consistent with right ventricular  pressure overload. Normal left ventricular internal  dimensions and wall thicknesses.  Right Heart: Severe right atrial enlargement. Right  ventricular enlargement with decreased right ventricular  systolic function. Normal tricuspid valve. Mild-moderate  tricuspid regurgitation. Normal pulmonic valve.  Pericardium/Pleura: Normal pericardium with trace  pericardial effusion.  Left pleural effusion.  Hemodynamic: Estimated right atrial pressure is 8 mm Hg.  Estimated right ventricular systolic pressure equals 72 mm  Hg, assuming right atrial pressure equals 8 mm Hg,  consistent with severe pulmonary hypertension.  ------------------------------------------------------------------------  Conclusions:  1. Calcified trileaflet aortic valve with normal opening.  2. Normal left ventricular internal dimensions and wall  thicknesses.  3. Hyperdynamic left ventricular systolic function.  Flattening of the interventricular septum in both systole  and diastole is  consistent with right ventricular pressure  overload.  4. Severe right atrial enlargement.  5. Right ventricular enlargement with decreased right  ventricular systolic function.  6. Normal tricuspid valve. Mild-moderate tricuspid  regurgitation.  7. Estimated pulmonary artery systolic pressure equals 72  mm Hg, assuming right atrial pressure equals 8 mm Hg,  consistent with severe pulmonary pressures.  *** Compared with echocardiogram of 4/25/2017, no  significant changes noted.  ------------------------------------------------------------------------  Confirmed on  12/13/2017 - 13:15:09 by Justin Cohen M.D.  ------------------------------------------------------------------------    < end of copied text >  ---------------------------------------------------------------------------------------------------------  MICROBIOLOGY:     Culture - Body Fluid with Gram Stain (03.02.18 @ 21:16)    Gram Stain:   polymorphonuclear leukocytes seen per low power field  No organisms seen per oil power field  by cytocentrifuge    Specimen Source: Peritoneal Peritoneal Fluid    Culture Results:   No growth to date.    Culture - Acid Fast - Body Fluid w/Smear (03.02.18 @ 21:16)    Specimen Source: .Body Fluid Peritoneal Fluid    Acid Fast Bacilli Smear:   No acid fast bacilli seen by fluorochrome stain      RADIOLOGY:  [x] Chest radiographs reviewed and interpreted by me    < from: Xray Abdomen 1 View PORTABLE -Urgent (03.07.18 @ 18:07) >    EXAM:  XR ABDOMEN PORTABLE URGENT 1V                            PROCEDURE DATE:  03/07/2018      INTERPRETATION:  CLINICAL INFORMATION: Abdominal pain and ascites.    TECHNIQUE: Portable abdominal radiograph dated 3/7/2018.    COMPARISON: Portable abdominal radiograph dated 3/4/2018.     FINDINGS: Top of Form 1    Distended stomach is evident.  There are no dilated loops of small bowel.   Bowel gas and stool identified throughout the colon and rectum.   There is no free air visualized.  The visualized osseous structures are unremarkable for patient's stated   age.    IMPRESSION:    Nonobstructive bowel gas pattern. Distended stomach.    RACHEL VELARDE M.D., RADIOLOGY RESIDENT  This document has been electronically signed.  PHAN CLIFTON M.D., ATTENDING RADIOLOGIST  This document has been electronically signed. Mar  8 2018 10:15AM      < end of copied text >  ---------------------------------------------------------------------------------------------------------  < from: VA Duplex Lower Ext Vein Scan, Bilat (03.05.18 @ 11:36) >    EXAM:  DUPLEX SCAN EXT VEINS LOWER BI                            PROCEDURE DATE:  03/05/2018      INTERPRETATION:  History:Severe pulmonary hypertension. COPD. Bilateral   lower extremity edema and shortness of breath. Evaluate for DVT.    Bilateral lower extremity venous duplex ultrasound from 4/28/2017   demonstrated no DVT.    There is edema of the superficial soft tissues of the lower extremities.    Both common femoral, superficial femoral and popliteal veins are patent   and compressible without evidence of thrombus.    The posterior tibial and peroneal veins are patent.  No thrombus is seen.    IMPRESSION: No evidence of deep vein thrombosis of either lower extremity.    JUDY DE LEON M.D., RADIOLOGY RESIDENT  This document has been electronically signed.  MARTIR VALVERDE M.D., ATTENDING RADIOLOGIST  This document has been electronically signed. Mar  5 2018  2:37PM      < end of copied text >  ---------------------------------------------------------------------------------------------------------  < from: Xray Abdomen 1 View PORTABLE -Urgent (03.04.18 @ 17:11) >    EXAM:  XR ABDOMEN PORTABLE URGENT 1V                            PROCEDURE DATE:  03/04/2018      INTERPRETATION:  CLINICAL INFORMATION: Abdominal distention. Evaluate for   obstruction.    TECHNIQUE: Portable abdominal radiograph dated 3/4/2018.    COMPARISON: Femoral ultrasound dated 3/2/2018. CT chest dated 3/3/2018.    FINDINGS: Top of Form 1      Large volume ascites.  There are no dilated loops of small bowel.   Bowel gas and stool identified throughout the colon and rectum.   There is no free air visualized.  The visualized osseous structures are unremarkable for patient's stated   age.    IMPRESSION:    Nonobstructive bowel gas pattern.    Ascites.    RACHEL VELARDE M.D., RADIOLOGY RESIDENT  This document has been electronically signed.  PIERRE BAILEY M.D., ATTENDING RADIOLOGIST  This document has been electronically signed. Mar  5 2018 10:22AM      < end of copied text >  ---------------------------------------------------------------------------------------------------------  < from: CT Chest No Cont (03.03.18 @ 18:24) >    EXAM:  CT CHEST                            PROCEDURE DATE:  03/03/2018        INTERPRETATION:  CLINICAL INFORMATION: Evaluate pleural effusions.   Shortness of breath.    COMPARISON: Chest CT dated 4/25/2017. Chest x-ray dated 3/1/2018.    PROCEDURE:   CT of the Chest was performed without intravenous contrast.  Sagittal and coronal reformats were performed.  Axial MIP reformats were also performed.    FINDINGS:    CHEST:     LUNGS AND LARGE AIRWAYS: Patent central airways.  Bibasilar subsegmental   atelectasis, right greater than left.  PLEURA: Trace bilateral pleural effusions.  VESSELS: Thoracic aortic and coronary artery atherosclerosis.  HEART: Cardiomegaly. Small pericardial effusion. Mitral annular   calcification. A densely calcified or metallic density measuring 1.1 x   0.4 cm overlies the basilar left pulmonary artery (3:52, 6:80).  MEDIASTINUM AND STEFANIA: No lymphadenopathy.  CHEST WALL AND LOWER NECK: Within normal limits.  VISUALIZED UPPER ABDOMEN: Small volume ascites. Atherosclerotic changes.  BONES: Multilevel degenerative disease.    IMPRESSION: Trace bilateral pleural effusions.  Bibasilar subsegmental atelectasis, right greater than left.  Densely calcified or metallic density overlies the left pulmonary artery.  See above..      JUAN JUAREZ M.D., RADIOLOGY RESIDENT  This document has been electronically signed.  MADONNA MORGAN M.D., ATTENDING RADIOLOGIST  This document has been electronically signed. Mar  4 2018 12:28PM      < end of copied text >  ---------------------------------------------------------------------------------------------------------  < from: US Abdomen Limited (03.02.18 @ 08:50) >    EXAM:  US ABDOMEN LIMITED                            PROCEDURE DATE:  03/02/2018      INTERPRETATION:  Clinical information: Assess for ascites for   paracentesis.    Comparison: Ultrasound 12/27/2017.    Findings: Targeted ultrasound was performed for purposes of ascites   evaluation. A large amount of ascites is noted in all 4 quadrants.    IMPRESSION:    Diffuse, large volume abdominal ascites.    GAEL SCHERER M.D., ATTENDING RADIOLOGIST  This document has been electronically signed. Mar  2 2018  9:16AM      < end of copied text >  ---------------------------------------------------------------------------------------------------------

## 2018-03-10 LAB
ANION GAP SERPL CALC-SCNC: 13 MMOL/L — SIGNIFICANT CHANGE UP (ref 5–17)
BUN SERPL-MCNC: 103 MG/DL — HIGH (ref 7–23)
CALCIUM SERPL-MCNC: 8.7 MG/DL — SIGNIFICANT CHANGE UP (ref 8.4–10.5)
CHLORIDE SERPL-SCNC: 91 MMOL/L — LOW (ref 96–108)
CO2 SERPL-SCNC: 29 MMOL/L — SIGNIFICANT CHANGE UP (ref 22–31)
CREAT SERPL-MCNC: 3.18 MG/DL — HIGH (ref 0.5–1.3)
GLUCOSE SERPL-MCNC: 118 MG/DL — HIGH (ref 70–99)
HCT VFR BLD CALC: 31.3 % — LOW (ref 34.5–45)
HGB BLD-MCNC: 9.6 G/DL — LOW (ref 11.5–15.5)
MCHC RBC-ENTMCNC: 26.8 PG — LOW (ref 27–34)
MCHC RBC-ENTMCNC: 30.7 GM/DL — LOW (ref 32–36)
MCV RBC AUTO: 87.4 FL — SIGNIFICANT CHANGE UP (ref 80–100)
PLATELET # BLD AUTO: 272 K/UL — SIGNIFICANT CHANGE UP (ref 150–400)
POTASSIUM SERPL-MCNC: 4.6 MMOL/L — SIGNIFICANT CHANGE UP (ref 3.5–5.3)
POTASSIUM SERPL-SCNC: 4.6 MMOL/L — SIGNIFICANT CHANGE UP (ref 3.5–5.3)
RBC # BLD: 3.58 M/UL — LOW (ref 3.8–5.2)
RBC # FLD: 17.5 % — HIGH (ref 10.3–14.5)
SODIUM SERPL-SCNC: 133 MMOL/L — LOW (ref 135–145)
TSH SERPL-MCNC: 4.97 UIU/ML — HIGH (ref 0.27–4.2)
WBC # BLD: 9.42 K/UL — SIGNIFICANT CHANGE UP (ref 3.8–10.5)
WBC # FLD AUTO: 9.42 K/UL — SIGNIFICANT CHANGE UP (ref 3.8–10.5)

## 2018-03-10 PROCEDURE — 74176 CT ABD & PELVIS W/O CONTRAST: CPT | Mod: 26

## 2018-03-10 RX ADMIN — Medication 240 MILLIGRAM(S): at 06:39

## 2018-03-10 RX ADMIN — Medication 100 MILLIGRAM(S): at 11:54

## 2018-03-10 RX ADMIN — Medication 3 MILLILITER(S): at 11:54

## 2018-03-10 RX ADMIN — ATORVASTATIN CALCIUM 20 MILLIGRAM(S): 80 TABLET, FILM COATED ORAL at 22:03

## 2018-03-10 RX ADMIN — Medication 3 MILLILITER(S): at 18:32

## 2018-03-10 RX ADMIN — Medication 0.5 MILLIGRAM(S): at 18:32

## 2018-03-10 RX ADMIN — Medication 25 MILLIGRAM(S): at 06:39

## 2018-03-10 RX ADMIN — CLOPIDOGREL BISULFATE 75 MILLIGRAM(S): 75 TABLET, FILM COATED ORAL at 11:54

## 2018-03-10 RX ADMIN — Medication 25 MILLIGRAM(S): at 22:03

## 2018-03-10 RX ADMIN — Medication 5 MILLIGRAM(S): at 06:39

## 2018-03-10 RX ADMIN — Medication 81 MILLIGRAM(S): at 06:39

## 2018-03-10 NOTE — PROGRESS NOTE ADULT - SUBJECTIVE AND OBJECTIVE BOX
MEDICINE, PROGRESS NOTE [phone]    FRAN ALEXANDRA 72y [id number]    Patient seen and examined.  Patient is a 72y old  Female who presents with a chief complaint of shortness of breath and difficulty eating due to fullness (01 Mar 2018 19:30)  Pt states the protonix makes her feel bad. Pt feels ok and is having bowel movements.    PAST MEDICAL & SURGICAL HISTORY:  Hyperthyroidism  JOSE (obstructive sleep apnea)  Epistaxis  GIB (gastrointestinal bleeding)  CAD S/P percutaneous coronary angioplasty  Afib  Pulmonary HTN  GERD (gastroesophageal reflux disease)  Obesity  Cardiomegaly  Valvular heart disease  COPD (chronic obstructive pulmonary disease): Home O2  Sleep Apnea: by criteria  Loose, teeth: 3 bottom front loose teeth  Female stress incontinence  Shoulder pain, right  Constipation  Arthritis of Knee  H/O heartburn  History of lung cancer  Obese  Hx of hyperlipidemia  Asthma  Diabetes mellitus: type 2  dx about 4-5 years ago   no daily fingerstick  H/O: HTN (hypertension)  Enlarged lymph nodes  Lymph nodes enlarged: s/p biopsy mediastinal  benign  H/O endoscopy  left upper lobectomy    MEDICATIONS  (STANDING):  ALBUTerol/ipratropium for Nebulization 3 milliLiter(s) Nebulizer every 6 hours  allopurinol 100 milliGRAM(s) Oral daily  aspirin enteric coated 81 milliGRAM(s) Oral daily  atorvastatin 20 milliGRAM(s) Oral at bedtime  BACItracin   Ointment 1 Application(s) Topical two times a day  buDESOnide   0.5 milliGRAM(s) Respule 0.5 milliGRAM(s) Inhalation every 12 hours  buMETAnide 0.5 milliGRAM(s) Oral two times a day  clopidogrel Tablet 75 milliGRAM(s) Oral daily  digoxin     Tablet 0.125 milliGRAM(s) Oral every other day  diltiazem    milliGRAM(s) Oral daily  heparin  Injectable 5000 Unit(s) SubCutaneous every 8 hours  methimazole 5 milliGRAM(s) Oral daily  metolazone 5 milliGRAM(s) Oral <User Schedule>  metoprolol     tartrate 25 milliGRAM(s) Oral every 8 hours  pantoprazole  Injectable 40 milliGRAM(s) IV Push two times a day  potassium chloride    Tablet ER 10 milliEquivalent(s) Oral daily  predniSONE   Tablet 5 milliGRAM(s) Oral daily  sucralfate 1 Gram(s) Oral three times a day    MEDICATIONS  (PRN):  docusate sodium 100 milliGRAM(s) Oral two times a day PRN Constipation  Gas-X extra strength (simethicone 125 mg 1 Tablet(s) 1 Tablet(s) Chew three times a day PRN bloating  ondansetron Injectable 4 milliGRAM(s) IV Push every 8 hours PRN Nausea and/or Vomiting  sodium chloride 0.65% Nasal 1 Spray(s) Both Nostrils three times a day PRN Nasal Congestion    Allergies    No Known Allergies    Intolerances    albuterol (Unknown)      PHYSICAL EXAM:  Constitutional: NAD  HEENT: Normocephalic, EOMI  Neck:  No JVD  Respiratory: CTA B/L, No wheezes  Cardiovascular: S1, S2, RRR, + systolic murmur  Gastrointestinal: BS+, soft, + distention, + fluid wave  Extremities: No peripheral edema  Neurological: AAOX3, no focal deficits  Psychiatric: Normal mood, normal affect  : No Gottlieb    Vital Signs Last 24 Hrs  T(C): 36.6 (10 Mar 2018 12:42), Max: 36.8 (09 Mar 2018 20:46)  T(F): 97.9 (10 Mar 2018 12:42), Max: 98.2 (09 Mar 2018 20:46)  HR: 60 (10 Mar 2018 12:42) (60 - 118)  BP: 87/52 (10 Mar 2018 12:42) (80/38 - 138/81)  BP(mean): --  RR: 20 (10 Mar 2018 12:42) (19 - 20)  SpO2: 93% (10 Mar 2018 12:42) (93% - 99%)  I&O's Summary    09 Mar 2018 07:01  -  10 Mar 2018 07:00  --------------------------------------------------------  IN: 1000 mL / OUT: 300 mL / NET: 700 mL    10 Mar 2018 06:01  -  10 Mar 2018 13:42  --------------------------------------------------------  IN: 480 mL / OUT: 200 mL / NET: 280 mL        LABS:                        9.6    9.42  )-----------( 272      ( 10 Mar 2018 10:58 )             31.3     03-10    133<L>  |  91<L>  |  103<H>  ----------------------------<  118<H>  4.6   |  29  |  3.18<H>    Ca    8.7      10 Mar 2018 09:29

## 2018-03-10 NOTE — PROGRESS NOTE ADULT - SUBJECTIVE AND OBJECTIVE BOX
Patient seen and examined sitting in bed; no new events overnight.  NAD noted.  Patient reports no significant change in breathing.    REVIEW OF SYSTEMS:  As per HPI, otherwise 8 full 10 ROS were unremarkable    MEDICATIONS  (STANDING):  ALBUTerol/ipratropium for Nebulization 3 milliLiter(s) Nebulizer every 6 hours  allopurinol 100 milliGRAM(s) Oral daily  aspirin enteric coated 81 milliGRAM(s) Oral daily  atorvastatin 20 milliGRAM(s) Oral at bedtime  BACItracin   Ointment 1 Application(s) Topical two times a day  buDESOnide   0.5 milliGRAM(s) Respule 0.5 milliGRAM(s) Inhalation every 12 hours  buMETAnide 0.5 milliGRAM(s) Oral two times a day  clopidogrel Tablet 75 milliGRAM(s) Oral daily  digoxin     Tablet 0.125 milliGRAM(s) Oral every other day  diltiazem    milliGRAM(s) Oral daily  heparin  Injectable 5000 Unit(s) SubCutaneous every 8 hours  methimazole 5 milliGRAM(s) Oral daily  metolazone 5 milliGRAM(s) Oral <User Schedule>  metoprolol     tartrate 25 milliGRAM(s) Oral every 8 hours  pantoprazole  Injectable 40 milliGRAM(s) IV Push two times a day  potassium chloride    Tablet ER 10 milliEquivalent(s) Oral daily  predniSONE   Tablet 5 milliGRAM(s) Oral daily  sucralfate 1 Gram(s) Oral three times a day      VITAL:  T(C): , Max: 36.8 (03-09-18 @ 20:46)  T(F): , Max: 98.2 (03-09-18 @ 20:46)  HR: 60 (03-10-18 @ 12:42)  BP: 87/52 (03-10-18 @ 12:42)  BP(mean): --  RR: 20 (03-10-18 @ 12:42)  SpO2: 93% (03-10-18 @ 12:42)  Wt(kg): --    I and O's:    03-09 @ 07:01  -  03-10 @ 07:00  --------------------------------------------------------  IN: 1000 mL / OUT: 300 mL / NET: 700 mL    03-10 @ 06:01  -  03-10 @ 13:21  --------------------------------------------------------  IN: 480 mL / OUT: 200 mL / NET: 280 mL          PHYSICAL EXAM:    Constitutional: NAD  HEENT: PERRLA, EOMI,  MMM  Neck: No LAD, No JVD  Respiratory: diminished b/l  Cardiovascular: S1 and S2  Gastrointestinal: BS+, +distention   Extremities: + l/e edema  Neurological: A/O x 3, no focal deficits  Psychiatric: Normal mood, normal affect  : No Gottlieb  Skin: No rashes  Access: Not applicable    LABS:                        9.6    9.42  )-----------( 272      ( 10 Mar 2018 10:58 )             31.3     03-10    133<L>  |  91<L>  |  103<H>  ----------------------------<  118<H>  4.6   |  29  |  3.18<H>    Ca    8.7      10 Mar 2018 09:29    Assessment and Plan:   · Assessment	  Patient is a 71-year-old female with past medical history of diabetes, hypertension, severe pulmonary hypertension, right ventricular dysfunction, presents with SOB.  The patient's acute renal failure is likely hemodynamic in nature.  Goals of therapy are to improve her hemodynamics in order to improve her forward flow and renal perfusion.  CASSANDRA  Severe PHTN  Cor Pulm  Failure to thrive  Abd pain and likely recurrence of ascites  Hyponatremia    Renal:  non-oliguric:  CASSANDRA:  uptrend in azotemia noted from yesterday; relatively unchanged today.  Recent managmenet over volume with aggressive diuresis leading to azotemia.  Diuretics reduced as of late to home regimen (Bumex 0.5mg Po BID)  GI:  +ascietes with no significant response with use of aggressive diuretics.  Likely for pigtail catheter   Pulmonary:  patient reports no improvement/worsening in breathing status; remains on Bumex BID  Cardiology: BPs soft at times    Recommendation  - Will continue Bumex 0.5 mg PO BID for now if BPs permit  - Continue to trend renal function; may need to cut back on diuretics of azotemia continues to rise  - Zaroxolyn 5 mg PO MWF as scheduled for now  - Monitor I & Os  - Plan for pigtail ?next week; pulmonary on board; planning with IR

## 2018-03-10 NOTE — PROGRESS NOTE ADULT - ASSESSMENT
Right elbow and hip pain  malnutrition  volume overload/ascites/LE edema  sev PHTN/ Cor pulm  decomp diast hf  sev copd  restrictive lung disease sec to ascites which diminishes diaphragmatic excrusion  epistaxis resolved  CASSANDRA  Failure to thrive  Abd pain and likely recurrence of ascites  Hyponatremia    continue current care  check ct abd/pelvis  GI eval  hold diuretics for hypotension  d/c protonix

## 2018-03-10 NOTE — PROVIDER CONTACT NOTE (OTHER) - SITUATION
Pt is due for cardizem  mg, bumex PO 0.5 mg, metoprolol 25 mg PO but manual PS=742/58.Pt is refusing the bumex & wants it at 0800.Pt is tachy up to the 120's, pt is A fib on tele

## 2018-03-10 NOTE — PROVIDER CONTACT NOTE (MEDICATION) - ASSESSMENT
Pt. feels light headache.
Pt. looks depressed and angry.
denies light headache.
/64, HR 70-80 on tele AFl  pt asymptomatic
Pt A&Ox4, VSS. on 4L NC, Aflutter on tele. Pt is asymptomatic, no complaints of sob or c/p.
pt is a&ox4, VSS, W/T. in NAD

## 2018-03-10 NOTE — CONSULT NOTE ADULT - ASSESSMENT
72F with PMHx of AFib, asthma, CAD, cardiomegaly, CHF, cor pulmonale, depression, enlarged lymph nodes, COPD, DM type II, PSHx of endoscopy, upper lobectomy, abdominal / pelvic CT scan shows large ascites.  Ascites is likely related to CHF and cor pulmonale.  Poor appetite could be related to gastric compression due to ascites.  Last paracentesis in Jan 2018 with removal of 9 liters of ascites.  An indwelling peritoneal catheter either placed by IR or surgery has a high risk of infection and peritonitis.      Plan:   1. Consider indwelling peritoneal catheter for palliation if patient is DNR/DNI and awaiting hospice as risk of peritonitis is high.  2. Otherwise, I favor large volume paracentesis as needed.  3. I favor paracentesis Monday by IR.  4. On-going assessment by psych, pulmonary, and nephrology.  5. Possible megace in future if appetite poor after paracentesis.

## 2018-03-10 NOTE — CONSULT NOTE ADULT - SUBJECTIVE AND OBJECTIVE BOX
Patient is a 72y old  Female who presents with a chief complaint of shortness of breath and difficulty eating due to fullness (01 Mar 2018 19:30)      HPI:  72F with PMHx of AFib, asthma, CAD, cardiomegaly, enlarged lymph nodes, COPD, DM type II, PSHx of endoscopy, upper lobectomy for carcinoid tumor of the lung, sent to the ED by PMD to be admitted for heart failure. Pt presents with complaints of shortness of breath secondary to fluid in her lungs, abdominal distension and LE edema. Her SOB has been constant, moderate to severe.  Had a large volume paracentesis in January 2018 with removal of 9 liters of fluid.  Ascites is likely related to CHF and cor pulmonale.  Recent epistaxis and being seen by ENT.  Nephrology, psychiatry, and pulmonary following.  The idea of an indwelling peritoneal drain placement for palliation has been raised.    PAST MEDICAL & SURGICAL HISTORY:  Hyperthyroidism  JOSE (obstructive sleep apnea)  Epistaxis  GIB (gastrointestinal bleeding)  CAD S/P percutaneous coronary angioplasty  Afib  Pulmonary HTN  GERD (gastroesophageal reflux disease)  Obesity  Cardiomegaly  Valvular heart disease  COPD (chronic obstructive pulmonary disease): Home O2  Sleep Apnea: by criteria  Loose, teeth: 3 bottom front loose teeth  Female stress incontinence  Shoulder pain, right  Constipation  Arthritis of Knee  H/O heartburn  History of lung cancer  Obese  Hx of hyperlipidemia  Asthma  Diabetes mellitus: type 2  dx about 4-5 years ago   no daily fingerstick  H/O: HTN (hypertension)  Enlarged lymph nodes  Lymph nodes enlarged: s/p biopsy mediastinal  benign  H/O endoscopy  left upper lobectomy      Allergies    No Known Allergies    Intolerances    albuterol (Unknown)      MEDICATIONS  (STANDING):  ALBUTerol/ipratropium for Nebulization 3 milliLiter(s) Nebulizer every 6 hours  allopurinol 100 milliGRAM(s) Oral daily  aspirin enteric coated 81 milliGRAM(s) Oral daily  atorvastatin 20 milliGRAM(s) Oral at bedtime  BACItracin   Ointment 1 Application(s) Topical two times a day  buDESOnide   0.5 milliGRAM(s) Respule 0.5 milliGRAM(s) Inhalation every 12 hours  clopidogrel Tablet 75 milliGRAM(s) Oral daily  digoxin     Tablet 0.125 milliGRAM(s) Oral every other day  diltiazem    milliGRAM(s) Oral daily  heparin  Injectable 5000 Unit(s) SubCutaneous every 8 hours  methimazole 5 milliGRAM(s) Oral daily  metolazone 5 milliGRAM(s) Oral <User Schedule>  metoprolol     tartrate 25 milliGRAM(s) Oral every 8 hours  potassium chloride    Tablet ER 10 milliEquivalent(s) Oral daily  predniSONE   Tablet 5 milliGRAM(s) Oral daily  sucralfate 1 Gram(s) Oral three times a day    MEDICATIONS  (PRN):  docusate sodium 100 milliGRAM(s) Oral two times a day PRN Constipation  Gas-X extra strength (simethicone 125 mg 1 Tablet(s) 1 Tablet(s) Chew three times a day PRN bloating  ondansetron Injectable 4 milliGRAM(s) IV Push every 8 hours PRN Nausea and/or Vomiting  sodium chloride 0.65% Nasal 1 Spray(s) Both Nostrils three times a day PRN Nasal Congestion          Review of Systems:    General:  No wt loss, fevers, chills, night sweats,fatigue,   CV:  No pain, palpitatioins, hypo/hypertension  Resp:  No dyspnea, cough, tachypnea, wheezing  GI:  No pain, No nausea, No vomiting, No diarrhea, No constipation, No weight loss, No fever, No pruritis, No rectal bleeding, No tarry stools, No dysphagia,  :  No pain, bleeding, incontinence, nocturia  Muscle:  No pain, weakness  Neuro:  No weakness, tingling, memory problems  Psych:  No fatigue, insomnia, mood problems, depression  Endocrine:  No polyuria, polydypsia, cold/heat intolerance  Heme:  No petechiae, ecchymosis, easy bruisability  Skin:  No rash, tattoos, scars, edema  Otherwise 10 point ROS negative      Vital Signs Last 24 Hrs  T(C): 36.6 (10 Mar 2018 12:42), Max: 36.8 (09 Mar 2018 20:46)  T(F): 97.9 (10 Mar 2018 12:42), Max: 98.2 (09 Mar 2018 20:46)  HR: 73 (10 Mar 2018 17:21) (60 - 118)  BP: 105/65 (10 Mar 2018 17:21) (80/38 - 138/81)  BP(mean): --  RR: 20 (10 Mar 2018 12:42) (19 - 20)  SpO2: 93% (10 Mar 2018 12:42) (93% - 99%)    PHYSICAL EXAM:    Constitutional: NAD, well-developed  Neck: No LAD, supple  Respiratory: CTA and P  Cardiovascular: S1 and S2, RRR, no M  Gastrointestinal: BS+, soft, NT/ND, neg HSM,  Extremities: No peripheral edema, neg clubing, cyanosis  Vascular: 2+ peripheral pulses  Neurological: A/O x 3, no focal deficits  Psychiatric: Normal mood, normal affect  Skin: No rashes        LABS:                        9.6    9.42  )-----------( 272      ( 10 Mar 2018 10:58 )             31.3     03-10    133<L>  |  91<L>  |  103<H>  ----------------------------<  118<H>  4.6   |  29  |  3.18<H>    Ca    8.7      10 Mar 2018 09:29          LIVER FUNCTIONS - ( 07 Mar 2018 08:19 )  Alb: 2.6 g/dL / Pro: 5.6 g/dL / ALK PHOS: 50 U/L / ALT: 6 U/L RC / AST: 9 U/L / GGT: x             RADIOLOGY & ADDITIONAL TESTS:  ******PRELIMINARY REPORT******        EXAM:  CT ABDOMEN AND PELVIS OC                        PROCEDURE DATE:  03/10/2018    INTERPRETATION:    trace pericardial effusion   small bilateral pleural effusions   large volume ascites

## 2018-03-11 LAB
ANION GAP SERPL CALC-SCNC: 17 MMOL/L — SIGNIFICANT CHANGE UP (ref 5–17)
BUN SERPL-MCNC: 105 MG/DL — HIGH (ref 7–23)
CALCIUM SERPL-MCNC: 8.5 MG/DL — SIGNIFICANT CHANGE UP (ref 8.4–10.5)
CHLORIDE SERPL-SCNC: 89 MMOL/L — LOW (ref 96–108)
CO2 SERPL-SCNC: 22 MMOL/L — SIGNIFICANT CHANGE UP (ref 22–31)
CREAT SERPL-MCNC: 2.87 MG/DL — HIGH (ref 0.5–1.3)
GLUCOSE SERPL-MCNC: 87 MG/DL — SIGNIFICANT CHANGE UP (ref 70–99)
HCT VFR BLD CALC: 29.5 % — LOW (ref 34.5–45)
HGB BLD-MCNC: 9.9 G/DL — LOW (ref 11.5–15.5)
MAGNESIUM SERPL-MCNC: 2 MG/DL — SIGNIFICANT CHANGE UP (ref 1.6–2.6)
MCHC RBC-ENTMCNC: 28.9 PG — SIGNIFICANT CHANGE UP (ref 27–34)
MCHC RBC-ENTMCNC: 33.7 GM/DL — SIGNIFICANT CHANGE UP (ref 32–36)
MCV RBC AUTO: 85.9 FL — SIGNIFICANT CHANGE UP (ref 80–100)
PHOSPHATE SERPL-MCNC: 4.6 MG/DL — HIGH (ref 2.5–4.5)
PLATELET # BLD AUTO: 258 K/UL — SIGNIFICANT CHANGE UP (ref 150–400)
POTASSIUM SERPL-MCNC: 5.4 MMOL/L — HIGH (ref 3.5–5.3)
POTASSIUM SERPL-SCNC: 5.4 MMOL/L — HIGH (ref 3.5–5.3)
RBC # BLD: 3.43 M/UL — LOW (ref 3.8–5.2)
RBC # FLD: 17.3 % — HIGH (ref 10.3–14.5)
SODIUM SERPL-SCNC: 128 MMOL/L — LOW (ref 135–145)
WBC # BLD: 10.3 K/UL — SIGNIFICANT CHANGE UP (ref 3.8–10.5)
WBC # FLD AUTO: 10.3 K/UL — SIGNIFICANT CHANGE UP (ref 3.8–10.5)

## 2018-03-11 RX ORDER — BUMETANIDE 0.25 MG/ML
0.5 INJECTION INTRAMUSCULAR; INTRAVENOUS
Qty: 0 | Refills: 0 | Status: DISCONTINUED | OUTPATIENT
Start: 2018-03-12 | End: 2018-03-15

## 2018-03-11 RX ADMIN — Medication 3 MILLILITER(S): at 11:24

## 2018-03-11 RX ADMIN — Medication 25 MILLIGRAM(S): at 08:16

## 2018-03-11 RX ADMIN — Medication 100 MILLIGRAM(S): at 11:24

## 2018-03-11 RX ADMIN — ONDANSETRON 4 MILLIGRAM(S): 8 TABLET, FILM COATED ORAL at 11:24

## 2018-03-11 RX ADMIN — ATORVASTATIN CALCIUM 20 MILLIGRAM(S): 80 TABLET, FILM COATED ORAL at 23:10

## 2018-03-11 RX ADMIN — CLOPIDOGREL BISULFATE 75 MILLIGRAM(S): 75 TABLET, FILM COATED ORAL at 11:24

## 2018-03-11 RX ADMIN — Medication 25 MILLIGRAM(S): at 15:34

## 2018-03-11 RX ADMIN — Medication 25 MILLIGRAM(S): at 23:10

## 2018-03-11 RX ADMIN — Medication 0.12 MILLIGRAM(S): at 11:24

## 2018-03-11 RX ADMIN — Medication 5 MILLIGRAM(S): at 08:16

## 2018-03-11 RX ADMIN — Medication 240 MILLIGRAM(S): at 08:15

## 2018-03-11 RX ADMIN — Medication 81 MILLIGRAM(S): at 08:16

## 2018-03-11 NOTE — PROVIDER CONTACT NOTE (OTHER) - SITUATION
notified provider pt refusing am meds, vital signs and bloodwork.  pt requesting vital signs and meds be given at 630 yet patient refused again.

## 2018-03-11 NOTE — PROGRESS NOTE ADULT - ASSESSMENT
Right elbow and hip pain  malnutrition  volume overload/ascites/LE edema  sev PHTN/ Cor pulm  decomp diast hf  sev copd  restrictive lung disease sec to ascites which diminishes diaphragmatic excrusion  epistaxis resolved  CASSANDRA  Failure to thrive  Abd pain and likely recurrence of ascites  Hyponatremia    continue current care  will plan for IR paracentesis possibly alix   strict i/o's  appreciate gi and renal input

## 2018-03-11 NOTE — PROGRESS NOTE ADULT - SUBJECTIVE AND OBJECTIVE BOX
Patient is a 72y old  Female who presents with a chief complaint of shortness of breath and difficulty eating due to fullness (01 Mar 2018 19:30)      INTERVAL HPI/OVERNIGHT EVENTS:  No melena.  No rectal bleeding.    MEDICATIONS  (STANDING):  ALBUTerol/ipratropium for Nebulization 3 milliLiter(s) Nebulizer every 6 hours  allopurinol 100 milliGRAM(s) Oral daily  aspirin enteric coated 81 milliGRAM(s) Oral daily  atorvastatin 20 milliGRAM(s) Oral at bedtime  BACItracin   Ointment 1 Application(s) Topical two times a day  buDESOnide   0.5 milliGRAM(s) Respule 0.5 milliGRAM(s) Inhalation every 12 hours  clopidogrel Tablet 75 milliGRAM(s) Oral daily  digoxin     Tablet 0.125 milliGRAM(s) Oral every other day  diltiazem    milliGRAM(s) Oral daily  heparin  Injectable 5000 Unit(s) SubCutaneous every 8 hours  methimazole 5 milliGRAM(s) Oral daily  metolazone 5 milliGRAM(s) Oral <User Schedule>  metoprolol     tartrate 25 milliGRAM(s) Oral every 8 hours  predniSONE   Tablet 5 milliGRAM(s) Oral daily  sucralfate 1 Gram(s) Oral three times a day    MEDICATIONS  (PRN):  docusate sodium 100 milliGRAM(s) Oral two times a day PRN Constipation  Gas-X extra strength (simethicone 125 mg 1 Tablet(s) 1 Tablet(s) Chew three times a day PRN bloating  ondansetron Injectable 4 milliGRAM(s) IV Push every 8 hours PRN Nausea and/or Vomiting  sodium chloride 0.65% Nasal 1 Spray(s) Both Nostrils three times a day PRN Nasal Congestion      Allergies    No Known Allergies    Intolerances    albuterol (Unknown)      Review of Systems:  General:  No wt loss, fevers, chills, night sweats,fatigue,   CV:  No pain, palpitatioins, hypo/hypertension  Resp:  No dyspnea, cough, tachypnea, wheezing  GI:  No pain, No nausea, No vomiting, No diarrhea, No constipation, No weight loss, No fever, No pruritis, No rectal bleeding, No tarry stools, No dysphagia,  :  No pain, bleeding, incontinence, nocturia  Muscle:  No pain, weakness  Neuro:  No weakness, tingling, memory problems  Psych:  No fatigue, insomnia, mood problems, depression  Endocrine:  No polyuria, polydypsia, cold/heat intolerance  Heme:  No petechiae, ecchymosis, easy bruisability  Skin:  No rash, tattoos, scars, edema  Otherwise 10 point ROS negative    Vital Signs Last 24 Hrs  T(C): 36.6 (11 Mar 2018 13:25), Max: 36.6 (11 Mar 2018 13:25)  T(F): 97.8 (11 Mar 2018 13:25), Max: 97.8 (11 Mar 2018 13:25)  HR: 66 (11 Mar 2018 15:32) (58 - 124)  BP: 92/55 (11 Mar 2018 15:32) (92/55 - 105/68)  BP(mean): --  RR: 16 (11 Mar 2018 13:25) (16 - 18)  SpO2: 98% (11 Mar 2018 13:25) (90% - 98%)    PHYSICAL EXAM:    Constitutional: NAD, well-developed  Neck: No LAD, supple  Respiratory: CTA and P  Cardiovascular: S1 and S2, RRR, no M  Gastrointestinal: BS+, soft, NT/ND, neg HSM,  Extremities: No peripheral edema, neg clubing, cyanosis  Vascular: 2+ peripheral pulses  Neurological: A/O x 3, no focal deficits  Psychiatric: Normal mood, normal affect  Skin: No rashes    LABS:                        9.9    10.3  )-----------( 258      ( 11 Mar 2018 11:56 )             29.5     03-11    128<L>  |  89<L>  |  105<H>  ----------------------------<  87  5.4<H>   |  22  |  2.87<H>    Ca    8.5      11 Mar 2018 11:56  Phos  4.6     03-11  Mg     2.0     03-11          LIVER FUNCTIONS - ( 07 Mar 2018 08:19 )  Alb: 2.6 g/dL / Pro: 5.6 g/dL / ALK PHOS: 50 U/L / ALT: 6 U/L RC / AST: 9 U/L / GGT: x             RADIOLOGY & ADDITIONAL TESTS:  EXAM:  CT ABDOMEN AND PELVIS OC                            PROCEDURE DATE:  03/10/2018            INTERPRETATION:  CLINICAL INFORMATION: Abdominal distention and pain for   several days. Acute renal failure.    COMPARISON: CT abdomen pelvis 12/14/2015    PROCEDURE:   CT of the Abdomen and Pelvis was performed without intravenous contrast.   Intravenous contrast: None.  Oral contrast: positive contrast was administered.  Sagittal and coronal reformats were performed.    FINDINGS:    LOWER CHEST:Mild interlobular septal thickening and small bilateral   pleural effusions. Subsegmental atelectasis in the right lower lobe.   Cardiomegaly. Coronary calcification. Calcified hilar lymph nodes.    LIVER: Within normal limits.  BILE DUCTS: Normal caliber.  GALLBLADDER: Cholelithiasis.  SPLEEN: Within normal limits.  PANCREAS: Within normal limits.  ADRENALS: Within normal limits.  KIDNEYS/URETERS: Left lower pole renal cyst.    BLADDER: Within normal limits.  REPRODUCTIVE ORGANS: Posterior uterine fibroid.    BOWEL: No bowel obstruction. Sigmoid diverticulosis, without   diverticulitis. Normal appendix.     PERITONEUM: Large volume ascites.  VESSELS:  Atherosclerotic changes.  RETROPERITONEUM: No lymphadenopathy.    ABDOMINAL WALL: Within normal limits.  BONES: Degenerative changes. T12 vertebral body hemangioma.    IMPRESSION:     Large volume ascites, etiology unclear.    Small bilateral pleural effusions and mild pulmonary edema.                        VIOLA ANDREWS M.D., ATTENDINGRADIOLOGIST  This document has been electronically signed. Mar 11 2018  8:52AM

## 2018-03-11 NOTE — PROGRESS NOTE ADULT - SUBJECTIVE AND OBJECTIVE BOX
Patient seen and examined sitting in chair; no new events overnight.  NAD.      REVIEW OF SYSTEMS:  As per HPI, otherwise 8 full 10 ROS were unremarkable    MEDICATIONS  (STANDING):  ALBUTerol/ipratropium for Nebulization 3 milliLiter(s) Nebulizer every 6 hours  allopurinol 100 milliGRAM(s) Oral daily  aspirin enteric coated 81 milliGRAM(s) Oral daily  atorvastatin 20 milliGRAM(s) Oral at bedtime  BACItracin   Ointment 1 Application(s) Topical two times a day  buDESOnide   0.5 milliGRAM(s) Respule 0.5 milliGRAM(s) Inhalation every 12 hours  clopidogrel Tablet 75 milliGRAM(s) Oral daily  digoxin     Tablet 0.125 milliGRAM(s) Oral every other day  diltiazem    milliGRAM(s) Oral daily  heparin  Injectable 5000 Unit(s) SubCutaneous every 8 hours  methimazole 5 milliGRAM(s) Oral daily  metolazone 5 milliGRAM(s) Oral <User Schedule>  metoprolol     tartrate 25 milliGRAM(s) Oral every 8 hours  potassium chloride    Tablet ER 10 milliEquivalent(s) Oral daily  predniSONE   Tablet 5 milliGRAM(s) Oral daily  sucralfate 1 Gram(s) Oral three times a day      VITAL:  T(C): , Max: 36.6 (03-11-18 @ 13:25)  T(F): , Max: 97.8 (03-11-18 @ 13:25)  HR: 66 (03-11-18 @ 15:32)  BP: 92/55 (03-11-18 @ 15:32)  BP(mean): --  RR: 16 (03-11-18 @ 13:25)  SpO2: 98% (03-11-18 @ 13:25)  Wt(kg): --    I and O's:    03-10 @ 06:01  -  03-11 @ 07:00  --------------------------------------------------------  IN: 900 mL / OUT: 200 mL / NET: 700 mL    03-11 @ 07:01  -  03-11 @ 15:40  --------------------------------------------------------  IN: 360 mL / OUT: 0 mL / NET: 360 mL      PHYSICAL EXAM:    Constitutional: NAD  HEENT: PERRLA, EOMI,  MMM  Neck: No LAD, No JVD  Respiratory: diminished b/l  Cardiovascular: S1 and S2  Gastrointestinal: BS+, +distention   Extremities: + l/e edema  Neurological: A/O x 3, no focal deficits  Psychiatric: Normal mood, normal affect  : No Gottlieb  Skin: No rashes  Access: Not applicable  LABS:                        9.9    10.3  )-----------( 258      ( 11 Mar 2018 11:56 )             29.5     03-11    128<L>  |  89<L>  |  105<H>  ----------------------------<  87  5.4<H>   |  22  |  2.87<H>    Ca    8.5      11 Mar 2018 11:56  Phos  4.6     03-11  Mg     2.0     03-11      Assessment and Plan:   · Assessment	  Patient is a 71-year-old female with past medical history of diabetes, hypertension, severe pulmonary hypertension, right ventricular dysfunction, presents with SOB.  The patient's acute renal failure is likely hemodynamic in nature.  Goals of therapy are to improve her hemodynamics in order to improve her forward flow and renal perfusion.  CASSANDRA  Severe PHTN  Cor Pulm  Failure to thrive  Abd pain and likely recurrence of ascites  Hyponatremia    Renal:  non-oliguric:  CASSANDRA:  managment of volume status with aggressive diuresis leading to azotemia; bumex discontinued as of late dt hypotension; creatinine trending downward  GI:  +ascietes with no significant response with use of aggressive diuretics.  Likely for pigtail catheter   Pulmonary:  patient reports no improvement in breathing status; will look to restart diuretics given patient's BPs improved  Cardiology: BPs soft at times though asymptomatic   Hyperkalemia:  dt CASSANDRA, KCL tabs & diet:  mild  Hyponatremia:  likely hypervolemic:  mild; Na+ slowly trending down    Recommendation  - Continue Zaroxolyn 5 mg PO MWF as scheduled for now if BPs permit  - Restart Bumex 0.5 mg PO BID in AM (BP parameters in place)  - Hold KCL supplements for now (will d/c_  - Maintain K+ restrictive diet for now; will likely be able to dc soon; patient on diuretics which will help  - Monitor I & Os  - Plan for pigtail ?next week; pulmonary on board; planning with IR; managmenet per primary team

## 2018-03-11 NOTE — PROGRESS NOTE ADULT - ASSESSMENT
72F with PMHx of AFib, asthma, CAD, cardiomegaly, CHF, cor pulmonale, depression, enlarged lymph nodes, COPD, DM type II, PSHx of endoscopy, upper lobectomy, abdominal / pelvic CT scan shows large ascites.  Ascites is likely related to CHF and cor pulmonale.  Poor appetite could be related to gastric compression from ascites.  Last paracentesis in Jan 2018 with removal of 9 liters of ascites.  An indwelling peritoneal catheter either placed by IR or surgery has a high risk of infection and peritonitis.      Plan:   1. Consider indwelling peritoneal catheter for palliation if patient is DNR/DNI and awaiting hospice as risk of peritonitis is high.  2. Otherwise, I favor large volume paracentesis as needed.  3. I favor paracentesis Monday by IR.  4. On-going assessment by psych, pulmonary, and nephrology.  5. Possible megace in future if appetite remains poor after paracentesis (per psych note).

## 2018-03-12 LAB
ANION GAP SERPL CALC-SCNC: 12 MMOL/L — SIGNIFICANT CHANGE UP (ref 5–17)
BUN SERPL-MCNC: 110 MG/DL — HIGH (ref 7–23)
CALCIUM SERPL-MCNC: 8.7 MG/DL — SIGNIFICANT CHANGE UP (ref 8.4–10.5)
CHLORIDE SERPL-SCNC: 91 MMOL/L — LOW (ref 96–108)
CO2 SERPL-SCNC: 28 MMOL/L — SIGNIFICANT CHANGE UP (ref 22–31)
CREAT SERPL-MCNC: 3.53 MG/DL — HIGH (ref 0.5–1.3)
GLUCOSE SERPL-MCNC: 93 MG/DL — SIGNIFICANT CHANGE UP (ref 70–99)
HCT VFR BLD CALC: 30.5 % — LOW (ref 34.5–45)
HGB BLD-MCNC: 10.1 G/DL — LOW (ref 11.5–15.5)
MAGNESIUM SERPL-MCNC: 1.9 MG/DL — SIGNIFICANT CHANGE UP (ref 1.6–2.6)
MCHC RBC-ENTMCNC: 28.5 PG — SIGNIFICANT CHANGE UP (ref 27–34)
MCHC RBC-ENTMCNC: 33.1 GM/DL — SIGNIFICANT CHANGE UP (ref 32–36)
MCV RBC AUTO: 86.1 FL — SIGNIFICANT CHANGE UP (ref 80–100)
PHOSPHATE SERPL-MCNC: 4.9 MG/DL — HIGH (ref 2.5–4.5)
PLATELET # BLD AUTO: 218 K/UL — SIGNIFICANT CHANGE UP (ref 150–400)
POTASSIUM SERPL-MCNC: 5.3 MMOL/L — SIGNIFICANT CHANGE UP (ref 3.5–5.3)
POTASSIUM SERPL-SCNC: 5.3 MMOL/L — SIGNIFICANT CHANGE UP (ref 3.5–5.3)
RBC # BLD: 3.55 M/UL — LOW (ref 3.8–5.2)
RBC # FLD: 17.6 % — HIGH (ref 10.3–14.5)
SODIUM SERPL-SCNC: 131 MMOL/L — LOW (ref 135–145)
WBC # BLD: 8.1 K/UL — SIGNIFICANT CHANGE UP (ref 3.8–10.5)
WBC # FLD AUTO: 8.1 K/UL — SIGNIFICANT CHANGE UP (ref 3.8–10.5)

## 2018-03-12 RX ORDER — TIOTROPIUM BROMIDE AND OLODATEROL 3.124; 2.736 UG/1; UG/1
2 SPRAY, METERED RESPIRATORY (INHALATION) DAILY
Qty: 0 | Refills: 0 | Status: DISCONTINUED | OUTPATIENT
Start: 2018-03-12 | End: 2018-04-04

## 2018-03-12 RX ORDER — SUCRALFATE 1 G
1 TABLET ORAL THREE TIMES A DAY
Qty: 0 | Refills: 0 | Status: DISCONTINUED | OUTPATIENT
Start: 2018-03-12 | End: 2018-04-04

## 2018-03-12 RX ORDER — DILTIAZEM HCL 120 MG
240 CAPSULE, EXT RELEASE 24 HR ORAL DAILY
Qty: 0 | Refills: 0 | Status: DISCONTINUED | OUTPATIENT
Start: 2018-03-12 | End: 2018-03-18

## 2018-03-12 RX ORDER — METOPROLOL TARTRATE 50 MG
25 TABLET ORAL EVERY 8 HOURS
Qty: 0 | Refills: 0 | Status: DISCONTINUED | OUTPATIENT
Start: 2018-03-12 | End: 2018-03-15

## 2018-03-12 RX ORDER — ACETAMINOPHEN 500 MG
650 TABLET ORAL EVERY 6 HOURS
Qty: 0 | Refills: 0 | Status: DISCONTINUED | OUTPATIENT
Start: 2018-03-12 | End: 2018-04-04

## 2018-03-12 RX ADMIN — Medication 3 MILLILITER(S): at 23:08

## 2018-03-12 RX ADMIN — Medication 25 MILLIGRAM(S): at 12:58

## 2018-03-12 RX ADMIN — Medication 100 MILLIGRAM(S): at 12:57

## 2018-03-12 RX ADMIN — CLOPIDOGREL BISULFATE 75 MILLIGRAM(S): 75 TABLET, FILM COATED ORAL at 12:58

## 2018-03-12 RX ADMIN — Medication 81 MILLIGRAM(S): at 08:46

## 2018-03-12 RX ADMIN — Medication 25 MILLIGRAM(S): at 22:32

## 2018-03-12 RX ADMIN — BUMETANIDE 0.5 MILLIGRAM(S): 0.25 INJECTION INTRAMUSCULAR; INTRAVENOUS at 08:50

## 2018-03-12 RX ADMIN — Medication 5 MILLIGRAM(S): at 08:46

## 2018-03-12 RX ADMIN — ATORVASTATIN CALCIUM 20 MILLIGRAM(S): 80 TABLET, FILM COATED ORAL at 22:32

## 2018-03-12 RX ADMIN — Medication 1 APPLICATION(S): at 18:19

## 2018-03-12 NOTE — PROGRESS NOTE ADULT - SUBJECTIVE AND OBJECTIVE BOX
MEDICINE, PROGRESS NOTE [phone]    FRAN ALEXANDRA 72y [id number]    Patient seen and examined.  Patient is a 72y old  Female who presents with a chief complaint of shortness of breath and difficulty eating due to fullness (01 Mar 2018 19:30)  Pt states she feel full and is tired.    PAST MEDICAL & SURGICAL HISTORY:  Hyperthyroidism  JOSE (obstructive sleep apnea)  Epistaxis  GIB (gastrointestinal bleeding)  CAD S/P percutaneous coronary angioplasty  Afib  Pulmonary HTN  GERD (gastroesophageal reflux disease)  Obesity  Cardiomegaly  Valvular heart disease  COPD (chronic obstructive pulmonary disease): Home O2  Sleep Apnea: by criteria  Loose, teeth: 3 bottom front loose teeth  Female stress incontinence  Shoulder pain, right  Constipation  Arthritis of Knee  H/O heartburn  History of lung cancer  Obese  Hx of hyperlipidemia  Asthma  Diabetes mellitus: type 2  dx about 4-5 years ago   no daily fingerstick  H/O: HTN (hypertension)  Enlarged lymph nodes  Lymph nodes enlarged: s/p biopsy mediastinal  benign  H/O endoscopy  left upper lobectomy    MEDICATIONS  (STANDING):  ALBUTerol/ipratropium for Nebulization 3 milliLiter(s) Nebulizer every 6 hours  allopurinol 100 milliGRAM(s) Oral daily  aspirin enteric coated 81 milliGRAM(s) Oral daily  atorvastatin 20 milliGRAM(s) Oral at bedtime  BACItracin   Ointment 1 Application(s) Topical two times a day  buDESOnide   0.5 milliGRAM(s) Respule 0.5 milliGRAM(s) Inhalation every 12 hours  buMETAnide 0.5 milliGRAM(s) Oral two times a day  clopidogrel Tablet 75 milliGRAM(s) Oral daily  digoxin     Tablet 0.125 milliGRAM(s) Oral every other day  diltiazem    milliGRAM(s) Oral daily  heparin  Injectable 5000 Unit(s) SubCutaneous every 8 hours  methimazole 5 milliGRAM(s) Oral daily  metolazone 5 milliGRAM(s) Oral <User Schedule>  metoprolol     tartrate 25 milliGRAM(s) Oral every 8 hours  predniSONE   Tablet 5 milliGRAM(s) Oral daily  sucralfate 1 Gram(s) Oral three times a day    MEDICATIONS  (PRN):  acetaminophen   Tablet. 650 milliGRAM(s) Oral every 6 hours PRN Mild Pain (1 - 3)  docusate sodium 100 milliGRAM(s) Oral two times a day PRN Constipation  Gas-X extra strength (simethicone 125 mg 1 Tablet(s) 1 Tablet(s) Chew three times a day PRN bloating  ondansetron Injectable 4 milliGRAM(s) IV Push every 8 hours PRN Nausea and/or Vomiting  sodium chloride 0.65% Nasal 1 Spray(s) Both Nostrils three times a day PRN Nasal Congestion    Allergies    No Known Allergies    Intolerances    albuterol (Unknown)      PHYSICAL EXAM:  Constitutional: NAD  HEENT: Normocephalic, EOMI  Neck:  No JVD  Respiratory: CTA B/L, No wheezes  Cardiovascular: S1, S2, RRR, + systolic murmur  Gastrointestinal: BS hypoactive, + distention, + fluid wave, mild tenderness  Extremities: + peripheral edema le b/l  Neurological: AAOX3, no focal deficits  Psychiatric: Normal mood, normal affect  : No Gottlieb    Vital Signs Last 24 Hrs  T(C): 36.6 (12 Mar 2018 12:58), Max: 36.6 (12 Mar 2018 12:58)  T(F): 97.8 (12 Mar 2018 12:58), Max: 97.8 (12 Mar 2018 12:58)  HR: 118 (12 Mar 2018 18:26) (56 - 118)  BP: 83/55 (12 Mar 2018 18:26) (83/55 - 99/55)  BP(mean): --  RR: 16 (12 Mar 2018 12:58) (16 - 16)  SpO2: 98% (12 Mar 2018 12:58) (95% - 100%)  I&O's Summary    11 Mar 2018 07:01  -  12 Mar 2018 07:00  --------------------------------------------------------  IN: 600 mL / OUT: 0 mL / NET: 600 mL    12 Mar 2018 07:01  -  12 Mar 2018 19:45  --------------------------------------------------------  IN: 600 mL / OUT: 200 mL / NET: 400 mL        LABS:                        10.1   8.1   )-----------( 218      ( 12 Mar 2018 08:17 )             30.5     03-12    131<L>  |  91<L>  |  110<H>  ----------------------------<  93  5.3   |  28  |  3.53<H>    Ca    8.7      12 Mar 2018 08:13  Phos  4.9     03-12  Mg     1.9     03-12  Magnesium, Serum: 1.9 mg/dL (03-12 @ 08:13)

## 2018-03-12 NOTE — PROGRESS NOTE ADULT - SUBJECTIVE AND OBJECTIVE BOX
Events noted. Pt. frustrated by events. Discussed with NP Angelique Kowalski and nurse manager. Pt. wants to receive a paracentesis or drain. She wants to take her meds at different time than written.       Vital Signs Last 24 Hrs  T(C): 36.6 (12 Mar 2018 12:58), Max: 36.6 (12 Mar 2018 12:58)  T(F): 97.8 (12 Mar 2018 12:58), Max: 97.8 (12 Mar 2018 12:58)  HR: 114 (12 Mar 2018 12:58) (56 - 114)  BP: 92/62 (12 Mar 2018 12:58) (92/62 - 99/55)  BP(mean): --  RR: 16 (12 Mar 2018 12:58) (16 - 16)  SpO2: 98% (12 Mar 2018 12:58) (95% - 100%)                          10.1   8.1   )-----------( 218      ( 12 Mar 2018 08:17 )             30.5       03-12    131<L>  |  91<L>  |  110<H>  ----------------------------<  93  5.3   |  28  |  3.53<H>    Ca    8.7      12 Mar 2018 08:13  Phos  4.9     03-12  Mg     1.9     03-12                MEDICATIONS  (STANDING):  ALBUTerol/ipratropium for Nebulization 3 milliLiter(s) Nebulizer every 6 hours  allopurinol 100 milliGRAM(s) Oral daily  aspirin enteric coated 81 milliGRAM(s) Oral daily  atorvastatin 20 milliGRAM(s) Oral at bedtime  BACItracin   Ointment 1 Application(s) Topical two times a day  buDESOnide   0.5 milliGRAM(s) Respule 0.5 milliGRAM(s) Inhalation every 12 hours  buMETAnide 0.5 milliGRAM(s) Oral two times a day  clopidogrel Tablet 75 milliGRAM(s) Oral daily  digoxin     Tablet 0.125 milliGRAM(s) Oral every other day  diltiazem    milliGRAM(s) Oral daily  heparin  Injectable 5000 Unit(s) SubCutaneous every 8 hours  methimazole 5 milliGRAM(s) Oral daily  metolazone 5 milliGRAM(s) Oral <User Schedule>  metoprolol     tartrate 25 milliGRAM(s) Oral every 8 hours  predniSONE   Tablet 5 milliGRAM(s) Oral daily  sucralfate 1 Gram(s) Oral three times a day    MEDICATIONS  (PRN):  acetaminophen   Tablet. 650 milliGRAM(s) Oral every 6 hours PRN Mild Pain (1 - 3)  docusate sodium 100 milliGRAM(s) Oral two times a day PRN Constipation  Gas-X extra strength (simethicone 125 mg 1 Tablet(s) 1 Tablet(s) Chew three times a day PRN bloating  ondansetron Injectable 4 milliGRAM(s) IV Push every 8 hours PRN Nausea and/or Vomiting  sodium chloride 0.65% Nasal 1 Spray(s) Both Nostrils three times a day PRN Nasal Congestion      Elderly WF bundled up in bed, calm, cooperative, alert and oriented x 3 .  No psychomotor abnormalities. Insight and judgment are fair. Speech is coherent with normal rate and volume. No hallucinations nor delusions. The patient denied suicidal and homicidal ideation and plan. Mood is frustrated and affect full range and appropriate. Attention and concentration, short term memory, and long term memory within normal limits.

## 2018-03-12 NOTE — PROGRESS NOTE ADULT - ASSESSMENT
Adjustment disorder  Pt. understands her medical condition and risks of refusing tx. She can reason and weigh decisions. She has the capacity to make medical decisions.     Recommend  No antidepressant meds  Correct Na  Consider changing timing of meds for enhancing compliance  Following.    Jose Craig M.D.  Psychiatry  [phone]

## 2018-03-12 NOTE — PROGRESS NOTE ADULT - SUBJECTIVE AND OBJECTIVE BOX
INTERVAL HPI/OVERNIGHT EVENTS:  still with abdominal distension as well as SOB  decreased appetite due to distension  no nausea or vomiting    MEDICATIONS  (STANDING):  ALBUTerol/ipratropium for Nebulization 3 milliLiter(s) Nebulizer every 6 hours  allopurinol 100 milliGRAM(s) Oral daily  aspirin enteric coated 81 milliGRAM(s) Oral daily  atorvastatin 20 milliGRAM(s) Oral at bedtime  BACItracin   Ointment 1 Application(s) Topical two times a day  buDESOnide   0.5 milliGRAM(s) Respule 0.5 milliGRAM(s) Inhalation every 12 hours  buMETAnide 0.5 milliGRAM(s) Oral two times a day  clopidogrel Tablet 75 milliGRAM(s) Oral daily  digoxin     Tablet 0.125 milliGRAM(s) Oral every other day  diltiazem    milliGRAM(s) Oral daily  heparin  Injectable 5000 Unit(s) SubCutaneous every 8 hours  methimazole 5 milliGRAM(s) Oral daily  metolazone 5 milliGRAM(s) Oral <User Schedule>  metoprolol     tartrate 25 milliGRAM(s) Oral every 8 hours  predniSONE   Tablet 5 milliGRAM(s) Oral daily  sucralfate 1 Gram(s) Oral three times a day    MEDICATIONS  (PRN):  docusate sodium 100 milliGRAM(s) Oral two times a day PRN Constipation  Gas-X extra strength (simethicone 125 mg 1 Tablet(s) 1 Tablet(s) Chew three times a day PRN bloating  ondansetron Injectable 4 milliGRAM(s) IV Push every 8 hours PRN Nausea and/or Vomiting  sodium chloride 0.65% Nasal 1 Spray(s) Both Nostrils three times a day PRN Nasal Congestion      Allergies  No Known Allergies    Intolerances  albuterol (Unknown)      Review of Systems:  General:  No fevers, chills  CV:  No pain, palpitatioins  Resp:  +SOB +BRO  :  No pain, bleeding, incontinence, nocturia  Muscle:  No pain, weakness  Neuro:  No weakness, tingling, memory problems  Psych:  +fatigue, No mood problems, depression  Heme:  No petechiae, ecchymosis, easy bruisability  Skin:  No rash, tattoos, scars, edema    Vital Signs Last 24 Hrs  T(C): 36.4 (12 Mar 2018 06:20), Max: 36.6 (11 Mar 2018 13:25)  T(F): 97.6 (12 Mar 2018 06:20), Max: 97.8 (11 Mar 2018 13:25)  HR: 65 (12 Mar 2018 08:51) (56 - 73)  BP: 96/60 (12 Mar 2018 08:51) (92/55 - 99/55)  BP(mean): --  RR: 16 (12 Mar 2018 06:20) (16 - 16)  SpO2: 100% (12 Mar 2018 06:20) (95% - 100%)    PHYSICAL EXAM:  Constitutional: alert and appropriate, dyspneic on nasal cannula. spouse at bedside  Neck: No LAD, supple  Respiratory: decreased BS at bases  Cardiovascular: S1 and S2, RRR, +syst. murmur  Gastrointestinal: hypo-active BS+, distended +fluid wave  Extremities: +b/l LE edema  Neurological: A/O x 3, no focal deficits  Psychiatric: Normal mood, normal affect  Skin: No rashes    LABS:                        10.1   8.1   )-----------( 218      ( 12 Mar 2018 08:17 )             30.5     03-12    131<L>  |  91<L>  |  110<H>  ----------------------------<  93  5.3   |  28  |  3.53<H>    Ca    8.7      12 Mar 2018 08:13  Phos  4.9     03-12  Mg     1.9     03-12      RADIOLOGY & ADDITIONAL TESTS:  < from: CT Abdomen and Pelvis w/ Oral Cont (03.10.18 @ 19:07) >    INTERPRETATION:  CLINICAL INFORMATION: Abdominal distention and pain for   several days. Acute renal failure.    COMPARISON: CT abdomen pelvis 12/14/2015    PROCEDURE:   CT of the Abdomen and Pelvis was performed without intravenous contrast.   Intravenous contrast: None.  Oral contrast: positive contrast was administered.  Sagittal and coronal reformats were performed.    FINDINGS:    LOWER CHEST:Mild interlobular septal thickening and small bilateral   pleural effusions. Subsegmental atelectasis in the right lower lobe.   Cardiomegaly. Coronary calcification. Calcified hilar lymph nodes.    LIVER: Within normal limits.  BILE DUCTS: Normal caliber.  GALLBLADDER: Cholelithiasis.  SPLEEN: Within normal limits.  PANCREAS: Within normal limits.  ADRENALS: Within normal limits.  KIDNEYS/URETERS: Left lower pole renal cyst.    BLADDER: Within normal limits.  REPRODUCTIVE ORGANS: Posterior uterine fibroid.    BOWEL: No bowel obstruction. Sigmoid diverticulosis, without   diverticulitis. Normal appendix.     PERITONEUM: Large volume ascites.  VESSELS:  Atherosclerotic changes.  RETROPERITONEUM: No lymphadenopathy.    ABDOMINAL WALL: Within normal limits.  BONES: Degenerative changes. T12 vertebral body hemangioma.    IMPRESSION:     Large volume ascites, etiology unclear.    Small bilateral pleural effusions and mild pulmonary edema.      < end of copied text >

## 2018-03-12 NOTE — PROGRESS NOTE ADULT - ASSESSMENT
72F with PMHx of AFib, asthma, CAD, cardiomegaly, CHF, cor pulmonale, depression, enlarged lymph nodes, COPD, DM type II, PSHx of endoscopy, upper lobectomy, abdominal / pelvic CT scan shows large ascites.  Ascites is likely related to CHF and cor pulmonale.  Poor appetite could be related to gastric compression due to ascites.  Last paracentesis in Jan 2018 with removal of 9 liters of ascites.    Recurrent large Volume ascites    Indwelling peritoneal catheter for ascites drainage does carry risk of infection- but per discussion with Medicine and Renal attendings, this offers a long-term management of problematic recurrent ascites not responsive to diuresis    Plan:   -Discussed with Medicine and Renal attendings who favor tunnel peritoneal catheter for ascites drainage and management  -call placed to IR for evaluation  -Discussed with Renal attending, no restriction on volume of fluid removal. Supplement post-paracentesis with IV albumin  -Discussed with patient and spouse in detail, all questions answered.  -continue PO diet as tolerated    Await IR attending review of imaging/clinical history (call placed to IR by me). Further plan as per IR attending review and patient/family decision  Patient also on Plavix - which may need to be help prior to tunneled catheter (need to discuss further with IR)    Tom Murry PA-C    [facility]  [phone]

## 2018-03-12 NOTE — PROGRESS NOTE ADULT - SUBJECTIVE AND OBJECTIVE BOX
[facility] [phone]        Patient seen and examined in bed.  She was having the same distention in her abd        MEDICATIONS  (STANDING):  ALBUTerol/ipratropium for Nebulization 3 milliLiter(s) Nebulizer every 6 hours  allopurinol 100 milliGRAM(s) Oral daily  aspirin enteric coated 81 milliGRAM(s) Oral daily  atorvastatin 20 milliGRAM(s) Oral at bedtime  BACItracin   Ointment 1 Application(s) Topical two times a day  buDESOnide   0.5 milliGRAM(s) Respule 0.5 milliGRAM(s) Inhalation every 12 hours  buMETAnide 0.5 milliGRAM(s) Oral two times a day  clopidogrel Tablet 75 milliGRAM(s) Oral daily  digoxin     Tablet 0.125 milliGRAM(s) Oral every other day  diltiazem    milliGRAM(s) Oral daily  heparin  Injectable 5000 Unit(s) SubCutaneous every 8 hours  methimazole 5 milliGRAM(s) Oral daily  metolazone 5 milliGRAM(s) Oral <User Schedule>  metoprolol     tartrate 25 milliGRAM(s) Oral every 8 hours  predniSONE   Tablet 5 milliGRAM(s) Oral daily  sucralfate 1 Gram(s) Oral three times a day      VITAL:  T(C): , Max: 36.6 (03-11-18 @ 13:25)  T(F): , Max: 97.8 (03-11-18 @ 13:25)  HR: 65 (03-12-18 @ 08:51)  BP: 96/60 (03-12-18 @ 08:51)  BP(mean): --  RR: 16 (03-12-18 @ 06:20)  SpO2: 100% (03-12-18 @ 06:20)  Wt(kg): --    I and O's:    03-11 @ 07:01  -  03-12 @ 07:00  --------------------------------------------------------  IN: 600 mL / OUT: 0 mL / NET: 600 mL    03-12 @ 07:01  -  03-12 @ 09:40  --------------------------------------------------------  IN: 240 mL / OUT: 0 mL / NET: 240 mL          PHYSICAL EXAM:    Constitutional: NAD;  Looks older then stated age  HEENT: PERRLA    Neck:  No JVD  Respiratory: CTAB/L  Cardiovascular: S1 and S2  Gastrointestinal: BS+, soft, distended  Extremities: +  peripheral edema  Neurological: A/O x 3, no focal deficits  Psychiatric: Normal mood, normal affect  : No Gottlieb  Skin: No rashes  Access: Not applicable    LABS:                        10.1   8.1   )-----------( 218      ( 12 Mar 2018 08:17 )             30.5     03-12    131<L>  |  91<L>  |  110<H>  ----------------------------<  93  5.3   |  28  |  3.53<H>    Ca    8.7      12 Mar 2018 08:13  Phos  4.9     03-12  Mg     1.9     03-12            Urine Studies:          RADIOLOGY & ADDITIONAL STUDIES:

## 2018-03-12 NOTE — PROGRESS NOTE ADULT - ASSESSMENT
Patient is a 71-year-old female with past medical history of diabetes, hypertension, severe pulmonary hypertension, right ventricular dysfunction, presents with SOB.  The patient's acute renal failure is likely hemodynamic in nature.  Goals of therapy are to improve her hemodynamics in order to improve her forward flow and renal perfusion.  CASSANDRA  Severe PHTN  Cor Pulm  Failure to thrive  Abd pain and likely recurrence of ascites  Hyponatremia    Renal:  non-oliguric:  CASSANDRA: Cont PO diuretics at present  GI:  +ascietes with no significant response with use of aggressive diuretics.  Likely tunneled catheter for long term drainage  Pulmonary:  stable  Cardiology: BPs soft at times though asymptomatic   Hyperkalemia:  dt CASSANDRA, KCL tabs & diet:  mild  Hyponatremia:  likely hypervolemic:  mild; Na+ slowly trending down    Recommendation  - Continue Zaroxolyn 5 mg PO MWF as scheduled for now if BPs permit  - Bumex 0.5 mg PO BID    - Hold KCL supplements for now    - Maintain K+ restrictive diet for now;    - Monitor I & Os  - Plan for tunneled  pigtail next week

## 2018-03-12 NOTE — PROGRESS NOTE ADULT - ASSESSMENT
Right elbow and hip pain  malnutrition  volume overload/ascites/LE edema  sev PHTN/ Cor pulm  decomp diast hf  sev copd  restrictive lung disease sec to ascites which diminishes diaphragmatic excrusion  epistaxis resolved  CASSANDRA  Failure to thrive  Abd pain and likely recurrence of ascites  Hyponatremia    continue current care  pt deciding on tunneled catheter after discussions with pulm  plan for possible catheter after discussion with IR  monitor electrolytes closely

## 2018-03-13 LAB
ANION GAP SERPL CALC-SCNC: 17 MMOL/L — SIGNIFICANT CHANGE UP (ref 5–17)
BUN SERPL-MCNC: 108 MG/DL — HIGH (ref 7–23)
CALCIUM SERPL-MCNC: 8.7 MG/DL — SIGNIFICANT CHANGE UP (ref 8.4–10.5)
CHLORIDE SERPL-SCNC: 90 MMOL/L — LOW (ref 96–108)
CO2 SERPL-SCNC: 24 MMOL/L — SIGNIFICANT CHANGE UP (ref 22–31)
CREAT SERPL-MCNC: 3.34 MG/DL — HIGH (ref 0.5–1.3)
GLUCOSE SERPL-MCNC: 145 MG/DL — HIGH (ref 70–99)
HCT VFR BLD CALC: 32.6 % — LOW (ref 34.5–45)
HGB BLD-MCNC: 10.7 G/DL — LOW (ref 11.5–15.5)
MAGNESIUM SERPL-MCNC: 1.8 MG/DL — SIGNIFICANT CHANGE UP (ref 1.6–2.6)
MCHC RBC-ENTMCNC: 28.2 PG — SIGNIFICANT CHANGE UP (ref 27–34)
MCHC RBC-ENTMCNC: 32.7 GM/DL — SIGNIFICANT CHANGE UP (ref 32–36)
MCV RBC AUTO: 86.3 FL — SIGNIFICANT CHANGE UP (ref 80–100)
PHOSPHATE SERPL-MCNC: 4.7 MG/DL — HIGH (ref 2.5–4.5)
PLATELET # BLD AUTO: 259 K/UL — SIGNIFICANT CHANGE UP (ref 150–400)
POTASSIUM SERPL-MCNC: 4.5 MMOL/L — SIGNIFICANT CHANGE UP (ref 3.5–5.3)
POTASSIUM SERPL-SCNC: 4.5 MMOL/L — SIGNIFICANT CHANGE UP (ref 3.5–5.3)
RAPID RVP RESULT: SIGNIFICANT CHANGE UP
RBC # BLD: 3.78 M/UL — LOW (ref 3.8–5.2)
RBC # FLD: 17.9 % — HIGH (ref 10.3–14.5)
SODIUM SERPL-SCNC: 131 MMOL/L — LOW (ref 135–145)
WBC # BLD: 10.3 K/UL — SIGNIFICANT CHANGE UP (ref 3.8–10.5)
WBC # FLD AUTO: 10.3 K/UL — SIGNIFICANT CHANGE UP (ref 3.8–10.5)

## 2018-03-13 PROCEDURE — 99221 1ST HOSP IP/OBS SF/LOW 40: CPT

## 2018-03-13 PROCEDURE — 73502 X-RAY EXAM HIP UNI 2-3 VIEWS: CPT | Mod: 26,RT

## 2018-03-13 PROCEDURE — 73080 X-RAY EXAM OF ELBOW: CPT | Mod: 26,RT

## 2018-03-13 RX ADMIN — Medication 5 MILLIGRAM(S): at 08:01

## 2018-03-13 RX ADMIN — CLOPIDOGREL BISULFATE 75 MILLIGRAM(S): 75 TABLET, FILM COATED ORAL at 12:03

## 2018-03-13 RX ADMIN — Medication 3 MILLILITER(S): at 23:11

## 2018-03-13 RX ADMIN — Medication 1 APPLICATION(S): at 18:45

## 2018-03-13 RX ADMIN — Medication 25 MILLIGRAM(S): at 21:28

## 2018-03-13 RX ADMIN — Medication 3 MILLILITER(S): at 18:44

## 2018-03-13 RX ADMIN — Medication 0.5 MILLIGRAM(S): at 18:44

## 2018-03-13 RX ADMIN — ONDANSETRON 4 MILLIGRAM(S): 8 TABLET, FILM COATED ORAL at 10:50

## 2018-03-13 RX ADMIN — Medication 3 MILLILITER(S): at 08:01

## 2018-03-13 RX ADMIN — Medication 0.5 MILLIGRAM(S): at 08:01

## 2018-03-13 RX ADMIN — Medication 25 MILLIGRAM(S): at 08:01

## 2018-03-13 RX ADMIN — Medication 650 MILLIGRAM(S): at 11:49

## 2018-03-13 RX ADMIN — Medication 650 MILLIGRAM(S): at 10:50

## 2018-03-13 RX ADMIN — Medication 25 MILLIGRAM(S): at 15:18

## 2018-03-13 RX ADMIN — Medication 81 MILLIGRAM(S): at 08:01

## 2018-03-13 RX ADMIN — Medication 0.12 MILLIGRAM(S): at 12:03

## 2018-03-13 RX ADMIN — ATORVASTATIN CALCIUM 20 MILLIGRAM(S): 80 TABLET, FILM COATED ORAL at 21:28

## 2018-03-13 RX ADMIN — Medication 100 MILLIGRAM(S): at 12:03

## 2018-03-13 RX ADMIN — Medication 240 MILLIGRAM(S): at 08:01

## 2018-03-13 NOTE — PROGRESS NOTE ADULT - SUBJECTIVE AND OBJECTIVE BOX
[facility] - [facility]     PROGRESS NOTE    CHIEF COMPLAINT: CRF (hypoxic); COPD; emphysema; JOSE; secondary pulmonary hypertension; right heart failure; epistaxis; ascites;     INTERVAL HISTORY: short of breath with minimal exertion - barely able to walk to the bathroom; abdominal fullness with anorexia; minimal epistaxis; difficulty with diuretics due to hemodynamic instability and tenuous renal function; weak, tired and frail; no cough, sputum production, chest congestion or wheeze; no fevers, chills or sweats; no chest pain/pressure or palpitations; legs swollen - could not tolerate KATY stockings; right hip and knee pain    REVIEW OF SYSTEMS:  Constitutional: As per interval history  HEENT: Within normal limits  CV: As per interval history  Resp: As per interval history  GI: As per interval history  : Within normal limits  Musculoskeletal: As per interval history  Skin: Within normal limits  Neurological: Within normal limits  Psychiatric: Within normal limits  Endocrine: Within normal limits  Hematologic/Lymphatic: Within normal limits  Allergic/Immunologic: Within normal limits      MEDICATIONS:     Pulmonary "  ALBUTerol/ipratropium for Nebulization 3 milliLiter(s) Nebulizer every 6 hours  buDESOnide   0.5 milliGRAM(s) Respule 0.5 milliGRAM(s) Inhalation every 12 hours  tiotropium 2.5 MICROgram(s)/olodaterol 2.5 MICROgram(s) Inhaler 2 Puff(s) Inhalation daily      Anti-microbials:      Cardiovascular:  buMETAnide 0.5 milliGRAM(s) Oral two times a day  digoxin     Tablet 0.125 milliGRAM(s) Oral every other day  diltiazem    milliGRAM(s) Oral daily  metolazone 5 milliGRAM(s) Oral <User Schedule>  metoprolol     tartrate 25 milliGRAM(s) Oral every 8 hours      Other:  acetaminophen   Tablet. 650 milliGRAM(s) Oral every 6 hours PRN  allopurinol 100 milliGRAM(s) Oral daily  aspirin enteric coated 81 milliGRAM(s) Oral daily  atorvastatin 20 milliGRAM(s) Oral at bedtime  BACItracin   Ointment 1 Application(s) Topical two times a day  clopidogrel Tablet 75 milliGRAM(s) Oral daily  docusate sodium 100 milliGRAM(s) Oral two times a day PRN  Gas-X extra strength (simethicone 125 mg 1 Tablet(s) 1 Tablet(s) Chew three times a day PRN  heparin  Injectable 5000 Unit(s) SubCutaneous every 8 hours  methimazole 5 milliGRAM(s) Oral daily  ondansetron Injectable 4 milliGRAM(s) IV Push every 8 hours PRN  predniSONE   Tablet 5 milliGRAM(s) Oral daily  sodium chloride 0.65% Nasal 1 Spray(s) Both Nostrils three times a day PRN  sucralfate 1 Gram(s) Oral three times a day PRN        OBJECTIVE:        I&O's Detail    12 Mar 2018 07:01  -  13 Mar 2018 07:00  --------------------------------------------------------  IN:    Oral Fluid: 1020 mL  Total IN: 1020 mL    OUT:    Voided: 600 mL  Total OUT: 600 mL    Total NET: 420 mL      13 Mar 2018 07:01  -  13 Mar 2018 16:25  --------------------------------------------------------  IN:    Oral Fluid: 480 mL  Total IN: 480 mL    OUT:    Voided: 250 mL  Total OUT: 250 mL    Total NET: 230 mL    Daily Weight in k.7 (13 Mar 2018 09:14)    PHYSICAL EXAM:       ICU Vital Signs Last 24 Hrs  T(C): 36.7 (13 Mar 2018 13:50), Max: 36.7 (12 Mar 2018 20:05)  T(F): 98 (13 Mar 2018 13:50), Max: 98 (12 Mar 2018 20:05)  HR: 82 (13 Mar 2018 13:50) (82 - 118)  BP: 94/60 (13 Mar 2018 13:55) (83/55 - 94/60)  BP(mean): --  ABP: --  ABP(mean): --  RR: 18 (13 Mar 2018 13:50) (17 - 18)  SpO2: 97% (13 Mar 2018 13:50) (97% - 99%) on 5lpm     General: Awake. Alert. Cooperative. Weak and tired. No distress. Chronically ill appearing	  HEENT:   Atraumatic. Bitemporal wasting. Anicteric. Normal oral mucosa, PERRL, EOMI  Neck: Supple. Trachea midline. Thyroid without enlargement/tenderness/nodules. No carotid bruit. (+) JVD; loss of bilateral supraclavicular fat pads	  Cardiovascular: Irregularly irregular rate and rhythm. S1 S2 normal. II/VI systolic murmur  Respiratory: Respirations unlabored. Decreased breath sounds throughout especially at the bases now with right sided rales. Decreased chest wall excursion  Abdomen: Soft. Non-tender. Severely distended with fluid wave. No organomegaly. No masses. Normal bowel sounds	  Extremities: Warm to touch. No clubbing or cyanosis. Mild to moderate lower extremity edema up to the thigh. Loss of extremity muscle mass  Pulses: Decreased lower extremity peripheral pulses  Skin: Lower extremity venous stasis changes  Lymph Nodes: Cervical, supraclavicular and axillary nodes normal  Neurological: Motor and sensory examination equal and normal. A and O x 3  Psychiatry: Depressed      LABS:                        10.7   10.3  )-----------( 259      ( 13 Mar 2018 09:38 )             32.6                         10.1   8.1   )-----------( 218      ( 12 Mar 2018 08:17 )             30.5         131<L>  |  90<L>  |  108<H>  ----------------------------<  145<H>  4.5   |  24  |  3.34<H>        131<L>  |  91<L>  |  110<H>  ----------------------------<  93  5.3   |  28  |  3.53<H>    Ca      8.7      -    Ca      8.7          Phos    4.7         Phos    4.9           Mg       1.8         Mg       1.9         TPro  5.6<L>  /  Alb  2.6<L>  /  TBili  0.2  /  DBili  x   /  AST  9<L>  /  ALT  6<L>  /  AlkPhos  50  -07    Serum Pro-Brain Natriuretic Peptide: 61979 pg/mL ( @ 16:29)    CARDIAC MARKERS ( 01 Mar 2018 16:29 )  x     / 0.06 ng/mL / x     / x     / 5.4 ng/mL    < from: Transthoracic Echocardiogram (17 @ 18:58) >    Patient name: FRAN ALEXANDRA  YOB: 1945   Age: 71 (F)   MR#: [id number]  Study Date: 2017  Location: [address] [place], [place] [id number]: Thalia Amezquita [facility]  Study quality: Technically fair  Referring Physician: Pierce Cobb MD  Blood Pressure: 106/58 mmHg  Height: 160 cm  Weight: 64 kg  BSA: 1.7 m2  ------------------------------------------------------------------------  PROCEDURE: Transthoracic echocardiogram with 2-D, M-Mode  and complete spectral and color flow Doppler.  INDICATION: Other specified pulmonary heart diseases  (I27.89)  ------------------------------------------------------------------------  Dimensions:    Normal Values:  LA:     3.6    2.0 - 4.0 cm  Ao:     2.8    2.0 - 3.8 cm  SEPTUM: 0.7    0.6 - 1.2 cm  PWT:    0.9    0.6 - 1.1 cm  LVIDd:  4.1    3.0 - 5.6 cm  LVIDs:  1.9    1.8 - 4.0 cm  Derived variables:  LVMI: 58 g/m2  RWT: 0.43  Fractional short: 54 %  Doppler Peak Velocity (m/sec): AoV=1.8  ------------------------------------------------------------------------  Observations:  Mitral Valve: Mitral annular calcification, otherwise  normal mitral valve. Minimal mitral regurgitation.  Aortic Valve/Aorta: Calcified trileaflet aortic valve with  normal opening. Peak transaortic valve gradient equals 13  mm Hg, mean transaortic valve gradient equals 8 mm Hg. Peak  left ventricular outflow tract gradient equals 6 mm Hg,  mean gradient is equal to 3 mm Hg, LVOT velocity time  integral equals 19 cm.  Aortic Root: 2.8 cm.  LVOT diameter: 1.9 cm.  Left Atrium: Normal left atrium.  LA volume index = 25  cc/m2.  Left Ventricle: Hyperdynamic left ventricular systolic  function. Flattening of the interventricular septum in both  systole and diastole is  consistent with right ventricular  pressure overload. Normal left ventricular internal  dimensions and wall thicknesses.  Right Heart: Severe right atrial enlargement. Right  ventricular enlargement with decreased right ventricular  systolic function. Normal tricuspid valve. Mild-moderate  tricuspid regurgitation. Normal pulmonic valve.  Pericardium/Pleura: Normal pericardium with trace  pericardial effusion.  Left pleural effusion.  Hemodynamic: Estimated right atrial pressure is 8 mm Hg.  Estimated right ventricular systolic pressure equals 72 mm  Hg, assuming right atrial pressure equals 8 mm Hg,  consistent with severe pulmonary hypertension.  ------------------------------------------------------------------------  Conclusions:  1. Calcified trileaflet aortic valve with normal opening.  2. Normal left ventricular internal dimensions and wall  thicknesses.  3. Hyperdynamic left ventricular systolic function.  Flattening of the interventricular septum in both systole  and diastole is  consistent with right ventricular pressure  overload.  4. Severe right atrial enlargement.  5. Right ventricular enlargement with decreased right  ventricular systolic function.  6. Normal tricuspid valve. Mild-moderate tricuspid  regurgitation.  7. Estimated pulmonary artery systolic pressure equals 72  mm Hg, assuming right atrial pressure equals 8 mm Hg,  consistent with severe pulmonary pressures.  *** Compared with echocardiogram of 2017, no  significant changes noted.  ------------------------------------------------------------------------  Confirmed on  2017 - 13:15:09 by Justin Cohen M.D.  ------------------------------------------------------------------------    < end of copied text >  ---------------------------------------------------------------------------------------------------------  MICROBIOLOGY:     Culture - Fungal, Body Fluid (18 @ 21:16)    Specimen Source: .Body Fluid Peritoneal Fluid    Culture Results:   No fungus isolated at 1 week. No additional interim reports will be  issued unless there is a change in culture status.    Culture - Body Fluid with Gram Stain (18 @ 21:16)    Gram Stain:   polymorphonuclear leukocytes seen per low power field  No organisms seen per oil power field  by cytocentrifuge    Specimen Source: Peritoneal Peritoneal Fluid    Culture Results:   No growth to date.    Culture - Acid Fast - Body Fluid w/Smear (18 @ 21:16)    Specimen Source: .Body Fluid Peritoneal Fluid    Acid Fast Bacilli Smear:   No acid fast bacilli seen by fluorochrome stain    RADIOLOGY:  [ ] Chest radiographs reviewed and interpreted by me    < from: CT Abdomen and Pelvis w/ Oral Cont (03.10.18 @ 19:07) >    EXAM:  CT ABDOMEN AND PELVIS OC                            PROCEDURE DATE:  03/10/2018      INTERPRETATION:  CLINICAL INFORMATION: Abdominal distention and pain for   several days. Acute renal failure.    COMPARISON: CT abdomen pelvis 2015    PROCEDURE:   CT of the Abdomen and Pelvis was performed without intravenous contrast.   Intravenous contrast: None.  Oral contrast: positive contrast was administered.  Sagittal and coronal reformats were performed.    FINDINGS:    LOWER CHEST:Mild interlobular septal thickening and small bilateral   pleural effusions. Subsegmental atelectasis in the right lower lobe.   Cardiomegaly. Coronary calcification. Calcified hilar lymph nodes.    LIVER: Within normal limits.  BILE DUCTS: Normal caliber.  GALLBLADDER: Cholelithiasis.  SPLEEN: Within normal limits.  PANCREAS: Within normal limits.  ADRENALS: Within normal limits.  KIDNEYS/URETERS: Left lower pole renal cyst.    BLADDER: Within normal limits.  REPRODUCTIVE ORGANS: Posterior uterine fibroid.    BOWEL: No bowel obstruction. Sigmoid diverticulosis, without   diverticulitis. Normal appendix.     PERITONEUM: Large volume ascites.  VESSELS:  Atherosclerotic changes.  RETROPERITONEUM: No lymphadenopathy.    ABDOMINAL WALL: Within normal limits.  BONES: Degenerative changes. T12 vertebral body hemangioma.    IMPRESSION:     Large volume ascites, etiology unclear.    Small bilateral pleural effusions and mild pulmonary edema.    VIOLA ANDREWS M.D., ATTENDINGRADIOLOGIST  This document has been electronically signed. Mar 11 2018  8:52AM      < end of copied text >  ---------------------------------------------------------------------------------------------------------  < from: [facility] Duplex Lower Ext Vein Scan, [place] (18 @ 11:36) >    EXAM:  DUPLEX SCAN EXT VEINS LOWER BI                            PROCEDURE DATE:  2018      INTERPRETATION:  History:Severe pulmonary hypertension. COPD. Bilateral   lower extremity edema and shortness of breath. Evaluate for DVT.    Bilateral lower extremity venous duplex ultrasound from 2017   demonstrated no DVT.    There is edema of the superficial soft tissues of the lower extremities.    Both common femoral, superficial femoral and popliteal veins are patent   and compressible without evidence of thrombus.    The posterior tibial and peroneal veins are patent.  No thrombus is seen.    IMPRESSION: No evidence of deep vein thrombosis of either lower extremity.    JUDY DE LEON M.D., RADIOLOGY RESIDENT  This document has been electronically signed.  MARTIR VALVERDE M.D., ATTENDING RADIOLOGIST  This document has been electronically signed. Mar  5 2018  2:37PM      < end of copied text >  ---------------------------------------------------------------------------------------------------------  < from: Xray Abdomen 1 View PORTABLE -Urgent (18 @ 17:11) >    EXAM:  XR ABDOMEN PORTABLE URGENT 1V                            PROCEDURE DATE:  2018      INTERPRETATION:  CLINICAL INFORMATION: Abdominal distention. Evaluate for   obstruction.    TECHNIQUE: Portable abdominal radiograph dated 3/4/2018.    COMPARISON: Femoral ultrasound dated 3/2/2018. CT chest dated 3/3/2018.    FINDINGS: Top of Form 1      Large volume ascites.  There are no dilated loops of small bowel.   Bowel gas and stool identified throughout the colon and rectum.   There is no free air visualized.  The visualized osseous structures are unremarkable for patient's stated   age.    IMPRESSION:    Nonobstructive bowel gas pattern.    Ascites.    RACHEL VELARDE M.D., RADIOLOGY RESIDENT  This document has been electronically signed.  PIERRE BAILEY M.D., ATTENDING RADIOLOGIST  This document has been electronically signed. Mar  5 2018 10:22AM      < end of copied text >  ---------------------------------------------------------------------------------------------------------  < from: CT Chest No Cont (18 @ 18:24) >    EXAM:  CT CHEST                            PROCEDURE DATE:  2018        INTERPRETATION:  CLINICAL INFORMATION: Evaluate pleural effusions.   Shortness of breath.    COMPARISON: Chest CT dated 2017. Chest x-ray dated 3/1/2018.    PROCEDURE:   CT of the Chest was performed without intravenous contrast.  Sagittal and coronal reformats were performed.  Axial MIP reformats were also performed.    FINDINGS:    CHEST:     LUNGS AND LARGE AIRWAYS: Patent central airways.  Bibasilar subsegmental   atelectasis, right greater than left.  PLEURA: Trace bilateral pleural effusions.  VESSELS: Thoracic aortic and coronary artery atherosclerosis.  HEART: Cardiomegaly. Small pericardial effusion. Mitral annular   calcification. A densely calcified or metallic density measuring 1.1 x   0.4 cm overlies the basilar left pulmonary artery (3:52, 6:80).  MEDIASTINUM AND STEFANIA: No lymphadenopathy.  CHEST WALL AND LOWER NECK: Within normal limits.  VISUALIZED UPPER ABDOMEN: Small volume ascites. Atherosclerotic changes.  BONES: Multilevel degenerative disease.    IMPRESSION: Trace bilateral pleural effusions.  Bibasilar subsegmental atelectasis, right greater than left.  Densely calcified or metallic density overlies the left pulmonary artery.  See above..      JUAN JUAREZ M.D., RADIOLOGY RESIDENT  This document has been electronically signed.  MADONNA MORGAN M.D., ATTENDING RADIOLOGIST  This document has been electronically signed. Mar  4 2018 12:28PM      < end of copied text >  ---------------------------------------------------------------------------------------------------------  < from: US Abdomen Limited (18 @ 08:50) >    EXAM:  US ABDOMEN LIMITED                            PROCEDURE DATE:  2018      INTERPRETATION:  Clinical information: Assess for ascites for   paracentesis.    Comparison: Ultrasound 2017.    Findings: Targeted ultrasound was performed for purposes of ascites   evaluation. A large amount of ascites is noted in all 4 quadrants.    IMPRESSION:    Diffuse, large volume abdominal ascites.    GAEL SCHERER M.D., ATTENDING RADIOLOGIST  This document has been electronically signed. Mar  2 2018  9:16AM      < end of copied text >  ---------------------------------------------------------------------------------------------------------

## 2018-03-13 NOTE — PROGRESS NOTE ADULT - SUBJECTIVE AND OBJECTIVE BOX
Patient is a 72y old  Female who presented with a chief complaint of shortness of breath and difficulty eating due to fullness (01 Mar 2018 19:30)      INTERVAL HPI/OVERNIGHT EVENTS:  remains with BRO, SOB, +othopnea  appetite fair with decreased PO intake due to abdominal distension/fullness  Pt states she is unsure if she is wanting to proceed with peritoneal catheter placement       MEDICATIONS  (STANDING):  ALBUTerol/ipratropium for Nebulization 3 milliLiter(s) Nebulizer every 6 hours  allopurinol 100 milliGRAM(s) Oral daily  aspirin enteric coated 81 milliGRAM(s) Oral daily  atorvastatin 20 milliGRAM(s) Oral at bedtime  BACItracin   Ointment 1 Application(s) Topical two times a day  buDESOnide   0.5 milliGRAM(s) Respule 0.5 milliGRAM(s) Inhalation every 12 hours  buMETAnide 0.5 milliGRAM(s) Oral two times a day  clopidogrel Tablet 75 milliGRAM(s) Oral daily  digoxin     Tablet 0.125 milliGRAM(s) Oral every other day  diltiazem    milliGRAM(s) Oral daily  heparin  Injectable 5000 Unit(s) SubCutaneous every 8 hours  methimazole 5 milliGRAM(s) Oral daily  metolazone 5 milliGRAM(s) Oral <User Schedule>  metoprolol     tartrate 25 milliGRAM(s) Oral every 8 hours  predniSONE   Tablet 5 milliGRAM(s) Oral daily  tiotropium 2.5 MICROgram(s)/olodaterol 2.5 MICROgram(s) Inhaler 2 Puff(s) Inhalation daily    MEDICATIONS  (PRN):  acetaminophen   Tablet. 650 milliGRAM(s) Oral every 6 hours PRN Mild Pain (1 - 3)  docusate sodium 100 milliGRAM(s) Oral two times a day PRN Constipation  Gas-X extra strength (simethicone 125 mg 1 Tablet(s) 1 Tablet(s) Chew three times a day PRN bloating  ondansetron Injectable 4 milliGRAM(s) IV Push every 8 hours PRN Nausea and/or Vomiting  sodium chloride 0.65% Nasal 1 Spray(s) Both Nostrils three times a day PRN Nasal Congestion  sucralfate 1 Gram(s) Oral three times a day PRN stomach discomfort      Allergies  No Known Allergies    Intolerances  albuterol (Unknown)      Review of Systems:  General:  No fevers, chills  CV:  No pain, palpitatioins  Resp:  +SOB +BRO  :  No pain, bleeding, incontinence, nocturia  Muscle:  No pain, weakness  Neuro:  No weakness, tingling, memory problems  Psych:  +fatigue, No mood problems, depression  Heme:  No petechiae, ecchymosis, easy bruisability  Skin:  No rash, tattoos, scars    Vital Signs Last 24 Hrs  T(C): 36.7 (12 Mar 2018 20:05), Max: 36.7 (12 Mar 2018 20:05)  T(F): 98 (12 Mar 2018 20:05), Max: 98 (12 Mar 2018 20:05)  HR: 84 (12 Mar 2018 20:05) (56 - 118)  BP: 92/58 (12 Mar 2018 20:05) (83/55 - 99/55)  BP(mean): --  RR: 17 (12 Mar 2018 20:05) (16 - 17)  SpO2: 99% (12 Mar 2018 20:05) (98% - 100%)    PHYSICAL EXAM:  Constitutional: alert and appropriate, dyspneic on nasal cannula. sitting up at edge of bed  Neck: No LAD, supple  Respiratory: decreased BS at bases  Cardiovascular: S1 and S2, RRR, +syst. murmur  Gastrointestinal: hypo-active BS+, distended +fluid wave  Extremities: +b/l LE edema  Neurological: A/O x 3, no focal deficits  Psychiatric: Normal mood, normal affect  Skin: No rashes    LABS:                        10.1   8.1   )-----------( 218      ( 12 Mar 2018 08:17 )             30.5     03-12    131<L>  |  91<L>  |  110<H>  ----------------------------<  93  5.3   |  28  |  3.53<H>    Ca    8.7      12 Mar 2018 08:13  Phos  4.9     03-12  Mg     1.9     03-12      RADIOLOGY & ADDITIONAL TESTS:

## 2018-03-13 NOTE — PROGRESS NOTE ADULT - ASSESSMENT
Patient is a 71-year-old female with past medical history of diabetes, hypertension, severe pulmonary hypertension, right ventricular dysfunction, presents with SOB.  The patient's acute renal failure is likely hemodynamic in nature.  Goals of therapy are to improve her hemodynamics in order to improve her forward flow and renal perfusion.  CASSANDRA  Severe PHTN  Cor Pulm  Failure to thrive  Abd pain and likely recurrence of ascites  Hyponatremia-hypervolemic    Renal:  non-oliguric:  CASSANDRA: Cont PO diuretics at present  GI:  +ascietes with no significant response with use of aggressive diuretics.  Likely tunneled catheter for long term drainage  Pulmonary:  stable  Cardiology: BPs soft at times though asymptomatic       Recommendation  - Continue Zaroxolyn 5 mg PO MWF as scheduled for now if BPs permit  - Bumex 0.5 mg PO BID    - Off  KCL supplements for now    - Maintain K+ restrictive diet for now;    - Monitor I & Os  - Plan for tunneled  pigtail?    Pt is well aware of her prognosis and does not want to see palliative care

## 2018-03-13 NOTE — PROGRESS NOTE ADULT - SUBJECTIVE AND OBJECTIVE BOX
Interventional Radiology     HPI:  72F with PMHx of AFib, asthma, CAD, cardiomegaly, COPD, DM type II sent to the ED by PMD to be admitted for heart failure on 3/1/18.  Pt has CHF with recurrent ascites and IR was requested to evaluate pt for tunnelled peritoneal catheter placement for recurrent ascites.  Pt previously had paracentesis by [facility] IR on 3/2/18 9600 ml removed and on 1/29/18 9000 ml removed.       Vital Signs: Vital Signs Last 24 Hrs  T(C): 36.7 (12 Mar 2018 20:05), Max: 36.7 (12 Mar 2018 20:05)  T(F): 98 (12 Mar 2018 20:05), Max: 98 (12 Mar 2018 20:05)  HR: 97 (13 Mar 2018 07:59) (84 - 118)  BP: 91/58 (13 Mar 2018 07:59) (83/55 - 92/62)  BP(mean): --  RR: 17 (12 Mar 2018 20:05) (16 - 17)  SpO2: 99% (12 Mar 2018 20:05) (98% - 99%)    Past Medical/ Surgical History: PAST MEDICAL & SURGICAL HISTORY:  Hyperthyroidism  JOSE (obstructive sleep apnea)  Epistaxis  GIB (gastrointestinal bleeding)  CAD S/P percutaneous coronary angioplasty  Afib  Pulmonary HTN  GERD (gastroesophageal reflux disease)  Obesity  Cardiomegaly  Valvular heart disease  COPD (chronic obstructive pulmonary disease): Home O2  Sleep Apnea: by criteria  Loose, teeth: 3 bottom front loose teeth  Female stress incontinence  Shoulder pain, right  Constipation  Arthritis of Knee  H/O heartburn  History of lung cancer  Obese  Hx of hyperlipidemia  Asthma  Diabetes mellitus: type 2  dx about 4-5 years ago   no daily fingerstick  H/O: HTN (hypertension)  Enlarged lymph nodes  Lymph nodes enlarged: s/p biopsy mediastinal  benign  H/O endoscopy  left upper lobectomy      Allergies: Allergies    No Known Allergies    Intolerances    albuterol (Unknown)      Medications: MEDICATIONS  (STANDING):  ALBUTerol/ipratropium for Nebulization 3 milliLiter(s) Nebulizer every 6 hours  allopurinol 100 milliGRAM(s) Oral daily  aspirin enteric coated 81 milliGRAM(s) Oral daily  atorvastatin 20 milliGRAM(s) Oral at bedtime  BACItracin   Ointment 1 Application(s) Topical two times a day  buDESOnide   0.5 milliGRAM(s) Respule 0.5 milliGRAM(s) Inhalation every 12 hours  buMETAnide 0.5 milliGRAM(s) Oral two times a day  clopidogrel Tablet 75 milliGRAM(s) Oral daily  digoxin     Tablet 0.125 milliGRAM(s) Oral every other day  diltiazem    milliGRAM(s) Oral daily  heparin  Injectable 5000 Unit(s) SubCutaneous every 8 hours  methimazole 5 milliGRAM(s) Oral daily  metolazone 5 milliGRAM(s) Oral <User Schedule>  metoprolol     tartrate 25 milliGRAM(s) Oral every 8 hours  predniSONE   Tablet 5 milliGRAM(s) Oral daily  tiotropium 2.5 MICROgram(s)/olodaterol 2.5 MICROgram(s) Inhaler 2 Puff(s) Inhalation daily    MEDICATIONS  (PRN):  acetaminophen   Tablet. 650 milliGRAM(s) Oral every 6 hours PRN Mild Pain (1 - 3)  docusate sodium 100 milliGRAM(s) Oral two times a day PRN Constipation  Gas-X extra strength (simethicone 125 mg 1 Tablet(s) 1 Tablet(s) Chew three times a day PRN bloating  ondansetron Injectable 4 milliGRAM(s) IV Push every 8 hours PRN Nausea and/or Vomiting  sodium chloride 0.65% Nasal 1 Spray(s) Both Nostrils three times a day PRN Nasal Congestion  sucralfate 1 Gram(s) Oral three times a day PRN stomach discomfort    PHYSICAL EXAM:    General:   NAD    Abdomen:  + Distension, + mild epigastric and mild RLQ tenderness  Extremities: B/L LE 2+ pitting edema    LABS:                        10.7   10.3  )-----------( 259      ( 13 Mar 2018 09:38 )             32.6     03-13    131<L>  |  90<L>  |  108<H>  ----------------------------<  145<H>  4.5   |  24  |  3.34<H>    Ca    8.7      13 Mar 2018 09:35  Phos  4.7     03-13  Mg     1.8     03-13    Activated Partial Thromboplastin Time in AM (03.01.18 @ 16:29)    Activated Partial Thromboplastin Time: 28.0    Prothrombin Time and INR, Plasma in AM (03.01.18 @ 16:29)    Prothrombin Time, Plasma: 12.5    INR: 1.15    A/P 71 Y/O F with PMH as above with CHF with recurrent ascites with CASSANDRA    Case reviewed by IR attending Dr. Faraz Hernandez and at this time do not recommend a tunnelled peritoneal catheter placement since the patient's prognosis is not short term, she is not palliative, and the risk of tunnelled peritoneal catheter becoming infected is high.  This patient will benefit from repeat paracentesis procedure as needed.    If patient need's paracentesis at present then please coordinate with IR at ext [phone] and have Renal clearance (with recommendation of how much to remove and how much albumin to give) since this pt has GFR of 13 and we do not want to cause worsening renal failure by large volume paracentesis.  Case d/w covering JOEY Christopher.      Arley Romero, BARRINGTON-C  [facility] [address]  Ext [phone] Interventional Radiology     HPI:  72F with PMHx of AFib, asthma, CAD, cardiomegaly, COPD, DM type II sent to the ED by PMD to be admitted for heart failure on 3/1/18.  Pt has CHF with recurrent ascites and IR was requested to evaluate pt for tunnelled peritoneal catheter placement for recurrent ascites.  Pt previously had paracentesis by [facility] IR on 3/2/18 9600 ml removed and on 1/29/18 9000 ml removed.       Vital Signs: Vital Signs Last 24 Hrs  T(C): 36.7 (12 Mar 2018 20:05), Max: 36.7 (12 Mar 2018 20:05)  T(F): 98 (12 Mar 2018 20:05), Max: 98 (12 Mar 2018 20:05)  HR: 97 (13 Mar 2018 07:59) (84 - 118)  BP: 91/58 (13 Mar 2018 07:59) (83/55 - 92/62)  BP(mean): --  RR: 17 (12 Mar 2018 20:05) (16 - 17)  SpO2: 99% (12 Mar 2018 20:05) (98% - 99%)    Past Medical/ Surgical History: PAST MEDICAL & SURGICAL HISTORY:  Hyperthyroidism  JOSE (obstructive sleep apnea)  Epistaxis  GIB (gastrointestinal bleeding)  CAD S/P percutaneous coronary angioplasty  Afib  Pulmonary HTN  GERD (gastroesophageal reflux disease)  Obesity  Cardiomegaly  Valvular heart disease  COPD (chronic obstructive pulmonary disease): Home O2  Sleep Apnea: by criteria  Loose, teeth: 3 bottom front loose teeth  Female stress incontinence  Shoulder pain, right  Constipation  Arthritis of Knee  H/O heartburn  History of lung cancer  Obese  Hx of hyperlipidemia  Asthma  Diabetes mellitus: type 2  dx about 4-5 years ago   no daily fingerstick  H/O: HTN (hypertension)  Enlarged lymph nodes  Lymph nodes enlarged: s/p biopsy mediastinal  benign  H/O endoscopy  left upper lobectomy      Allergies: Allergies    No Known Allergies    Intolerances    albuterol (Unknown)      Medications: MEDICATIONS  (STANDING):  ALBUTerol/ipratropium for Nebulization 3 milliLiter(s) Nebulizer every 6 hours  allopurinol 100 milliGRAM(s) Oral daily  aspirin enteric coated 81 milliGRAM(s) Oral daily  atorvastatin 20 milliGRAM(s) Oral at bedtime  BACItracin   Ointment 1 Application(s) Topical two times a day  buDESOnide   0.5 milliGRAM(s) Respule 0.5 milliGRAM(s) Inhalation every 12 hours  buMETAnide 0.5 milliGRAM(s) Oral two times a day  clopidogrel Tablet 75 milliGRAM(s) Oral daily  digoxin     Tablet 0.125 milliGRAM(s) Oral every other day  diltiazem    milliGRAM(s) Oral daily  heparin  Injectable 5000 Unit(s) SubCutaneous every 8 hours  methimazole 5 milliGRAM(s) Oral daily  metolazone 5 milliGRAM(s) Oral <User Schedule>  metoprolol     tartrate 25 milliGRAM(s) Oral every 8 hours  predniSONE   Tablet 5 milliGRAM(s) Oral daily  tiotropium 2.5 MICROgram(s)/olodaterol 2.5 MICROgram(s) Inhaler 2 Puff(s) Inhalation daily    MEDICATIONS  (PRN):  acetaminophen   Tablet. 650 milliGRAM(s) Oral every 6 hours PRN Mild Pain (1 - 3)  docusate sodium 100 milliGRAM(s) Oral two times a day PRN Constipation  Gas-X extra strength (simethicone 125 mg 1 Tablet(s) 1 Tablet(s) Chew three times a day PRN bloating  ondansetron Injectable 4 milliGRAM(s) IV Push every 8 hours PRN Nausea and/or Vomiting  sodium chloride 0.65% Nasal 1 Spray(s) Both Nostrils three times a day PRN Nasal Congestion  sucralfate 1 Gram(s) Oral three times a day PRN stomach discomfort    PHYSICAL EXAM:    General:   NAD    Abdomen:  + Distension, + mild epigastric and mild RLQ tenderness  Extremities: B/L LE 2+ pitting edema    LABS:                        10.7   10.3  )-----------( 259      ( 13 Mar 2018 09:38 )             32.6     03-13    131<L>  |  90<L>  |  108<H>  ----------------------------<  145<H>  4.5   |  24  |  3.34<H>    Ca    8.7      13 Mar 2018 09:35  Phos  4.7     03-13  Mg     1.8     03-13    Activated Partial Thromboplastin Time in AM (03.01.18 @ 16:29)    Activated Partial Thromboplastin Time: 28.0    Prothrombin Time and INR, Plasma in AM (03.01.18 @ 16:29)    Prothrombin Time, Plasma: 12.5    INR: 1.15    A/P 73 Y/O F with PMH as above with CHF with recurrent ascites with CASSANDRA    Case reviewed by IR attending Dr. Faraz Hernandez and at this time do not recommend a tunnelled peritoneal catheter placement since the patient's prognosis is not short term, she is not palliative, and the risk of tunnelled peritoneal catheter becoming infected is high.  This patient will benefit from repeat paracentesis procedure as needed.    If patient needs paracentesis at present then please coordinate with IR at ext [phone] and have Renal clearance (with recommendation of how much to remove and how much albumin to give) since this pt has GFR of 13 and we do not want to cause worsening renal failure by large volume paracentesis.  Case d/w covering JOEY Christopher.      Arley Romero, BARRINGTON-C  [facility] [address]  Ext [phone] Interventional Radiology     HPI:  72F with PMHx of AFib, asthma, CAD, cardiomegaly, COPD, DM type II sent to the ED by PMD to be admitted for heart failure on 3/1/18.  Pt has CHF with recurrent ascites and IR was requested to evaluate pt for tunnelled peritoneal catheter placement for recurrent ascites.  Pt previously had paracentesis by [facility] IR on 3/2/18 9600 ml removed and on 1/29/18 9000 ml removed.       Vital Signs: Vital Signs Last 24 Hrs  T(C): 36.7 (12 Mar 2018 20:05), Max: 36.7 (12 Mar 2018 20:05)  T(F): 98 (12 Mar 2018 20:05), Max: 98 (12 Mar 2018 20:05)  HR: 97 (13 Mar 2018 07:59) (84 - 118)  BP: 91/58 (13 Mar 2018 07:59) (83/55 - 92/62)  BP(mean): --  RR: 17 (12 Mar 2018 20:05) (16 - 17)  SpO2: 99% (12 Mar 2018 20:05) (98% - 99%)    Past Medical/ Surgical History: PAST MEDICAL & SURGICAL HISTORY:  Hyperthyroidism  JOSE (obstructive sleep apnea)  Epistaxis  GIB (gastrointestinal bleeding)  CAD S/P percutaneous coronary angioplasty  Afib  Pulmonary HTN  GERD (gastroesophageal reflux disease)  Obesity  Cardiomegaly  Valvular heart disease  COPD (chronic obstructive pulmonary disease): Home O2  Sleep Apnea: by criteria  Loose, teeth: 3 bottom front loose teeth  Female stress incontinence  Shoulder pain, right  Constipation  Arthritis of Knee  H/O heartburn  History of lung cancer  Obese  Hx of hyperlipidemia  Asthma  Diabetes mellitus: type 2  dx about 4-5 years ago   no daily fingerstick  H/O: HTN (hypertension)  Enlarged lymph nodes  Lymph nodes enlarged: s/p biopsy mediastinal  benign  H/O endoscopy  left upper lobectomy      Allergies: Allergies    No Known Allergies    Intolerances    albuterol (Unknown)      Medications: MEDICATIONS  (STANDING):  ALBUTerol/ipratropium for Nebulization 3 milliLiter(s) Nebulizer every 6 hours  allopurinol 100 milliGRAM(s) Oral daily  aspirin enteric coated 81 milliGRAM(s) Oral daily  atorvastatin 20 milliGRAM(s) Oral at bedtime  BACItracin   Ointment 1 Application(s) Topical two times a day  buDESOnide   0.5 milliGRAM(s) Respule 0.5 milliGRAM(s) Inhalation every 12 hours  buMETAnide 0.5 milliGRAM(s) Oral two times a day  clopidogrel Tablet 75 milliGRAM(s) Oral daily  digoxin     Tablet 0.125 milliGRAM(s) Oral every other day  diltiazem    milliGRAM(s) Oral daily  heparin  Injectable 5000 Unit(s) SubCutaneous every 8 hours  methimazole 5 milliGRAM(s) Oral daily  metolazone 5 milliGRAM(s) Oral <User Schedule>  metoprolol     tartrate 25 milliGRAM(s) Oral every 8 hours  predniSONE   Tablet 5 milliGRAM(s) Oral daily  tiotropium 2.5 MICROgram(s)/olodaterol 2.5 MICROgram(s) Inhaler 2 Puff(s) Inhalation daily    MEDICATIONS  (PRN):  acetaminophen   Tablet. 650 milliGRAM(s) Oral every 6 hours PRN Mild Pain (1 - 3)  docusate sodium 100 milliGRAM(s) Oral two times a day PRN Constipation  Gas-X extra strength (simethicone 125 mg 1 Tablet(s) 1 Tablet(s) Chew three times a day PRN bloating  ondansetron Injectable 4 milliGRAM(s) IV Push every 8 hours PRN Nausea and/or Vomiting  sodium chloride 0.65% Nasal 1 Spray(s) Both Nostrils three times a day PRN Nasal Congestion  sucralfate 1 Gram(s) Oral three times a day PRN stomach discomfort    PHYSICAL EXAM:    General:   NAD    Abdomen:  + Distension, + mild epigastric and mild RLQ tenderness  Extremities: B/L LE 2+ pitting edema    LABS:                        10.7   10.3  )-----------( 259      ( 13 Mar 2018 09:38 )             32.6     03-13    131<L>  |  90<L>  |  108<H>  ----------------------------<  145<H>  4.5   |  24  |  3.34<H>    Ca    8.7      13 Mar 2018 09:35  Phos  4.7     03-13  Mg     1.8     03-13    Activated Partial Thromboplastin Time in AM (03.01.18 @ 16:29)    Activated Partial Thromboplastin Time: 28.0    Prothrombin Time and INR, Plasma in AM (03.01.18 @ 16:29)    Prothrombin Time, Plasma: 12.5    INR: 1.15    A/P 73 Y/O F with PMH as above with CHF with recurrent ascites with CASSANDRA    Case reviewed by IR attending Dr. Faraz Hernandez and at this time we do not recommend placement of a tunneled peritoneal catheter due to the risk of catheter infection/peritonitis. She should continue to obtain repeat paracenteses as needed.    If the patient needs paracentesis at present then please coordinate with IR at ext [phone] and have Renal clearance (with recommendation of how much to remove and how much albumin to give) since this pt has GFR of 13 and we do not want to cause worsening renal failure by large volume paracentesis.  Case d/w covering JOEY Christopher.      RHONDA CastilloSoutheast Georgia Health System Cam[place] [address]  Ext [phone]

## 2018-03-13 NOTE — PROGRESS NOTE ADULT - SUBJECTIVE AND OBJECTIVE BOX
NEPHROLOGY-[facility] [phone]        Patient seen and examined in bed.  She was still having the same SOB.  No NV noted.          MEDICATIONS  (STANDING):  ALBUTerol/ipratropium for Nebulization 3 milliLiter(s) Nebulizer every 6 hours  allopurinol 100 milliGRAM(s) Oral daily  aspirin enteric coated 81 milliGRAM(s) Oral daily  atorvastatin 20 milliGRAM(s) Oral at bedtime  BACItracin   Ointment 1 Application(s) Topical two times a day  buDESOnide   0.5 milliGRAM(s) Respule 0.5 milliGRAM(s) Inhalation every 12 hours  buMETAnide 0.5 milliGRAM(s) Oral two times a day  clopidogrel Tablet 75 milliGRAM(s) Oral daily  digoxin     Tablet 0.125 milliGRAM(s) Oral every other day  diltiazem    milliGRAM(s) Oral daily  heparin  Injectable 5000 Unit(s) SubCutaneous every 8 hours  methimazole 5 milliGRAM(s) Oral daily  metolazone 5 milliGRAM(s) Oral <User Schedule>  metoprolol     tartrate 25 milliGRAM(s) Oral every 8 hours  predniSONE   Tablet 5 milliGRAM(s) Oral daily  tiotropium 2.5 MICROgram(s)/olodaterol 2.5 MICROgram(s) Inhaler 2 Puff(s) Inhalation daily      VITAL:  T(C): , Max: 36.7 (03-12-18 @ 20:05)  T(F): , Max: 98 (03-12-18 @ 20:05)  HR: 97 (03-13-18 @ 07:59)  BP: 91/58 (03-13-18 @ 07:59)  BP(mean): --  RR: 17 (03-12-18 @ 20:05)  SpO2: 99% (03-12-18 @ 20:05)  Wt(kg): --    I and O's:    03-12 @ 07:01  -  03-13 @ 07:00  --------------------------------------------------------  IN: 1020 mL / OUT: 600 mL / NET: 420 mL          PHYSICAL EXAM:    Constitutional: NAD  HEENT: PERRLA    Neck:  No JVD  Respiratory: reduced breath sounds  Cardiovascular: S1 and S2  Gastrointestinal: BS+, soft, distended  Extremities: + peripheral edema  Neurological: A/O x 3, no focal deficits  Psychiatric: Normal mood, normal affect  : No Gottlieb  Skin: No rashes  Access: Not applicable    LABS:                        10.7   10.3  )-----------( 259      ( 13 Mar 2018 09:38 )             32.6     03-13    131<L>  |  90<L>  |  x   ----------------------------<  145<H>  4.5   |  24  |  3.34<H>    Ca    8.7      13 Mar 2018 09:35  Phos  4.7     03-13  Mg     1.8     03-13            Urine Studies:          RADIOLOGY & ADDITIONAL STUDIES:

## 2018-03-13 NOTE — PROGRESS NOTE ADULT - ASSESSMENT
72F with PMHx of AFib, asthma, CAD, cardiomegaly, CHF, cor pulmonale, depression, enlarged lymph nodes, COPD, DM type II, PSHx of endoscopy, upper lobectomy, abdominal / pelvic CT scan shows large ascites.  Ascites is likely related to CHF and cor pulmonale.  Poor appetite could be related to gastric compression due to ascites.  Last paracentesis in 3/2/18 with removal of 9 liters of ascites.    Recurrent large Volume ascites    Indwelling peritoneal catheter for ascites drainage does carry risk of infection- but per discussion with Medicine and Renal attendings, this offers a long-term management of problematic recurrent ascites not responsive to diuresis    Plan:   -Discussed with Medicine and Renal attendings who favor tunnel peritoneal catheter for ascites drainage and management  -Await IR attending review of imaging/clinical history. Further plan as per IR attending review and patient/family decision  -Discussed with Renal attending, no restriction on volume of fluid removal. Supplement post-paracentesis with IV albumin  -Discussed with patient and spouse in detail, all questions answered.  -continue PO diet as tolerated    Patient also on Plavix - which may need to be help prior to tunneled catheter (need to discuss further with IR)    Tom Murry PA-C    [facility]  [phone]

## 2018-03-13 NOTE — PROGRESS NOTE ADULT - ASSESSMENT
Right elbow and hip pain  malnutrition  volume overload/ascites/LE edema  sev PHTN/ Cor pulm  decomp diast hf  sev copd  restrictive lung disease sec to ascites which diminishes diaphragmatic excrusion  epistaxis resolved  CASSANDRA  Failure to thrive  Abd pain and likely recurrence of ascites  Hyponatremia    continue current care  appreciate IR input  plan for paracentesis alix   d/c planning in 24 to 48 hrs after para.

## 2018-03-13 NOTE — PROGRESS NOTE ADULT - SUBJECTIVE AND OBJECTIVE BOX
MEDICINE, PROGRESS NOTE [phone]    FRAN ALEXANDRA 72y [id number]    Patient seen and examined.  Patient is a 72y old  Female who presents with a chief complaint of shortness of breath and difficulty eating due to fullness (01 Mar 2018 19:30)  pt feels full and hip and elbow still hurt    PAST MEDICAL & SURGICAL HISTORY:  Hyperthyroidism  JOSE (obstructive sleep apnea)  Epistaxis  GIB (gastrointestinal bleeding)  CAD S/P percutaneous coronary angioplasty  Afib  Pulmonary HTN  GERD (gastroesophageal reflux disease)  Obesity  Cardiomegaly  Valvular heart disease  COPD (chronic obstructive pulmonary disease): Home O2  Sleep Apnea: by criteria  Loose, teeth: 3 bottom front loose teeth  Female stress incontinence  Shoulder pain, right  Constipation  Arthritis of Knee  H/O heartburn  History of lung cancer  Obese  Hx of hyperlipidemia  Asthma  Diabetes mellitus: type 2  dx about 4-5 years ago   no daily fingerstick  H/O: HTN (hypertension)  Enlarged lymph nodes  Lymph nodes enlarged: s/p biopsy mediastinal  benign  H/O endoscopy  left upper lobectomy    MEDICATIONS  (STANDING):  ALBUTerol/ipratropium for Nebulization 3 milliLiter(s) Nebulizer every 6 hours  allopurinol 100 milliGRAM(s) Oral daily  aspirin enteric coated 81 milliGRAM(s) Oral daily  atorvastatin 20 milliGRAM(s) Oral at bedtime  BACItracin   Ointment 1 Application(s) Topical two times a day  buDESOnide   0.5 milliGRAM(s) Respule 0.5 milliGRAM(s) Inhalation every 12 hours  buMETAnide 0.5 milliGRAM(s) Oral two times a day  clopidogrel Tablet 75 milliGRAM(s) Oral daily  digoxin     Tablet 0.125 milliGRAM(s) Oral every other day  diltiazem    milliGRAM(s) Oral daily  heparin  Injectable 5000 Unit(s) SubCutaneous every 8 hours  methimazole 5 milliGRAM(s) Oral daily  metolazone 5 milliGRAM(s) Oral <User Schedule>  metoprolol     tartrate 25 milliGRAM(s) Oral every 8 hours  predniSONE   Tablet 5 milliGRAM(s) Oral daily  tiotropium 2.5 MICROgram(s)/olodaterol 2.5 MICROgram(s) Inhaler 2 Puff(s) Inhalation daily    MEDICATIONS  (PRN):  acetaminophen   Tablet. 650 milliGRAM(s) Oral every 6 hours PRN Mild Pain (1 - 3)  docusate sodium 100 milliGRAM(s) Oral two times a day PRN Constipation  Gas-X extra strength (simethicone 125 mg 1 Tablet(s) 1 Tablet(s) Chew three times a day PRN bloating  ondansetron Injectable 4 milliGRAM(s) IV Push every 8 hours PRN Nausea and/or Vomiting  sodium chloride 0.65% Nasal 1 Spray(s) Both Nostrils three times a day PRN Nasal Congestion  sucralfate 1 Gram(s) Oral three times a day PRN stomach discomfort    Allergies    No Known Allergies    Intolerances    albuterol (Unknown)      PHYSICAL EXAM:  Constitutional: NAD  HEENT: Normocephalic, EOMI  Neck:  No JVD  Respiratory: CTA B/L, No wheezes  Cardiovascular: S1, S2, RRR, + systolic murmur  Gastrointestinal: BS+, soft, NT/ND  Extremities: No peripheral edema  Neurological: AAOX3, no focal deficits  Psychiatric: Normal mood, normal affect  : No Gottlieb    Vital Signs Last 24 Hrs  T(C): 36.6 (13 Mar 2018 20:10), Max: 36.7 (13 Mar 2018 13:50)  T(F): 97.9 (13 Mar 2018 20:10), Max: 98 (13 Mar 2018 13:50)  HR: 110 (13 Mar 2018 20:10) (78 - 110)  BP: 113/66 (13 Mar 2018 20:10) (89/60 - 113/66)  BP(mean): --  RR: 20 (13 Mar 2018 20:10) (17 - 20)  SpO2: 94% (13 Mar 2018 20:10) (94% - 97%)  I&O's Summary    12 Mar 2018 07:01  -  13 Mar 2018 07:00  --------------------------------------------------------  IN: 1020 mL / OUT: 600 mL / NET: 420 mL    13 Mar 2018 07:01  -  13 Mar 2018 21:52  --------------------------------------------------------  IN: 900 mL / OUT: 700 mL / NET: 200 mL        LABS:                        10.7   10.3  )-----------( 259      ( 13 Mar 2018 09:38 )             32.6     03-13    131<L>  |  90<L>  |  108<H>  ----------------------------<  145<H>  4.5   |  24  |  3.34<H>    Ca    8.7      13 Mar 2018 09:35  Phos  4.7     03-13  Mg     1.8     03-13          Magnesium, Serum: 1.8 mg/dL (03-13 @ 09:35)

## 2018-03-13 NOTE — PROGRESS NOTE ADULT - ASSESSMENT
No change in MS.     Recommend  Continue current rx.  Encouraged pt. to discuss her meds with Dr. Cobb.     Jose Craig M.D.  Psychiatry  [phone]

## 2018-03-13 NOTE — PROGRESS NOTE ADULT - ASSESSMENT
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  3) chronic diastolic CHF (little evidence left sided heart failure on chest CT - trace pleural effusions - minimal ground glass opacities)    PLAN/RECOMMENDATIONS    oxygen supplementation ATC to keep saturation greater than 92%  s/p large volume paracentesis  would benefit from a chronic indwelling intraperitoneal catheter to improve quality of life - I have reached out to IR to discuss the risks and benefits  diuretics adjusted by renal - not helping recurrence of ascites and likely will lead to azotemia  albuterol/atrovent/pulmicort nebs  cardiac meds: bumex/cardizem CD/lopressor/digoxin/metolazone/ASA/plavix/lipitor  GI/DVT prophylaxis - protonix/carafate/SQ heparin  prednisone 5mg daily (?)  patient has been treated with endothelin antagonists and phosphodiesterase inhibitors without improvement in PAPs in the past  KATY stockings     Will follow with you. Plan of care discussed with the patient and her  at bedside and Dr. Smith. Prognosis is poor    Chacho Gallego MD, [facility] - [phone]  Pulmonary Medicine

## 2018-03-13 NOTE — PROGRESS NOTE ADULT - SUBJECTIVE AND OBJECTIVE BOX
Events noted. No new complaints. Refused Heparin as she feels she does not need it.       Vital Signs Last 24 Hrs  T(C): 36.7 (13 Mar 2018 13:50), Max: 36.7 (12 Mar 2018 20:05)  T(F): 98 (13 Mar 2018 13:50), Max: 98 (12 Mar 2018 20:05)  HR: 82 (13 Mar 2018 13:50) (82 - 118)  BP: 94/60 (13 Mar 2018 13:55) (83/55 - 94/60)  BP(mean): --  RR: 18 (13 Mar 2018 13:50) (17 - 18)  SpO2: 97% (13 Mar 2018 13:50) (97% - 99%)                          10.7   10.3  )-----------( 259      ( 13 Mar 2018 09:38 )             32.6       03-13    131<L>  |  90<L>  |  108<H>  ----------------------------<  145<H>  4.5   |  24  |  3.34<H>    Ca    8.7      13 Mar 2018 09:35  Phos  4.7     03-13  Mg     1.8     03-13                MEDICATIONS  (STANDING):  ALBUTerol/ipratropium for Nebulization 3 milliLiter(s) Nebulizer every 6 hours  allopurinol 100 milliGRAM(s) Oral daily  aspirin enteric coated 81 milliGRAM(s) Oral daily  atorvastatin 20 milliGRAM(s) Oral at bedtime  BACItracin   Ointment 1 Application(s) Topical two times a day  buDESOnide   0.5 milliGRAM(s) Respule 0.5 milliGRAM(s) Inhalation every 12 hours  buMETAnide 0.5 milliGRAM(s) Oral two times a day  clopidogrel Tablet 75 milliGRAM(s) Oral daily  digoxin     Tablet 0.125 milliGRAM(s) Oral every other day  diltiazem    milliGRAM(s) Oral daily  heparin  Injectable 5000 Unit(s) SubCutaneous every 8 hours  methimazole 5 milliGRAM(s) Oral daily  metolazone 5 milliGRAM(s) Oral <User Schedule>  metoprolol     tartrate 25 milliGRAM(s) Oral every 8 hours  predniSONE   Tablet 5 milliGRAM(s) Oral daily  tiotropium 2.5 MICROgram(s)/olodaterol 2.5 MICROgram(s) Inhaler 2 Puff(s) Inhalation daily    MEDICATIONS  (PRN):  acetaminophen   Tablet. 650 milliGRAM(s) Oral every 6 hours PRN Mild Pain (1 - 3)  docusate sodium 100 milliGRAM(s) Oral two times a day PRN Constipation  Gas-X extra strength (simethicone 125 mg 1 Tablet(s) 1 Tablet(s) Chew three times a day PRN bloating  ondansetron Injectable 4 milliGRAM(s) IV Push every 8 hours PRN Nausea and/or Vomiting  sodium chloride 0.65% Nasal 1 Spray(s) Both Nostrils three times a day PRN Nasal Congestion  sucralfate 1 Gram(s) Oral three times a day PRN stomach discomfort      Elderly WF in bed, calm, cooperative, alert and oriented x 3 .  No psychomotor abnormalities. Insight and judgment are fair. Speech is coherent with normal rate and volume. No hallucinations nor delusions. The patient denied suicidal and homicidal ideation and plan. Mood is 'ok" and affect full range and appropriate. Attention and concentration, short term memory, and long term memory within normal limits.     Given supportive psychotherapy.

## 2018-03-14 LAB
ANION GAP SERPL CALC-SCNC: 14 MMOL/L — SIGNIFICANT CHANGE UP (ref 5–17)
BUN SERPL-MCNC: 113 MG/DL — HIGH (ref 7–23)
CALCIUM SERPL-MCNC: 8.1 MG/DL — LOW (ref 8.4–10.5)
CHLORIDE SERPL-SCNC: 91 MMOL/L — LOW (ref 96–108)
CO2 SERPL-SCNC: 26 MMOL/L — SIGNIFICANT CHANGE UP (ref 22–31)
CREAT SERPL-MCNC: 3.58 MG/DL — HIGH (ref 0.5–1.3)
GLUCOSE SERPL-MCNC: 179 MG/DL — HIGH (ref 70–99)
HCT VFR BLD CALC: 27.8 % — LOW (ref 34.5–45)
HGB BLD-MCNC: 10.1 G/DL — LOW (ref 11.5–15.5)
MCHC RBC-ENTMCNC: 31.7 PG — SIGNIFICANT CHANGE UP (ref 27–34)
MCHC RBC-ENTMCNC: 36.4 GM/DL — HIGH (ref 32–36)
MCV RBC AUTO: 87.1 FL — SIGNIFICANT CHANGE UP (ref 80–100)
PLATELET # BLD AUTO: 196 K/UL — SIGNIFICANT CHANGE UP (ref 150–400)
POTASSIUM SERPL-MCNC: 4.4 MMOL/L — SIGNIFICANT CHANGE UP (ref 3.5–5.3)
POTASSIUM SERPL-SCNC: 4.4 MMOL/L — SIGNIFICANT CHANGE UP (ref 3.5–5.3)
RBC # BLD: 3.19 M/UL — LOW (ref 3.8–5.2)
RBC # FLD: 17.8 % — HIGH (ref 10.3–14.5)
SODIUM SERPL-SCNC: 131 MMOL/L — LOW (ref 135–145)
WBC # BLD: 9.1 K/UL — SIGNIFICANT CHANGE UP (ref 3.8–10.5)
WBC # FLD AUTO: 9.1 K/UL — SIGNIFICANT CHANGE UP (ref 3.8–10.5)

## 2018-03-14 RX ADMIN — CLOPIDOGREL BISULFATE 75 MILLIGRAM(S): 75 TABLET, FILM COATED ORAL at 13:18

## 2018-03-14 RX ADMIN — Medication 3 MILLILITER(S): at 09:53

## 2018-03-14 RX ADMIN — Medication 5 MILLIGRAM(S): at 09:53

## 2018-03-14 RX ADMIN — BUMETANIDE 0.5 MILLIGRAM(S): 0.25 INJECTION INTRAMUSCULAR; INTRAVENOUS at 09:52

## 2018-03-14 RX ADMIN — Medication 240 MILLIGRAM(S): at 09:51

## 2018-03-14 RX ADMIN — ATORVASTATIN CALCIUM 20 MILLIGRAM(S): 80 TABLET, FILM COATED ORAL at 23:09

## 2018-03-14 RX ADMIN — Medication 1 APPLICATION(S): at 09:52

## 2018-03-14 RX ADMIN — Medication 81 MILLIGRAM(S): at 09:51

## 2018-03-14 RX ADMIN — Medication 25 MILLIGRAM(S): at 16:15

## 2018-03-14 RX ADMIN — Medication 25 MILLIGRAM(S): at 09:51

## 2018-03-14 RX ADMIN — Medication 100 MILLIGRAM(S): at 13:18

## 2018-03-14 RX ADMIN — Medication 0.5 MILLIGRAM(S): at 09:53

## 2018-03-14 NOTE — PROGRESS NOTE ADULT - SUBJECTIVE AND OBJECTIVE BOX
NEPHROLOGY-[facility] [phone]        Patient seen and examined in bed.  SHe states that her knee hurt        MEDICATIONS  (STANDING):  ALBUTerol/ipratropium for Nebulization 3 milliLiter(s) Nebulizer every 6 hours  allopurinol 100 milliGRAM(s) Oral daily  aspirin enteric coated 81 milliGRAM(s) Oral daily  atorvastatin 20 milliGRAM(s) Oral at bedtime  BACItracin   Ointment 1 Application(s) Topical two times a day  buDESOnide   0.5 milliGRAM(s) Respule 0.5 milliGRAM(s) Inhalation every 12 hours  buMETAnide 0.5 milliGRAM(s) Oral two times a day  clopidogrel Tablet 75 milliGRAM(s) Oral daily  digoxin     Tablet 0.125 milliGRAM(s) Oral every other day  diltiazem    milliGRAM(s) Oral daily  heparin  Injectable 5000 Unit(s) SubCutaneous every 8 hours  methimazole 5 milliGRAM(s) Oral daily  metolazone 5 milliGRAM(s) Oral <User Schedule>  metoprolol     tartrate 25 milliGRAM(s) Oral every 8 hours  predniSONE   Tablet 5 milliGRAM(s) Oral daily  tiotropium 2.5 MICROgram(s)/olodaterol 2.5 MICROgram(s) Inhaler 2 Puff(s) Inhalation daily      VITAL:  T(C): , Max: 36.7 (03-13-18 @ 13:50)  T(F): , Max: 98 (03-13-18 @ 13:50)  HR: 110 (03-13-18 @ 20:10)  BP: 113/66 (03-13-18 @ 20:10)  BP(mean): --  RR: 20 (03-13-18 @ 20:10)  SpO2: 94% (03-13-18 @ 20:10)  Wt(kg): --    I and O's:    03-13 @ 07:01  -  03-14 @ 07:00  --------------------------------------------------------  IN: 900 mL / OUT: 700 mL / NET: 200 mL          PHYSICAL EXAM:    Constitutional: NAD  HEENT: PERRLA    Neck:  No JVD  Respiratory: CTAB/L  Cardiovascular: S1 and S2  Gastrointestinal: BS+, soft, NT/ND  Extremities: No peripheral edema  Neurological: A/O x 3, no focal deficits  Psychiatric: Normal mood, normal affect  : No Gottlieb  Skin: No rashes  Access: Not applicable    LABS:                        10.7   10.3  )-----------( 259      ( 13 Mar 2018 09:38 )             32.6     03-13    131<L>  |  90<L>  |  108<H>  ----------------------------<  145<H>  4.5   |  24  |  3.34<H>    Ca    8.7      13 Mar 2018 09:35  Phos  4.7     03-13  Mg     1.8     03-13            Urine Studies:          RADIOLOGY & ADDITIONAL STUDIES:

## 2018-03-14 NOTE — DIETITIAN INITIAL EVALUATION ADULT. - ENERGY NEEDS
ht: 63 inches. wt: 141.9 pounds (current, standing, +1 B/L ankle edema). BMI: 25.1 kG/m2. UBW:  IBW: 115 pounds +/- 10%. %IBW: 123%.  Other pertinent objective information: 72 year old female pt with PMH of AFib, Asthma, CAD, Cardiomegaly, enlarged lymph nodes, COPD, T2DM, endoscopy, upper lobectomy, CHF presented to ED with c/o SOB. Found to have decompensated diastolic CHF, (+) ascites on Bumex, pan for IR drain; CASSANDRA on CKD, per nephology continue Bumex and restricted K+ diet. Hyperphosphatemia noted. No pressure ulcers noted. ht: 63 inches. wt: 141.9 pounds (current, standing, +2 B/L ankle edema (+) ascites). BMI: 25.1 kG/m2. UBW: 136 pounds. IBW: 115 pounds +/- 10%. %IBW: 123%.  Other pertinent objective information: 72 year old female pt with PMH of AFib, Asthma, CAD, Cardiomegaly, enlarged lymph nodes, COPD, T2DM, endoscopy, upper lobectomy, CHF presented to ED with c/o SOB. Found to have decompensated diastolic CHF, (+) ascites on Bumex, pan for IR drain; CASSANDRA on CKD, per nephology continue Bumex and restricted K+ diet. Hyperphosphatemia noted. No pressure ulcers noted.

## 2018-03-14 NOTE — DIETITIAN INITIAL EVALUATION ADULT. - PHYSICAL APPEARANCE
(+) ascites (+) ascites; Pt refused nutrition-focused physical exam at this time. Unable to obtain good visual exam of patient as she was wearing a baggy sweatshirt and she was wearing a hat with her head down./debilitated

## 2018-03-14 NOTE — DIETITIAN INITIAL EVALUATION ADULT. - OTHER INFO
Pt seen for LOS admission. Pt seen for LOS admission. Pt reports weight gain 2/2 fluid retention, (+) ascites noted pending IR drain today. Pt seen for LOS admission. Pt currently reports "lousy" appetite, endorses fullness/early satiety and also c/o disliking the food served here. Attempted to obtain pt's food preferences, states she just wants chocolate milk with meals and states she already spoke with a  here to voice her concerns regarding the food. Pt admits to weight gain 2/2 fluid retention, (+) ascites noted pending IR drain today. Was 133-136 pounds x 3 months ago, now 141.9 pounds. Pt denies difficulty chewing/swallowing, denies GI distress at this time no N+V, diarrhea or constipation last BM yesterday 3/13. Pt declines oral supplements as she dislikes the taste; however requests chocolate milk for additional protein. Pt exhibits good understanding and adherence to low sodium diet recommendations, is able to teach back 3 foods high in sodium to limit/avoid. Pt exhibits knowledge deficit regarding high K+ and high Phosphorus foods. Pt observed eating a peanut butter sandwich. Pt seen for LOS admission. Pt currently reports "lousy" appetite <50% meals most days, endorses fullness/early satiety and also c/o disliking the food served here. Attempted to obtain pt's food preferences, states she just wants chocolate milk with meals and states she already spoke with a  here to voice her concerns regarding the food. Pt admits to weight gain 2/2 fluid retention, (+) ascites noted pending IR drain today. Was 133-136 pounds x 3 months ago, now 141.9 pounds. Pt denies difficulty chewing/swallowing, denies GI distress at this time no N+V, diarrhea or constipation last BM yesterday 3/13. Pt declines oral supplements as she dislikes the taste; however requests chocolate milk for additional protein. Pt exhibits good understanding and adherence to low sodium diet recommendations, is able to teach back 3 foods high in sodium to limit/avoid. Pt exhibits knowledge deficit regarding high K+ and high Phosphorus foods. Pt observed eating a peanut butter sandwich.

## 2018-03-14 NOTE — PROGRESS NOTE ADULT - SUBJECTIVE AND OBJECTIVE BOX
INTERVAL HPI/OVERNIGHT EVENTS:  still with SOB and poor appetite    MEDICATIONS  (STANDING):  ALBUTerol/ipratropium for Nebulization 3 milliLiter(s) Nebulizer every 6 hours  allopurinol 100 milliGRAM(s) Oral daily  aspirin enteric coated 81 milliGRAM(s) Oral daily  atorvastatin 20 milliGRAM(s) Oral at bedtime  BACItracin   Ointment 1 Application(s) Topical two times a day  buDESOnide   0.5 milliGRAM(s) Respule 0.5 milliGRAM(s) Inhalation every 12 hours  buMETAnide 0.5 milliGRAM(s) Oral two times a day  clopidogrel Tablet 75 milliGRAM(s) Oral daily  digoxin     Tablet 0.125 milliGRAM(s) Oral every other day  diltiazem    milliGRAM(s) Oral daily  heparin  Injectable 5000 Unit(s) SubCutaneous every 8 hours  methimazole 5 milliGRAM(s) Oral daily  metolazone 5 milliGRAM(s) Oral <User Schedule>  metoprolol     tartrate 25 milliGRAM(s) Oral every 8 hours  predniSONE   Tablet 5 milliGRAM(s) Oral daily  tiotropium 2.5 MICROgram(s)/olodaterol 2.5 MICROgram(s) Inhaler 2 Puff(s) Inhalation daily    MEDICATIONS  (PRN):  acetaminophen   Tablet. 650 milliGRAM(s) Oral every 6 hours PRN Mild Pain (1 - 3)  docusate sodium 100 milliGRAM(s) Oral two times a day PRN Constipation  Gas-X extra strength (simethicone 125 mg 1 Tablet(s) 1 Tablet(s) Chew three times a day PRN bloating  ondansetron Injectable 4 milliGRAM(s) IV Push every 8 hours PRN Nausea and/or Vomiting  sodium chloride 0.65% Nasal 1 Spray(s) Both Nostrils three times a day PRN Nasal Congestion  sucralfate 1 Gram(s) Oral three times a day PRN stomach discomfort      Allergies  No Known Allergies    Intolerances  albuterol (Unknown)      Review of Systems:  General:  No fevers, chills  CV:  No pain, palpitatioins  Resp:  +SOB +BRO  :  No pain, bleeding, incontinence, nocturia  Muscle:  No pain, weakness  Neuro:  No weakness, tingling, memory problems  Psych:  +fatigue, No mood problems, depression  Heme:  No petechiae, ecchymosis, easy bruisability  Skin:  No rash, tattoos, scars    Vital Signs Last 24 Hrs  T(C): 36.6 (13 Mar 2018 20:10), Max: 36.7 (13 Mar 2018 13:50)  T(F): 97.9 (13 Mar 2018 20:10), Max: 98 (13 Mar 2018 13:50)  HR: 112 (14 Mar 2018 09:48) (78 - 112)  BP: 97/66 (14 Mar 2018 09:48) (89/60 - 113/66)  BP(mean): --  RR: 20 (13 Mar 2018 20:10) (17 - 20)  SpO2: 94% (13 Mar 2018 20:10) (94% - 97%)    PHYSICAL EXAM:  Constitutional: alert and appropriate, dyspneic on nasal cannula. sitting up at edge of bed  Neck: No LAD, supple  Respiratory: decreased BS at bases  Cardiovascular: S1 and S2, RRR, +syst. murmur  Gastrointestinal: hypo-active BS+, distended +fluid wave  Extremities: +b/l LE edema  Neurological: A/O x 3, no focal deficits  Psychiatric: Normal mood, normal affect  Skin: No rashes    LABS:                        10.1   9.1   )-----------( 196      ( 14 Mar 2018 09:34 )             27.8     03-14    131<L>  |  91<L>  |  113<H>  ----------------------------<  179<H>  4.4   |  26  |  3.58<H>    Ca    8.1<L>      14 Mar 2018 09:29  Phos  4.7     03-13  Mg     1.8     03-13        RADIOLOGY & ADDITIONAL TESTS:

## 2018-03-14 NOTE — PROGRESS NOTE ADULT - ASSESSMENT
72F with PMHx of AFib, asthma, CAD, cardiomegaly, CHF, cor pulmonale, depression, enlarged lymph nodes, COPD, DM type II, PSHx of endoscopy, upper lobectomy, abdominal / pelvic CT scan shows large ascites.  Ascites is likely related to CHF and cor pulmonale.  Poor appetite could be related to gastric compression due to ascites.  Last paracentesis in 3/2/18 with removal of 9 liters of ascites.    Recurrent large Volume ascites  - not responding to aggressive diuresis  No tunneled peritoneal catheter for ascites drainage as per discussion with IR team/attending due to associated increased risk of infection and pt. does not have a terminal disease     Plan:   -Discussed with Medicine and Renal attendings.   -IR follow up appreciated  -Paracentesis (LVP), IR contacted  -Discussed with Renal attending, no restriction on volume of fluid removal. Supplement pre and post-paracentesis with IV albumin  -Discussed with patient in detail, all questions answered.  -continue PO diet as tolerated    Tom Murry PA-C    [facility]  [phone]

## 2018-03-14 NOTE — DIETITIAN INITIAL EVALUATION ADULT. - NS AS NUTRI INTERV MEALS SNACK
General/healthful diet/Vitamin - modified diet/Mineral - modified diet/Recommend add no concentrated phosphorous to diet order in setting of hyperphosphatemia. Can continue no concentrated K+ low sodium diet for hx hyperkalemia and to help prevent fluid retention. Pt's food preferences obtained and honored on menu. OK for pt to have 1 small chocolate milk container daily.

## 2018-03-14 NOTE — PROGRESS NOTE ADULT - SUBJECTIVE AND OBJECTIVE BOX
[facility] - [facility]     PROGRESS NOTE    CHIEF COMPLAINT: CRF (hypoxic); COPD; emphysema; JOSE; secondary pulmonary hypertension; right heart failure; epistaxis; ascites;     INTERVAL HISTORY: short of breath with minimal exertion - barely able to walk to the bathroom; abdominal fullness with anorexia - still awaiting paracentesis; difficulty with diuretics due to hemodynamic instability and tenuous renal function; weak, tired and frail; no cough, sputum production, chest congestion or wheeze; no fevers, chills or sweats; no chest pain/pressure or palpitations; legs swollen - could not tolerate KATY stockings; right hip and knee pain;     REVIEW OF SYSTEMS:  Constitutional: As per interval history  HEENT: Within normal limits  CV: As per interval history  Resp: As per interval history  GI: As per interval history  : Within normal limits  Musculoskeletal: As per interval history  Skin: Within normal limits  Neurological: Within normal limits  Psychiatric: Within normal limits  Endocrine: Within normal limits  Hematologic/Lymphatic: Within normal limits  Allergic/Immunologic: Within normal limits    MEDICATIONS:     Pulmonary "  ALBUTerol/ipratropium for Nebulization 3 milliLiter(s) Nebulizer every 6 hours  buDESOnide   0.5 milliGRAM(s) Respule 0.5 milliGRAM(s) Inhalation every 12 hours  tiotropium 2.5 MICROgram(s)/olodaterol 2.5 MICROgram(s) Inhaler 2 Puff(s) Inhalation daily      Anti-microbials:      Cardiovascular:  buMETAnide 0.5 milliGRAM(s) Oral two times a day  digoxin     Tablet 0.125 milliGRAM(s) Oral every other day  diltiazem    milliGRAM(s) Oral daily  metolazone 5 milliGRAM(s) Oral <User Schedule>  metoprolol     tartrate 25 milliGRAM(s) Oral every 8 hours      Other:  acetaminophen   Tablet. 650 milliGRAM(s) Oral every 6 hours PRN  allopurinol 100 milliGRAM(s) Oral daily  aspirin enteric coated 81 milliGRAM(s) Oral daily  atorvastatin 20 milliGRAM(s) Oral at bedtime  BACItracin   Ointment 1 Application(s) Topical two times a day  clopidogrel Tablet 75 milliGRAM(s) Oral daily  docusate sodium 100 milliGRAM(s) Oral two times a day PRN  Gas-X extra strength (simethicone 125 mg 1 Tablet(s) 1 Tablet(s) Chew three times a day PRN  heparin  Injectable 5000 Unit(s) SubCutaneous every 8 hours  methimazole 5 milliGRAM(s) Oral daily  ondansetron Injectable 4 milliGRAM(s) IV Push every 8 hours PRN  predniSONE   Tablet 5 milliGRAM(s) Oral daily  sodium chloride 0.65% Nasal 1 Spray(s) Both Nostrils three times a day PRN  sucralfate 1 Gram(s) Oral three times a day PRN        OBJECTIVE:    I&O's Detail    13 Mar 2018 07:01  -  14 Mar 2018 07:00  --------------------------------------------------------  IN:    Oral Fluid: 900 mL  Total IN: 900 mL    OUT:    Voided: 700 mL  Total OUT: 700 mL    Total NET: 200 mL      14 Mar 2018 07:01  -  14 Mar 2018 15:07  --------------------------------------------------------  IN:    Oral Fluid: 360 mL  Total IN: 360 mL    OUT:    Voided: 150 mL  Total OUT: 150 mL    Total NET: 210 mL    Daily Weight in k.1 (14 Mar 2018 11:51)    PHYSICAL EXAM:       ICU Vital Signs Last 24 Hrs  T(C): 36.8 (14 Mar 2018 12:46), Max: 36.8 (14 Mar 2018 12:46)  T(F): 98.2 (14 Mar 2018 12:46), Max: 98.2 (14 Mar 2018 12:46)  HR: 70 (14 Mar 2018 12:46) (70 - 112)  BP: 89/58 (14 Mar 2018 12:46) (89/58 - 113/66)  BP(mean): --  ABP: --  ABP(mean): --  RR: 24 (14 Mar 2018 12:46) (17 - 24)  SpO2: 92% (14 Mar 2018 12:46) (92% - 95%) on 5lpm     General: Awake. Alert. Cooperative. Weak and tired. No distress. Chronically ill appearing	  HEENT:   Atraumatic. Bitemporal wasting. Anicteric. Normal oral mucosa, PERRL, EOMI  Neck: Supple. Trachea midline. Thyroid without enlargement/tenderness/nodules. No carotid bruit. (+) JVD; loss of bilateral supraclavicular fat pads	  Cardiovascular: Irregularly irregular rate and rhythm. S1 S2 normal. II/VI systolic murmur  Respiratory: Respirations unlabored. Decreased breath sounds throughout especially at the bases now with right sided rales. Decreased chest wall excursion  Abdomen: Soft. Non-tender. Severely distended with fluid wave. No organomegaly. No masses. Normal bowel sounds	  Extremities: Warm to touch. No clubbing or cyanosis. Mild to moderate lower extremity edema up to the thigh. Loss of extremity muscle mass  Pulses: Decreased lower extremity peripheral pulses  Skin: Lower extremity venous stasis changes; skin tear left elbow  Lymph Nodes: Cervical, supraclavicular and axillary nodes normal  Neurological: Motor and sensory examination equal and normal. A and O x 3  Psychiatry: Depressed      LABS:                        10.1   9.1   )-----------( 196      ( 14 Mar 2018 09:34 )             27.8                         10.7   10.3  )-----------( 259      ( 13 Mar 2018 09:38 )             32.6         131<L>  |  91<L>  |  113<H>  ----------------------------<  179<H>  4.4   |  26  |  3.58<H>        131<L>  |  90<L>  |  108<H>  ----------------------------<  145<H>  4.5   |  24  |  3.34<H>    Ca      8.1<L>          Ca      8.7          Phos    4.7         Phos    4.9           Mg       1.8         Mg       1.9         Serum Pro-Brain Natriuretic Peptide: 62240 pg/mL ( @ 16:29)    CARDIAC MARKERS ( 01 Mar 2018 16:29 )  x     / 0.06 ng/mL / x     / x     / 5.4 ng/mL    < from: Transthoracic Echocardiogram (.13.17 @ 18:58) >    Patient name: FRAN ALEXANDRA  YOB: 1945   Age: 71 (F)   MR#: [id number]  Study Date: 2017  Location: [address] [place], [place] [id number]: [facility]  Study quality: Technically fair  Referring Physician: Pierce Cobb MD  Blood Pressure: 106/58 mmHg  Height: 160 cm  Weight: 64 kg  BSA: 1.7 m2  ------------------------------------------------------------------------  PROCEDURE: Transthoracic echocardiogram with 2-D, M-Mode  and complete spectral and color flow Doppler.  INDICATION: Other specified pulmonary heart diseases  (I27.89)  ------------------------------------------------------------------------  Dimensions:    Normal Values:  LA:     3.6    2.0 - 4.0 cm  Ao:     2.8    2.0 - 3.8 cm  SEPTUM: 0.7    0.6 - 1.2 cm  PWT:    0.9    0.6 - 1.1 cm  LVIDd:  4.1    3.0 - 5.6 cm  LVIDs:  1.9    1.8 - 4.0 cm  Derived variables:  LVMI: 58 g/m2  RWT: 0.43  Fractional short: 54 %  Doppler Peak Velocity (m/sec): AoV=1.8  ------------------------------------------------------------------------  Observations:  Mitral Valve: Mitral annular calcification, otherwise  normal mitral valve. Minimal mitral regurgitation.  Aortic Valve/Aorta: Calcified trileaflet aortic valve with  normal opening. Peak transaortic valve gradient equals 13  mm Hg, mean transaortic valve gradient equals 8 mm Hg. Peak  left ventricular outflow tract gradient equals 6 mm Hg,  mean gradient is equal to 3 mm Hg, LVOT velocity time  integral equals 19 cm.  Aortic Root: 2.8 cm.  LVOT diameter: 1.9 cm.  Left Atrium: Normal left atrium.  LA volume index = 25  cc/m2.  Left Ventricle: Hyperdynamic left ventricular systolic  function. Flattening of the interventricular septum in both  systole and diastole is  consistent with right ventricular  pressure overload. Normal left ventricular internal  dimensions and wall thicknesses.  Right Heart: Severe right atrial enlargement. Right  ventricular enlargement with decreased right ventricular  systolic function. Normal tricuspid valve. Mild-moderate  tricuspid regurgitation. Normal pulmonic valve.  Pericardium/Pleura: Normal pericardium with trace  pericardial effusion.  Left pleural effusion.  Hemodynamic: Estimated right atrial pressure is 8 mm Hg.  Estimated right ventricular systolic pressure equals 72 mm  Hg, assuming right atrial pressure equals 8 mm Hg,  consistent with severe pulmonary hypertension.  ------------------------------------------------------------------------  Conclusions:  1. Calcified trileaflet aortic valve with normal opening.  2. Normal left ventricular internal dimensions and wall  thicknesses.  3. Hyperdynamic left ventricular systolic function.  Flattening of the interventricular septum in both systole  and diastole is  consistent with right ventricular pressure  overload.  4. Severe right atrial enlargement.  5. Right ventricular enlargement with decreased right  ventricular systolic function.  6. Normal tricuspid valve. Mild-moderate tricuspid  regurgitation.  7. Estimated pulmonary artery systolic pressure equals 72  mm Hg, assuming right atrial pressure equals 8 mm Hg,  consistent with severe pulmonary pressures.  *** Compared with echocardiogram of 2017, no  significant changes noted.  ------------------------------------------------------------------------  Confirmed on  2017 - 13:15:09 by Justin Cohen M.D.  ------------------------------------------------------------------------    < end of copied text >  ---------------------------------------------------------------------------------------------------------  MICROBIOLOGY:     Culture - Fungal, Body Fluid (18 @ 21:16)    Specimen Source: .Body Fluid Peritoneal Fluid    Culture Results:   No fungus isolated at 1 week. No additional interim reports will be  issued unless there is a change in culture status.    Culture - Body Fluid with Gram Stain (18 @ 21:16)    Gram Stain:   polymorphonuclear leukocytes seen per low power field  No organisms seen per oil power field  by cytocentrifuge    Specimen Source: Peritoneal Peritoneal Fluid    Culture Results:   No growth to date.    Culture - Acid Fast - Body Fluid w/Smear (18 @ 21:16)    Specimen Source: .Body Fluid Peritoneal Fluid    Acid Fast Bacilli Smear:   No acid fast bacilli seen by fluorochrome stain    RADIOLOGY:  [x] Chest radiographs reviewed and interpreted by me    < from: CT Abdomen and Pelvis w/ Oral Cont (03.10.18 @ 19:07) >    EXAM:  CT ABDOMEN AND PELVIS OC                            PROCEDURE DATE:  03/10/2018      INTERPRETATION:  CLINICAL INFORMATION: Abdominal distention and pain for   several days. Acute renal failure.    COMPARISON: CT abdomen pelvis 2015    PROCEDURE:   CT of the Abdomen and Pelvis was performed without intravenous contrast.   Intravenous contrast: None.  Oral contrast: positive contrast was administered.  Sagittal and coronal reformats were performed.    FINDINGS:    LOWER CHEST:Mild interlobular septal thickening and small bilateral   pleural effusions. Subsegmental atelectasis in the right lower lobe.   Cardiomegaly. Coronary calcification. Calcified hilar lymph nodes.    LIVER: Within normal limits.  BILE DUCTS: Normal caliber.  GALLBLADDER: Cholelithiasis.  SPLEEN: Within normal limits.  PANCREAS: Within normal limits.  ADRENALS: Within normal limits.  KIDNEYS/URETERS: Left lower pole renal cyst.    BLADDER: Within normal limits.  REPRODUCTIVE ORGANS: Posterior uterine fibroid.    BOWEL: No bowel obstruction. Sigmoid diverticulosis, without   diverticulitis. Normal appendix.     PERITONEUM: Large volume ascites.  VESSELS:  Atherosclerotic changes.  RETROPERITONEUM: No lymphadenopathy.    ABDOMINAL WALL: Within normal limits.  BONES: Degenerative changes. T12 vertebral body hemangioma.    IMPRESSION:     Large volume ascites, etiology unclear.    Small bilateral pleural effusions and mild pulmonary edema.    VIOLA ANDREWS M.D., ATTENDINGRADIOLOGIST  This document has been electronically signed. Mar 11 2018  8:52AM      < end of copied text >  ---------------------------------------------------------------------------------------------------------  < from: [facility] Duplex Lower Ext Vein Scan, Bilat (18 @ 11:36) >    EXAM:  DUPLEX SCAN EXT VEINS LOWER BI                            PROCEDURE DATE:  2018      INTERPRETATION:  History:Severe pulmonary hypertension. COPD. Bilateral   lower extremity edema and shortness of breath. Evaluate for DVT.    Bilateral lower extremity venous duplex ultrasound from 2017   demonstrated no DVT.    There is edema of the superficial soft tissues of the lower extremities.    Both common femoral, superficial femoral and popliteal veins are patent   and compressible without evidence of thrombus.    The posterior tibial and peroneal veins are patent.  No thrombus is seen.    IMPRESSION: No evidence of deep vein thrombosis of either lower extremity.    JUDY DE LEON M.D., RADIOLOGY RESIDENT  This document has been electronically signed.  MARTIR VALVERDE M.D., ATTENDING RADIOLOGIST  This document has been electronically signed. Mar  5 2018  2:37PM      < end of copied text >  ---------------------------------------------------------------------------------------------------------  < from: Xray Abdomen 1 View PORTABLE -Urgent (18 @ 17:11) >    EXAM:  XR ABDOMEN PORTABLE URGENT 1V                            PROCEDURE DATE:  2018      INTERPRETATION:  CLINICAL INFORMATION: Abdominal distention. Evaluate for   obstruction.    TECHNIQUE: Portable abdominal radiograph dated 3/4/2018.    COMPARISON: Femoral ultrasound dated 3/2/2018. CT chest dated 3/3/2018.    FINDINGS: Top of Form 1      Large volume ascites.  There are no dilated loops of small bowel.   Bowel gas and stool identified throughout the colon and rectum.   There is no free air visualized.  The visualized osseous structures are unremarkable for patient's stated   age.    IMPRESSION:    Nonobstructive bowel gas pattern.    Ascites.    RACHEL VELARDE M.D., RADIOLOGY RESIDENT  This document has been electronically signed.  PIERRE BAILEY M.D., ATTENDING RADIOLOGIST  This document has been electronically signed. Mar  5 2018 10:22AM      < end of copied text >  ---------------------------------------------------------------------------------------------------------  < from: CT Chest No Cont (18 @ 18:24) >    EXAM:  CT CHEST                            PROCEDURE DATE:  2018        INTERPRETATION:  CLINICAL INFORMATION: Evaluate pleural effusions.   Shortness of breath.    COMPARISON: Chest CT dated 2017. Chest x-ray dated 3/1/2018.    PROCEDURE:   CT of the Chest was performed without intravenous contrast.  Sagittal and coronal reformats were performed.  Axial MIP reformats were also performed.    FINDINGS:    CHEST:     LUNGS AND LARGE AIRWAYS: Patent central airways.  Bibasilar subsegmental   atelectasis, right greater than left.  PLEURA: Trace bilateral pleural effusions.  VESSELS: Thoracic aortic and coronary artery atherosclerosis.  HEART: Cardiomegaly. Small pericardial effusion. Mitral annular   calcification. A densely calcified or metallic density measuring 1.1 x   0.4 cm overlies the basilar left pulmonary artery (3:52, 6:80).  MEDIASTINUM AND STEFANIA: No lymphadenopathy.  CHEST WALL AND LOWER NECK: Within normal limits.  VISUALIZED UPPER ABDOMEN: Small volume ascites. Atherosclerotic changes.  BONES: Multilevel degenerative disease.    IMPRESSION: Trace bilateral pleural effusions.  Bibasilar subsegmental atelectasis, right greater than left.  Densely calcified or metallic density overlies the left pulmonary artery.  See above..      JUAN JUAREZ M.D., RADIOLOGY RESIDENT  This document has been electronically signed.  MADONNA MORGAN M.D., ATTENDING RADIOLOGIST  This document has been electronically signed. Mar  4 2018 12:28PM      < end of copied text >  ---------------------------------------------------------------------------------------------------------  < from: US Abdomen Limited (18 @ 08:50) >    EXAM:  US ABDOMEN LIMITED                            PROCEDURE DATE:  2018      INTERPRETATION:  Clinical information: Assess for ascites for   paracentesis.    Comparison: Ultrasound 2017.    Findings: Targeted ultrasound was performed for purposes of ascites   evaluation. A large amount of ascites is noted in all 4 quadrants.    IMPRESSION:    Diffuse, large volume abdominal ascites.    GAEL SCHERER M.D., ATTENDING RADIOLOGIST  This document has been electronically signed. Mar  2 2018  9:16AM      < end of copied text >  ---------------------------------------------------------------------------------------------------------

## 2018-03-14 NOTE — PROGRESS NOTE ADULT - SUBJECTIVE AND OBJECTIVE BOX
Interventional Radiology Pre-Procedure Note    This is a 72y Female with shortness of breath presenting for therapeutic paracentesis.    Procedure:    Diagnosis/Indication: Patient is a 72y old  Female who presents with a chief complaint of shortness of breath and difficulty eating due to fullness (01 Mar 2018 19:30)      PAST MEDICAL & SURGICAL HISTORY:  Hyperthyroidism  JOSE (obstructive sleep apnea)  Epistaxis  GIB (gastrointestinal bleeding)  CAD S/P percutaneous coronary angioplasty  Afib  Pulmonary HTN  GERD (gastroesophageal reflux disease)  Obesity  Cardiomegaly  Valvular heart disease  COPD (chronic obstructive pulmonary disease): Home O2  Sleep Apnea: by criteria  Loose, teeth: 3 bottom front loose teeth  Female stress incontinence  Shoulder pain, right  Constipation  Arthritis of Knee  H/O heartburn  History of lung cancer  Obese  Hx of hyperlipidemia  Asthma  Diabetes mellitus: type 2  dx about 4-5 years ago   no daily fingerstick  H/O: HTN (hypertension)  Enlarged lymph nodes  Lymph nodes enlarged: s/p biopsy mediastinal  benign  H/O endoscopy  left upper lobectomy       Allergies: albuterol (Unknown)  No Known Allergies      LABS:  CBC Full  -  ( 14 Mar 2018 09:34 )  WBC Count : 9.1 K/uL  Hemoglobin : 10.1 g/dL  Hematocrit : 27.8 %  Platelet Count - Automated : 196 K/uL  Mean Cell Volume : 87.1 fl  Mean Cell Hemoglobin : 31.7 pg  Mean Cell Hemoglobin Concentration : 36.4 gm/dL  Auto Neutrophil # : x  Auto Lymphocyte # : x  Auto Monocyte # : x  Auto Eosinophil # : x  Auto Basophil # : x  Auto Neutrophil % : x  Auto Lymphocyte % : x  Auto Monocyte % : x  Auto Eosinophil % : x  Auto Basophil % : x    03-14    131<L>  |  91<L>  |  113<H>  ----------------------------<  179<H>  4.4   |  26  |  3.58<H>    Ca    8.1<L>      14 Mar 2018 09:29  Phos  4.7     03-13  Mg     1.8     03-13          Procedure/ risks/ benefits/ alternatives were explained, informed consent obtained from patient, verbalizes understanding.

## 2018-03-14 NOTE — PROGRESS NOTE ADULT - ASSESSMENT
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  3) chronic diastolic CHF (little evidence left sided heart failure on chest CT - trace pleural effusions - minimal ground glass opacities)    PLAN/RECOMMENDATIONS    oxygen supplementation ATC to keep saturation greater than 92%  planning for serial large volume thoracenteses rather than an indwelling catheter  diuretics being adjusted by renal - not helping recurrence of ascites and likely will lead to azotemia  albuterol/atrovent/pulmicort nebs  cardiac meds: bumex/cardizem CD/lopressor/digoxin/metolazone/ASA/plavix/lipitor  GI/DVT prophylaxis - protonix/carafate/SQ heparin  prednisone 5mg daily (?)  patient has been treated with endothelin antagonists and phosphodiesterase inhibitors without improvement in PAPs in the past  KATY stockings     Will follow with you. Plan of care discussed with the patient and her  at bedside. Prognosis is poor    Chacho Gallego MD, [facility] - [phone]  Pulmonary Medicine 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  3) chronic diastolic CHF (little evidence left sided heart failure on chest CT - trace pleural effusions - minimal ground glass opacities)    PLAN/RECOMMENDATIONS    oxygen supplementation ATC to keep saturation greater than 92%  planning for serial large volume paracenteses rather than an indwelling catheter  diuretics being adjusted by renal - not helping recurrence of ascites and likely will lead to azotemia  albuterol/atrovent/pulmicort nebs  cardiac meds: bumex/cardizem CD/lopressor/digoxin/metolazone/ASA/plavix/lipitor  GI/DVT prophylaxis - protonix/carafate/SQ heparin  prednisone 5mg daily (?)  patient has been treated with endothelin antagonists and phosphodiesterase inhibitors without improvement in PAPs in the past  KATY stockings     Will follow with you. Plan of care discussed with the patient and her  at bedside. Prognosis is poor    Chacho Gallego MD, [facility] - [phone]  Pulmonary Medicine

## 2018-03-14 NOTE — CHART NOTE - NSCHARTNOTEFT_GEN_A_CORE
Upon Nutritional Assessment by the Registered Dietitian your patient was determined to meet criteria / has evidence of the following diagnosis/diagnoses:          [ ]  Mild Protein Calorie Malnutrition        [ ]  Moderate Protein Calorie Malnutrition        [x] Severe Protein Calorie Malnutrition        [ ] Unspecified Protein Calorie Malnutrition        [ ] Underweight / BMI <19        [ ] Morbid Obesity / BMI > 40      Findings as based on:  [x] Comprehensive nutrition assessment   [ ] Nutrition Focused Physical Exam  [ ] Other:       Nutrition Plan/Recommendations:      Please see dietitian initial evaluation for recommendations      PROVIDER Section:     By signing this assessment you are acknowledging and agree with the diagnosis/diagnoses assigned by the Registered Dietitian    Comments:

## 2018-03-14 NOTE — DIETITIAN INITIAL EVALUATION ADULT. - SIGNS/SYMPTOMS
3/14: Phosphorus: 4.7, pt consuming foods high in phosphorous FTT; poor po chronic<50%,c/o fullness, edema, lean body mass loss likely masked with fluid retention

## 2018-03-14 NOTE — DIETITIAN INITIAL EVALUATION ADULT. - NS AS NUTRI INTERV ED CONTENT
Purpose of the nutrition education/1) Educated pt on low phosphorous low K+ diet recommendations. reviewed foods high in phosphorous and K+ to limit/avoid. Provided K+ and phosphorous nutrient list to pt for reference as needed./Priority modifications/Nutrition relationship to health/disease

## 2018-03-14 NOTE — PROVIDER CONTACT NOTE (OTHER) - SITUATION
s/p provider regarding dose of metoprolol.  s/p paracentesis.  BP 89/59.  HR 58.  has been 50's to low 60's since back from paracentesis

## 2018-03-14 NOTE — PROGRESS NOTE ADULT - SUBJECTIVE AND OBJECTIVE BOX
MEDICINE, PROGRESS NOTE [phone]    FRAN ALEXANDRA 72y [id number]    Patient was seen and examined earlier.   Patient is a 72y old  Female who presents with a chief complaint of shortness of breath and difficulty eating due to fullness (01 Mar 2018 19:30)  pt feels ok.    PAST MEDICAL & SURGICAL HISTORY:  Hyperthyroidism  JOSE (obstructive sleep apnea)  Epistaxis  GIB (gastrointestinal bleeding)  CAD S/P percutaneous coronary angioplasty  Afib  Pulmonary HTN  GERD (gastroesophageal reflux disease)  Obesity  Cardiomegaly  Valvular heart disease  COPD (chronic obstructive pulmonary disease): Home O2  Sleep Apnea: by criteria  Loose, teeth: 3 bottom front loose teeth  Female stress incontinence  Shoulder pain, right  Constipation  Arthritis of Knee  H/O heartburn  History of lung cancer  Obese  Hx of hyperlipidemia  Asthma  Diabetes mellitus: type 2  dx about 4-5 years ago   no daily fingerstick  H/O: HTN (hypertension)  Enlarged lymph nodes  Lymph nodes enlarged: s/p biopsy mediastinal  benign  H/O endoscopy  left upper lobectomy    MEDICATIONS  (STANDING):  ALBUTerol/ipratropium for Nebulization 3 milliLiter(s) Nebulizer every 6 hours  aspirin enteric coated 81 milliGRAM(s) Oral daily  atorvastatin 20 milliGRAM(s) Oral at bedtime  BACItracin   Ointment 1 Application(s) Topical two times a day  buDESOnide   0.5 milliGRAM(s) Respule 0.5 milliGRAM(s) Inhalation every 12 hours  buMETAnide 0.5 milliGRAM(s) Oral two times a day  clopidogrel Tablet 75 milliGRAM(s) Oral daily  digoxin     Tablet 0.125 milliGRAM(s) Oral every other day  diltiazem    milliGRAM(s) Oral daily  heparin  Injectable 5000 Unit(s) SubCutaneous every 8 hours  methimazole 5 milliGRAM(s) Oral daily  metolazone 5 milliGRAM(s) Oral <User Schedule>  metoprolol     tartrate 25 milliGRAM(s) Oral every 8 hours  predniSONE   Tablet 5 milliGRAM(s) Oral daily  tiotropium 2.5 MICROgram(s)/olodaterol 2.5 MICROgram(s) Inhaler 2 Puff(s) Inhalation daily    MEDICATIONS  (PRN):  acetaminophen   Tablet. 650 milliGRAM(s) Oral every 6 hours PRN Mild Pain (1 - 3)  docusate sodium 100 milliGRAM(s) Oral two times a day PRN Constipation  Gas-X extra strength (simethicone 125 mg 1 Tablet(s) 1 Tablet(s) Chew three times a day PRN bloating  ondansetron Injectable 4 milliGRAM(s) IV Push every 8 hours PRN Nausea and/or Vomiting  sodium chloride 0.65% Nasal 1 Spray(s) Both Nostrils three times a day PRN Nasal Congestion  sucralfate 1 Gram(s) Oral three times a day PRN stomach discomfort    Allergies    No Known Allergies    Intolerances    albuterol (Unknown)      PHYSICAL EXAM:  Constitutional: NAD  HEENT: Normocephalic, EOMI  Neck:  No JVD  Respiratory: CTA B/L, No wheezes  Cardiovascular: S1, S2, RRR, + systolic murmur  Gastrointestinal: BS+, soft, NT/ND  Extremities: No peripheral edema  Neurological: AAOX3, no focal deficits  Psychiatric: Normal mood, normal affect  : No Gottlieb    Vital Signs Last 24 Hrs  T(C): 36.8 (14 Mar 2018 12:46), Max: 36.8 (14 Mar 2018 12:46)  T(F): 98.2 (14 Mar 2018 12:46), Max: 98.2 (14 Mar 2018 12:46)  HR: 70 (14 Mar 2018 12:46) (70 - 112)  BP: 89/58 (14 Mar 2018 12:46) (89/58 - 113/66)  BP(mean): --  RR: 24 (14 Mar 2018 12:46) (17 - 24)  SpO2: 92% (14 Mar 2018 12:46) (92% - 95%)  I&O's Summary    13 Mar 2018 07:01  -  14 Mar 2018 07:00  --------------------------------------------------------  IN: 900 mL / OUT: 700 mL / NET: 200 mL    14 Mar 2018 07:01  -  14 Mar 2018 18:17  --------------------------------------------------------  IN: 360 mL / OUT: 150 mL / NET: 210 mL        LABS:                        10.1   9.1   )-----------( 196      ( 14 Mar 2018 09:34 )             27.8     03-14    131<L>  |  91<L>  |  113<H>  ----------------------------<  179<H>  4.4   |  26  |  3.58<H>    Ca    8.1<L>      14 Mar 2018 09:29  Phos  4.7     03-13  Mg     1.8     03-13

## 2018-03-14 NOTE — DIETITIAN INITIAL EVALUATION ADULT. - ADHERENCE
Pt adhered well to a low sodium diet PTA Pt adhered well to a low sodium diet PTA; states she cooks with "no salt" and "stays away" from processed foods high in sodium at home. Pt tried to prepare most of her foods at home, rarely eats out at restaurants.

## 2018-03-14 NOTE — PROGRESS NOTE ADULT - ASSESSMENT
Patient is a 71-year-old female with past medical history of diabetes, hypertension, severe pulmonary hypertension, right ventricular dysfunction, presents with SOB.  The patient's acute renal failure is likely hemodynamic in nature.  Goals of therapy are to improve her hemodynamics in order to improve her forward flow and renal perfusion.  CASSANDRA  Severe PHTN  Cor Pulm  Failure to thrive  Abd pain and likely recurrence of ascites  Hyponatremia-hypervolemic    Renal:  non-oliguric:  CSASANDRA: Cont PO diuretics at present  GI:  +ascietes with no significant response with use of aggressive diuretics.   Pulmonary:  stable  Cardiology: BPs soft at times though asymptomatic       Recommendation  - Continue Zaroxolyn 5 mg PO MWF as scheduled for now if BPs permit  - Bumex 0.5 mg PO BID    - Off  KCL supplements for now    - Maintain K+ restrictive diet for now;    - Monitor I & Os  - Paracentesis today and 6-7 liters removal;  Use IV alb 25% during and after the procedure    Pt is well aware of her prognosis and does not want to see palliative care

## 2018-03-14 NOTE — PROGRESS NOTE ADULT - SUBJECTIVE AND OBJECTIVE BOX
Interventional Radiology Procedure Note    Procedure: Therapeutic Paracentesis    Operators: EVIE Galvez    Anesthesia (type): Local lidocaine 2%    EBL: 0 cc    Findings/Follow up Plan of Care:    Specimens Removed: 7 L clear yellow ascites    Complications: None    Condition/Disposition: Patient transferred to floor in stable condition. Visibly decreased abdominal distension. Small tegaderm placed over the puncture site.  2 packs of 25% albumin administered over the course of the procedure.    Please call Interventional Radiology x [phone] with any questions, concerns, or issues.

## 2018-03-14 NOTE — PROGRESS NOTE ADULT - ASSESSMENT
Right elbow and hip pain  malnutrition  volume overload/ascites/LE edema  sev PHTN/ Cor pulm  decomp diast hf  sev copd  restrictive lung disease sec to ascites which diminishes diaphragmatic excrusion  epistaxis resolved  CASSANDRA  Failure to thrive  Abd pain and likely recurrence of ascites  Hyponatremia    continue current care  plan for para today  albumin   d/c planning in 48 hours

## 2018-03-14 NOTE — DIETITIAN INITIAL EVALUATION ADULT. - PERTINENT LABORATORY DATA
Na: 131, Chloride: 91, BUN: 113, Cr: 3.58, eGFR: 12. Na: 131, Chloride: 91, BUN: 113, Cr: 3.58, eGFR: 12, Phosphorous: 4.7.

## 2018-03-15 LAB
ALBUMIN SERPL ELPH-MCNC: 2.4 G/DL — LOW (ref 3.3–5)
ALP SERPL-CCNC: 42 U/L — SIGNIFICANT CHANGE UP (ref 40–120)
ALT FLD-CCNC: 6 U/L RC — LOW (ref 10–45)
ANION GAP SERPL CALC-SCNC: 13 MMOL/L — SIGNIFICANT CHANGE UP (ref 5–17)
AST SERPL-CCNC: 8 U/L — LOW (ref 10–40)
BILIRUB SERPL-MCNC: 0.3 MG/DL — SIGNIFICANT CHANGE UP (ref 0.2–1.2)
BUN SERPL-MCNC: 116 MG/DL — HIGH (ref 7–23)
CALCIUM SERPL-MCNC: 8 MG/DL — LOW (ref 8.4–10.5)
CHLORIDE SERPL-SCNC: 90 MMOL/L — LOW (ref 96–108)
CO2 SERPL-SCNC: 27 MMOL/L — SIGNIFICANT CHANGE UP (ref 22–31)
CREAT SERPL-MCNC: 3.67 MG/DL — HIGH (ref 0.5–1.3)
GLUCOSE SERPL-MCNC: 199 MG/DL — HIGH (ref 70–99)
HCT VFR BLD CALC: 28.3 % — LOW (ref 34.5–45)
HGB BLD-MCNC: 9.4 G/DL — LOW (ref 11.5–15.5)
MAGNESIUM SERPL-MCNC: 1.7 MG/DL — SIGNIFICANT CHANGE UP (ref 1.6–2.6)
MCHC RBC-ENTMCNC: 28.9 PG — SIGNIFICANT CHANGE UP (ref 27–34)
MCHC RBC-ENTMCNC: 33.3 GM/DL — SIGNIFICANT CHANGE UP (ref 32–36)
MCV RBC AUTO: 86.7 FL — SIGNIFICANT CHANGE UP (ref 80–100)
PHOSPHATE SERPL-MCNC: 4.7 MG/DL — HIGH (ref 2.5–4.5)
PLATELET # BLD AUTO: 165 K/UL — SIGNIFICANT CHANGE UP (ref 150–400)
POTASSIUM SERPL-MCNC: 4.9 MMOL/L — SIGNIFICANT CHANGE UP (ref 3.5–5.3)
POTASSIUM SERPL-SCNC: 4.9 MMOL/L — SIGNIFICANT CHANGE UP (ref 3.5–5.3)
PROT SERPL-MCNC: 4.7 G/DL — LOW (ref 6–8.3)
RBC # BLD: 3.27 M/UL — LOW (ref 3.8–5.2)
RBC # FLD: 17.7 % — HIGH (ref 10.3–14.5)
SODIUM SERPL-SCNC: 130 MMOL/L — LOW (ref 135–145)
WBC # BLD: 7.5 K/UL — SIGNIFICANT CHANGE UP (ref 3.8–10.5)
WBC # FLD AUTO: 7.5 K/UL — SIGNIFICANT CHANGE UP (ref 3.8–10.5)

## 2018-03-15 PROCEDURE — 49083 ABD PARACENTESIS W/IMAGING: CPT

## 2018-03-15 RX ORDER — ALBUMIN HUMAN 25 %
50 VIAL (ML) INTRAVENOUS EVERY 4 HOURS
Qty: 0 | Refills: 0 | Status: COMPLETED | OUTPATIENT
Start: 2018-03-15 | End: 2018-03-15

## 2018-03-15 RX ORDER — ACETAMINOPHEN 500 MG
1000 TABLET ORAL ONCE
Qty: 0 | Refills: 0 | Status: COMPLETED | OUTPATIENT
Start: 2018-03-15 | End: 2018-03-15

## 2018-03-15 RX ORDER — METOPROLOL TARTRATE 50 MG
25 TABLET ORAL EVERY 8 HOURS
Qty: 0 | Refills: 0 | Status: DISCONTINUED | OUTPATIENT
Start: 2018-03-15 | End: 2018-04-04

## 2018-03-15 RX ORDER — HYDROCORTISONE 20 MG
25 TABLET ORAL EVERY 8 HOURS
Qty: 0 | Refills: 0 | Status: COMPLETED | OUTPATIENT
Start: 2018-03-15 | End: 2018-03-16

## 2018-03-15 RX ADMIN — Medication 400 MILLIGRAM(S): at 04:52

## 2018-03-15 RX ADMIN — Medication 25 MILLIGRAM(S): at 08:35

## 2018-03-15 RX ADMIN — ATORVASTATIN CALCIUM 20 MILLIGRAM(S): 80 TABLET, FILM COATED ORAL at 21:05

## 2018-03-15 RX ADMIN — Medication 0.12 MILLIGRAM(S): at 12:23

## 2018-03-15 RX ADMIN — Medication 1000 MILLIGRAM(S): at 05:22

## 2018-03-15 RX ADMIN — Medication 400 MILLIGRAM(S): at 21:05

## 2018-03-15 RX ADMIN — CLOPIDOGREL BISULFATE 75 MILLIGRAM(S): 75 TABLET, FILM COATED ORAL at 12:23

## 2018-03-15 RX ADMIN — Medication 5 MILLIGRAM(S): at 05:23

## 2018-03-15 RX ADMIN — Medication 240 MILLIGRAM(S): at 08:35

## 2018-03-15 RX ADMIN — Medication 25 MILLIGRAM(S): at 21:05

## 2018-03-15 RX ADMIN — Medication 81 MILLIGRAM(S): at 05:23

## 2018-03-15 RX ADMIN — Medication 1000 MILLIGRAM(S): at 22:00

## 2018-03-15 RX ADMIN — Medication 12.5 MILLILITER(S): at 17:23

## 2018-03-15 RX ADMIN — Medication 25 MILLIGRAM(S): at 21:04

## 2018-03-15 RX ADMIN — ONDANSETRON 4 MILLIGRAM(S): 8 TABLET, FILM COATED ORAL at 17:24

## 2018-03-15 RX ADMIN — Medication 12.5 MILLILITER(S): at 14:57

## 2018-03-15 NOTE — PROGRESS NOTE ADULT - SUBJECTIVE AND OBJECTIVE BOX
MEDICINE, PROGRESS NOTE [phone]    FRAN ALEXANDRA 72y [id number]    Patient seen and examined.  Patient is a 72y old  Female who presents with a chief complaint of shortness of breath and difficulty eating due to fullness (01 Mar 2018 19:30)  Pt feels nauseous.     PAST MEDICAL & SURGICAL HISTORY:  Hyperthyroidism  JOSE (obstructive sleep apnea)  Epistaxis  GIB (gastrointestinal bleeding)  CAD S/P percutaneous coronary angioplasty  Afib  Pulmonary HTN  GERD (gastroesophageal reflux disease)  Obesity  Cardiomegaly  Valvular heart disease  COPD (chronic obstructive pulmonary disease): Home O2  Sleep Apnea: by criteria  Loose, teeth: 3 bottom front loose teeth  Female stress incontinence  Shoulder pain, right  Constipation  Arthritis of Knee  H/O heartburn  History of lung cancer  Obese  Hx of hyperlipidemia  Asthma  Diabetes mellitus: type 2  dx about 4-5 years ago   no daily fingerstick  H/O: HTN (hypertension)  Enlarged lymph nodes  Lymph nodes enlarged: s/p biopsy mediastinal  benign  H/O endoscopy  left upper lobectomy    MEDICATIONS  (STANDING):  acetaminophen  IVPB. 1000 milliGRAM(s) IV Intermittent once  ALBUTerol/ipratropium for Nebulization 3 milliLiter(s) Nebulizer every 6 hours  aspirin enteric coated 81 milliGRAM(s) Oral daily  atorvastatin 20 milliGRAM(s) Oral at bedtime  BACItracin   Ointment 1 Application(s) Topical two times a day  buDESOnide   0.5 milliGRAM(s) Respule 0.5 milliGRAM(s) Inhalation every 12 hours  clopidogrel Tablet 75 milliGRAM(s) Oral daily  digoxin     Tablet 0.125 milliGRAM(s) Oral every other day  diltiazem    milliGRAM(s) Oral daily  heparin  Injectable 5000 Unit(s) SubCutaneous every 8 hours  hydrocortisone sodium succinate Injectable 25 milliGRAM(s) IV Push every 8 hours  methimazole 5 milliGRAM(s) Oral daily  metolazone 5 milliGRAM(s) Oral <User Schedule>  metoprolol     tartrate 25 milliGRAM(s) Oral every 8 hours  predniSONE   Tablet 5 milliGRAM(s) Oral daily  tiotropium 2.5 MICROgram(s)/olodaterol 2.5 MICROgram(s) Inhaler 2 Puff(s) Inhalation daily    MEDICATIONS  (PRN):  acetaminophen   Tablet. 650 milliGRAM(s) Oral every 6 hours PRN Mild Pain (1 - 3)  docusate sodium 100 milliGRAM(s) Oral two times a day PRN Constipation  Gas-X extra strength (simethicone 125 mg 1 Tablet(s) 1 Tablet(s) Chew three times a day PRN bloating  ondansetron Injectable 4 milliGRAM(s) IV Push every 8 hours PRN Nausea and/or Vomiting  sodium chloride 0.65% Nasal 1 Spray(s) Both Nostrils three times a day PRN Nasal Congestion  sucralfate 1 Gram(s) Oral three times a day PRN stomach discomfort    Allergies    No Known Allergies    Intolerances    albuterol (Unknown)      PHYSICAL EXAM:  Constitutional: NAD  HEENT: Normocephalic, EOMI  Neck:  No JVD  Respiratory: CTA B/L, No wheezes  Cardiovascular: S1, S2, RRR, + systolic murmur  Gastrointestinal: BS+, soft, NT, dec distention  Extremities: + peripheral edema  Neurological: AAOX3, no focal deficits  Psychiatric: Normal mood, normal affect  : No Gottlieb    Vital Signs Last 24 Hrs  T(C): 36.4 (15 Mar 2018 15:07), Max: 36.4 (15 Mar 2018 14:58)  T(F): 97.5 (15 Mar 2018 15:07), Max: 97.5 (15 Mar 2018 14:58)  HR: 61 (15 Mar 2018 17:15) (56 - 111)  BP: 93/60 (15 Mar 2018 17:15) (81/47 - 94/57)  BP(mean): --  RR: 17 (15 Mar 2018 15:07) (17 - 22)  SpO2: 97% (15 Mar 2018 15:07) (96% - 97%)  I&O's Summary    14 Mar 2018 07:01  -  15 Mar 2018 07:00  --------------------------------------------------------  IN: 660 mL / OUT: 450 mL / NET: 210 mL    15 Mar 2018 07:01  -  15 Mar 2018 20:01  --------------------------------------------------------  IN: 600 mL / OUT: 450 mL / NET: 150 mL        LABS:                        9.4    7.5   )-----------( 165      ( 15 Mar 2018 09:53 )             28.3     03-15    130<L>  |  90<L>  |  116<H>  ----------------------------<  199<H>  4.9   |  27  |  3.67<H>    Ca    8.0<L>      15 Mar 2018 09:53  Phos  4.7     03-15  Mg     1.7     03-15    TPro  4.7<L>  /  Alb  2.4<L>  /  TBili  0.3  /  DBili  x   /  AST  8<L>  /  ALT  6<L>  /  AlkPhos  42  03-15        Magnesium, Serum: 1.7 mg/dL (03-15 @ 09:53)

## 2018-03-15 NOTE — CHART NOTE - NSCHARTNOTEFT_GEN_A_CORE
Nutrition Follow-Up    Pt seen for request to speak with RD as she is very unhappy regarding the . States she is requesting peanut butter and jelly sandwiches and other food items that are not being sent from the kitchen because of her diet order. Admits she is generally non-adherent to her diet order because she dislikes most of the food served and will only eat certain things she likes despite nutrient content. Labs reviewed today, pt remains with elevated phosphorous.      Please note RD initial assessment 3/14/18 where education was provided to pt on lower phosphorous and potassium diet in setting of elevated labs.    Spoke with patient today, reinforced diet education on lower phosphorous and lower potassium diet for her hx of hyperkalemia and hyperphosphatemia. Pt voiced understanding yet still requests food items high in phosphorous. Pt agreed to continue current diet for now with slight liberalization (one peanut butter and jelly sandwich daily, one chocolate milk allowed daily).    Will continue to monitor pt's adherence to diet and consider diet liberalization for quality of life purposes if continues to choose to not adhere to dietary recommendations.    RD remains available: Dionne Sauceda MS, RDN, CDN, CDE. #[phone]

## 2018-03-15 NOTE — PROGRESS NOTE ADULT - SUBJECTIVE AND OBJECTIVE BOX
Events noted. Pt. frustrated with events. Poor sleep she says from noise from roommate.  says she ate today.       Vital Signs Last 24 Hrs  T(C): 36.4 (15 Mar 2018 15:07), Max: 36.4 (15 Mar 2018 14:58)  T(F): 97.5 (15 Mar 2018 15:07), Max: 97.5 (15 Mar 2018 14:58)  HR: 59 (15 Mar 2018 15:07) (56 - 111)  BP: 94/57 (15 Mar 2018 15:07) (81/47 - 94/57)  BP(mean): --  RR: 17 (15 Mar 2018 15:07) (17 - 22)  SpO2: 97% (15 Mar 2018 15:07) (96% - 98%)                          9.4    7.5   )-----------( 165      ( 15 Mar 2018 09:53 )             28.3       03-15    130<L>  |  90<L>  |  116<H>  ----------------------------<  199<H>  4.9   |  27  |  3.67<H>    Ca    8.0<L>      15 Mar 2018 09:53  Phos  4.7     03-15  Mg     1.7     03-15    TPro  4.7<L>  /  Alb  2.4<L>  /  TBili  0.3  /  DBili  x   /  AST  8<L>  /  ALT  6<L>  /  AlkPhos  42  03-15              MEDICATIONS  (STANDING):  albumin human 25% IVPB 50 milliLiter(s) IV Intermittent every 4 hours  ALBUTerol/ipratropium for Nebulization 3 milliLiter(s) Nebulizer every 6 hours  aspirin enteric coated 81 milliGRAM(s) Oral daily  atorvastatin 20 milliGRAM(s) Oral at bedtime  BACItracin   Ointment 1 Application(s) Topical two times a day  buDESOnide   0.5 milliGRAM(s) Respule 0.5 milliGRAM(s) Inhalation every 12 hours  buMETAnide 0.5 milliGRAM(s) Oral two times a day  clopidogrel Tablet 75 milliGRAM(s) Oral daily  digoxin     Tablet 0.125 milliGRAM(s) Oral every other day  diltiazem    milliGRAM(s) Oral daily  heparin  Injectable 5000 Unit(s) SubCutaneous every 8 hours  methimazole 5 milliGRAM(s) Oral daily  metolazone 5 milliGRAM(s) Oral <User Schedule>  metoprolol     tartrate 25 milliGRAM(s) Oral every 8 hours  predniSONE   Tablet 5 milliGRAM(s) Oral daily  tiotropium 2.5 MICROgram(s)/olodaterol 2.5 MICROgram(s) Inhaler 2 Puff(s) Inhalation daily    MEDICATIONS  (PRN):  acetaminophen   Tablet. 650 milliGRAM(s) Oral every 6 hours PRN Mild Pain (1 - 3)  docusate sodium 100 milliGRAM(s) Oral two times a day PRN Constipation  Gas-X extra strength (simethicone 125 mg 1 Tablet(s) 1 Tablet(s) Chew three times a day PRN bloating  ondansetron Injectable 4 milliGRAM(s) IV Push every 8 hours PRN Nausea and/or Vomiting  sodium chloride 0.65% Nasal 1 Spray(s) Both Nostrils three times a day PRN Nasal Congestion  sucralfate 1 Gram(s) Oral three times a day PRN stomach discomfort      Elderly WF in sitting up in  bed, calm, cooperative, alert and oriented x 3 .  No psychomotor abnormalities. Insight and judgment are fair. Speech is coherent with normal rate and volume. No hallucinations nor delusions. The patient denied suicidal and homicidal ideation and plan. Mood is frustrated and affect somewhat irritable.  Attention and concentration, short term memory, and long term memory within normal limits.     Given supportive psychotherapy.

## 2018-03-15 NOTE — PROGRESS NOTE ADULT - SUBJECTIVE AND OBJECTIVE BOX
INTERVAL HPI/OVERNIGHT EVENTS:  Still with SOB and poor appetite    MEDICATIONS  (STANDING):  ALBUTerol/ipratropium for Nebulization 3 milliLiter(s) Nebulizer every 6 hours  allopurinol 100 milliGRAM(s) Oral daily  aspirin enteric coated 81 milliGRAM(s) Oral daily  atorvastatin 20 milliGRAM(s) Oral at bedtime  BACItracin   Ointment 1 Application(s) Topical two times a day  buDESOnide   0.5 milliGRAM(s) Respule 0.5 milliGRAM(s) Inhalation every 12 hours  buMETAnide 0.5 milliGRAM(s) Oral two times a day  clopidogrel Tablet 75 milliGRAM(s) Oral daily  digoxin     Tablet 0.125 milliGRAM(s) Oral every other day  diltiazem    milliGRAM(s) Oral daily  heparin  Injectable 5000 Unit(s) SubCutaneous every 8 hours  methimazole 5 milliGRAM(s) Oral daily  metolazone 5 milliGRAM(s) Oral <User Schedule>  metoprolol     tartrate 25 milliGRAM(s) Oral every 8 hours  predniSONE   Tablet 5 milliGRAM(s) Oral daily  tiotropium 2.5 MICROgram(s)/olodaterol 2.5 MICROgram(s) Inhaler 2 Puff(s) Inhalation daily    MEDICATIONS  (PRN):  acetaminophen   Tablet. 650 milliGRAM(s) Oral every 6 hours PRN Mild Pain (1 - 3)  docusate sodium 100 milliGRAM(s) Oral two times a day PRN Constipation  Gas-X extra strength (simethicone 125 mg 1 Tablet(s) 1 Tablet(s) Chew three times a day PRN bloating  ondansetron Injectable 4 milliGRAM(s) IV Push every 8 hours PRN Nausea and/or Vomiting  sodium chloride 0.65% Nasal 1 Spray(s) Both Nostrils three times a day PRN Nasal Congestion  sucralfate 1 Gram(s) Oral three times a day PRN stomach discomfort    Allergies  No Known Allergies    Intolerances  albuterol (Unknown)    Review of Systems:  General:  No fevers, chills  CV:  No pain, palpitatioins  Resp:  +SOB +BRO  :  No pain, bleeding, incontinence, nocturia  Muscle:  No pain, weakness  Neuro:  No weakness, tingling, memory problems  Psych:  +fatigue, No mood problems, depression  Heme:  No petechiae, ecchymosis, easy bruisability  Skin:  No rash, tattoos, scars    Vital Signs Last 24 Hrs  T(C): 36.6 (13 Mar 2018 20:10), Max: 36.7 (13 Mar 2018 13:50)  T(F): 97.9 (13 Mar 2018 20:10), Max: 98 (13 Mar 2018 13:50)  HR: 112 (14 Mar 2018 09:48) (78 - 112)  BP: 97/66 (14 Mar 2018 09:48) (89/60 - 113/66)  BP(mean): --  RR: 20 (13 Mar 2018 20:10) (17 - 20)  SpO2: 94% (13 Mar 2018 20:10) (94% - 97%)    PHYSICAL EXAM:  Constitutional: alert and appropriate, dyspneic on nasal cannula. sitting up at edge of bed  Neck: No LAD, supple  Respiratory: decreased BS at bases  Cardiovascular: S1 and S2, RRR, +syst. murmur  Gastrointestinal: hypo-active BS+, distended +fluid wave  Extremities: +b/l LE edema  Neurological: A/O x 3, no focal deficits  Psychiatric: Normal mood, normal affect  Skin: No rashes    LABS:                      10.1   9.1   )-----------( 196      ( 14 Mar 2018 09:34 )             27.8     131<L>  |  91<L>  |  113<H>  ----------------------------<  179<H>  4.4   |  26  |  3.58<H>    Ca    8.1<L>      14 Mar 2018 09:29  Phos  4.7     03-13  Mg     1.8     03-13    RADIOLOGY & ADDITIONAL TESTS:

## 2018-03-15 NOTE — PROGRESS NOTE ADULT - ASSESSMENT
Patient is a 71-year-old female with past medical history of diabetes, hypertension, severe pulmonary hypertension, right ventricular dysfunction, presents with SOB.  The patient's acute renal failure is likely hemodynamic in nature.  Goals of therapy are to improve her hemodynamics in order to improve her forward flow and renal perfusion.  CASSANDRA  Severe PHTN  Cor Pulm  Failure to thrive  Abd pain and likely recurrence of ascites  Hyponatremia-hypervolemic  Hypotension    Renal:  non-oliguric:  CASSANDRA: Awaiting labs from this am  GI:  +ascietes with no significant response with use of aggressive diuretics.  S/P paracentesis;  I.V. alb 25% x 2 --4 hours.  If remains hypotensive then hydrocortisone 25mg ivp q 8 hours x 3 doses  Pulmonary:  stable  Cardiology: Hypotensive    Recommendation  - Continue Zaroxolyn 5 mg PO MWF as scheduled for now if BPs permit  - Bumex 0.5 mg PO BID --HOLD all diuretics while hypotensive  - Off  KCL supplements for now    - Maintain K+ restrictive diet for now;    - Monitor I & Os  -I.V. alb 25% x 2 --4 hours.    Pt is well aware of her prognosis and does not want to see palliative care

## 2018-03-15 NOTE — PROGRESS NOTE ADULT - SUBJECTIVE AND OBJECTIVE BOX
NEPHROLOGY-[facility] [phone]        Patient seen and examined in bed.  She felt better p her paracentesis        MEDICATIONS  (STANDING):  ALBUTerol/ipratropium for Nebulization 3 milliLiter(s) Nebulizer every 6 hours  aspirin enteric coated 81 milliGRAM(s) Oral daily  atorvastatin 20 milliGRAM(s) Oral at bedtime  BACItracin   Ointment 1 Application(s) Topical two times a day  buDESOnide   0.5 milliGRAM(s) Respule 0.5 milliGRAM(s) Inhalation every 12 hours  buMETAnide 0.5 milliGRAM(s) Oral two times a day  clopidogrel Tablet 75 milliGRAM(s) Oral daily  digoxin     Tablet 0.125 milliGRAM(s) Oral every other day  diltiazem    milliGRAM(s) Oral daily  heparin  Injectable 5000 Unit(s) SubCutaneous every 8 hours  methimazole 5 milliGRAM(s) Oral daily  metolazone 5 milliGRAM(s) Oral <User Schedule>  metoprolol     tartrate 25 milliGRAM(s) Oral every 8 hours  predniSONE   Tablet 5 milliGRAM(s) Oral daily  tiotropium 2.5 MICROgram(s)/olodaterol 2.5 MICROgram(s) Inhaler 2 Puff(s) Inhalation daily      VITAL:  T(C): , Max: 36.8 (03-14-18 @ 12:46)  T(F): , Max: 98.2 (03-14-18 @ 12:46)  HR: 111 (03-15-18 @ 08:17)  BP: 87/60 (03-15-18 @ 08:17)  BP(mean): --  RR: 22 (03-15-18 @ 05:16)  SpO2: 96% (03-15-18 @ 05:16)  Wt(kg): --    I and O's:    03-14 @ 07:01  -  03-15 @ 07:00  --------------------------------------------------------  IN: 660 mL / OUT: 450 mL / NET: 210 mL    03-15 @ 07:01  -  03-15 @ 10:27  --------------------------------------------------------  IN: 360 mL / OUT: 250 mL / NET: 110 mL          PHYSICAL EXAM:    Constitutional: NAD  HEENT: PERRLA    Neck:  No JVD  Respiratory: CTAB/L  Cardiovascular: S1 and S2  Gastrointestinal: BS+, soft, NT   Extremities: No peripheral edema  Neurological: A/O x 3, no focal deficits  Psychiatric: Normal mood, normal affect  : No Gottlieb  Skin: No rashes  Access: Not applicable    LABS:                        9.4    7.5   )-----------( 165      ( 15 Mar 2018 09:53 )             28.3     03-14    131<L>  |  91<L>  |  113<H>  ----------------------------<  179<H>  4.4   |  26  |  3.58<H>    Ca    8.1<L>      14 Mar 2018 09:29            Urine Studies:          RADIOLOGY & ADDITIONAL STUDIES:

## 2018-03-15 NOTE — PROGRESS NOTE ADULT - ASSESSMENT
Monitoring off antidepressants given her hyponatremia and epistaxis      Recommend  Continue correct of hyponatremia  Discussed with  at bedside with pt's permission.    Jose Craig M.D.  Psychiatry  [phone]

## 2018-03-15 NOTE — PROGRESS NOTE ADULT - ASSESSMENT
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  3) chronic diastolic CHF (little evidence left sided heart failure on chest CT - trace pleural effusions - minimal ground glass opacities)    PLAN/RECOMMENDATIONS    oxygen supplementation ATC to keep saturation greater than 92%  planning for serial large volume paracenteses rather than for an indwelling catheter  diuretics being adjusted by renal - not helping recurrence of ascites and are leading to azotemia  albuterol/atrovent/pulmicort nebs  cardiac meds: bumex/cardizem CD/lopressor/digoxin/metolazone/ASA/plavix/lipitor  GI/DVT prophylaxis - protonix/carafate/SQ heparin  prednisone 5mg daily (?)  patient has been treated with endothelin antagonists and phosphodiesterase inhibitors without improvement in PAPs in the past  KATY stockings as tolerated    Will follow with you. Plan of care discussed with the patient and her  at bedside. Prognosis is poor    Chacho Gallego MD, [facility], [place] - [phone]  Pulmonary Medicine

## 2018-03-15 NOTE — PROGRESS NOTE ADULT - SUBJECTIVE AND OBJECTIVE BOX
[facility] - [facility]     PROGRESS NOTE    CHIEF COMPLAINT:    INTERVAL HISTORY: diuretics held this morning due to hypotension; difficulty with diuretics due to hemodynamic instability and tenuous renal function; s/p large volume paracentesis yesterday ~ 7000cc - no abdominal pain; somewhat less short of breath but still barely able to walk to the bathroom; less abdominal fullness but continues with anorexia;  weak, tired and frail; no cough, sputum production, chest congestion or wheeze; no fevers, chills or sweats; no chest pain/pressure or palpitations; legs swollen - could not tolerate KATY stockings; right hip and knee pain;     REVIEW OF SYSTEMS:  Constitutional: As per interval history  HEENT: Within normal limits  CV: As per interval history  Resp: As per interval history  GI: As per interval history  : Within normal limits  Musculoskeletal: As per interval history  Skin: Within normal limits  Neurological: Within normal limits  Psychiatric: Within normal limits  Endocrine: Within normal limits  Hematologic/Lymphatic: Within normal limits  Allergic/Immunologic: Within normal limits      MEDICATIONS:     Pulmonary "  ALBUTerol/ipratropium for Nebulization 3 milliLiter(s) Nebulizer every 6 hours  buDESOnide   0.5 milliGRAM(s) Respule 0.5 milliGRAM(s) Inhalation every 12 hours  tiotropium 2.5 MICROgram(s)/olodaterol 2.5 MICROgram(s) Inhaler 2 Puff(s) Inhalation daily      Anti-microbials:      Cardiovascular:  buMETAnide 0.5 milliGRAM(s) Oral two times a day  digoxin     Tablet 0.125 milliGRAM(s) Oral every other day  diltiazem    milliGRAM(s) Oral daily  metolazone 5 milliGRAM(s) Oral <User Schedule>  metoprolol     tartrate 25 milliGRAM(s) Oral every 8 hours      Other:  acetaminophen   Tablet. 650 milliGRAM(s) Oral every 6 hours PRN  aspirin enteric coated 81 milliGRAM(s) Oral daily  atorvastatin 20 milliGRAM(s) Oral at bedtime  BACItracin   Ointment 1 Application(s) Topical two times a day  clopidogrel Tablet 75 milliGRAM(s) Oral daily  docusate sodium 100 milliGRAM(s) Oral two times a day PRN  Gas-X extra strength (simethicone 125 mg 1 Tablet(s) 1 Tablet(s) Chew three times a day PRN  heparin  Injectable 5000 Unit(s) SubCutaneous every 8 hours  methimazole 5 milliGRAM(s) Oral daily  ondansetron Injectable 4 milliGRAM(s) IV Push every 8 hours PRN  predniSONE   Tablet 5 milliGRAM(s) Oral daily  sodium chloride 0.65% Nasal 1 Spray(s) Both Nostrils three times a day PRN  sucralfate 1 Gram(s) Oral three times a day PRN        OBJECTIVE:        I&O's Detail    14 Mar 2018 07:01  -  15 Mar 2018 07:00  --------------------------------------------------------  IN:    Oral Fluid: 660 mL  Total IN: 660 mL    OUT:    Voided: 450 mL  Total OUT: 450 mL    Total NET: 210 mL      15 Mar 2018 07:01  -  15 Mar 2018 09:11  --------------------------------------------------------  IN:    Oral Fluid: 360 mL  Total IN: 360 mL    OUT:    Voided: 250 mL  Total OUT: 250 mL    Total NET: 110 mL      Daily Weight in k.1 (15 Mar 2018 07:28)    PHYSICAL EXAM:       ICU Vital Signs Last 24 Hrs  T(C): 36.3 (14 Mar 2018 19:35), Max: 36.8 (14 Mar 2018 12:46)  T(F): 97.3 (14 Mar 2018 19:35), Max: 98.2 (14 Mar 2018 12:46)  HR: 111 (15 Mar 2018 08:17) (58 - 112)  BP: 87/60 (15 Mar 2018 08:17) (87/56 - 97/66)  BP(mean): --  ABP: --  ABP(mean): --  RR: 22 (15 Mar 2018 05:16) (22 - 24)  SpO2: 96% (15 Mar 2018 05:16) (92% - 98%) on 5lpm     General: Awake. Alert. Cooperative. Weak and tired. No distress. Chronically ill appearing	  HEENT:   Atraumatic. Bitemporal wasting. Anicteric. Normal oral mucosa, PERRL, EOMI  Neck: Supple. Trachea midline. Thyroid without enlargement/tenderness/nodules. No carotid bruit. (+) JVD; loss of bilateral supraclavicular fat pads	  Cardiovascular: Irregularly irregular rate and rhythm. S1 S2 normal. II/VI systolic murmur  Respiratory: Respirations unlabored. Decreased breath sounds throughout especially at the bases now with right sided rales. Decreased chest wall excursion  Abdomen: Soft. Non-tender. Mildly distended with fluid wave. No organomegaly. No masses. Normal bowel sounds	  Extremities: Warm to touch. No clubbing or cyanosis. Mild to moderate lower extremity edema up to the thigh. Loss of extremity muscle mass  Pulses: Decreased lower extremity peripheral pulses  Skin: Lower extremity venous stasis changes; skin tear left elbow  Lymph Nodes: Cervical, supraclavicular and axillary nodes normal  Neurological: Motor and sensory examination equal and normal. A and O x 3  Psychiatry: Depressed      LABS:                        10.1   9.1   )-----------( 196      ( 14 Mar 2018 09:34 )             27.8                         10.7   10.3  )-----------( 259      ( 13 Mar 2018 09:38 )             32.6     -14    131<L>  |  91<L>  |  113<H>  ----------------------------<  179<H>  4.4   |  26  |  3.58<H>        131<L>  |  90<L>  |  108<H>  ----------------------------<  145<H>  4.5   |  24  |  3.34<H>    Ca      8.1<L>          Ca      8.7          Phos    4.7         Phos    4.9           Mg       1.8         Mg       1.9         Serum Pro-Brain Natriuretic Peptide: 88655 pg/mL ( @ 16:29)    CARDIAC MARKERS ( 01 Mar 2018 16:29 )  x     / 0.06 ng/mL / x     / x     / 5.4 ng/mL    < from: Transthoracic Echocardiogram (17 @ 18:58) >    Patient name: FRAN ALEXANDRA  YOB: 1945   Age: 71 (F)   MR#: [id number]  Study Date: 2017  Location: [address] [place], [place] [id number]: Thalia Amezquita [facility]  Study quality: Technically fair  Referring Physician: Pierce Cobb MD  Blood Pressure: 106/58 mmHg  Height: 160 cm  Weight: 64 kg  BSA: 1.7 m2  ------------------------------------------------------------------------  PROCEDURE: Transthoracic echocardiogram with 2-D, M-Mode  and complete spectral and color flow Doppler.  INDICATION: Other specified pulmonary heart diseases  (I27.89)  ------------------------------------------------------------------------  Dimensions:    Normal Values:  LA:     3.6    2.0 - 4.0 cm  Ao:     2.8    2.0 - 3.8 cm  SEPTUM: 0.7    0.6 - 1.2 cm  PWT:    0.9    0.6 - 1.1 cm  LVIDd:  4.1    3.0 - 5.6 cm  LVIDs:  1.9    1.8 - 4.0 cm  Derived variables:  LVMI: 58 g/m2  RWT: 0.43  Fractional short: 54 %  Doppler Peak Velocity (m/sec): AoV=1.8  ------------------------------------------------------------------------  Observations:  Mitral Valve: Mitral annular calcification, otherwise  normal mitral valve. Minimal mitral regurgitation.  Aortic Valve/Aorta: Calcified trileaflet aortic valve with  normal opening. Peak transaortic valve gradient equals 13  mm Hg, mean transaortic valve gradient equals 8 mm Hg. Peak  left ventricular outflow tract gradient equals 6 mm Hg,  mean gradient is equal to 3 mm Hg, LVOT velocity time  integral equals 19 cm.  Aortic Root: 2.8 cm.  LVOT diameter: 1.9 cm.  Left Atrium: Normal left atrium.  LA volume index = 25  cc/m2.  Left Ventricle: Hyperdynamic left ventricular systolic  function. Flattening of the interventricular septum in both  systole and diastole is  consistent with right ventricular  pressure overload. Normal left ventricular internal  dimensions and wall thicknesses.  Right Heart: Severe right atrial enlargement. Right  ventricular enlargement with decreased right ventricular  systolic function. Normal tricuspid valve. Mild-moderate  tricuspid regurgitation. Normal pulmonic valve.  Pericardium/Pleura: Normal pericardium with trace  pericardial effusion.  Left pleural effusion.  Hemodynamic: Estimated right atrial pressure is 8 mm Hg.  Estimated right ventricular systolic pressure equals 72 mm  Hg, assuming right atrial pressure equals 8 mm Hg,  consistent with severe pulmonary hypertension.  ------------------------------------------------------------------------  Conclusions:  1. Calcified trileaflet aortic valve with normal opening.  2. Normal left ventricular internal dimensions and wall  thicknesses.  3. Hyperdynamic left ventricular systolic function.  Flattening of the interventricular septum in both systole  and diastole is  consistent with right ventricular pressure  overload.  4. Severe right atrial enlargement.  5. Right ventricular enlargement with decreased right  ventricular systolic function.  6. Normal tricuspid valve. Mild-moderate tricuspid  regurgitation.  7. Estimated pulmonary artery systolic pressure equals 72  mm Hg, assuming right atrial pressure equals 8 mm Hg,  consistent with severe pulmonary pressures.  *** Compared with echocardiogram of 2017, no  significant changes noted.  ------------------------------------------------------------------------  Confirmed on  2017 - 13:15:09 by Justin Cohen M.D.  ------------------------------------------------------------------------    < end of copied text >  ---------------------------------------------------------------------------------------------------------  MICROBIOLOGY:     Culture - Fungal, Body Fluid (18 @ 21:16)    Specimen Source: .Body Fluid Peritoneal Fluid    Culture Results:   No fungus isolated at 1 week. No additional interim reports will be  issued unless there is a change in culture status.    Culture - Body Fluid with Gram Stain (18 @ 21:16)    Gram Stain:   polymorphonuclear leukocytes seen per low power field  No organisms seen per oil power field  by cytocentrifuge    Specimen Source: Peritoneal Peritoneal Fluid    Culture Results:   No growth to date.    Culture - Acid Fast - Body Fluid w/Smear (18 @ 21:16)    Specimen Source: .Body Fluid Peritoneal Fluid    Acid Fast Bacilli Smear:   No acid fast bacilli seen by fluorochrome stain    RADIOLOGY:  [x] Chest radiographs reviewed and interpreted by me    < from: CT Abdomen and Pelvis w/ Oral Cont (03.10.18 @ 19:07) >    EXAM:  CT ABDOMEN AND PELVIS OC                            PROCEDURE DATE:  03/10/2018      INTERPRETATION:  CLINICAL INFORMATION: Abdominal distention and pain for   several days. Acute renal failure.    COMPARISON: CT abdomen pelvis 2015    PROCEDURE:   CT of the Abdomen and Pelvis was performed without intravenous contrast.   Intravenous contrast: None.  Oral contrast: positive contrast was administered.  Sagittal and coronal reformats were performed.    FINDINGS:    LOWER CHEST:Mild interlobular septal thickening and small bilateral   pleural effusions. Subsegmental atelectasis in the right lower lobe.   Cardiomegaly. Coronary calcification. Calcified hilar lymph nodes.    LIVER: Within normal limits.  BILE DUCTS: Normal caliber.  GALLBLADDER: Cholelithiasis.  SPLEEN: Within normal limits.  PANCREAS: Within normal limits.  ADRENALS: Within normal limits.  KIDNEYS/URETERS: Left lower pole renal cyst.    BLADDER: Within normal limits.  REPRODUCTIVE ORGANS: Posterior uterine fibroid.    BOWEL: No bowel obstruction. Sigmoid diverticulosis, without   diverticulitis. Normal appendix.     PERITONEUM: Large volume ascites.  VESSELS:  Atherosclerotic changes.  RETROPERITONEUM: No lymphadenopathy.    ABDOMINAL WALL: Within normal limits.  BONES: Degenerative changes. T12 vertebral body hemangioma.    IMPRESSION:     Large volume ascites, etiology unclear.    Small bilateral pleural effusions and mild pulmonary edema.    VIOLA ANDREWS M.D., ATTENDINGRADIOLOGIST  This document has been electronically signed. Mar 11 2018  8:52AM      < end of copied text >  ---------------------------------------------------------------------------------------------------------  < from: [facility] Duplex Lower Ext Vein Scan, [place] (18 @ 11:36) >    EXAM:  DUPLEX SCAN EXT VEINS LOWER BI                            PROCEDURE DATE:  2018      INTERPRETATION:  History:Severe pulmonary hypertension. COPD. Bilateral   lower extremity edema and shortness of breath. Evaluate for DVT.    Bilateral lower extremity venous duplex ultrasound from 2017   demonstrated no DVT.    There is edema of the superficial soft tissues of the lower extremities.    Both common femoral, superficial femoral and popliteal veins are patent   and compressible without evidence of thrombus.    The posterior tibial and peroneal veins are patent.  No thrombus is seen.    IMPRESSION: No evidence of deep vein thrombosis of either lower extremity.    JUDY DE LEON M.D., RADIOLOGY RESIDENT  This document has been electronically signed.  MARTIR VALVERDE M.D., ATTENDING RADIOLOGIST  This document has been electronically signed. Mar  5 2018  2:37PM      < end of copied text >  ---------------------------------------------------------------------------------------------------------  < from: Xray Abdomen 1 View PORTABLE -Urgent (18 @ 17:11) >    EXAM:  XR ABDOMEN PORTABLE URGENT 1V                            PROCEDURE DATE:  2018      INTERPRETATION:  CLINICAL INFORMATION: Abdominal distention. Evaluate for   obstruction.    TECHNIQUE: Portable abdominal radiograph dated 3/4/2018.    COMPARISON: Femoral ultrasound dated 3/2/2018. CT chest dated 3/3/2018.    FINDINGS: Top of Form 1      Large volume ascites.  There are no dilated loops of small bowel.   Bowel gas and stool identified throughout the colon and rectum.   There is no free air visualized.  The visualized osseous structures are unremarkable for patient's stated   age.    IMPRESSION:    Nonobstructive bowel gas pattern.    Ascites.    RACHEL VELARDE M.D., RADIOLOGY RESIDENT  This document has been electronically signed.  PIERRE BAILEY M.D., ATTENDING RADIOLOGIST  This document has been electronically signed. Mar  5 2018 10:22AM      < end of copied text >  ---------------------------------------------------------------------------------------------------------  < from: CT Chest No Cont (18 @ 18:24) >    EXAM:  CT CHEST                            PROCEDURE DATE:  2018        INTERPRETATION:  CLINICAL INFORMATION: Evaluate pleural effusions.   Shortness of breath.    COMPARISON: Chest CT dated 2017. Chest x-ray dated 3/1/2018.    PROCEDURE:   CT of the Chest was performed without intravenous contrast.  Sagittal and coronal reformats were performed.  Axial MIP reformats were also performed.    FINDINGS:    CHEST:     LUNGS AND LARGE AIRWAYS: Patent central airways.  Bibasilar subsegmental   atelectasis, right greater than left.  PLEURA: Trace bilateral pleural effusions.  VESSELS: Thoracic aortic and coronary artery atherosclerosis.  HEART: Cardiomegaly. Small pericardial effusion. Mitral annular   calcification. A densely calcified or metallic density measuring 1.1 x   0.4 cm overlies the basilar left pulmonary artery (3:52, 6:80).  MEDIASTINUM AND STEFANIA: No lymphadenopathy.  CHEST WALL AND LOWER NECK: Within normal limits.  VISUALIZED UPPER ABDOMEN: Small volume ascites. Atherosclerotic changes.  BONES: Multilevel degenerative disease.    IMPRESSION: Trace bilateral pleural effusions.  Bibasilar subsegmental atelectasis, right greater than left.  Densely calcified or metallic density overlies the left pulmonary artery.  See above..      JUAN JUAREZ M.D., RADIOLOGY RESIDENT  This document has been electronically signed.  MADONNA MORGAN M.D., ATTENDING RADIOLOGIST  This document has been electronically signed. Mar  4 2018 12:28PM      < end of copied text >  ---------------------------------------------------------------------------------------------------------  < from: US Abdomen Limited (18 @ 08:50) >    EXAM:  US ABDOMEN LIMITED                            PROCEDURE DATE:  2018      INTERPRETATION:  Clinical information: Assess for ascites for   paracentesis.    Comparison: Ultrasound 2017.    Findings: Targeted ultrasound was performed for purposes of ascites   evaluation. A large amount of ascites is noted in all 4 quadrants.    IMPRESSION:    Diffuse, large volume abdominal ascites.    GAEL SCHERER M.D., ATTENDING RADIOLOGIST  This document has been electronically signed. Mar  2 2018  9:16AM      < end of copied text >  --------------------------------------------------------------------------------------------------------- [facility] - [facility]     PROGRESS NOTE    CHIEF COMPLAINT: CRF (hypoxic); COPD; emphysema; JOSE; secondary pulmonary hypertension; right heart failure; epistaxis; ascites;     INTERVAL HISTORY: diuretics held this morning due to hypotension; difficulty with diuretics due to hemodynamic instability and tenuous renal function; s/p large volume paracentesis yesterday ~ 7000cc - no abdominal pain; somewhat less short of breath but still barely able to walk to the bathroom; less abdominal fullness but continues with anorexia;  weak, tired and frail; no cough, sputum production, chest congestion or wheeze; no fevers, chills or sweats; no chest pain/pressure or palpitations; legs swollen - could not tolerate KATY stockings; right hip and knee pain;     REVIEW OF SYSTEMS:  Constitutional: As per interval history  HEENT: Within normal limits  CV: As per interval history  Resp: As per interval history  GI: As per interval history  : Within normal limits  Musculoskeletal: As per interval history  Skin: Within normal limits  Neurological: Within normal limits  Psychiatric: Within normal limits  Endocrine: Within normal limits  Hematologic/Lymphatic: Within normal limits  Allergic/Immunologic: Within normal limits      MEDICATIONS:     Pulmonary "  ALBUTerol/ipratropium for Nebulization 3 milliLiter(s) Nebulizer every 6 hours  buDESOnide   0.5 milliGRAM(s) Respule 0.5 milliGRAM(s) Inhalation every 12 hours  tiotropium 2.5 MICROgram(s)/olodaterol 2.5 MICROgram(s) Inhaler 2 Puff(s) Inhalation daily      Anti-microbials:      Cardiovascular:  buMETAnide 0.5 milliGRAM(s) Oral two times a day  digoxin     Tablet 0.125 milliGRAM(s) Oral every other day  diltiazem    milliGRAM(s) Oral daily  metolazone 5 milliGRAM(s) Oral <User Schedule>  metoprolol     tartrate 25 milliGRAM(s) Oral every 8 hours      Other:  acetaminophen   Tablet. 650 milliGRAM(s) Oral every 6 hours PRN  aspirin enteric coated 81 milliGRAM(s) Oral daily  atorvastatin 20 milliGRAM(s) Oral at bedtime  BACItracin   Ointment 1 Application(s) Topical two times a day  clopidogrel Tablet 75 milliGRAM(s) Oral daily  docusate sodium 100 milliGRAM(s) Oral two times a day PRN  Gas-X extra strength (simethicone 125 mg 1 Tablet(s) 1 Tablet(s) Chew three times a day PRN  heparin  Injectable 5000 Unit(s) SubCutaneous every 8 hours  methimazole 5 milliGRAM(s) Oral daily  ondansetron Injectable 4 milliGRAM(s) IV Push every 8 hours PRN  predniSONE   Tablet 5 milliGRAM(s) Oral daily  sodium chloride 0.65% Nasal 1 Spray(s) Both Nostrils three times a day PRN  sucralfate 1 Gram(s) Oral three times a day PRN        OBJECTIVE:        I&O's Detail    14 Mar 2018 07:01  -  15 Mar 2018 07:00  --------------------------------------------------------  IN:    Oral Fluid: 660 mL  Total IN: 660 mL    OUT:    Voided: 450 mL  Total OUT: 450 mL    Total NET: 210 mL      15 Mar 2018 07:01  -  15 Mar 2018 09:11  --------------------------------------------------------  IN:    Oral Fluid: 360 mL  Total IN: 360 mL    OUT:    Voided: 250 mL  Total OUT: 250 mL    Total NET: 110 mL      Daily Weight in k.1 (15 Mar 2018 07:28)    PHYSICAL EXAM:       ICU Vital Signs Last 24 Hrs  T(C): 36.3 (14 Mar 2018 19:35), Max: 36.8 (14 Mar 2018 12:46)  T(F): 97.3 (14 Mar 2018 19:35), Max: 98.2 (14 Mar 2018 12:46)  HR: 111 (15 Mar 2018 08:17) (58 - 112)  BP: 87/60 (15 Mar 2018 08:17) (87/56 - 97/66)  BP(mean): --  ABP: --  ABP(mean): --  RR: 22 (15 Mar 2018 05:16) (22 - 24)  SpO2: 96% (15 Mar 2018 05:16) (92% - 98%) on 5lpm     General: Awake. Alert. Cooperative. Weak and tired. No distress. Chronically ill appearing	  HEENT:   Atraumatic. Bitemporal wasting. Anicteric. Normal oral mucosa, PERRL, EOMI  Neck: Supple. Trachea midline. Thyroid without enlargement/tenderness/nodules. No carotid bruit. (+) JVD; loss of bilateral supraclavicular fat pads	  Cardiovascular: Irregularly irregular rate and rhythm. S1 S2 normal. II/VI systolic murmur  Respiratory: Respirations unlabored. Decreased breath sounds throughout especially at the bases now with right sided rales. Decreased chest wall excursion  Abdomen: Soft. Non-tender. Mildly distended with fluid wave. No organomegaly. No masses. Normal bowel sounds	  Extremities: Warm to touch. No clubbing or cyanosis. Mild to moderate lower extremity edema up to the thigh. Loss of extremity muscle mass  Pulses: Decreased lower extremity peripheral pulses  Skin: Lower extremity venous stasis changes; skin tear left elbow  Lymph Nodes: Cervical, supraclavicular and axillary nodes normal  Neurological: Motor and sensory examination equal and normal. A and O x 3  Psychiatry: Depressed      LABS:                        10.1   9.1   )-----------( 196      ( 14 Mar 2018 09:34 )             27.8                         10.7   10.3  )-----------( 259      ( 13 Mar 2018 09:38 )             32.6     14    131<L>  |  91<L>  |  113<H>  ----------------------------<  179<H>  4.4   |  26  |  3.58<H>        131<L>  |  90<L>  |  108<H>  ----------------------------<  145<H>  4.5   |  24  |  3.34<H>    Ca      8.1<L>          Ca      8.7          Phos    4.7         Phos    4.9           Mg       1.8         Mg       1.9     -12    Serum Pro-Brain Natriuretic Peptide: 45295 pg/mL ( @ 16:29)    CARDIAC MARKERS ( 01 Mar 2018 16:29 )  x     / 0.06 ng/mL / x     / x     / 5.4 ng/mL    < from: Transthoracic Echocardiogram (.13.17 @ 18:58) >    Patient name: FRAN ALEXANDRA  YOB: 1945   Age: 71 (F)   MR#: [id number]  Study Date: 2017  Location: [address] [place], [place] [id number]: Thalia Amezquita [facility]  Study quality: Technically fair  Referring Physician: Pierce Cobb MD  Blood Pressure: 106/58 mmHg  Height: 160 cm  Weight: 64 kg  BSA: 1.7 m2  ------------------------------------------------------------------------  PROCEDURE: Transthoracic echocardiogram with 2-D, M-Mode  and complete spectral and color flow Doppler.  INDICATION: Other specified pulmonary heart diseases  (I27.89)  ------------------------------------------------------------------------  Dimensions:    Normal Values:  LA:     3.6    2.0 - 4.0 cm  Ao:     2.8    2.0 - 3.8 cm  SEPTUM: 0.7    0.6 - 1.2 cm  PWT:    0.9    0.6 - 1.1 cm  LVIDd:  4.1    3.0 - 5.6 cm  LVIDs:  1.9    1.8 - 4.0 cm  Derived variables:  LVMI: 58 g/m2  RWT: 0.43  Fractional short: 54 %  Doppler Peak Velocity (m/sec): AoV=1.8  ------------------------------------------------------------------------  Observations:  Mitral Valve: Mitral annular calcification, otherwise  normal mitral valve. Minimal mitral regurgitation.  Aortic Valve/Aorta: Calcified trileaflet aortic valve with  normal opening. Peak transaortic valve gradient equals 13  mm Hg, mean transaortic valve gradient equals 8 mm Hg. Peak  left ventricular outflow tract gradient equals 6 mm Hg,  mean gradient is equal to 3 mm Hg, LVOT velocity time  integral equals 19 cm.  Aortic Root: 2.8 cm.  LVOT diameter: 1.9 cm.  Left Atrium: Normal left atrium.  LA volume index = 25  cc/m2.  Left Ventricle: Hyperdynamic left ventricular systolic  function. Flattening of the interventricular septum in both  systole and diastole is  consistent with right ventricular  pressure overload. Normal left ventricular internal  dimensions and wall thicknesses.  Right Heart: Severe right atrial enlargement. Right  ventricular enlargement with decreased right ventricular  systolic function. Normal tricuspid valve. Mild-moderate  tricuspid regurgitation. Normal pulmonic valve.  Pericardium/Pleura: Normal pericardium with trace  pericardial effusion.  Left pleural effusion.  Hemodynamic: Estimated right atrial pressure is 8 mm Hg.  Estimated right ventricular systolic pressure equals 72 mm  Hg, assuming right atrial pressure equals 8 mm Hg,  consistent with severe pulmonary hypertension.  ------------------------------------------------------------------------  Conclusions:  1. Calcified trileaflet aortic valve with normal opening.  2. Normal left ventricular internal dimensions and wall  thicknesses.  3. Hyperdynamic left ventricular systolic function.  Flattening of the interventricular septum in both systole  and diastole is  consistent with right ventricular pressure  overload.  4. Severe right atrial enlargement.  5. Right ventricular enlargement with decreased right  ventricular systolic function.  6. Normal tricuspid valve. Mild-moderate tricuspid  regurgitation.  7. Estimated pulmonary artery systolic pressure equals 72  mm Hg, assuming right atrial pressure equals 8 mm Hg,  consistent with severe pulmonary pressures.  *** Compared with echocardiogram of 2017, no  significant changes noted.  ------------------------------------------------------------------------  Confirmed on  2017 - 13:15:09 by Justin Cohen M.D.  ------------------------------------------------------------------------    < end of copied text >  ---------------------------------------------------------------------------------------------------------  MICROBIOLOGY:     Culture - Fungal, Body Fluid (18 @ 21:16)    Specimen Source: .Body Fluid Peritoneal Fluid    Culture Results:   No fungus isolated at 1 week. No additional interim reports will be  issued unless there is a change in culture status.    Culture - Body Fluid with Gram Stain (18 @ 21:16)    Gram Stain:   polymorphonuclear leukocytes seen per low power field  No organisms seen per oil power field  by cytocentrifuge    Specimen Source: Peritoneal Peritoneal Fluid    Culture Results:   No growth to date.    Culture - Acid Fast - Body Fluid w/Smear (18 @ 21:16)    Specimen Source: .Body Fluid Peritoneal Fluid    Acid Fast Bacilli Smear:   No acid fast bacilli seen by fluorochrome stain    RADIOLOGY:  [x] Chest radiographs reviewed and interpreted by me    < from: CT Abdomen and Pelvis w/ Oral Cont (03.10.18 @ 19:07) >    EXAM:  CT ABDOMEN AND PELVIS OC                            PROCEDURE DATE:  03/10/2018      INTERPRETATION:  CLINICAL INFORMATION: Abdominal distention and pain for   several days. Acute renal failure.    COMPARISON: CT abdomen pelvis 2015    PROCEDURE:   CT of the Abdomen and Pelvis was performed without intravenous contrast.   Intravenous contrast: None.  Oral contrast: positive contrast was administered.  Sagittal and coronal reformats were performed.    FINDINGS:    LOWER CHEST:Mild interlobular septal thickening and small bilateral   pleural effusions. Subsegmental atelectasis in the right lower lobe.   Cardiomegaly. Coronary calcification. Calcified hilar lymph nodes.    LIVER: Within normal limits.  BILE DUCTS: Normal caliber.  GALLBLADDER: Cholelithiasis.  SPLEEN: Within normal limits.  PANCREAS: Within normal limits.  ADRENALS: Within normal limits.  KIDNEYS/URETERS: Left lower pole renal cyst.    BLADDER: Within normal limits.  REPRODUCTIVE ORGANS: Posterior uterine fibroid.    BOWEL: No bowel obstruction. Sigmoid diverticulosis, without   diverticulitis. Normal appendix.     PERITONEUM: Large volume ascites.  VESSELS:  Atherosclerotic changes.  RETROPERITONEUM: No lymphadenopathy.    ABDOMINAL WALL: Within normal limits.  BONES: Degenerative changes. T12 vertebral body hemangioma.    IMPRESSION:     Large volume ascites, etiology unclear.    Small bilateral pleural effusions and mild pulmonary edema.    VIOLA ANDREWS M.D., ATTENDINGRADIOLOGIST  This document has been electronically signed. Mar 11 2018  8:52AM      < end of copied text >  ---------------------------------------------------------------------------------------------------------  < from: [facility] Duplex Lower Ext Vein Scan, Bilat (18 @ 11:36) >    EXAM:  DUPLEX SCAN EXT VEINS LOWER BI                            PROCEDURE DATE:  2018      INTERPRETATION:  History:Severe pulmonary hypertension. COPD. Bilateral   lower extremity edema and shortness of breath. Evaluate for DVT.    Bilateral lower extremity venous duplex ultrasound from 2017   demonstrated no DVT.    There is edema of the superficial soft tissues of the lower extremities.    Both common femoral, superficial femoral and popliteal veins are patent   and compressible without evidence of thrombus.    The posterior tibial and peroneal veins are patent.  No thrombus is seen.    IMPRESSION: No evidence of deep vein thrombosis of either lower extremity.    JUDY DE LEON M.D., RADIOLOGY RESIDENT  This document has been electronically signed.  MARTIR VALVERDE M.D., ATTENDING RADIOLOGIST  This document has been electronically signed. Mar  5 2018  2:37PM      < end of copied text >  ---------------------------------------------------------------------------------------------------------  < from: Xray Abdomen 1 View PORTABLE -Urgent (18 @ 17:11) >    EXAM:  XR ABDOMEN PORTABLE URGENT 1V                            PROCEDURE DATE:  2018      INTERPRETATION:  CLINICAL INFORMATION: Abdominal distention. Evaluate for   obstruction.    TECHNIQUE: Portable abdominal radiograph dated 3/4/2018.    COMPARISON: Femoral ultrasound dated 3/2/2018. CT chest dated 3/3/2018.    FINDINGS: Top of Form 1      Large volume ascites.  There are no dilated loops of small bowel.   Bowel gas and stool identified throughout the colon and rectum.   There is no free air visualized.  The visualized osseous structures are unremarkable for patient's stated   age.    IMPRESSION:    Nonobstructive bowel gas pattern.    Ascites.    RACHEL VELARDE M.D., RADIOLOGY RESIDENT  This document has been electronically signed.  PIERRE BAILEY M.D., ATTENDING RADIOLOGIST  This document has been electronically signed. Mar  5 2018 10:22AM      < end of copied text >  ---------------------------------------------------------------------------------------------------------  < from: CT Chest No Cont (18 @ 18:24) >    EXAM:  CT CHEST                            PROCEDURE DATE:  2018        INTERPRETATION:  CLINICAL INFORMATION: Evaluate pleural effusions.   Shortness of breath.    COMPARISON: Chest CT dated 2017. Chest x-ray dated 3/1/2018.    PROCEDURE:   CT of the Chest was performed without intravenous contrast.  Sagittal and coronal reformats were performed.  Axial MIP reformats were also performed.    FINDINGS:    CHEST:     LUNGS AND LARGE AIRWAYS: Patent central airways.  Bibasilar subsegmental   atelectasis, right greater than left.  PLEURA: Trace bilateral pleural effusions.  VESSELS: Thoracic aortic and coronary artery atherosclerosis.  HEART: Cardiomegaly. Small pericardial effusion. Mitral annular   calcification. A densely calcified or metallic density measuring 1.1 x   0.4 cm overlies the basilar left pulmonary artery (3:52, 6:80).  MEDIASTINUM AND STEFANIA: No lymphadenopathy.  CHEST WALL AND LOWER NECK: Within normal limits.  VISUALIZED UPPER ABDOMEN: Small volume ascites. Atherosclerotic changes.  BONES: Multilevel degenerative disease.    IMPRESSION: Trace bilateral pleural effusions.  Bibasilar subsegmental atelectasis, right greater than left.  Densely calcified or metallic density overlies the left pulmonary artery.  See above..      JUAN JUAREZ M.D., RADIOLOGY RESIDENT  This document has been electronically signed.  MADONNA MORGAN M.D., ATTENDING RADIOLOGIST  This document has been electronically signed. Mar  4 2018 12:28PM      < end of copied text >  ---------------------------------------------------------------------------------------------------------  < from: US Abdomen Limited (18 @ 08:50) >    EXAM:  US ABDOMEN LIMITED                            PROCEDURE DATE:  2018      INTERPRETATION:  Clinical information: Assess for ascites for   paracentesis.    Comparison: Ultrasound 2017.    Findings: Targeted ultrasound was performed for purposes of ascites   evaluation. A large amount of ascites is noted in all 4 quadrants.    IMPRESSION:    Diffuse, large volume abdominal ascites.    GAEL SCHERER M.D., ATTENDING RADIOLOGIST  This document has been electronically signed. Mar  2 2018  9:16AM      < end of copied text >  ---------------------------------------------------------------------------------------------------------

## 2018-03-15 NOTE — PROGRESS NOTE ADULT - ASSESSMENT
72F with PMHx of AFib, asthma, CAD, cardiomegaly, CHF, cor pulmonale, depression, enlarged lymph nodes, COPD, DM type II, PSHx of endoscopy, upper lobectomy, abdominal / pelvic CT scan shows large ascites.  Ascites is likely related to CHF and cor pulmonale.  Poor appetite could be related to gastric compression due to ascites.  s/p paracentesis 3/2/18 with removal of 9 liters of ascites and again 3/14 with removal of 7 liters.    as per discussion with IR team/attending, no tunneled peritoneal catheter for ascites drainage due to associated increased risk of infection and pt. does not have a terminal disease.    Plan -   -IR follow up appreciated  -Albumin replacement as per renal  -Discussed with patient in detail, all questions answered.  -Continue PO diet as tolerated

## 2018-03-16 RX ADMIN — ATORVASTATIN CALCIUM 20 MILLIGRAM(S): 80 TABLET, FILM COATED ORAL at 22:50

## 2018-03-16 RX ADMIN — Medication 240 MILLIGRAM(S): at 07:44

## 2018-03-16 RX ADMIN — Medication 3 MILLILITER(S): at 12:44

## 2018-03-16 RX ADMIN — Medication 25 MILLIGRAM(S): at 07:41

## 2018-03-16 RX ADMIN — Medication 25 MILLIGRAM(S): at 07:44

## 2018-03-16 RX ADMIN — Medication 3 MILLILITER(S): at 17:22

## 2018-03-16 RX ADMIN — Medication 25 MILLIGRAM(S): at 13:00

## 2018-03-16 RX ADMIN — Medication 0.5 MILLIGRAM(S): at 17:22

## 2018-03-16 RX ADMIN — Medication 25 MILLIGRAM(S): at 22:50

## 2018-03-16 RX ADMIN — TIOTROPIUM BROMIDE AND OLODATEROL 2 PUFF(S): 3.124; 2.736 SPRAY, METERED RESPIRATORY (INHALATION) at 12:45

## 2018-03-16 RX ADMIN — Medication 1 SPRAY(S): at 06:22

## 2018-03-16 RX ADMIN — CLOPIDOGREL BISULFATE 75 MILLIGRAM(S): 75 TABLET, FILM COATED ORAL at 12:44

## 2018-03-16 RX ADMIN — Medication 5 MILLIGRAM(S): at 07:41

## 2018-03-16 RX ADMIN — Medication 81 MILLIGRAM(S): at 07:40

## 2018-03-16 RX ADMIN — ONDANSETRON 4 MILLIGRAM(S): 8 TABLET, FILM COATED ORAL at 16:36

## 2018-03-16 NOTE — PROGRESS NOTE ADULT - ASSESSMENT
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  3) chronic diastolic CHF (little evidence left sided heart failure on chest CT - trace pleural effusions - minimal ground glass opacities)    CKD with CASSANDRA due to intravascular volume depletion     AF    HTN/HLD/DM/CAD/PCI      PLAN/RECOMMENDATIONS    oxygen supplementation ATC to keep saturation greater than 92%  planning for serial large volume paracenteses rather than for an indwelling catheter  diuretics being held - not helping recurrence of ascites and has lead to worsening renal function  albuterol/atrovent/pulmicort nebs  cardiac meds: cardizem CD/lopressor/digoxin/ASA/plavix/lipitor  GI/DVT prophylaxis - carafate/SQ heparin  on both oral and IV steroids and albumin for hypotension  patient has been treated with endothelin antagonists and phosphodiesterase inhibitors without improvement in PAPs in the past  KATY stockings as tolerated    Will follow with you. Plan of care discussed with the patient at bedside and Dr. Smith. Prognosis is poor    Chacho Gallego MD, [facility] - [phone]  Pulmonary Medicine

## 2018-03-16 NOTE — CHART NOTE - NSCHARTNOTEFT_GEN_A_CORE
Heart Rate briefly decreased to 43    Notified by RN that HR decreased to 43 briefly while asleep, notified bp was 80/55 after the event. Pt asymptomatic and BP repeated after with appropriate size cuff as arms a thin, 104/60. Awake alert and oriented sitting at side of bed in no acute distress irregular hr rate 55-60's. tachypnea o2 2l nasal canula crackles bases, no accessory muscle use. Denies chest pain, shortness of breath, headache, palpitations, dizziness, nausea, vomiting.    Vital Signs Last 24 Hrs  T(C): 36.8 (15 Mar 2018 20:38), Max: 36.8 (15 Mar 2018 20:38)  T(F): 98.2 (15 Mar 2018 20:38), Max: 98.2 (15 Mar 2018 20:38)  HR: 54 (16 Mar 2018 00:33) (54 - 111)  BP: 104/60 (16 Mar 2018 00:43) (81/47 - 104/60)  BP(mean): --  RR: 22 (16 Mar 2018 00:33) (17 - 22)  SpO2: 97% (16 Mar 2018 00:33) (91% - 97%)    MEDICATIONS  (STANDING):  ALBUTerol/ipratropium for Nebulization 3 milliLiter(s) Nebulizer every 6 hours  aspirin enteric coated 81 milliGRAM(s) Oral daily  atorvastatin 20 milliGRAM(s) Oral at bedtime  BACItracin   Ointment 1 Application(s) Topical two times a day  buDESOnide   0.5 milliGRAM(s) Respule 0.5 milliGRAM(s) Inhalation every 12 hours  clopidogrel Tablet 75 milliGRAM(s) Oral daily  digoxin     Tablet 0.125 milliGRAM(s) Oral every other day  diltiazem    milliGRAM(s) Oral daily  heparin  Injectable 5000 Unit(s) SubCutaneous every 8 hours  hydrocortisone sodium succinate Injectable 25 milliGRAM(s) IV Push every 8 hours  methimazole 5 milliGRAM(s) Oral daily  metolazone 5 milliGRAM(s) Oral <User Schedule>  metoprolol     tartrate 25 milliGRAM(s) Oral every 8 hours  predniSONE   Tablet 5 milliGRAM(s) Oral daily  tiotropium 2.5 MICROgram(s)/olodaterol 2.5 MICROgram(s) Inhaler 2 Puff(s) Inhalation daily    MEDICATIONS  (PRN):  acetaminophen   Tablet. 650 milliGRAM(s) Oral every 6 hours PRN Mild Pain (1 - 3)  docusate sodium 100 milliGRAM(s) Oral two times a day PRN Constipation  Gas-X extra strength (simethicone 125 mg 1 Tablet(s) 1 Tablet(s) Chew three times a day PRN bloating  ondansetron Injectable 4 milliGRAM(s) IV Push every 8 hours PRN Nausea and/or Vomiting  sodium chloride 0.65% Nasal 1 Spray(s) Both Nostrils three times a day PRN Nasal Congestion  sucralfate 1 Gram(s) Oral three times a day PRN stomach discomfort    Plan  c/w meds    Will endorse to primary day team, attending to follow  Lucille Waller NP  [facility] [id number]

## 2018-03-16 NOTE — PROGRESS NOTE ADULT - SUBJECTIVE AND OBJECTIVE BOX
MEDICINE, PROGRESS NOTE [phone]    FRAN ALEXANDRA 72y [id number]    Patient seen and examined.  Patient is a 72y old  Female who presents with a chief complaint of shortness of breath and difficulty eating due to fullness (01 Mar 2018 19:30)  Pt c/o pain in right knee and elbow after fall earlier today. Pt doesn't want any xrays as she states she feels fine.    PAST MEDICAL & SURGICAL HISTORY:  Hyperthyroidism  JOSE (obstructive sleep apnea)  Epistaxis  GIB (gastrointestinal bleeding)  CAD S/P percutaneous coronary angioplasty  Afib  Pulmonary HTN  GERD (gastroesophageal reflux disease)  Obesity  Cardiomegaly  Valvular heart disease  COPD (chronic obstructive pulmonary disease): Home O2  Sleep Apnea: by criteria  Loose, teeth: 3 bottom front loose teeth  Female stress incontinence  Shoulder pain, right  Constipation  Arthritis of Knee  H/O heartburn  History of lung cancer  Obese  Hx of hyperlipidemia  Asthma  Diabetes mellitus: type 2  dx about 4-5 years ago   no daily fingerstick  H/O: HTN (hypertension)  Enlarged lymph nodes  Lymph nodes enlarged: s/p biopsy mediastinal  benign  H/O endoscopy  left upper lobectomy    MEDICATIONS  (STANDING):  ALBUTerol/ipratropium for Nebulization 3 milliLiter(s) Nebulizer every 6 hours  aspirin enteric coated 81 milliGRAM(s) Oral daily  atorvastatin 20 milliGRAM(s) Oral at bedtime  BACItracin   Ointment 1 Application(s) Topical two times a day  buDESOnide   0.5 milliGRAM(s) Respule 0.5 milliGRAM(s) Inhalation every 12 hours  clopidogrel Tablet 75 milliGRAM(s) Oral daily  digoxin     Tablet 0.125 milliGRAM(s) Oral every other day  diltiazem    milliGRAM(s) Oral daily  heparin  Injectable 5000 Unit(s) SubCutaneous every 8 hours  methimazole 5 milliGRAM(s) Oral daily  metoprolol     tartrate 25 milliGRAM(s) Oral every 8 hours  predniSONE   Tablet 5 milliGRAM(s) Oral daily  tiotropium 2.5 MICROgram(s)/olodaterol 2.5 MICROgram(s) Inhaler 2 Puff(s) Inhalation daily    MEDICATIONS  (PRN):  acetaminophen   Tablet. 650 milliGRAM(s) Oral every 6 hours PRN Mild Pain (1 - 3)  docusate sodium 100 milliGRAM(s) Oral two times a day PRN Constipation  Gas-X extra strength (simethicone 125 mg 1 Tablet(s) 1 Tablet(s) Chew three times a day PRN bloating  ondansetron Injectable 4 milliGRAM(s) IV Push every 8 hours PRN Nausea and/or Vomiting  sodium chloride 0.65% Nasal 1 Spray(s) Both Nostrils three times a day PRN Nasal Congestion  sucralfate 1 Gram(s) Oral three times a day PRN stomach discomfort    Allergies    No Known Allergies    Intolerances    albuterol (Unknown)      PHYSICAL EXAM:  Constitutional: NAD  HEENT: Normocephalic, EOMI  Neck:  No JVD  Respiratory: CTA B/L, No wheezes  Cardiovascular: S1, S2, RRR, + systolic murmur  Gastrointestinal: BS+, soft, nt, +distention, + fluid wave  Extremities: + peripheral edema le b/l  Neurological: AAOX3, no focal deficits  Psychiatric: Normal mood, normal affect  : No Gottlieb    Vital Signs Last 24 Hrs  T(C): 36.9 (16 Mar 2018 16:01), Max: 36.9 (16 Mar 2018 16:01)  T(F): 98.5 (16 Mar 2018 16:01), Max: 98.5 (16 Mar 2018 16:01)  HR: 75 (16 Mar 2018 17:20) (54 - 83)  BP: 100/63 (16 Mar 2018 17:20) (98/60 - 109/57)  BP(mean): --  RR: 20 (16 Mar 2018 16:01) (19 - 24)  SpO2: 96% (16 Mar 2018 16:01) (91% - 97%)  I&O's Summary    15 Mar 2018 07:01  -  16 Mar 2018 07:00  --------------------------------------------------------  IN: 950 mL / OUT: 725 mL / NET: 225 mL    16 Mar 2018 07:01  -  16 Mar 2018 19:14  --------------------------------------------------------  IN: 240 mL / OUT: 0 mL / NET: 240 mL        LABS:                        9.4    7.5   )-----------( 165      ( 15 Mar 2018 09:53 )             28.3     03-15    130<L>  |  90<L>  |  116<H>  ----------------------------<  199<H>  4.9   |  27  |  3.67<H>    Ca    8.0<L>      15 Mar 2018 09:53  Phos  4.7     03-15  Mg     1.7     03-15    TPro  4.7<L>  /  Alb  2.4<L>  /  TBili  0.3  /  DBili  x   /  AST  8<L>  /  ALT  6<L>  /  AlkPhos  42  03-15

## 2018-03-16 NOTE — PROGRESS NOTE ADULT - ASSESSMENT
Right elbow and hip pain  malnutrition  volume overload/ascites/LE edema  sev PHTN/ Cor pulm  decomp diast hf  sev copd  restrictive lung disease sec to ascites which diminishes diaphragmatic excrusion  epistaxis resolved  CASSANDRA  Failure to thrive  Abd pain and likely recurrence of ascites  Hyponatremia    continue current care  d/c diuretics for now  monitor volume status  if pain in knee or elbow worsens may consider xray if pt agrees.

## 2018-03-16 NOTE — CHART NOTE - NSCHARTNOTEFT_GEN_A_CORE
72 year old female pt with PMH of AFib, Asthma, CAD, Cardiomegaly, enlarged lymph nodes, COPD, T2DM, endoscopy, upper lobectomy, CHF presented to ED with c/o SOB. Found to have decompensated diastolic CHF, (+) ascites, s/p paracentesis 3/2/18 with removal of 9 liters of ascites and again 3/14 with removal of 7 liters.; CASSANDRA on CKD per renal hold diuretics. Hyperphosphatemia noted.     Pt seen today for malnutrition follow-up. Still expresses dissatisfaction with  and with no concentrated K+ diet, requests diet to be liberalized. Diet office staff reports pt has been calling them and is very agitated. Please note RD initial assessment 3/14/18 where education was provided to pt on lower phosphorous and potassium diet in setting of elevated labs, and education was reinforced yesterday 3/16/18. She eats well if she receives food that she likes.    Source: Patient [x]    Family [ ]     other [x] electronic medical records    Diet : Low Sodium (active as of this am)    Patient reports [ ] nausea  [ ] vomiting [ ] diarrhea [ ] constipation  [ ]chewing problems [ ] swallowing issues  [x] other: Pt denies GI distress at this time    PO intake:  < 50% [x] 50-75% [x]   % [x]  other : varies    Source for PO intake [x] Patient [ ] family [x] chart [ ] staff [ ] other    Enteral /Parenteral Nutrition: [x] n/a    Current Weight: 133.3 pounds (current, standing, +2 B/L leg edema). ~18 pound wt loss from 151.4 pounds admit wt 3/2 2/2 fluid removal from paracentesis and diuretic    Pertinent Medications: MEDICATIONS  (STANDING):  ALBUTerol/ipratropium for Nebulization 3 milliLiter(s) Nebulizer every 6 hours  aspirin enteric coated 81 milliGRAM(s) Oral daily  atorvastatin 20 milliGRAM(s) Oral at bedtime  BACItracin   Ointment 1 Application(s) Topical two times a day  buDESOnide   0.5 milliGRAM(s) Respule 0.5 milliGRAM(s) Inhalation every 12 hours  clopidogrel Tablet 75 milliGRAM(s) Oral daily  digoxin     Tablet 0.125 milliGRAM(s) Oral every other day  diltiazem    milliGRAM(s) Oral daily  heparin  Injectable 5000 Unit(s) SubCutaneous every 8 hours  hydrocortisone sodium succinate Injectable 25 milliGRAM(s) IV Push every 8 hours  methimazole 5 milliGRAM(s) Oral daily  metoprolol     tartrate 25 milliGRAM(s) Oral every 8 hours  predniSONE   Tablet 5 milliGRAM(s) Oral daily  tiotropium 2.5 MICROgram(s)/olodaterol 2.5 MICROgram(s) Inhaler 2 Puff(s) Inhalation daily    MEDICATIONS  (PRN):  acetaminophen   Tablet. 650 milliGRAM(s) Oral every 6 hours PRN Mild Pain (1 - 3)  docusate sodium 100 milliGRAM(s) Oral two times a day PRN Constipation  Gas-X extra strength (simethicone 125 mg 1 Tablet(s) 1 Tablet(s) Chew three times a day PRN bloating  ondansetron Injectable 4 milliGRAM(s) IV Push every 8 hours PRN Nausea and/or Vomiting  sodium chloride 0.65% Nasal 1 Spray(s) Both Nostrils three times a day PRN Nasal Congestion  sucralfate 1 Gram(s) Oral three times a day PRN stomach discomfort    Pertinent Labs:  03-15 Na130 mmol/L<L> Glu 199 mg/dL<H> K+ 4.9 mmol/L Cr  3.67 mg/dL<H>  mg/dL<H> 03-15 Phos 4.7 mg/dL<H> 03-15 Alb 2.4 g/dL<L>    Skin: No pressure ulcers noted    Estimated Needs:   [x] no change since previous assessment  [ ] recalculated:       Previous Nutrition Diagnosis: Severe Malnutrition      Nutrition Diagnosis is [x] ongoing  [ ] resolved [ ] not applicable        New Nutrition Diagnosis: [x] Limited adherence to nutrition-related recommendations related to personal food preferences as evidence by pt requesting diet to be liberalized regardless of education on indication for therapeutic diet      Interventions:     1) Continue low sodium diet otherwise liberalized in setting of malnutrition, pt preference; pt verbalizes understanding of indication for lower phosphorous lower K+ diet in setting of hyperphosphatemia and prior hyperkalemia however agrees to low sodium diet only as long as she can occasionally have caputo.  2) Requested diet office see pt to obtain her food preferences for the upcoming meals   3) Assist pt in meal selection as able     Monitoring and Evaluation:     [x] PO intake [x] Tolerance to diet prescription [x] weights [x] follow up per protocol    [x] other: RD remains available: Dionne Sauceda MS, RDN, CDN, CDE. #[phone] 72 year old female pt with PMH of AFib, Asthma, CAD, Cardiomegaly, enlarged lymph nodes, COPD, T2DM, endoscopy, upper lobectomy, CHF presented to ED with c/o SOB. Found to have decompensated diastolic CHF, (+) ascites, s/p paracentesis 3/2/18 with removal of 9 liters of ascites and again 3/14 with removal of 7 liters.; CASSANDRA on CKD per renal hold diuretics. Hyperphosphatemia noted.     Pt seen today for malnutrition follow-up. Still expresses dissatisfaction with  and with no concentrated K+ diet, requests diet to be liberalized. Diet office staff reports pt has been calling them and is very agitated. Please note RD initial assessment 3/14/18 where education was provided to pt on lower phosphorous and potassium diet in setting of elevated labs, and education was reinforced yesterday 3/16/18. She eats well if she receives food that she likes.    Source: Patient [x]    Family [ ]     other [x] electronic medical records    Diet : Low Sodium (active as of this am)    Patient reports [ ] nausea  [ ] vomiting [ ] diarrhea [ ] constipation  [ ]chewing problems [ ] swallowing issues  [x] other: Pt denies GI distress at this time    PO intake:  < 50% [x] 50-75% [x]   % [x]  other : varies    Source for PO intake [x] Patient [ ] family [x] chart [ ] staff [ ] other    Enteral /Parenteral Nutrition: [x] n/a    Current Weight: 133.3 pounds (current, standing, +2 B/L leg edema). ~18 pound wt loss from 151.4 pounds admit wt 3/2 2/2 fluid removal from paracentesis and diuretic    Pertinent Medications: MEDICATIONS  (STANDING):  ALBUTerol/ipratropium for Nebulization 3 milliLiter(s) Nebulizer every 6 hours  aspirin enteric coated 81 milliGRAM(s) Oral daily  atorvastatin 20 milliGRAM(s) Oral at bedtime  BACItracin   Ointment 1 Application(s) Topical two times a day  buDESOnide   0.5 milliGRAM(s) Respule 0.5 milliGRAM(s) Inhalation every 12 hours  clopidogrel Tablet 75 milliGRAM(s) Oral daily  digoxin     Tablet 0.125 milliGRAM(s) Oral every other day  diltiazem    milliGRAM(s) Oral daily  heparin  Injectable 5000 Unit(s) SubCutaneous every 8 hours  hydrocortisone sodium succinate Injectable 25 milliGRAM(s) IV Push every 8 hours  methimazole 5 milliGRAM(s) Oral daily  metoprolol     tartrate 25 milliGRAM(s) Oral every 8 hours  predniSONE   Tablet 5 milliGRAM(s) Oral daily  tiotropium 2.5 MICROgram(s)/olodaterol 2.5 MICROgram(s) Inhaler 2 Puff(s) Inhalation daily    MEDICATIONS  (PRN):  acetaminophen   Tablet. 650 milliGRAM(s) Oral every 6 hours PRN Mild Pain (1 - 3)  docusate sodium 100 milliGRAM(s) Oral two times a day PRN Constipation  Gas-X extra strength (simethicone 125 mg 1 Tablet(s) 1 Tablet(s) Chew three times a day PRN bloating  ondansetron Injectable 4 milliGRAM(s) IV Push every 8 hours PRN Nausea and/or Vomiting  sodium chloride 0.65% Nasal 1 Spray(s) Both Nostrils three times a day PRN Nasal Congestion  sucralfate 1 Gram(s) Oral three times a day PRN stomach discomfort    Pertinent Labs:  03-15 Na130 mmol/L<L> Glu 199 mg/dL<H> K+ 4.9 mmol/L Cr  3.67 mg/dL<H>  mg/dL<H> 03-15 Phos 4.7 mg/dL<H> 03-15 Alb 2.4 g/dL<L>    Skin: No pressure ulcers noted    Estimated Needs:   [x] no change since previous assessment  [ ] recalculated:       Previous Nutrition Diagnosis: Severe Malnutrition      Nutrition Diagnosis is [x] ongoing  [ ] resolved [ ] not applicable        New Nutrition Diagnosis: [x] Limited adherence to nutrition-related recommendations related to personal food preferences as evidence by pt requesting diet to be liberalized regardless of education on indication for therapeutic diet      Interventions:     1) Continue low sodium diet otherwise liberalized in setting of malnutrition, pt preference; pt verbalizes understanding of indication for lower phosphorous lower K+ diet in setting of hyperphosphatemia and prior hyperkalemia however agrees only to low sodium diet only as long as she can occasionally have caputo.  2) Requested diet office see pt to obtain her food preferences for the upcoming meals   3) Assist pt in meal selection as able     Monitoring and Evaluation:     [x] PO intake [x] Tolerance to diet prescription [x] weights [x] follow up per protocol    [x] other: RD remains available: Dionne Sauceda MS, RDN, CDN, CDE. #[phone]

## 2018-03-16 NOTE — PROGRESS NOTE ADULT - SUBJECTIVE AND OBJECTIVE BOX
NEPHROLOGY-[facility] [phone]        Patient seen and examined in bed.  She was in good spirits and offered no complaints.  Bp was soft yesterday.  IV alb was given as well as IV steroids  This am seems stronger    ROS-  + fatigue; all other ros were reviewed and were negative       MEDICATIONS  (STANDING):  ALBUTerol/ipratropium for Nebulization 3 milliLiter(s) Nebulizer every 6 hours  aspirin enteric coated 81 milliGRAM(s) Oral daily  atorvastatin 20 milliGRAM(s) Oral at bedtime  BACItracin   Ointment 1 Application(s) Topical two times a day  buDESOnide   0.5 milliGRAM(s) Respule 0.5 milliGRAM(s) Inhalation every 12 hours  clopidogrel Tablet 75 milliGRAM(s) Oral daily  digoxin     Tablet 0.125 milliGRAM(s) Oral every other day  diltiazem    milliGRAM(s) Oral daily  heparin  Injectable 5000 Unit(s) SubCutaneous every 8 hours  hydrocortisone sodium succinate Injectable 25 milliGRAM(s) IV Push every 8 hours  methimazole 5 milliGRAM(s) Oral daily  metoprolol     tartrate 25 milliGRAM(s) Oral every 8 hours  predniSONE   Tablet 5 milliGRAM(s) Oral daily  tiotropium 2.5 MICROgram(s)/olodaterol 2.5 MICROgram(s) Inhaler 2 Puff(s) Inhalation daily      VITAL:  T(C): , Max: 36.8 (03-15-18 @ 20:38)  T(F): , Max: 98.2 (03-15-18 @ 20:38)  HR: 78 (03-16-18 @ 06:55)  BP: 109/57 (03-16-18 @ 06:55)  BP(mean): --  RR: 24 (03-16-18 @ 06:55)  SpO2: 97% (03-16-18 @ 06:55)  Wt(kg): --    I and O's:    03-15 @ 07:01  -  03-16 @ 07:00  --------------------------------------------------------  IN: 950 mL / OUT: 725 mL / NET: 225 mL    03-16 @ 07:01  -  03-16 @ 11:43  --------------------------------------------------------  IN: 240 mL / OUT: 0 mL / NET: 240 mL          PHYSICAL EXAM:    Constitutional: NAD  HEENT: PERRLA    Neck:  No JVD  Respiratory: reduced but good air entry  Cardiovascular: S1 and S2  Gastrointestinal: BS+, soft, distended  Extremities: No peripheral edema  Neurological: A/O x 3, no focal deficits  Psychiatric: Normal mood, normal affect  : No Gottlieb  Skin: No rashes  Access: Not applicable    LABS:                        9.4    7.5   )-----------( 165      ( 15 Mar 2018 09:53 )             28.3     03-15    130<L>  |  90<L>  |  116<H>  ----------------------------<  199<H>  4.9   |  27  |  3.67<H>    Ca    8.0<L>      15 Mar 2018 09:53  Phos  4.7     03-15  Mg     1.7     03-15    TPro  4.7<L>  /  Alb  2.4<L>  /  TBili  0.3  /  DBili  x   /  AST  8<L>  /  ALT  6<L>  /  AlkPhos  42  03-15          Urine Studies:          RADIOLOGY & ADDITIONAL STUDIES:      < from: Xray Hip 2-3 Views, Right (03.13.18 @ 22:45) >    EXAM:  XR HIP 2-3V RT                            PROCEDURE DATE:  03/13/2018            INTERPRETATION:  Clinical information: Right hip pain.    AP and frog-leg lateral views of the right hip are compared to an abdomen   and pelvis CT scan from 3/10/2018.    Impression: There is no acute fracture or dislocation. Arterial   calcifications are noted. The right hip joint space is preserved. A right   greater than left sacroiliitis is better visualized on CT scan.                     CANDIS GOMEZ M.D., ATTENDING RADIOLOGIST  This document has been electronically signed. Mar 14 2018  1:50PM                < end of copied text >

## 2018-03-16 NOTE — PROGRESS NOTE ADULT - ASSESSMENT
Patient is a 71-year-old female with past medical history of diabetes, hypertension, severe pulmonary hypertension, right ventricular dysfunction, presents with SOB.  The patient's acute renal failure is likely hemodynamic in nature.  Goals of therapy are to improve her hemodynamics in order to improve her forward flow and renal perfusion.  CASSANDRA  Severe PHTN  Cor Pulm  Failure to thrive  Abd pain and likely recurrence of ascites  Hyponatremia-hypervolemic  Hypotension    Renal:  non-oliguric:  CASSANDRA:  Likely worse from the hypotension and will slowly start to improve  GI:  +ascietes with no significant response with use of aggressive diuretics(currently off).   SP  I.V. alb 25% x 2 and  hydrocortisone 25mg ivp q 8 hours x 3 doses(last dose today)  Pulmonary:  stable  Cardiology: Hypotensive    Recommendation  - HOLD all diuretics while hypotensive  - Off  KCL supplements for now    - Maintain K+ restrictive diet for now;    - Monitor I & Os  -Last dose of IV steroids    Pt is well aware of her prognosis and does not want to see palliative care

## 2018-03-16 NOTE — PROGRESS NOTE ADULT - SUBJECTIVE AND OBJECTIVE BOX
INTERVAL HPI/OVERNIGHT EVENTS:  Still with SOB and poor appetite    MEDICATIONS  (STANDING):  ALBUTerol/ipratropium for Nebulization 3 milliLiter(s) Nebulizer every 6 hours  aspirin enteric coated 81 milliGRAM(s) Oral daily  atorvastatin 20 milliGRAM(s) Oral at bedtime  BACItracin   Ointment 1 Application(s) Topical two times a day  buDESOnide   0.5 milliGRAM(s) Respule 0.5 milliGRAM(s) Inhalation every 12 hours  clopidogrel Tablet 75 milliGRAM(s) Oral daily  digoxin     Tablet 0.125 milliGRAM(s) Oral every other day  diltiazem    milliGRAM(s) Oral daily  heparin  Injectable 5000 Unit(s) SubCutaneous every 8 hours  hydrocortisone sodium succinate Injectable 25 milliGRAM(s) IV Push every 8 hours  methimazole 5 milliGRAM(s) Oral daily  metoprolol     tartrate 25 milliGRAM(s) Oral every 8 hours  predniSONE   Tablet 5 milliGRAM(s) Oral daily  tiotropium 2.5 MICROgram(s)/olodaterol 2.5 MICROgram(s) Inhaler 2 Puff(s) Inhalation daily    MEDICATIONS  (PRN):  acetaminophen   Tablet. 650 milliGRAM(s) Oral every 6 hours PRN Mild Pain (1 - 3)  docusate sodium 100 milliGRAM(s) Oral two times a day PRN Constipation  Gas-X extra strength (simethicone 125 mg 1 Tablet(s) 1 Tablet(s) Chew three times a day PRN bloating  ondansetron Injectable 4 milliGRAM(s) IV Push every 8 hours PRN Nausea and/or Vomiting  sodium chloride 0.65% Nasal 1 Spray(s) Both Nostrils three times a day PRN Nasal Congestion  sucralfate 1 Gram(s) Oral three times a day PRN stomach discomfort    Allergies  No Known Allergies    Intolerances  albuterol (Unknown)    Review of Systems:  General:  No fevers, chills  CV:  No pain, palpitatioins  Resp:  +SOB +BRO  :  No pain, bleeding, incontinence, nocturia  Muscle:  No pain, weakness  Neuro:  No weakness, tingling, memory problems  Psych:  +fatigue, No mood problems, depression  Heme:  No petechiae, ecchymosis, easy bruisability  Skin:  No rash, tattoos, scars    T(F): 97.6 (03-16-18 @ 06:55), Max: 98.2 (03-15-18 @ 20:38)  HR: 78 (03-16-18 @ 06:55) (54 - 111)  BP: 109/57 (03-16-18 @ 06:55) (81/47 - 109/57)  RR: 24 (03-16-18 @ 06:55) (17 - 24)  SpO2: 97% (03-16-18 @ 06:55) (91% - 97%)  Wt(kg): --  ,   I&O's Summary    15 Mar 2018 07:01  -  16 Mar 2018 07:00  --------------------------------------------------------  IN: 950 mL / OUT: 725 mL / NET: 225 mL    PHYSICAL EXAM:  Constitutional: alert and appropriate, dyspneic on nasal cannula. sitting up at edge of bed  Neck: No LAD, supple  Respiratory: decreased BS at bases  Cardiovascular: S1 and S2, RRR, +syst. murmur  Gastrointestinal: hypo-active BS+, distended +fluid wave  Extremities: +b/l LE edema  Neurological: A/O x 3, no focal deficits  Psychiatric: Normal mood, normal affect  Skin: No rashes    LABS:                               9.4    7.5   )-----------( 165      ( 15 Mar 2018 09:53 )             28.3               03-15    130<L>  |  90<L>  |  116<H>  ----------------------------<  199<H>  4.9   |  27  |  3.67<H>    Ca    8.0<L>      15 Mar 2018 09:53  Phos  4.7     03-15  Mg     1.7     03-15    TPro  4.7<L>  /  Alb  2.4<L>  /  TBili  0.3  /  DBili  x   /  AST  8<L>  /  ALT  6<L>  /  AlkPhos  42  03-15    RADIOLOGY & ADDITIONAL TESTS:

## 2018-03-16 NOTE — PROGRESS NOTE ADULT - SUBJECTIVE AND OBJECTIVE BOX
[facility] - [facility]     PROGRESS NOTE    CHIEF COMPLAINT: CRF (hypoxic); COPD; emphysema; JOSE; secondary pulmonary hypertension; right heart failure; epistaxis; ascites;     INTERVAL HISTORY: off diuretics; difficulty breathing due to nasal congestion following a bout of epistaxis;  still barely able to walk to the bathroom; s/p large volume paracentesis Wednesday ~ 7000cc - no abdominal pain; less abdominal fullness but seems to have a little better appetite; weak, tired and frail; no cough, sputum production, chest congestion or wheeze; no fevers, chills or sweats; no chest pain/pressure or palpitations; legs swollen - could not tolerate KATY stockings; right hip and knee pain; being given steroids and albumin for hypotension    REVIEW OF SYSTEMS:  Constitutional: As per interval history  HEENT: Within normal limits  CV: As per interval history  Resp: As per interval history  GI: As per interval history  : Within normal limits  Musculoskeletal: As per interval history  Skin: Within normal limits  Neurological: Within normal limits  Psychiatric: Within normal limits  Endocrine: Within normal limits  Hematologic/Lymphatic: Within normal limits  Allergic/Immunologic: Within normal limits      MEDICATIONS:     Pulmonary "  ALBUTerol/ipratropium for Nebulization 3 milliLiter(s) Nebulizer every 6 hours  buDESOnide   0.5 milliGRAM(s) Respule 0.5 milliGRAM(s) Inhalation every 12 hours  tiotropium 2.5 MICROgram(s)/olodaterol 2.5 MICROgram(s) Inhaler 2 Puff(s) Inhalation daily      Anti-microbials:      Cardiovascular:  digoxin     Tablet 0.125 milliGRAM(s) Oral every other day  diltiazem    milliGRAM(s) Oral daily  metoprolol     tartrate 25 milliGRAM(s) Oral every 8 hours      Other:  acetaminophen   Tablet. 650 milliGRAM(s) Oral every 6 hours PRN  aspirin enteric coated 81 milliGRAM(s) Oral daily  atorvastatin 20 milliGRAM(s) Oral at bedtime  BACItracin   Ointment 1 Application(s) Topical two times a day  clopidogrel Tablet 75 milliGRAM(s) Oral daily  docusate sodium 100 milliGRAM(s) Oral two times a day PRN  Gas-X extra strength (simethicone 125 mg 1 Tablet(s) 1 Tablet(s) Chew three times a day PRN  heparin  Injectable 5000 Unit(s) SubCutaneous every 8 hours  hydrocortisone sodium succinate Injectable 25 milliGRAM(s) IV Push every 8 hours  methimazole 5 milliGRAM(s) Oral daily  ondansetron Injectable 4 milliGRAM(s) IV Push every 8 hours PRN  predniSONE   Tablet 5 milliGRAM(s) Oral daily  sodium chloride 0.65% Nasal 1 Spray(s) Both Nostrils three times a day PRN  sucralfate 1 Gram(s) Oral three times a day PRN        OBJECTIVE:    I&O's Detail    15 Mar 2018 07:01  -  16 Mar 2018 07:00  --------------------------------------------------------  IN:    Albumin 25%: 50 mL    Oral Fluid: 900 mL  Total IN: 950 mL    OUT:    Voided: 725 mL  Total OUT: 725 mL    Total NET: 225 mL      PHYSICAL EXAM:       ICU Vital Signs Last 24 Hrs  T(C): 36.4 (16 Mar 2018 06:55), Max: 36.8 (15 Mar 2018 20:38)  T(F): 97.6 (16 Mar 2018 06:55), Max: 98.2 (15 Mar 2018 20:38)  HR: 78 (16 Mar 2018 06:55) (54 - 83)  BP: 109/57 (16 Mar 2018 06:55) (81/47 - 109/57)  BP(mean): --  ABP: --  ABP(mean): --  RR: 24 (16 Mar 2018 06:55) (17 - 24)  SpO2: 97% (16 Mar 2018 06:55) (91% - 97%)    General: Awake. Alert. Cooperative. Weak and tired. No distress. Chronically ill appearing	  HEENT:   Atraumatic. Bitemporal wasting. Anicteric. Normal oral mucosa, PERRL, EOMI  Neck: Supple. Trachea midline. Thyroid without enlargement/tenderness/nodules. No carotid bruit. (+) JVD; loss of bilateral supraclavicular fat pads	  Cardiovascular: Irregularly irregular rate and rhythm. S1 S2 normal. II/VI systolic murmur  Respiratory: Respirations unlabored. Decreased breath sounds throughout especially at the bases now with right sided rales. Decreased chest wall excursion  Abdomen: Soft. Non-tender. Mildly distended with fluid wave. No organomegaly. No masses. Normal bowel sounds	  Extremities: Warm to touch. No clubbing or cyanosis. Mild to moderate lower extremity edema up to the thigh. Loss of extremity muscle mass  Pulses: Decreased lower extremity peripheral pulses  Skin: Lower extremity venous stasis changes; skin tear left elbow  Lymph Nodes: Cervical, supraclavicular and axillary nodes normal  Neurological: Motor and sensory examination equal and normal. A and O x 3  Psychiatry: Depressed         LABS:                        9.4    7.5   )-----------( 165      ( 15 Mar 2018 09:53 )             28.3                         10.1   9.1   )-----------( 196      ( 14 Mar 2018 09:34 )             27.8     03-15    130<L>  |  90<L>  |  116<H>  ----------------------------<  199<H>  4.9   |  27  |  3.67<H>    03-14    131<L>  |  91<L>  |  113<H>  ----------------------------<  179<H>  4.4   |  26  |  3.58<H>    Ca      8.0<L>      03-15    Ca      8.1<L>      03-14    Phos    4.7     03-15    Phos    4.7     03-13      Mg       1.7     03-15    Mg       1.8     03-13    TPro  4.7<L>  /  Alb  2.4<L>  /  TBili  0.3  /  DBili  x   /  AST  8<L>  /  ALT  6<L>  /  AlkPhos  42  03-15    Serum Pro-Brain Natriuretic Peptide: 28806 pg/mL (03-01 @ 16:29)    CARDIAC MARKERS ( 01 Mar 2018 16:29 )  x     / 0.06 ng/mL / x     / x     / 5.4 ng/mL    < from: Transthoracic Echocardiogram (12.13.17 @ 18:58) >    Patient name: FRAN ALEXANDRA  YOB: 1945   Age: 71 (F)   MR#: [id number]  Study Date: 12/13/2017  Location: [address] [place], [place] [id number]: [facility]  Study quality: Technically fair  Referring Physician: Pierce Cobb MD  Blood Pressure: 106/58 mmHg  Height: 160 cm  Weight: 64 kg  BSA: 1.7 m2  ------------------------------------------------------------------------  PROCEDURE: Transthoracic echocardiogram with 2-D, M-Mode  and complete spectral and color flow Doppler.  INDICATION: Other specified pulmonary heart diseases  (I27.89)  ------------------------------------------------------------------------  Dimensions:    Normal Values:  LA:     3.6    2.0 - 4.0 cm  Ao:     2.8    2.0 - 3.8 cm  SEPTUM: 0.7    0.6 - 1.2 cm  PWT:    0.9    0.6 - 1.1 cm  LVIDd:  4.1    3.0 - 5.6 cm  LVIDs:  1.9    1.8 - 4.0 cm  Derived variables:  LVMI: 58 g/m2  RWT: 0.43  Fractional short: 54 %  Doppler Peak Velocity (m/sec): AoV=1.8  ------------------------------------------------------------------------  Observations:  Mitral Valve: Mitral annular calcification, otherwise  normal mitral valve. Minimal mitral regurgitation.  Aortic Valve/Aorta: Calcified trileaflet aortic valve with  normal opening. Peak transaortic valve gradient equals 13  mm Hg, mean transaortic valve gradient equals 8 mm Hg. Peak  left ventricular outflow tract gradient equals 6 mm Hg,  mean gradient is equal to 3 mm Hg, LVOT velocity time  integral equals 19 cm.  Aortic Root: 2.8 cm.  LVOT diameter: 1.9 cm.  Left Atrium: Normal left atrium.  LA volume index = 25  cc/m2.  Left Ventricle: Hyperdynamic left ventricular systolic  function. Flattening of the interventricular septum in both  systole and diastole is  consistent with right ventricular  pressure overload. Normal left ventricular internal  dimensions and wall thicknesses.  Right Heart: Severe right atrial enlargement. Right  ventricular enlargement with decreased right ventricular  systolic function. Normal tricuspid valve. Mild-moderate  tricuspid regurgitation. Normal pulmonic valve.  Pericardium/Pleura: Normal pericardium with trace  pericardial effusion.  Left pleural effusion.  Hemodynamic: Estimated right atrial pressure is 8 mm Hg.  Estimated right ventricular systolic pressure equals 72 mm  Hg, assuming right atrial pressure equals 8 mm Hg,  consistent with severe pulmonary hypertension.  ------------------------------------------------------------------------  Conclusions:  1. Calcified trileaflet aortic valve with normal opening.  2. Normal left ventricular internal dimensions and wall  thicknesses.  3. Hyperdynamic left ventricular systolic function.  Flattening of the interventricular septum in both systole  and diastole is  consistent with right ventricular pressure  overload.  4. Severe right atrial enlargement.  5. Right ventricular enlargement with decreased right  ventricular systolic function.  6. Normal tricuspid valve. Mild-moderate tricuspid  regurgitation.  7. Estimated pulmonary artery systolic pressure equals 72  mm Hg, assuming right atrial pressure equals 8 mm Hg,  consistent with severe pulmonary pressures.  *** Compared with echocardiogram of 4/25/2017, no  significant changes noted.  ------------------------------------------------------------------------  Confirmed on  12/13/2017 - 13:15:09 by Justin Cohen M.D.  ------------------------------------------------------------------------    < end of copied text >  ---------------------------------------------------------------------------------------------------------  MICROBIOLOGY:     Culture - Fungal, Body Fluid (03.02.18 @ 21:16)    Specimen Source: .Body Fluid Peritoneal Fluid    Culture Results:   No fungus isolated at 1 week. No additional interim reports will be  issued unless there is a change in culture status.    Culture - Body Fluid with Gram Stain (03.02.18 @ 21:16)    Gram Stain:   polymorphonuclear leukocytes seen per low power field  No organisms seen per oil power field  by cytocentrifuge    Specimen Source: Peritoneal Peritoneal Fluid    Culture Results:   No growth to date.    Culture - Acid Fast - Body Fluid w/Smear (03.02.18 @ 21:16)    Specimen Source: .Body Fluid Peritoneal Fluid    Acid Fast Bacilli Smear:   No acid fast bacilli seen by fluorochrome stain    RADIOLOGY:  [x] Chest radiographs reviewed and interpreted by me    < from: CT Abdomen and Pelvis w/ Oral Cont (03.10.18 @ 19:07) >    EXAM:  CT ABDOMEN AND PELVIS OC                            PROCEDURE DATE:  03/10/2018      INTERPRETATION:  CLINICAL INFORMATION: Abdominal distention and pain for   several days. Acute renal failure.    COMPARISON: CT abdomen pelvis 12/14/2015    PROCEDURE:   CT of the Abdomen and Pelvis was performed without intravenous contrast.   Intravenous contrast: None.  Oral contrast: positive contrast was administered.  Sagittal and coronal reformats were performed.    FINDINGS:    LOWER CHEST:Mild interlobular septal thickening and small bilateral   pleural effusions. Subsegmental atelectasis in the right lower lobe.   Cardiomegaly. Coronary calcification. Calcified hilar lymph nodes.    LIVER: Within normal limits.  BILE DUCTS: Normal caliber.  GALLBLADDER: Cholelithiasis.  SPLEEN: Within normal limits.  PANCREAS: Within normal limits.  ADRENALS: Within normal limits.  KIDNEYS/URETERS: Left lower pole renal cyst.    BLADDER: Within normal limits.  REPRODUCTIVE ORGANS: Posterior uterine fibroid.    BOWEL: No bowel obstruction. Sigmoid diverticulosis, without   diverticulitis. Normal appendix.     PERITONEUM: Large volume ascites.  VESSELS:  Atherosclerotic changes.  RETROPERITONEUM: No lymphadenopathy.    ABDOMINAL WALL: Within normal limits.  BONES: Degenerative changes. T12 vertebral body hemangioma.    IMPRESSION:     Large volume ascites, etiology unclear.    Small bilateral pleural effusions and mild pulmonary edema.    VIOLA ANDREWS M.D., ATTENDINGRADIOLOGIST  This document has been electronically signed. Mar 11 2018  8:52AM      < end of copied text >  ---------------------------------------------------------------------------------------------------------  < from: [facility] Duplex Lower Ext Vein Scan, Bilat (03.05.18 @ 11:36) >    EXAM:  DUPLEX SCAN EXT VEINS LOWER BI                            PROCEDURE DATE:  03/05/2018      INTERPRETATION:  History:Severe pulmonary hypertension. COPD. Bilateral   lower extremity edema and shortness of breath. Evaluate for DVT.    Bilateral lower extremity venous duplex ultrasound from 4/28/2017   demonstrated no DVT.    There is edema of the superficial soft tissues of the lower extremities.    Both common femoral, superficial femoral and popliteal veins are patent   and compressible without evidence of thrombus.    The posterior tibial and peroneal veins are patent.  No thrombus is seen.    IMPRESSION: No evidence of deep vein thrombosis of either lower extremity.    JUDY DE LEON M.D., RADIOLOGY RESIDENT  This document has been electronically signed.  MARTIR VALVERDE M.D., ATTENDING RADIOLOGIST  This document has been electronically signed. Mar  5 2018  2:37PM      < end of copied text >  ---------------------------------------------------------------------------------------------------------  < from: Xray Abdomen 1 View PORTABLE -Urgent (03.04.18 @ 17:11) >    EXAM:  XR ABDOMEN PORTABLE URGENT 1V                            PROCEDURE DATE:  03/04/2018      INTERPRETATION:  CLINICAL INFORMATION: Abdominal distention. Evaluate for   obstruction.    TECHNIQUE: Portable abdominal radiograph dated 3/4/2018.    COMPARISON: Femoral ultrasound dated 3/2/2018. CT chest dated 3/3/2018.    FINDINGS: Top of Form 1      Large volume ascites.  There are no dilated loops of small bowel.   Bowel gas and stool identified throughout the colon and rectum.   There is no free air visualized.  The visualized osseous structures are unremarkable for patient's stated   age.    IMPRESSION:    Nonobstructive bowel gas pattern.    Ascites.    RACHEL VELARDE M.D., RADIOLOGY RESIDENT  This document has been electronically signed.  PIERRE BAILEY M.D., ATTENDING RADIOLOGIST  This document has been electronically signed. Mar  5 2018 10:22AM      < end of copied text >  ---------------------------------------------------------------------------------------------------------  < from: CT Chest No Cont (03.03.18 @ 18:24) >    EXAM:  CT CHEST                            PROCEDURE DATE:  03/03/2018        INTERPRETATION:  CLINICAL INFORMATION: Evaluate pleural effusions.   Shortness of breath.    COMPARISON: Chest CT dated 4/25/2017. Chest x-ray dated 3/1/2018.    PROCEDURE:   CT of the Chest was performed without intravenous contrast.  Sagittal and coronal reformats were performed.  Axial MIP reformats were also performed.    FINDINGS:    CHEST:     LUNGS AND LARGE AIRWAYS: Patent central airways.  Bibasilar subsegmental   atelectasis, right greater than left.  PLEURA: Trace bilateral pleural effusions.  VESSELS: Thoracic aortic and coronary artery atherosclerosis.  HEART: Cardiomegaly. Small pericardial effusion. Mitral annular   calcification. A densely calcified or metallic density measuring 1.1 x   0.4 cm overlies the basilar left pulmonary artery (3:52, 6:80).  MEDIASTINUM AND STEFANIA: No lymphadenopathy.  CHEST WALL AND LOWER NECK: Within normal limits.  VISUALIZED UPPER ABDOMEN: Small volume ascites. Atherosclerotic changes.  BONES: Multilevel degenerative disease.    IMPRESSION: Trace bilateral pleural effusions.  Bibasilar subsegmental atelectasis, right greater than left.  Densely calcified or metallic density overlies the left pulmonary artery.  See above..      JUAN JUAREZ M.D., RADIOLOGY RESIDENT  This document has been electronically signed.  MADONNA MORGAN M.D., ATTENDING RADIOLOGIST  This document has been electronically signed. Mar  4 2018 12:28PM      < end of copied text >  ---------------------------------------------------------------------------------------------------------  < from: US Abdomen Limited (03.02.18 @ 08:50) >    EXAM:  US ABDOMEN LIMITED                            PROCEDURE DATE:  03/02/2018      INTERPRETATION:  Clinical information: Assess for ascites for   paracentesis.    Comparison: Ultrasound 12/27/2017.    Findings: Targeted ultrasound was performed for purposes of ascites   evaluation. A large amount of ascites is noted in all 4 quadrants.    IMPRESSION:    Diffuse, large volume abdominal ascites.    GAEL SCHERER M.D., ATTENDING RADIOLOGIST  This document has been electronically signed. Mar  2 2018  9:16AM      < end of copied text >  ---------------------------------------------------------------------------------------------------------

## 2018-03-16 NOTE — PROGRESS NOTE ADULT - ASSESSMENT
72F with PMHx of AFib, asthma, CAD, cardiomegaly, CHF, cor pulmonale, depression, enlarged lymph nodes, COPD, DM type II, PSHx of endoscopy, upper lobectomy, abdominal /pelvic CT scan showed large ascites.  Ascites is likely related to CHF and cor pulmonale.  Poor appetite could be related to gastric compression due to ascites.  s/p paracentesis 3/2/18 with removal of 9 liters of ascites and again 3/14 with removal of 7 liters.    As per discussion with IR team/attending, no tunneled peritoneal catheter for ascites drainage due to associated increased risk of infection and pt. does not have a terminal disease.    Plan -   -IR follow up appreciated  -Albumin replacement as per renal  -Continue PO diet as tolerated    Please call over weekend prn with GI concerns.

## 2018-03-17 LAB
ANION GAP SERPL CALC-SCNC: 20 MMOL/L — HIGH (ref 5–17)
BUN SERPL-MCNC: 122 MG/DL — HIGH (ref 7–23)
CALCIUM SERPL-MCNC: 9 MG/DL — SIGNIFICANT CHANGE UP (ref 8.4–10.5)
CHLORIDE SERPL-SCNC: 94 MMOL/L — LOW (ref 96–108)
CO2 SERPL-SCNC: 24 MMOL/L — SIGNIFICANT CHANGE UP (ref 22–31)
CREAT SERPL-MCNC: 3.61 MG/DL — HIGH (ref 0.5–1.3)
CULTURE RESULTS: SIGNIFICANT CHANGE UP
GLUCOSE SERPL-MCNC: 100 MG/DL — HIGH (ref 70–99)
HCT VFR BLD CALC: 30.1 % — LOW (ref 34.5–45)
HGB BLD-MCNC: 9.6 G/DL — LOW (ref 11.5–15.5)
MAGNESIUM SERPL-MCNC: 1.7 MG/DL — SIGNIFICANT CHANGE UP (ref 1.6–2.6)
MCHC RBC-ENTMCNC: 27 PG — SIGNIFICANT CHANGE UP (ref 27–34)
MCHC RBC-ENTMCNC: 31.9 GM/DL — LOW (ref 32–36)
MCV RBC AUTO: 84.6 FL — SIGNIFICANT CHANGE UP (ref 80–100)
NRBC # BLD: 0 /100 WBCS — SIGNIFICANT CHANGE UP (ref 0–0)
PHOSPHATE SERPL-MCNC: 4.1 MG/DL — SIGNIFICANT CHANGE UP (ref 2.5–4.5)
PLATELET # BLD AUTO: 218 K/UL — SIGNIFICANT CHANGE UP (ref 150–400)
POTASSIUM SERPL-MCNC: 5 MMOL/L — SIGNIFICANT CHANGE UP (ref 3.5–5.3)
POTASSIUM SERPL-SCNC: 5 MMOL/L — SIGNIFICANT CHANGE UP (ref 3.5–5.3)
RBC # BLD: 3.56 M/UL — LOW (ref 3.8–5.2)
RBC # FLD: 18.1 % — HIGH (ref 10.3–14.5)
SODIUM SERPL-SCNC: 138 MMOL/L — SIGNIFICANT CHANGE UP (ref 135–145)
SPECIMEN SOURCE: SIGNIFICANT CHANGE UP
WBC # BLD: 9.54 K/UL — SIGNIFICANT CHANGE UP (ref 3.8–10.5)
WBC # FLD AUTO: 9.54 K/UL — SIGNIFICANT CHANGE UP (ref 3.8–10.5)

## 2018-03-17 RX ADMIN — CLOPIDOGREL BISULFATE 75 MILLIGRAM(S): 75 TABLET, FILM COATED ORAL at 17:24

## 2018-03-17 RX ADMIN — Medication 240 MILLIGRAM(S): at 07:46

## 2018-03-17 RX ADMIN — Medication 0.12 MILLIGRAM(S): at 17:24

## 2018-03-17 RX ADMIN — Medication 25 MILLIGRAM(S): at 07:46

## 2018-03-17 RX ADMIN — Medication 3 MILLILITER(S): at 17:24

## 2018-03-17 RX ADMIN — Medication 0.5 MILLIGRAM(S): at 17:24

## 2018-03-17 RX ADMIN — Medication 81 MILLIGRAM(S): at 07:46

## 2018-03-17 RX ADMIN — ATORVASTATIN CALCIUM 20 MILLIGRAM(S): 80 TABLET, FILM COATED ORAL at 22:43

## 2018-03-17 RX ADMIN — Medication 5 MILLIGRAM(S): at 07:46

## 2018-03-17 RX ADMIN — ONDANSETRON 4 MILLIGRAM(S): 8 TABLET, FILM COATED ORAL at 10:27

## 2018-03-17 NOTE — PROGRESS NOTE ADULT - SUBJECTIVE AND OBJECTIVE BOX
MEDICINE, PROGRESS NOTE [phone]    FRAN ALEXANDRA 72y [id number]    Patient seen and examined.  Patient is a 72y old  Female who presents with a chief complaint of shortness of breath and difficulty eating due to fullness (01 Mar 2018 19:30)  Pt c/o feeling nauseous    PAST MEDICAL & SURGICAL HISTORY:  Hyperthyroidism  JOSE (obstructive sleep apnea)  Epistaxis  GIB (gastrointestinal bleeding)  CAD S/P percutaneous coronary angioplasty  Afib  Pulmonary HTN  GERD (gastroesophageal reflux disease)  Obesity  Cardiomegaly  Valvular heart disease  COPD (chronic obstructive pulmonary disease): Home O2  Sleep Apnea: by criteria  Loose, teeth: 3 bottom front loose teeth  Female stress incontinence  Shoulder pain, right  Constipation  Arthritis of Knee  H/O heartburn  History of lung cancer  Obese  Hx of hyperlipidemia  Asthma  Diabetes mellitus: type 2  dx about 4-5 years ago   no daily fingerstick  H/O: HTN (hypertension)  Enlarged lymph nodes  Lymph nodes enlarged: s/p biopsy mediastinal  benign  H/O endoscopy  left upper lobectomy    MEDICATIONS  (STANDING):  ALBUTerol/ipratropium for Nebulization 3 milliLiter(s) Nebulizer every 6 hours  aspirin enteric coated 81 milliGRAM(s) Oral daily  atorvastatin 20 milliGRAM(s) Oral at bedtime  BACItracin   Ointment 1 Application(s) Topical two times a day  buDESOnide   0.5 milliGRAM(s) Respule 0.5 milliGRAM(s) Inhalation every 12 hours  clopidogrel Tablet 75 milliGRAM(s) Oral daily  digoxin     Tablet 0.125 milliGRAM(s) Oral every other day  diltiazem    milliGRAM(s) Oral daily  heparin  Injectable 5000 Unit(s) SubCutaneous every 8 hours  methimazole 5 milliGRAM(s) Oral daily  metoprolol     tartrate 25 milliGRAM(s) Oral every 8 hours  predniSONE   Tablet 5 milliGRAM(s) Oral daily  tiotropium 2.5 MICROgram(s)/olodaterol 2.5 MICROgram(s) Inhaler 2 Puff(s) Inhalation daily    MEDICATIONS  (PRN):  acetaminophen   Tablet. 650 milliGRAM(s) Oral every 6 hours PRN Mild Pain (1 - 3)  docusate sodium 100 milliGRAM(s) Oral two times a day PRN Constipation  Gas-X extra strength (simethicone 125 mg 1 Tablet(s) 1 Tablet(s) Chew three times a day PRN bloating  ondansetron Injectable 4 milliGRAM(s) IV Push every 8 hours PRN Nausea and/or Vomiting  sodium chloride 0.65% Nasal 1 Spray(s) Both Nostrils three times a day PRN Nasal Congestion  sucralfate 1 Gram(s) Oral three times a day PRN stomach discomfort    Allergies    No Known Allergies    Intolerances    albuterol (Unknown)      PHYSICAL EXAM:  Constitutional: NAD  HEENT: Normocephalic, EOMI  Neck:  No JVD  Respiratory: CTA B/L, No wheezes  Cardiovascular: S1, S2, RRR, + systolic murmur  Gastrointestinal: BS+, soft, NT/ND  Extremities: + peripheral edema le b/l  Neurological: AAOX3, no focal deficits  Psychiatric: Normal mood, normal affect  : No Gottlieb    Vital Signs Last 24 Hrs  T(C): 37.1 (17 Mar 2018 14:36), Max: 37.1 (17 Mar 2018 14:36)  T(F): 98.7 (17 Mar 2018 14:36), Max: 98.7 (17 Mar 2018 14:36)  HR: 61 (17 Mar 2018 14:36) (61 - 78)  BP: 92/52 (17 Mar 2018 14:36) (92/52 - 112/67)  BP(mean): --  RR: 18 (17 Mar 2018 14:36) (18 - 18)  SpO2: 94% (17 Mar 2018 14:36) (94% - 98%)  I&O's Summary    16 Mar 2018 07:01  -  17 Mar 2018 07:00  --------------------------------------------------------  IN: 480 mL / OUT: 250 mL / NET: 230 mL    17 Mar 2018 07:01  -  17 Mar 2018 16:32  --------------------------------------------------------  IN: 480 mL / OUT: 250 mL / NET: 230 mL        LABS:                        9.6    9.54  )-----------( 218      ( 17 Mar 2018 10:39 )             30.1     03-17    138  |  94<L>  |  122<H>  ----------------------------<  100<H>  5.0   |  24  |  3.61<H>    Ca    9.0      17 Mar 2018 08:45  Phos  4.1     03-17  Mg     1.7     03-17  Magnesium, Serum: 1.7 mg/dL (03-17 @ 08:45)

## 2018-03-18 LAB
ANION GAP SERPL CALC-SCNC: 13 MMOL/L — SIGNIFICANT CHANGE UP (ref 5–17)
BUN SERPL-MCNC: 126 MG/DL — HIGH (ref 7–23)
CALCIUM SERPL-MCNC: 8.6 MG/DL — SIGNIFICANT CHANGE UP (ref 8.4–10.5)
CHLORIDE SERPL-SCNC: 94 MMOL/L — LOW (ref 96–108)
CO2 SERPL-SCNC: 26 MMOL/L — SIGNIFICANT CHANGE UP (ref 22–31)
CREAT SERPL-MCNC: 3.38 MG/DL — HIGH (ref 0.5–1.3)
GLUCOSE SERPL-MCNC: 174 MG/DL — HIGH (ref 70–99)
HCT VFR BLD CALC: 30.7 % — LOW (ref 34.5–45)
HGB BLD-MCNC: 9.6 G/DL — LOW (ref 11.5–15.5)
MCHC RBC-ENTMCNC: 26.7 PG — LOW (ref 27–34)
MCHC RBC-ENTMCNC: 31.3 GM/DL — LOW (ref 32–36)
MCV RBC AUTO: 85.5 FL — SIGNIFICANT CHANGE UP (ref 80–100)
NRBC # BLD: 0 /100 WBCS — SIGNIFICANT CHANGE UP (ref 0–0)
PLATELET # BLD AUTO: 212 K/UL — SIGNIFICANT CHANGE UP (ref 150–400)
POTASSIUM SERPL-MCNC: 4.5 MMOL/L — SIGNIFICANT CHANGE UP (ref 3.5–5.3)
POTASSIUM SERPL-SCNC: 4.5 MMOL/L — SIGNIFICANT CHANGE UP (ref 3.5–5.3)
RBC # BLD: 3.59 M/UL — LOW (ref 3.8–5.2)
RBC # FLD: 18.4 % — HIGH (ref 10.3–14.5)
SODIUM SERPL-SCNC: 133 MMOL/L — LOW (ref 135–145)
WBC # BLD: 8.87 K/UL — SIGNIFICANT CHANGE UP (ref 3.8–10.5)
WBC # FLD AUTO: 8.87 K/UL — SIGNIFICANT CHANGE UP (ref 3.8–10.5)

## 2018-03-18 RX ORDER — DILTIAZEM HCL 120 MG
240 CAPSULE, EXT RELEASE 24 HR ORAL DAILY
Qty: 0 | Refills: 0 | Status: DISCONTINUED | OUTPATIENT
Start: 2018-03-18 | End: 2018-03-24

## 2018-03-18 RX ADMIN — Medication 5 MILLIGRAM(S): at 08:15

## 2018-03-18 RX ADMIN — Medication 650 MILLIGRAM(S): at 14:59

## 2018-03-18 RX ADMIN — Medication 650 MILLIGRAM(S): at 15:00

## 2018-03-18 RX ADMIN — Medication 25 MILLIGRAM(S): at 23:09

## 2018-03-18 RX ADMIN — Medication 3 MILLILITER(S): at 23:09

## 2018-03-18 RX ADMIN — Medication 25 MILLIGRAM(S): at 08:15

## 2018-03-18 RX ADMIN — Medication 81 MILLIGRAM(S): at 08:15

## 2018-03-18 RX ADMIN — CLOPIDOGREL BISULFATE 75 MILLIGRAM(S): 75 TABLET, FILM COATED ORAL at 14:58

## 2018-03-18 RX ADMIN — ATORVASTATIN CALCIUM 20 MILLIGRAM(S): 80 TABLET, FILM COATED ORAL at 23:08

## 2018-03-18 RX ADMIN — Medication 240 MILLIGRAM(S): at 08:15

## 2018-03-18 NOTE — PROGRESS NOTE ADULT - SUBJECTIVE AND OBJECTIVE BOX
NEPHROLOGY-[facility]  [phone]  Luba Fischer NP      Patient seen and examined. Was eating breakfast during my visit. Not in any acute distress. Mildly dyspneic        MEDICATIONS  (STANDING):  ALBUTerol/ipratropium for Nebulization 3 milliLiter(s) Nebulizer every 6 hours  aspirin enteric coated 81 milliGRAM(s) Oral daily  atorvastatin 20 milliGRAM(s) Oral at bedtime  BACItracin   Ointment 1 Application(s) Topical two times a day  buDESOnide   0.5 milliGRAM(s) Respule 0.5 milliGRAM(s) Inhalation every 12 hours  clopidogrel Tablet 75 milliGRAM(s) Oral daily  digoxin     Tablet 0.125 milliGRAM(s) Oral every other day  diltiazem    milliGRAM(s) Oral daily  heparin  Injectable 5000 Unit(s) SubCutaneous every 8 hours  methimazole 5 milliGRAM(s) Oral daily  metoprolol     tartrate 25 milliGRAM(s) Oral every 8 hours  predniSONE   Tablet 5 milliGRAM(s) Oral daily  tiotropium 2.5 MICROgram(s)/olodaterol 2.5 MICROgram(s) Inhaler 2 Puff(s) Inhalation daily      VITAL:  T(C): , Max: 37.1 (03-17-18 @ 14:36)  T(F): , Max: 98.7 (03-17-18 @ 14:36)  HR: 89 (03-18-18 @ 04:32)  BP: 121/73 (03-18-18 @ 04:32)  BP(mean): --  RR: 18 (03-18-18 @ 04:32)  SpO2: 94% (03-18-18 @ 04:32)  Wt(kg): --    I and O's:    03-17 @ 07:01  -  03-18 @ 07:00  --------------------------------------------------------  IN: 720 mL / OUT: 875 mL / NET: -155 mL      PHYSICAL EXAM:    Constitutional: NAD  Respiratory: CTAB/L  Cardiovascular: S1 and S2  Gastrointestinal: BS+, soft, NT/distended  Extremities: 2+ LE edema  : No Gottlieb  Skin: No rashes  Access: Not applicable    LABS:                        9.6    9.54  )-----------( 218      ( 17 Mar 2018 10:39 )             30.1     18 Mar 2018 09:31    133<L>  |  94<L>  |  126<H>  ----------------------------<  174<H>  4.5     |  26     |  3.38<H>  17 Mar 2018 08:45    138    |  94<L>  |  122<H>  ----------------------------<  100<H>  5.0     |  24     |  3.61<H>    Ca    8.6        18 Mar 2018 09:31  Ca    9.0        17 Mar 2018 08:45  Phos  4.1       17 Mar 2018 08:45  Mg     1.7       17 Mar 2018 08:45      Urine Studies:      RADIOLOGY & ADDITIONAL STUDIES:    ASSESSMENT:  71-year-old female with past medical history of diabetes, hypertension, severe pulmonary hypertension, right ventricular dysfunction, presents with SOB.  The patient's acute renal failure is likely hemodynamic in nature.  Goals of therapy are to improve her hemodynamics in order to improve her forward flow and renal perfusion.  CASSANDRA  Severe PHTN  Cor Pulm  Failure to thrive  Ascites  Hyponatremia-hypervolemic  Hypotension    Renal: non-oliguric:  CASSANDRA:  creatinine trended down today  GI: +Ascites with no significant response with use of aggressive diuretics (currently off).  Pulmonary:  stable  Cardiology: BP remains soft    RECOMMENDATION:  - HOLD all diuretics while hypotensive  -Monitor volume status closely  - Off  KCL supplements for now    - Maintain K+ restrictive diet for now    - Monitor I & Os

## 2018-03-18 NOTE — PROGRESS NOTE ADULT - ASSESSMENT
Right elbow and hip pain  malnutrition  volume overload/ascites/LE edema  sev PHTN/ Cor pulm  decomp diast hf  sev copd  restrictive lung disease sec to ascites which diminishes diaphragmatic excursion  epistaxis resolved  CASSANDRA  Failure to thrive  Abd pain and likely recurrence of ascites  Hyponatremia    continue current care  hold diuretics for now  hold cardizem for syst <85  encourage po intake

## 2018-03-18 NOTE — PROGRESS NOTE ADULT - SUBJECTIVE AND OBJECTIVE BOX
MEDICINE, PROGRESS NOTE [phone]    FRAN ALEXANDRA 72y [id number]    Patient seen and examined.  Patient is a 72y old  Female who presents with a chief complaint of shortness of breath and difficulty eating due to fullness (01 Mar 2018 19:30)  Pt feels tired, was not able to sleep.    PAST MEDICAL & SURGICAL HISTORY:  Hyperthyroidism  JOSE (obstructive sleep apnea)  Epistaxis  GIB (gastrointestinal bleeding)  CAD S/P percutaneous coronary angioplasty  Afib  Pulmonary HTN  GERD (gastroesophageal reflux disease)  Obesity  Cardiomegaly  Valvular heart disease  COPD (chronic obstructive pulmonary disease): Home O2  Sleep Apnea: by criteria  Loose, teeth: 3 bottom front loose teeth  Female stress incontinence  Shoulder pain, right  Constipation  Arthritis of Knee  H/O heartburn  History of lung cancer  Obese  Hx of hyperlipidemia  Asthma  Diabetes mellitus: type 2  dx about 4-5 years ago   no daily fingerstick  H/O: HTN (hypertension)  Enlarged lymph nodes  Lymph nodes enlarged: s/p biopsy mediastinal  benign  H/O endoscopy  left upper lobectomy    MEDICATIONS  (STANDING):  ALBUTerol/ipratropium for Nebulization 3 milliLiter(s) Nebulizer every 6 hours  aspirin enteric coated 81 milliGRAM(s) Oral daily  atorvastatin 20 milliGRAM(s) Oral at bedtime  BACItracin   Ointment 1 Application(s) Topical two times a day  buDESOnide   0.5 milliGRAM(s) Respule 0.5 milliGRAM(s) Inhalation every 12 hours  clopidogrel Tablet 75 milliGRAM(s) Oral daily  digoxin     Tablet 0.125 milliGRAM(s) Oral every other day  diltiazem    milliGRAM(s) Oral daily  heparin  Injectable 5000 Unit(s) SubCutaneous every 8 hours  methimazole 5 milliGRAM(s) Oral daily  metoprolol     tartrate 25 milliGRAM(s) Oral every 8 hours  predniSONE   Tablet 5 milliGRAM(s) Oral daily  tiotropium 2.5 MICROgram(s)/olodaterol 2.5 MICROgram(s) Inhaler 2 Puff(s) Inhalation daily    MEDICATIONS  (PRN):  acetaminophen   Tablet. 650 milliGRAM(s) Oral every 6 hours PRN Mild Pain (1 - 3)  docusate sodium 100 milliGRAM(s) Oral two times a day PRN Constipation  Gas-X extra strength (simethicone 125 mg 1 Tablet(s) 1 Tablet(s) Chew three times a day PRN bloating  ondansetron Injectable 4 milliGRAM(s) IV Push every 8 hours PRN Nausea and/or Vomiting  sodium chloride 0.65% Nasal 1 Spray(s) Both Nostrils three times a day PRN Nasal Congestion  sucralfate 1 Gram(s) Oral three times a day PRN stomach discomfort    Allergies    No Known Allergies    Intolerances    albuterol (Unknown)      PHYSICAL EXAM:  Constitutional: NAD  HEENT: Normocephalic, EOMI  Neck:  No JVD  Respiratory: CTA B/L, No wheezes  Cardiovascular: S1, S2, RRR, + systolic murmur  Gastrointestinal: BS+, soft, NT/ND  Extremities: + peripheral edema le b/l  Neurological: AAOX3, no focal deficits  Psychiatric: Normal mood, normal affect  : No Gottlieb    Vital Signs Last 24 Hrs  T(C): 36.6 (18 Mar 2018 14:13), Max: 36.8 (18 Mar 2018 04:32)  T(F): 97.8 (18 Mar 2018 14:13), Max: 98.3 (18 Mar 2018 04:32)  HR: 60 (18 Mar 2018 15:17) (57 - 89)  BP: 90/56 (18 Mar 2018 15:17) (87/51 - 121/73)  BP(mean): --  RR: 17 (18 Mar 2018 14:13) (17 - 18)  SpO2: 98% (18 Mar 2018 14:13) (93% - 98%)  I&O's Summary    17 Mar 2018 07:01  -  18 Mar 2018 07:00  --------------------------------------------------------  IN: 720 mL / OUT: 875 mL / NET: -155 mL    18 Mar 2018 07:01  -  18 Mar 2018 16:47  --------------------------------------------------------  IN: 480 mL / OUT: 200 mL / NET: 280 mL        LABS:                        9.6    8.87  )-----------( 212      ( 18 Mar 2018 11:20 )             30.7     03-18    133<L>  |  94<L>  |  126<H>  ----------------------------<  174<H>  4.5   |  26  |  3.38<H>    Ca    8.6      18 Mar 2018 09:31  Phos  4.1     03-17  Mg     1.7     03-17

## 2018-03-19 LAB
ANION GAP SERPL CALC-SCNC: 13 MMOL/L — SIGNIFICANT CHANGE UP (ref 5–17)
BUN SERPL-MCNC: 118 MG/DL — HIGH (ref 7–23)
CALCIUM SERPL-MCNC: 8.4 MG/DL — SIGNIFICANT CHANGE UP (ref 8.4–10.5)
CHLORIDE SERPL-SCNC: 94 MMOL/L — LOW (ref 96–108)
CO2 SERPL-SCNC: 26 MMOL/L — SIGNIFICANT CHANGE UP (ref 22–31)
CREAT SERPL-MCNC: 3.24 MG/DL — HIGH (ref 0.5–1.3)
GLUCOSE SERPL-MCNC: 195 MG/DL — HIGH (ref 70–99)
HCT VFR BLD CALC: 29.6 % — LOW (ref 34.5–45)
HGB BLD-MCNC: 9.5 G/DL — LOW (ref 11.5–15.5)
MCHC RBC-ENTMCNC: 28.1 PG — SIGNIFICANT CHANGE UP (ref 27–34)
MCHC RBC-ENTMCNC: 32 GM/DL — SIGNIFICANT CHANGE UP (ref 32–36)
MCV RBC AUTO: 88 FL — SIGNIFICANT CHANGE UP (ref 80–100)
PLATELET # BLD AUTO: 193 K/UL — SIGNIFICANT CHANGE UP (ref 150–400)
POTASSIUM SERPL-MCNC: 4.9 MMOL/L — SIGNIFICANT CHANGE UP (ref 3.5–5.3)
POTASSIUM SERPL-SCNC: 4.9 MMOL/L — SIGNIFICANT CHANGE UP (ref 3.5–5.3)
RBC # BLD: 3.36 M/UL — LOW (ref 3.8–5.2)
RBC # FLD: 17.6 % — HIGH (ref 10.3–14.5)
SODIUM SERPL-SCNC: 133 MMOL/L — LOW (ref 135–145)
WBC # BLD: 7.7 K/UL — SIGNIFICANT CHANGE UP (ref 3.8–10.5)
WBC # FLD AUTO: 7.7 K/UL — SIGNIFICANT CHANGE UP (ref 3.8–10.5)

## 2018-03-19 RX ADMIN — Medication 25 MILLIGRAM(S): at 08:20

## 2018-03-19 RX ADMIN — CLOPIDOGREL BISULFATE 75 MILLIGRAM(S): 75 TABLET, FILM COATED ORAL at 12:22

## 2018-03-19 RX ADMIN — Medication 25 MILLIGRAM(S): at 15:26

## 2018-03-19 RX ADMIN — Medication 0.5 MILLIGRAM(S): at 08:20

## 2018-03-19 RX ADMIN — TIOTROPIUM BROMIDE AND OLODATEROL 2 PUFF(S): 3.124; 2.736 SPRAY, METERED RESPIRATORY (INHALATION) at 12:23

## 2018-03-19 RX ADMIN — Medication 81 MILLIGRAM(S): at 08:19

## 2018-03-19 RX ADMIN — Medication 3 MILLILITER(S): at 08:19

## 2018-03-19 RX ADMIN — Medication 5 MILLIGRAM(S): at 08:21

## 2018-03-19 RX ADMIN — Medication 0.12 MILLIGRAM(S): at 12:22

## 2018-03-19 RX ADMIN — Medication 5 MILLIGRAM(S): at 12:25

## 2018-03-19 RX ADMIN — Medication 3 MILLILITER(S): at 14:30

## 2018-03-19 RX ADMIN — Medication 240 MILLIGRAM(S): at 08:19

## 2018-03-19 NOTE — PROGRESS NOTE ADULT - ASSESSMENT
71-year-old female with past medical history of diabetes, hypertension, severe pulmonary hypertension, right ventricular dysfunction, presents with SOB.  The patient's acute renal failure is likely hemodynamic in nature.  Goals of therapy are to improve her hemodynamics in order to improve her forward flow and renal perfusion.  CASSANDRA  Severe PHTN  Cor Pulm  Failure to thrive  Ascites  Hyponatremia-hypervolemic  Hypotension and tachycardia    Renal: non-oliguric:  CASSANDRA:  creatinine trended down today  GI: +Ascites with no significant response with use of aggressive diuretics (currently off).  Pulmonary:  stable  Cardiology: BP remains soft    RECOMMENDATION:  - HOLD all diuretics while hypotensive  -Monitor volume status closely  - Increase Prednisone to 10mg po qd  -On dig and cardizem for heart rate control.  She was turned down for ablation in the past due to mult medical problems  - Maintain K+ restrictive diet for now    - Monitor I & Os

## 2018-03-19 NOTE — PROGRESS NOTE ADULT - SUBJECTIVE AND OBJECTIVE BOX
POD/STATUS POST/ENT ISSUE: Epistaxis    INTERVAL HPI: Called to eval patient for dry nostrils, picking at scab in nose Pt seen & examined denies any active bleeding. Pt states she is on oxygen chronically via nasal cannula and her nose is always crusting and dry, and sometimes she has removed clots from her nose. pt with similar symptoms in the past, non compliant with nasal saline and Baci, advised pt to continue using daily,  and avoid nasal trauma.     Vital Signs Last 24 Hrs  T(C): 36.7 (19 Mar 2018 13:56), Max: 36.7 (19 Mar 2018 13:56)  T(F): 98 (19 Mar 2018 13:56), Max: 98 (19 Mar 2018 13:56)  HR: 80 (19 Mar 2018 13:56) (68 - 124)  BP: 88/51 (19 Mar 2018 13:56) (88/51 - 111/74)  RR: 17 (19 Mar 2018 13:56) (16 - 18)  SpO2: 94% (19 Mar 2018 13:56) (94% - 95%)    PHYSICAL EXAM:  Gen: NAD, well-developed  Head: Normocephalic, Atraumatic  Eyes: PERRL, EOMI, no scleral injection  Nose: Nares bilaterally patent, no discharge no bleeding dry nasal mucosa with   Mouth: Mucosa moist, tongue/uvula midline, oropharynx clear  Neck: Flat, supple, no lymphadenopathy, trachea midline, no masses  Resp: breathing easily, no stridor  CV: no peripheral edema/cyanosis    LABS:                        9.5    7.7   )-----------( 193      ( 19 Mar 2018 10:37 )             29.6

## 2018-03-19 NOTE — CHART NOTE - NSCHARTNOTEFT_GEN_A_CORE
72 year old female pt with PMH of AFib, Asthma, CAD, Cardiomegaly, enlarged lymph nodes, COPD, T2DM, endoscopy, upper lobectomy, CHF presented to ED with c/o SOB. Found to have decompensated diastolic CHF, (+) ascites, s/p paracentesis 3/2/18 with removal of 9 liters of ascites and again 3/14 with removal of 7 liters.; CASSANDRA on CKD per renal diuretics on hold. Upon last few visits pt wishes to have regular liberalized diet regardless of indication for therapeutic diet in setting of impaired renal function and heart failure. Ascites and renal function noted as worsening with plans for serial large volume paracenteses per pulmonolgy. Poor prognosis is noted.    Pt seen today for malnutrition follow-up. Expresses improved satisfaction with diet liberalization to regular and states it has resulted in somewhat improved po intake; pt wishes to continue on this diet.     Source: Patient [x]    Family [ ]     other [x] electronic medical records      Diet : Regular liberalized diet      Patient reports [ ] nausea  [ ] vomiting [ ] diarrhea [ ] constipation  [ ]chewing problems [ ] swallowing issues  [x] other: Denies N+V, denies diarrhea or constipation, last BM yesterday 3/18/18      PO intake:  < 50% [ ] 50-75% [x]   % [ ]  other :      Source for PO intake [x] Patient [ ] family [x] chart [ ] staff [ ] other      Enteral /Parenteral Nutrition: [x[ n/a       Current Weight: 137.1 pounds (current, standing, +1 B/L ankle edema noted, (+) ascites). ~14 pound wt loss from 151.4 pounds admit wt 3/2 2/2 fluid removal from paracentesis and prior diuretic    Pertinent Medications: MEDICATIONS  (STANDING):  ALBUTerol/ipratropium for Nebulization 3 milliLiter(s) Nebulizer every 6 hours  aspirin enteric coated 81 milliGRAM(s) Oral daily  atorvastatin 20 milliGRAM(s) Oral at bedtime  BACItracin   Ointment 1 Application(s) Topical two times a day  buDESOnide   0.5 milliGRAM(s) Respule 0.5 milliGRAM(s) Inhalation every 12 hours  clopidogrel Tablet 75 milliGRAM(s) Oral daily  digoxin     Tablet 0.125 milliGRAM(s) Oral every other day  diltiazem    milliGRAM(s) Oral daily  heparin  Injectable 5000 Unit(s) SubCutaneous every 8 hours  methimazole 5 milliGRAM(s) Oral daily  metoprolol     tartrate 25 milliGRAM(s) Oral every 8 hours  predniSONE   Tablet 10 milliGRAM(s) Oral daily  tiotropium 2.5 MICROgram(s)/olodaterol 2.5 MICROgram(s) Inhaler 2 Puff(s) Inhalation daily    MEDICATIONS  (PRN):  acetaminophen   Tablet. 650 milliGRAM(s) Oral every 6 hours PRN Mild Pain (1 - 3)  docusate sodium 100 milliGRAM(s) Oral two times a day PRN Constipation  Gas-X extra strength (simethicone 125 mg 1 Tablet(s) 1 Tablet(s) Chew three times a day PRN bloating  ondansetron Injectable 4 milliGRAM(s) IV Push every 8 hours PRN Nausea and/or Vomiting  sodium chloride 0.65% Nasal 1 Spray(s) Both Nostrils three times a day PRN Nasal Congestion  sucralfate 1 Gram(s) Oral three times a day PRN stomach discomfort    Pertinent Labs:  03-18 Na133 mmol/L<L> Glu 174 mg/dL<H> K+ 4.5 mmol/L Cr  3.38 mg/dL<H>  mg/dL<H> 03-17 Phos 4.1 mg/dL 03-15 Alb 2.4 g/dL<L>      Skin: No pressure ulcers noted      Estimated Needs:   [x] no change since previous assessment  [ ] recalculated:       Previous Nutrition Diagnosis: Limited adherence to nutrition-related recommendations        Nutrition Diagnosis is [x] ongoing; however no additional intervention warranted as pt wishes to be on liberalized diet regardless of education on indication for therapeutic diet  [ ] resolved [ ] not applicable        New Nutrition Diagnosis: [x] not applicable    Interventions:     1) Continue current regular liberalized diet for quality of life purposes. Pt educated on indication for therapeutic diet upon prior encounters for fluid control and heart health however pt continues to request liberalized diet.  2) Pt's food preferences obtained and honored on menu. Encourage po intake as able.       Monitoring and Evaluation:     [x] PO intake [x] Tolerance to diet prescription [x] weights [x] follow up per protocol    [x] other: RD remains available: Dionne Zampelli MS, RDN, CDN, CDE. #[phone]

## 2018-03-19 NOTE — PROGRESS NOTE ADULT - SUBJECTIVE AND OBJECTIVE BOX
[facility] - [facility]     PROGRESS NOTE    CHIEF COMPLAINT:  CRF (hypoxic); COPD; emphysema; JOSE; secondary pulmonary hypertension; right heart failure; epistaxis; ascites;      INTERVAL HISTORY: off diuretics with bouts of hypotension; increasing abdominal girth; quite short of breath with minimal exertion; diffuse pain; weak, tired and frail; no cough, sputum production, chest congestion or wheeze; no fevers, chills or sweats; no chest pain/pressure or palpitations; legs swollen - could not tolerate KATY stockings    REVIEW OF SYSTEMS:  Constitutional: As per interval history  HEENT: Within normal limits  CV: As per interval history  Resp: As per interval history  GI: Within normal limits   : Within normal limits  Musculoskeletal: Within normal limits  Skin: Within normal limits  Neurological: Within normal limits  Psychiatric: Within normal limits  Endocrine: Within normal limits  Hematologic/Lymphatic: Within normal limits  Allergic/Immunologic: Within normal limits    MEDICATIONS:     Pulmonary "  ALBUTerol/ipratropium for Nebulization 3 milliLiter(s) Nebulizer every 6 hours  buDESOnide   0.5 milliGRAM(s) Respule 0.5 milliGRAM(s) Inhalation every 12 hours  tiotropium 2.5 MICROgram(s)/olodaterol 2.5 MICROgram(s) Inhaler 2 Puff(s) Inhalation daily      Anti-microbials:      Cardiovascular:  digoxin     Tablet 0.125 milliGRAM(s) Oral every other day  diltiazem    milliGRAM(s) Oral daily  metoprolol     tartrate 25 milliGRAM(s) Oral every 8 hours      Other:  acetaminophen   Tablet. 650 milliGRAM(s) Oral every 6 hours PRN  aspirin enteric coated 81 milliGRAM(s) Oral daily  atorvastatin 20 milliGRAM(s) Oral at bedtime  BACItracin   Ointment 1 Application(s) Topical two times a day  clopidogrel Tablet 75 milliGRAM(s) Oral daily  docusate sodium 100 milliGRAM(s) Oral two times a day PRN  Gas-X extra strength (simethicone 125 mg 1 Tablet(s) 1 Tablet(s) Chew three times a day PRN  heparin  Injectable 5000 Unit(s) SubCutaneous every 8 hours  methimazole 5 milliGRAM(s) Oral daily  ondansetron Injectable 4 milliGRAM(s) IV Push every 8 hours PRN  predniSONE   Tablet 10 milliGRAM(s) Oral daily  sodium chloride 0.65% Nasal 1 Spray(s) Both Nostrils three times a day PRN  sucralfate 1 Gram(s) Oral three times a day PRN        OBJECTIVE:    I&O's Detail    18 Mar 2018 07:01  -  19 Mar 2018 07:00  --------------------------------------------------------  IN:    Oral Fluid: 600 mL  Total IN: 600 mL    OUT:    Voided: 650 mL  Total OUT: 650 mL    Total NET: -50 mL    Daily Weight in k.2 (19 Mar 2018 07:05)    PHYSICAL EXAM:       ICU Vital Signs Last 24 Hrs  T(C): 36.4 (19 Mar 2018 06:54), Max: 36.6 (18 Mar 2018 14:13)  T(F): 97.6 (19 Mar 2018 06:54), Max: 97.8 (18 Mar 2018 14:13)  HR: 124 (19 Mar 2018 08:20) (57 - 124)  BP: 88/58 (19 Mar 2018 08:20) (87/51 - 111/74)  BP(mean): --  ABP: --  ABP(mean): --  RR: 16 (19 Mar 2018 08:20) (16 - 18)  SpO2: 95% (19 Mar 2018 06:54) (94% - 98%) on 5lpm       General: Awake. Alert. Cooperative. Weak and tired. No distress. Chronically ill appearing	  HEENT:   Atraumatic. Bitemporal wasting. Anicteric. Normal oral mucosa, PERRL, EOMI  Neck: Supple. Trachea midline. Thyroid without enlargement/tenderness/nodules. No carotid bruit. (+) JVD; loss of bilateral supraclavicular fat pads	  Cardiovascular: Irregularly irregular rate and rhythm. S1 S2 normal. II/VI systolic murmur  Respiratory: Respirations unlabored. Decreased breath sounds throughout especially at the bases now with right sided rales. Decreased chest wall excursion  Abdomen: Soft. Non-tender. Mildly distended with fluid wave. No organomegaly. No masses. Normal bowel sounds	  Extremities: Warm to touch. No clubbing or cyanosis. Mild to moderate lower extremity edema up to the thigh. Loss of extremity muscle mass  Pulses: Decreased lower extremity peripheral pulses  Skin: Lower extremity venous stasis changes; skin tear left elbow  Lymph Nodes: Cervical, supraclavicular and axillary nodes normal  Neurological: Motor and sensory examination equal and normal. A and O x 3  Psychiatry: Depressed     LABS:                        9.6    8.87  )-----------( 212      ( 18 Mar 2018 11:20 )             30.7                         9.6    9.54  )-----------( 218      ( 17 Mar 2018 10:39 )             30.1     03-18    133<L>  |  94<L>  |  126<H>  ----------------------------<  174<H>  4.5   |  26  |  3.38<H>    03-17    138  |  94<L>  |  122<H>  ----------------------------<  100<H>  5.0   |  24  |  3.61<H>    Ca      8.6      -18    Ca      9.0      -17    Phos    4.1     -17    Phos    4.7     03-15      Mg       1.7     -17    Mg       1.7     03-15    TPro  4.7<L>  /  Alb  2.4<L>  /  TBili  0.3  /  DBili  x   /  AST  8<L>  /  ALT  6<L>  /  AlkPhos  42  03-15    Serum Pro-Brain Natriuretic Peptide: 71952 pg/mL ( @ 16:29)    CARDIAC MARKERS ( 01 Mar 2018 16:29 )  x     / 0.06 ng/mL / x     / x     / 5.4 ng/mL    < from: Transthoracic Echocardiogram (17 @ 18:58) >    Patient name: FRAN ALEXANDRA  YOB: 1945   Age: 71 (F)   MR#: [id number]  Study Date: 2017  Location: [address] [place], [place] [id number]: Thalia Amezquita RDCS  Study quality: Technically fair  Referring Physician: Pierce Cobb MD  Blood Pressure: 106/58 mmHg  Height: 160 cm  Weight: 64 kg  BSA: 1.7 m2  ------------------------------------------------------------------------  PROCEDURE: Transthoracic echocardiogram with 2-D, M-Mode  and complete spectral and color flow Doppler.  INDICATION: Other specified pulmonary heart diseases  (I27.89)  ------------------------------------------------------------------------  Dimensions:    Normal Values:  LA:     3.6    2.0 - 4.0 cm  Ao:     2.8    2.0 - 3.8 cm  SEPTUM: 0.7    0.6 - 1.2 cm  PWT:    0.9    0.6 - 1.1 cm  LVIDd:  4.1    3.0 - 5.6 cm  LVIDs:  1.9    1.8 - 4.0 cm  Derived variables:  LVMI: 58 g/m2  RWT: 0.43  Fractional short: 54 %  Doppler Peak Velocity (m/sec): AoV=1.8  ------------------------------------------------------------------------  Observations:  Mitral Valve: Mitral annular calcification, otherwise  normal mitral valve. Minimal mitral regurgitation.  Aortic Valve/Aorta: Calcified trileaflet aortic valve with  normal opening. Peak transaortic valve gradient equals 13  mm Hg, mean transaortic valve gradient equals 8 mm Hg. Peak  left ventricular outflow tract gradient equals 6 mm Hg,  mean gradient is equal to 3 mm Hg, LVOT velocity time  integral equals 19 cm.  Aortic Root: 2.8 cm.  LVOT diameter: 1.9 cm.  Left Atrium: Normal left atrium.  LA volume index = 25  cc/m2.  Left Ventricle: Hyperdynamic left ventricular systolic  function. Flattening of the interventricular septum in both  systole and diastole is  consistent with right ventricular  pressure overload. Normal left ventricular internal  dimensions and wall thicknesses.  Right Heart: Severe right atrial enlargement. Right  ventricular enlargement with decreased right ventricular  systolic function. Normal tricuspid valve. Mild-moderate  tricuspid regurgitation. Normal pulmonic valve.  Pericardium/Pleura: Normal pericardium with trace  pericardial effusion.  Left pleural effusion.  Hemodynamic: Estimated right atrial pressure is 8 mm Hg.  Estimated right ventricular systolic pressure equals 72 mm  Hg, assuming right atrial pressure equals 8 mm Hg,  consistent with severe pulmonary hypertension.  ------------------------------------------------------------------------  Conclusions:  1. Calcified trileaflet aortic valve with normal opening.  2. Normal left ventricular internal dimensions and wall  thicknesses.  3. Hyperdynamic left ventricular systolic function.  Flattening of the interventricular septum in both systole  and diastole is  consistent with right ventricular pressure  overload.  4. Severe right atrial enlargement.  5. Right ventricular enlargement with decreased right  ventricular systolic function.  6. Normal tricuspid valve. Mild-moderate tricuspid  regurgitation.  7. Estimated pulmonary artery systolic pressure equals 72  mm Hg, assuming right atrial pressure equals 8 mm Hg,  consistent with severe pulmonary pressures.  *** Compared with echocardiogram of 2017, no  significant changes noted.  ------------------------------------------------------------------------  Confirmed on  2017 - 13:15:09 by Justin Cohen M.D.  ------------------------------------------------------------------------    < end of copied text >  ---------------------------------------------------------------------------------------------------------    MICROBIOLOGY:     Culture - Fungal, Body Fluid (18 @ 21:16)    Specimen Source: .Body Fluid Peritoneal Fluid    Culture Results:   No fungus isolated at 1 week. No additional interim reports will be  issued unless there is a change in culture status.    Culture - Body Fluid with Gram Stain (18 @ 21:16)    Gram Stain:   polymorphonuclear leukocytes seen per low power field  No organisms seen per oil power field  by cytocentrifuge    Specimen Source: Peritoneal Peritoneal Fluid    Culture Results:   No growth to date.    Culture - Acid Fast - Body Fluid w/Smear (18 @ 21:16)    Specimen Source: .Body Fluid Peritoneal Fluid    Acid Fast Bacilli Smear:   No acid fast bacilli seen by fluorochrome stain    RADIOLOGY:  [x] Chest radiographs reviewed and interpreted by me    < from: CT Abdomen and Pelvis w/ Oral Cont (03.10.18 @ 19:07) >    EXAM:  CT ABDOMEN AND PELVIS OC                            PROCEDURE DATE:  03/10/2018      INTERPRETATION:  CLINICAL INFORMATION: Abdominal distention and pain for   several days. Acute renal failure.    COMPARISON: CT abdomen pelvis 2015    PROCEDURE:   CT of the Abdomen and Pelvis was performed without intravenous contrast.   Intravenous contrast: None.  Oral contrast: positive contrast was administered.  Sagittal and coronal reformats were performed.    FINDINGS:    LOWER CHEST:Mild interlobular septal thickening and small bilateral   pleural effusions. Subsegmental atelectasis in the right lower lobe.   Cardiomegaly. Coronary calcification. Calcified hilar lymph nodes.    LIVER: Within normal limits.  BILE DUCTS: Normal caliber.  GALLBLADDER: Cholelithiasis.  SPLEEN: Within normal limits.  PANCREAS: Within normal limits.  ADRENALS: Within normal limits.  KIDNEYS/URETERS: Left lower pole renal cyst.    BLADDER: Within normal limits.  REPRODUCTIVE ORGANS: Posterior uterine fibroid.    BOWEL: No bowel obstruction. Sigmoid diverticulosis, without   diverticulitis. Normal appendix.     PERITONEUM: Large volume ascites.  VESSELS:  Atherosclerotic changes.  RETROPERITONEUM: No lymphadenopathy.    ABDOMINAL WALL: Within normal limits.  BONES: Degenerative changes. T12 vertebral body hemangioma.    IMPRESSION:     Large volume ascites, etiology unclear.    Small bilateral pleural effusions and mild pulmonary edema.    VIOLA ANDREWS M.D., ATTENDINGRADIOLOGIST  This document has been electronically signed. Mar 11 2018  8:52AM      < end of copied text >  ---------------------------------------------------------------------------------------------------------  < from: [facility] Duplex Lower Ext Vein Scan, Bilat (18 @ 11:36) >    EXAM:  DUPLEX SCAN EXT VEINS LOWER BI                            PROCEDURE DATE:  2018      INTERPRETATION:  History:Severe pulmonary hypertension. COPD. Bilateral   lower extremity edema and shortness of breath. Evaluate for DVT.    Bilateral lower extremity venous duplex ultrasound from 2017   demonstrated no DVT.    There is edema of the superficial soft tissues of the lower extremities.    Both common femoral, superficial femoral and popliteal veins are patent   and compressible without evidence of thrombus.    The posterior tibial and peroneal veins are patent.  No thrombus is seen.    IMPRESSION: No evidence of deep vein thrombosis of either lower extremity.    JUDY DE LEON M.D., RADIOLOGY RESIDENT  This document has been electronically signed.  MARTIR VALVERDE M.D., ATTENDING RADIOLOGIST  This document has been electronically signed. Mar  5 2018  2:37PM      < end of copied text >  ---------------------------------------------------------------------------------------------------------  < from: Xray Abdomen 1 View PORTABLE -Urgent (18 @ 17:11) >    EXAM:  XR ABDOMEN PORTABLE URGENT 1V                            PROCEDURE DATE:  2018      INTERPRETATION:  CLINICAL INFORMATION: Abdominal distention. Evaluate for   obstruction.    TECHNIQUE: Portable abdominal radiograph dated 3/4/2018.    COMPARISON: Femoral ultrasound dated 3/2/2018. CT chest dated 3/3/2018.    FINDINGS: Top of Form 1      Large volume ascites.  There are no dilated loops of small bowel.   Bowel gas and stool identified throughout the colon and rectum.   There is no free air visualized.  The visualized osseous structures are unremarkable for patient's stated   age.    IMPRESSION:    Nonobstructive bowel gas pattern.    Ascites.    RACHEL VELARDE M.D., RADIOLOGY RESIDENT  This document has been electronically signed.  PIERRE BAILEY M.D., ATTENDING RADIOLOGIST  This document has been electronically signed. Mar  5 2018 10:22AM      < end of copied text >  ---------------------------------------------------------------------------------------------------------  < from: CT Chest No Cont (18 @ 18:24) >    EXAM:  CT CHEST                            PROCEDURE DATE:  2018        INTERPRETATION:  CLINICAL INFORMATION: Evaluate pleural effusions.   Shortness of breath.    COMPARISON: Chest CT dated 2017. Chest x-ray dated 3/1/2018.    PROCEDURE:   CT of the Chest was performed without intravenous contrast.  Sagittal and coronal reformats were performed.  Axial MIP reformats were also performed.    FINDINGS:    CHEST:     LUNGS AND LARGE AIRWAYS: Patent central airways.  Bibasilar subsegmental   atelectasis, right greater than left.  PLEURA: Trace bilateral pleural effusions.  VESSELS: Thoracic aortic and coronary artery atherosclerosis.  HEART: Cardiomegaly. Small pericardial effusion. Mitral annular   calcification. A densely calcified or metallic density measuring 1.1 x   0.4 cm overlies the basilar left pulmonary artery (3:52, 6:80).  MEDIASTINUM AND STEFANIA: No lymphadenopathy.  CHEST WALL AND LOWER NECK: Within normal limits.  VISUALIZED UPPER ABDOMEN: Small volume ascites. Atherosclerotic changes.  BONES: Multilevel degenerative disease.    IMPRESSION: Trace bilateral pleural effusions.  Bibasilar subsegmental atelectasis, right greater than left.  Densely calcified or metallic density overlies the left pulmonary artery.  See above..      JUAN JUAREZ M.D., RADIOLOGY RESIDENT  This document has been electronically signed.  MADONNA MORGAN M.D., ATTENDING RADIOLOGIST  This document has been electronically signed. Mar  4 2018 12:28PM      < end of copied text >  ---------------------------------------------------------------------------------------------------------  < from: US Abdomen Limited (18 @ 08:50) >    EXAM:  US ABDOMEN LIMITED                            PROCEDURE DATE:  2018      INTERPRETATION:  Clinical information: Assess for ascites for   paracentesis.    Comparison: Ultrasound 2017.    Findings: Targeted ultrasound was performed for purposes of ascites   evaluation. A large amount of ascites is noted in all 4 quadrants.    IMPRESSION:    Diffuse, large volume abdominal ascites.    GAEL SCHERER M.D., ATTENDING RADIOLOGIST  This document has been electronically signed. Mar  2 2018  9:16AM      < end of copied text >  ---------------------------------------------------------------------------------------------------------

## 2018-03-19 NOTE — PROGRESS NOTE ADULT - PROBLEM SELECTOR PLAN 1
Continue Nasal Saline & Baci Daily as directed   Avoid Nasal Trauma  Optimize Anticoagulation as possible  Care per Med team  Above discussed at length with patient and family

## 2018-03-19 NOTE — PROGRESS NOTE ADULT - ASSESSMENT
72F with PMHx of AFib, asthma, CAD, cardiomegaly, CHF, cor pulmonale, depression, enlarged lymph nodes, COPD, DM type II, PSHx of endoscopy, upper lobectomy, abdominal /pelvic CT scan showed large ascites.  Ascites is likely related to CHF and cor pulmonale.  Poor appetite could be related to gastric compression due to ascites.  s/p paracentesis 3/2/18 with removal of 9 liters of ascites and again 3/14 with removal of 7 liters.    As per discussion with IR team/attending, no tunneled peritoneal catheter for ascites drainage due to associated increased risk of infection and pt. does not have a terminal disease.    Plan -   -LVP prn  -Continue PO diet as tolerated  -Further care as per primary team    Tom Murry PA-C    [facility]  [phone]

## 2018-03-19 NOTE — PROGRESS NOTE ADULT - ASSESSMENT
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  3) chronic diastolic CHF (little evidence left sided heart failure on chest CT - trace pleural effusions - minimal ground glass opacities)    CKD with CASSANDRA due to intravascular volume depletion     AF    HTN/HLD/DM/CAD/PCI    PLAN/RECOMMENDATIONS    oxygen supplementation ATC to keep saturation greater than 92%  planning for serial large volume paracenteses rather than for an indwelling catheter (?)  diuretics being held - not helping recurrence of ascites and has lead to worsening renal function  albuterol/atrovent/pulmicort nebs  prednisone 10mg daily  cardiac meds: cardizem CD/lopressor/digoxin/ASA/plavix/lipitor  GI/DVT prophylaxis - carafate/SQ heparin  patient has been treated with endothelin antagonists and phosphodiesterase inhibitors without improvement in PAPs in the past  KATY stockings as tolerated    Will follow with you. Plan of care discussed with the patient at bedside and Dr. Smith. Prognosis is poor    Chacho Gallego MD, [facility] - [phone]  Pulmonary Medicine

## 2018-03-19 NOTE — PROGRESS NOTE ADULT - SUBJECTIVE AND OBJECTIVE BOX
NEPHROLOGY-[facility] [phone]        Patient seen and examined in bed.  She was not dizzy.  Tachycardic this am        MEDICATIONS  (STANDING):  ALBUTerol/ipratropium for Nebulization 3 milliLiter(s) Nebulizer every 6 hours  aspirin enteric coated 81 milliGRAM(s) Oral daily  atorvastatin 20 milliGRAM(s) Oral at bedtime  BACItracin   Ointment 1 Application(s) Topical two times a day  buDESOnide   0.5 milliGRAM(s) Respule 0.5 milliGRAM(s) Inhalation every 12 hours  clopidogrel Tablet 75 milliGRAM(s) Oral daily  digoxin     Tablet 0.125 milliGRAM(s) Oral every other day  diltiazem    milliGRAM(s) Oral daily  heparin  Injectable 5000 Unit(s) SubCutaneous every 8 hours  methimazole 5 milliGRAM(s) Oral daily  metoprolol     tartrate 25 milliGRAM(s) Oral every 8 hours  predniSONE   Tablet 5 milliGRAM(s) Oral daily  tiotropium 2.5 MICROgram(s)/olodaterol 2.5 MICROgram(s) Inhaler 2 Puff(s) Inhalation daily      VITAL:  T(C): , Max: 36.6 (03-18-18 @ 14:13)  T(F): , Max: 97.8 (03-18-18 @ 14:13)  HR: 124 (03-19-18 @ 08:20)  BP: 88/58 (03-19-18 @ 08:20)  BP(mean): --  RR: 16 (03-19-18 @ 08:20)  SpO2: 95% (03-19-18 @ 06:54)  Wt(kg): --    I and O's:    03-18 @ 07:01  -  03-19 @ 07:00  --------------------------------------------------------  IN: 600 mL / OUT: 650 mL / NET: -50 mL          PHYSICAL EXAM:    Constitutional: NAD  HEENT: PERRLA    Neck:  No JVD  Respiratory: reduced breath sounds  Cardiovascular: S1 and S2-tachycardic  Gastrointestinal: BS+, soft, NT/ND  Extremities: No peripheral edema  Neurological: A/O x 3, no focal deficits  Psychiatric: Normal mood, normal affect  : No Gottlieb  Skin: No rashes  Access: Not applicable    LABS:                        9.6    8.87  )-----------( 212      ( 18 Mar 2018 11:20 )             30.7     03-18    133<L>  |  94<L>  |  126<H>  ----------------------------<  174<H>  4.5   |  26  |  3.38<H>    Ca    8.6      18 Mar 2018 09:31  Phos  4.1     03-17  Mg     1.7     03-17            Urine Studies:          RADIOLOGY & ADDITIONAL STUDIES:

## 2018-03-19 NOTE — PROGRESS NOTE ADULT - ATTENDING COMMENTS
AS above, procedure was performed with supervision.
Chacho Peoples MD, FACG, [facility]  [phone]
Elvira Manzano MD, FACP, FACG, AGAF  [facility]  [phone]
Elvira Manzano MD, FACP, FACG, AGAF  [facility]  [phone]
Elvira Manzano MD, FACP, FACG, AGAF  [facility]  [phone]
No renal objection to large volume paracentesis.  Please do not exceed 6-7 liter  IV alb 25% during the procedure and another run 3 hours after the procedure
Stable GI issues.  Will sign-off.  Pls reconsult PRN.

## 2018-03-19 NOTE — PROGRESS NOTE ADULT - SUBJECTIVE AND OBJECTIVE BOX
INTERVAL HPI/OVERNIGHT EVENTS:  no significant change  appetite fair  still with SOB - not significantly changed    MEDICATIONS  (STANDING):  ALBUTerol/ipratropium for Nebulization 3 milliLiter(s) Nebulizer every 6 hours  aspirin enteric coated 81 milliGRAM(s) Oral daily  atorvastatin 20 milliGRAM(s) Oral at bedtime  BACItracin   Ointment 1 Application(s) Topical two times a day  buDESOnide   0.5 milliGRAM(s) Respule 0.5 milliGRAM(s) Inhalation every 12 hours  clopidogrel Tablet 75 milliGRAM(s) Oral daily  digoxin     Tablet 0.125 milliGRAM(s) Oral every other day  diltiazem    milliGRAM(s) Oral daily  heparin  Injectable 5000 Unit(s) SubCutaneous every 8 hours  methimazole 5 milliGRAM(s) Oral daily  metoprolol     tartrate 25 milliGRAM(s) Oral every 8 hours  predniSONE   Tablet 10 milliGRAM(s) Oral daily  tiotropium 2.5 MICROgram(s)/olodaterol 2.5 MICROgram(s) Inhaler 2 Puff(s) Inhalation daily    MEDICATIONS  (PRN):  acetaminophen   Tablet. 650 milliGRAM(s) Oral every 6 hours PRN Mild Pain (1 - 3)  docusate sodium 100 milliGRAM(s) Oral two times a day PRN Constipation  Gas-X extra strength (simethicone 125 mg 1 Tablet(s) 1 Tablet(s) Chew three times a day PRN bloating  ondansetron Injectable 4 milliGRAM(s) IV Push every 8 hours PRN Nausea and/or Vomiting  sodium chloride 0.65% Nasal 1 Spray(s) Both Nostrils three times a day PRN Nasal Congestion  sucralfate 1 Gram(s) Oral three times a day PRN stomach discomfort      Allergies  No Known Allergies    Intolerances  albuterol (Unknown)      Review of Systems:  General:  No fevers, chills  CV:  No pain, palpitatioins  Resp:  +SOB +BRO  :  No pain, bleeding, incontinence, nocturia  Muscle:  No pain, weakness  Neuro:  No weakness, tingling, memory problems  Psych:  +fatigue, No mood problems, depression  Heme:  No petechiae, ecchymosis, easy bruisability    Vital Signs Last 24 Hrs  T(C): 36.4 (19 Mar 2018 06:54), Max: 36.6 (18 Mar 2018 14:13)  T(F): 97.6 (19 Mar 2018 06:54), Max: 97.8 (18 Mar 2018 14:13)  HR: 124 (19 Mar 2018 08:20) (57 - 124)  BP: 88/58 (19 Mar 2018 08:20) (87/51 - 111/74)  BP(mean): --  RR: 16 (19 Mar 2018 08:20) (16 - 18)  SpO2: 95% (19 Mar 2018 06:54) (94% - 98%)    PHYSICAL EXAM:  Constitutional: arousable, resting in bed (propped on pillows) on nasal cannula.   Neck: No LAD, supple  Respiratory: decreased BS at bases  Cardiovascular: S1 and S2, tachy +syst. murmur  Gastrointestinal: hypo-active BS+, distended +fluid wave  Extremities: +b/l LE edema  Neurological: A/O x 3, no focal deficits  Psychiatric: Normal mood, normal affect  Skin: No rashes    LABS:                        9.5    7.7   )-----------( 193      ( 19 Mar 2018 10:37 )             29.6     03-19    133<L>  |  94<L>  |  118<H>  ----------------------------<  195<H>  4.9   |  26  |  3.24<H>    Ca    8.4      19 Mar 2018 10:04            RADIOLOGY & ADDITIONAL TESTS:

## 2018-03-19 NOTE — PROGRESS NOTE ADULT - ASSESSMENT
Right elbow and hip pain  malnutrition  volume overload/ascites/LE edema  sev PHTN/ Cor pulm  decomp diast hf  sev copd  restrictive lung disease sec to ascites which diminishes diaphragmatic excursion  epistaxis - ongoing  CASSANDRA on ckd 4  Failure to thrive  Abd pain and likely recurrence of ascites  Hyponatremia    continue current care  holding diuretics given bp  pt c/o increased pain in calves worse on right - check le dopplers  ent f/u to evaluate for larger clot that pt is not able to pass.  appreciate renal/gi/pulm input  will hopefully attempt LVP this week and attempt to d/c pt home on oral diuretics with prn paracentesis as needed.

## 2018-03-20 LAB
ANION GAP SERPL CALC-SCNC: 12 MMOL/L — SIGNIFICANT CHANGE UP (ref 5–17)
BUN SERPL-MCNC: 125 MG/DL — HIGH (ref 7–23)
CALCIUM SERPL-MCNC: 8.8 MG/DL — SIGNIFICANT CHANGE UP (ref 8.4–10.5)
CHLORIDE SERPL-SCNC: 95 MMOL/L — LOW (ref 96–108)
CO2 SERPL-SCNC: 26 MMOL/L — SIGNIFICANT CHANGE UP (ref 22–31)
CREAT SERPL-MCNC: 3.21 MG/DL — HIGH (ref 0.5–1.3)
GLUCOSE SERPL-MCNC: 192 MG/DL — HIGH (ref 70–99)
HCT VFR BLD CALC: 30.3 % — LOW (ref 34.5–45)
HGB BLD-MCNC: 9.7 G/DL — LOW (ref 11.5–15.5)
MCHC RBC-ENTMCNC: 28.1 PG — SIGNIFICANT CHANGE UP (ref 27–34)
MCHC RBC-ENTMCNC: 31.9 GM/DL — LOW (ref 32–36)
MCV RBC AUTO: 88.2 FL — SIGNIFICANT CHANGE UP (ref 80–100)
PLATELET # BLD AUTO: 207 K/UL — SIGNIFICANT CHANGE UP (ref 150–400)
POTASSIUM SERPL-MCNC: 5.4 MMOL/L — HIGH (ref 3.5–5.3)
POTASSIUM SERPL-SCNC: 5.4 MMOL/L — HIGH (ref 3.5–5.3)
RBC # BLD: 3.44 M/UL — LOW (ref 3.8–5.2)
RBC # FLD: 17.9 % — HIGH (ref 10.3–14.5)
SODIUM SERPL-SCNC: 133 MMOL/L — LOW (ref 135–145)
WBC # BLD: 7.6 K/UL — SIGNIFICANT CHANGE UP (ref 3.8–10.5)
WBC # FLD AUTO: 7.6 K/UL — SIGNIFICANT CHANGE UP (ref 3.8–10.5)

## 2018-03-20 PROCEDURE — 93970 EXTREMITY STUDY: CPT | Mod: 26

## 2018-03-20 RX ORDER — SODIUM CHLORIDE 0.65 %
1 AEROSOL, SPRAY (ML) NASAL
Qty: 0 | Refills: 0 | Status: DISCONTINUED | OUTPATIENT
Start: 2018-03-20 | End: 2018-04-04

## 2018-03-20 RX ADMIN — Medication 25 MILLIGRAM(S): at 08:12

## 2018-03-20 RX ADMIN — ATORVASTATIN CALCIUM 20 MILLIGRAM(S): 80 TABLET, FILM COATED ORAL at 00:26

## 2018-03-20 RX ADMIN — Medication 0.5 MILLIGRAM(S): at 08:15

## 2018-03-20 RX ADMIN — Medication 25 MILLIGRAM(S): at 00:26

## 2018-03-20 RX ADMIN — Medication 10 MILLIGRAM(S): at 08:13

## 2018-03-20 RX ADMIN — Medication 3 MILLILITER(S): at 00:27

## 2018-03-20 RX ADMIN — Medication 100 MILLIGRAM(S): at 00:26

## 2018-03-20 RX ADMIN — Medication 81 MILLIGRAM(S): at 08:14

## 2018-03-20 RX ADMIN — Medication 240 MILLIGRAM(S): at 08:12

## 2018-03-20 RX ADMIN — Medication 1 SPRAY(S): at 18:13

## 2018-03-20 RX ADMIN — TIOTROPIUM BROMIDE AND OLODATEROL 2 PUFF(S): 3.124; 2.736 SPRAY, METERED RESPIRATORY (INHALATION) at 12:43

## 2018-03-20 RX ADMIN — CLOPIDOGREL BISULFATE 75 MILLIGRAM(S): 75 TABLET, FILM COATED ORAL at 11:46

## 2018-03-20 RX ADMIN — Medication 100 MILLIGRAM(S): at 11:46

## 2018-03-20 RX ADMIN — Medication 1 SPRAY(S): at 12:44

## 2018-03-20 NOTE — PROGRESS NOTE ADULT - SUBJECTIVE AND OBJECTIVE BOX
NEPHROLOGY-[facility] [phone]        Patient seen and examined in bed.  She complainted of pain in her right ankle(but more 3 cm above ankle and on the tendon side)        MEDICATIONS  (STANDING):  ALBUTerol/ipratropium for Nebulization 3 milliLiter(s) Nebulizer every 6 hours  aspirin enteric coated 81 milliGRAM(s) Oral daily  atorvastatin 20 milliGRAM(s) Oral at bedtime  BACItracin   Ointment 1 Application(s) Topical two times a day  buDESOnide   0.5 milliGRAM(s) Respule 0.5 milliGRAM(s) Inhalation every 12 hours  clopidogrel Tablet 75 milliGRAM(s) Oral daily  digoxin     Tablet 0.125 milliGRAM(s) Oral every other day  diltiazem    milliGRAM(s) Oral daily  heparin  Injectable 5000 Unit(s) SubCutaneous every 8 hours  methimazole 5 milliGRAM(s) Oral daily  metoprolol     tartrate 25 milliGRAM(s) Oral every 8 hours  predniSONE   Tablet 10 milliGRAM(s) Oral daily  tiotropium 2.5 MICROgram(s)/olodaterol 2.5 MICROgram(s) Inhaler 2 Puff(s) Inhalation daily      VITAL:  T(C): , Max: 36.7 (03-19-18 @ 13:56)  T(F): , Max: 98 (03-19-18 @ 13:56)  HR: 76 (03-20-18 @ 08:10)  BP: 95/62 (03-20-18 @ 08:10)  BP(mean): --  RR: 18 (03-20-18 @ 08:10)  SpO2: 98% (03-19-18 @ 21:10)  Wt(kg): --    I and O's:        PHYSICAL EXAM:    Constitutional: NAD  HEENT: PERRLA    Neck:  No JVD  Respiratory: reduced  Cardiovascular: S1 and S2  Gastrointestinal: BS+, soft, distended  Extremities: No peripheral edema  Neurological: A/O x 3, no focal deficits  Psychiatric: Normal mood, normal affect  : No Gottlieb  Skin: No rashes  Access: Not applicable    LABS:                        9.5    7.7   )-----------( 193      ( 19 Mar 2018 10:37 )             29.6     03-19    133<L>  |  94<L>  |  118<H>  ----------------------------<  195<H>  4.9   |  26  |  3.24<H>    Ca    8.4      19 Mar 2018 10:04            Urine Studies:          RADIOLOGY & ADDITIONAL STUDIES:

## 2018-03-20 NOTE — PROGRESS NOTE ADULT - ASSESSMENT
No change in MS.   Avoiding antidepressant meds as she is still hyponatremic and risk of exacerbating epistaxis.     Recommend  Continue correction of Na  Discussed with  at bedside.    Jose Craig M.D.  Psychiatry  [phone]

## 2018-03-20 NOTE — PROGRESS NOTE ADULT - ASSESSMENT
71-year-old female with past medical history of diabetes, hypertension, severe pulmonary hypertension, right ventricular dysfunction, presents with SOB.  The patient's acute renal failure is likely hemodynamic in nature.  Goals of therapy are to improve her hemodynamics in order to improve her forward flow and renal perfusion.  CASSANDRA  Severe PHTN  Cor Pulm  Failure to thrive  Ascites  Hyponatremia-hypervolemic  Hypotension and tachycardia    Renal: non-oliguric:  CASSANDRA:  creatinine trended down today  GI: +Ascites with no significant response with use of aggressive diuretics (currently off).  Pulmonary:  stable  Cardiology: BP remains soft    RECOMMENDATION:  - HOLD all diuretics while hypotensive BUT i suspect can restart in am.  LE edema is certainly worse then yesterday  -Monitor volume status closely  - Prednisone to 10mg po qd  -On dig and cardizem for heart rate control.  She was turned down for ablation in the past due to mult medical problems  - Maintain K+ restrictive diet for now    - Monitor I & Os

## 2018-03-20 NOTE — CHART NOTE - NSCHARTNOTEFT_GEN_A_CORE
PA called for clarification about the dried blood/mucus in pt b/l nare.   No obvious clot to be removed  recommend NS qid in b/l nare around the clock

## 2018-03-20 NOTE — PROGRESS NOTE ADULT - SUBJECTIVE AND OBJECTIVE BOX
MEDICINE, PROGRESS NOTE [phone]    FRAN ALEXANDRA 72y [id number]    Patient seen and examined.  Patient is a 72y old  Female who presents with a chief complaint of shortness of breath and difficulty eating due to fullness (01 Mar 2018 19:30)  Pt states too much activity make her sob.     PAST MEDICAL & SURGICAL HISTORY:  Hyperthyroidism  JOSE (obstructive sleep apnea)  Epistaxis  GIB (gastrointestinal bleeding)  CAD S/P percutaneous coronary angioplasty  Afib  Pulmonary HTN  GERD (gastroesophageal reflux disease)  Obesity  Cardiomegaly  Valvular heart disease  COPD (chronic obstructive pulmonary disease): Home O2  Sleep Apnea: by criteria  Loose, teeth: 3 bottom front loose teeth  Female stress incontinence  Shoulder pain, right  Constipation  Arthritis of Knee  H/O heartburn  History of lung cancer  Obese  Hx of hyperlipidemia  Asthma  Diabetes mellitus: type 2  dx about 4-5 years ago   no daily fingerstick  H/O: HTN (hypertension)  Enlarged lymph nodes  Lymph nodes enlarged: s/p biopsy mediastinal  benign  H/O endoscopy  left upper lobectomy    MEDICATIONS  (STANDING):  ALBUTerol/ipratropium for Nebulization 3 milliLiter(s) Nebulizer every 6 hours  aspirin enteric coated 81 milliGRAM(s) Oral daily  atorvastatin 20 milliGRAM(s) Oral at bedtime  BACItracin   Ointment 1 Application(s) Topical two times a day  buDESOnide   0.5 milliGRAM(s) Respule 0.5 milliGRAM(s) Inhalation every 12 hours  clopidogrel Tablet 75 milliGRAM(s) Oral daily  digoxin     Tablet 0.125 milliGRAM(s) Oral every other day  diltiazem    milliGRAM(s) Oral daily  heparin  Injectable 5000 Unit(s) SubCutaneous every 8 hours  methimazole 5 milliGRAM(s) Oral daily  metoprolol     tartrate 25 milliGRAM(s) Oral every 8 hours  predniSONE   Tablet 10 milliGRAM(s) Oral daily  sodium chloride 0.65% Nasal 1 Spray(s) Both Nostrils four times a day  tiotropium 2.5 MICROgram(s)/olodaterol 2.5 MICROgram(s) Inhaler 2 Puff(s) Inhalation daily    MEDICATIONS  (PRN):  acetaminophen   Tablet. 650 milliGRAM(s) Oral every 6 hours PRN Mild Pain (1 - 3)  docusate sodium 100 milliGRAM(s) Oral two times a day PRN Constipation  Gas-X extra strength (simethicone 125 mg 1 Tablet(s) 1 Tablet(s) Chew three times a day PRN bloating  ondansetron Injectable 4 milliGRAM(s) IV Push every 8 hours PRN Nausea and/or Vomiting  sucralfate 1 Gram(s) Oral three times a day PRN stomach discomfort    Allergies    No Known Allergies    Intolerances    albuterol (Unknown)      PHYSICAL EXAM:  Constitutional: NAD  HEENT: Normocephalic, EOMI  Neck:  No JVD  Respiratory: CTA B/L ulf, No wheezes, dec bs at bases  Cardiovascular: S1, S2, RRR, + systolic murmur  Gastrointestinal: BS+, soft, NT/ND  Extremities: + peripheral edema le b/l  Neurological: AAOX3, no focal deficits  Psychiatric: Normal mood, normal affect  : No Gottlieb    Vital Signs Last 24 Hrs  T(C): 36.4 (20 Mar 2018 13:40), Max: 36.6 (19 Mar 2018 21:10)  T(F): 97.5 (20 Mar 2018 13:40), Max: 97.8 (19 Mar 2018 21:10)  HR: 55 (20 Mar 2018 13:40) (55 - 78)  BP: 99/59 (20 Mar 2018 13:40) (95/62 - 100/68)  BP(mean): --  RR: 20 (20 Mar 2018 13:40) (18 - 24)  SpO2: 95% (20 Mar 2018 13:40) (95% - 98%)  I&O's Summary    20 Mar 2018 07:01  -  20 Mar 2018 20:26  --------------------------------------------------------  IN: 780 mL / OUT: 0 mL / NET: 780 mL        LABS:                        9.7    7.6   )-----------( 207      ( 20 Mar 2018 10:52 )             30.3     03-20    133<L>  |  95<L>  |  125<H>  ----------------------------<  192<H>  5.4<H>   |  26  |  3.21<H>    Ca    8.8      20 Mar 2018 10:39

## 2018-03-20 NOTE — PROGRESS NOTE ADULT - SUBJECTIVE AND OBJECTIVE BOX
Events noted. Still complains of abdominal discomfort, poor sleep as "they wake me up." Eats food brought by .       Vital Signs Last 24 Hrs  T(C): 36.4 (20 Mar 2018 13:40), Max: 36.6 (19 Mar 2018 21:10)  T(F): 97.5 (20 Mar 2018 13:40), Max: 97.8 (19 Mar 2018 21:10)  HR: 55 (20 Mar 2018 13:40) (55 - 78)  BP: 99/59 (20 Mar 2018 13:40) (95/62 - 100/68)  BP(mean): --  RR: 20 (20 Mar 2018 13:40) (18 - 24)  SpO2: 95% (20 Mar 2018 13:40) (95% - 98%)                          9.7    7.6   )-----------( 207      ( 20 Mar 2018 10:52 )             30.3       03-20    133<L>  |  95<L>  |  125<H>  ----------------------------<  192<H>  5.4<H>   |  26  |  3.21<H>    Ca    8.8      20 Mar 2018 10:39                MEDICATIONS  (STANDING):  ALBUTerol/ipratropium for Nebulization 3 milliLiter(s) Nebulizer every 6 hours  aspirin enteric coated 81 milliGRAM(s) Oral daily  atorvastatin 20 milliGRAM(s) Oral at bedtime  BACItracin   Ointment 1 Application(s) Topical two times a day  buDESOnide   0.5 milliGRAM(s) Respule 0.5 milliGRAM(s) Inhalation every 12 hours  clopidogrel Tablet 75 milliGRAM(s) Oral daily  digoxin     Tablet 0.125 milliGRAM(s) Oral every other day  diltiazem    milliGRAM(s) Oral daily  heparin  Injectable 5000 Unit(s) SubCutaneous every 8 hours  methimazole 5 milliGRAM(s) Oral daily  metoprolol     tartrate 25 milliGRAM(s) Oral every 8 hours  predniSONE   Tablet 10 milliGRAM(s) Oral daily  sodium chloride 0.65% Nasal 1 Spray(s) Both Nostrils four times a day  tiotropium 2.5 MICROgram(s)/olodaterol 2.5 MICROgram(s) Inhaler 2 Puff(s) Inhalation daily    MEDICATIONS  (PRN):  acetaminophen   Tablet. 650 milliGRAM(s) Oral every 6 hours PRN Mild Pain (1 - 3)  docusate sodium 100 milliGRAM(s) Oral two times a day PRN Constipation  Gas-X extra strength (simethicone 125 mg 1 Tablet(s) 1 Tablet(s) Chew three times a day PRN bloating  ondansetron Injectable 4 milliGRAM(s) IV Push every 8 hours PRN Nausea and/or Vomiting  sucralfate 1 Gram(s) Oral three times a day PRN stomach discomfort      Elderly WF in bed, eyes closed, sitting up at an angle,  alert and oriented x 3 .  No psychomotor abnormalities. Insight and judgment are fair. Speech is coherent with normal rate and volume. No hallucinations nor delusions. The patient denied suicidal and homicidal ideation and plan. Mood is frustrated and affect constricted/irritable. Attention and concentration, short term memory, and long term memory within normal limits.     Given supportive psychotherapy.

## 2018-03-20 NOTE — PROGRESS NOTE ADULT - ASSESSMENT
Right elbow, hip, knee, pain  malnutrition  volume overload/ascites/LE edema  sev PHTN/ Cor pulm  decomp diast hf  sev copd  restrictive lung disease sec to ascites which diminishes diaphragmatic excursion  epistaxis - ongoing  CASSANDRA on ckd 4  Failure to thrive  Abd pain and likely recurrence of ascites  Hyponatremia    continue current care  if renal function improves alix AM may restart bumex 0.5 q 12  will consider repeat para possibly thurs  appreciate renal/ent/psych input

## 2018-03-21 LAB
ANION GAP SERPL CALC-SCNC: 10 MMOL/L — SIGNIFICANT CHANGE UP (ref 5–17)
ANION GAP SERPL CALC-SCNC: 13 MMOL/L — SIGNIFICANT CHANGE UP (ref 5–17)
BUN SERPL-MCNC: 118 MG/DL — HIGH (ref 7–23)
BUN SERPL-MCNC: 121 MG/DL — HIGH (ref 7–23)
CALCIUM SERPL-MCNC: 8.9 MG/DL — SIGNIFICANT CHANGE UP (ref 8.4–10.5)
CALCIUM SERPL-MCNC: 9.2 MG/DL — SIGNIFICANT CHANGE UP (ref 8.4–10.5)
CHLORIDE SERPL-SCNC: 95 MMOL/L — LOW (ref 96–108)
CHLORIDE SERPL-SCNC: 96 MMOL/L — SIGNIFICANT CHANGE UP (ref 96–108)
CO2 SERPL-SCNC: 26 MMOL/L — SIGNIFICANT CHANGE UP (ref 22–31)
CO2 SERPL-SCNC: 28 MMOL/L — SIGNIFICANT CHANGE UP (ref 22–31)
CREAT SERPL-MCNC: 2.91 MG/DL — HIGH (ref 0.5–1.3)
CREAT SERPL-MCNC: 3.01 MG/DL — HIGH (ref 0.5–1.3)
GAS PNL BLDV: SIGNIFICANT CHANGE UP
GLUCOSE BLDC GLUCOMTR-MCNC: 118 MG/DL — HIGH (ref 70–99)
GLUCOSE BLDC GLUCOMTR-MCNC: 281 MG/DL — HIGH (ref 70–99)
GLUCOSE SERPL-MCNC: 112 MG/DL — HIGH (ref 70–99)
GLUCOSE SERPL-MCNC: 168 MG/DL — HIGH (ref 70–99)
HCT VFR BLD CALC: 30 % — LOW (ref 34.5–45)
HGB BLD-MCNC: 9.8 G/DL — LOW (ref 11.5–15.5)
MAGNESIUM SERPL-MCNC: 1.6 MG/DL — SIGNIFICANT CHANGE UP (ref 1.6–2.6)
MCHC RBC-ENTMCNC: 28.7 PG — SIGNIFICANT CHANGE UP (ref 27–34)
MCHC RBC-ENTMCNC: 32.7 GM/DL — SIGNIFICANT CHANGE UP (ref 32–36)
MCV RBC AUTO: 87.8 FL — SIGNIFICANT CHANGE UP (ref 80–100)
PLATELET # BLD AUTO: 192 K/UL — SIGNIFICANT CHANGE UP (ref 150–400)
POTASSIUM SERPL-MCNC: 5.9 MMOL/L — HIGH (ref 3.5–5.3)
POTASSIUM SERPL-MCNC: 6.1 MMOL/L — HIGH (ref 3.5–5.3)
POTASSIUM SERPL-MCNC: 6.2 MMOL/L — CRITICAL HIGH (ref 3.5–5.3)
POTASSIUM SERPL-SCNC: 5.9 MMOL/L — HIGH (ref 3.5–5.3)
POTASSIUM SERPL-SCNC: 6.1 MMOL/L — HIGH (ref 3.5–5.3)
POTASSIUM SERPL-SCNC: 6.2 MMOL/L — CRITICAL HIGH (ref 3.5–5.3)
RBC # BLD: 3.42 M/UL — LOW (ref 3.8–5.2)
RBC # FLD: 17.8 % — HIGH (ref 10.3–14.5)
SODIUM SERPL-SCNC: 134 MMOL/L — LOW (ref 135–145)
SODIUM SERPL-SCNC: 134 MMOL/L — LOW (ref 135–145)
WBC # BLD: 8 K/UL — SIGNIFICANT CHANGE UP (ref 3.8–10.5)
WBC # FLD AUTO: 8 K/UL — SIGNIFICANT CHANGE UP (ref 3.8–10.5)

## 2018-03-21 RX ORDER — BUMETANIDE 0.25 MG/ML
0.5 INJECTION INTRAMUSCULAR; INTRAVENOUS ONCE
Qty: 0 | Refills: 0 | Status: COMPLETED | OUTPATIENT
Start: 2018-03-21 | End: 2018-03-21

## 2018-03-21 RX ORDER — INSULIN HUMAN 100 [IU]/ML
10 INJECTION, SOLUTION SUBCUTANEOUS ONCE
Qty: 0 | Refills: 0 | Status: DISCONTINUED | OUTPATIENT
Start: 2018-03-21 | End: 2018-03-21

## 2018-03-21 RX ORDER — SODIUM POLYSTYRENE SULFONATE 4.1 MEQ/G
30 POWDER, FOR SUSPENSION ORAL ONCE
Qty: 0 | Refills: 0 | Status: COMPLETED | OUTPATIENT
Start: 2018-03-21 | End: 2018-03-22

## 2018-03-21 RX ORDER — DEXTROSE 50 % IN WATER 50 %
50 SYRINGE (ML) INTRAVENOUS ONCE
Qty: 0 | Refills: 0 | Status: COMPLETED | OUTPATIENT
Start: 2018-03-21 | End: 2018-03-21

## 2018-03-21 RX ORDER — DEXTROSE 50 % IN WATER 50 %
50 SYRINGE (ML) INTRAVENOUS ONCE
Qty: 0 | Refills: 0 | Status: DISCONTINUED | OUTPATIENT
Start: 2018-03-21 | End: 2018-03-21

## 2018-03-21 RX ORDER — INSULIN HUMAN 100 [IU]/ML
10 INJECTION, SOLUTION SUBCUTANEOUS ONCE
Qty: 0 | Refills: 0 | Status: COMPLETED | OUTPATIENT
Start: 2018-03-21 | End: 2018-03-21

## 2018-03-21 RX ORDER — BUMETANIDE 0.25 MG/ML
0.5 INJECTION INTRAMUSCULAR; INTRAVENOUS EVERY 12 HOURS
Qty: 0 | Refills: 0 | Status: DISCONTINUED | OUTPATIENT
Start: 2018-03-21 | End: 2018-03-24

## 2018-03-21 RX ADMIN — Medication 81 MILLIGRAM(S): at 12:39

## 2018-03-21 RX ADMIN — Medication 0.12 MILLIGRAM(S): at 11:49

## 2018-03-21 RX ADMIN — BUMETANIDE 0.5 MILLIGRAM(S): 0.25 INJECTION INTRAMUSCULAR; INTRAVENOUS at 15:12

## 2018-03-21 RX ADMIN — Medication 25 MILLIGRAM(S): at 15:18

## 2018-03-21 RX ADMIN — Medication 240 MILLIGRAM(S): at 11:54

## 2018-03-21 RX ADMIN — INSULIN HUMAN 10 UNIT(S): 100 INJECTION, SOLUTION SUBCUTANEOUS at 19:06

## 2018-03-21 RX ADMIN — Medication 10 MILLIGRAM(S): at 12:40

## 2018-03-21 RX ADMIN — ATORVASTATIN CALCIUM 20 MILLIGRAM(S): 80 TABLET, FILM COATED ORAL at 23:04

## 2018-03-21 RX ADMIN — Medication 1 SPRAY(S): at 11:54

## 2018-03-21 RX ADMIN — CLOPIDOGREL BISULFATE 75 MILLIGRAM(S): 75 TABLET, FILM COATED ORAL at 11:49

## 2018-03-21 RX ADMIN — Medication 25 MILLIGRAM(S): at 23:04

## 2018-03-21 RX ADMIN — Medication 50 MILLILITER(S): at 19:00

## 2018-03-21 NOTE — CHART NOTE - NSCHARTNOTEFT_GEN_A_CORE
Pt. Hyperkalemic with potassium 6.2.   Repeat BMP with potassium 6.1 and VBG K5.6.   Pt. denies chest pain .   Pt. not amenable earlier to iv lock placed but after speaking to attending has agreed.   D50, insulin and Kayexalate ordered.  12 lead EKG ordered.   Pt. spoken at length on importance of necessity of taking potassium lowering agents.   Pt's daughter in law Francisca was called and has witnessed and tried to enforce importance of potassium lowering treatments .  Pt. adamantly refusing Kayexalate. Will continue to encourage patient to have iv lock for treatment lowering agents.   c/w Bumex bid and will recheck VBG in 2 hrs.    Pt. fully understands and verbally states that she understands all consequences not limited to death.   Will continue close monitoring on telemetry.   Mare Zhu [facility]. Pt. Hyperkalemic with potassium 6.2.   Repeat BMP with potassium 6.1 and VBG K5.6.   Pt. denies chest pain .   Pt. not amenable earlier to iv lock placed but after speaking to attending has agreed.   D50, insulin and Kayexalate ordered.  12 lead EKG ordered.   Pt. spoken at length on importance of necessity of taking potassium lowering agents.   Pt's daughter in law Francisca was called . She witnessed discussion on importance of potassium lowering treatments .  Daughter in law enforced necessity of taking medications.   Pt. adamantly refusing Kayexalate. Will continue to encourage patient to have iv lock for treatment lowering agents.   c/w Bumex bid and will recheck VBG in 2 hrs.    Pt. fully understands and verbally states that she understands all consequences not limited to death.   Will continue close monitoring on telemetry.   Mare Zhu [facility].

## 2018-03-21 NOTE — PROGRESS NOTE ADULT - SUBJECTIVE AND OBJECTIVE BOX
NEPHROLOGY-[facility] [phone]        Patient seen and examined in bed.  She was upset about the food.  Still with knee pain that she believes is the skin        MEDICATIONS  (STANDING):  ALBUTerol/ipratropium for Nebulization 3 milliLiter(s) Nebulizer every 6 hours  aspirin enteric coated 81 milliGRAM(s) Oral daily  atorvastatin 20 milliGRAM(s) Oral at bedtime  BACItracin   Ointment 1 Application(s) Topical two times a day  buDESOnide   0.5 milliGRAM(s) Respule 0.5 milliGRAM(s) Inhalation every 12 hours  clopidogrel Tablet 75 milliGRAM(s) Oral daily  digoxin     Tablet 0.125 milliGRAM(s) Oral every other day  diltiazem    milliGRAM(s) Oral daily  heparin  Injectable 5000 Unit(s) SubCutaneous every 8 hours  methimazole 5 milliGRAM(s) Oral daily  metoprolol     tartrate 25 milliGRAM(s) Oral every 8 hours  predniSONE   Tablet 10 milliGRAM(s) Oral daily  sodium chloride 0.65% Nasal 1 Spray(s) Both Nostrils four times a day  tiotropium 2.5 MICROgram(s)/olodaterol 2.5 MICROgram(s) Inhaler 2 Puff(s) Inhalation daily      VITAL:  T(C): , Max: 36.6 (03-21-18 @ 06:12)  T(F): , Max: 97.8 (03-21-18 @ 06:12)  HR: 80 (03-21-18 @ 06:12)  BP: 93/57 (03-21-18 @ 06:12)  BP(mean): --  RR: 18 (03-21-18 @ 06:12)  SpO2: 90% (03-21-18 @ 06:12)  Wt(kg): --    I and O's:    03-20 @ 07:01  -  03-21 @ 07:00  --------------------------------------------------------  IN: 1180 mL / OUT: 150 mL / NET: 1030 mL          PHYSICAL EXAM:    Constitutional: NAD  HEENT: PERRLA    Neck:  No JVD  Respiratory: CTAB/L  Cardiovascular: S1 and S2  Gastrointestinal: BS+, soft, NT/ND  Extremities: No peripheral edema  Neurological: A/O x 3, no focal deficits  Psychiatric: Normal mood, normal affect  : No Gottlieb  Skin: No rashes  Access: Not applicable    LABS:                        9.7    7.6   )-----------( 207      ( 20 Mar 2018 10:52 )             30.3     03-20    133<L>  |  95<L>  |  125<H>  ----------------------------<  192<H>  5.4<H>   |  26  |  3.21<H>    Ca    8.8      20 Mar 2018 10:39            Urine Studies:          RADIOLOGY & ADDITIONAL STUDIES:    < from: [facility] Duplex Lower Ext Vein Scan, [place] (03.20.18 @ 15:51) >    EXAM:  DUPLEX SCAN EXT VEINS LOWER BI                            PROCEDURE DATE:  03/20/2018            INTERPRETATION:  CLINICAL INFORMATION: Bilateral lower extremity   swelling, rule out DVT.    COMPARISON: Prior examination, dated 3/5/2018 showed no thrombus.    TECHNIQUE: Duplex sonography of the BILATERAL LOWER extremities with   color and spectral Doppler, with and without compression.      FINDINGS: There is edema within the superficial soft tissues of both   lower extremities.    Thereis normal compressibility of the bilateral common femoral, femoral   and popliteal veins. No calf vein thrombosis is detected.    Doppler examination shows normal spontaneous and phasic flow.    IMPRESSION:     No evidence of bilateral lower extremity deep venous thrombosis.                        MARTIR VALVERDE M.D., ATTENDING RADIOLOGIST  This document has been electronically signed. Mar 20 2018  4:43PM                < end of copied text >

## 2018-03-21 NOTE — PROGRESS NOTE ADULT - ASSESSMENT
71-year-old female with past medical history of diabetes, hypertension, severe pulmonary hypertension, right ventricular dysfunction, presents with SOB.  The patient's acute renal failure is likely hemodynamic in nature.  Goals of therapy are to improve her hemodynamics in order to improve her forward flow and renal perfusion.  CASSANDRA  Severe PHTN  Cor Pulm  Failure to thrive  Ascites  Hyponatremia-hypervolemic  Hyperkalemia      Renal: non-oliguric:  CASSANDRA:  creatinine is essentially  from yesterday...  GI: +Ascites with no significant response with use of aggressive diuretics.  Would NOT pursue another paracentesis until renal parameters improve   Pulmonary:  stable  Cardiology: BP remains soft;  We have tried  gtt to help with renal perfusion but that DID NOT HELP and lead to rapid afib    RECOMMENDATION:  - Will try Bumex 0.5mg po bid today  -Monitor volume status closely  - Prednisone to 10mg po qd  -On dig and cardizem for heart rate control.  She was turned down for ablation in the past due to mult medical problems  - Maintain K+ restrictive diet for now    - Monitor I & Os      Poor overall prognosis

## 2018-03-21 NOTE — PROGRESS NOTE ADULT - ASSESSMENT
Right elbow, hip, knee, pain  malnutrition  volume overload/ascites/LE edema  sev PHTN/ Cor pulm  decomp diast hf  sev copd  restrictive lung disease sec to ascites which diminishes diaphragmatic excursion  epistaxis - ongoing  CASSANDRA on ckd 4    continue current care  bumex restarted  check stat bmp  attempt IV access if pt allows.

## 2018-03-21 NOTE — PROGRESS NOTE ADULT - SUBJECTIVE AND OBJECTIVE BOX
MEDICINE, PROGRESS NOTE [phone]    FRAN ALEXANDRA 72y [id number]    Patient seen and examined.  Patient is a 72y old  Female who presents with a chief complaint of shortness of breath and difficulty eating due to fullness (01 Mar 2018 19:30)      PAST MEDICAL & SURGICAL HISTORY:  Hyperthyroidism  JOSE (obstructive sleep apnea)  Epistaxis  GIB (gastrointestinal bleeding)  CAD S/P percutaneous coronary angioplasty  Afib  Pulmonary HTN  GERD (gastroesophageal reflux disease)  Obesity  Cardiomegaly  Valvular heart disease  COPD (chronic obstructive pulmonary disease): Home O2  Sleep Apnea: by criteria  Loose, teeth: 3 bottom front loose teeth  Female stress incontinence  Shoulder pain, right  Constipation  Arthritis of Knee  H/O heartburn  History of lung cancer  Obese  Hx of hyperlipidemia  Asthma  Diabetes mellitus: type 2  dx about 4-5 years ago   no daily fingerstick  H/O: HTN (hypertension)  Enlarged lymph nodes  Lymph nodes enlarged: s/p biopsy mediastinal  benign  H/O endoscopy  left upper lobectomy    MEDICATIONS  (STANDING):  ALBUTerol/ipratropium for Nebulization 3 milliLiter(s) Nebulizer every 6 hours  aspirin enteric coated 81 milliGRAM(s) Oral daily  atorvastatin 20 milliGRAM(s) Oral at bedtime  BACItracin   Ointment 1 Application(s) Topical two times a day  buDESOnide   0.5 milliGRAM(s) Respule 0.5 milliGRAM(s) Inhalation every 12 hours  buMETAnide 0.5 milliGRAM(s) Oral every 12 hours  clopidogrel Tablet 75 milliGRAM(s) Oral daily  digoxin     Tablet 0.125 milliGRAM(s) Oral every other day  diltiazem    milliGRAM(s) Oral daily  heparin  Injectable 5000 Unit(s) SubCutaneous every 8 hours  methimazole 5 milliGRAM(s) Oral daily  metoprolol     tartrate 25 milliGRAM(s) Oral every 8 hours  predniSONE   Tablet 10 milliGRAM(s) Oral daily  sodium chloride 0.65% Nasal 1 Spray(s) Both Nostrils four times a day  tiotropium 2.5 MICROgram(s)/olodaterol 2.5 MICROgram(s) Inhaler 2 Puff(s) Inhalation daily    MEDICATIONS  (PRN):  acetaminophen   Tablet. 650 milliGRAM(s) Oral every 6 hours PRN Mild Pain (1 - 3)  docusate sodium 100 milliGRAM(s) Oral two times a day PRN Constipation  Gas-X extra strength (simethicone 125 mg 1 Tablet(s) 1 Tablet(s) Chew three times a day PRN bloating  ondansetron Injectable 4 milliGRAM(s) IV Push every 8 hours PRN Nausea and/or Vomiting  sucralfate 1 Gram(s) Oral three times a day PRN stomach discomfort    Allergies    No Known Allergies    Intolerances    albuterol (Unknown)      PHYSICAL EXAM:  Constitutional: NAD  HEENT: Normocephalic, EOMI  Neck:  No JVD  Respiratory: CTA B/L, No wheezes  Cardiovascular: S1, S2, RRR, + systolic murmur  Gastrointestinal: BS+, soft, NT/ND  Extremities: No peripheral edema  Neurological: AAOX3, no focal deficits  Psychiatric: Normal mood, normal affect  : No Gottlieb    Vital Signs Last 24 Hrs  T(C): 36.4 (21 Mar 2018 11:57), Max: 36.6 (21 Mar 2018 06:12)  T(F): 97.6 (21 Mar 2018 11:57), Max: 97.8 (21 Mar 2018 06:12)  HR: 98 (21 Mar 2018 15:18) (78 - 101)  BP: 95/63 (21 Mar 2018 14:00) (93/57 - 106/66)  BP(mean): --  RR: 20 (21 Mar 2018 11:57) (18 - 20)  SpO2: 93% (21 Mar 2018 11:57) (90% - 95%)  I&O's Summary    20 Mar 2018 07:01  -  21 Mar 2018 07:00  --------------------------------------------------------  IN: 1180 mL / OUT: 150 mL / NET: 1030 mL    21 Mar 2018 07:01  -  21 Mar 2018 15:46  --------------------------------------------------------  IN: 660 mL / OUT: 150 mL / NET: 510 mL        LABS:                        9.8    8.0   )-----------( 192      ( 21 Mar 2018 12:18 )             30.0     03-21    134<L>  |  96  |  118<H>  ----------------------------<  112<H>  6.2<HH>   |  28  |  3.01<H>    Ca    9.2      21 Mar 2018 12:18  Mg     1.6     03-21  Magnesium, Serum: 1.6 mg/dL (03-21 @ 12:18) MEDICINE, PROGRESS NOTE [phone]    FRAN ALEXANDRA 72y [id number]    Patient seen and examined.  Patient is a 72y old  Female who presents with a chief complaint of shortness of breath and difficulty eating due to fullness (01 Mar 2018 19:30)  Pt frustrated with her condition and refused iv because they poke her too much to attempt it. after discussion she will allow one more try.    PAST MEDICAL & SURGICAL HISTORY:  Hyperthyroidism  JOSE (obstructive sleep apnea)  Epistaxis  GIB (gastrointestinal bleeding)  CAD S/P percutaneous coronary angioplasty  Afib  Pulmonary HTN  GERD (gastroesophageal reflux disease)  Obesity  Cardiomegaly  Valvular heart disease  COPD (chronic obstructive pulmonary disease): Home O2  Sleep Apnea: by criteria  Loose, teeth: 3 bottom front loose teeth  Female stress incontinence  Shoulder pain, right  Constipation  Arthritis of Knee  H/O heartburn  History of lung cancer  Obese  Hx of hyperlipidemia  Asthma  Diabetes mellitus: type 2  dx about 4-5 years ago   no daily fingerstick  H/O: HTN (hypertension)  Enlarged lymph nodes  Lymph nodes enlarged: s/p biopsy mediastinal  benign  H/O endoscopy  left upper lobectomy    MEDICATIONS  (STANDING):  ALBUTerol/ipratropium for Nebulization 3 milliLiter(s) Nebulizer every 6 hours  aspirin enteric coated 81 milliGRAM(s) Oral daily  atorvastatin 20 milliGRAM(s) Oral at bedtime  BACItracin   Ointment 1 Application(s) Topical two times a day  buDESOnide   0.5 milliGRAM(s) Respule 0.5 milliGRAM(s) Inhalation every 12 hours  buMETAnide 0.5 milliGRAM(s) Oral every 12 hours  clopidogrel Tablet 75 milliGRAM(s) Oral daily  digoxin     Tablet 0.125 milliGRAM(s) Oral every other day  diltiazem    milliGRAM(s) Oral daily  heparin  Injectable 5000 Unit(s) SubCutaneous every 8 hours  methimazole 5 milliGRAM(s) Oral daily  metoprolol     tartrate 25 milliGRAM(s) Oral every 8 hours  predniSONE   Tablet 10 milliGRAM(s) Oral daily  sodium chloride 0.65% Nasal 1 Spray(s) Both Nostrils four times a day  tiotropium 2.5 MICROgram(s)/olodaterol 2.5 MICROgram(s) Inhaler 2 Puff(s) Inhalation daily    MEDICATIONS  (PRN):  acetaminophen   Tablet. 650 milliGRAM(s) Oral every 6 hours PRN Mild Pain (1 - 3)  docusate sodium 100 milliGRAM(s) Oral two times a day PRN Constipation  Gas-X extra strength (simethicone 125 mg 1 Tablet(s) 1 Tablet(s) Chew three times a day PRN bloating  ondansetron Injectable 4 milliGRAM(s) IV Push every 8 hours PRN Nausea and/or Vomiting  sucralfate 1 Gram(s) Oral three times a day PRN stomach discomfort    Allergies    No Known Allergies    Intolerances    albuterol (Unknown)      PHYSICAL EXAM:  Constitutional: NAD  HEENT: Normocephalic, EOMI  Neck:  No JVD  Respiratory: CTA B/L, No wheezes  Cardiovascular: S1, S2, RRR, + systolic murmur  Gastrointestinal: BS+, soft, NT/ND  Extremities: No peripheral edema  Neurological: AAOX3, no focal deficits  Psychiatric: Normal mood, normal affect  : No Gottlieb    Vital Signs Last 24 Hrs  T(C): 36.4 (21 Mar 2018 11:57), Max: 36.6 (21 Mar 2018 06:12)  T(F): 97.6 (21 Mar 2018 11:57), Max: 97.8 (21 Mar 2018 06:12)  HR: 98 (21 Mar 2018 15:18) (78 - 101)  BP: 95/63 (21 Mar 2018 14:00) (93/57 - 106/66)  BP(mean): --  RR: 20 (21 Mar 2018 11:57) (18 - 20)  SpO2: 93% (21 Mar 2018 11:57) (90% - 95%)  I&O's Summary    20 Mar 2018 07:01  -  21 Mar 2018 07:00  --------------------------------------------------------  IN: 1180 mL / OUT: 150 mL / NET: 1030 mL    21 Mar 2018 07:01  -  21 Mar 2018 15:46  --------------------------------------------------------  IN: 660 mL / OUT: 150 mL / NET: 510 mL        LABS:                        9.8    8.0   )-----------( 192      ( 21 Mar 2018 12:18 )             30.0     03-21    134<L>  |  96  |  118<H>  ----------------------------<  112<H>  6.2<HH>   |  28  |  3.01<H>    Ca    9.2      21 Mar 2018 12:18  Mg     1.6     03-21  Magnesium, Serum: 1.6 mg/dL (03-21 @ 12:18)

## 2018-03-21 NOTE — CHART NOTE - NSCHARTNOTEFT_GEN_A_CORE
Pt. with a potassium of 6.2  Bumex .5 ivpx1 stat  Will check repeat vbg   Discussed with Dr. Dakota Smith.   Mare Zhu [facility]-BC

## 2018-03-22 LAB
ANION GAP SERPL CALC-SCNC: 13 MMOL/L — SIGNIFICANT CHANGE UP (ref 5–17)
ANION GAP SERPL CALC-SCNC: 14 MMOL/L — SIGNIFICANT CHANGE UP (ref 5–17)
BUN SERPL-MCNC: 120 MG/DL — HIGH (ref 7–23)
BUN SERPL-MCNC: 124 MG/DL — HIGH (ref 7–23)
CALCIUM SERPL-MCNC: 8.8 MG/DL — SIGNIFICANT CHANGE UP (ref 8.4–10.5)
CALCIUM SERPL-MCNC: 9 MG/DL — SIGNIFICANT CHANGE UP (ref 8.4–10.5)
CHLORIDE SERPL-SCNC: 94 MMOL/L — LOW (ref 96–108)
CHLORIDE SERPL-SCNC: 97 MMOL/L — SIGNIFICANT CHANGE UP (ref 96–108)
CO2 SERPL-SCNC: 25 MMOL/L — SIGNIFICANT CHANGE UP (ref 22–31)
CO2 SERPL-SCNC: 26 MMOL/L — SIGNIFICANT CHANGE UP (ref 22–31)
CREAT SERPL-MCNC: 2.89 MG/DL — HIGH (ref 0.5–1.3)
CREAT SERPL-MCNC: 2.99 MG/DL — HIGH (ref 0.5–1.3)
GAS PNL BLDV: SIGNIFICANT CHANGE UP
GLUCOSE BLDC GLUCOMTR-MCNC: 123 MG/DL — HIGH (ref 70–99)
GLUCOSE BLDC GLUCOMTR-MCNC: 124 MG/DL — HIGH (ref 70–99)
GLUCOSE BLDC GLUCOMTR-MCNC: 180 MG/DL — HIGH (ref 70–99)
GLUCOSE BLDC GLUCOMTR-MCNC: 78 MG/DL — SIGNIFICANT CHANGE UP (ref 70–99)
GLUCOSE SERPL-MCNC: 112 MG/DL — HIGH (ref 70–99)
GLUCOSE SERPL-MCNC: 71 MG/DL — SIGNIFICANT CHANGE UP (ref 70–99)
HCT VFR BLD CALC: 30.4 % — LOW (ref 34.5–45)
HGB BLD-MCNC: 9.6 G/DL — LOW (ref 11.5–15.5)
MAGNESIUM SERPL-MCNC: 1.6 MG/DL — SIGNIFICANT CHANGE UP (ref 1.6–2.6)
MCHC RBC-ENTMCNC: 27.4 PG — SIGNIFICANT CHANGE UP (ref 27–34)
MCHC RBC-ENTMCNC: 31.6 GM/DL — LOW (ref 32–36)
MCV RBC AUTO: 86.6 FL — SIGNIFICANT CHANGE UP (ref 80–100)
NRBC # BLD: 0 /100 WBCS — SIGNIFICANT CHANGE UP (ref 0–0)
PHOSPHATE SERPL-MCNC: 3.9 MG/DL — SIGNIFICANT CHANGE UP (ref 2.5–4.5)
PLATELET # BLD AUTO: 236 K/UL — SIGNIFICANT CHANGE UP (ref 150–400)
POTASSIUM SERPL-MCNC: 5.8 MMOL/L — HIGH (ref 3.5–5.3)
POTASSIUM SERPL-MCNC: 6.3 MMOL/L — CRITICAL HIGH (ref 3.5–5.3)
POTASSIUM SERPL-SCNC: 5.8 MMOL/L — HIGH (ref 3.5–5.3)
POTASSIUM SERPL-SCNC: 6.3 MMOL/L — CRITICAL HIGH (ref 3.5–5.3)
RBC # BLD: 3.51 M/UL — LOW (ref 3.8–5.2)
RBC # FLD: 18.8 % — HIGH (ref 10.3–14.5)
SODIUM SERPL-SCNC: 133 MMOL/L — LOW (ref 135–145)
SODIUM SERPL-SCNC: 136 MMOL/L — SIGNIFICANT CHANGE UP (ref 135–145)
T4 FREE SERPL-MCNC: 0.9 NG/DL — SIGNIFICANT CHANGE UP (ref 0.9–1.8)
TSH SERPL-MCNC: 7.22 UIU/ML — HIGH (ref 0.27–4.2)
WBC # BLD: 9.01 K/UL — SIGNIFICANT CHANGE UP (ref 3.8–10.5)
WBC # FLD AUTO: 9.01 K/UL — SIGNIFICANT CHANGE UP (ref 3.8–10.5)

## 2018-03-22 PROCEDURE — 93010 ELECTROCARDIOGRAM REPORT: CPT | Mod: 77,76

## 2018-03-22 PROCEDURE — 93321 DOPPLER ECHO F-UP/LMTD STD: CPT | Mod: 26

## 2018-03-22 PROCEDURE — 76705 ECHO EXAM OF ABDOMEN: CPT | Mod: 26

## 2018-03-22 PROCEDURE — 93010 ELECTROCARDIOGRAM REPORT: CPT

## 2018-03-22 PROCEDURE — 93308 TTE F-UP OR LMTD: CPT | Mod: 26

## 2018-03-22 RX ORDER — INSULIN HUMAN 100 [IU]/ML
10 INJECTION, SOLUTION SUBCUTANEOUS ONCE
Qty: 0 | Refills: 0 | Status: COMPLETED | OUTPATIENT
Start: 2018-03-22 | End: 2018-03-22

## 2018-03-22 RX ORDER — SODIUM BICARBONATE 1 MEQ/ML
25 SYRINGE (ML) INTRAVENOUS ONCE
Qty: 0 | Refills: 0 | Status: DISCONTINUED | OUTPATIENT
Start: 2018-03-22 | End: 2018-03-22

## 2018-03-22 RX ORDER — SODIUM POLYSTYRENE SULFONATE 4.1 MEQ/G
15 POWDER, FOR SUSPENSION ORAL ONCE
Qty: 0 | Refills: 0 | Status: DISCONTINUED | OUTPATIENT
Start: 2018-03-22 | End: 2018-03-22

## 2018-03-22 RX ORDER — BUMETANIDE 0.25 MG/ML
0.5 INJECTION INTRAMUSCULAR; INTRAVENOUS ONCE
Qty: 0 | Refills: 0 | Status: COMPLETED | OUTPATIENT
Start: 2018-03-22 | End: 2018-03-22

## 2018-03-22 RX ORDER — SODIUM BICARBONATE 1 MEQ/ML
50 SYRINGE (ML) INTRAVENOUS ONCE
Qty: 0 | Refills: 0 | Status: COMPLETED | OUTPATIENT
Start: 2018-03-22 | End: 2018-03-22

## 2018-03-22 RX ORDER — BUMETANIDE 0.25 MG/ML
0.5 INJECTION INTRAMUSCULAR; INTRAVENOUS DAILY
Qty: 0 | Refills: 0 | Status: DISCONTINUED | OUTPATIENT
Start: 2018-03-22 | End: 2018-03-22

## 2018-03-22 RX ORDER — DEXTROSE 50 % IN WATER 50 %
50 SYRINGE (ML) INTRAVENOUS ONCE
Qty: 0 | Refills: 0 | Status: COMPLETED | OUTPATIENT
Start: 2018-03-22 | End: 2018-03-22

## 2018-03-22 RX ORDER — SODIUM POLYSTYRENE SULFONATE 4.1 MEQ/G
30 POWDER, FOR SUSPENSION ORAL ONCE
Qty: 0 | Refills: 0 | Status: COMPLETED | OUTPATIENT
Start: 2018-03-22 | End: 2018-03-22

## 2018-03-22 RX ADMIN — Medication 81 MILLIGRAM(S): at 08:25

## 2018-03-22 RX ADMIN — CLOPIDOGREL BISULFATE 75 MILLIGRAM(S): 75 TABLET, FILM COATED ORAL at 12:13

## 2018-03-22 RX ADMIN — Medication 25 MILLIGRAM(S): at 22:34

## 2018-03-22 RX ADMIN — Medication 25 MILLIGRAM(S): at 08:25

## 2018-03-22 RX ADMIN — Medication 50 MILLIEQUIVALENT(S): at 18:48

## 2018-03-22 RX ADMIN — INSULIN HUMAN 10 UNIT(S): 100 INJECTION, SOLUTION SUBCUTANEOUS at 18:45

## 2018-03-22 RX ADMIN — Medication 240 MILLIGRAM(S): at 08:25

## 2018-03-22 RX ADMIN — ATORVASTATIN CALCIUM 20 MILLIGRAM(S): 80 TABLET, FILM COATED ORAL at 22:34

## 2018-03-22 RX ADMIN — Medication 50 MILLILITER(S): at 03:35

## 2018-03-22 RX ADMIN — Medication 50 MILLILITER(S): at 18:45

## 2018-03-22 RX ADMIN — BUMETANIDE 0.5 MILLIGRAM(S): 0.25 INJECTION INTRAMUSCULAR; INTRAVENOUS at 10:31

## 2018-03-22 RX ADMIN — INSULIN HUMAN 10 UNIT(S): 100 INJECTION, SOLUTION SUBCUTANEOUS at 03:35

## 2018-03-22 RX ADMIN — TIOTROPIUM BROMIDE AND OLODATEROL 2 PUFF(S): 3.124; 2.736 SPRAY, METERED RESPIRATORY (INHALATION) at 12:14

## 2018-03-22 RX ADMIN — SODIUM POLYSTYRENE SULFONATE 30 GRAM(S): 4.1 POWDER, FOR SUSPENSION ORAL at 18:10

## 2018-03-22 RX ADMIN — Medication 10 MILLIGRAM(S): at 08:25

## 2018-03-22 RX ADMIN — BUMETANIDE 0.5 MILLIGRAM(S): 0.25 INJECTION INTRAMUSCULAR; INTRAVENOUS at 08:26

## 2018-03-22 NOTE — CHART NOTE - NSCHARTNOTEFT_GEN_A_CORE
Noticed patient K is 6.3.      examined patient. She is without any complaints of chest discomfort, feeling fatigue, numbness tingling, she is not experiencing any SOB and denies any Palpitations.   EKG: no changes from EKG in the AM. shows Afib.   Lungs: CTA B    Ordered: Kayexalate 30 g, insulin 10 units ,1 amp dextrose 50%, 1 amp Sodium bicarb.     Spoke with attending Dr. Reeves- discussed the current order. Was informed order albuterol on top of previous order and if patient refuses Kayexalate give Bumex.   Pt agreed to Kayexalate but refused albuterol because she says it makes her "jittery"

## 2018-03-22 NOTE — PROGRESS NOTE ADULT - ASSESSMENT
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  3) chronic diastolic CHF (little evidence left sided heart failure on chest CT - trace pleural effusions - minimal ground glass opacities)    CKD with CASSANDRA due to intravascular volume depletion     AF    HTN/HLD/DM/CAD/PCI    PLAN/RECOMMENDATIONS    oxygen supplementation ATC to keep saturation greater than 92%  i have recontacted IR to reconsider placing an indwelling intraperitoneal catheter rather than subjecting the patient to serial paracenteses  diurese as tolerated by renal function and hemodynamics although diuretics have not helped recurrence of ascites and have lead to worsening renal function  albuterol/atrovent/pulmicort nebs  prednisone 10mg daily  cardiac meds: bumex/cardizem CD/lopressor/digoxin/ASA/plavix/lipitor  GI/DVT prophylaxis - carafate/SQ heparin  patient has been treated with endothelin antagonists and phosphodiesterase inhibitors without improvement in PAPs in the past  KATY stockings as tolerated    Will follow with you. Plan of care discussed with the patient at bedside. Prognosis is poor    Chacho Gallego MD, [facility] - [phone]  Pulmonary Medicine

## 2018-03-22 NOTE — PROGRESS NOTE ADULT - ASSESSMENT
71-year-old female with past medical history of diabetes, hypertension, severe pulmonary hypertension, right ventricular dysfunction, presents with SOB.  The patient's acute renal failure is likely hemodynamic in nature.  Goals of therapy are to improve her hemodynamics in order to improve her forward flow and renal perfusion.  CASSANDRA  Severe PHTN  Cor Pulm  Failure to thrive  Ascites  Hyponatremia-hypervolemic  Hyperkalemia      Renal: non-oliguric:  CASSANDRA:  creatinine is essentially from yesterday...Kayaxalate 15 x 1 for hyperkalemia--Do not repeat potassium today.  Next blood draw in am  GI: +Ascites with no significant response with use of aggressive diuretics.  Would NOT pursue another paracentesis until renal parameters improve   Pulmonary:  stable  Cardiology: BP remains soft;  We have tried  gtt to help with renal perfusion but that DID NOT HELP and lead to rapid afib    RECOMMENDATION:  - Bumex 0.5mg po bid today  -Monitor volume status closely  - Prednisone to 10mg po qd  - On dig and cardizem for heart rate control.  She was turned down for ablation in the past due to mult medical problems  - Maintain K+ restrictive diet for now    - Monitor I & Os      Poor overall prognosis

## 2018-03-22 NOTE — PROGRESS NOTE ADULT - SUBJECTIVE AND OBJECTIVE BOX
MEDICINE, PROGRESS NOTE [phone]    FRAN ALEXANDRA 72y [id number]    Patient seen and examined.  Patient is a 72y old  Female who presents with a chief complaint of shortness of breath and difficulty eating due to fullness (01 Mar 2018 19:30)  Pt feels more full today.    PAST MEDICAL & SURGICAL HISTORY:  Hyperthyroidism  JOSE (obstructive sleep apnea)  Epistaxis  GIB (gastrointestinal bleeding)  CAD S/P percutaneous coronary angioplasty  Afib  Pulmonary HTN  GERD (gastroesophageal reflux disease)  Obesity  Cardiomegaly  Valvular heart disease  COPD (chronic obstructive pulmonary disease): Home O2  Sleep Apnea: by criteria  Loose, teeth: 3 bottom front loose teeth  Female stress incontinence  Shoulder pain, right  Constipation  Arthritis of Knee  H/O heartburn  History of lung cancer  Obese  Hx of hyperlipidemia  Asthma  Diabetes mellitus: type 2  dx about 4-5 years ago   no daily fingerstick  H/O: HTN (hypertension)  Enlarged lymph nodes  Lymph nodes enlarged: s/p biopsy mediastinal  benign  H/O endoscopy  left upper lobectomy    MEDICATIONS  (STANDING):  ALBUTerol/ipratropium for Nebulization 3 milliLiter(s) Nebulizer every 6 hours  aspirin enteric coated 81 milliGRAM(s) Oral daily  atorvastatin 20 milliGRAM(s) Oral at bedtime  BACItracin   Ointment 1 Application(s) Topical two times a day  buDESOnide   0.5 milliGRAM(s) Respule 0.5 milliGRAM(s) Inhalation every 12 hours  buMETAnide 0.5 milliGRAM(s) Oral every 12 hours  clopidogrel Tablet 75 milliGRAM(s) Oral daily  digoxin     Tablet 0.125 milliGRAM(s) Oral every other day  diltiazem    milliGRAM(s) Oral daily  heparin  Injectable 5000 Unit(s) SubCutaneous every 8 hours  methimazole 5 milliGRAM(s) Oral daily  metoprolol     tartrate 25 milliGRAM(s) Oral every 8 hours  predniSONE   Tablet 10 milliGRAM(s) Oral daily  sodium chloride 0.65% Nasal 1 Spray(s) Both Nostrils four times a day  sodium polystyrene sulfonate Suspension 15 Gram(s) Oral once  tiotropium 2.5 MICROgram(s)/olodaterol 2.5 MICROgram(s) Inhaler 2 Puff(s) Inhalation daily    MEDICATIONS  (PRN):  acetaminophen   Tablet. 650 milliGRAM(s) Oral every 6 hours PRN Mild Pain (1 - 3)  docusate sodium 100 milliGRAM(s) Oral two times a day PRN Constipation  Gas-X extra strength (simethicone 125 mg 1 Tablet(s) 1 Tablet(s) Chew three times a day PRN bloating  ondansetron Injectable 4 milliGRAM(s) IV Push every 8 hours PRN Nausea and/or Vomiting  sucralfate 1 Gram(s) Oral three times a day PRN stomach discomfort    Allergies    No Known Allergies    Intolerances    albuterol (Unknown)      PHYSICAL EXAM:  Constitutional: NAD  HEENT: Normocephalic, EOMI  Neck:  + JVD  Respiratory: CTA B/L ulf, No wheezes, dec bs at bases  Cardiovascular: S1, S2, tachy, + systolic murmur  Gastrointestinal: BS+, soft, NT, distended, + fluid wave  Extremities: + peripheral edema le b/l  Neurological: AAOX3, no focal deficits  Psychiatric: Normal mood, normal affect  : No Gottlieb    Vital Signs Last 24 Hrs  T(C): 36.6 (22 Mar 2018 06:31), Max: 36.6 (22 Mar 2018 06:31)  T(F): 97.9 (22 Mar 2018 06:31), Max: 97.9 (22 Mar 2018 06:31)  HR: 71 (22 Mar 2018 10:30) (65 - 99)  BP: 101/50 (22 Mar 2018 10:30) (91/59 - 107/61)  BP(mean): --  RR: 20 (22 Mar 2018 06:31) (20 - 20)  SpO2: 91% (22 Mar 2018 06:31) (90% - 91%)  I&O's Summary    21 Mar 2018 07:01  -  22 Mar 2018 07:00  --------------------------------------------------------  IN: 900 mL / OUT: 350 mL / NET: 550 mL    22 Mar 2018 07:01  -  22 Mar 2018 15:43  --------------------------------------------------------  IN: 240 mL / OUT: 0 mL / NET: 240 mL        LABS:                        9.6    9.01  )-----------( 236      ( 22 Mar 2018 10:28 )             30.4     03-22    136  |  97  |  120<H>  ----------------------------<  71  5.8<H>   |  25  |  2.89<H>    Ca    9.0      22 Mar 2018 07:16  Mg     1.6     03-21

## 2018-03-22 NOTE — PROVIDER CONTACT NOTE (OTHER) - SITUATION
Pt agreed to take Lantus IV-Push and dextrose for potassium of 6.1. Pt refusing Kayexalate and fu EKG. Pt agreed to take insulin IV-Push and dextrose IV push for potassium of 6.1. Pt refusing Kayexalate and fu EKG.

## 2018-03-22 NOTE — PROGRESS NOTE ADULT - ASSESSMENT
Right elbow, hip, knee, pain  malnutrition  volume overload/ascites/LE edema  sev PHTN/ Cor pulm  comp diast hf  sev copd  restrictive lung disease sec to ascites which diminishes diaphragmatic excursion  epistaxis - on and off  CASSANDRA on ckd 4  hyperkalemia    continue current care  check limited echo to assess if pericardial effusion  continue bumex  pt refuses kayexalate  attempt low potassium diet if pt complies  check stat bmp and TFT  case discussed with Dr hargrove and pt not cleared for discharge from renal perspective  discussed with NP Angelique Kowalski

## 2018-03-22 NOTE — DISCHARGE NOTE ADULT - HOME CARE AGENCY
Home Care [facility]- visiting nurse, PT, RN to [facility] for [facility]. [phone]. They will call you to set up 1st appointment, requested for Sun 3/25.

## 2018-03-22 NOTE — DISCHARGE NOTE ADULT - PATIENT PORTAL LINK FT
You can access the [facility] Patient Portal, offered by [facility], by registering with the following website: [email or web address]

## 2018-03-22 NOTE — CHART NOTE - NSCHARTNOTEFT_GEN_A_CORE
Patient is a 72y old  Female who presents with a chief complaint of shortness of breath and difficulty eating due to fullness (01 Mar 2018 19:30)  Pt seen for persistent hyperkalemia. Pt given Insulin 10 u IVP & D50 50 mL IVP X1 @ 19:30 last evening, for serum K 6.1, & VBG 5.6. Pt has been refusing to take Kayexelate, & refused having an EKG until this morning.   Rpt serum K 5.9, 5.7 on VBG.   EKG done at this time reveals AFib @ 75 bpm, QRS 66 ms, QTc 377 ms.   Pt denied having cp, sob, dizziness, n/v or palps.  Discussed with pt above results, and need to repeat above regimen, and to give Kayexelate. She remains hesitant, and asked that  I contact attending MD. Call placed, awaiting reply.      Vital Signs Last 24 Hrs  T(C): 36.4 (21 Mar 2018 21:07), Max: 36.6 (21 Mar 2018 06:12)  T(F): 97.5 (21 Mar 2018 21:07), Max: 97.8 (21 Mar 2018 06:12)  HR: 84 (21 Mar 2018 21:07) (80 - 101)  BP: 107/61 (21 Mar 2018 21:07) (93/57 - 107/61)  BP(mean): --  RR: 20 (21 Mar 2018 21:07) (18 - 20)  SpO2: 90% (21 Mar 2018 21:07) (90% - 93%)      Labs:                          9.8    8.0   )-----------( 192      ( 21 Mar 2018 12:18 )             30.0     03-21    x   |  x   |  x   ----------------------------<  x   5.9<H>   |  x   |  x     Ca    8.9      21 Mar 2018 16:18  Mg     1.6     03-21          Radiology:    Physical Exam:  General: WN/WD NAD  Neurology: A&Ox3, nonfocal, ANGUIANO x 4  Head:  Normocephalic, atraumatic  Respiratory: CTA B/L  CV: S1 S2, no murmur  Abdominal: Soft, Non Tender, Non Distended   MSK: Alberto 2+ edema, + peripheral pulses, FROM all 4 extremity    Assessment & Plan:  HPI:  72F with PMHx of AFib, asthma, CAD, cardiomegaly, enlarged lymph nodes, COPD, DM type II, PSHx of endoscopy, upper lobectomy, sent to the ED by PMD to be admitted for heart failure. Pt presents with complaints of shortness of breath secondary to fluid in her lungs, abdominal distension and LE edema. Her SOB has been constant, moderate to severe, without clear relieving or exacerbating factor and not associated with any fever, chills, cough, chest pain, abdominal pain, nausea, emesis, urinary symptoms, or any other complaints at this time. (01 Mar 2018 19:30)  Pt seen for persistent hyperkalemia, and need to repeat medication regimen to lower serum K. Pt remains to refuse to take Kayexalate.  PLAN:  >Will re-order Regular Insulin 10 u IVP X1 & D50 50 mL IVP X1 now, if pt agrees  >Awaiting further discussion with MD Cobb  >Will rpt BMP once regimen administered  >Sign out to day team    Follow up with Attending in AM.    Makenzie King PA-C  #[id number]

## 2018-03-22 NOTE — PROGRESS NOTE ADULT - SUBJECTIVE AND OBJECTIVE BOX
[facility] - [facility]     PROGRESS NOTE    CHIEF COMPLAINT: CRF (hypoxic); COPD; emphysema; JOSE; secondary pulmonary hypertension; right heart failure; epistaxis; ascites;     INTERVAL HISTORY: miserable - up all night being treated for hyperkalemia; worsening abdominal distension - awaiting another paracentesis; quite short of breath with minimal exertion; diffuse pain; weak, tired and frail; no cough, sputum production, chest congestion or wheeze; no fevers, chills or sweats; no chest pain/pressure or palpitations; legs swollen - could not tolerate KATY stockings; trial of dobutamine to improve right heart function in hopes of improving renal perfusion related in AF with RVR; restarted cautiously on diuretics      MEDICATIONS:     Pulmonary "  ALBUTerol/ipratropium for Nebulization 3 milliLiter(s) Nebulizer every 6 hours  buDESOnide   0.5 milliGRAM(s) Respule 0.5 milliGRAM(s) Inhalation every 12 hours  tiotropium 2.5 MICROgram(s)/olodaterol 2.5 MICROgram(s) Inhaler 2 Puff(s) Inhalation daily      Anti-microbials:      Cardiovascular:  buMETAnide 0.5 milliGRAM(s) Oral every 12 hours  digoxin     Tablet 0.125 milliGRAM(s) Oral every other day  diltiazem    milliGRAM(s) Oral daily  metoprolol     tartrate 25 milliGRAM(s) Oral every 8 hours      Other:  acetaminophen   Tablet. 650 milliGRAM(s) Oral every 6 hours PRN  aspirin enteric coated 81 milliGRAM(s) Oral daily  atorvastatin 20 milliGRAM(s) Oral at bedtime  BACItracin   Ointment 1 Application(s) Topical two times a day  clopidogrel Tablet 75 milliGRAM(s) Oral daily  docusate sodium 100 milliGRAM(s) Oral two times a day PRN  Gas-X extra strength (simethicone 125 mg 1 Tablet(s) 1 Tablet(s) Chew three times a day PRN  heparin  Injectable 5000 Unit(s) SubCutaneous every 8 hours  methimazole 5 milliGRAM(s) Oral daily  ondansetron Injectable 4 milliGRAM(s) IV Push every 8 hours PRN  predniSONE   Tablet 10 milliGRAM(s) Oral daily  sodium chloride 0.65% Nasal 1 Spray(s) Both Nostrils four times a day  sucralfate 1 Gram(s) Oral three times a day PRN        OBJECTIVE:        I&O's Detail    21 Mar 2018 07:01  -  22 Mar 2018 07:00  --------------------------------------------------------  IN:    Oral Fluid: 900 mL  Total IN: 900 mL    OUT:    Voided: 350 mL  Total OUT: 350 mL    Total NET: 550 mL      22 Mar 2018 07:01  -  22 Mar 2018 09:05  --------------------------------------------------------  IN:    Oral Fluid: 240 mL  Total IN: 240 mL    OUT:  Total OUT: 0 mL    Total NET: 240 mL      Daily Weight in k.7 (22 Mar 2018 08:31)    POCT Blood Glucose.: 78 mg/dL (22 Mar 2018 06:00)  POCT Blood Glucose.: 180 mg/dL (22 Mar 2018 03:33)  POCT Blood Glucose.: 118 mg/dL (21 Mar 2018 22:53)  POCT Blood Glucose.: 281 mg/dL (21 Mar 2018 19:03)      PHYSICAL EXAM:       ICU Vital Signs Last 24 Hrs  T(C): 36.6 (22 Mar 2018 06:31), Max: 36.6 (22 Mar 2018 06:31)  T(F): 97.9 (22 Mar 2018 06:31), Max: 97.9 (22 Mar 2018 06:31)  HR: 90 (22 Mar 2018 08:42) (65 - 101)  BP: 98/57 (22 Mar 2018 08:42) (91/59 - 107/61)  BP(mean): --  ABP: --  ABP(mean): --  RR: 20 (22 Mar 2018 06:31) (20 - 20)  SpO2: 91% (22 Mar 2018 06:31) (90% - 93%) on 5lpm     General: Awake. Alert. Cooperative. Weak and tired. No distress. Chronically ill appearing	  HEENT:   Atraumatic. Bitemporal wasting. Anicteric. Normal oral mucosa, PERRL, EOMI  Neck: Supple. Trachea midline. Thyroid without enlargement/tenderness/nodules. No carotid bruit. (+) JVD; loss of bilateral supraclavicular fat pads	  Cardiovascular: Irregularly irregular rate and rhythm. S1 S2 normal. II/VI systolic murmur  Respiratory: Respirations unlabored. Decreased breath sounds throughout especially at the bases. Decreased chest wall excursion  Abdomen: Soft. Non-tender. Significantly distended with fluid wave. No organomegaly. No masses. Normal bowel sounds	  Extremities: Warm to touch. No clubbing or cyanosis. Mild to moderate lower extremity edema up to the thigh. Loss of extremity muscle mass  Pulses: Decreased lower extremity peripheral pulses  Skin: Lower extremity venous stasis changes; skin tear left elbow  Lymph Nodes: Cervical, supraclavicular and axillary nodes normal  Neurological: Motor and sensory examination equal and normal. A and O x 3  Psychiatry: Depressed       LABS:                        9.8    8.0   )-----------( 192      ( 21 Mar 2018 12:18 )             30.0                         9.7    7.6   )-----------( 207      ( 20 Mar 2018 10:52 )             30.3         136  |  97  |  120<H>  ----------------------------<  71  5.8<H>   |  25  |  2.89<H>        x   |  x   |  x   ----------------------------<  x   5.9<H>   |  x   |  x     Ca      9.0          Ca      8.9            Mg       1.6           Venous Blood Gas:   @ 00:52  7.34/50/56/26/85  VBG Lactate: 0.9    Venous Blood Gas:   @ 16:33  7.34/55/35/29/60  VBG Lactate: 1.3    Serum Pro-Brain Natriuretic Peptide: 67744 pg/mL ( @ 16:29)    CARDIAC MARKERS ( 01 Mar 2018 16:29 )  x     / 0.06 ng/mL / x     / x     / 5.4 ng/mL    < from: Transthoracic Echocardiogram (12.13.17 @ 18:58) >    Patient name: FRAN ALEXANDRA  YOB: 1945   Age: 71 (F)   MR#: [id number]  Study Date: 2017  Location: [address] [place], [place] [id number]: Thalia Amezquita [facility]  Study quality: Technically fair  Referring Physician: Pierce Cobb MD  Blood Pressure: 106/58 mmHg  Height: 160 cm  Weight: 64 kg  BSA: 1.7 m2  ------------------------------------------------------------------------  PROCEDURE: Transthoracic echocardiogram with 2-D, M-Mode  and complete spectral and color flow Doppler.  INDICATION: Other specified pulmonary heart diseases  (I27.89)  ------------------------------------------------------------------------  Dimensions:    Normal Values:  LA:     3.6    2.0 - 4.0 cm  Ao:     2.8    2.0 - 3.8 cm  SEPTUM: 0.7    0.6 - 1.2 cm  PWT:    0.9    0.6 - 1.1 cm  LVIDd:  4.1    3.0 - 5.6 cm  LVIDs:  1.9    1.8 - 4.0 cm  Derived variables:  LVMI: 58 g/m2  RWT: 0.43  Fractional short: 54 %  Doppler Peak Velocity (m/sec): AoV=1.8  ------------------------------------------------------------------------  Observations:  Mitral Valve: Mitral annular calcification, otherwise  normal mitral valve. Minimal mitral regurgitation.  Aortic Valve/Aorta: Calcified trileaflet aortic valve with  normal opening. Peak transaortic valve gradient equals 13  mm Hg, mean transaortic valve gradient equals 8 mm Hg. Peak  left ventricular outflow tract gradient equals 6 mm Hg,  mean gradient is equal to 3 mm Hg, LVOT velocity time  integral equals 19 cm.  Aortic Root: 2.8 cm.  LVOT diameter: 1.9 cm.  Left Atrium: Normal left atrium.  LA volume index = 25  cc/m2.  Left Ventricle: Hyperdynamic left ventricular systolic  function. Flattening of the interventricular septum in both  systole and diastole is  consistent with right ventricular  pressure overload. Normal left ventricular internal  dimensions and wall thicknesses.  Right Heart: Severe right atrial enlargement. Right  ventricular enlargement with decreased right ventricular  systolic function. Normal tricuspid valve. Mild-moderate  tricuspid regurgitation. Normal pulmonic valve.  Pericardium/Pleura: Normal pericardium with trace  pericardial effusion.  Left pleural effusion.  Hemodynamic: Estimated right atrial pressure is 8 mm Hg.  Estimated right ventricular systolic pressure equals 72 mm  Hg, assuming right atrial pressure equals 8 mm Hg,  consistent with severe pulmonary hypertension.  ------------------------------------------------------------------------  Conclusions:  1. Calcified trileaflet aortic valve with normal opening.  2. Normal left ventricular internal dimensions and wall  thicknesses.  3. Hyperdynamic left ventricular systolic function.  Flattening of the interventricular septum in both systole  and diastole is  consistent with right ventricular pressure  overload.  4. Severe right atrial enlargement.  5. Right ventricular enlargement with decreased right  ventricular systolic function.  6. Normal tricuspid valve. Mild-moderate tricuspid  regurgitation.  7. Estimated pulmonary artery systolic pressure equals 72  mm Hg, assuming right atrial pressure equals 8 mm Hg,  consistent with severe pulmonary pressures.  *** Compared with echocardiogram of 2017, no  significant changes noted.  ------------------------------------------------------------------------  Confirmed on  2017 - 13:15:09 by Justin Cohen M.D.  ------------------------------------------------------------------------    < end of copied text >  ---------------------------------------------------------------------------------------------------------    MICROBIOLOGY:     Culture - Fungal, Body Fluid (18 @ 21:16)    Specimen Source: .Body Fluid Peritoneal Fluid    Culture Results:   No fungus isolated at 1 week. No additional interim reports will be  issued unless there is a change in culture status.    Culture - Body Fluid with Gram Stain (18 @ 21:16)    Gram Stain:   polymorphonuclear leukocytes seen per low power field  No organisms seen per oil power field  by cytocentrifuge    Specimen Source: Peritoneal Peritoneal Fluid    Culture Results:   No growth to date.    Culture - Acid Fast - Body Fluid w/Smear (18 @ 21:16)    Specimen Source: .Body Fluid Peritoneal Fluid    Acid Fast Bacilli Smear:   No acid fast bacilli seen by fluorochrome stain    RADIOLOGY:  [x] Chest radiographs reviewed and interpreted by me    < from: CT Abdomen and Pelvis w/ Oral Cont (03.10.18 @ 19:07) >    EXAM:  CT ABDOMEN AND PELVIS OC                            PROCEDURE DATE:  03/10/2018      INTERPRETATION:  CLINICAL INFORMATION: Abdominal distention and pain for   several days. Acute renal failure.    COMPARISON: CT abdomen pelvis 2015    PROCEDURE:   CT of the Abdomen and Pelvis was performed without intravenous contrast.   Intravenous contrast: None.  Oral contrast: positive contrast was administered.  Sagittal and coronal reformats were performed.    FINDINGS:    LOWER CHEST:Mild interlobular septal thickening and small bilateral   pleural effusions. Subsegmental atelectasis in the right lower lobe.   Cardiomegaly. Coronary calcification. Calcified hilar lymph nodes.    LIVER: Within normal limits.  BILE DUCTS: Normal caliber.  GALLBLADDER: Cholelithiasis.  SPLEEN: Within normal limits.  PANCREAS: Within normal limits.  ADRENALS: Within normal limits.  KIDNEYS/URETERS: Left lower pole renal cyst.    BLADDER: Within normal limits.  REPRODUCTIVE ORGANS: Posterior uterine fibroid.    BOWEL: No bowel obstruction. Sigmoid diverticulosis, without   diverticulitis. Normal appendix.     PERITONEUM: Large volume ascites.  VESSELS:  Atherosclerotic changes.  RETROPERITONEUM: No lymphadenopathy.    ABDOMINAL WALL: Within normal limits.  BONES: Degenerative changes. T12 vertebral body hemangioma.    IMPRESSION:     Large volume ascites, etiology unclear.    Small bilateral pleural effusions and mild pulmonary edema.    VIOLA ANDREWS M.D., ATTENDINGRADIOLOGIST  This document has been electronically signed. Mar 11 2018  8:52AM      < end of copied text >  ---------------------------------------------------------------------------------------------------------  < from: [facility] Duplex Lower Ext Vein Scan, Bilat (18 @ 11:36) >    EXAM:  DUPLEX SCAN EXT VEINS LOWER BI                            PROCEDURE DATE:  2018      INTERPRETATION:  History:Severe pulmonary hypertension. COPD. Bilateral   lower extremity edema and shortness of breath. Evaluate for DVT.    Bilateral lower extremity venous duplex ultrasound from 2017   demonstrated no DVT.    There is edema of the superficial soft tissues of the lower extremities.    Both common femoral, superficial femoral and popliteal veins are patent   and compressible without evidence of thrombus.    The posterior tibial and peroneal veins are patent.  No thrombus is seen.    IMPRESSION: No evidence of deep vein thrombosis of either lower extremity.    JUDY DE LEON M.D., RADIOLOGY RESIDENT  This document has been electronically signed.  MARTIR VALVERDE M.D., ATTENDING RADIOLOGIST  This document has been electronically signed. Mar  5 2018  2:37PM      < end of copied text >  ---------------------------------------------------------------------------------------------------------  < from: Xray Abdomen 1 View PORTABLE -Urgent (18 @ 17:11) >    EXAM:  XR ABDOMEN PORTABLE URGENT 1V                            PROCEDURE DATE:  2018      INTERPRETATION:  CLINICAL INFORMATION: Abdominal distention. Evaluate for   obstruction.    TECHNIQUE: Portable abdominal radiograph dated 3/4/2018.    COMPARISON: Femoral ultrasound dated 3/2/2018. CT chest dated 3/3/2018.    FINDINGS: Top of Form 1      Large volume ascites.  There are no dilated loops of small bowel.   Bowel gas and stool identified throughout the colon and rectum.   There is no free air visualized.  The visualized osseous structures are unremarkable for patient's stated   age.    IMPRESSION:    Nonobstructive bowel gas pattern.    Ascites.    RACHEL VELARDE M.D., RADIOLOGY RESIDENT  This document has been electronically signed.  PIERRE BAILEY M.D., ATTENDING RADIOLOGIST  This document has been electronically signed. Mar  5 2018 10:22AM      < end of copied text >  ---------------------------------------------------------------------------------------------------------  < from: CT Chest No Cont (18 @ 18:24) >    EXAM:  CT CHEST                            PROCEDURE DATE:  2018        INTERPRETATION:  CLINICAL INFORMATION: Evaluate pleural effusions.   Shortness of breath.    COMPARISON: Chest CT dated 2017. Chest x-ray dated 3/1/2018.    PROCEDURE:   CT of the Chest was performed without intravenous contrast.  Sagittal and coronal reformats were performed.  Axial MIP reformats were also performed.    FINDINGS:    CHEST:     LUNGS AND LARGE AIRWAYS: Patent central airways.  Bibasilar subsegmental   atelectasis, right greater than left.  PLEURA: Trace bilateral pleural effusions.  VESSELS: Thoracic aortic and coronary artery atherosclerosis.  HEART: Cardiomegaly. Small pericardial effusion. Mitral annular   calcification. A densely calcified or metallic density measuring 1.1 x   0.4 cm overlies the basilar left pulmonary artery (3:52, 6:80).  MEDIASTINUM AND STEFANIA: No lymphadenopathy.  CHEST WALL AND LOWER NECK: Within normal limits.  VISUALIZED UPPER ABDOMEN: Small volume ascites. Atherosclerotic changes.  BONES: Multilevel degenerative disease.    IMPRESSION: Trace bilateral pleural effusions.  Bibasilar subsegmental atelectasis, right greater than left.  Densely calcified or metallic density overlies the left pulmonary artery.  See above..      JUAN JUAREZ M.D., RADIOLOGY RESIDENT  This document has been electronically signed.  MADONNA MORGAN M.D., ATTENDING RADIOLOGIST  This document has been electronically signed. Mar  4 2018 12:28PM      < end of copied text >  ---------------------------------------------------------------------------------------------------------  < from: US Abdomen Limited (18 @ 08:50) >    EXAM:  US ABDOMEN LIMITED                            PROCEDURE DATE:  2018      INTERPRETATION:  Clinical information: Assess for ascites for   paracentesis.    Comparison: Ultrasound 2017.    Findings: Targeted ultrasound was performed for purposes of ascites   evaluation. A large amount of ascites is noted in all 4 quadrants.    IMPRESSION:    Diffuse, large volume abdominal ascites.    GAEL SCEHRER M.D., ATTENDING RADIOLOGIST  This document has been electronically signed. Mar  2 2018  9:16AM      < end of copied text >  --------------------------------------------------------------------------------------------------------- [facility] - [facility]     PROGRESS NOTE    CHIEF COMPLAINT: CRF (hypoxic); COPD; emphysema; JOSE; secondary pulmonary hypertension; right heart failure; epistaxis; ascites;     INTERVAL HISTORY: miserable - up all night being treated for hyperkalemia; worsening abdominal distension - awaiting another paracentesis; quite short of breath with minimal exertion; diffuse pain; weak, tired and frail; no cough, sputum production, chest congestion or wheeze; no fevers, chills or sweats; no chest pain/pressure or palpitations; legs swollen - could not tolerate KATY stockings; trial of dobutamine to improve right heart function in hopes of improving renal perfusion related in AF with RVR; restarted cautiously on diuretics    REVIEW OF SYSTEMS:  Constitutional: As per interval history  HEENT: epistaxis  CV: As per interval history  Resp: As per interval history  GI: anorexia  : As per interval history  Musculoskeletal: diffuse pain  Skin: Within normal limits  Neurological: Within normal limits  Psychiatric: depressed  Endocrine: Within normal limits  Hematologic/Lymphatic: Within normal limits  Allergic/Immunologic: Within normal limits    MEDICATIONS:     Pulmonary "  ALBUTerol/ipratropium for Nebulization 3 milliLiter(s) Nebulizer every 6 hours  buDESOnide   0.5 milliGRAM(s) Respule 0.5 milliGRAM(s) Inhalation every 12 hours  tiotropium 2.5 MICROgram(s)/olodaterol 2.5 MICROgram(s) Inhaler 2 Puff(s) Inhalation daily      Anti-microbials:      Cardiovascular:  buMETAnide 0.5 milliGRAM(s) Oral every 12 hours  digoxin     Tablet 0.125 milliGRAM(s) Oral every other day  diltiazem    milliGRAM(s) Oral daily  metoprolol     tartrate 25 milliGRAM(s) Oral every 8 hours      Other:  acetaminophen   Tablet. 650 milliGRAM(s) Oral every 6 hours PRN  aspirin enteric coated 81 milliGRAM(s) Oral daily  atorvastatin 20 milliGRAM(s) Oral at bedtime  BACItracin   Ointment 1 Application(s) Topical two times a day  clopidogrel Tablet 75 milliGRAM(s) Oral daily  docusate sodium 100 milliGRAM(s) Oral two times a day PRN  Gas-X extra strength (simethicone 125 mg 1 Tablet(s) 1 Tablet(s) Chew three times a day PRN  heparin  Injectable 5000 Unit(s) SubCutaneous every 8 hours  methimazole 5 milliGRAM(s) Oral daily  ondansetron Injectable 4 milliGRAM(s) IV Push every 8 hours PRN  predniSONE   Tablet 10 milliGRAM(s) Oral daily  sodium chloride 0.65% Nasal 1 Spray(s) Both Nostrils four times a day  sucralfate 1 Gram(s) Oral three times a day PRN        OBJECTIVE:    I&O's Detail    21 Mar 2018 07:01  -  22 Mar 2018 07:00  --------------------------------------------------------  IN:    Oral Fluid: 900 mL  Total IN: 900 mL    OUT:    Voided: 350 mL  Total OUT: 350 mL    Total NET: 550 mL      22 Mar 2018 07:01  -  22 Mar 2018 09:05  --------------------------------------------------------  IN:    Oral Fluid: 240 mL  Total IN: 240 mL    OUT:  Total OUT: 0 mL    Total NET: 240 mL      Daily Weight in k.7 (22 Mar 2018 08:31)    POCT Blood Glucose.: 78 mg/dL (22 Mar 2018 06:00)  POCT Blood Glucose.: 180 mg/dL (22 Mar 2018 03:33)  POCT Blood Glucose.: 118 mg/dL (21 Mar 2018 22:53)  POCT Blood Glucose.: 281 mg/dL (21 Mar 2018 19:03)      PHYSICAL EXAM:       ICU Vital Signs Last 24 Hrs  T(C): 36.6 (22 Mar 2018 06:31), Max: 36.6 (22 Mar 2018 06:31)  T(F): 97.9 (22 Mar 2018 06:31), Max: 97.9 (22 Mar 2018 06:31)  HR: 90 (22 Mar 2018 08:42) (65 - 101)  BP: 98/57 (22 Mar 2018 08:42) (91/59 - 107/61)  BP(mean): --  ABP: --  ABP(mean): --  RR: 20 (22 Mar 2018 06:31) (20 - 20)  SpO2: 91% (22 Mar 2018 06:31) (90% - 93%) on 5lpm     General: Awake. Alert. Cooperative. Weak and tired. No distress. Chronically ill appearing	  HEENT:   Atraumatic. Bitemporal wasting. Anicteric. Normal oral mucosa, PERRL, EOMI  Neck: Supple. Trachea midline. Thyroid without enlargement/tenderness/nodules. No carotid bruit. (+) JVD; loss of bilateral supraclavicular fat pads	  Cardiovascular: Irregularly irregular rate and rhythm. S1 S2 normal. II/VI systolic murmur  Respiratory: Respirations unlabored. Decreased breath sounds throughout especially at the bases. Decreased chest wall excursion  Abdomen: Soft. Non-tender. Significantly distended with fluid wave. No organomegaly. No masses. Normal bowel sounds	  Extremities: Warm to touch. No clubbing or cyanosis. Mild to moderate lower extremity edema up to the thigh. Loss of extremity muscle mass  Pulses: Decreased lower extremity peripheral pulses  Skin: Lower extremity venous stasis changes; skin tear left elbow  Lymph Nodes: Cervical, supraclavicular and axillary nodes normal  Neurological: Motor and sensory examination equal and normal. A and O x 3  Psychiatry: Depressed       LABS:                        9.8    8.0   )-----------( 192      ( 21 Mar 2018 12:18 )             30.0                         9.7    7.6   )-----------( 207      ( 20 Mar 2018 10:52 )             30.3         136  |  97  |  120<H>  ----------------------------<  71  5.8<H>   |  25  |  2.89<H>        x   |  x   |  x   ----------------------------<  x   5.9<H>   |  x   |  x     Ca      9.0          Ca      8.9            Mg       1.6           Venous Blood Gas:   @ 00:52  7.34/50/56/26/85  VBG Lactate: 0.9    Venous Blood Gas:   @ 16:33  7.34/55/35/29/60  VBG Lactate: 1.3    Serum Pro-Brain Natriuretic Peptide: 17567 pg/mL ( @ 16:29)    CARDIAC MARKERS ( 01 Mar 2018 16:29 )  x     / 0.06 ng/mL / x     / x     / 5.4 ng/mL    < from: Transthoracic Echocardiogram (17 @ 18:58) >    Patient name: FRAN ALEXANDRA  YOB: 1945   Age: 71 (F)   MR#: [id number]  Study Date: 2017  Location: [address] [place], [place] [id number]: Thalia Amezquita [facility] [[email or web address]]  Study quality: Technically fair  Referring Physician: Pierce Cobb MD  Blood Pressure: 106/58 mmHg  Height: 160 cm  Weight: 64 kg  BSA: 1.7 m2  ------------------------------------------------------------------------  PROCEDURE: Transthoracic echocardiogram with 2-D, M-Mode  and complete spectral and color flow Doppler.  INDICATION: Other specified pulmonary heart diseases  (I27.89)  ------------------------------------------------------------------------  Dimensions:    Normal Values:  LA:     3.6    2.0 - 4.0 cm  Ao:     2.8    2.0 - 3.8 cm  SEPTUM: 0.7    0.6 - 1.2 cm  PWT:    0.9    0.6 - 1.1 cm  LVIDd:  4.1    3.0 - 5.6 cm  LVIDs:  1.9    1.8 - 4.0 cm  Derived variables:  LVMI: 58 g/m2  RWT: 0.43  Fractional short: 54 %  Doppler Peak Velocity (m/sec): AoV=1.8  ------------------------------------------------------------------------  Observations:  Mitral Valve: Mitral annular calcification, otherwise  normal mitral valve. Minimal mitral regurgitation.  Aortic Valve/Aorta: Calcified trileaflet aortic valve with  normal opening. Peak transaortic valve gradient equals 13  mm Hg, mean transaortic valve gradient equals 8 mm Hg. Peak  left ventricular outflow tract gradient equals 6 mm Hg,  mean gradient is equal to 3 mm Hg, LVOT velocity time  integral equals 19 cm.  Aortic Root: 2.8 cm.  LVOT diameter: 1.9 cm.  Left Atrium: Normal left atrium.  LA volume index = 25  cc/m2.  Left Ventricle: Hyperdynamic left ventricular systolic  function. Flattening of the interventricular septum in both  systole and diastole is  consistent with right ventricular  pressure overload. Normal left ventricular internal  dimensions and wall thicknesses.  Right Heart: Severe right atrial enlargement. Right  ventricular enlargement with decreased right ventricular  systolic function. Normal tricuspid valve. Mild-moderate  tricuspid regurgitation. Normal pulmonic valve.  Pericardium/Pleura: Normal pericardium with trace  pericardial effusion.  Left pleural effusion.  Hemodynamic: Estimated right atrial pressure is 8 mm Hg.  Estimated right ventricular systolic pressure equals 72 mm  Hg, assuming right atrial pressure equals 8 mm Hg,  consistent with severe pulmonary hypertension.  ------------------------------------------------------------------------  Conclusions:  1. Calcified trileaflet aortic valve with normal opening.  2. Normal left ventricular internal dimensions and wall  thicknesses.  3. Hyperdynamic left ventricular systolic function.  Flattening of the interventricular septum in both systole  and diastole is  consistent with right ventricular pressure  overload.  4. Severe right atrial enlargement.  5. Right ventricular enlargement with decreased right  ventricular systolic function.  6. Normal tricuspid valve. Mild-moderate tricuspid  regurgitation.  7. Estimated pulmonary artery systolic pressure equals 72  mm Hg, assuming right atrial pressure equals 8 mm Hg,  consistent with severe pulmonary pressures.  *** Compared with echocardiogram of 2017, no  significant changes noted.  ------------------------------------------------------------------------  Confirmed on  2017 - 13:15:09 by Justin Cohen M.D.  ------------------------------------------------------------------------    < end of copied text >  ---------------------------------------------------------------------------------------------------------    MICROBIOLOGY:     Culture - Fungal, Body Fluid (18 @ 21:16)    Specimen Source: .Body Fluid Peritoneal Fluid    Culture Results:   No fungus isolated at 1 week. No additional interim reports will be  issued unless there is a change in culture status.    Culture - Body Fluid with Gram Stain (18 @ 21:16)    Gram Stain:   polymorphonuclear leukocytes seen per low power field  No organisms seen per oil power field  by cytocentrifuge    Specimen Source: Peritoneal Peritoneal Fluid    Culture Results:   No growth to date.    Culture - Acid Fast - Body Fluid w/Smear (18 @ 21:16)    Specimen Source: .Body Fluid Peritoneal Fluid    Acid Fast Bacilli Smear:   No acid fast bacilli seen by fluorochrome stain    RADIOLOGY:  [x] Chest radiographs reviewed and interpreted by me    < from: CT Abdomen and Pelvis w/ Oral Cont (03.10.18 @ 19:07) >    EXAM:  CT ABDOMEN AND PELVIS OC                            PROCEDURE DATE:  03/10/2018      INTERPRETATION:  CLINICAL INFORMATION: Abdominal distention and pain for   several days. Acute renal failure.    COMPARISON: CT abdomen pelvis 2015    PROCEDURE:   CT of the Abdomen and Pelvis was performed without intravenous contrast.   Intravenous contrast: None.  Oral contrast: positive contrast was administered.  Sagittal and coronal reformats were performed.    FINDINGS:    LOWER CHEST:Mild interlobular septal thickening and small bilateral   pleural effusions. Subsegmental atelectasis in the right lower lobe.   Cardiomegaly. Coronary calcification. Calcified hilar lymph nodes.    LIVER: Within normal limits.  BILE DUCTS: Normal caliber.  GALLBLADDER: Cholelithiasis.  SPLEEN: Within normal limits.  PANCREAS: Within normal limits.  ADRENALS: Within normal limits.  KIDNEYS/URETERS: Left lower pole renal cyst.    BLADDER: Within normal limits.  REPRODUCTIVE ORGANS: Posterior uterine fibroid.    BOWEL: No bowel obstruction. Sigmoid diverticulosis, without   diverticulitis. Normal appendix.     PERITONEUM: Large volume ascites.  VESSELS:  Atherosclerotic changes.  RETROPERITONEUM: No lymphadenopathy.    ABDOMINAL WALL: Within normal limits.  BONES: Degenerative changes. T12 vertebral body hemangioma.    IMPRESSION:     Large volume ascites, etiology unclear.    Small bilateral pleural effusions and mild pulmonary edema.    VIOLA ANDREWS M.D., ATTENDINGRADIOLOGIST  This document has been electronically signed. Mar 11 2018  8:52AM      < end of copied text >  ---------------------------------------------------------------------------------------------------------  < from: [facility] Duplex Lower Ext Vein Scan, Bilat (18 @ 11:36) >    EXAM:  DUPLEX SCAN EXT VEINS LOWER BI                            PROCEDURE DATE:  2018      INTERPRETATION:  History:Severe pulmonary hypertension. COPD. Bilateral   lower extremity edema and shortness of breath. Evaluate for DVT.    Bilateral lower extremity venous duplex ultrasound from 2017   demonstrated no DVT.    There is edema of the superficial soft tissues of the lower extremities.    Both common femoral, superficial femoral and popliteal veins are patent   and compressible without evidence of thrombus.    The posterior tibial and peroneal veins are patent.  No thrombus is seen.    IMPRESSION: No evidence of deep vein thrombosis of either lower extremity.    JUDY DE LEON M.D., RADIOLOGY RESIDENT  This document has been electronically signed.  MARTIR VALVERDE M.D., ATTENDING RADIOLOGIST  This document has been electronically signed. Mar  5 2018  2:37PM      < end of copied text >  ---------------------------------------------------------------------------------------------------------  < from: CT Chest No Cont (18 @ 18:24) >    EXAM:  CT CHEST                            PROCEDURE DATE:  2018        INTERPRETATION:  CLINICAL INFORMATION: Evaluate pleural effusions.   Shortness of breath.    COMPARISON: Chest CT dated 2017. Chest x-ray dated 3/1/2018.    PROCEDURE:   CT of the Chest was performed without intravenous contrast.  Sagittal and coronal reformats were performed.  Axial MIP reformats were also performed.    FINDINGS:    CHEST:     LUNGS AND LARGE AIRWAYS: Patent central airways.  Bibasilar subsegmental   atelectasis, right greater than left.  PLEURA: Trace bilateral pleural effusions.  VESSELS: Thoracic aortic and coronary artery atherosclerosis.  HEART: Cardiomegaly. Small pericardial effusion. Mitral annular   calcification. A densely calcified or metallic density measuring 1.1 x   0.4 cm overlies the basilar left pulmonary artery (3:52, 6:80).  MEDIASTINUM AND STEFANIA: No lymphadenopathy.  CHEST WALL AND LOWER NECK: Within normal limits.  VISUALIZED UPPER ABDOMEN: Small volume ascites. Atherosclerotic changes.  BONES: Multilevel degenerative disease.    IMPRESSION: Trace bilateral pleural effusions.  Bibasilar subsegmental atelectasis, right greater than left.  Densely calcified or metallic density overlies the left pulmonary artery.  See above..      JUAN JUAREZ M.D., RADIOLOGY RESIDENT  This document has been electronically signed.  MADONNA MORGAN M.D., ATTENDING RADIOLOGIST  This document has been electronically signed. Mar  4 2018 12:28PM      < end of copied text >  ---------------------------------------------------------------------------------------------------------  < from: US Abdomen Limited (18 @ 08:50) >    EXAM:  US ABDOMEN LIMITED                            PROCEDURE DATE:  2018      INTERPRETATION:  Clinical information: Assess for ascites for   paracentesis.    Comparison: Ultrasound 2017.    Findings: Targeted ultrasound was performed for purposes of ascites   evaluation. A large amount of ascites is noted in all 4 quadrants.    IMPRESSION:    Diffuse, large volume abdominal ascites.    GAEL SCHERER M.D., ATTENDING RADIOLOGIST  This document has been electronically signed. Mar  2 2018  9:16AM      < end of copied text >  ---------------------------------------------------------------------------------------------------------

## 2018-03-22 NOTE — PROGRESS NOTE ADULT - SUBJECTIVE AND OBJECTIVE BOX
[facility] [phone]        Patient seen and examined in bed in bed.  She did not sleep well last night. Potassium was high yesterday        MEDICATIONS  (STANDING):  ALBUTerol/ipratropium for Nebulization 3 milliLiter(s) Nebulizer every 6 hours  aspirin enteric coated 81 milliGRAM(s) Oral daily  atorvastatin 20 milliGRAM(s) Oral at bedtime  BACItracin   Ointment 1 Application(s) Topical two times a day  buDESOnide   0.5 milliGRAM(s) Respule 0.5 milliGRAM(s) Inhalation every 12 hours  buMETAnide 0.5 milliGRAM(s) Oral every 12 hours  clopidogrel Tablet 75 milliGRAM(s) Oral daily  digoxin     Tablet 0.125 milliGRAM(s) Oral every other day  diltiazem    milliGRAM(s) Oral daily  heparin  Injectable 5000 Unit(s) SubCutaneous every 8 hours  methimazole 5 milliGRAM(s) Oral daily  metoprolol     tartrate 25 milliGRAM(s) Oral every 8 hours  predniSONE   Tablet 10 milliGRAM(s) Oral daily  sodium chloride 0.65% Nasal 1 Spray(s) Both Nostrils four times a day  tiotropium 2.5 MICROgram(s)/olodaterol 2.5 MICROgram(s) Inhaler 2 Puff(s) Inhalation daily      VITAL:  T(C): , Max: 36.6 (03-22-18 @ 06:31)  T(F): , Max: 97.9 (03-22-18 @ 06:31)  HR: 90 (03-22-18 @ 08:42)  BP: 98/57 (03-22-18 @ 08:42)  BP(mean): --  RR: 20 (03-22-18 @ 06:31)  SpO2: 91% (03-22-18 @ 06:31)  Wt(kg): --    I and O's:    03-21 @ 07:01  -  03-22 @ 07:00  --------------------------------------------------------  IN: 900 mL / OUT: 350 mL / NET: 550 mL    03-22 @ 07:01 - 03-22 @ 09:18  --------------------------------------------------------  IN: 240 mL / OUT: 0 mL / NET: 240 mL          PHYSICAL EXAM:    Constitutional: NAD  HEENT: PERRLA    Neck:  No JVD  Respiratory: CTAB/L  Cardiovascular: S1 and S2  Gastrointestinal: BS+, soft, NT/ND  Extremities: No peripheral edema  Neurological: A/O x 3, no focal deficits  Psychiatric: Normal mood, normal affect  : No Gottlieb  Skin: No rashes  Access: Not applicable    LABS:                        9.8    8.0   )-----------( 192      ( 21 Mar 2018 12:18 )             30.0     03-22    136  |  97  |  120<H>  ----------------------------<  71  5.8<H>   |  25  |  2.89<H>    Ca    9.0      22 Mar 2018 07:16  Mg     1.6     03-21            Urine Studies:          RADIOLOGY & ADDITIONAL STUDIES:

## 2018-03-23 LAB
ANION GAP SERPL CALC-SCNC: 11 MMOL/L — SIGNIFICANT CHANGE UP (ref 5–17)
BUN SERPL-MCNC: 120 MG/DL — HIGH (ref 7–23)
CALCIUM SERPL-MCNC: 8.6 MG/DL — SIGNIFICANT CHANGE UP (ref 8.4–10.5)
CHLORIDE SERPL-SCNC: 98 MMOL/L — SIGNIFICANT CHANGE UP (ref 96–108)
CO2 SERPL-SCNC: 26 MMOL/L — SIGNIFICANT CHANGE UP (ref 22–31)
CREAT SERPL-MCNC: 2.91 MG/DL — HIGH (ref 0.5–1.3)
GLUCOSE SERPL-MCNC: 91 MG/DL — SIGNIFICANT CHANGE UP (ref 70–99)
HCT VFR BLD CALC: 30.2 % — LOW (ref 34.5–45)
HGB BLD-MCNC: 9.3 G/DL — LOW (ref 11.5–15.5)
MAGNESIUM SERPL-MCNC: 1.6 MG/DL — SIGNIFICANT CHANGE UP (ref 1.6–2.6)
MCHC RBC-ENTMCNC: 26.7 PG — LOW (ref 27–34)
MCHC RBC-ENTMCNC: 30.8 GM/DL — LOW (ref 32–36)
MCV RBC AUTO: 86.8 FL — SIGNIFICANT CHANGE UP (ref 80–100)
NRBC # BLD: 0 /100 WBCS — SIGNIFICANT CHANGE UP (ref 0–0)
PHOSPHATE SERPL-MCNC: 4.3 MG/DL — SIGNIFICANT CHANGE UP (ref 2.5–4.5)
PLATELET # BLD AUTO: 200 K/UL — SIGNIFICANT CHANGE UP (ref 150–400)
POTASSIUM SERPL-MCNC: 4.7 MMOL/L — SIGNIFICANT CHANGE UP (ref 3.5–5.3)
POTASSIUM SERPL-SCNC: 4.7 MMOL/L — SIGNIFICANT CHANGE UP (ref 3.5–5.3)
RBC # BLD: 3.48 M/UL — LOW (ref 3.8–5.2)
RBC # FLD: 18.7 % — HIGH (ref 10.3–14.5)
SODIUM SERPL-SCNC: 135 MMOL/L — SIGNIFICANT CHANGE UP (ref 135–145)
WBC # BLD: 6.54 K/UL — SIGNIFICANT CHANGE UP (ref 3.8–10.5)
WBC # FLD AUTO: 6.54 K/UL — SIGNIFICANT CHANGE UP (ref 3.8–10.5)

## 2018-03-23 RX ORDER — MAGNESIUM SULFATE 500 MG/ML
1 VIAL (ML) INJECTION ONCE
Qty: 0 | Refills: 0 | Status: COMPLETED | OUTPATIENT
Start: 2018-03-23 | End: 2018-03-23

## 2018-03-23 RX ADMIN — Medication 100 GRAM(S): at 08:56

## 2018-03-23 RX ADMIN — Medication 0.12 MILLIGRAM(S): at 12:34

## 2018-03-23 RX ADMIN — BUMETANIDE 0.5 MILLIGRAM(S): 0.25 INJECTION INTRAMUSCULAR; INTRAVENOUS at 17:18

## 2018-03-23 RX ADMIN — Medication 25 MILLIGRAM(S): at 08:56

## 2018-03-23 RX ADMIN — Medication 25 MILLIGRAM(S): at 17:18

## 2018-03-23 RX ADMIN — Medication 240 MILLIGRAM(S): at 08:56

## 2018-03-23 RX ADMIN — ATORVASTATIN CALCIUM 20 MILLIGRAM(S): 80 TABLET, FILM COATED ORAL at 23:14

## 2018-03-23 RX ADMIN — Medication 10 MILLIGRAM(S): at 08:56

## 2018-03-23 RX ADMIN — Medication 1 SPRAY(S): at 23:33

## 2018-03-23 RX ADMIN — CLOPIDOGREL BISULFATE 75 MILLIGRAM(S): 75 TABLET, FILM COATED ORAL at 12:34

## 2018-03-23 RX ADMIN — BUMETANIDE 0.5 MILLIGRAM(S): 0.25 INJECTION INTRAMUSCULAR; INTRAVENOUS at 08:56

## 2018-03-23 RX ADMIN — Medication 81 MILLIGRAM(S): at 08:56

## 2018-03-23 NOTE — PROGRESS NOTE ADULT - ASSESSMENT
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  3) chronic diastolic CHF (little evidence left sided heart failure on chest CT - trace pleural effusions - minimal ground glass opacities)    CKD with CASSANDRA due to intravascular volume depletion     AF    HTN/HLD/DM/CAD/PCI    PLAN/RECOMMENDATIONS    oxygen supplementation ATC to keep saturation greater than 92%  i have recontacted IR to reconsider placing an indwelling intraperitoneal catheter rather than subjecting the patient to serial paracenteses  diurese as tolerated by renal function and hemodynamics although diuretics have not helped recurrence of ascites   albuterol/atrovent/pulmicort nebs  prednisone 10mg daily  cardiac meds: bumex/cardizem CD/lopressor/digoxin/ASA/plavix/lipitor  GI/DVT prophylaxis - carafate/SQ heparin  patient has been treated with endothelin antagonists and phosphodiesterase inhibitors without improvement in PAPs in the past  KATY stockings as tolerated    Plan of care discussed with the patient at bedside. Prognosis is poor  Please call [phone] over the weekend with any questions or clinical changes. Dr. Roney Tellez and Dr. Edilberto Dukes will be covering the service.      Chacho Gallego MD, [facility] - [phone]  Pulmonary Medicine

## 2018-03-23 NOTE — PROGRESS NOTE ADULT - SUBJECTIVE AND OBJECTIVE BOX
[facility] - [facility]     PROGRESS NOTE    CHIEF COMPLAINT: CRF (hypoxic); COPD; emphysema; JOSE; secondary pulmonary hypertension; right heart failure; epistaxis; ascites;     INTERVAL HISTORY: weak and frail; worsening abdominal distension - holding off with another paracentesis due to hemodynamic instability and worsening renal function; quite short of breath with minimal exertion; diffuse pain; no cough, sputum production, chest congestion or wheeze; no fevers, chills or sweats; no chest pain/pressure or palpitations; legs swollen - could not tolerate KATY stockings; trial of dobutamine to improve right heart function in hopes of improving renal perfusion resulted in AF with RVR; restarted cautiously on diuretics    REVIEW OF SYSTEMS:  Constitutional: As per interval history  HEENT: epistaxis  CV: As per interval history  Resp: As per interval history  GI: anorexia  : As per interval history  Musculoskeletal: diffuse pain  Skin: Within normal limits  Neurological: Within normal limits  Psychiatric: depressed  Endocrine: Within normal limits  Hematologic/Lymphatic: Within normal limits  Allergic/Immunologic: Within normal limits      MEDICATIONS:     Pulmonary "  ALBUTerol/ipratropium for Nebulization 3 milliLiter(s) Nebulizer every 6 hours  buDESOnide   0.5 milliGRAM(s) Respule 0.5 milliGRAM(s) Inhalation every 12 hours  tiotropium 2.5 MICROgram(s)/olodaterol 2.5 MICROgram(s) Inhaler 2 Puff(s) Inhalation daily      Anti-microbials:      Cardiovascular:  buMETAnide 0.5 milliGRAM(s) Oral every 12 hours  digoxin     Tablet 0.125 milliGRAM(s) Oral every other day  diltiazem    milliGRAM(s) Oral daily  metoprolol     tartrate 25 milliGRAM(s) Oral every 8 hours      Other:  acetaminophen   Tablet. 650 milliGRAM(s) Oral every 6 hours PRN  aspirin enteric coated 81 milliGRAM(s) Oral daily  atorvastatin 20 milliGRAM(s) Oral at bedtime  BACItracin   Ointment 1 Application(s) Topical two times a day  clopidogrel Tablet 75 milliGRAM(s) Oral daily  docusate sodium 100 milliGRAM(s) Oral two times a day PRN  Gas-X extra strength (simethicone 125 mg 1 Tablet(s) 1 Tablet(s) Chew three times a day PRN  heparin  Injectable 5000 Unit(s) SubCutaneous every 8 hours  methimazole 5 milliGRAM(s) Oral daily  ondansetron Injectable 4 milliGRAM(s) IV Push every 8 hours PRN  predniSONE   Tablet 10 milliGRAM(s) Oral daily  sodium chloride 0.65% Nasal 1 Spray(s) Both Nostrils four times a day  sucralfate 1 Gram(s) Oral three times a day PRN        OBJECTIVE:        I&O's Detail    22 Mar 2018 07:01  -  23 Mar 2018 07:00  --------------------------------------------------------  IN:    Oral Fluid: 240 mL  Total IN: 240 mL    OUT:  Total OUT: 0 mL    Total NET: 240 mL     Daily Weight in k.7 (22 Mar 2018 14:31)      POCT Blood Glucose.: 123 mg/dL (22 Mar 2018 22:51)  POCT Blood Glucose.: 124 mg/dL (22 Mar 2018 18:41)      PHYSICAL EXAM:       ICU Vital Signs Last 24 Hrs  T(C): 36.3 (23 Mar 2018 06:35), Max: 36.9 (22 Mar 2018 22:30)  T(F): 97.3 (23 Mar 2018 06:35), Max: 98.4 (22 Mar 2018 22:30)  HR: 110 (23 Mar 2018 06:35) (59 - 110)  BP: 95/63 (23 Mar 2018 06:35) (82/51 - 105/67)  BP(mean): --  ABP: --  ABP(mean): --  RR: 18 (23 Mar 2018 06:35) (18 - 18)  SpO2: 98% (23 Mar 2018 06:35) (98% - 98%) on 5lpm     General: Awake. Alert. Cooperative. Weak and tired. No distress. Chronically ill appearing	  HEENT:   Atraumatic. Bitemporal wasting. Anicteric. Normal oral mucosa, PERRL, EOMI  Neck: Supple. Trachea midline. Thyroid without enlargement/tenderness/nodules. No carotid bruit. (+) JVD; loss of bilateral supraclavicular fat pads	  Cardiovascular: Irregularly irregular rate and rhythm. S1 S2 normal. II/VI systolic murmur  Respiratory: Respirations unlabored. Decreased breath sounds throughout especially at the bases. Decreased chest wall excursion  Abdomen: Soft. Non-tender. Significantly distended with fluid wave. No organomegaly. No masses. Normal bowel sounds	  Extremities: Warm to touch. No clubbing or cyanosis. Mild to moderate lower extremity edema up to the thigh. Loss of extremity muscle mass  Pulses: Decreased lower extremity peripheral pulses  Skin: Lower extremity venous stasis changes; skin tear left elbow  Lymph Nodes: Cervical, supraclavicular and axillary nodes normal  Neurological: Motor and sensory examination equal and normal. A and O x 3  Psychiatry: Depressed       LABS:                        9.3    6.54  )-----------( 200      ( 23 Mar 2018 07:49 )             30.2                         9.6    9.01  )-----------( 236      ( 22 Mar 2018 10:28 )             30.4         135  |  98  |  120<H>  ----------------------------<  91  4.7   |  26  |  2.91<H>        133<L>  |  94<L>  |  124<H>  ----------------------------<  112<H>  6.3<HH>   |  26  |  2.99<H>    Ca      8.6          Ca      8.8          Phos    4.3         Phos    3.9           Mg       1.6         Mg       1.6     22      Venous Blood Gas:   @ 00:52  7.34/50/56/26/85  VBG Lactate: 0.9    Venous Blood Gas:   @ 16:33  7.34/55/35/29/60  VBG Lactate: 1.3    Serum Pro-Brain Natriuretic Peptide: 68122 pg/mL ( @ 16:29)    CARDIAC MARKERS ( 01 Mar 2018 16:29 )  x     / 0.06 ng/mL / x     / x     / 5.4 ng/mL    < from: Transthoracic Echocardiogram (17 @ 18:58) >    Patient name: FRAN ALEXANDRA  YOB: 1945   Age: 71 (F)   MR#: [id number]  Study Date: 2017  Location: [address] [place], [place] [id number]: Thalia Amezquita [facility]  Study quality: Technically fair  Referring Physician: Pierce Cobb MD  Blood Pressure: 106/58 mmHg  Height: 160 cm  Weight: 64 kg  BSA: 1.7 m2  ------------------------------------------------------------------------  PROCEDURE: Transthoracic echocardiogram with 2-D, M-Mode  and complete spectral and color flow Doppler.  INDICATION: Other specified pulmonary heart diseases  (I27.89)  ------------------------------------------------------------------------  Dimensions:    Normal Values:  LA:     3.6    2.0 - 4.0 cm  Ao:     2.8    2.0 - 3.8 cm  SEPTUM: 0.7    0.6 - 1.2 cm  PWT:    0.9    0.6 - 1.1 cm  LVIDd:  4.1    3.0 - 5.6 cm  LVIDs:  1.9    1.8 - 4.0 cm  Derived variables:  LVMI: 58 g/m2  RWT: 0.43  Fractional short: 54 %  Doppler Peak Velocity (m/sec): AoV=1.8  ------------------------------------------------------------------------  Observations:  Mitral Valve: Mitral annular calcification, otherwise  normal mitral valve. Minimal mitral regurgitation.  Aortic Valve/Aorta: Calcified trileaflet aortic valve with  normal opening. Peak transaortic valve gradient equals 13  mm Hg, mean transaortic valve gradient equals 8 mm Hg. Peak  left ventricular outflow tract gradient equals 6 mm Hg,  mean gradient is equal to 3 mm Hg, LVOT velocity time  integral equals 19 cm.  Aortic Root: 2.8 cm.  LVOT diameter: 1.9 cm.  Left Atrium: Normal left atrium.  LA volume index = 25  cc/m2.  Left Ventricle: Hyperdynamic left ventricular systolic  function. Flattening of the interventricular septum in both  systole and diastole is  consistent with right ventricular  pressure overload. Normal left ventricular internal  dimensions and wall thicknesses.  Right Heart: Severe right atrial enlargement. Right  ventricular enlargement with decreased right ventricular  systolic function. Normal tricuspid valve. Mild-moderate  tricuspid regurgitation. Normal pulmonic valve.  Pericardium/Pleura: Normal pericardium with trace  pericardial effusion.  Left pleural effusion.  Hemodynamic: Estimated right atrial pressure is 8 mm Hg.  Estimated right ventricular systolic pressure equals 72 mm  Hg, assuming right atrial pressure equals 8 mm Hg,  consistent with severe pulmonary hypertension.  ------------------------------------------------------------------------  Conclusions:  1. Calcified trileaflet aortic valve with normal opening.  2. Normal left ventricular internal dimensions and wall  thicknesses.  3. Hyperdynamic left ventricular systolic function.  Flattening of the interventricular septum in both systole  and diastole is  consistent with right ventricular pressure  overload.  4. Severe right atrial enlargement.  5. Right ventricular enlargement with decreased right  ventricular systolic function.  6. Normal tricuspid valve. Mild-moderate tricuspid  regurgitation.  7. Estimated pulmonary artery systolic pressure equals 72  mm Hg, assuming right atrial pressure equals 8 mm Hg,  consistent with severe pulmonary pressures.  *** Compared with echocardiogram of 2017, no  significant changes noted.  ------------------------------------------------------------------------  Confirmed on  2017 - 13:15:09 by Justin Cohen M.D.  ------------------------------------------------------------------------    < end of copied text >  ---------------------------------------------------------------------------------------------------------    MICROBIOLOGY:     Culture - Fungal, Body Fluid (18 @ 21:16)    Specimen Source: .Body Fluid Peritoneal Fluid    Culture Results:   No fungus isolated at 1 week. No additional interim reports will be  issued unless there is a change in culture status.    Culture - Body Fluid with Gram Stain (18 @ 21:16)    Gram Stain:   polymorphonuclear leukocytes seen per low power field  No organisms seen per oil power field  by cytocentrifuge    Specimen Source: Peritoneal Peritoneal Fluid    Culture Results:   No growth to date.    Culture - Acid Fast - Body Fluid w/Smear (18 @ 21:16)    Specimen Source: .Body Fluid Peritoneal Fluid    Acid Fast Bacilli Smear:   No acid fast bacilli seen by fluorochrome stain    RADIOLOGY:  [x] Chest radiographs reviewed and interpreted by me    < from: US Abdomen Limited (18 @ 15:17) >    EXAM:  US ABDOMEN LIMITED                            PROCEDURE DATE:  2018        INTERPRETATION:  CLINICAL INFORMATION: End-stage right heart failure.   Evaluate for ascites.    TECHNIQUE: Limited sonogram of the abdomen to assess for ascites.    COMPARISON: Abdomen and pelvis dated 3/10/2018.    FINDINGS:    Large volume ascites in the bilateral lower quadrants. A moderate right   upper quadrant ascites. Small left upper quadrant ascites.    Trace left pleural effusion.    IMPRESSION:     Moderate to large volume abdominal ascites.    Trace left pleural effusion.    RACHEL VELARDE M.D., RADIOLOGY RESIDENT  This document has been electronically signed.  CEDRIC MISHRA M.D., ATTENDING RADIOLOGIST  This document has been electronically signed. Mar 22 2018  3:55PM      < end of copied text >  ---------------------------------------------------------------------------------------------------------    < from: [facility] Duplex Lower Ext Vein Scan, Bilat (18 @ 15:51) >    EXAM:  DUPLEX SCAN EXT VEINS LOWER BI                            PROCEDURE DATE:  2018        INTERPRETATION:  CLINICAL INFORMATION: Bilateral lower extremity   swelling, rule out DVT.    COMPARISON: Prior examination, dated 3/5/2018 showed no thrombus.    TECHNIQUE: Duplex sonography of the BILATERAL LOWER extremities with   color and spectral Doppler, with and without compression.      FINDINGS: There is edema within the superficial soft tissues of both   lower extremities.    There is normal compressibility of the bilateral common femoral, femoral   and popliteal veins. No calf vein thrombosis is detected.    Doppler examination shows normal spontaneous and phasic flow.    IMPRESSION:     No evidence of bilateral lower extremity deep venous thrombosis.    MARTIR VALVERDE M.D., ATTENDING RADIOLOGIST  This document has been electronically signed. Mar 20 2018  4:43PM      < end of copied text >  ---------------------------------------------------------------------------------------------------------    < from: CT Abdomen and Pelvis w/ Oral Cont (03.10.18 @ 19:07) >    EXAM:  CT ABDOMEN AND PELVIS OC                            PROCEDURE DATE:  03/10/2018      INTERPRETATION:  CLINICAL INFORMATION: Abdominal distention and pain for   several days. Acute renal failure.    COMPARISON: CT abdomen pelvis 2015    PROCEDURE:   CT of the Abdomen and Pelvis was performed without intravenous contrast.   Intravenous contrast: None.  Oral contrast: positive contrast was administered.  Sagittal and coronal reformats were performed.    FINDINGS:    LOWER CHEST:Mild interlobular septal thickening and small bilateral   pleural effusions. Subsegmental atelectasis in the right lower lobe.   Cardiomegaly. Coronary calcification. Calcified hilar lymph nodes.    LIVER: Within normal limits.  BILE DUCTS: Normal caliber.  GALLBLADDER: Cholelithiasis.  SPLEEN: Within normal limits.  PANCREAS: Within normal limits.  ADRENALS: Within normal limits.  KIDNEYS/URETERS: Left lower pole renal cyst.    BLADDER: Within normal limits.  REPRODUCTIVE ORGANS: Posterior uterine fibroid.    BOWEL: No bowel obstruction. Sigmoid diverticulosis, without   diverticulitis. Normal appendix.     PERITONEUM: Large volume ascites.  VESSELS:  Atherosclerotic changes.  RETROPERITONEUM: No lymphadenopathy.    ABDOMINAL WALL: Within normal limits.  BONES: Degenerative changes. T12 vertebral body hemangioma.    IMPRESSION:     Large volume ascites, etiology unclear.    Small bilateral pleural effusions and mild pulmonary edema.    VIOLA ANDREWS M.D., ATTENDINGRADIOLOGIST  This document has been electronically signed. Mar 11 2018  8:52AM      < end of copied text >  ---------------------------------------------------------------------------------------------------------  < from: CT Chest No Cont (18 @ 18:24) >    EXAM:  CT CHEST                            PROCEDURE DATE:  2018        INTERPRETATION:  CLINICAL INFORMATION: Evaluate pleural effusions.   Shortness of breath.    COMPARISON: Chest CT dated 2017. Chest x-ray dated 3/1/2018.    PROCEDURE:   CT of the Chest was performed without intravenous contrast.  Sagittal and coronal reformats were performed.  Axial MIP reformats were also performed.    FINDINGS:    CHEST:     LUNGS AND LARGE AIRWAYS: Patent central airways.  Bibasilar subsegmental   atelectasis, right greater than left.  PLEURA: Trace bilateral pleural effusions.  VESSELS: Thoracic aortic and coronary artery atherosclerosis.  HEART: Cardiomegaly. Small pericardial effusion. Mitral annular   calcification. A densely calcified or metallic density measuring 1.1 x   0.4 cm overlies the basilar left pulmonary artery (3:52, 6:80).  MEDIASTINUM AND STEFANAI: No lymphadenopathy.  CHEST WALL AND LOWER NECK: Within normal limits.  VISUALIZED UPPER ABDOMEN: Small volume ascites. Atherosclerotic changes.  BONES: Multilevel degenerative disease.    IMPRESSION: Trace bilateral pleural effusions.  Bibasilar subsegmental atelectasis, right greater than left.  Densely calcified or metallic density overlies the left pulmonary artery.  See above..      JUAN JUAREZ M.D., RADIOLOGY RESIDENT  This document has been electronically signed.  MADONNA MORGAN M.D., ATTENDING RADIOLOGIST  This document has been electronically signed. Mar  4 2018 12:28PM      < end of copied text >  ---------------------------------------------------------------------------------------------------------

## 2018-03-23 NOTE — PROGRESS NOTE ADULT - ASSESSMENT
71-year-old female with past medical history of diabetes, hypertension, severe pulmonary hypertension, right ventricular dysfunction, presents with SOB.  The patient's acute renal failure is likely hemodynamic in nature.  Goals of therapy are to improve her hemodynamics in order to improve her forward flow and renal perfusion.  CASSANDRA  Severe PHTN  Cor Pulm  Failure to thrive  Ascites  SP Hyperkalemia last night  Rapid a flutter     Renal: non-oliguric:  CASSANDRA:  creatinine is essentially from yesterday -Do not repeat potassium today.  Next blood draw in am  GI: +Ascites with no significant response with use of aggressive diuretics.  Would NOT pursue another paracentesis until renal parameters improve   Pulmonary:  stable  Cardiology: BP remains soft and heart rate is elevated;  We have tried  gtt to help with renal perfusion but that DID NOT HELP and lead to rapid afib    RECOMMENDATION:  - Bumex 0.5mg po bid today  -Monitor volume status closely  - Prednisone to 10mg po qd  - On dig and cardizem for heart rate control.  She was turned down for ablation in the past due to mult medical problems  - Maintain K+ restrictive diet for now and do not change to regular.  DW NP   - Monitor I & Os      Poor overall prognosis.  She would do poorly on HD and trying to medically manage her.  She has refused to see palliative in the past

## 2018-03-23 NOTE — PROGRESS NOTE ADULT - SUBJECTIVE AND OBJECTIVE BOX
MEDICINE, PROGRESS NOTE [phone]    FRAN ALEXANDRA 72y [id number]    Patient seen and examined.  Patient is a 72y old  Female who presents with a chief complaint of shortness of breath and difficulty eating due to fullness (01 Mar 2018 19:30)  Pt has leg pains and right hip, knee, elbow pain.    PAST MEDICAL & SURGICAL HISTORY:  Hyperthyroidism  JOSE (obstructive sleep apnea)  Epistaxis  GIB (gastrointestinal bleeding)  CAD S/P percutaneous coronary angioplasty  Afib  Pulmonary HTN  GERD (gastroesophageal reflux disease)  Obesity  Cardiomegaly  Valvular heart disease  COPD (chronic obstructive pulmonary disease): Home O2  Sleep Apnea: by criteria  Loose, teeth: 3 bottom front loose teeth  Female stress incontinence  Shoulder pain, right  Constipation  Arthritis of Knee  H/O heartburn  History of lung cancer  Obese  Hx of hyperlipidemia  Asthma  Diabetes mellitus: type 2  dx about 4-5 years ago   no daily fingerstick  H/O: HTN (hypertension)  Enlarged lymph nodes  Lymph nodes enlarged: s/p biopsy mediastinal  benign  H/O endoscopy  left upper lobectomy    MEDICATIONS  (STANDING):  ALBUTerol/ipratropium for Nebulization 3 milliLiter(s) Nebulizer every 6 hours  aspirin enteric coated 81 milliGRAM(s) Oral daily  atorvastatin 20 milliGRAM(s) Oral at bedtime  BACItracin   Ointment 1 Application(s) Topical two times a day  buDESOnide   0.5 milliGRAM(s) Respule 0.5 milliGRAM(s) Inhalation every 12 hours  buMETAnide 0.5 milliGRAM(s) Oral every 12 hours  clopidogrel Tablet 75 milliGRAM(s) Oral daily  digoxin     Tablet 0.125 milliGRAM(s) Oral every other day  diltiazem    milliGRAM(s) Oral daily  heparin  Injectable 5000 Unit(s) SubCutaneous every 8 hours  methimazole 5 milliGRAM(s) Oral daily  metoprolol     tartrate 25 milliGRAM(s) Oral every 8 hours  predniSONE   Tablet 10 milliGRAM(s) Oral daily  sodium chloride 0.65% Nasal 1 Spray(s) Both Nostrils four times a day  tiotropium 2.5 MICROgram(s)/olodaterol 2.5 MICROgram(s) Inhaler 2 Puff(s) Inhalation daily    MEDICATIONS  (PRN):  acetaminophen   Tablet. 650 milliGRAM(s) Oral every 6 hours PRN Mild Pain (1 - 3)  docusate sodium 100 milliGRAM(s) Oral two times a day PRN Constipation  Gas-X extra strength (simethicone 125 mg 1 Tablet(s) 1 Tablet(s) Chew three times a day PRN bloating  ondansetron Injectable 4 milliGRAM(s) IV Push every 8 hours PRN Nausea and/or Vomiting  sucralfate 1 Gram(s) Oral three times a day PRN stomach discomfort    Allergies    No Known Allergies    Intolerances    albuterol (Unknown)      PHYSICAL EXAM:  Constitutional: NAD  HEENT: Normocephalic, EOMI  Neck:  No JVD  Respiratory: CTA B/L, No wheezes  Cardiovascular: S1, S2, RRR, + systolic murmur  Gastrointestinal: BS+, soft, NT/ND  Extremities: No peripheral edema  Neurological: AAOX3, no focal deficits  Psychiatric: Normal mood, normal affect  : No Gottlieb    Vital Signs Last 24 Hrs  T(C): 36.7 (23 Mar 2018 14:17), Max: 36.9 (22 Mar 2018 22:30)  T(F): 98 (23 Mar 2018 14:17), Max: 98.4 (22 Mar 2018 22:30)  HR: 67 (23 Mar 2018 14:17) (59 - 110)  BP: 90/52 (23 Mar 2018 14:17) (82/51 - 105/67)  BP(mean): --  RR: 18 (23 Mar 2018 14:17) (18 - 18)  SpO2: 97% (23 Mar 2018 14:17) (97% - 98%)  I&O's Summary    22 Mar 2018 07:01  -  23 Mar 2018 07:00  --------------------------------------------------------  IN: 240 mL / OUT: 0 mL / NET: 240 mL    23 Mar 2018 07:01  -  23 Mar 2018 17:19  --------------------------------------------------------  IN: 240 mL / OUT: 150 mL / NET: 90 mL        LABS:                        9.3    6.54  )-----------( 200      ( 23 Mar 2018 07:49 )             30.2     03-23    135  |  98  |  120<H>  ----------------------------<  91  4.7   |  26  |  2.91<H>    Ca    8.6      23 Mar 2018 06:50  Phos  4.3     03-23  Mg     1.6     03-23    Magnesium, Serum: 1.6 mg/dL (03-23 @ 06:50)

## 2018-03-23 NOTE — PROGRESS NOTE ADULT - ASSESSMENT
Right elbow, hip, knee, pain  malnutrition  volume overload/ascites/LE edema  sev PHTN/ Cor pulm  comp diast hf  sev copd  restrictive lung disease sec to ascites which diminishes diaphragmatic excursion  epistaxis - on and off  CASSANDRA on ckd 4  hyperkalemia - resolved    continue current care  significant uremia, will see how pt does over weekend and consider paracentesis on Monday if possible  appreciate renal input  continue diuretics

## 2018-03-24 LAB
ANION GAP SERPL CALC-SCNC: 14 MMOL/L — SIGNIFICANT CHANGE UP (ref 5–17)
BUN SERPL-MCNC: 118 MG/DL — HIGH (ref 7–23)
CALCIUM SERPL-MCNC: 8.4 MG/DL — SIGNIFICANT CHANGE UP (ref 8.4–10.5)
CHLORIDE SERPL-SCNC: 96 MMOL/L — SIGNIFICANT CHANGE UP (ref 96–108)
CO2 SERPL-SCNC: 24 MMOL/L — SIGNIFICANT CHANGE UP (ref 22–31)
CREAT SERPL-MCNC: 2.97 MG/DL — HIGH (ref 0.5–1.3)
GLUCOSE SERPL-MCNC: 93 MG/DL — SIGNIFICANT CHANGE UP (ref 70–99)
HCT VFR BLD CALC: 29 % — LOW (ref 34.5–45)
HGB BLD-MCNC: 8.9 G/DL — LOW (ref 11.5–15.5)
MAGNESIUM SERPL-MCNC: 1.8 MG/DL — SIGNIFICANT CHANGE UP (ref 1.6–2.6)
MCHC RBC-ENTMCNC: 26.4 PG — LOW (ref 27–34)
MCHC RBC-ENTMCNC: 30.7 GM/DL — LOW (ref 32–36)
MCV RBC AUTO: 86.1 FL — SIGNIFICANT CHANGE UP (ref 80–100)
NRBC # BLD: 0 /100 WBCS — SIGNIFICANT CHANGE UP (ref 0–0)
PLATELET # BLD AUTO: 216 K/UL — SIGNIFICANT CHANGE UP (ref 150–400)
POTASSIUM SERPL-MCNC: 4.4 MMOL/L — SIGNIFICANT CHANGE UP (ref 3.5–5.3)
POTASSIUM SERPL-SCNC: 4.4 MMOL/L — SIGNIFICANT CHANGE UP (ref 3.5–5.3)
RBC # BLD: 3.37 M/UL — LOW (ref 3.8–5.2)
RBC # FLD: 19 % — HIGH (ref 10.3–14.5)
SODIUM SERPL-SCNC: 134 MMOL/L — LOW (ref 135–145)
WBC # BLD: 7.04 K/UL — SIGNIFICANT CHANGE UP (ref 3.8–10.5)
WBC # FLD AUTO: 7.04 K/UL — SIGNIFICANT CHANGE UP (ref 3.8–10.5)

## 2018-03-24 RX ORDER — BUMETANIDE 0.25 MG/ML
0.5 INJECTION INTRAMUSCULAR; INTRAVENOUS DAILY
Qty: 0 | Refills: 0 | Status: DISCONTINUED | OUTPATIENT
Start: 2018-03-24 | End: 2018-03-26

## 2018-03-24 RX ORDER — DILTIAZEM HCL 120 MG
240 CAPSULE, EXT RELEASE 24 HR ORAL DAILY
Qty: 0 | Refills: 0 | Status: DISCONTINUED | OUTPATIENT
Start: 2018-03-24 | End: 2018-04-04

## 2018-03-24 RX ADMIN — ATORVASTATIN CALCIUM 20 MILLIGRAM(S): 80 TABLET, FILM COATED ORAL at 23:14

## 2018-03-24 RX ADMIN — Medication 25 MILLIGRAM(S): at 17:06

## 2018-03-24 RX ADMIN — BUMETANIDE 0.5 MILLIGRAM(S): 0.25 INJECTION INTRAMUSCULAR; INTRAVENOUS at 09:01

## 2018-03-24 RX ADMIN — Medication 81 MILLIGRAM(S): at 09:02

## 2018-03-24 RX ADMIN — Medication 25 MILLIGRAM(S): at 23:14

## 2018-03-24 RX ADMIN — Medication 3 MILLILITER(S): at 21:01

## 2018-03-24 RX ADMIN — Medication 240 MILLIGRAM(S): at 09:02

## 2018-03-24 RX ADMIN — Medication 10 MILLIGRAM(S): at 09:01

## 2018-03-24 RX ADMIN — CLOPIDOGREL BISULFATE 75 MILLIGRAM(S): 75 TABLET, FILM COATED ORAL at 12:35

## 2018-03-24 RX ADMIN — Medication 25 MILLIGRAM(S): at 09:02

## 2018-03-24 NOTE — PROGRESS NOTE ADULT - SUBJECTIVE AND OBJECTIVE BOX
Resting comfortably in bed. No new events noted.                             8.9    7.04  )-----------( 216      ( 24 Mar 2018 07:47 )             29.0                           8.9    7.04  )-----------( 216      ( 24 Mar 2018 07:47 )             29.0     03-24    134<L>  |  96  |  118<H>  ----------------------------<  93  4.4   |  24  |  2.97<H>    Ca    8.4      24 Mar 2018 06:43  Phos  4.3     03-23  Mg     1.8     24        I&O's Detail    23 Mar 2018 07:  -  24 Mar 2018 07:00  --------------------------------------------------------  IN:    Oral Fluid: 600 mL  Total IN: 600 mL    OUT:    Voided: 350 mL  Total OUT: 350 mL    Total NET: 250 mL          I&O's Summary    23 Mar 2018 07:01  -  24 Mar 2018 07:00  --------------------------------------------------------  IN: 600 mL / OUT: 350 mL / NET: 250 mL        Daily     Daily Weight in k.6 (24 Mar 2018 09:37)    Vital Signs Last 24 Hrs  T(C): 36.3 (24 Mar 2018 06:15), Max: 36.7 (23 Mar 2018 14:17)  T(F): 97.3 (24 Mar 2018 06:15), Max: 98 (23 Mar 2018 14:17)  HR: 93 (24 Mar 2018 09:05) (67 - 110)  BP: 108/69 (24 Mar 2018 09:05) (86/51 - 108/69)  BP(mean): --  RR: 18 (24 Mar 2018 06:15) (17 - 18)  SpO2: 96% (24 Mar 2018 06:15) (96% - 98%)      MEDICATIONS  (STANDING):  ALBUTerol/ipratropium for Nebulization 3 milliLiter(s) Nebulizer every 6 hours  aspirin enteric coated 81 milliGRAM(s) Oral daily  atorvastatin 20 milliGRAM(s) Oral at bedtime  BACItracin   Ointment 1 Application(s) Topical two times a day  buDESOnide   0.5 milliGRAM(s) Respule 0.5 milliGRAM(s) Inhalation every 12 hours  buMETAnide 0.5 milliGRAM(s) Oral every 12 hours  clopidogrel Tablet 75 milliGRAM(s) Oral daily  digoxin     Tablet 0.125 milliGRAM(s) Oral every other day  diltiazem    milliGRAM(s) Oral daily  heparin  Injectable 5000 Unit(s) SubCutaneous every 8 hours  methimazole 5 milliGRAM(s) Oral daily  metoprolol     tartrate 25 milliGRAM(s) Oral every 8 hours  predniSONE   Tablet 10 milliGRAM(s) Oral daily  sodium chloride 0.65% Nasal 1 Spray(s) Both Nostrils four times a day  tiotropium 2.5 MICROgram(s)/olodaterol 2.5 MICROgram(s) Inhaler 2 Puff(s) Inhalation daily    MEDICATIONS  (PRN):  acetaminophen   Tablet. 650 milliGRAM(s) Oral every 6 hours PRN Mild Pain (1 - 3)  docusate sodium 100 milliGRAM(s) Oral two times a day PRN Constipation  Gas-X extra strength (simethicone 125 mg 1 Tablet(s) 1 Tablet(s) Chew three times a day PRN bloating  ondansetron Injectable 4 milliGRAM(s) IV Push every 8 hours PRN Nausea and/or Vomiting  sucralfate 1 Gram(s) Oral three times a day PRN stomach discomfort

## 2018-03-24 NOTE — PROGRESS NOTE ADULT - ASSESSMENT
72 year old female with CAD, dCHF, pHTN, HTN, DM, Afib and COPD p/w acute on chronic dCHF, ascites and acute on chronic kidney injury.     - CKD stage 4: likely due to HTN and DM  - CASSANDRA: non oliguric. Pre-renal due to ineffective circulating volume. Renal function stable  - acute on chronic dCHF: continued lower extremity edema   - GI: ascites s/p paracentesis. Abdomen remains distended  - hypotension: BP improving today  - hyperkalemia: improved     Recommendations  - continue bumex 0.5 mg po bid  - continue metoprolol 25 mg po q 8 hrs and Cardizem 240 mg po daily   - strict I+O and daily weights  - maintain low potassium diet, print out given on high and low potassium foods 72 year old female with CAD, dCHF, pHTN, HTN, DM, Afib and COPD p/w acute on chronic dCHF, ascites and acute on chronic kidney injury.     - CKD stage 4: likely due to HTN and DM  - CASSANDRA: non oliguric. Pre-renal due to ineffective circulating volume. Renal function stable  - acute on chronic dCHF: continued lower extremity edema   - GI: ascites s/p paracentesis. Abdomen remains distended  - hypotension: BP improving today  - hyperkalemia: improved     Recommendations  - continue bumex 0.5 mg po bid  - continue metoprolol 25 mg po q 8 hrs and Cardizem 240 mg po daily   - strict I+O and daily weights  - maintain low potassium diet, print out given on high and low potassium foods  - would defer another paracentesis until renal function improved   - no need for RRT at this time

## 2018-03-24 NOTE — PROGRESS NOTE ADULT - ASSESSMENT
Right elbow, hip, knee, pain  malnutrition  volume overload/ascites/LE edema  sev PHTN/ Cor pulm  comp diast hf  sev copd  restrictive lung disease sec to ascites which diminishes diaphragmatic excursion  epistaxis - on and off  CASSANDRA on ckd 4  hyperkalemia - resolved    continue current care  significant uremia, will see how pt does over weekend and consider paracentesis on Monday if possible  appreciate renal input  hold diuretics  monitor bp  low dose ivf if needed

## 2018-03-24 NOTE — PROGRESS NOTE ADULT - SUBJECTIVE AND OBJECTIVE BOX
MEDICINE, PROGRESS NOTE [phone]    FRAN ALEXANDRA 72y [id number]    Patient seen and examined.  Patient is a 72y old  Female who presents with a chief complaint of shortness of breath and difficulty eating due to fullness (01 Mar 2018 19:30)  Pt has no new complaints. still with leg pains.    PAST MEDICAL & SURGICAL HISTORY:  Hyperthyroidism  JOSE (obstructive sleep apnea)  Epistaxis  GIB (gastrointestinal bleeding)  CAD S/P percutaneous coronary angioplasty  Afib  Pulmonary HTN  GERD (gastroesophageal reflux disease)  Obesity  Cardiomegaly  Valvular heart disease  COPD (chronic obstructive pulmonary disease): Home O2  Sleep Apnea: by criteria  Loose, teeth: 3 bottom front loose teeth  Female stress incontinence  Shoulder pain, right  Constipation  Arthritis of Knee  H/O heartburn  History of lung cancer  Obese  Hx of hyperlipidemia  Asthma  Diabetes mellitus: type 2  dx about 4-5 years ago   no daily fingerstick  H/O: HTN (hypertension)  Enlarged lymph nodes  Lymph nodes enlarged: s/p biopsy mediastinal  benign  H/O endoscopy  left upper lobectomy    MEDICATIONS  (STANDING):  ALBUTerol/ipratropium for Nebulization 3 milliLiter(s) Nebulizer every 6 hours  aspirin enteric coated 81 milliGRAM(s) Oral daily  atorvastatin 20 milliGRAM(s) Oral at bedtime  BACItracin   Ointment 1 Application(s) Topical two times a day  buDESOnide   0.5 milliGRAM(s) Respule 0.5 milliGRAM(s) Inhalation every 12 hours  buMETAnide 0.5 milliGRAM(s) Oral daily  clopidogrel Tablet 75 milliGRAM(s) Oral daily  digoxin     Tablet 0.125 milliGRAM(s) Oral every other day  diltiazem    milliGRAM(s) Oral daily  heparin  Injectable 5000 Unit(s) SubCutaneous every 8 hours  methimazole 5 milliGRAM(s) Oral daily  metoprolol     tartrate 25 milliGRAM(s) Oral every 8 hours  predniSONE   Tablet 10 milliGRAM(s) Oral daily  sodium chloride 0.65% Nasal 1 Spray(s) Both Nostrils four times a day  tiotropium 2.5 MICROgram(s)/olodaterol 2.5 MICROgram(s) Inhaler 2 Puff(s) Inhalation daily    MEDICATIONS  (PRN):  acetaminophen   Tablet. 650 milliGRAM(s) Oral every 6 hours PRN Mild Pain (1 - 3)  docusate sodium 100 milliGRAM(s) Oral two times a day PRN Constipation  Gas-X extra strength (simethicone 125 mg 1 Tablet(s) 1 Tablet(s) Chew three times a day PRN bloating  ondansetron Injectable 4 milliGRAM(s) IV Push every 8 hours PRN Nausea and/or Vomiting  sucralfate 1 Gram(s) Oral three times a day PRN stomach discomfort    Allergies    No Known Allergies    Intolerances    albuterol (Unknown)      PHYSICAL EXAM:  Constitutional: NAD  HEENT: Normocephalic, EOMI  Neck:  No JVD  Respiratory: CTA B/L, No wheezes, dec bs at bases  Cardiovascular: S1, S2, RRR, + systolic murmur  Gastrointestinal: BS+, soft, NT/ND  Extremities: + peripheral edema le b/l  Neurological: AAOX3, no focal deficits  Psychiatric: Normal mood, normal affect  : No Gottlieb    Vital Signs Last 24 Hrs  T(C): 36.3 (24 Mar 2018 21:00), Max: 36.7 (24 Mar 2018 13:50)  T(F): 97.3 (24 Mar 2018 21:00), Max: 98.1 (24 Mar 2018 13:50)  HR: 61 (24 Mar 2018 21:00) (51 - 110)  BP: 90/53 (24 Mar 2018 21:00) (73/39 - 108/69)  BP(mean): --  RR: 22 (24 Mar 2018 21:00) (17 - 22)  SpO2: 97% (24 Mar 2018 21:00) (96% - 98%)  I&O's Summary    23 Mar 2018 07:01  -  24 Mar 2018 07:00  --------------------------------------------------------  IN: 600 mL / OUT: 350 mL / NET: 250 mL    24 Mar 2018 07:01  -  24 Mar 2018 22:13  --------------------------------------------------------  IN: 600 mL / OUT: 0 mL / NET: 600 mL        LABS:                        8.9    7.04  )-----------( 216      ( 24 Mar 2018 07:47 )             29.0     03-24    134<L>  |  96  |  118<H>  ----------------------------<  93  4.4   |  24  |  2.97<H>    Ca    8.4      24 Mar 2018 06:43  Phos  4.3     03-23  Mg     1.8     03-24    Magnesium, Serum: 1.8 mg/dL (03-24 @ 06:43)

## 2018-03-24 NOTE — PROGRESS NOTE ADULT - GENITOURINARY
negative
Airway patent, nasal mucosa clear, mouth with normal mucosa. Throat has no vesicles, no oropharyngeal exudates and uvula is midline, + erythema. Clear tympanic membranes bilaterally.

## 2018-03-25 LAB
ANION GAP SERPL CALC-SCNC: 13 MMOL/L — SIGNIFICANT CHANGE UP (ref 5–17)
BUN SERPL-MCNC: 121 MG/DL — HIGH (ref 7–23)
CALCIUM SERPL-MCNC: 8.9 MG/DL — SIGNIFICANT CHANGE UP (ref 8.4–10.5)
CHLORIDE SERPL-SCNC: 94 MMOL/L — LOW (ref 96–108)
CO2 SERPL-SCNC: 28 MMOL/L — SIGNIFICANT CHANGE UP (ref 22–31)
CREAT SERPL-MCNC: 3.13 MG/DL — HIGH (ref 0.5–1.3)
GLUCOSE SERPL-MCNC: 102 MG/DL — HIGH (ref 70–99)
HCT VFR BLD CALC: 31.2 % — LOW (ref 34.5–45)
HGB BLD-MCNC: 9.5 G/DL — LOW (ref 11.5–15.5)
MCHC RBC-ENTMCNC: 26.8 PG — LOW (ref 27–34)
MCHC RBC-ENTMCNC: 30.4 GM/DL — LOW (ref 32–36)
MCV RBC AUTO: 87.9 FL — SIGNIFICANT CHANGE UP (ref 80–100)
NRBC # BLD: 0 /100 WBCS — SIGNIFICANT CHANGE UP (ref 0–0)
PLATELET # BLD AUTO: 233 K/UL — SIGNIFICANT CHANGE UP (ref 150–400)
POTASSIUM SERPL-MCNC: 4.9 MMOL/L — SIGNIFICANT CHANGE UP (ref 3.5–5.3)
POTASSIUM SERPL-SCNC: 4.9 MMOL/L — SIGNIFICANT CHANGE UP (ref 3.5–5.3)
RBC # BLD: 3.55 M/UL — LOW (ref 3.8–5.2)
RBC # FLD: 18.5 % — HIGH (ref 10.3–14.5)
SODIUM SERPL-SCNC: 135 MMOL/L — SIGNIFICANT CHANGE UP (ref 135–145)
WBC # BLD: 7.75 K/UL — SIGNIFICANT CHANGE UP (ref 3.8–10.5)
WBC # FLD AUTO: 7.75 K/UL — SIGNIFICANT CHANGE UP (ref 3.8–10.5)

## 2018-03-25 RX ADMIN — Medication 3 MILLILITER(S): at 12:28

## 2018-03-25 RX ADMIN — Medication 240 MILLIGRAM(S): at 08:04

## 2018-03-25 RX ADMIN — Medication 10 MILLIGRAM(S): at 08:04

## 2018-03-25 RX ADMIN — CLOPIDOGREL BISULFATE 75 MILLIGRAM(S): 75 TABLET, FILM COATED ORAL at 15:00

## 2018-03-25 RX ADMIN — Medication 25 MILLIGRAM(S): at 08:04

## 2018-03-25 RX ADMIN — Medication 0.12 MILLIGRAM(S): at 15:01

## 2018-03-25 RX ADMIN — ATORVASTATIN CALCIUM 20 MILLIGRAM(S): 80 TABLET, FILM COATED ORAL at 21:54

## 2018-03-25 RX ADMIN — Medication 3 MILLILITER(S): at 15:00

## 2018-03-25 RX ADMIN — Medication 25 MILLIGRAM(S): at 18:36

## 2018-03-25 RX ADMIN — BUMETANIDE 0.5 MILLIGRAM(S): 0.25 INJECTION INTRAMUSCULAR; INTRAVENOUS at 08:04

## 2018-03-25 RX ADMIN — Medication 81 MILLIGRAM(S): at 08:03

## 2018-03-25 NOTE — PROGRESS NOTE ADULT - ASSESSMENT
Right elbow, hip, knee, pain  malnutrition  volume overload/ascites/LE edema  sev PHTN/ Cor pulm  comp diast hf  sev copd  restrictive lung disease sec to ascites which diminishes diaphragmatic excursion  epistaxis - on and off  CASSANDRA on ckd 4  hyperkalemia - resolved  2 shattered teeth     continue current care  continue to hold diuretics  dental eval  hold bp meds as well  await appropriate renal parameters for potential paracentesis

## 2018-03-25 NOTE — PROGRESS NOTE ADULT - SUBJECTIVE AND OBJECTIVE BOX
Patient seen and examined in bed; on NC; no new complaints.  NAD noted.    REVIEW OF SYSTEMS:  As per HPI, otherwise 8 full 10 ROS were unremarkable    MEDICATIONS  (STANDING):  ALBUTerol/ipratropium for Nebulization 3 milliLiter(s) Nebulizer every 6 hours  aspirin enteric coated 81 milliGRAM(s) Oral daily  atorvastatin 20 milliGRAM(s) Oral at bedtime  BACItracin   Ointment 1 Application(s) Topical two times a day  buDESOnide   0.5 milliGRAM(s) Respule 0.5 milliGRAM(s) Inhalation every 12 hours  buMETAnide 0.5 milliGRAM(s) Oral daily  clopidogrel Tablet 75 milliGRAM(s) Oral daily  digoxin     Tablet 0.125 milliGRAM(s) Oral every other day  diltiazem    milliGRAM(s) Oral daily  heparin  Injectable 5000 Unit(s) SubCutaneous every 8 hours  methimazole 5 milliGRAM(s) Oral daily  metoprolol     tartrate 25 milliGRAM(s) Oral every 8 hours  predniSONE   Tablet 10 milliGRAM(s) Oral daily  sodium chloride 0.65% Nasal 1 Spray(s) Both Nostrils four times a day  tiotropium 2.5 MICROgram(s)/olodaterol 2.5 MICROgram(s) Inhaler 2 Puff(s) Inhalation daily      VITAL:  T(C): , Max: 36.7 (03-24-18 @ 13:50)  T(F): , Max: 98.1 (03-24-18 @ 13:50)  HR: 58 (03-25-18 @ 12:26)  BP: 114/76 (03-25-18 @ 08:06)  BP(mean): --  RR: 20 (03-24-18 @ 23:11)  SpO2: 97% (03-24-18 @ 23:11)  Wt(kg): --    I and O's:    03-24 @ 07:01  -  03-25 @ 07:00  --------------------------------------------------------  IN: 600 mL / OUT: 0 mL / NET: 600 mL          PHYSICAL EXAM:    Constitutional: NAD  HEENT: PERRLA, EOMI,  MMM  Neck: No LAD, No JVD  Respiratory: exp wheeze  Cardiovascular: S1 and S2  Gastrointestinal: BS+, soft, NT/ND  Extremities: ++ l/e edema  Neurological: A/O x 3, no focal deficits  Psychiatric: Normal mood, normal affect  : No Gottlieb  Skin: No rashes  Access: Not applicable    LABS:                        9.5    7.75  )-----------( 233      ( 25 Mar 2018 09:08 )             31.2     03-25    135  |  94<L>  |  121<H>  ----------------------------<  102<H>  4.9   |  28  |  3.13<H>    Ca    8.9      25 Mar 2018 07:52  Mg     1.8     03-24        Assessment and Plan:   · Assessment	  72 year old female with CAD, dCHF, pHTN, HTN, DM, Afib and COPD p/w acute on chronic dCHF, ascites and acute on chronic kidney injury.     - CKD stage 4: likely due to HTN and DM  - CASSANDRA: non oliguric. Pre-renal due to ineffective circulating volume. Azotemia rising this AM  - acute on chronic dCHF: no significant change in volume status; lower extremity edema present; have been limited to diuretics dt hypotension; though NAD noted; will continue current regimen  - GI: ascites s/p paracentesis. Abdomen remains distended  - hypotension: BP acceptable today  - hyperkalemia: improved     Recommendations  - continue bumex 0.5 mg po daily if BP permits (decreased as of late to daily given hypotension)  - continue metoprolol 25 mg po q 8 hrs if BP permits  - continue Cardizem 240 mg po daily   - strict I+O and daily weights  - maintain low potassium diet  - would defer another paracentesis until renal function improved   - no need for RRT at this time

## 2018-03-25 NOTE — PROGRESS NOTE ADULT - SUBJECTIVE AND OBJECTIVE BOX
MEDICINE, PROGRESS NOTE [phone]    FRAN ALEXANDRA 72y [id number]    Patient seen and examined.  Patient is a 72y old  Female who presents with a chief complaint of shortness of breath and difficulty eating due to fullness (01 Mar 2018 19:30)  Pt has pain in legs, pt c/o pain in mouth since this am.    PAST MEDICAL & SURGICAL HISTORY:  Hyperthyroidism  JOSE (obstructive sleep apnea)  Epistaxis  GIB (gastrointestinal bleeding)  CAD S/P percutaneous coronary angioplasty  Afib  Pulmonary HTN  GERD (gastroesophageal reflux disease)  Obesity  Cardiomegaly  Valvular heart disease  COPD (chronic obstructive pulmonary disease): Home O2  Sleep Apnea: by criteria  Loose, teeth: 3 bottom front loose teeth  Female stress incontinence  Shoulder pain, right  Constipation  Arthritis of Knee  H/O heartburn  History of lung cancer  Obese  Hx of hyperlipidemia  Asthma  Diabetes mellitus: type 2  dx about 4-5 years ago   no daily fingerstick  H/O: HTN (hypertension)  Enlarged lymph nodes  Lymph nodes enlarged: s/p biopsy mediastinal  benign  H/O endoscopy  left upper lobectomy    MEDICATIONS  (STANDING):  ALBUTerol/ipratropium for Nebulization 3 milliLiter(s) Nebulizer every 6 hours  aspirin enteric coated 81 milliGRAM(s) Oral daily  atorvastatin 20 milliGRAM(s) Oral at bedtime  BACItracin   Ointment 1 Application(s) Topical two times a day  buDESOnide   0.5 milliGRAM(s) Respule 0.5 milliGRAM(s) Inhalation every 12 hours  buMETAnide 0.5 milliGRAM(s) Oral daily  clopidogrel Tablet 75 milliGRAM(s) Oral daily  digoxin     Tablet 0.125 milliGRAM(s) Oral every other day  diltiazem    milliGRAM(s) Oral daily  heparin  Injectable 5000 Unit(s) SubCutaneous every 8 hours  methimazole 5 milliGRAM(s) Oral daily  metoprolol     tartrate 25 milliGRAM(s) Oral every 8 hours  predniSONE   Tablet 10 milliGRAM(s) Oral daily  sodium chloride 0.65% Nasal 1 Spray(s) Both Nostrils four times a day  tiotropium 2.5 MICROgram(s)/olodaterol 2.5 MICROgram(s) Inhaler 2 Puff(s) Inhalation daily    MEDICATIONS  (PRN):  acetaminophen   Tablet. 650 milliGRAM(s) Oral every 6 hours PRN Mild Pain (1 - 3)  docusate sodium 100 milliGRAM(s) Oral two times a day PRN Constipation  Gas-X extra strength (simethicone 125 mg 1 Tablet(s) 1 Tablet(s) Chew three times a day PRN bloating  ondansetron Injectable 4 milliGRAM(s) IV Push every 8 hours PRN Nausea and/or Vomiting  sucralfate 1 Gram(s) Oral three times a day PRN stomach discomfort    Allergies    No Known Allergies    Intolerances    albuterol (Unknown)      PHYSICAL EXAM:  Constitutional: NAD  HEENT: Normocephalic, EOMI  Neck:  + JVD  Respiratory: CTA B/L ulf, No wheezes, dec bs at bases  Cardiovascular: S1, S2, RRR, + systolic murmur  Gastrointestinal: BS+, soft, NT, distended, + fluid wave  Extremities: + peripheral edema le b/l  Neurological: AAOX3, no focal deficits  Psychiatric: Normal mood, normal affect  : No Gottlieb    Vital Signs Last 24 Hrs  T(C): 36.3 (25 Mar 2018 14:16), Max: 36.3 (24 Mar 2018 21:00)  T(F): 97.4 (25 Mar 2018 14:16), Max: 97.4 (25 Mar 2018 14:16)  HR: 78 (25 Mar 2018 15:00) (57 - 84)  BP: 95/61 (25 Mar 2018 15:00) (89/64 - 114/76)  BP(mean): --  RR: 21 (25 Mar 2018 14:16) (20 - 22)  SpO2: 97% (25 Mar 2018 14:16) (97% - 97%)  I&O's Summary    24 Mar 2018 07:01  -  25 Mar 2018 07:00  --------------------------------------------------------  IN: 600 mL / OUT: 0 mL / NET: 600 mL    25 Mar 2018 07:01  -  25 Mar 2018 17:25  --------------------------------------------------------  IN: 280 mL / OUT: 0 mL / NET: 280 mL        LABS:                        9.5    7.75  )-----------( 233      ( 25 Mar 2018 09:08 )             31.2     03-25    135  |  94<L>  |  121<H>  ----------------------------<  102<H>  4.9   |  28  |  3.13<H>    Ca    8.9      25 Mar 2018 07:52  Mg     1.8     03-24

## 2018-03-26 LAB
ANION GAP SERPL CALC-SCNC: 16 MMOL/L — SIGNIFICANT CHANGE UP (ref 5–17)
BUN SERPL-MCNC: 127 MG/DL — HIGH (ref 7–23)
CALCIUM SERPL-MCNC: 8.8 MG/DL — SIGNIFICANT CHANGE UP (ref 8.4–10.5)
CHLORIDE SERPL-SCNC: 93 MMOL/L — LOW (ref 96–108)
CO2 SERPL-SCNC: 27 MMOL/L — SIGNIFICANT CHANGE UP (ref 22–31)
CREAT SERPL-MCNC: 3.43 MG/DL — HIGH (ref 0.5–1.3)
GLUCOSE SERPL-MCNC: 95 MG/DL — SIGNIFICANT CHANGE UP (ref 70–99)
HCT VFR BLD CALC: 31 % — LOW (ref 34.5–45)
HGB BLD-MCNC: 9.8 G/DL — LOW (ref 11.5–15.5)
MAGNESIUM SERPL-MCNC: 1.8 MG/DL — SIGNIFICANT CHANGE UP (ref 1.6–2.6)
MCHC RBC-ENTMCNC: 27.1 PG — SIGNIFICANT CHANGE UP (ref 27–34)
MCHC RBC-ENTMCNC: 31.6 GM/DL — LOW (ref 32–36)
MCV RBC AUTO: 85.9 FL — SIGNIFICANT CHANGE UP (ref 80–100)
NRBC # BLD: 0 /100 WBCS — SIGNIFICANT CHANGE UP (ref 0–0)
PHOSPHATE 24H UR-MCNC: 49.4 MG/DL — SIGNIFICANT CHANGE UP
PHOSPHATE SERPL-MCNC: 4.7 MG/DL — HIGH (ref 2.5–4.5)
PLATELET # BLD AUTO: 215 K/UL — SIGNIFICANT CHANGE UP (ref 150–400)
POTASSIUM SERPL-MCNC: 5.1 MMOL/L — SIGNIFICANT CHANGE UP (ref 3.5–5.3)
POTASSIUM SERPL-SCNC: 5.1 MMOL/L — SIGNIFICANT CHANGE UP (ref 3.5–5.3)
RBC # BLD: 3.61 M/UL — LOW (ref 3.8–5.2)
RBC # FLD: 18.7 % — HIGH (ref 10.3–14.5)
SODIUM SERPL-SCNC: 136 MMOL/L — SIGNIFICANT CHANGE UP (ref 135–145)
SODIUM UR-SCNC: <20 MMOL/L — SIGNIFICANT CHANGE UP
WBC # BLD: 8.21 K/UL — SIGNIFICANT CHANGE UP (ref 3.8–10.5)
WBC # FLD AUTO: 8.21 K/UL — SIGNIFICANT CHANGE UP (ref 3.8–10.5)

## 2018-03-26 RX ORDER — ALBUMIN HUMAN 25 %
50 VIAL (ML) INTRAVENOUS
Qty: 0 | Refills: 0 | Status: COMPLETED | OUTPATIENT
Start: 2018-03-26 | End: 2018-03-26

## 2018-03-26 RX ADMIN — ATORVASTATIN CALCIUM 20 MILLIGRAM(S): 80 TABLET, FILM COATED ORAL at 21:25

## 2018-03-26 RX ADMIN — Medication 25 MILLIGRAM(S): at 08:35

## 2018-03-26 RX ADMIN — CLOPIDOGREL BISULFATE 75 MILLIGRAM(S): 75 TABLET, FILM COATED ORAL at 12:47

## 2018-03-26 RX ADMIN — Medication 81 MILLIGRAM(S): at 08:34

## 2018-03-26 RX ADMIN — Medication 3 MILLILITER(S): at 17:25

## 2018-03-26 RX ADMIN — Medication 10 MILLIGRAM(S): at 08:34

## 2018-03-26 RX ADMIN — Medication 240 MILLIGRAM(S): at 08:34

## 2018-03-26 RX ADMIN — Medication 50 MILLILITER(S): at 14:30

## 2018-03-26 RX ADMIN — Medication 50 MILLILITER(S): at 11:13

## 2018-03-26 RX ADMIN — Medication 50 MILLILITER(S): at 17:24

## 2018-03-26 RX ADMIN — Medication 3 MILLILITER(S): at 23:00

## 2018-03-26 RX ADMIN — Medication 0.5 MILLIGRAM(S): at 17:26

## 2018-03-26 NOTE — PROGRESS NOTE ADULT - SUBJECTIVE AND OBJECTIVE BOX
NEPHROLOGY-[facility] [phone]        Patient seen and examined in bed.  She did not sleep well yesterday.  And is still distended        MEDICATIONS  (STANDING):  ALBUTerol/ipratropium for Nebulization 3 milliLiter(s) Nebulizer every 6 hours  aspirin enteric coated 81 milliGRAM(s) Oral daily  atorvastatin 20 milliGRAM(s) Oral at bedtime  BACItracin   Ointment 1 Application(s) Topical two times a day  buDESOnide   0.5 milliGRAM(s) Respule 0.5 milliGRAM(s) Inhalation every 12 hours  clopidogrel Tablet 75 milliGRAM(s) Oral daily  digoxin     Tablet 0.125 milliGRAM(s) Oral every other day  diltiazem    milliGRAM(s) Oral daily  heparin  Injectable 5000 Unit(s) SubCutaneous every 8 hours  methimazole 5 milliGRAM(s) Oral daily  metoprolol     tartrate 25 milliGRAM(s) Oral every 8 hours  predniSONE   Tablet 10 milliGRAM(s) Oral daily  sodium chloride 0.65% Nasal 1 Spray(s) Both Nostrils four times a day  tiotropium 2.5 MICROgram(s)/olodaterol 2.5 MICROgram(s) Inhaler 2 Puff(s) Inhalation daily      VITAL:  T(C): , Max: 36.3 (03-25-18 @ 14:16)  T(F): , Max: 97.4 (03-25-18 @ 14:16)  HR: 121 (03-26-18 @ 08:32)  BP: 97/65 (03-26-18 @ 08:32)  BP(mean): --  RR: 22 (03-26-18 @ 05:53)  SpO2: 91% (03-26-18 @ 05:53)  Wt(kg): --    I and O's:    03-25 @ 07:01  -  03-26 @ 07:00  --------------------------------------------------------  IN: 280 mL / OUT: 0 mL / NET: 280 mL        Weight (kg): 65.6 (03-26 @ 06:56)    PHYSICAL EXAM:    Constitutional: NAD  HEENT: PERRLA    Neck:  No JVD  Respiratory: reduced but clear  Cardiovascular: S1 and S2  Gastrointestinal: BS+, soft, distended fluid shift  Extremities: +  peripheral edema  Neurological: A/O x 3, no focal deficits  Psychiatric: Normal mood, normal affect  : No Gottlieb  Skin: No rashes  Access: Not applicable    LABS:                        9.8    8.21  )-----------( 215      ( 26 Mar 2018 07:28 )             31.0     03-26    136  |  93<L>  |  127<H>  ----------------------------<  95  5.1   |  27  |  3.43<H>    Ca    8.8      26 Mar 2018 07:02  Phos  4.7     03-26  Mg     1.8     03-26            Urine Studies:          RADIOLOGY & ADDITIONAL STUDIES: NEPHROLOGY-[facility] [phone]        Patient seen and examined in bed.  She did not sleep well yesterday.  And is still distended    ROS-+weakness + fatigue; all other ros were reviewed and were negative     MEDICATIONS  (STANDING):  ALBUTerol/ipratropium for Nebulization 3 milliLiter(s) Nebulizer every 6 hours  aspirin enteric coated 81 milliGRAM(s) Oral daily  atorvastatin 20 milliGRAM(s) Oral at bedtime  BACItracin   Ointment 1 Application(s) Topical two times a day  buDESOnide   0.5 milliGRAM(s) Respule 0.5 milliGRAM(s) Inhalation every 12 hours  clopidogrel Tablet 75 milliGRAM(s) Oral daily  digoxin     Tablet 0.125 milliGRAM(s) Oral every other day  diltiazem    milliGRAM(s) Oral daily  heparin  Injectable 5000 Unit(s) SubCutaneous every 8 hours  methimazole 5 milliGRAM(s) Oral daily  metoprolol     tartrate 25 milliGRAM(s) Oral every 8 hours  predniSONE   Tablet 10 milliGRAM(s) Oral daily  sodium chloride 0.65% Nasal 1 Spray(s) Both Nostrils four times a day  tiotropium 2.5 MICROgram(s)/olodaterol 2.5 MICROgram(s) Inhaler 2 Puff(s) Inhalation daily      VITAL:  T(C): , Max: 36.3 (03-25-18 @ 14:16)  T(F): , Max: 97.4 (03-25-18 @ 14:16)  HR: 121 (03-26-18 @ 08:32)  BP: 97/65 (03-26-18 @ 08:32)  BP(mean): --  RR: 22 (03-26-18 @ 05:53)  SpO2: 91% (03-26-18 @ 05:53)  Wt(kg): --    I and O's:    03-25 @ 07:01  -  03-26 @ 07:00  --------------------------------------------------------  IN: 280 mL / OUT: 0 mL / NET: 280 mL        Weight (kg): 65.6 (03-26 @ 06:56)    PHYSICAL EXAM:    Constitutional: NAD  HEENT: PERRLA    Neck:  No JVD  Respiratory: reduced but clear  Cardiovascular: S1 and S2  Gastrointestinal: BS+, soft, distended fluid shift  Extremities: +  peripheral edema  Neurological: A/O x 3, no focal deficits  Psychiatric: Normal mood, normal affect  : No Gottlieb  Skin: No rashes  Access: Not applicable    LABS:                        9.8    8.21  )-----------( 215      ( 26 Mar 2018 07:28 )             31.0     03-26    136  |  93<L>  |  127<H>  ----------------------------<  95  5.1   |  27  |  3.43<H>    Ca    8.8      26 Mar 2018 07:02  Phos  4.7     03-26  Mg     1.8     03-26            Urine Studies:          RADIOLOGY & ADDITIONAL STUDIES:      < from: US Abdomen Limited (03.22.18 @ 15:17) >    EXAM:  US ABDOMEN LIMITED                            PROCEDURE DATE:  03/22/2018            INTERPRETATION:  CLINICAL INFORMATION: End-stage right heart failure.   Evaluate for ascites.    TECHNIQUE: Limited sonogram of the abdomen to assess for ascites.    COMPARISON: Abdomen and pelvis dated 3/10/2018.    FINDINGS:    Large volume ascites in the bilateral lower quadrants. A moderate right   upper quadrant ascites. Small left upper quadrant ascites.    Trace left pleural effusion.    IMPRESSION:     Moderate to large volume abdominal ascites.    Trace left pleural effusion.                RACHEL VELARDE M.D., RADIOLOGY RESIDENT  This document has been electronically signed.  CEDRIC MISHRA M.D., ATTENDING RADIOLOGIST  This document has been electronically signed. Mar 22 2018  3:55PM                < end of copied text >

## 2018-03-26 NOTE — PROGRESS NOTE ADULT - SUBJECTIVE AND OBJECTIVE BOX
MEDICINE, PROGRESS NOTE [phone]    FRAN ALEXANDRA 72y [id number]    Patient seen and examined.  Patient is a 72y old  Female who presents with a chief complaint of shortness of breath and difficulty eating due to fullness (01 Mar 2018 19:30)      PAST MEDICAL & SURGICAL HISTORY:  Hyperthyroidism  JOSE (obstructive sleep apnea)  Epistaxis  GIB (gastrointestinal bleeding)  CAD S/P percutaneous coronary angioplasty  Afib  Pulmonary HTN  GERD (gastroesophageal reflux disease)  Obesity  Cardiomegaly  Valvular heart disease  COPD (chronic obstructive pulmonary disease): Home O2  Sleep Apnea: by criteria  Loose, teeth: 3 bottom front loose teeth  Female stress incontinence  Shoulder pain, right  Constipation  Arthritis of Knee  H/O heartburn  History of lung cancer  Obese  Hx of hyperlipidemia  Asthma  Diabetes mellitus: type 2  dx about 4-5 years ago   no daily fingerstick  H/O: HTN (hypertension)  Enlarged lymph nodes  Lymph nodes enlarged: s/p biopsy mediastinal  benign  H/O endoscopy  left upper lobectomy    MEDICATIONS  (STANDING):  ALBUTerol/ipratropium for Nebulization 3 milliLiter(s) Nebulizer every 6 hours  aspirin enteric coated 81 milliGRAM(s) Oral daily  atorvastatin 20 milliGRAM(s) Oral at bedtime  BACItracin   Ointment 1 Application(s) Topical two times a day  buDESOnide   0.5 milliGRAM(s) Respule 0.5 milliGRAM(s) Inhalation every 12 hours  clopidogrel Tablet 75 milliGRAM(s) Oral daily  digoxin     Tablet 0.125 milliGRAM(s) Oral every other day  diltiazem    milliGRAM(s) Oral daily  heparin  Injectable 5000 Unit(s) SubCutaneous every 8 hours  methimazole 5 milliGRAM(s) Oral daily  metoprolol     tartrate 25 milliGRAM(s) Oral every 8 hours  predniSONE   Tablet 10 milliGRAM(s) Oral daily  sodium chloride 0.65% Nasal 1 Spray(s) Both Nostrils four times a day  tiotropium 2.5 MICROgram(s)/olodaterol 2.5 MICROgram(s) Inhaler 2 Puff(s) Inhalation daily    MEDICATIONS  (PRN):  acetaminophen   Tablet. 650 milliGRAM(s) Oral every 6 hours PRN Mild Pain (1 - 3)  docusate sodium 100 milliGRAM(s) Oral two times a day PRN Constipation  Gas-X extra strength (simethicone 125 mg 1 Tablet(s) 1 Tablet(s) Chew three times a day PRN bloating  ondansetron Injectable 4 milliGRAM(s) IV Push every 8 hours PRN Nausea and/or Vomiting  sucralfate 1 Gram(s) Oral three times a day PRN stomach discomfort    Allergies    No Known Allergies    Intolerances    albuterol (Unknown)      PHYSICAL EXAM:  Constitutional: NAD  HEENT: Normocephalic, EOMI  Neck:  No JVD  Respiratory: CTA B/L, No wheezes  Cardiovascular: S1, S2, RRR, + systolic murmur  Gastrointestinal: BS+, soft, NT/ND  Extremities: No peripheral edema  Neurological: AAOX3, no focal deficits  Psychiatric: Normal mood, normal affect  : No Gottlieb    Vital Signs Last 24 Hrs  T(C): 36.6 (26 Mar 2018 13:45), Max: 36.6 (26 Mar 2018 13:45)  T(F): 97.9 (26 Mar 2018 13:45), Max: 97.9 (26 Mar 2018 13:45)  HR: 57 (26 Mar 2018 17:22) (57 - 121)  BP: 91/54 (26 Mar 2018 17:22) (85/57 - 97/65)  BP(mean): --  RR: 22 (26 Mar 2018 13:45) (22 - 22)  SpO2: 93% (26 Mar 2018 17:22) (91% - 95%)  I&O's Summary    25 Mar 2018 07:01  -  26 Mar 2018 07:00  --------------------------------------------------------  IN: 280 mL / OUT: 0 mL / NET: 280 mL    26 Mar 2018 07:01  -  26 Mar 2018 19:16  --------------------------------------------------------  IN: 280 mL / OUT: 200 mL / NET: 80 mL        LABS:                        9.8    8.21  )-----------( 215      ( 26 Mar 2018 07:28 )             31.0     03-26    136  |  93<L>  |  127<H>  ----------------------------<  95  5.1   |  27  |  3.43<H>    Ca    8.8      26 Mar 2018 07:02  Phos  4.7     03-26  Mg     1.8     03-26    Magnesium, Serum: 1.8 mg/dL (03-26 @ 07:02)

## 2018-03-26 NOTE — PROGRESS NOTE ADULT - ASSESSMENT
72 year old female with CAD, dCHF, pHTN, HTN, DM, Afib and COPD p/w acute on chronic dCHF, ascites and acute on chronic kidney injury.     - CKD stage 4: likely due to HTN and DM and now CASSANDRA: non oliguric. I am now concerned about hepato-renal syndrome.    - acute on chronic dCHF: continued lower extremity edema   - GI: ascites s/p paracentesis. Abdomen remains distended  - hypotension: BP improving today  - hyperkalemia: high normal    Recommendations  - No choice but the dc the bumex 0.5 mg po bid  - continue metoprolol 25 mg po q 8 hrs and Cardizem 240 mg po daily   - strict I+O and daily weights;   Start I.V. alb x 3 doses today  - maintain low potassium diet, print out given on high and low potassium foods  - would defer another paracentesis until renal function improved   - start Midodrine and increases MAP by 10mmHg.    There is no role for inotropes as she responds with extreme tachycardia  Prognosis is guarded at best.  May need to have palliative re-evaluate

## 2018-03-26 NOTE — CHART NOTE - NSCHARTNOTEFT_GEN_A_CORE
72 year old female pt with PMH of AFib, Asthma, CAD, Cardiomegaly, enlarged lymph nodes, COPD, T2DM, endoscopy, upper lobectomy, CHF presented to ED with c/o SOB. Found to have decompensated diastolic CHF, (+) ascites, s/p paracentesis 3/2/18 with removal of 9 liters of ascites and again 3/14 with removal of 7 liters.; CASSANDRA on CKD per renal. Upon last few visits pt wished to have regular liberalized diet regardless of education on indication for therapeutic diet in setting of impaired renal function and heart failure. Pt now noted with hyperkalemia, per notes pt was refusing potassium lowering agents. No concentrated K+ was added back to diet per nephrology.     Pt seen today for malnutrition follow-up.     Source: Patient [x]    Family [ ]     other [x] electronic medical records      Diet : Regular, No concentrated K+ diet      Patient reports [ ] nausea  [ ] vomiting [ ] diarrhea [ ] constipation  [ ]chewing problems [ ] swallowing issues  [x] other: Denies GI distress, last BM 3/24/18      PO intake:  < 50% [x] 50-75% [x]   % [ ]  other : varies      Source for PO intake [x] Patient [ ] family [x] chart [ ] staff [ ] other      Enteral /Parenteral Nutrition: [x] n/a       Current Weight: 144.6 pounds (current, standing, +1 B/L ankle edema noted, (+) ascites). Wt trending up from lowest weight in house 130.2 3/15 following paracentesis. Overall~6 pounds less from 151.4 pounds admit wt 3/2/18.    Pertinent Medications: MEDICATIONS  (STANDING):  albumin human 25% IVPB 50 milliLiter(s) IV Intermittent every 3 hours  ALBUTerol/ipratropium for Nebulization 3 milliLiter(s) Nebulizer every 6 hours  aspirin enteric coated 81 milliGRAM(s) Oral daily  atorvastatin 20 milliGRAM(s) Oral at bedtime  BACItracin   Ointment 1 Application(s) Topical two times a day  buDESOnide   0.5 milliGRAM(s) Respule 0.5 milliGRAM(s) Inhalation every 12 hours  clopidogrel Tablet 75 milliGRAM(s) Oral daily  digoxin     Tablet 0.125 milliGRAM(s) Oral every other day  diltiazem    milliGRAM(s) Oral daily  heparin  Injectable 5000 Unit(s) SubCutaneous every 8 hours  methimazole 5 milliGRAM(s) Oral daily  metoprolol     tartrate 25 milliGRAM(s) Oral every 8 hours  predniSONE   Tablet 10 milliGRAM(s) Oral daily  sodium chloride 0.65% Nasal 1 Spray(s) Both Nostrils four times a day  tiotropium 2.5 MICROgram(s)/olodaterol 2.5 MICROgram(s) Inhaler 2 Puff(s) Inhalation daily    MEDICATIONS  (PRN):  acetaminophen   Tablet. 650 milliGRAM(s) Oral every 6 hours PRN Mild Pain (1 - 3)  docusate sodium 100 milliGRAM(s) Oral two times a day PRN Constipation  Gas-X extra strength (simethicone 125 mg 1 Tablet(s) 1 Tablet(s) Chew three times a day PRN bloating  ondansetron Injectable 4 milliGRAM(s) IV Push every 8 hours PRN Nausea and/or Vomiting  sucralfate 1 Gram(s) Oral three times a day PRN stomach discomfort    Pertinent Labs:  03-26 Na136 mmol/L Glu 95 mg/dL K+ 5.1 mmol/L Cr  3.43 mg/dL<H>  mg/dL<H> 03-26 Phos 4.7 mg/dL<H>      Skin: No pressure ulcers noted      Estimated Needs:   [x] no change since previous assessment  [ ] recalculated:       Previous Nutrition Diagnosis: Severe Malnutrition       Nutrition Diagnosis is [x] ongoing  [ ] resolved [ ] not applicable        New Nutrition Diagnosis: [x] not applicable      Interventions:     1) Continues current diet per Nephrology  2) Per notes palliative care to possibly re-evaluate/re visit [facility]       Monitoring and Evaluation:     [x] PO intake [x] Tolerance to diet prescription [x] weights [x] follow up per protocol    [x] other: RD remains available: Dionne Sauceda MS, RDN, CDN, CDE. #[phone] 72 year old female pt with PMH of AFib, Asthma, CAD, Cardiomegaly, enlarged lymph nodes, COPD, T2DM, endoscopy, upper lobectomy, CHF presented to ED with c/o SOB. Found to have decompensated diastolic CHF, (+) ascites, s/p paracentesis 3/2/18 with removal of 9 liters of ascites and again 3/14 with removal of 7 liters.; CASSANDRA on CKD per renal. Pt with hx hyperkalemia; no concentrated K+ was added back to diet per nephrology to address. Pt wishes to have regular liberalized diet regardless of education on indication for therapeutic diet in setting of impaired renal function and heart failure and regardless of prior education (upon RD initial assessment 3/14/18 education was provided to pt on lower phosphorous and potassium diet in setting of elevated labs and reinforced thereafter). Hyperphosphatemia noted.    Pt seen today for malnutrition follow-up. Still with c/o food served, pt stating she doesn't always get the foods she orders. Admits her appetite is declining and she doesn't want anything special at this time but feels she should get something she requests once in a while because she doesn't eat much as it is.       Source: Patient [x]    Family [ ]     other [x] electronic medical records      Diet : Regular, No concentrated K+ diet      Patient reports [ ] nausea  [ ] vomiting [ ] diarrhea [ ] constipation  [ ]chewing problems [ ] swallowing issues  [x] other: Denies GI distress, last BM 3/24/18      PO intake:  < 50% [x] 50-75% [x]   % [ ]  other : varies      Source for PO intake [x] Patient [ ] family [x] chart [ ] staff [ ] other      Enteral /Parenteral Nutrition: [x] n/a       Current Weight: 144.6 pounds (current, standing, +1 B/L ankle edema noted, (+) ascites). Wt trending up from lowest weight in house 130.2 3/15 following paracentesis. Overall~6 pounds less from 151.4 pounds admit wt 3/2/18.    Pertinent Medications: MEDICATIONS  (STANDING):  albumin human 25% IVPB 50 milliLiter(s) IV Intermittent every 3 hours  ALBUTerol/ipratropium for Nebulization 3 milliLiter(s) Nebulizer every 6 hours  aspirin enteric coated 81 milliGRAM(s) Oral daily  atorvastatin 20 milliGRAM(s) Oral at bedtime  BACItracin   Ointment 1 Application(s) Topical two times a day  buDESOnide   0.5 milliGRAM(s) Respule 0.5 milliGRAM(s) Inhalation every 12 hours  clopidogrel Tablet 75 milliGRAM(s) Oral daily  digoxin     Tablet 0.125 milliGRAM(s) Oral every other day  diltiazem    milliGRAM(s) Oral daily  heparin  Injectable 5000 Unit(s) SubCutaneous every 8 hours  methimazole 5 milliGRAM(s) Oral daily  metoprolol     tartrate 25 milliGRAM(s) Oral every 8 hours  predniSONE   Tablet 10 milliGRAM(s) Oral daily  sodium chloride 0.65% Nasal 1 Spray(s) Both Nostrils four times a day  tiotropium 2.5 MICROgram(s)/olodaterol 2.5 MICROgram(s) Inhaler 2 Puff(s) Inhalation daily    MEDICATIONS  (PRN):  acetaminophen   Tablet. 650 milliGRAM(s) Oral every 6 hours PRN Mild Pain (1 - 3)  docusate sodium 100 milliGRAM(s) Oral two times a day PRN Constipation  Gas-X extra strength (simethicone 125 mg 1 Tablet(s) 1 Tablet(s) Chew three times a day PRN bloating  ondansetron Injectable 4 milliGRAM(s) IV Push every 8 hours PRN Nausea and/or Vomiting  sucralfate 1 Gram(s) Oral three times a day PRN stomach discomfort    Pertinent Labs:  03-26 Na136 mmol/L Glu 95 mg/dL K+ 5.1 mmol/L Cr  3.43 mg/dL<H>  mg/dL<H> 03-26 Phos 4.7 mg/dL<H>      Skin: No pressure ulcers noted      Estimated Needs:   [x] no change since previous assessment  [ ] recalculated:       Previous Nutrition Diagnosis: Severe Malnutrition       Nutrition Diagnosis is [x] ongoing  [ ] resolved [ ] not applicable        New Nutrition Diagnosis: [x] not applicable      Interventions:     1) Continue current diet no concentrated K+ diet per Nephrology  2) Had discussion with pt and family member at bedside regarding the diet order vs. pt's wishes to have diet liberalized. Explained that per MD orders she is on lower K+ diet, reinforced education and indication for limiting dietary K+ and phosphorous as well in setting of hyperphosphatemia. I advised pt to adhere to current diet as she can but acknowledged her dissatisfaction with the diet and ultimately advised her to discuss nutrition-related GOC with the MD if it's something she would like changed/liberalized for quality of life purposes. Pt voiced understanding.  3) Would recommend palliative care revisit pt to discuss GOC      Monitoring and Evaluation:     [x] PO intake [x] Tolerance to diet prescription [x] weights [x] follow up per protocol    [x] other: RD remains available: Dionne Sauceda MS, RDN, CDN, CDE. #[phone] 72 year old female pt with PMH of AFib, Asthma, CAD, Cardiomegaly, enlarged lymph nodes, COPD, T2DM, endoscopy, upper lobectomy, CHF presented to ED with c/o SOB. Found to have decompensated diastolic CHF, (+) ascites, s/p paracentesis 3/2/18 with removal of 9 liters of ascites and again 3/14 with removal of 7 liters.; CASSANDRA on CKD per renal. Pt with hx hyperkalemia; no concentrated K+ was added back to diet per nephrology to address. Pt wishes to have regular liberalized diet regardless of indication for therapeutic diet in setting of impaired renal function and heart failure and regardless of prior education (upon RD initial assessment 3/14/18 education was provided to pt on lower phosphorous and potassium diet in setting of elevated labs and reinforced thereafter). Hyperphosphatemia noted.    Pt seen today for malnutrition follow-up. Still with c/o food served, pt stating she doesn't always get the foods she orders. Admits her appetite is declining and she doesn't want anything special at this time but feels she should get something she requests once in a while because she doesn't eat much as it is.       Source: Patient [x]    Family [ ]     other [x] electronic medical records      Diet : Regular, No concentrated K+ diet      Patient reports [ ] nausea  [ ] vomiting [ ] diarrhea [ ] constipation  [ ]chewing problems [ ] swallowing issues  [x] other: Denies GI distress, last BM 3/24/18      PO intake:  < 50% [x] 50-75% [x]   % [ ]  other : varies      Source for PO intake [x] Patient [ ] family [x] chart [ ] staff [ ] other      Enteral /Parenteral Nutrition: [x] n/a       Current Weight: 144.6 pounds (current, standing, +1 B/L ankle edema noted, (+) ascites). Wt trending up from lowest weight in house 130.2 3/15 following paracentesis. Overall~6 pounds less from 151.4 pounds admit wt 3/2/18.    Pertinent Medications: MEDICATIONS  (STANDING):  albumin human 25% IVPB 50 milliLiter(s) IV Intermittent every 3 hours  ALBUTerol/ipratropium for Nebulization 3 milliLiter(s) Nebulizer every 6 hours  aspirin enteric coated 81 milliGRAM(s) Oral daily  atorvastatin 20 milliGRAM(s) Oral at bedtime  BACItracin   Ointment 1 Application(s) Topical two times a day  buDESOnide   0.5 milliGRAM(s) Respule 0.5 milliGRAM(s) Inhalation every 12 hours  clopidogrel Tablet 75 milliGRAM(s) Oral daily  digoxin     Tablet 0.125 milliGRAM(s) Oral every other day  diltiazem    milliGRAM(s) Oral daily  heparin  Injectable 5000 Unit(s) SubCutaneous every 8 hours  methimazole 5 milliGRAM(s) Oral daily  metoprolol     tartrate 25 milliGRAM(s) Oral every 8 hours  predniSONE   Tablet 10 milliGRAM(s) Oral daily  sodium chloride 0.65% Nasal 1 Spray(s) Both Nostrils four times a day  tiotropium 2.5 MICROgram(s)/olodaterol 2.5 MICROgram(s) Inhaler 2 Puff(s) Inhalation daily    MEDICATIONS  (PRN):  acetaminophen   Tablet. 650 milliGRAM(s) Oral every 6 hours PRN Mild Pain (1 - 3)  docusate sodium 100 milliGRAM(s) Oral two times a day PRN Constipation  Gas-X extra strength (simethicone 125 mg 1 Tablet(s) 1 Tablet(s) Chew three times a day PRN bloating  ondansetron Injectable 4 milliGRAM(s) IV Push every 8 hours PRN Nausea and/or Vomiting  sucralfate 1 Gram(s) Oral three times a day PRN stomach discomfort    Pertinent Labs:  03-26 Na136 mmol/L Glu 95 mg/dL K+ 5.1 mmol/L Cr  3.43 mg/dL<H>  mg/dL<H> 03-26 Phos 4.7 mg/dL<H>      Skin: No pressure ulcers noted      Estimated Needs:   [x] no change since previous assessment  [ ] recalculated:       Previous Nutrition Diagnosis: Severe Malnutrition       Nutrition Diagnosis is [x] ongoing  [ ] resolved [ ] not applicable        New Nutrition Diagnosis: [x] not applicable      Interventions:     1) Continue current diet no concentrated K+ diet per Nephrology  2) Had discussion with pt and family member at bedside regarding the diet order vs. pt's wishes to have diet liberalized. Explained that per MD orders she is on lower K+ diet, reinforced education and indication for limiting dietary K+ and phosphorous as well in setting of hyperphosphatemia. I advised pt to adhere to current diet as she can but acknowledged her dissatisfaction with the diet and ultimately advised her to discuss nutrition-related GOC with the MD if it's something she would like changed/liberalized for quality of life purposes. Pt voiced understanding.  3) Would recommend palliative care revisit pt to discuss GOC      Monitoring and Evaluation:     [x] PO intake [x] Tolerance to diet prescription [x] weights [x] follow up per protocol    [x] other: RD remains available: Dionne Sauceda MS, RDN, CDN, CDE. #[phone] 72 year old female pt with PMH of AFib, Asthma, CAD, Cardiomegaly, enlarged lymph nodes, COPD, T2DM, endoscopy, upper lobectomy, CHF presented to ED with c/o SOB. Found to have decompensated diastolic CHF, (+) ascites, s/p paracentesis 3/2/18 with removal of 9 liters of ascites and again 3/14 with removal of 7 liters.; CASSANDRA on CKD per renal. Pt with hx hyperkalemia; no concentrated K+ was added back to diet per nephrology to address. Pt wishes to have regular liberalized diet regardless of indication for therapeutic diet in setting of impaired renal function and heart failure and regardless of prior education (upon RD initial assessment 3/14/18 education was provided to pt on lower phosphorous and potassium diet in setting of elevated labs and reinforced thereafter). Hyperphosphatemia noted.    Pt seen today for malnutrition follow-up. Still with c/o food served, pt stating she doesn't always get the foods she orders. Admits her appetite is declining and she doesn't want anything special at this time but feels she should get something she requests once in a while because she doesn't eat much as it is.       Source: Patient [x]    Family [ ]     other [x] electronic medical records      Diet : Regular, No concentrated K+ diet      Patient reports [ ] nausea  [ ] vomiting [ ] diarrhea [ ] constipation  [ ]chewing problems [ ] swallowing issues  [x] other: Denies GI distress, last BM 3/24/18      PO intake:  < 50% [x] 50-75% [x]   % [ ]  other : varies      Source for PO intake [x] Patient [ ] family [x] chart [ ] staff [ ] other      Enteral /Parenteral Nutrition: [x] n/a       Current Weight: 144.6 pounds (current, standing, +1 B/L ankle edema noted, (+) ascites). Wt trending up from lowest weight in house 130.2 3/15 following paracentesis. Overall~6 pounds less from 151.4 pounds admit wt 3/2/18.    Pertinent Medications: MEDICATIONS  (STANDING):  albumin human 25% IVPB 50 milliLiter(s) IV Intermittent every 3 hours  ALBUTerol/ipratropium for Nebulization 3 milliLiter(s) Nebulizer every 6 hours  aspirin enteric coated 81 milliGRAM(s) Oral daily  atorvastatin 20 milliGRAM(s) Oral at bedtime  BACItracin   Ointment 1 Application(s) Topical two times a day  buDESOnide   0.5 milliGRAM(s) Respule 0.5 milliGRAM(s) Inhalation every 12 hours  clopidogrel Tablet 75 milliGRAM(s) Oral daily  digoxin     Tablet 0.125 milliGRAM(s) Oral every other day  diltiazem    milliGRAM(s) Oral daily  heparin  Injectable 5000 Unit(s) SubCutaneous every 8 hours  methimazole 5 milliGRAM(s) Oral daily  metoprolol     tartrate 25 milliGRAM(s) Oral every 8 hours  predniSONE   Tablet 10 milliGRAM(s) Oral daily  sodium chloride 0.65% Nasal 1 Spray(s) Both Nostrils four times a day  tiotropium 2.5 MICROgram(s)/olodaterol 2.5 MICROgram(s) Inhaler 2 Puff(s) Inhalation daily    MEDICATIONS  (PRN):  acetaminophen   Tablet. 650 milliGRAM(s) Oral every 6 hours PRN Mild Pain (1 - 3)  docusate sodium 100 milliGRAM(s) Oral two times a day PRN Constipation  Gas-X extra strength (simethicone 125 mg 1 Tablet(s) 1 Tablet(s) Chew three times a day PRN bloating  ondansetron Injectable 4 milliGRAM(s) IV Push every 8 hours PRN Nausea and/or Vomiting  sucralfate 1 Gram(s) Oral three times a day PRN stomach discomfort    Pertinent Labs:  03-26 Na136 mmol/L Glu 95 mg/dL K+ 5.1 mmol/L Cr  3.43 mg/dL<H>  mg/dL<H> 03-26 Phos 4.7 mg/dL<H>      Skin: No pressure ulcers noted      Estimated Needs:   [x] no change since previous assessment  [ ] recalculated:       Previous Nutrition Diagnosis: Severe Malnutrition       Nutrition Diagnosis is [x] ongoing  [ ] resolved [ ] not applicable        New Nutrition Diagnosis: [x] not applicable      Interventions:     1) Continue current diet no concentrated K+ diet per Nephrology  2) Had discussion with pt and family member at bedside regarding the diet order vs. pt's wishes to have diet liberalized. Explained that per MD orders she is on lower K+ diet, reinforced education and indication for limiting dietary K+ and phosphorous as well in setting of hyperphosphatemia. I advised pt to adhere to current diet as she can but acknowledged her dissatisfaction with the diet and ultimately advised her to discuss nutrition-related GOC with the MD if it's something she would like changed/liberalized for quality of life purposes. Pt voiced understanding.  3) Would recommend palliative care revisit pt to discuss GOC  4) Discussed plan with NP     Monitoring and Evaluation:     [x] PO intake [x] Tolerance to diet prescription [x] weights [x] follow up per protocol    [x] other: RD remains available: Dionne Sauceda MS, RDN, CDN, CDE. #[phone]

## 2018-03-26 NOTE — PROGRESS NOTE ADULT - SUBJECTIVE AND OBJECTIVE BOX
[facility] - [facility]     PROGRESS NOTE    CHIEF COMPLAINT: CRF (hypoxic); COPD; emphysema; JOSE; secondary pulmonary hypertension; right heart failure; epistaxis; ascites;     INTERVAL HISTORY: weak and frail; worsening abdominal distension - holding off with another paracentesis due to hemodynamic instability and worsening renal function; quite short of breath with minimal exertion; diffuse pain and unable to find a comfortable position to rest; no cough, sputum production, chest congestion or wheeze; no fevers, chills or sweats; no chest pain/pressure or palpitations; legs swollen - could not tolerate KATY stockings; trial of dobutamine to improve right heart function in hopes of improving renal perfusion resulted in AF with RVR; off diuretics    REVIEW OF SYSTEMS:  Constitutional: As per interval history  HEENT: epistaxis  CV: As per interval history  Resp: As per interval history  GI: anorexia  : As per interval history  Musculoskeletal: diffuse pain  Skin: Within normal limits  Neurological: Within normal limits  Psychiatric: depressed  Endocrine: Within normal limits  Hematologic/Lymphatic: Within normal limits  Allergic/Immunologic: Within normal limits        MEDICATIONS:     Pulmonary "  ALBUTerol/ipratropium for Nebulization 3 milliLiter(s) Nebulizer every 6 hours  buDESOnide   0.5 milliGRAM(s) Respule 0.5 milliGRAM(s) Inhalation every 12 hours  tiotropium 2.5 MICROgram(s)/olodaterol 2.5 MICROgram(s) Inhaler 2 Puff(s) Inhalation daily      Anti-microbials:      Cardiovascular:  digoxin     Tablet 0.125 milliGRAM(s) Oral every other day  diltiazem    milliGRAM(s) Oral daily  metoprolol     tartrate 25 milliGRAM(s) Oral every 8 hours      Other:  acetaminophen   Tablet. 650 milliGRAM(s) Oral every 6 hours PRN  albumin human 25% IVPB 50 milliLiter(s) IV Intermittent every 3 hours  aspirin enteric coated 81 milliGRAM(s) Oral daily  atorvastatin 20 milliGRAM(s) Oral at bedtime  BACItracin   Ointment 1 Application(s) Topical two times a day  clopidogrel Tablet 75 milliGRAM(s) Oral daily  docusate sodium 100 milliGRAM(s) Oral two times a day PRN  Gas-X extra strength (simethicone 125 mg 1 Tablet(s) 1 Tablet(s) Chew three times a day PRN  heparin  Injectable 5000 Unit(s) SubCutaneous every 8 hours  methimazole 5 milliGRAM(s) Oral daily  ondansetron Injectable 4 milliGRAM(s) IV Push every 8 hours PRN  predniSONE   Tablet 10 milliGRAM(s) Oral daily  sodium chloride 0.65% Nasal 1 Spray(s) Both Nostrils four times a day  sucralfate 1 Gram(s) Oral three times a day PRN        OBJECTIVE:    I&O's Detail    25 Mar 2018 07:  -  26 Mar 2018 07:00  --------------------------------------------------------  IN:    Oral Fluid: 280 mL  Total IN: 280 mL    OUT:  Total OUT: 0 mL    Total NET: 280 mL      26 Mar 2018 07:01  -  26 Mar 2018 16:29  --------------------------------------------------------  IN:    Oral Fluid: 280 mL  Total IN: 280 mL    OUT:    Voided: 200 mL  Total OUT: 200 mL    Total NET: 80 mL      Daily Weight in k.6 (26 Mar 2018 05:53)    PHYSICAL EXAM:       ICU Vital Signs Last 24 Hrs  T(C): 36.6 (26 Mar 2018 13:45), Max: 36.6 (26 Mar 2018 13:45)  T(F): 97.9 (26 Mar 2018 13:45), Max: 97.9 (26 Mar 2018 13:45)  HR: 84 (26 Mar 2018 13:45) (64 - 121)  BP: 85/57 (26 Mar 2018 13:45) (85/57 - 112/70)  BP(mean): --  ABP: --  ABP(mean): --  RR: 22 (26 Mar 2018 13:45) (22 - 22)  SpO2: 93% (26 Mar 2018 13:45) (91% - 95%) on 5lpm nasal canula     General: Awake. Alert. Cooperative. Weak and tired. No distress. Chronically ill appearing	  HEENT:   Atraumatic. Bitemporal wasting. Anicteric. Normal oral mucosa, PERRL, EOMI  Neck: Supple. Trachea midline. Thyroid without enlargement/tenderness/nodules. No carotid bruit. (+) JVD; loss of bilateral supraclavicular fat pads	  Cardiovascular: Irregularly irregular rate and rhythm. S1 S2 normal. II/VI systolic murmur  Respiratory: Respirations unlabored. Decreased breath sounds throughout especially at the bases. Decreased chest wall excursion  Abdomen: Soft. Non-tender. Significantly distended with fluid wave. No organomegaly. No masses. Normal bowel sounds	  Extremities: Warm to touch. No clubbing or cyanosis. Mild to moderate lower extremity edema up to the thigh. Loss of extremity muscle mass  Pulses: Decreased lower extremity peripheral pulses  Skin: Lower extremity venous stasis changes;   Lymph Nodes: Cervical, supraclavicular and axillary nodes normal  Neurological: Motor and sensory examination equal and normal. A and O x 3  Psychiatry: Depressed       LABS:                        9.8    8.21  )-----------( 215      ( 26 Mar 2018 07:28 )             31.0                         9.5    7.75  )-----------( 233      ( 25 Mar 2018 09:08 )             31.2         136  |  93<L>  |  127<H>  ----------------------------<  95  5.1   |  27  |  3.43<H>        135  |  94<L>  |  121<H>  ----------------------------<  102<H>  4.9   |  28  |  3.13<H>    Ca      8.8          Ca      8.9          Phos    4.7         Phos    4.3           Mg       1.8         Mg       1.8         Venous Blood Gas:   @ 00:52  7.34/50/56/26/85  VBG Lactate: 0.9    Venous Blood Gas:   @ 16:33  7.34/55/35/29/60  VBG Lactate: 1.3    Serum Pro-Brain Natriuretic Peptide: 41461 pg/mL ( @ 16:29)    CARDIAC MARKERS ( 01 Mar 2018 16:29 )  x     / 0.06 ng/mL / x     / x     / 5.4 ng/mL    < from: Transthoracic Echocardiogram (.13.17 @ 18:58) >    Patient name: FRAN ALEXANDRA  YOB: 1945   Age: 71 (F)   MR#: [id number]  Study Date: 2017  Location: [address] [place], [place] [id number]: Thalia Amezquita [facility]  Study quality: Technically fair  Referring Physician: Pierce Cobb MD  Blood Pressure: 106/58 mmHg  Height: 160 cm  Weight: 64 kg  BSA: 1.7 m2  ------------------------------------------------------------------------  PROCEDURE: Transthoracic echocardiogram with 2-D, M-Mode  and complete spectral and color flow Doppler.  INDICATION: Other specified pulmonary heart diseases  (I27.89)  ------------------------------------------------------------------------  Dimensions:    Normal Values:  LA:     3.6    2.0 - 4.0 cm  Ao:     2.8    2.0 - 3.8 cm  SEPTUM: 0.7    0.6 - 1.2 cm  PWT:    0.9    0.6 - 1.1 cm  LVIDd:  4.1    3.0 - 5.6 cm  LVIDs:  1.9    1.8 - 4.0 cm  Derived variables:  LVMI: 58 g/m2  RWT: 0.43  Fractional short: 54 %  Doppler Peak Velocity (m/sec): AoV=1.8  ------------------------------------------------------------------------  Observations:  Mitral Valve: Mitral annular calcification, otherwise  normal mitral valve. Minimal mitral regurgitation.  Aortic Valve/Aorta: Calcified trileaflet aortic valve with  normal opening. Peak transaortic valve gradient equals 13  mm Hg, mean transaortic valve gradient equals 8 mm Hg. Peak  left ventricular outflow tract gradient equals 6 mm Hg,  mean gradient is equal to 3 mm Hg, LVOT velocity time  integral equals 19 cm.  Aortic Root: 2.8 cm.  LVOT diameter: 1.9 cm.  Left Atrium: Normal left atrium.  LA volume index = 25  cc/m2.  Left Ventricle: Hyperdynamic left ventricular systolic  function. Flattening of the interventricular septum in both  systole and diastole is  consistent with right ventricular  pressure overload. Normal left ventricular internal  dimensions and wall thicknesses.  Right Heart: Severe right atrial enlargement. Right  ventricular enlargement with decreased right ventricular  systolic function. Normal tricuspid valve. Mild-moderate  tricuspid regurgitation. Normal pulmonic valve.  Pericardium/Pleura: Normal pericardium with trace  pericardial effusion.  Left pleural effusion.  Hemodynamic: Estimated right atrial pressure is 8 mm Hg.  Estimated right ventricular systolic pressure equals 72 mm  Hg, assuming right atrial pressure equals 8 mm Hg,  consistent with severe pulmonary hypertension.  ------------------------------------------------------------------------  Conclusions:  1. Calcified trileaflet aortic valve with normal opening.  2. Normal left ventricular internal dimensions and wall  thicknesses.  3. Hyperdynamic left ventricular systolic function.  Flattening of the interventricular septum in both systole  and diastole is  consistent with right ventricular pressure  overload.  4. Severe right atrial enlargement.  5. Right ventricular enlargement with decreased right  ventricular systolic function.  6. Normal tricuspid valve. Mild-moderate tricuspid  regurgitation.  7. Estimated pulmonary artery systolic pressure equals 72  mm Hg, assuming right atrial pressure equals 8 mm Hg,  consistent with severe pulmonary pressures.  *** Compared with echocardiogram of 2017, no  significant changes noted.  ------------------------------------------------------------------------  Confirmed on  2017 - 13:15:09 by Justin Cohen M.D.  ------------------------------------------------------------------------    < end of copied text >  ---------------------------------------------------------------------------------------------------------  MICROBIOLOGY:     Culture - Fungal, Body Fluid (18 @ 21:16)    Specimen Source: .Body Fluid Peritoneal Fluid    Culture Results:   No fungus isolated at 1 week. No additional interim reports will be  issued unless there is a change in culture status.    Culture - Body Fluid with Gram Stain (18 @ 21:16)    Gram Stain:   polymorphonuclear leukocytes seen per low power field  No organisms seen per oil power field  by cytocentrifuge    Specimen Source: Peritoneal Peritoneal Fluid    Culture Results:   No growth to date.    Culture - Acid Fast - Body Fluid w/Smear (18 @ 21:16)    Specimen Source: .Body Fluid Peritoneal Fluid    Acid Fast Bacilli Smear:   No acid fast bacilli seen by fluorochrome stain    RADIOLOGY:  [x] Chest radiographs reviewed and interpreted by me    < from: US Abdomen Limited (18 @ 15:17) >    EXAM:  US ABDOMEN LIMITED                            PROCEDURE DATE:  2018        INTERPRETATION:  CLINICAL INFORMATION: End-stage right heart failure.   Evaluate for ascites.    TECHNIQUE: Limited sonogram of the abdomen to assess for ascites.    COMPARISON: Abdomen and pelvis dated 3/10/2018.    FINDINGS:    Large volume ascites in the bilateral lower quadrants. A moderate right   upper quadrant ascites. Small left upper quadrant ascites.    Trace left pleural effusion.    IMPRESSION:     Moderate to large volume abdominal ascites.    Trace left pleural effusion.    RACHEL VELARDE M.D., RADIOLOGY RESIDENT  This document has been electronically signed.  CEDRIC MISHRA M.D., ATTENDING RADIOLOGIST  This document has been electronically signed. Mar 22 2018  3:55PM      < end of copied text >  ---------------------------------------------------------------------------------------------------------    < from: [facility] Duplex Lower Ext Vein Scan, Bilat (18 @ 15:51) >    EXAM:  DUPLEX SCAN EXT VEINS LOWER BI                            PROCEDURE DATE:  2018        INTERPRETATION:  CLINICAL INFORMATION: Bilateral lower extremity   swelling, rule out DVT.    COMPARISON: Prior examination, dated 3/5/2018 showed no thrombus.    TECHNIQUE: Duplex sonography of the BILATERAL LOWER extremities with   color and spectral Doppler, with and without compression.      FINDINGS: There is edema within the superficial soft tissues of both   lower extremities.    There is normal compressibility of the bilateral common femoral, femoral   and popliteal veins. No calf vein thrombosis is detected.    Doppler examination shows normal spontaneous and phasic flow.    IMPRESSION:     No evidence of bilateral lower extremity deep venous thrombosis.    MARTIR VALVERDE M.D., ATTENDING RADIOLOGIST  This document has been electronically signed. Mar 20 2018  4:43PM      < end of copied text >  ---------------------------------------------------------------------------------------------------------    < from: CT Abdomen and Pelvis w/ Oral Cont (03.10.18 @ 19:07) >    EXAM:  CT ABDOMEN AND PELVIS OC                            PROCEDURE DATE:  03/10/2018      INTERPRETATION:  CLINICAL INFORMATION: Abdominal distention and pain for   several days. Acute renal failure.    COMPARISON: CT abdomen pelvis 2015    PROCEDURE:   CT of the Abdomen and Pelvis was performed without intravenous contrast.   Intravenous contrast: None.  Oral contrast: positive contrast was administered.  Sagittal and coronal reformats were performed.    FINDINGS:    LOWER CHEST:Mild interlobular septal thickening and small bilateral   pleural effusions. Subsegmental atelectasis in the right lower lobe.   Cardiomegaly. Coronary calcification. Calcified hilar lymph nodes.    LIVER: Within normal limits.  BILE DUCTS: Normal caliber.  GALLBLADDER: Cholelithiasis.  SPLEEN: Within normal limits.  PANCREAS: Within normal limits.  ADRENALS: Within normal limits.  KIDNEYS/URETERS: Left lower pole renal cyst.    BLADDER: Within normal limits.  REPRODUCTIVE ORGANS: Posterior uterine fibroid.    BOWEL: No bowel obstruction. Sigmoid diverticulosis, without   diverticulitis. Normal appendix.     PERITONEUM: Large volume ascites.  VESSELS:  Atherosclerotic changes.  RETROPERITONEUM: No lymphadenopathy.    ABDOMINAL WALL: Within normal limits.  BONES: Degenerative changes. T12 vertebral body hemangioma.    IMPRESSION:     Large volume ascites, etiology unclear.    Small bilateral pleural effusions and mild pulmonary edema.    VIOLA ANDREWS M.D., ATTENDINGRADIOLOGIST  This document has been electronically signed. Mar 11 2018  8:52AM      < end of copied text >  ---------------------------------------------------------------------------------------------------------  < from: CT Chest No Cont (18 @ 18:24) >    EXAM:  CT CHEST                            PROCEDURE DATE:  2018        INTERPRETATION:  CLINICAL INFORMATION: Evaluate pleural effusions.   Shortness of breath.    COMPARISON: Chest CT dated 2017. Chest x-ray dated 3/1/2018.    PROCEDURE:   CT of the Chest was performed without intravenous contrast.  Sagittal and coronal reformats were performed.  Axial MIP reformats were also performed.    FINDINGS:    CHEST:     LUNGS AND LARGE AIRWAYS: Patent central airways.  Bibasilar subsegmental   atelectasis, right greater than left.  PLEURA: Trace bilateral pleural effusions.  VESSELS: Thoracic aortic and coronary artery atherosclerosis.  HEART: Cardiomegaly. Small pericardial effusion. Mitral annular   calcification. A densely calcified or metallic density measuring 1.1 x   0.4 cm overlies the basilar left pulmonary artery (3:52, 6:80).  MEDIASTINUM AND STEFANIA: No lymphadenopathy.  CHEST WALL AND LOWER NECK: Within normal limits.  VISUALIZED UPPER ABDOMEN: Small volume ascites. Atherosclerotic changes.  BONES: Multilevel degenerative disease.    IMPRESSION: Trace bilateral pleural effusions.  Bibasilar subsegmental atelectasis, right greater than left.  Densely calcified or metallic density overlies the left pulmonary artery.  See above..      JUAN JUAREZ M.D., RADIOLOGY RESIDENT  This document has been electronically signed.  MADONNA MORGAN M.D., ATTENDING RADIOLOGIST  This document has been electronically signed. Mar  4 2018 12:28PM      < end of copied text >  ---------------------------------------------------------------------------------------------------------

## 2018-03-26 NOTE — PROGRESS NOTE ADULT - ASSESSMENT
Right elbow, hip, knee, pain  malnutrition  volume overload/ascites/LE edema  sev PHTN/ Cor pulm  comp diast hf  sev copd  restrictive lung disease sec to ascites which diminishes diaphragmatic excursion  epistaxis - on and off  CASSANDRA on ckd 4  hyperkalemia - resolved  2 shattered teeth     continue current care  dental eval still pending  discussed with STEPHANIE Coley to lin dental again  monitor labs  appreciate renal input  poss para when renal fxn improved.

## 2018-03-26 NOTE — PROGRESS NOTE ADULT - ASSESSMENT
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  3) chronic diastolic CHF (little evidence left sided heart failure on chest CT - trace pleural effusions - minimal ground glass opacities)    CKD with CASSANDRA due to intravascular volume depletion +/- hepato-renal syndrome    AF    HTN/HLD/DM/CAD/PCI    PLAN/RECOMMENDATIONS    oxygen supplementation ATC to keep saturation greater than 92%  paracentesis versus placement of an indwelling intraperitoneal catheter when hemodynamically stable and renal function has improved  albuterol/atrovent/pulmicort nebs  prednisone 10mg daily  cardiac meds: cardizem CD/lopressor/digoxin/ASA/plavix/lipitor - off diuretics - IV albumin x 3 doses today  GI/DVT prophylaxis - carafate/SQ heparin  patient has been treated with endothelin antagonists and phosphodiesterase inhibitors without improvement in PAPs in the past    Will follow with you. Plan of care discussed with the patient and her  at bedside    Chacho Gallego MD, [facility] - [phone]  Pulmonary Medicine

## 2018-03-26 NOTE — CONSULT NOTE ADULT - SUBJECTIVE AND OBJECTIVE BOX
Patient is a 72y old  Female who presents with a chief complaint of shortness of breath and difficulty eating due to fullness (01 Mar 2018 19:30)  Dental CC: "I don't know what happened, I just noticed yesterday some of my top teeth were broken."    HPI:  72F with PMHx of AFib, asthma, CAD, cardiomegaly, enlarged lymph nodes, COPD, DM type II, PSHx of endoscopy, upper lobectomy, sent to the ED by PMD to be admitted for heart failure. Pt presents with complaints of shortness of breath secondary to fluid in her lungs, abdominal distension and LE edema. Her SOB has been constant, moderate to severe, without clear relieving or exacerbating factor and not associated with any fever, chills, cough, chest pain, abdominal pain, nausea, emesis, urinary symptoms, or any other complaints at this time. (01 Mar 2018 19:30)    PAST MEDICAL & SURGICAL HISTORY:  Hyperthyroidism  JOSE (obstructive sleep apnea)  Epistaxis  GIB (gastrointestinal bleeding)  CAD S/P percutaneous coronary angioplasty  Afib  Pulmonary HTN  GERD (gastroesophageal reflux disease)  Obesity  Cardiomegaly  Valvular heart disease  COPD (chronic obstructive pulmonary disease): Home O2  Sleep Apnea: by criteria  Loose, teeth: 3 bottom front loose teeth  Female stress incontinence  Shoulder pain, right  Constipation  Arthritis of Knee  H/O heartburn  History of lung cancer  Obese  Hx of hyperlipidemia  Asthma  Diabetes mellitus: type 2  dx about 4-5 years ago   no daily fingerstick  H/O: HTN (hypertension)  Enlarged lymph nodes  Lymph nodes enlarged: s/p biopsy mediastinal  benign  H/O endoscopy  left upper lobectomy    (   ) heart valve replacement  (   ) joint replacement  (   ) pregnancy    MEDICATIONS  (STANDING):  ALBUTerol/ipratropium for Nebulization 3 milliLiter(s) Nebulizer every 6 hours  aspirin enteric coated 81 milliGRAM(s) Oral daily  atorvastatin 20 milliGRAM(s) Oral at bedtime  BACItracin   Ointment 1 Application(s) Topical two times a day  buDESOnide   0.5 milliGRAM(s) Respule 0.5 milliGRAM(s) Inhalation every 12 hours  clopidogrel Tablet 75 milliGRAM(s) Oral daily  digoxin     Tablet 0.125 milliGRAM(s) Oral every other day  diltiazem    milliGRAM(s) Oral daily  heparin  Injectable 5000 Unit(s) SubCutaneous every 8 hours  methimazole 5 milliGRAM(s) Oral daily  metoprolol     tartrate 25 milliGRAM(s) Oral every 8 hours  predniSONE   Tablet 10 milliGRAM(s) Oral daily  sodium chloride 0.65% Nasal 1 Spray(s) Both Nostrils four times a day  tiotropium 2.5 MICROgram(s)/olodaterol 2.5 MICROgram(s) Inhaler 2 Puff(s) Inhalation daily    MEDICATIONS  (PRN):  acetaminophen   Tablet. 650 milliGRAM(s) Oral every 6 hours PRN Mild Pain (1 - 3)  docusate sodium 100 milliGRAM(s) Oral two times a day PRN Constipation  Gas-X extra strength (simethicone 125 mg 1 Tablet(s) 1 Tablet(s) Chew three times a day PRN bloating  ondansetron Injectable 4 milliGRAM(s) IV Push every 8 hours PRN Nausea and/or Vomiting  sucralfate 1 Gram(s) Oral three times a day PRN stomach discomfort      Allergies    No Known Allergies    Intolerances    albuterol (Unknown)      FAMILY HISTORY:  No pertinent family history in first degree relatives      LABS:                        9.8    8.21  )-----------( 215      ( 26 Mar 2018 07:28 )             31.0     03-26    136  |  93<L>  |  127<H>  ----------------------------<  95  5.1   |  27  |  3.43<H>    Ca    8.8      26 Mar 2018 07:02  Phos  4.7     03-26  Mg     1.8     03-26      WBC Count: 8.21 K/uL [3.80 - 10.50] (03-26 @ 07:28)    Platelet Count - Automated: 215 K/uL [150 - 400] (03-26 @ 07:28)  Platelet Count - Automated: 233 K/uL [150 - 400] (03-25 @ 09:08)            *SOCIAL HISTORY: (guardian or who pt came with), (smoking hx)    *Last Dental Visit:    Vital Signs Last 24 Hrs  T(C): 36.3 (26 Mar 2018 20:31), Max: 36.6 (26 Mar 2018 13:45)  T(F): 97.3 (26 Mar 2018 20:31), Max: 97.9 (26 Mar 2018 13:45)  HR: 59 (26 Mar 2018 20:31) (57 - 121)  BP: 110/68 (26 Mar 2018 20:31) (85/57 - 110/68)  BP(mean): --  RR: 22 (26 Mar 2018 20:31) (22 - 22)  SpO2: 93% (26 Mar 2018 20:31) (91% - 95%)    EOE: (-)TMD, no significant abnormalities detected.    IOE:  Permanent dentition, partially edentulous, multiple restorations in place. #3-12 present, #13 missing, #14 PMDO of clinical crown broken off, only buccal portion of crown remaining and has positive mobility but not quite grade 1 mobility. #15 complete clinical crown broken off, root remaining. #16 carious. Most dentition present in mandible, excessively large calculous bridge present on mandibular anterior dentition therefore unable to fully assess them.  No acute infection clinically noted. Patient requested that tooth extracted as it does cause her discomfort and she avoids eating on the left side. Informed patient that as the tooth is not an aspiration risk or an acute emergency, it will not be extracted at this time. Once patient understood tooth will not be extracted since this is not an emergency situation, she did not want to travel to the dental ED to complete the exam with a final intraoral radiograph.    Radiographs: No radiographs taken as patient did not want to travel to dental ED after learning tooth #14 (chief complaint) will not be extracted.  ASSESSMENT: Multiple fractured dentition.     RECOMMENDATIONS:   1) Pain management.  2) Dental F/U with outpatient dentist for comprehensive dental care.   3) If any difficulty swallowing/breathing, fever occur, page dental.     Resident Doctor: Saritha Mccarty DDS  MSc           Pager# [phone]  [facility] clinic : J Luis Rascon DDS

## 2018-03-27 LAB
ANION GAP SERPL CALC-SCNC: 15 MMOL/L — SIGNIFICANT CHANGE UP (ref 5–17)
BUN SERPL-MCNC: 128 MG/DL — HIGH (ref 7–23)
CALCIUM SERPL-MCNC: 8.6 MG/DL — SIGNIFICANT CHANGE UP (ref 8.4–10.5)
CHLORIDE SERPL-SCNC: 97 MMOL/L — SIGNIFICANT CHANGE UP (ref 96–108)
CO2 SERPL-SCNC: 24 MMOL/L — SIGNIFICANT CHANGE UP (ref 22–31)
CREAT SERPL-MCNC: 3.09 MG/DL — HIGH (ref 0.5–1.3)
DIGOXIN SERPL-MCNC: 1.5 NG/ML — SIGNIFICANT CHANGE UP (ref 0.8–2)
GLUCOSE SERPL-MCNC: 141 MG/DL — HIGH (ref 70–99)
POTASSIUM SERPL-MCNC: 4.9 MMOL/L — SIGNIFICANT CHANGE UP (ref 3.5–5.3)
POTASSIUM SERPL-SCNC: 4.9 MMOL/L — SIGNIFICANT CHANGE UP (ref 3.5–5.3)
SODIUM SERPL-SCNC: 136 MMOL/L — SIGNIFICANT CHANGE UP (ref 135–145)

## 2018-03-27 RX ADMIN — Medication 3 MILLILITER(S): at 12:01

## 2018-03-27 RX ADMIN — Medication 240 MILLIGRAM(S): at 09:19

## 2018-03-27 RX ADMIN — Medication 81 MILLIGRAM(S): at 09:18

## 2018-03-27 RX ADMIN — Medication 1 SPRAY(S): at 12:01

## 2018-03-27 RX ADMIN — Medication 3 MILLILITER(S): at 06:24

## 2018-03-27 RX ADMIN — Medication 3 MILLILITER(S): at 21:13

## 2018-03-27 RX ADMIN — Medication 10 MILLIGRAM(S): at 09:19

## 2018-03-27 RX ADMIN — Medication 25 MILLIGRAM(S): at 09:19

## 2018-03-27 RX ADMIN — Medication 0.12 MILLIGRAM(S): at 12:01

## 2018-03-27 RX ADMIN — TIOTROPIUM BROMIDE AND OLODATEROL 2 PUFF(S): 3.124; 2.736 SPRAY, METERED RESPIRATORY (INHALATION) at 12:00

## 2018-03-27 RX ADMIN — CLOPIDOGREL BISULFATE 75 MILLIGRAM(S): 75 TABLET, FILM COATED ORAL at 12:01

## 2018-03-27 RX ADMIN — ATORVASTATIN CALCIUM 20 MILLIGRAM(S): 80 TABLET, FILM COATED ORAL at 21:12

## 2018-03-27 NOTE — PROGRESS NOTE ADULT - ASSESSMENT
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  3) chronic diastolic CHF (little evidence left sided heart failure on chest CT - trace pleural effusions - minimal ground glass opacities)    CKD with CASSANDRA due to intravascular volume depletion +/- hepato-renal syndrome    AF/atrial flutter    HTN/HLD/DM/CAD/PCI    PLAN/RECOMMENDATIONS    oxygen supplementation ATC to keep saturation greater than 92%  paracentesis versus placement of an indwelling intraperitoneal catheter when hemodynamically stable and renal function has improved  albuterol/atrovent/pulmicort nebs  prednisone 10mg daily  cardiac meds: cardizem CD/lopressor/digoxin/ASA/plavix/lipitor - off diuretics - s/p IV albumin x 3 doses   unable to tolerate inotropes which resulted in tachycardia  GI/DVT prophylaxis - carafate/SQ heparin  patient has been treated with endothelin antagonists and phosphodiesterase inhibitors without improvement in PAPs in the past    Will follow with you. Plan of care discussed with the patient at length at bedside.  Prognosis is poor.     Chacho Gallego MD, [facility] - [phone]  Pulmonary Medicine

## 2018-03-27 NOTE — PROGRESS NOTE ADULT - ASSESSMENT
72 year old female with CAD, dCHF, pHTN, HTN, DM, Afib and COPD p/w acute on chronic dCHF, ascites and acute on chronic kidney injury.     - CKD stage 4: likely due to HTN and DM and now CASSANDRA: non oliguric.   - acute on chronic dCHF: continued lower extremity edema   - GI: ascites s/p paracentesis. Abdomen remains distended  - hypotension: BP improving today      Recommendations  - No choice but the dc the bumex 0.5 mg po bid  - continue metoprolol 25 mg po q 8 hrs and Cardizem 240 mg po daily   - strict I+O and daily weights; SP I.V. alb x 3 doses.    - maintain low potassium diet, print out given on high and low potassium foods  - would defer another paracentesis until renal function improved   - Check random TSH and dig level in am 3/29    There is no role for inotropes as she responds with extreme tachycardia  Prognosis is guarded at best.  May need to have palliative re-evaluate

## 2018-03-27 NOTE — PROGRESS NOTE ADULT - ASSESSMENT
Adjustment disorder    Recommend  Pt. refuses to start an antidepressant claiming she is already on too many meds. (Sodium wnl but risk of bleeding with epistaxis).  Following.     Jose Craig M.D.  Psychiatry  [phone]

## 2018-03-27 NOTE — PROGRESS NOTE ADULT - SUBJECTIVE AND OBJECTIVE BOX
Events noted. Pt. remains frustrated with medical setbacks and her diet. She has an appetite and says "they (kitchen) don't send me what I ordered" despite the nurse telling me otherwise.       Vital Signs Last 24 Hrs  T(C): 36.4 (27 Mar 2018 06:13), Max: 36.6 (26 Mar 2018 13:45)  T(F): 97.6 (27 Mar 2018 06:13), Max: 97.9 (26 Mar 2018 13:45)  HR: 100 (27 Mar 2018 09:19) (57 - 100)  BP: 94/63 (27 Mar 2018 09:19) (85/57 - 110/68)  BP(mean): --  RR: 18 (27 Mar 2018 09:19) (18 - 22)  SpO2: 97% (27 Mar 2018 06:13) (93% - 97%)                          9.8    8.21  )-----------( 215      ( 26 Mar 2018 07:28 )             31.0       03-26    136  |  93<L>  |  127<H>  ----------------------------<  95  5.1   |  27  |  3.43<H>    Ca    8.8      26 Mar 2018 07:02  Phos  4.7     03-26  Mg     1.8     03-26                MEDICATIONS  (STANDING):  ALBUTerol/ipratropium for Nebulization 3 milliLiter(s) Nebulizer every 6 hours  aspirin enteric coated 81 milliGRAM(s) Oral daily  atorvastatin 20 milliGRAM(s) Oral at bedtime  BACItracin   Ointment 1 Application(s) Topical two times a day  buDESOnide   0.5 milliGRAM(s) Respule 0.5 milliGRAM(s) Inhalation every 12 hours  clopidogrel Tablet 75 milliGRAM(s) Oral daily  digoxin     Tablet 0.125 milliGRAM(s) Oral every other day  diltiazem    milliGRAM(s) Oral daily  heparin  Injectable 5000 Unit(s) SubCutaneous every 8 hours  methimazole 5 milliGRAM(s) Oral daily  metoprolol     tartrate 25 milliGRAM(s) Oral every 8 hours  predniSONE   Tablet 10 milliGRAM(s) Oral daily  sodium chloride 0.65% Nasal 1 Spray(s) Both Nostrils four times a day  tiotropium 2.5 MICROgram(s)/olodaterol 2.5 MICROgram(s) Inhaler 2 Puff(s) Inhalation daily    MEDICATIONS  (PRN):  acetaminophen   Tablet. 650 milliGRAM(s) Oral every 6 hours PRN Mild Pain (1 - 3)  docusate sodium 100 milliGRAM(s) Oral two times a day PRN Constipation  Gas-X extra strength (simethicone 125 mg 1 Tablet(s) 1 Tablet(s) Chew three times a day PRN bloating  ondansetron Injectable 4 milliGRAM(s) IV Push every 8 hours PRN Nausea and/or Vomiting  sucralfate 1 Gram(s) Oral three times a day PRN stomach discomfort      Elderly WF in bed, sitting up with distended abdomen, wearing valero,  alert and oriented x 3  .  No psychomotor abnormalities. Insight and judgment are fair. Speech is coherent with normal rate and volume. No hallucinations nor delusions. The patient denied suicidal and homicidal ideation and plan. Mood is frustrated and affect irritable.  Attention and concentration, short term memory, and long term memory within normal limits.     Given supportive psychotherapy.

## 2018-03-27 NOTE — PROGRESS NOTE ADULT - SUBJECTIVE AND OBJECTIVE BOX
[facility] - [facility]     PROGRESS NOTE    CHIEF COMPLAINT: CRF (hypoxic); COPD; emphysema; JOSE; secondary pulmonary hypertension; right heart failure; epistaxis; ascites;     INTERVAL HISTORY: weak and frail; severe abdominal distension - still holding off with another paracentesis due to hemodynamic instability and poor renal function; quite short of breath with minimal exertion; no cough, sputum production, chest congestion or wheeze; no fevers, chills or sweats; no chest pain/pressure or palpitations; legs swollen - could not tolerate KATY stockings; off diuretics    REVIEW OF SYSTEMS:  Constitutional: As per interval history  HEENT: epistaxis - resolved  CV: As per interval history  Resp: As per interval history  GI: anorexia  : As per interval history  Musculoskeletal: diffuse pain  Skin: Within normal limits  Neurological: Within normal limits  Psychiatric: depressed  Endocrine: Within normal limits  Hematologic/Lymphatic: Within normal limits  Allergic/Immunologic: Within normal limits        MEDICATIONS:     Pulmonary "  ALBUTerol/ipratropium for Nebulization 3 milliLiter(s) Nebulizer every 6 hours  buDESOnide   0.5 milliGRAM(s) Respule 0.5 milliGRAM(s) Inhalation every 12 hours  tiotropium 2.5 MICROgram(s)/olodaterol 2.5 MICROgram(s) Inhaler 2 Puff(s) Inhalation daily      Anti-microbials:      Cardiovascular:  digoxin     Tablet 0.125 milliGRAM(s) Oral every other day  diltiazem    milliGRAM(s) Oral daily  metoprolol     tartrate 25 milliGRAM(s) Oral every 8 hours      Other:  acetaminophen   Tablet. 650 milliGRAM(s) Oral every 6 hours PRN  aspirin enteric coated 81 milliGRAM(s) Oral daily  atorvastatin 20 milliGRAM(s) Oral at bedtime  BACItracin   Ointment 1 Application(s) Topical two times a day  clopidogrel Tablet 75 milliGRAM(s) Oral daily  docusate sodium 100 milliGRAM(s) Oral two times a day PRN  Gas-X extra strength (simethicone 125 mg 1 Tablet(s) 1 Tablet(s) Chew three times a day PRN  heparin  Injectable 5000 Unit(s) SubCutaneous every 8 hours  methimazole 5 milliGRAM(s) Oral daily  ondansetron Injectable 4 milliGRAM(s) IV Push every 8 hours PRN  predniSONE   Tablet 10 milliGRAM(s) Oral daily  sodium chloride 0.65% Nasal 1 Spray(s) Both Nostrils four times a day  sucralfate 1 Gram(s) Oral three times a day PRN        OBJECTIVE:    I&O's Detail    26 Mar 2018 07:  -  27 Mar 2018 07:00  --------------------------------------------------------  IN:    Oral Fluid: 280 mL  Total IN: 280 mL    OUT:    Voided: 200 mL  Total OUT: 200 mL    Total NET: 80 mL      27 Mar 2018 07:01  -  27 Mar 2018 16:47  --------------------------------------------------------  IN:    Oral Fluid: 480 mL  Total IN: 480 mL    OUT:    Voided: 100 mL  Total OUT: 100 mL    Total NET: 380 mL    Daily Weight in k.9 (27 Mar 2018 06:41)    PHYSICAL EXAM:       ICU Vital Signs Last 24 Hrs  T(C): 36.6 (27 Mar 2018 14:22), Max: 36.6 (27 Mar 2018 14:22)  T(F): 97.8 (27 Mar 2018 14:22), Max: 97.8 (27 Mar 2018 14:22)  HR: 57 (27 Mar 2018 14:22) (57 - 100)  BP: 101/67 (27 Mar 2018 14:22) (91/54 - 110/68)  BP(mean): --  ABP: --  ABP(mean): --  RR: 22 (27 Mar 2018 14:22) (18 - 22)  SpO2: 94% (27 Mar 2018 14:22) (93% - 97%) on 5lpm nasal canula     General: Awake. Alert. Cooperative. Weak and tired. No distress. Chronically ill appearing	  HEENT:   Atraumatic. Bitemporal wasting. Anicteric. Normal oral mucosa, PERRL, EOMI  Neck: Supple. Trachea midline. Thyroid without enlargement/tenderness/nodules. No carotid bruit. (+) JVD; loss of bilateral supraclavicular fat pads	  Cardiovascular: Bradycardic. Irregularly irregular rate and rhythm. S1 S2 normal. II/VI systolic murmur  Respiratory: Respirations unlabored. Decreased breath sounds throughout especially at the bases. Decreased chest wall excursion  Abdomen: Soft. Non-tender. Significantly distended with fluid wave. No organomegaly. No masses. Normal bowel sounds	  Extremities: Warm to touch. No clubbing or cyanosis. Moderate lower extremity edema up to the thigh. Loss of extremity muscle mass  Pulses: Decreased lower extremity peripheral pulses  Skin: Lower extremity venous stasis changes;   Lymph Nodes: Cervical, supraclavicular and axillary nodes normal  Neurological: Motor and sensory examination equal and normal. A and O x 3  Psychiatry: Depressed       LABS:                        9.8    8.21  )-----------( 215      ( 26 Mar 2018 07:28 )             31.0         136  |  97  |  128<H>  ----------------------------<  141<H>  4.9   |  24  |  3.09<H>        136  |  93<L>  |  127<H>  ----------------------------<  95  5.1   |  27  |  3.43<H>    Ca      8.6          Ca      8.8          Phos    4.7         Phos    4.3           Mg       1.8         Mg       1.8     24    Venous Blood Gas:   @ 00:52  7.34/50/56//85  VBG Lactate: 0.9    Venous Blood Gas:   @ 16:33  7.34/55/35/29/60  VBG Lactate: 1.3    Serum Pro-Brain Natriuretic Peptide: 95080 pg/mL ( @ 16:29)    CARDIAC MARKERS ( 01 Mar 2018 16:29 )  x     / 0.06 ng/mL / x     / x     / 5.4 ng/mL    < from: Transthoracic Echocardiogram (17 @ 18:58) >    Patient name: FRAN ALEXANDRA  YOB: 1945   Age: 71 (F)   MR#: [id number]  Study Date: 2017  Location: [address] [place], [place] [id number]: [facility]  Study quality: Technically fair  Referring Physician: Pierce Cobb MD  Blood Pressure: 106/58 mmHg  Height: 160 cm  Weight: 64 kg  BSA: 1.7 m2  ------------------------------------------------------------------------  PROCEDURE: Transthoracic echocardiogram with 2-D, M-Mode  and complete spectral and color flow Doppler.  INDICATION: Other specified pulmonary heart diseases  (I27.89)  ------------------------------------------------------------------------  Dimensions:    Normal Values:  LA:     3.6    2.0 - 4.0 cm  Ao:     2.8    2.0 - 3.8 cm  SEPTUM: 0.7    0.6 - 1.2 cm  PWT:    0.9    0.6 - 1.1 cm  LVIDd:  4.1    3.0 - 5.6 cm  LVIDs:  1.9    1.8 - 4.0 cm  Derived variables:  LVMI: 58 g/m2  RWT: 0.43  Fractional short: 54 %  Doppler Peak Velocity (m/sec): AoV=1.8  ------------------------------------------------------------------------  Observations:  Mitral Valve: Mitral annular calcification, otherwise  normal mitral valve. Minimal mitral regurgitation.  Aortic Valve/Aorta: Calcified trileaflet aortic valve with  normal opening. Peak transaortic valve gradient equals 13  mm Hg, mean transaortic valve gradient equals 8 mm Hg. Peak  left ventricular outflow tract gradient equals 6 mm Hg,  mean gradient is equal to 3 mm Hg, LVOT velocity time  integral equals 19 cm.  Aortic Root: 2.8 cm.  LVOT diameter: 1.9 cm.  Left Atrium: Normal left atrium.  LA volume index = 25  cc/m2.  Left Ventricle: Hyperdynamic left ventricular systolic  function. Flattening of the interventricular septum in both  systole and diastole is  consistent with right ventricular  pressure overload. Normal left ventricular internal  dimensions and wall thicknesses.  Right Heart: Severe right atrial enlargement. Right  ventricular enlargement with decreased right ventricular  systolic function. Normal tricuspid valve. Mild-moderate  tricuspid regurgitation. Normal pulmonic valve.  Pericardium/Pleura: Normal pericardium with trace  pericardial effusion.  Left pleural effusion.  Hemodynamic: Estimated right atrial pressure is 8 mm Hg.  Estimated right ventricular systolic pressure equals 72 mm  Hg, assuming right atrial pressure equals 8 mm Hg,  consistent with severe pulmonary hypertension.  ------------------------------------------------------------------------  Conclusions:  1. Calcified trileaflet aortic valve with normal opening.  2. Normal left ventricular internal dimensions and wall  thicknesses.  3. Hyperdynamic left ventricular systolic function.  Flattening of the interventricular septum in both systole  and diastole is  consistent with right ventricular pressure  overload.  4. Severe right atrial enlargement.  5. Right ventricular enlargement with decreased right  ventricular systolic function.  6. Normal tricuspid valve. Mild-moderate tricuspid  regurgitation.  7. Estimated pulmonary artery systolic pressure equals 72  mm Hg, assuming right atrial pressure equals 8 mm Hg,  consistent with severe pulmonary pressures.  *** Compared with echocardiogram of 2017, no  significant changes noted.  ------------------------------------------------------------------------  Confirmed on  2017 - 13:15:09 by Justin Cohen M.D.  ------------------------------------------------------------------------    < end of copied text >  ---------------------------------------------------------------------------------------------------------  MICROBIOLOGY:     Culture - Fungal, Body Fluid (18 @ 21:16)    Specimen Source: .Body Fluid Peritoneal Fluid    Culture Results:   No fungus isolated at 1 week. No additional interim reports will be  issued unless there is a change in culture status.    Culture - Body Fluid with Gram Stain (18 @ 21:16)    Gram Stain:   polymorphonuclear leukocytes seen per low power field  No organisms seen per oil power field  by cytocentrifuge    Specimen Source: Peritoneal Peritoneal Fluid    Culture Results:   No growth to date.    Culture - Acid Fast - Body Fluid w/Smear (18 @ 21:16)    Specimen Source: .Body Fluid Peritoneal Fluid    Acid Fast Bacilli Smear:   No acid fast bacilli seen by fluorochrome stain      RADIOLOGY:  [x] Chest radiographs reviewed and interpreted by me    < from: US Abdomen Limited (18 @ 15:17) >    EXAM:  US ABDOMEN LIMITED                            PROCEDURE DATE:  2018        INTERPRETATION:  CLINICAL INFORMATION: End-stage right heart failure.   Evaluate for ascites.    TECHNIQUE: Limited sonogram of the abdomen to assess for ascites.    COMPARISON: Abdomen and pelvis dated 3/10/2018.    FINDINGS:    Large volume ascites in the bilateral lower quadrants. A moderate right   upper quadrant ascites. Small left upper quadrant ascites.    Trace left pleural effusion.    IMPRESSION:     Moderate to large volume abdominal ascites.    Trace left pleural effusion.    RACHEL VELARDE M.D., RADIOLOGY RESIDENT  This document has been electronically signed.  CEDRIC MISHRA M.D., ATTENDING RADIOLOGIST  This document has been electronically signed. Mar 22 2018  3:55PM      < end of copied text >  ---------------------------------------------------------------------------------------------------------    < from: [facility] Duplex Lower Ext Vein Scan, Bilat (18 @ 15:51) >    EXAM:  DUPLEX SCAN EXT VEINS LOWER BI                            PROCEDURE DATE:  2018        INTERPRETATION:  CLINICAL INFORMATION: Bilateral lower extremity   swelling, rule out DVT.    COMPARISON: Prior examination, dated 3/5/2018 showed no thrombus.    TECHNIQUE: Duplex sonography of the BILATERAL LOWER extremities with   color and spectral Doppler, with and without compression.      FINDINGS: There is edema within the superficial soft tissues of both   lower extremities.    There is normal compressibility of the bilateral common femoral, femoral   and popliteal veins. No calf vein thrombosis is detected.    Doppler examination shows normal spontaneous and phasic flow.    IMPRESSION:     No evidence of bilateral lower extremity deep venous thrombosis.    MARTIR VALVERDE M.D., ATTENDING RADIOLOGIST  This document has been electronically signed. Mar 20 2018  4:43PM      < end of copied text >  ---------------------------------------------------------------------------------------------------------    < from: CT Abdomen and Pelvis w/ Oral Cont (03.10.18 @ 19:07) >    EXAM:  CT ABDOMEN AND PELVIS OC                            PROCEDURE DATE:  03/10/2018      INTERPRETATION:  CLINICAL INFORMATION: Abdominal distention and pain for   several days. Acute renal failure.    COMPARISON: CT abdomen pelvis 2015    PROCEDURE:   CT of the Abdomen and Pelvis was performed without intravenous contrast.   Intravenous contrast: None.  Oral contrast: positive contrast was administered.  Sagittal and coronal reformats were performed.    FINDINGS:    LOWER CHEST:Mild interlobular septal thickening and small bilateral   pleural effusions. Subsegmental atelectasis in the right lower lobe.   Cardiomegaly. Coronary calcification. Calcified hilar lymph nodes.    LIVER: Within normal limits.  BILE DUCTS: Normal caliber.  GALLBLADDER: Cholelithiasis.  SPLEEN: Within normal limits.  PANCREAS: Within normal limits.  ADRENALS: Within normal limits.  KIDNEYS/URETERS: Left lower pole renal cyst.    BLADDER: Within normal limits.  REPRODUCTIVE ORGANS: Posterior uterine fibroid.    BOWEL: No bowel obstruction. Sigmoid diverticulosis, without   diverticulitis. Normal appendix.     PERITONEUM: Large volume ascites.  VESSELS:  Atherosclerotic changes.  RETROPERITONEUM: No lymphadenopathy.    ABDOMINAL WALL: Within normal limits.  BONES: Degenerative changes. T12 vertebral body hemangioma.    IMPRESSION:     Large volume ascites, etiology unclear.    Small bilateral pleural effusions and mild pulmonary edema.    VIOLA ANDREWS M.D., ATTENDINGRADIOLOGIST  This document has been electronically signed. Mar 11 2018  8:52AM      < end of copied text >  ---------------------------------------------------------------------------------------------------------  < from: CT Chest No Cont (18 @ 18:24) >    EXAM:  CT CHEST                            PROCEDURE DATE:  2018        INTERPRETATION:  CLINICAL INFORMATION: Evaluate pleural effusions.   Shortness of breath.    COMPARISON: Chest CT dated 2017. Chest x-ray dated 3/1/2018.    PROCEDURE:   CT of the Chest was performed without intravenous contrast.  Sagittal and coronal reformats were performed.  Axial MIP reformats were also performed.    FINDINGS:    CHEST:     LUNGS AND LARGE AIRWAYS: Patent central airways.  Bibasilar subsegmental   atelectasis, right greater than left.  PLEURA: Trace bilateral pleural effusions.  VESSELS: Thoracic aortic and coronary artery atherosclerosis.  HEART: Cardiomegaly. Small pericardial effusion. Mitral annular   calcification. A densely calcified or metallic density measuring 1.1 x   0.4 cm overlies the basilar left pulmonary artery (3:52, 6:80).  MEDIASTINUM AND STEFANIA: No lymphadenopathy.  CHEST WALL AND LOWER NECK: Within normal limits.  VISUALIZED UPPER ABDOMEN: Small volume ascites. Atherosclerotic changes.  BONES: Multilevel degenerative disease.    IMPRESSION: Trace bilateral pleural effusions.  Bibasilar subsegmental atelectasis, right greater than left.  Densely calcified or metallic density overlies the left pulmonary artery.  See above..      JUAN JUAREZ M.D., RADIOLOGY RESIDENT  This document has been electronically signed.  MADONNA MORGAN M.D., ATTENDING RADIOLOGIST  This document has been electronically signed. Mar  4 2018 12:28PM      < end of copied text >  ---------------------------------------------------------------------------------------------------------

## 2018-03-27 NOTE — PROGRESS NOTE ADULT - SUBJECTIVE AND OBJECTIVE BOX
NEPHROLOGY-[facility] [phone]        Patient seen and examined in bed. She had an uneventful night        MEDICATIONS  (STANDING):  ALBUTerol/ipratropium for Nebulization 3 milliLiter(s) Nebulizer every 6 hours  aspirin enteric coated 81 milliGRAM(s) Oral daily  atorvastatin 20 milliGRAM(s) Oral at bedtime  BACItracin   Ointment 1 Application(s) Topical two times a day  buDESOnide   0.5 milliGRAM(s) Respule 0.5 milliGRAM(s) Inhalation every 12 hours  clopidogrel Tablet 75 milliGRAM(s) Oral daily  digoxin     Tablet 0.125 milliGRAM(s) Oral every other day  diltiazem    milliGRAM(s) Oral daily  heparin  Injectable 5000 Unit(s) SubCutaneous every 8 hours  methimazole 5 milliGRAM(s) Oral daily  metoprolol     tartrate 25 milliGRAM(s) Oral every 8 hours  predniSONE   Tablet 10 milliGRAM(s) Oral daily  sodium chloride 0.65% Nasal 1 Spray(s) Both Nostrils four times a day  tiotropium 2.5 MICROgram(s)/olodaterol 2.5 MICROgram(s) Inhaler 2 Puff(s) Inhalation daily      VITAL:  T(C): , Max: 36.6 (03-27-18 @ 14:22)  T(F): , Max: 97.8 (03-27-18 @ 14:22)  HR: 57 (03-27-18 @ 14:22)  BP: 101/67 (03-27-18 @ 14:22)  BP(mean): --  RR: 22 (03-27-18 @ 14:22)  SpO2: 94% (03-27-18 @ 14:22)  Wt(kg): --    I and O's:    03-26 @ 07:01  -  03-27 @ 07:00  --------------------------------------------------------  IN: 280 mL / OUT: 200 mL / NET: 80 mL    03-27 @ 07:01  -  03-27 @ 15:12  --------------------------------------------------------  IN: 480 mL / OUT: 100 mL / NET: 380 mL          PHYSICAL EXAM:    Constitutional: NAD  HEENT: PERRLA    Neck:  No JVD  Respiratory: CTAB/L  Cardiovascular: S1 and S2  Gastrointestinal: BS+, soft, NT/ND  Extremities: No peripheral edema  Neurological: A/O x 3, no focal deficits  Psychiatric: Normal mood, normal affect  : No Gottlieb  Skin: No rashes  Access: Not applicable    LABS:                        9.8    8.21  )-----------( 215      ( 26 Mar 2018 07:28 )             31.0     03-27    136  |  97  |  128<H>  ----------------------------<  141<H>  4.9   |  24  |  3.09<H>    Ca    8.6      27 Mar 2018 12:09  Phos  4.7     03-26  Mg     1.8     03-26            Urine Studies:    Sodium, Random Urine: <20 mmol/L (03-26 @ 12:40)        RADIOLOGY & ADDITIONAL STUDIES:

## 2018-03-27 NOTE — PROGRESS NOTE ADULT - ASSESSMENT
Right elbow, hip, knee, pain  malnutrition  volume overload/ascites/LE edema  sev PHTN/ Cor pulm  comp diast hf  sev copd  restrictive lung disease sec to ascites which diminishes diaphragmatic excursion  epistaxis - on and off  CASSANDRA on ckd 4  hyperkalemia - resolved  2 shattered teeth     continue current care  appreciate psych/renal/pulm input  await Dental attending eval  monitor labs daily

## 2018-03-27 NOTE — PROGRESS NOTE ADULT - SUBJECTIVE AND OBJECTIVE BOX
MEDICINE, PROGRESS NOTE [phone]    FRAN ALEXANDRA 72y [id number]    Patient seen and examined.  Patient is a 72y old  Female who presents with a chief complaint of shortness of breath and difficulty eating due to fullness (01 Mar 2018 19:30)  Pt c/o left sided facial pain extending from teeth up to temple.    PAST MEDICAL & SURGICAL HISTORY:  Hyperthyroidism  JOSE (obstructive sleep apnea)  Epistaxis  GIB (gastrointestinal bleeding)  CAD S/P percutaneous coronary angioplasty  Afib  Pulmonary HTN  GERD (gastroesophageal reflux disease)  Obesity  Cardiomegaly  Valvular heart disease  COPD (chronic obstructive pulmonary disease): Home O2  Sleep Apnea: by criteria  Loose, teeth: 3 bottom front loose teeth  Female stress incontinence  Shoulder pain, right  Constipation  Arthritis of Knee  H/O heartburn  History of lung cancer  Obese  Hx of hyperlipidemia  Asthma  Diabetes mellitus: type 2  dx about 4-5 years ago   no daily fingerstick  H/O: HTN (hypertension)  Enlarged lymph nodes  Lymph nodes enlarged: s/p biopsy mediastinal  benign  H/O endoscopy  left upper lobectomy    MEDICATIONS  (STANDING):  ALBUTerol/ipratropium for Nebulization 3 milliLiter(s) Nebulizer every 6 hours  aspirin enteric coated 81 milliGRAM(s) Oral daily  atorvastatin 20 milliGRAM(s) Oral at bedtime  BACItracin   Ointment 1 Application(s) Topical two times a day  buDESOnide   0.5 milliGRAM(s) Respule 0.5 milliGRAM(s) Inhalation every 12 hours  clopidogrel Tablet 75 milliGRAM(s) Oral daily  digoxin     Tablet 0.125 milliGRAM(s) Oral every other day  diltiazem    milliGRAM(s) Oral daily  heparin  Injectable 5000 Unit(s) SubCutaneous every 8 hours  methimazole 5 milliGRAM(s) Oral daily  metoprolol     tartrate 25 milliGRAM(s) Oral every 8 hours  predniSONE   Tablet 10 milliGRAM(s) Oral daily  sodium chloride 0.65% Nasal 1 Spray(s) Both Nostrils four times a day  tiotropium 2.5 MICROgram(s)/olodaterol 2.5 MICROgram(s) Inhaler 2 Puff(s) Inhalation daily    MEDICATIONS  (PRN):  acetaminophen   Tablet. 650 milliGRAM(s) Oral every 6 hours PRN Mild Pain (1 - 3)  docusate sodium 100 milliGRAM(s) Oral two times a day PRN Constipation  Gas-X extra strength (simethicone 125 mg 1 Tablet(s) 1 Tablet(s) Chew three times a day PRN bloating  ondansetron Injectable 4 milliGRAM(s) IV Push every 8 hours PRN Nausea and/or Vomiting  sucralfate 1 Gram(s) Oral three times a day PRN stomach discomfort    Allergies    No Known Allergies    Intolerances    albuterol (Unknown)      PHYSICAL EXAM:  Constitutional: NAD  HEENT: Normocephalic, EOMI  Neck:  + JVD  Respiratory: Coarse bs b/l  Cardiovascular: S1, S2, RRR, + systolic murmur  Gastrointestinal: BS+, soft, NT, + distention/ + fluid wave  Extremities: + peripheral edema le b/l  Neurological: AAOX3, no focal deficits  Psychiatric: Normal mood, normal affect  : No Gottlieb    Vital Signs Last 24 Hrs  T(C): 36.6 (27 Mar 2018 14:22), Max: 36.6 (27 Mar 2018 14:22)  T(F): 97.8 (27 Mar 2018 14:22), Max: 97.8 (27 Mar 2018 14:22)  HR: 57 (27 Mar 2018 14:22) (57 - 100)  BP: 101/67 (27 Mar 2018 14:22) (94/63 - 110/68)  BP(mean): --  RR: 22 (27 Mar 2018 14:22) (18 - 22)  SpO2: 94% (27 Mar 2018 14:22) (93% - 97%)  I&O's Summary    26 Mar 2018 07:01  -  27 Mar 2018 07:00  --------------------------------------------------------  IN: 280 mL / OUT: 200 mL / NET: 80 mL    27 Mar 2018 07:01  -  27 Mar 2018 18:35  --------------------------------------------------------  IN: 480 mL / OUT: 100 mL / NET: 380 mL        LABS:                        9.8    8.21  )-----------( 215      ( 26 Mar 2018 07:28 )             31.0     03-27    136  |  97  |  128<H>  ----------------------------<  141<H>  4.9   |  24  |  3.09<H>    Ca    8.6      27 Mar 2018 12:09  Phos  4.7     03-26  Mg     1.8     03-26

## 2018-03-28 LAB
ANION GAP SERPL CALC-SCNC: 13 MMOL/L — SIGNIFICANT CHANGE UP (ref 5–17)
BUN SERPL-MCNC: 125 MG/DL — HIGH (ref 7–23)
CALCIUM SERPL-MCNC: 8.7 MG/DL — SIGNIFICANT CHANGE UP (ref 8.4–10.5)
CHLORIDE SERPL-SCNC: 97 MMOL/L — SIGNIFICANT CHANGE UP (ref 96–108)
CO2 SERPL-SCNC: 26 MMOL/L — SIGNIFICANT CHANGE UP (ref 22–31)
CREAT SERPL-MCNC: 2.94 MG/DL — HIGH (ref 0.5–1.3)
GLUCOSE SERPL-MCNC: 95 MG/DL — SIGNIFICANT CHANGE UP (ref 70–99)
HCT VFR BLD CALC: 30.4 % — LOW (ref 34.5–45)
HGB BLD-MCNC: 9.8 G/DL — LOW (ref 11.5–15.5)
MCHC RBC-ENTMCNC: 28.5 PG — SIGNIFICANT CHANGE UP (ref 27–34)
MCHC RBC-ENTMCNC: 32.2 GM/DL — SIGNIFICANT CHANGE UP (ref 32–36)
MCV RBC AUTO: 88.4 FL — SIGNIFICANT CHANGE UP (ref 80–100)
PLATELET # BLD AUTO: 176 K/UL — SIGNIFICANT CHANGE UP (ref 150–400)
POTASSIUM SERPL-MCNC: 4.7 MMOL/L — SIGNIFICANT CHANGE UP (ref 3.5–5.3)
POTASSIUM SERPL-SCNC: 4.7 MMOL/L — SIGNIFICANT CHANGE UP (ref 3.5–5.3)
RBC # BLD: 3.44 M/UL — LOW (ref 3.8–5.2)
RBC # FLD: 18.1 % — HIGH (ref 10.3–14.5)
SODIUM SERPL-SCNC: 136 MMOL/L — SIGNIFICANT CHANGE UP (ref 135–145)
TSH SERPL-MCNC: 7.35 UIU/ML — HIGH (ref 0.27–4.2)
WBC # BLD: 8.6 K/UL — SIGNIFICANT CHANGE UP (ref 3.8–10.5)
WBC # FLD AUTO: 8.6 K/UL — SIGNIFICANT CHANGE UP (ref 3.8–10.5)

## 2018-03-28 PROCEDURE — 93010 ELECTROCARDIOGRAM REPORT: CPT

## 2018-03-28 RX ADMIN — ATORVASTATIN CALCIUM 20 MILLIGRAM(S): 80 TABLET, FILM COATED ORAL at 23:29

## 2018-03-28 RX ADMIN — Medication 81 MILLIGRAM(S): at 06:41

## 2018-03-28 RX ADMIN — Medication 240 MILLIGRAM(S): at 06:41

## 2018-03-28 RX ADMIN — CLOPIDOGREL BISULFATE 75 MILLIGRAM(S): 75 TABLET, FILM COATED ORAL at 11:34

## 2018-03-28 RX ADMIN — Medication 25 MILLIGRAM(S): at 06:40

## 2018-03-28 RX ADMIN — Medication 10 MILLIGRAM(S): at 06:40

## 2018-03-28 RX ADMIN — Medication 1 SPRAY(S): at 17:22

## 2018-03-28 RX ADMIN — TIOTROPIUM BROMIDE AND OLODATEROL 2 PUFF(S): 3.124; 2.736 SPRAY, METERED RESPIRATORY (INHALATION) at 11:34

## 2018-03-28 RX ADMIN — Medication 1 SPRAY(S): at 11:33

## 2018-03-28 RX ADMIN — Medication 25 MILLIGRAM(S): at 17:22

## 2018-03-28 NOTE — PROGRESS NOTE ADULT - ASSESSMENT
72 year old female with CAD, dCHF, pHTN, HTN, DM, Afib and COPD p/w acute on chronic dCHF, ascites and acute on chronic kidney injury.     - CKD stage 4: likely due to HTN and DM and now CASSANDRA: non oliguric.   - acute on chronic dCHF: continued lower extremity edema   - GI: ascites s/p paracentesis. Abdomen remains distended        Recommendations  - No choice but the dc the bumex 0.5 mg po bid  - continue metoprolol 25 mg po q 8 hrs and Cardizem 240 mg po daily   - strict I+O and daily weights; Creatinine is slowly improvin  - maintain low potassium diet, print out given on high and low potassium foods  - would defer another paracentesis until renal function improved   - ?back off on the Tapazole given the TSH    There is no role for inotropes as she responds with extreme tachycardia  Prognosis is guarded at best.  May need to have palliative re-evaluate

## 2018-03-28 NOTE — PROGRESS NOTE ADULT - SUBJECTIVE AND OBJECTIVE BOX
NEPHROLOGY-[facility] [phone]        Patient seen and examined in bed.  SHe did not sleep well.  Still with slight tremors        MEDICATIONS  (STANDING):  aspirin enteric coated 81 milliGRAM(s) Oral daily  atorvastatin 20 milliGRAM(s) Oral at bedtime  BACItracin   Ointment 1 Application(s) Topical two times a day  clopidogrel Tablet 75 milliGRAM(s) Oral daily  digoxin     Tablet 0.125 milliGRAM(s) Oral every other day  diltiazem    milliGRAM(s) Oral daily  heparin  Injectable 5000 Unit(s) SubCutaneous every 8 hours  methimazole 5 milliGRAM(s) Oral daily  metoprolol     tartrate 25 milliGRAM(s) Oral every 8 hours  predniSONE   Tablet 10 milliGRAM(s) Oral daily  sodium chloride 0.65% Nasal 1 Spray(s) Both Nostrils four times a day  tiotropium 2.5 MICROgram(s)/olodaterol 2.5 MICROgram(s) Inhaler 2 Puff(s) Inhalation daily      VITAL:  T(C): , Max: 36.8 (03-28-18 @ 13:45)  T(F): , Max: 98.3 (03-28-18 @ 13:45)  HR: 70 (03-28-18 @ 17:23)  BP: 119/74 (03-28-18 @ 17:23)  BP(mean): --  RR: 20 (03-28-18 @ 17:23)  SpO2: 98% (03-28-18 @ 13:45)  Wt(kg): --    I and O's:    03-27 @ 07:01  -  03-28 @ 07:00  --------------------------------------------------------  IN: 980 mL / OUT: 100 mL / NET: 880 mL    03-28 @ 07:01  -  03-28 @ 17:39  --------------------------------------------------------  IN: 240 mL / OUT: 200 mL / NET: 40 mL          PHYSICAL EXAM:    Constitutional: NAD  HEENT: PERRLA    Neck:  No JVD  Respiratory: reduced breath sounds  Cardiovascular: S1 and S2  Gastrointestinal: BS+, soft, NT/ND  Extremities: + peripheral edema  Neurological: A/O x 3, no focal deficits  Psychiatric: Normal mood, normal affect  : No Gottlieb  Skin: No rashes  Access: Not applicable    LABS:                        9.8    8.6   )-----------( 176      ( 28 Mar 2018 08:39 )             30.4     03-28    136  |  97  |  125<H>  ----------------------------<  95  4.7   |  26  |  2.94<H>    Ca    8.7      28 Mar 2018 08:37            Urine Studies:          RADIOLOGY & ADDITIONAL STUDIES:

## 2018-03-28 NOTE — PROGRESS NOTE ADULT - ASSESSMENT
Right elbow, hip, knee, pain  malnutrition  volume overload/ascites/LE edema  sev PHTN/ Cor pulm  comp diast hf  sev copd  restrictive lung disease sec to ascites which diminishes diaphragmatic excursion  epistaxis - on and off  CASSANDRA on ckd 4  hyperkalemia - resolved  2 shattered teeth   nsvt    continue current care  check full tft  monitor labs  hold [id number] for now

## 2018-03-28 NOTE — PROGRESS NOTE ADULT - ASSESSMENT
SSESSMENT    chronic hypoxic respiratory failure - multifactorial - resulting in severe pulmonary artery hypertension, cor pulmonale and massive/recurrent ascites    1) COPD/emphysema  2) restrictive lung disease due to ascites limiting diaphragmatic excursion with atelectasis, malnutriation with respiratory muscle weakness and s/p a left upper lobe lobectomy for a carcinoid tumor  3) chronic diastolic CHF (little evidence left sided heart failure on chest CT - trace pleural effusions - minimal ground glass opacities)    CKD with CASSANDRA due to intravascular volume depletion +/- hepato-renal syndrome    AF/atrial flutter    HTN/HLD/DM/CAD/PCI    PLAN/RECOMMENDATIONS    oxygen supplementation ATC to keep saturation greater than 92%  paracentesis versus placement of an indwelling intraperitoneal catheter when hemodynamically stable and renal function has improved  discontinue albuterol/atrovent/pulmicort nebs (tremor)  continue tiotropium 2.5 MICROgram(s)/olodaterol 2.5 MICROgram(s) Inhaler 2 Puff(s) Inhalation daily  prednisone 10mg daily  cardiac meds: cardizem CD/lopressor/digoxin/ASA/plavix/lipitor - off diuretics   unable to tolerate inotropic agents which resulted in tachycardia  GI/DVT prophylaxis - carafate/SQ heparin  patient had no improvement in pulmonary artery pressures with pulmonary artery vasodilators    Will follow with you. Plan of care discussed with the patient at length at bedside.  Prognosis is poor.     Chacho Gallego MD, [facility] - [phone]  Pulmonary Medicine

## 2018-03-28 NOTE — PROGRESS NOTE ADULT - SUBJECTIVE AND OBJECTIVE BOX
MEDICINE, PROGRESS NOTE [phone]    FARN ALEXANDRA 72y [id number]    Patient seen and examined.  Patient is a 72y old  Female who presents with a chief complaint of shortness of breath and difficulty eating due to fullness (01 Mar 2018 19:30)  Pt has no current     PAST MEDICAL & SURGICAL HISTORY:  Hyperthyroidism  JOSE (obstructive sleep apnea)  Epistaxis  GIB (gastrointestinal bleeding)  CAD S/P percutaneous coronary angioplasty  Afib  Pulmonary HTN  GERD (gastroesophageal reflux disease)  Obesity  Cardiomegaly  Valvular heart disease  COPD (chronic obstructive pulmonary disease): Home O2  Sleep Apnea: by criteria  Loose, teeth: 3 bottom front loose teeth  Female stress incontinence  Shoulder pain, right  Constipation  Arthritis of Knee  H/O heartburn  History of lung cancer  Obese  Hx of hyperlipidemia  Asthma  Diabetes mellitus: type 2  dx about 4-5 years ago   no daily fingerstick  H/O: HTN (hypertension)  Enlarged lymph nodes  Lymph nodes enlarged: s/p biopsy mediastinal  benign  H/O endoscopy  left upper lobectomy    MEDICATIONS  (STANDING):  aspirin enteric coated 81 milliGRAM(s) Oral daily  atorvastatin 20 milliGRAM(s) Oral at bedtime  BACItracin   Ointment 1 Application(s) Topical two times a day  clopidogrel Tablet 75 milliGRAM(s) Oral daily  digoxin     Tablet 0.125 milliGRAM(s) Oral every other day  diltiazem    milliGRAM(s) Oral daily  heparin  Injectable 5000 Unit(s) SubCutaneous every 8 hours  methimazole 5 milliGRAM(s) Oral daily  metoprolol     tartrate 25 milliGRAM(s) Oral every 8 hours  predniSONE   Tablet 10 milliGRAM(s) Oral daily  sodium chloride 0.65% Nasal 1 Spray(s) Both Nostrils four times a day  tiotropium 2.5 MICROgram(s)/olodaterol 2.5 MICROgram(s) Inhaler 2 Puff(s) Inhalation daily    MEDICATIONS  (PRN):  acetaminophen   Tablet. 650 milliGRAM(s) Oral every 6 hours PRN Mild Pain (1 - 3)  docusate sodium 100 milliGRAM(s) Oral two times a day PRN Constipation  Gas-X extra strength (simethicone 125 mg 1 Tablet(s) 1 Tablet(s) Chew three times a day PRN bloating  ondansetron Injectable 4 milliGRAM(s) IV Push every 8 hours PRN Nausea and/or Vomiting  sucralfate 1 Gram(s) Oral three times a day PRN stomach discomfort    Allergies    No Known Allergies    Intolerances    albuterol (Unknown)      PHYSICAL EXAM:  Constitutional: NAD  HEENT: Normocephalic, EOMI  Neck:  No JVD  Respiratory: CTA B/L, No wheezes  Cardiovascular: S1, S2, RRR, + systolic murmur  Gastrointestinal: BS+, soft, NT, distended, + fluid wave  Extremities: No peripheral edema  Neurological: AAOX3, no focal deficits  Psychiatric: Normal mood, normal affect  : No Gottlieb    Vital Signs Last 24 Hrs  T(C): 36.8 (28 Mar 2018 13:45), Max: 36.8 (28 Mar 2018 13:45)  T(F): 98.3 (28 Mar 2018 13:45), Max: 98.3 (28 Mar 2018 13:45)  HR: 70 (28 Mar 2018 17:23) (57 - 120)  BP: 119/74 (28 Mar 2018 17:23) (103/66 - 119/74)  BP(mean): --  RR: 20 (28 Mar 2018 17:23) (18 - 20)  SpO2: 98% (28 Mar 2018 13:45) (98% - 99%)  I&O's Summary    27 Mar 2018 07:01  -  28 Mar 2018 07:00  --------------------------------------------------------  IN: 980 mL / OUT: 100 mL / NET: 880 mL    28 Mar 2018 07:01  -  28 Mar 2018 19:34  --------------------------------------------------------  IN: 714 mL / OUT: 200 mL / NET: 514 mL        LABS:                        9.8    8.6   )-----------( 176      ( 28 Mar 2018 08:39 )             30.4     03-28    136  |  97  |  125<H>  ----------------------------<  95  4.7   |  26  |  2.94<H>    Ca    8.7      28 Mar 2018 08:37

## 2018-03-28 NOTE — PROVIDER CONTACT NOTE (OTHER) - ASSESSMENT
Pt asymptomatic  BP86/52  HR=57  R=18
BP manual 80/55.  repeat 104/60.  pt asymptomatic.
Pt A&Ox4, VSS. Pt is asymptomatic no sob or c/p. Pt is aflutter on tele.
Pt A&Ox4, VSS. on 4L NC. Pt is asymptomatic, no complaints of c/p or sob at the moment.
Pt A&Ox4, currently on face tent mask, refusing to wear any longer. VSS.
Pt asymptomatic lying comfortable in bed   no s/s or complaints of chest pain, SOB, dizziness
Pt has ascites, pt's O2 sat=95% on supplemental O2, VSS
VSS
VSS. Pt not in distress. Pt educated on importance of medication and EKG, Pt still refused.
manual EL=644/58, JO=137, O2 sat=99%, temp=97.5, respirations=19
no signs of distress
no signs of distress.
no signs of distress.  discussed risk of DVT.  NPO for thoracentesis
pt stated she dose not want to take our medication for gas pains she had her  bring in home medicine of simethicone. It was explained to pt we need to verify medication by pharmacy, pt refused. it was then explained to pt we need to be aware of each time she takes the medication and she agreed to notify nursing staff when she takes one.

## 2018-03-28 NOTE — CHART NOTE - NSCHARTNOTEFT_GEN_A_CORE
Called by Rn to report pt with 1 episode of bradycardia at 42 on tele  - pt seen , pt denies all complaints , denies cp, palpations, dizziness,   - Ekg ordered (pt refusing)  - Vitals stable  - D/w Dr. Cobb   - Continue tele monitoring Called by Rn to report pt with 1 episode of bradycardia at 42 on tele  - pt seen , pt denies all complaints , denies cp, palpations, dizziness,   - Ekg ordered (pt refusing)  - Vitals stable  - D/w Dr. Cobb   - Continue tele monitoring      Patient also had 8 bits of wct on tele  - mag/phos ordered)pt refused   - Tsh 7.35  discussed above with Dr. Cobb  rec: full thyroid panel in am

## 2018-03-28 NOTE — PROGRESS NOTE ADULT - SUBJECTIVE AND OBJECTIVE BOX
[facility] - [facility]     PROGRESS NOTE    CHIEF COMPLAINT: CRF (hypoxic); COPD; emphysema; JOSE; secondary pulmonary hypertension; right heart failure; epistaxis; ascites;     INTERVAL HISTORY: weak, tired and frail after a poor night's sleep due to noise from her roommate; severe abdominal distension with early satiety - still holding off with another paracentesis due to hemodynamic instability and poor renal function; quite short of breath with minimal exertion; knee pain also limiting ambulation; no cough, sputum production, chest congestion or wheeze; no fevers, chills or sweats; no chest pain/pressure or palpitations; legs swollen; off diuretics    REVIEW OF SYSTEMS:  Constitutional: As per interval history  HEENT: epistaxis - resolved  CV: As per interval history  Resp: As per interval history  GI: anorexia  : As per interval history  Musculoskeletal: diffuse pain  Skin: Within normal limits  Neurological: Within normal limits  Psychiatric: depressed  Endocrine: Within normal limits  Hematologic/Lymphatic: Within normal limits  Allergic/Immunologic: Within normal limits    MEDICATIONS:     Pulmonary "  ALBUTerol/ipratropium for Nebulization 3 milliLiter(s) Nebulizer every 6 hours  buDESOnide   0.5 milliGRAM(s) Respule 0.5 milliGRAM(s) Inhalation every 12 hours  tiotropium 2.5 MICROgram(s)/olodaterol 2.5 MICROgram(s) Inhaler 2 Puff(s) Inhalation daily      Anti-microbials:      Cardiovascular:  digoxin     Tablet 0.125 milliGRAM(s) Oral every other day  diltiazem    milliGRAM(s) Oral daily  metoprolol     tartrate 25 milliGRAM(s) Oral every 8 hours      Other:  acetaminophen   Tablet. 650 milliGRAM(s) Oral every 6 hours PRN  aspirin enteric coated 81 milliGRAM(s) Oral daily  atorvastatin 20 milliGRAM(s) Oral at bedtime  BACItracin   Ointment 1 Application(s) Topical two times a day  clopidogrel Tablet 75 milliGRAM(s) Oral daily  docusate sodium 100 milliGRAM(s) Oral two times a day PRN  Gas-X extra strength (simethicone 125 mg 1 Tablet(s) 1 Tablet(s) Chew three times a day PRN  heparin  Injectable 5000 Unit(s) SubCutaneous every 8 hours  methimazole 5 milliGRAM(s) Oral daily  ondansetron Injectable 4 milliGRAM(s) IV Push every 8 hours PRN  predniSONE   Tablet 10 milliGRAM(s) Oral daily  sodium chloride 0.65% Nasal 1 Spray(s) Both Nostrils four times a day  sucralfate 1 Gram(s) Oral three times a day PRN      OBJECTIVE:    I&O's Detail    27 Mar 2018 07:01  -  28 Mar 2018 07:00  --------------------------------------------------------  IN:    Oral Fluid: 980 mL  Total IN: 980 mL    OUT:    Voided: 100 mL  Total OUT: 100 mL    Total NET: 880 mL     Daily Weight in k.3 (28 Mar 2018 06:38)    PHYSICAL EXAM:       ICU Vital Signs Last 24 Hrs  T(C): 36.5 (28 Mar 2018 06:38), Max: 36.6 (27 Mar 2018 14:22)  T(F): 97.7 (28 Mar 2018 06:38), Max: 97.8 (27 Mar 2018 14:22)  HR: 120 (28 Mar 2018 06:38) (57 - 120)  BP: 103/66 (28 Mar 2018 06:38) (94/63 - 105/69)  BP(mean): --  ABP: --  ABP(mean): --  RR: 18 (28 Mar 2018 06:38) (18 - 22)  SpO2: 99% (28 Mar 2018 06:38) (94% - 99%)     General: Awake. Alert. Cooperative. Weak and tired. No distress. Chronically ill appearing	  HEENT:   Atraumatic. Bitemporal wasting. Anicteric. Normal oral mucosa, PERRL, EOMI  Neck: Supple. Trachea midline. Thyroid without enlargement/tenderness/nodules. No carotid bruit. (+) JVD; loss of bilateral supraclavicular fat pads	  Cardiovascular: Irregularly irregular rate and rhythm. S1 S2 normal. II/VI systolic murmur  Respiratory: Respirations unlabored. Decreased breath sounds throughout. Bibasilar rales. Decreased chest wall excursion  Abdomen: Soft. Non-tender. Significantly distended with fluid wave. No organomegaly. No masses. Normal bowel sounds	  Extremities: Warm to touch. No clubbing or cyanosis. Moderate lower extremity edema up to the thigh. Loss of extremity muscle mass  Pulses: Decreased lower extremity peripheral pulses  Skin: Lower extremity venous stasis changes; Left knee abrasion  Lymph Nodes: Cervical, supraclavicular and axillary nodes normal  Neurological: Motor and sensory examination equal and normal. A and O x 3  Psychiatry: Depressed       LABS:                        9.8    8.6   )-----------( 176      ( 28 Mar 2018 08:39 )             30.4         136  |  97  |  128<H>  ----------------------------<  141<H>  4.9   |  24  |  3.09<H>        136  |  93<L>  |  127<H>  ----------------------------<  95  5.1   |  27  |  3.43<H>    Ca      8.6          Ca      8.8          Phos    4.7           Mg       1.8         Mg       1.8         Venous Blood Gas:   @ 00:52  7.34/50/56/26/85  VBG Lactate: 0.9    Venous Blood Gas:   @ 16:33  7.34/55/35/29/60  VBG Lactate: 1.3    Serum Pro-Brain Natriuretic Peptide: 28330 pg/mL ( @ 16:29)    CARDIAC MARKERS ( 01 Mar 2018 16:29 )  x     / 0.06 ng/mL / x     / x     / 5.4 ng/mL    < from: Transthoracic Echocardiogram (17 @ 18:58) >    Patient name: FRAN ALEXANDRA  YOB: 1945   Age: 71 (F)   MR#: [id number]  Study Date: 2017  Location: [address] [place], [place] [id number]: Thalia Amezquita RDCS  Study quality: Technically fair  Referring Physician: Pierce Cobb MD  Blood Pressure: 106/58 mmHg  Height: 160 cm  Weight: 64 kg  BSA: 1.7 m2  ------------------------------------------------------------------------  PROCEDURE: Transthoracic echocardiogram with 2-D, M-Mode  and complete spectral and color flow Doppler.  INDICATION: Other specified pulmonary heart diseases  (I27.89)  ------------------------------------------------------------------------  Dimensions:    Normal Values:  LA:     3.6    2.0 - 4.0 cm  Ao:     2.8    2.0 - 3.8 cm  SEPTUM: 0.7    0.6 - 1.2 cm  PWT:    0.9    0.6 - 1.1 cm  LVIDd:  4.1    3.0 - 5.6 cm  LVIDs:  1.9    1.8 - 4.0 cm  Derived variables:  LVMI: 58 g/m2  RWT: 0.43  Fractional short: 54 %  Doppler Peak Velocity (m/sec): AoV=1.8  ------------------------------------------------------------------------  Observations:  Mitral Valve: Mitral annular calcification, otherwise  normal mitral valve. Minimal mitral regurgitation.  Aortic Valve/Aorta: Calcified trileaflet aortic valve with  normal opening. Peak transaortic valve gradient equals 13  mm Hg, mean transaortic valve gradient equals 8 mm Hg. Peak  left ventricular outflow tract gradient equals 6 mm Hg,  mean gradient is equal to 3 mm Hg, LVOT velocity time  integral equals 19 cm.  Aortic Root: 2.8 cm.  LVOT diameter: 1.9 cm.  Left Atrium: Normal left atrium.  LA volume index = 25  cc/m2.  Left Ventricle: Hyperdynamic left ventricular systolic  function. Flattening of the interventricular septum in both  systole and diastole is  consistent with right ventricular  pressure overload. Normal left ventricular internal  dimensions and wall thicknesses.  Right Heart: Severe right atrial enlargement. Right  ventricular enlargement with decreased right ventricular  systolic function. Normal tricuspid valve. Mild-moderate  tricuspid regurgitation. Normal pulmonic valve.  Pericardium/Pleura: Normal pericardium with trace  pericardial effusion.  Left pleural effusion.  Hemodynamic: Estimated right atrial pressure is 8 mm Hg.  Estimated right ventricular systolic pressure equals 72 mm  Hg, assuming right atrial pressure equals 8 mm Hg,  consistent with severe pulmonary hypertension.  ------------------------------------------------------------------------  Conclusions:  1. Calcified trileaflet aortic valve with normal opening.  2. Normal left ventricular internal dimensions and wall  thicknesses.  3. Hyperdynamic left ventricular systolic function.  Flattening of the interventricular septum in both systole  and diastole is  consistent with right ventricular pressure  overload.  4. Severe right atrial enlargement.  5. Right ventricular enlargement with decreased right  ventricular systolic function.  6. Normal tricuspid valve. Mild-moderate tricuspid  regurgitation.  7. Estimated pulmonary artery systolic pressure equals 72  mm Hg, assuming right atrial pressure equals 8 mm Hg,  consistent with severe pulmonary pressures.  *** Compared with echocardiogram of 2017, no  significant changes noted.  ------------------------------------------------------------------------  Confirmed on  2017 - 13:15:09 by Justin Cohen M.D.  ------------------------------------------------------------------------    < end of copied text >  ---------------------------------------------------------------------------------------------------------  MICROBIOLOGY:     Culture - Fungal, Body Fluid (18 @ 21:16)    Specimen Source: .Body Fluid Peritoneal Fluid    Culture Results:   No fungus isolated at 1 week. No additional interim reports will be  issued unless there is a change in culture status.    Culture - Body Fluid with Gram Stain (18 @ 21:16)    Gram Stain:   polymorphonuclear leukocytes seen per low power field  No organisms seen per oil power field  by cytocentrifuge    Specimen Source: Peritoneal Peritoneal Fluid    Culture Results:   No growth to date.    Culture - Acid Fast - Body Fluid w/Smear (18 @ 21:16)    Specimen Source: .Body Fluid Peritoneal Fluid    Acid Fast Bacilli Smear:   No acid fast bacilli seen by fluorochrome stain      RADIOLOGY:  [x] Chest radiographs reviewed and interpreted by me    < from: US Abdomen Limited (18 @ 15:17) >    EXAM:  US ABDOMEN LIMITED                            PROCEDURE DATE:  2018        INTERPRETATION:  CLINICAL INFORMATION: End-stage right heart failure.   Evaluate for ascites.    TECHNIQUE: Limited sonogram of the abdomen to assess for ascites.    COMPARISON: Abdomen and pelvis dated 3/10/2018.    FINDINGS:    Large volume ascites in the bilateral lower quadrants. A moderate right   upper quadrant ascites. Small left upper quadrant ascites.    Trace left pleural effusion.    IMPRESSION:     Moderate to large volume abdominal ascites.    Trace left pleural effusion.    RACHEL VELARDE M.D., RADIOLOGY RESIDENT  This document has been electronically signed.  CEDRIC MISHRA M.D., ATTENDING RADIOLOGIST  This document has been electronically signed. Mar 22 2018  3:55PM      < end of copied text >  ---------------------------------------------------------------------------------------------------------    < from: [facility] Duplex Lower Ext Vein Scan, Bilat (18 @ 15:51) >    EXAM:  DUPLEX SCAN EXT VEINS LOWER BI                            PROCEDURE DATE:  2018        INTERPRETATION:  CLINICAL INFORMATION: Bilateral lower extremity   swelling, rule out DVT.    COMPARISON: Prior examination, dated 3/5/2018 showed no thrombus.    TECHNIQUE: Duplex sonography of the BILATERAL LOWER extremities with   color and spectral Doppler, with and without compression.      FINDINGS: There is edema within the superficial soft tissues of both   lower extremities.    There is normal compressibility of the bilateral common femoral, femoral   and popliteal veins. No calf vein thrombosis is detected.    Doppler examination shows normal spontaneous and phasic flow.    IMPRESSION:     No evidence of bilateral lower extremity deep venous thrombosis.    MARTIR VALVERDE M.D., ATTENDING RADIOLOGIST  This document has been electronically signed. Mar 20 2018  4:43PM      < end of copied text >  ---------------------------------------------------------------------------------------------------------    < from: CT Abdomen and Pelvis w/ Oral Cont (03.10.18 @ 19:07) >    EXAM:  CT ABDOMEN AND PELVIS OC                            PROCEDURE DATE:  03/10/2018      INTERPRETATION:  CLINICAL INFORMATION: Abdominal distention and pain for   several days. Acute renal failure.    COMPARISON: CT abdomen pelvis 2015    PROCEDURE:   CT of the Abdomen and Pelvis was performed without intravenous contrast.   Intravenous contrast: None.  Oral contrast: positive contrast was administered.  Sagittal and coronal reformats were performed.    FINDINGS:    LOWER CHEST:Mild interlobular septal thickening and small bilateral   pleural effusions. Subsegmental atelectasis in the right lower lobe.   Cardiomegaly. Coronary calcification. Calcified hilar lymph nodes.    LIVER: Within normal limits.  BILE DUCTS: Normal caliber.  GALLBLADDER: Cholelithiasis.  SPLEEN: Within normal limits.  PANCREAS: Within normal limits.  ADRENALS: Within normal limits.  KIDNEYS/URETERS: Left lower pole renal cyst.    BLADDER: Within normal limits.  REPRODUCTIVE ORGANS: Posterior uterine fibroid.    BOWEL: No bowel obstruction. Sigmoid diverticulosis, without   diverticulitis. Normal appendix.     PERITONEUM: Large volume ascites.  VESSELS:  Atherosclerotic changes.  RETROPERITONEUM: No lymphadenopathy.    ABDOMINAL WALL: Within normal limits.  BONES: Degenerative changes. T12 vertebral body hemangioma.    IMPRESSION:     Large volume ascites, etiology unclear.    Small bilateral pleural effusions and mild pulmonary edema.    VIOLA ANDREWS M.D., ATTENDINGRADIOLOGIST  This document has been electronically signed. Mar 11 2018  8:52AM      < end of copied text >  ---------------------------------------------------------------------------------------------------------  < from: CT Chest No Cont (18 @ 18:24) >    EXAM:  CT CHEST                            PROCEDURE DATE:  2018        INTERPRETATION:  CLINICAL INFORMATION: Evaluate pleural effusions.   Shortness of breath.    COMPARISON: Chest CT dated 2017. Chest x-ray dated 3/1/2018.    PROCEDURE:   CT of the Chest was performed without intravenous contrast.  Sagittal and coronal reformats were performed.  Axial MIP reformats were also performed.    FINDINGS:    CHEST:     LUNGS AND LARGE AIRWAYS: Patent central airways.  Bibasilar subsegmental   atelectasis, right greater than left.  PLEURA: Trace bilateral pleural effusions.  VESSELS: Thoracic aortic and coronary artery atherosclerosis.  HEART: Cardiomegaly. Small pericardial effusion. Mitral annular   calcification. A densely calcified or metallic density measuring 1.1 x   0.4 cm overlies the basilar left pulmonary artery (3:52, 6:80).  MEDIASTINUM AND STEFANIA: No lymphadenopathy.  CHEST WALL AND LOWER NECK: Within normal limits.  VISUALIZED UPPER ABDOMEN: Small volume ascites. Atherosclerotic changes.  BONES: Multilevel degenerative disease.    IMPRESSION: Trace bilateral pleural effusions.  Bibasilar subsegmental atelectasis, right greater than left.  Densely calcified or metallic density overlies the left pulmonary artery.  See above..      JUAN JUAREZ M.D., RADIOLOGY RESIDENT  This document has been electronically signed.  MADONNA MORGAN M.D., ATTENDING RADIOLOGIST  This document has been electronically signed. Mar  4 2018 12:28PM      < end of copied text >  ---------------------------------------------------------------------------------------------------------

## 2018-03-29 LAB
ANION GAP SERPL CALC-SCNC: 15 MMOL/L — SIGNIFICANT CHANGE UP (ref 5–17)
BUN SERPL-MCNC: 126 MG/DL — HIGH (ref 7–23)
CALCIUM SERPL-MCNC: 9.1 MG/DL — SIGNIFICANT CHANGE UP (ref 8.4–10.5)
CHLORIDE SERPL-SCNC: 95 MMOL/L — LOW (ref 96–108)
CO2 SERPL-SCNC: 26 MMOL/L — SIGNIFICANT CHANGE UP (ref 22–31)
CREAT SERPL-MCNC: 3.06 MG/DL — HIGH (ref 0.5–1.3)
DIGOXIN SERPL-MCNC: 1.7 NG/ML — SIGNIFICANT CHANGE UP (ref 0.8–2)
GLUCOSE SERPL-MCNC: 107 MG/DL — HIGH (ref 70–99)
HCT VFR BLD CALC: 32.9 % — LOW (ref 34.5–45)
HGB BLD-MCNC: 10.5 G/DL — LOW (ref 11.5–15.5)
MAGNESIUM SERPL-MCNC: 1.7 MG/DL — SIGNIFICANT CHANGE UP (ref 1.6–2.6)
MCHC RBC-ENTMCNC: 28.2 PG — SIGNIFICANT CHANGE UP (ref 27–34)
MCHC RBC-ENTMCNC: 31.8 GM/DL — LOW (ref 32–36)
MCV RBC AUTO: 88.5 FL — SIGNIFICANT CHANGE UP (ref 80–100)
PHOSPHATE SERPL-MCNC: 4.5 MG/DL — SIGNIFICANT CHANGE UP (ref 2.5–4.5)
PLATELET # BLD AUTO: 189 K/UL — SIGNIFICANT CHANGE UP (ref 150–400)
POTASSIUM SERPL-MCNC: 4.7 MMOL/L — SIGNIFICANT CHANGE UP (ref 3.5–5.3)
POTASSIUM SERPL-SCNC: 4.7 MMOL/L — SIGNIFICANT CHANGE UP (ref 3.5–5.3)
RBC # BLD: 3.71 M/UL — LOW (ref 3.8–5.2)
RBC # FLD: 18.2 % — HIGH (ref 10.3–14.5)
SODIUM SERPL-SCNC: 136 MMOL/L — SIGNIFICANT CHANGE UP (ref 135–145)
T3FREE SERPL-MCNC: 1.54 PG/ML — LOW (ref 1.8–4.6)
T4 AB SER-ACNC: 4.5 UG/DL — LOW (ref 4.6–12)
T4 FREE SERPL-MCNC: 0.9 NG/DL — SIGNIFICANT CHANGE UP (ref 0.9–1.8)
T4/T3 UPTAKE INDEX SERPL: 0.97 INDEX — SIGNIFICANT CHANGE UP (ref 0.8–1.3)
TSH SERPL-MCNC: 8.52 UIU/ML — HIGH (ref 0.27–4.2)
WBC # BLD: 7.8 K/UL — SIGNIFICANT CHANGE UP (ref 3.8–10.5)
WBC # FLD AUTO: 7.8 K/UL — SIGNIFICANT CHANGE UP (ref 3.8–10.5)

## 2018-03-29 RX ORDER — IPRATROPIUM BROMIDE 0.2 MG/ML
500 SOLUTION, NON-ORAL INHALATION EVERY 6 HOURS
Qty: 0 | Refills: 0 | Status: DISCONTINUED | OUTPATIENT
Start: 2018-03-29 | End: 2018-04-04

## 2018-03-29 RX ORDER — BUDESONIDE, MICRONIZED 100 %
0.5 POWDER (GRAM) MISCELLANEOUS EVERY 12 HOURS
Qty: 0 | Refills: 0 | Status: DISCONTINUED | OUTPATIENT
Start: 2018-03-29 | End: 2018-04-04

## 2018-03-29 RX ADMIN — Medication 25 MILLIGRAM(S): at 23:20

## 2018-03-29 RX ADMIN — Medication 1 SPRAY(S): at 15:39

## 2018-03-29 RX ADMIN — Medication 81 MILLIGRAM(S): at 06:40

## 2018-03-29 RX ADMIN — TIOTROPIUM BROMIDE AND OLODATEROL 2 PUFF(S): 3.124; 2.736 SPRAY, METERED RESPIRATORY (INHALATION) at 18:46

## 2018-03-29 RX ADMIN — Medication 25 MILLIGRAM(S): at 09:17

## 2018-03-29 RX ADMIN — ATORVASTATIN CALCIUM 20 MILLIGRAM(S): 80 TABLET, FILM COATED ORAL at 23:18

## 2018-03-29 RX ADMIN — Medication 10 MILLIGRAM(S): at 06:40

## 2018-03-29 RX ADMIN — Medication 0.5 MILLIGRAM(S): at 20:59

## 2018-03-29 RX ADMIN — CLOPIDOGREL BISULFATE 75 MILLIGRAM(S): 75 TABLET, FILM COATED ORAL at 15:38

## 2018-03-29 RX ADMIN — Medication 240 MILLIGRAM(S): at 06:40

## 2018-03-29 RX ADMIN — Medication 25 MILLIGRAM(S): at 15:39

## 2018-03-29 RX ADMIN — Medication 0.12 MILLIGRAM(S): at 15:39

## 2018-03-29 NOTE — PROGRESS NOTE ADULT - ASSESSMENT
72 year old female with CAD, dCHF, pHTN, HTN, DM, Afib and COPD p/w acute on chronic dCHF, ascites and acute on chronic kidney injury.     - CKD stage 4: likely due to HTN and DM and now CASSANDRA: non oliguric.   - acute on chronic dCHF: continued lower extremity edema   - GI: ascites s/p paracentesis. Abdomen remains distended        Recommendations  - No choice but the dc the bumex 0.5 mg po bid.  Unfotunately I dont have a great plan for her.  HD would be a terrible long term solution due to her comorbidities  - continue metoprolol 25 mg po q 8 hrs and Cardizem 240 mg po daily   - strict I+O and daily weights; Creatinine is slowly improvin  - maintain low potassium diet, print out given on high and low potassium foods  - would defer another paracentesis until renal function improved   - ?back off on the Tapazole given the TSH.  T# and T4 pending    There is no role for inotropes as she responds with extreme tachycardia  Prognosis is guarded at best.  May need to have palliative re-evaluate 72 year old female with CAD, dCHF, pHTN, HTN, DM, Afib and COPD p/w acute on chronic dCHF, ascites and acute on chronic kidney injury.     - CKD stage 4: likely due to HTN and DM and now CASSANDRA: non oliguric.   - acute on chronic dCHF: continued lower extremity edema   - GI: ascites s/p paracentesis. Abdomen remains distended        Recommendations  - No choice but the dc the bumex 0.5 mg po bid.  Unfotunately I dont have a great plan for her.  HD would be a terrible long term solution due to her comorbidities  - continue metoprolol 25 mg po q 8 hrs and Cardizem 240 mg po daily   - strict I+O and daily weights; Creatinine is slowly improvin  - maintain low potassium diet, print out given on high and low potassium foods  - would defer another paracentesis until renal function improved   - ?back off on the Tapazole given the TSH.  T3 and T4 pending    There is no role for inotropes as she responds with extreme tachycardia  Prognosis is guarded at best.  May need to have palliative re-evaluate 72 year old female with CAD, dCHF, pHTN, HTN, DM, Afib and COPD p/w acute on chronic dCHF, ascites and acute on chronic kidney injury.     - CKD stage 4: likely due to HTN and DM and now CASSANDRA: non oliguric.   - acute on chronic dCHF: continued lower extremity edema   - GI: ascites s/p paracentesis. Abdomen remains distended        Recommendations  - No choice but the dc the bumex 0.5 mg po bid.  Unfotunately I dont have a great plan for her.  HD would be a terrible long term solution due to her comorbidities  - continue metoprolol 25 mg po q 8 hrs and Cardizem 240 mg po daily   - strict I+O and daily weights; Creatinine/renal profile is stagnant.  Additional IV alb is unlikely to help  - maintain low potassium diet, print out given on high and low potassium foods  - would defer another paracentesis until renal function improved   - ?back off on the Tapazole given the TSH.  T3 and T4 pending    There is no role for inotropes as she responds with extreme tachycardia  Prognosis is guarded at best.  May need to have palliative re-evaluate

## 2018-03-29 NOTE — PROGRESS NOTE ADULT - ASSESSMENT
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  3) chronic diastolic CHF (little evidence left sided heart failure on chest CT - trace pleural effusions - minimal ground glass opacities)    CKD with CASSANDRA due to intravascular volume depletion +/- hepato-renal syndrome    AF/atrial flutter    HTN/HLD/DM/CAD/PCI    PLAN/RECOMMENDATIONS    oxygen supplementation ATC to keep saturation greater than 92%  paracentesis versus placement of an indwelling intraperitoneal catheter when hemodynamically stable and renal function has improved  atrovent/pulmicort nebs  continue tiotropium 2.5 MICROgram(s)/olodaterol 2.5 MICROgram(s) Inhaler 2 Puff(s) Inhalation daily  prednisone 10mg daily  cardiac meds: cardizem CD/lopressor/digoxin/ASA/plavix/lipitor - off diuretics   unable to tolerate inotropic agents which resulted in tachycardia  GI/DVT prophylaxis - carafate/SQ heparin  patient had no improvement in pulmonary artery pressures with pulmonary artery vasodilators    Will follow with you. Plan of care discussed with the patient at length at bedside.  Prognosis is poor.     Chacho Gallego MD, [facility] - [phone]  Pulmonary Medicine

## 2018-03-29 NOTE — PROGRESS NOTE ADULT - SUBJECTIVE AND OBJECTIVE BOX
NEPHROLOGY-[facility] [phone]        Patient seen and examined in bed. She had an uneventful night.          MEDICATIONS  (STANDING):  aspirin enteric coated 81 milliGRAM(s) Oral daily  atorvastatin 20 milliGRAM(s) Oral at bedtime  BACItracin   Ointment 1 Application(s) Topical two times a day  clopidogrel Tablet 75 milliGRAM(s) Oral daily  digoxin     Tablet 0.125 milliGRAM(s) Oral every other day  diltiazem    milliGRAM(s) Oral daily  heparin  Injectable 5000 Unit(s) SubCutaneous every 8 hours  methimazole 5 milliGRAM(s) Oral daily  metoprolol     tartrate 25 milliGRAM(s) Oral every 8 hours  predniSONE   Tablet 10 milliGRAM(s) Oral daily  sodium chloride 0.65% Nasal 1 Spray(s) Both Nostrils four times a day  tiotropium 2.5 MICROgram(s)/olodaterol 2.5 MICROgram(s) Inhaler 2 Puff(s) Inhalation daily      VITAL:  T(C): , Max: 36.8 (03-28-18 @ 13:45)  T(F): , Max: 98.3 (03-28-18 @ 13:45)  HR: 61 (03-29-18 @ 06:38)  BP: 95/62 (03-29-18 @ 06:38)  BP(mean): --  RR: 18 (03-29-18 @ 06:38)  SpO2: 95% (03-29-18 @ 06:38)  Wt(kg): --    I and O's:    03-28 @ 07:01  -  03-29 @ 07:00  --------------------------------------------------------  IN: 714 mL / OUT: 650 mL / NET: 64 mL          PHYSICAL EXAM:    Constitutional: NAD  HEENT: PERRLA    Neck:  No JVD  Respiratory: CTAB/L  Cardiovascular: S1 and S2  Gastrointestinal: BS+, soft, distended and positive fluid shift  Extremities: No peripheral edema  Neurological: A/O x 3, no focal deficits  Psychiatric: Normal mood, normal affect  : No Gottlieb  Skin: No rashes  Access: Not applicable    LABS:                        10.5   7.8   )-----------( 189      ( 29 Mar 2018 09:47 )             32.9     03-29    136  |  95<L>  |  126<H>  ----------------------------<  107<H>  4.7   |  26  |  3.06<H>    Ca    9.1      29 Mar 2018 09:41  Phos  4.5     03-29  Mg     1.7     03-29            Urine Studies:          RADIOLOGY & ADDITIONAL STUDIES:

## 2018-03-29 NOTE — CHART NOTE - NSCHARTNOTEFT_GEN_A_CORE
72 year old female pt with PMH of AFib, Asthma, CAD, Cardiomegaly, enlarged lymph nodes, COPD, T2DM, endoscopy, upper lobectomy, CHF presented to ED with c/o SOB. Found to have decompensated diastolic CHF, (+) ascites, s/p paracentesis 3/2/18 with removal of 9 liters of ascites and again 3/14 with removal of 7 liters.; CASSANDRA on CKD per renal. Pt with hx hyperkalemia; no concentrated K+ was added back to diet per nephrology to address. Pt with ongoing dissatisfaction with diet and diet services, does not like therapeutic diet regardless of prior education of indication for therapeutic diet (upon RD initial assessment 3/14/18 education was provided to pt on lower phosphorous and potassium diet in setting of elevated labs and reinforced thereafter). Pt noted to have refused palliative care, getting food from home brought in occasionally. Also new reports of pt hoarding food in bags and sending it home to family; pt ordering several packets of cakes and cookies on tray that she is not eating.     Pt seen today for malnutrition follow-up. Still with c/o food services.    Source: Patient [x]    Family [ ]     other [x] electronic medical records    Diet : Regular, No concentrated K+    Patient reports [ ] nausea  [ ] vomiting [ ] diarrhea [ ] constipation  [ ]chewing problems [ ] swallowing issues  [x] other: Denies GI distress    PO intake:  < 50% [x] 50-75% [ ]   % [x] when eating food from home  other :    Source for PO intake [x] Patient [ ] family [x] chart [ ] staff [ ] other    Enteral /Parenteral Nutrition: [x] n/a    Current Weight: 146.1 pounds (current, standing, B/L ankle + 1 edema, + ascites). Wt trending up from lowest weight in house 130.2 3/15 following paracentesis. Overall~5 pounds less from 151.4 pounds admit wt 3/2/18.    Pertinent Medications: MEDICATIONS  (STANDING):  aspirin enteric coated 81 milliGRAM(s) Oral daily  atorvastatin 20 milliGRAM(s) Oral at bedtime  BACItracin   Ointment 1 Application(s) Topical two times a day  clopidogrel Tablet 75 milliGRAM(s) Oral daily  digoxin     Tablet 0.125 milliGRAM(s) Oral every other day  diltiazem    milliGRAM(s) Oral daily  heparin  Injectable 5000 Unit(s) SubCutaneous every 8 hours  methimazole 5 milliGRAM(s) Oral daily  metoprolol     tartrate 25 milliGRAM(s) Oral every 8 hours  predniSONE   Tablet 10 milliGRAM(s) Oral daily  sodium chloride 0.65% Nasal 1 Spray(s) Both Nostrils four times a day  tiotropium 2.5 MICROgram(s)/olodaterol 2.5 MICROgram(s) Inhaler 2 Puff(s) Inhalation daily    MEDICATIONS  (PRN):  acetaminophen   Tablet. 650 milliGRAM(s) Oral every 6 hours PRN Mild Pain (1 - 3)  docusate sodium 100 milliGRAM(s) Oral two times a day PRN Constipation  Gas-X extra strength (simethicone 125 mg 1 Tablet(s) 1 Tablet(s) Chew three times a day PRN bloating  ondansetron Injectable 4 milliGRAM(s) IV Push every 8 hours PRN Nausea and/or Vomiting  sucralfate 1 Gram(s) Oral three times a day PRN stomach discomfort    Pertinent Labs:  03-29 Na136 mmol/L Glu 107 mg/dL<H> K+ 4.7 mmol/L Cr  3.06 mg/dL<H>  mg/dL<H> 03-29 Phos 4.5 mg/dL    Skin: No pressure ulcers noted    Estimated Needs:   [x] no change since previous assessment  [ ] recalculated:     Previous Nutrition Diagnosis: Severe Malnutrition       Nutrition Diagnosis is [x] ongoing  [ ] resolved [ ] not applicable        New Nutrition Diagnosis: [x] not applicable      Interventions:     1) Continue current diet no concentrated K+ diet per Nephrology  2) Unfortunately had to limit amount of snacks pt allowed on tray to amount she will be able to consume herself  3) Pt does not want oral supplements    Monitoring and Evaluation:     [x] PO intake [x] Tolerance to diet prescription [x] weights [x] follow up per protocol    [x] other: RD remains available: Dionne Sauceda MS, RDN, CDN, CDE. #[phone]. 72 year old female pt with PMH of AFib, Asthma, CAD, Cardiomegaly, enlarged lymph nodes, COPD, T2DM, endoscopy, upper lobectomy, CHF presented to ED with c/o SOB. Found to have decompensated diastolic CHF, (+) ascites, s/p paracentesis 3/2/18 with removal of 9 liters of ascites and again 3/14 with removal of 7 liters.; CASSANDRA on CKD per renal. Pt with hx hyperkalemia; no concentrated K+ was added back to diet per nephrology to address. Pt with ongoing dissatisfaction with diet and diet services, does not like therapeutic diet regardless of prior education of indication for therapeutic diet (upon RD initial assessment 3/14/18 education was provided to pt on lower phosphorous and potassium diet in setting of elevated labs and reinforced thereafter). Pt noted to have refused palliative care, getting food from home brought in occasionally. Also new reports of pt hoarding food in bags and sending it home to family; pt ordering several packets of cakes and cookies on tray that she is not eating.     Pt seen today for malnutrition follow-up. PO intake poor-fair, eats better when food brought in from home.    Source: Patient [x]    Family [ ]     other [x] electronic medical records    Diet : Regular, No concentrated K+    Patient reports [ ] nausea  [ ] vomiting [ ] diarrhea [ ] constipation  [ ]chewing problems [ ] swallowing issues  [x] other: Denies GI distress    PO intake:  < 50% [x] 50-75% [ ]   % [x] when eating food from home  other :    Source for PO intake [x] Patient [ ] family [x] chart [ ] staff [ ] other    Enteral /Parenteral Nutrition: [x] n/a    Current Weight: 146.1 pounds (current, standing, B/L ankle + 1 edema, + ascites). Wt trending up from lowest weight in house 130.2 3/15 following paracentesis. Overall~5 pounds less from 151.4 pounds admit wt 3/2/18.    Pertinent Medications: MEDICATIONS  (STANDING):  aspirin enteric coated 81 milliGRAM(s) Oral daily  atorvastatin 20 milliGRAM(s) Oral at bedtime  BACItracin   Ointment 1 Application(s) Topical two times a day  clopidogrel Tablet 75 milliGRAM(s) Oral daily  digoxin     Tablet 0.125 milliGRAM(s) Oral every other day  diltiazem    milliGRAM(s) Oral daily  heparin  Injectable 5000 Unit(s) SubCutaneous every 8 hours  methimazole 5 milliGRAM(s) Oral daily  metoprolol     tartrate 25 milliGRAM(s) Oral every 8 hours  predniSONE   Tablet 10 milliGRAM(s) Oral daily  sodium chloride 0.65% Nasal 1 Spray(s) Both Nostrils four times a day  tiotropium 2.5 MICROgram(s)/olodaterol 2.5 MICROgram(s) Inhaler 2 Puff(s) Inhalation daily    MEDICATIONS  (PRN):  acetaminophen   Tablet. 650 milliGRAM(s) Oral every 6 hours PRN Mild Pain (1 - 3)  docusate sodium 100 milliGRAM(s) Oral two times a day PRN Constipation  Gas-X extra strength (simethicone 125 mg 1 Tablet(s) 1 Tablet(s) Chew three times a day PRN bloating  ondansetron Injectable 4 milliGRAM(s) IV Push every 8 hours PRN Nausea and/or Vomiting  sucralfate 1 Gram(s) Oral three times a day PRN stomach discomfort    Pertinent Labs:  03-29 Na136 mmol/L Glu 107 mg/dL<H> K+ 4.7 mmol/L Cr  3.06 mg/dL<H>  mg/dL<H> 03-29 Phos 4.5 mg/dL    Skin: No pressure ulcers noted    Estimated Needs:   [x] no change since previous assessment  [ ] recalculated:     Previous Nutrition Diagnosis: Severe Malnutrition       Nutrition Diagnosis is [x] ongoing  [ ] resolved [ ] not applicable        New Nutrition Diagnosis: [x] not applicable      Interventions:     1) Continue current diet no concentrated K+ diet per Nephrology. Food preferences obtained  2) Unfortunately had to limit amount of snacks pt allowed on tray to amount she will be able to consume herself  3) Pt does not want oral supplements    Monitoring and Evaluation:     [x] PO intake [x] Tolerance to diet prescription [x] weights [x] follow up per protocol    [x] other: RD remains available: Dionne Sauceda MS, RDN, CDN, CDE. #[phone].

## 2018-03-29 NOTE — PROGRESS NOTE ADULT - SUBJECTIVE AND OBJECTIVE BOX
MEDICINE, PROGRESS NOTE [phone]    FRAN ALEXANDRA 72y [id number]    Patient seen and examined.  Patient is a 72y old  Female who presents with a chief complaint of shortness of breath and difficulty eating due to fullness (01 Mar 2018 19:30)  Pt feels tired.    PAST MEDICAL & SURGICAL HISTORY:  Hyperthyroidism  JOSE (obstructive sleep apnea)  Epistaxis  GIB (gastrointestinal bleeding)  CAD S/P percutaneous coronary angioplasty  Afib  Pulmonary HTN  GERD (gastroesophageal reflux disease)  Obesity  Cardiomegaly  Valvular heart disease  COPD (chronic obstructive pulmonary disease): Home O2  Sleep Apnea: by criteria  Loose, teeth: 3 bottom front loose teeth  Female stress incontinence  Shoulder pain, right  Constipation  Arthritis of Knee  H/O heartburn  History of lung cancer  Obese  Hx of hyperlipidemia  Asthma  Diabetes mellitus: type 2  dx about 4-5 years ago   no daily fingerstick  H/O: HTN (hypertension)  Enlarged lymph nodes  Lymph nodes enlarged: s/p biopsy mediastinal  benign  H/O endoscopy  left upper lobectomy    MEDICATIONS  (STANDING):  aspirin enteric coated 81 milliGRAM(s) Oral daily  atorvastatin 20 milliGRAM(s) Oral at bedtime  BACItracin   Ointment 1 Application(s) Topical two times a day  buDESOnide   0.5 milliGRAM(s) Respule 0.5 milliGRAM(s) Inhalation every 12 hours  clopidogrel Tablet 75 milliGRAM(s) Oral daily  digoxin     Tablet 0.125 milliGRAM(s) Oral every other day  diltiazem    milliGRAM(s) Oral daily  heparin  Injectable 5000 Unit(s) SubCutaneous every 8 hours  ipratropium    for Nebulization 500 MICROGram(s) Nebulizer every 6 hours  methimazole 5 milliGRAM(s) Oral daily  metoprolol     tartrate 25 milliGRAM(s) Oral every 8 hours  predniSONE   Tablet 10 milliGRAM(s) Oral daily  sodium chloride 0.65% Nasal 1 Spray(s) Both Nostrils four times a day  tiotropium 2.5 MICROgram(s)/olodaterol 2.5 MICROgram(s) Inhaler 2 Puff(s) Inhalation daily    MEDICATIONS  (PRN):  acetaminophen   Tablet. 650 milliGRAM(s) Oral every 6 hours PRN Mild Pain (1 - 3)  docusate sodium 100 milliGRAM(s) Oral two times a day PRN Constipation  Gas-X extra strength (simethicone 125 mg 1 Tablet(s) 1 Tablet(s) Chew three times a day PRN bloating  ondansetron Injectable 4 milliGRAM(s) IV Push every 8 hours PRN Nausea and/or Vomiting  sucralfate 1 Gram(s) Oral three times a day PRN stomach discomfort    Allergies    No Known Allergies    Intolerances    albuterol (Unknown)      PHYSICAL EXAM:  Constitutional: NAD  HEENT: Normocephalic, EOMI  Neck:  No JVD  Respiratory: CTA B/L, No wheezes  Cardiovascular: S1, S2, RRR, + systolic murmur  Gastrointestinal: BS+, soft, NT/ND  Extremities: + peripheral edema le b/l  Neurological: AAOX3, no focal deficits  Psychiatric: Normal mood, normal affect  : No Gottlieb    Vital Signs Last 24 Hrs  T(C): 36.7 (29 Mar 2018 14:31), Max: 36.7 (29 Mar 2018 14:31)  T(F): 98 (29 Mar 2018 14:31), Max: 98 (29 Mar 2018 14:31)  HR: 74 (29 Mar 2018 14:31) (58 - 74)  BP: 104/64 (29 Mar 2018 14:31) (95/62 - 109/72)  BP(mean): --  RR: 17 (29 Mar 2018 14:31) (17 - 18)  SpO2: 92% (29 Mar 2018 14:31) (92% - 96%)  I&O's Summary    28 Mar 2018 07:01  -  29 Mar 2018 07:00  --------------------------------------------------------  IN: 714 mL / OUT: 650 mL / NET: 64 mL    29 Mar 2018 07:01  -  29 Mar 2018 20:53  --------------------------------------------------------  IN: 530 mL / OUT: 200 mL / NET: 330 mL        LABS:                        10.5   7.8   )-----------( 189      ( 29 Mar 2018 09:47 )             32.9     03-29    136  |  95<L>  |  126<H>  ----------------------------<  107<H>  4.7   |  26  |  3.06<H>    Ca    9.1      29 Mar 2018 09:41  Phos  4.5     03-29  Mg     1.7     03-29          Magnesium, Serum: 1.7 mg/dL (03-29 @ 09:41)

## 2018-03-29 NOTE — PROGRESS NOTE ADULT - ASSESSMENT
Right elbow, hip, knee, pain  malnutrition  volume overload/ascites/LE edema  sev PHTN/ Cor pulm  comp diast hf  sev copd  restrictive lung disease sec to ascites which diminishes diaphragmatic excursion  epistaxis - on and off  CASSANDRA on ckd 4  hyperkalemia - resolved  2 shattered teeth   nsvt    continue current care  endo eval  monitor renal function  discussed with Dr. hargrove

## 2018-03-29 NOTE — PROGRESS NOTE ADULT - SUBJECTIVE AND OBJECTIVE BOX
[facility] - [facility]     PROGRESS NOTE    CHIEF COMPLAINT: CRF (hypoxic); COPD; emphysema; JOSE; secondary pulmonary hypertension; right heart failure; epistaxis; ascites;     INTERVAL HISTORY: weak, tired and frail; severe abdominal distension with early satiety - still holding off with another paracentesis due to hemodynamic instability and poor renal function; quite short of breath with minimal exertion; knee pain also limiting ambulation; no cough, sputum production, chest congestion or wheeze; no fevers, chills or sweats; no chest pain/pressure or palpitations; legs swollen; off diuretics    REVIEW OF SYSTEMS:  Constitutional: As per interval history  HEENT: epistaxis - resolved  CV: As per interval history  Resp: As per interval history  GI: anorexia  : As per interval history  Musculoskeletal: diffuse pain  Skin: Within normal limits  Neurological: Within normal limits  Psychiatric: depressed  Endocrine: Within normal limits  Hematologic/Lymphatic: Within normal limits  Allergic/Immunologic: Within normal limits      MEDICATIONS:     Pulmonary "  tiotropium 2.5 MICROgram(s)/olodaterol 2.5 MICROgram(s) Inhaler 2 Puff(s) Inhalation daily      Anti-microbials:      Cardiovascular:  digoxin     Tablet 0.125 milliGRAM(s) Oral every other day  diltiazem    milliGRAM(s) Oral daily  metoprolol     tartrate 25 milliGRAM(s) Oral every 8 hours      Other:  acetaminophen   Tablet. 650 milliGRAM(s) Oral every 6 hours PRN  aspirin enteric coated 81 milliGRAM(s) Oral daily  atorvastatin 20 milliGRAM(s) Oral at bedtime  BACItracin   Ointment 1 Application(s) Topical two times a day  clopidogrel Tablet 75 milliGRAM(s) Oral daily  docusate sodium 100 milliGRAM(s) Oral two times a day PRN  Gas-X extra strength (simethicone 125 mg 1 Tablet(s) 1 Tablet(s) Chew three times a day PRN  heparin  Injectable 5000 Unit(s) SubCutaneous every 8 hours  methimazole 5 milliGRAM(s) Oral daily  ondansetron Injectable 4 milliGRAM(s) IV Push every 8 hours PRN  predniSONE   Tablet 10 milliGRAM(s) Oral daily  sodium chloride 0.65% Nasal 1 Spray(s) Both Nostrils four times a day  sucralfate 1 Gram(s) Oral three times a day PRN        OBJECTIVE:    I&O's Detail    28 Mar 2018 07:  -  29 Mar 2018 07:00  --------------------------------------------------------  IN:    Oral Fluid: 714 mL  Total IN: 714 mL    OUT:    Voided: 650 mL  Total OUT: 650 mL    Total NET: 64 mL      29 Mar 2018 07:01  -  29 Mar 2018 16:52  --------------------------------------------------------  IN:    Oral Fluid: 480 mL  Total IN: 480 mL    OUT:    Voided: 200 mL  Total OUT: 200 mL    Total NET: 280 mL    Daily Weight in k.2 (29 Mar 2018 12:08)    PHYSICAL EXAM:       ICU Vital Signs Last 24 Hrs  T(C): 36.7 (29 Mar 2018 14:31), Max: 36.7 (29 Mar 2018 14:31)  T(F): 98 (29 Mar 2018 14:31), Max: 98 (29 Mar 2018 14:31)  HR: 74 (29 Mar 2018 14:31) (58 - 74)  BP: 104/64 (29 Mar 2018 14:31) (95/62 - 119/74)  BP(mean): --  ABP: --  ABP(mean): --  RR: 17 (29 Mar 2018 14:31) (17 - 20)  SpO2: 92% (29 Mar 2018 14:31) (92% - 96%) on 5lpm     General: Awake. Alert. Cooperative. Weak and tired. No distress. Chronically ill appearing	  HEENT:   Atraumatic. Bitemporal wasting. Anicteric. Normal oral mucosa, PERRL, EOMI  Neck: Supple. Trachea midline. Thyroid without enlargement/tenderness/nodules. No carotid bruit. (+) JVD; loss of bilateral supraclavicular fat pads	  Cardiovascular: Irregularly irregular rate and rhythm. S1 S2 normal. II/VI systolic murmur  Respiratory: Respirations unlabored. Decreased breath sounds throughout. Bibasilar rales. Decreased chest wall excursion  Abdomen: Soft. Non-tender. Significantly distended with fluid wave. No organomegaly. No masses. Normal bowel sounds	  Extremities: Warm to touch. No clubbing or cyanosis. Moderate lower extremity edema up to the thigh. Loss of extremity muscle mass  Pulses: Decreased lower extremity peripheral pulses  Skin: Lower extremity venous stasis changes; Left knee abrasion  Lymph Nodes: Cervical, supraclavicular and axillary nodes normal  Neurological: Motor and sensory examination equal and normal. A and O x 3  Psychiatry: Depressed       LABS:                        10.5   7.8   )-----------( 189      ( 29 Mar 2018 09:47 )             32.9                         9.8    8.6   )-----------( 176      ( 28 Mar 2018 08:39 )             30.4         136  |  95<L>  |  126<H>  ----------------------------<  107<H>  4.7   |  26  |  3.06<H>        136  |  97  |  125<H>  ----------------------------<  95  4.7   |  26  |  2.94<H>    Ca      9.1          Ca      8.7          Phos    4.5         Phos    4.7           Mg       1.7         Mg       1.8         Venous Blood Gas:   @ 00:52  7.34/50/56/26/85  VBG Lactate: 0.9    Venous Blood Gas:   @ 16:33  7.34/55/35/29/60  VBG Lactate: 1.3    Serum Pro-Brain Natriuretic Peptide: 34267 pg/mL ( @ 16:29)    CARDIAC MARKERS ( 01 Mar 2018 16:29 )  x     / 0.06 ng/mL / x     / x     / 5.4 ng/mL    < from: Transthoracic Echocardiogram (17 @ 18:58) >    Patient name: FRAN ALEXANDRA  YOB: 1945   Age: 71 (F)   MR#: [id number]  Study Date: 2017  Location: [address] [place], [place] [id number]: Thalia Amezquita RDCS  Study quality: Technically fair  Referring Physician: Pierce Cobb MD  Blood Pressure: 106/58 mmHg  Height: 160 cm  Weight: 64 kg  BSA: 1.7 m2  ------------------------------------------------------------------------  PROCEDURE: Transthoracic echocardiogram with 2-D, M-Mode  and complete spectral and color flow Doppler.  INDICATION: Other specified pulmonary heart diseases  (I27.89)  ------------------------------------------------------------------------  Dimensions:    Normal Values:  LA:     3.6    2.0 - 4.0 cm  Ao:     2.8    2.0 - 3.8 cm  SEPTUM: 0.7    0.6 - 1.2 cm  PWT:    0.9    0.6 - 1.1 cm  LVIDd:  4.1    3.0 - 5.6 cm  LVIDs:  1.9    1.8 - 4.0 cm  Derived variables:  LVMI: 58 g/m2  RWT: 0.43  Fractional short: 54 %  Doppler Peak Velocity (m/sec): AoV=1.8  ------------------------------------------------------------------------  Observations:  Mitral Valve: Mitral annular calcification, otherwise  normal mitral valve. Minimal mitral regurgitation.  Aortic Valve/Aorta: Calcified trileaflet aortic valve with  normal opening. Peak transaortic valve gradient equals 13  mm Hg, mean transaortic valve gradient equals 8 mm Hg. Peak  left ventricular outflow tract gradient equals 6 mm Hg,  mean gradient is equal to 3 mm Hg, LVOT velocity time  integral equals 19 cm.  Aortic Root: 2.8 cm.  LVOT diameter: 1.9 cm.  Left Atrium: Normal left atrium.  LA volume index = 25  cc/m2.  Left Ventricle: Hyperdynamic left ventricular systolic  function. Flattening of the interventricular septum in both  systole and diastole is  consistent with right ventricular  pressure overload. Normal left ventricular internal  dimensions and wall thicknesses.  Right Heart: Severe right atrial enlargement. Right  ventricular enlargement with decreased right ventricular  systolic function. Normal tricuspid valve. Mild-moderate  tricuspid regurgitation. Normal pulmonic valve.  Pericardium/Pleura: Normal pericardium with trace  pericardial effusion.  Left pleural effusion.  Hemodynamic: Estimated right atrial pressure is 8 mm Hg.  Estimated right ventricular systolic pressure equals 72 mm  Hg, assuming right atrial pressure equals 8 mm Hg,  consistent with severe pulmonary hypertension.  ------------------------------------------------------------------------  Conclusions:  1. Calcified trileaflet aortic valve with normal opening.  2. Normal left ventricular internal dimensions and wall  thicknesses.  3. Hyperdynamic left ventricular systolic function.  Flattening of the interventricular septum in both systole  and diastole is  consistent with right ventricular pressure  overload.  4. Severe right atrial enlargement.  5. Right ventricular enlargement with decreased right  ventricular systolic function.  6. Normal tricuspid valve. Mild-moderate tricuspid  regurgitation.  7. Estimated pulmonary artery systolic pressure equals 72  mm Hg, assuming right atrial pressure equals 8 mm Hg,  consistent with severe pulmonary pressures.  *** Compared with echocardiogram of 2017, no  significant changes noted.  ------------------------------------------------------------------------  Confirmed on  2017 - 13:15:09 by Justin Cohen M.D.  ------------------------------------------------------------------------    < end of copied text >  ---------------------------------------------------------------------------------------------------------  MICROBIOLOGY:     Culture - Fungal, Body Fluid (18 @ 21:16)    Specimen Source: .Body Fluid Peritoneal Fluid    Culture Results:   No fungus isolated at 1 week. No additional interim reports will be  issued unless there is a change in culture status.    Culture - Body Fluid with Gram Stain (18 @ 21:16)    Gram Stain:   polymorphonuclear leukocytes seen per low power field  No organisms seen per oil power field  by cytocentrifuge    Specimen Source: Peritoneal Peritoneal Fluid    Culture Results:   No growth to date.    Culture - Acid Fast - Body Fluid w/Smear (18 @ 21:16)    Specimen Source: .Body Fluid Peritoneal Fluid    Acid Fast Bacilli Smear:   No acid fast bacilli seen by fluorochrome stain      RADIOLOGY:  [x] Chest radiographs reviewed and interpreted by me    < from: US Abdomen Limited (18 @ 15:17) >    EXAM:  US ABDOMEN LIMITED                            PROCEDURE DATE:  2018        INTERPRETATION:  CLINICAL INFORMATION: End-stage right heart failure.   Evaluate for ascites.    TECHNIQUE: Limited sonogram of the abdomen to assess for ascites.    COMPARISON: Abdomen and pelvis dated 3/10/2018.    FINDINGS:    Large volume ascites in the bilateral lower quadrants. A moderate right   upper quadrant ascites. Small left upper quadrant ascites.    Trace left pleural effusion.    IMPRESSION:     Moderate to large volume abdominal ascites.    Trace left pleural effusion.    RACHEL VELARDE M.D., RADIOLOGY RESIDENT  This document has been electronically signed.  CEDRIC MISHRA M.D., ATTENDING RADIOLOGIST  This document has been electronically signed. Mar 22 2018  3:55PM      < end of copied text >  ---------------------------------------------------------------------------------------------------------    < from: [facility] Duplex Lower Ext Vein Scan, Bilat (18 @ 15:51) >    EXAM:  DUPLEX SCAN EXT VEINS LOWER BI                            PROCEDURE DATE:  2018        INTERPRETATION:  CLINICAL INFORMATION: Bilateral lower extremity   swelling, rule out DVT.    COMPARISON: Prior examination, dated 3/5/2018 showed no thrombus.    TECHNIQUE: Duplex sonography of the BILATERAL LOWER extremities with   color and spectral Doppler, with and without compression.      FINDINGS: There is edema within the superficial soft tissues of both   lower extremities.    There is normal compressibility of the bilateral common femoral, femoral   and popliteal veins. No calf vein thrombosis is detected.    Doppler examination shows normal spontaneous and phasic flow.    IMPRESSION:     No evidence of bilateral lower extremity deep venous thrombosis.    MARTIR VALVERDE M.D., ATTENDING RADIOLOGIST  This document has been electronically signed. Mar 20 2018  4:43PM      < end of copied text >  ---------------------------------------------------------------------------------------------------------    < from: CT Abdomen and Pelvis w/ Oral Cont (03.10.18 @ 19:07) >    EXAM:  CT ABDOMEN AND PELVIS OC                            PROCEDURE DATE:  03/10/2018      INTERPRETATION:  CLINICAL INFORMATION: Abdominal distention and pain for   several days. Acute renal failure.    COMPARISON: CT abdomen pelvis 2015    PROCEDURE:   CT of the Abdomen and Pelvis was performed without intravenous contrast.   Intravenous contrast: None.  Oral contrast: positive contrast was administered.  Sagittal and coronal reformats were performed.    FINDINGS:    LOWER CHEST:Mild interlobular septal thickening and small bilateral   pleural effusions. Subsegmental atelectasis in the right lower lobe.   Cardiomegaly. Coronary calcification. Calcified hilar lymph nodes.    LIVER: Within normal limits.  BILE DUCTS: Normal caliber.  GALLBLADDER: Cholelithiasis.  SPLEEN: Within normal limits.  PANCREAS: Within normal limits.  ADRENALS: Within normal limits.  KIDNEYS/URETERS: Left lower pole renal cyst.    BLADDER: Within normal limits.  REPRODUCTIVE ORGANS: Posterior uterine fibroid.    BOWEL: No bowel obstruction. Sigmoid diverticulosis, without   diverticulitis. Normal appendix.     PERITONEUM: Large volume ascites.  VESSELS:  Atherosclerotic changes.  RETROPERITONEUM: No lymphadenopathy.    ABDOMINAL WALL: Within normal limits.  BONES: Degenerative changes. T12 vertebral body hemangioma.    IMPRESSION:     Large volume ascites, etiology unclear.    Small bilateral pleural effusions and mild pulmonary edema.    VIOLA ANDREWS M.D., ATTENDINGRADIOLOGIST  This document has been electronically signed. Mar 11 2018  8:52AM      < end of copied text >  ---------------------------------------------------------------------------------------------------------  < from: CT Chest No Cont (18 @ 18:24) >    EXAM:  CT CHEST                            PROCEDURE DATE:  2018        INTERPRETATION:  CLINICAL INFORMATION: Evaluate pleural effusions.   Shortness of breath.    COMPARISON: Chest CT dated 2017. Chest x-ray dated 3/1/2018.    PROCEDURE:   CT of the Chest was performed without intravenous contrast.  Sagittal and coronal reformats were performed.  Axial MIP reformats were also performed.    FINDINGS:    CHEST:     LUNGS AND LARGE AIRWAYS: Patent central airways.  Bibasilar subsegmental   atelectasis, right greater than left.  PLEURA: Trace bilateral pleural effusions.  VESSELS: Thoracic aortic and coronary artery atherosclerosis.  HEART: Cardiomegaly. Small pericardial effusion. Mitral annular   calcification. A densely calcified or metallic density measuring 1.1 x   0.4 cm overlies the basilar left pulmonary artery (3:52, 6:80).  MEDIASTINUM AND STEFANIA: No lymphadenopathy.  CHEST WALL AND LOWER NECK: Within normal limits.  VISUALIZED UPPER ABDOMEN: Small volume ascites. Atherosclerotic changes.  BONES: Multilevel degenerative disease.    IMPRESSION: Trace bilateral pleural effusions.  Bibasilar subsegmental atelectasis, right greater than left.  Densely calcified or metallic density overlies the left pulmonary artery.  See above..      JUAN JUAREZ M.D., RADIOLOGY RESIDENT  This document has been electronically signed.  MADONNA MORGAN M.D., ATTENDING RADIOLOGIST  This document has been electronically signed. Mar  4 2018 12:28PM      < end of copied text >  ---------------------------------------------------------------------------------------------------------

## 2018-03-30 DIAGNOSIS — I50.9 HEART FAILURE, UNSPECIFIED: ICD-10-CM

## 2018-03-30 DIAGNOSIS — Z86.79 PERSONAL HISTORY OF OTHER DISEASES OF THE CIRCULATORY SYSTEM: ICD-10-CM

## 2018-03-30 DIAGNOSIS — E11.9 TYPE 2 DIABETES MELLITUS WITHOUT COMPLICATIONS: ICD-10-CM

## 2018-03-30 DIAGNOSIS — J44.9 CHRONIC OBSTRUCTIVE PULMONARY DISEASE, UNSPECIFIED: ICD-10-CM

## 2018-03-30 DIAGNOSIS — E05.90 THYROTOXICOSIS, UNSPECIFIED WITHOUT THYROTOXIC CRISIS OR STORM: ICD-10-CM

## 2018-03-30 LAB
ANION GAP SERPL CALC-SCNC: 12 MMOL/L — SIGNIFICANT CHANGE UP (ref 5–17)
BUN SERPL-MCNC: 128 MG/DL — HIGH (ref 7–23)
CALCIUM SERPL-MCNC: 8.9 MG/DL — SIGNIFICANT CHANGE UP (ref 8.4–10.5)
CHLORIDE SERPL-SCNC: 96 MMOL/L — SIGNIFICANT CHANGE UP (ref 96–108)
CO2 SERPL-SCNC: 26 MMOL/L — SIGNIFICANT CHANGE UP (ref 22–31)
CREAT SERPL-MCNC: 3.13 MG/DL — HIGH (ref 0.5–1.3)
GLUCOSE BLDC GLUCOMTR-MCNC: 110 MG/DL — HIGH (ref 70–99)
GLUCOSE BLDC GLUCOMTR-MCNC: 159 MG/DL — HIGH (ref 70–99)
GLUCOSE BLDC GLUCOMTR-MCNC: 172 MG/DL — HIGH (ref 70–99)
GLUCOSE BLDC GLUCOMTR-MCNC: 88 MG/DL — SIGNIFICANT CHANGE UP (ref 70–99)
GLUCOSE SERPL-MCNC: 112 MG/DL — HIGH (ref 70–99)
HBA1C BLD-MCNC: 5.8 % — HIGH (ref 4–5.6)
MAGNESIUM SERPL-MCNC: 1.8 MG/DL — SIGNIFICANT CHANGE UP (ref 1.6–2.6)
PHOSPHATE SERPL-MCNC: 4.5 MG/DL — SIGNIFICANT CHANGE UP (ref 2.5–4.5)
POTASSIUM SERPL-MCNC: 5.1 MMOL/L — SIGNIFICANT CHANGE UP (ref 3.5–5.3)
POTASSIUM SERPL-MCNC: 5.6 MMOL/L — HIGH (ref 3.5–5.3)
POTASSIUM SERPL-SCNC: 5.1 MMOL/L — SIGNIFICANT CHANGE UP (ref 3.5–5.3)
POTASSIUM SERPL-SCNC: 5.6 MMOL/L — HIGH (ref 3.5–5.3)
SODIUM SERPL-SCNC: 134 MMOL/L — LOW (ref 135–145)

## 2018-03-30 RX ORDER — SODIUM POLYSTYRENE SULFONATE 4.1 MEQ/G
30 POWDER, FOR SUSPENSION ORAL ONCE
Qty: 0 | Refills: 0 | Status: COMPLETED | OUTPATIENT
Start: 2018-03-30 | End: 2018-03-30

## 2018-03-30 RX ORDER — SODIUM CHLORIDE 9 MG/ML
50 INJECTION, SOLUTION INTRAVENOUS
Qty: 0 | Refills: 0 | Status: COMPLETED | OUTPATIENT
Start: 2018-03-30 | End: 2018-03-30

## 2018-03-30 RX ORDER — INSULIN HUMAN 100 [IU]/ML
10 INJECTION, SOLUTION SUBCUTANEOUS EVERY 6 HOURS
Qty: 0 | Refills: 0 | Status: DISCONTINUED | OUTPATIENT
Start: 2018-03-30 | End: 2018-03-30

## 2018-03-30 RX ORDER — INSULIN HUMAN 100 [IU]/ML
10 INJECTION, SOLUTION SUBCUTANEOUS ONCE
Qty: 0 | Refills: 0 | Status: COMPLETED | OUTPATIENT
Start: 2018-03-30 | End: 2018-03-30

## 2018-03-30 RX ORDER — SODIUM CHLORIDE 9 MG/ML
50 INJECTION, SOLUTION INTRAVENOUS
Qty: 0 | Refills: 0 | Status: DISCONTINUED | OUTPATIENT
Start: 2018-03-30 | End: 2018-03-30

## 2018-03-30 RX ORDER — ALBUMIN HUMAN 25 %
50 VIAL (ML) INTRAVENOUS ONCE
Qty: 0 | Refills: 0 | Status: COMPLETED | OUTPATIENT
Start: 2018-03-30 | End: 2018-03-30

## 2018-03-30 RX ORDER — DEXTROSE 50 % IN WATER 50 %
50 SYRINGE (ML) INTRAVENOUS ONCE
Qty: 0 | Refills: 0 | Status: COMPLETED | OUTPATIENT
Start: 2018-03-30 | End: 2018-03-30

## 2018-03-30 RX ADMIN — INSULIN HUMAN 10 UNIT(S): 100 INJECTION, SOLUTION SUBCUTANEOUS at 16:21

## 2018-03-30 RX ADMIN — SODIUM CHLORIDE 50 MILLILITER(S): 9 INJECTION, SOLUTION INTRAVENOUS at 16:25

## 2018-03-30 RX ADMIN — ATORVASTATIN CALCIUM 20 MILLIGRAM(S): 80 TABLET, FILM COATED ORAL at 22:49

## 2018-03-30 RX ADMIN — CLOPIDOGREL BISULFATE 75 MILLIGRAM(S): 75 TABLET, FILM COATED ORAL at 11:59

## 2018-03-30 RX ADMIN — Medication 240 MILLIGRAM(S): at 08:34

## 2018-03-30 RX ADMIN — Medication 10 MILLIGRAM(S): at 08:34

## 2018-03-30 RX ADMIN — Medication 25 MILLIGRAM(S): at 08:34

## 2018-03-30 RX ADMIN — TIOTROPIUM BROMIDE AND OLODATEROL 2 PUFF(S): 3.124; 2.736 SPRAY, METERED RESPIRATORY (INHALATION) at 12:06

## 2018-03-30 RX ADMIN — Medication 25 MILLIGRAM(S): at 17:54

## 2018-03-30 RX ADMIN — Medication 81 MILLIGRAM(S): at 08:35

## 2018-03-30 RX ADMIN — Medication 50 MILLILITER(S): at 12:53

## 2018-03-30 NOTE — CONSULT NOTE ADULT - CONSULT REASON
Ascites
CRF (hypoxic); COPD; emphysema; obesity; JOSE; secondary right heart failure; pHTN; ascites
Epistaxis
Hyperthyroidism
Loose top left teeth that broke into multiple pieces.
Depression

## 2018-03-30 NOTE — PROVIDER CONTACT NOTE (OTHER) - ACTION/TREATMENT ORDERED:
np informed. no further action at this time
Will cont to monitor
Provider notified, will continue to monitor and f/u with Pt again to see if she will take Kayexalate and have EKG.
continue to monitor.
Carie Herrera NP aware of situation. Its okay for pt to take home med.
Continue to monitor.
Educated pt that supplemental O2 would remain on pt during the test & why the x ray is indicated for her condition. Pt still refused to go for the x ray-NP aware.
OK to give cardizem 240 mg PO & metoprolol 25 mg PO with rest of AM meds, reschedule bumex 0.5 mg PO to 0800 as per pt request, continue to monitor pt
Pls put pt back on 4L NC and continue education. Continue to monitor.
Will try to order portable x-ray. Continue to monitor.
dose held.  will monitor
will monitor
will monitor
will monitor, endorsed to day nurse
will montior

## 2018-03-30 NOTE — PROGRESS NOTE ADULT - NSHPATTENDINGPLANDISCUSS_GEN_ALL_CORE
Alea CELIS
Alea CELIS
GI
NP
Pulm Attd
Call placed to PCP to discuss above
NP
NP
NP on the floor and will call the PCP as well
NP on the floor and will call the PCP as well
Dr Alan

## 2018-03-30 NOTE — CONSULT NOTE ADULT - CONSULT REQUESTED DATE/TIME
02-Mar-2018 14:48
05-Mar-2018
10-Mar-2018 20:20
30-Mar-2018 14:24
26-Mar-2018 19:45
09-Mar-2018 14:37

## 2018-03-30 NOTE — CONSULT NOTE ADULT - ASSESSMENT
Assessment  DMT2: 72y Female with DM T2 with hyperglycemia on insulin, blood sugars running in acceptable range, eating meals,  compliant with low carb diet.  CAD/CHF: On medications, stable, monitored.  Hyperthyroidism:  On Tapazole 5mg po daily, asymptomatic, in subclinical hypothyroid state now.  HTN: Controlled, On med.

## 2018-03-30 NOTE — PROGRESS NOTE ADULT - SUBJECTIVE AND OBJECTIVE BOX
[facility] - [facility]     PROGRESS NOTE    CHIEF COMPLAINT: CRF (hypoxic); COPD; emphysema; JOSE; secondary pulmonary hypertension; right heart failure; epistaxis; ascites;     INTERVAL HISTORY: weak, tired and frail; severe abdominal distension with early satiety - still holding off with another paracentesis due to hemodynamic instability and poor renal function; quite short of breath with minimal exertion; knee pain also limiting ambulation; no cough, sputum production, chest congestion or wheeze; no fevers, chills or sweats; no chest pain/pressure or palpitations; legs swollen; off diuretics    REVIEW OF SYSTEMS:  Constitutional: As per interval history  HEENT: epistaxis - resolved  CV: As per interval history  Resp: As per interval history  GI: anorexia  : As per interval history  Musculoskeletal: diffuse pain  Skin: Within normal limits  Neurological: Within normal limits  Psychiatric: depressed  Endocrine: Within normal limits  Hematologic/Lymphatic: Within normal limits  Allergic/Immunologic: Within normal limits    MEDICATIONS:     Pulmonary "  buDESOnide   0.5 milliGRAM(s) Respule 0.5 milliGRAM(s) Inhalation every 12 hours  ipratropium    for Nebulization 500 MICROGram(s) Nebulizer every 6 hours  tiotropium 2.5 MICROgram(s)/olodaterol 2.5 MICROgram(s) Inhaler 2 Puff(s) Inhalation daily      Anti-microbials:      Cardiovascular:  digoxin     Tablet 0.125 milliGRAM(s) Oral every other day  diltiazem    milliGRAM(s) Oral daily  metoprolol     tartrate 25 milliGRAM(s) Oral every 8 hours      Other:  acetaminophen   Tablet. 650 milliGRAM(s) Oral every 6 hours PRN  aspirin enteric coated 81 milliGRAM(s) Oral daily  atorvastatin 20 milliGRAM(s) Oral at bedtime  BACItracin   Ointment 1 Application(s) Topical two times a day  clopidogrel Tablet 75 milliGRAM(s) Oral daily  docusate sodium 100 milliGRAM(s) Oral two times a day PRN  Gas-X extra strength (simethicone 125 mg 1 Tablet(s) 1 Tablet(s) Chew three times a day PRN  heparin  Injectable 5000 Unit(s) SubCutaneous every 8 hours  ondansetron Injectable 4 milliGRAM(s) IV Push every 8 hours PRN  predniSONE   Tablet 10 milliGRAM(s) Oral daily  sodium chloride 0.65% Nasal 1 Spray(s) Both Nostrils four times a day  sucralfate 1 Gram(s) Oral three times a day PRN        OBJECTIVE:    I&O's Detail    29 Mar 2018 07:  -  30 Mar 2018 07:00  --------------------------------------------------------  IN:    Oral Fluid: 530 mL  Total IN: 530 mL    OUT:    Voided: 200 mL  Total OUT: 200 mL    Total NET: 330 mL      30 Mar 2018 07:  -  30 Mar 2018 13:04  --------------------------------------------------------  IN:    Oral Fluid: 600 mL  Total IN: 600 mL    OUT:    Voided: 330 mL  Total OUT: 330 mL    Total NET: 270 mL      Daily Weight in k.8 (30 Mar 2018 08:46)    PHYSICAL EXAM:       ICU Vital Signs Last 24 Hrs  T(C): 36.4 (30 Mar 2018 12:53), Max: 36.7 (29 Mar 2018 14:31)  T(F): 97.5 (30 Mar 2018 12:53), Max: 98 (29 Mar 2018 14:31)  HR: 58 (30 Mar 2018 12:53) (55 - 74)  BP: 93/60 (30 Mar 2018 12:53) (91/60 - 104/64)  BP(mean): --  ABP: --  ABP(mean): --  RR: 18 (30 Mar 2018 12:53) (17 - 18)  SpO2: 95% (30 Mar 2018 12:53) (92% - 96%) on 5lpm     General: Awake. Alert. Cooperative. Weak and tired. No distress. Chronically ill appearing	  HEENT:   Atraumatic. Bitemporal wasting. Anicteric. Normal oral mucosa, PERRL, EOMI  Neck: Supple. Trachea midline. Thyroid without enlargement/tenderness/nodules. No carotid bruit. (+) JVD; loss of bilateral supraclavicular fat pads	  Cardiovascular: Irregularly irregular rate and rhythm. S1 S2 normal. II/VI systolic murmur  Respiratory: Respirations unlabored. Decreased breath sounds throughout. Bibasilar rales. Decreased chest wall excursion  Abdomen: Soft. Non-tender. Significantly distended with fluid wave. No organomegaly. No masses. Normal bowel sounds	  Extremities: Warm to touch. No clubbing or cyanosis. Moderate lower extremity edema up to the thigh. Loss of extremity muscle mass  Pulses: Decreased lower extremity peripheral pulses  Skin: Lower extremity venous stasis changes; Left knee abrasion  Lymph Nodes: Cervical, supraclavicular and axillary nodes normal  Neurological: Motor and sensory examination equal and normal. A and O x 3  Psychiatry: Depressed       LABS:                        10.5   7.8   )-----------( 189      ( 29 Mar 2018 09:47 )             32.9     -30    134<L>  |  96  |  128<H>  ----------------------------<  112<H>  5.6<H>   |  26  |  3.13<H>        136  |  95<L>  |  126<H>  ----------------------------<  107<H>  4.7   |  26  |  3.06<H>    Ca      8.9          Ca      9.1          Phos    4.5         Phos    4.5           Mg       1.8         Mg       1.7         Venous Blood Gas:   @ 00:52  7.34/50/56/26/85  VBG Lactate: 0.9    Venous Blood Gas:   @ 16:33  7.34/55/35/29/60  VBG Lactate: 1.3    Serum Pro-Brain Natriuretic Peptide: 34809 pg/mL ( @ 16:29)    CARDIAC MARKERS ( 01 Mar 2018 16:29 )  x     / 0.06 ng/mL / x     / x     / 5.4 ng/mL    < from: Transthoracic Echocardiogram (17 @ 18:58) >    Patient name: FRAN ALEXANDRA  YOB: 1945   Age: 71 (F)   MR#: [id number]  Study Date: 2017  Location: [address] [place], [place] [address]er: Thalia Amezquita [facility]  Study quality: Technically fair  Referring Physician: Pierce Cobb MD  Blood Pressure: 106/58 mmHg  Height: 160 cm  Weight: 64 kg  BSA: 1.7 m2  ------------------------------------------------------------------------  PROCEDURE: Transthoracic echocardiogram with 2-D, M-Mode  and complete spectral and color flow Doppler.  INDICATION: Other specified pulmonary heart diseases  (I27.89)  ------------------------------------------------------------------------  Dimensions:    Normal Values:  LA:     3.6    2.0 - 4.0 cm  Ao:     2.8    2.0 - 3.8 cm  SEPTUM: 0.7    0.6 - 1.2 cm  PWT:    0.9    0.6 - 1.1 cm  LVIDd:  4.1    3.0 - 5.6 cm  LVIDs:  1.9    1.8 - 4.0 cm  Derived variables:  LVMI: 58 g/m2  RWT: 0.43  Fractional short: 54 %  Doppler Peak Velocity (m/sec): AoV=1.8  ------------------------------------------------------------------------  Observations:  Mitral Valve: Mitral annular calcification, otherwise  normal mitral valve. Minimal mitral regurgitation.  Aortic Valve/Aorta: Calcified trileaflet aortic valve with  normal opening. Peak transaortic valve gradient equals 13  mm Hg, mean transaortic valve gradient equals 8 mm Hg. Peak  left ventricular outflow tract gradient equals 6 mm Hg,  mean gradient is equal to 3 mm Hg, LVOT velocity time  integral equals 19 cm.  Aortic Root: 2.8 cm.  LVOT diameter: 1.9 cm.  Left Atrium: Normal left atrium.  LA volume index = 25  cc/m2.  Left Ventricle: Hyperdynamic left ventricular systolic  function. Flattening of the interventricular septum in both  systole and diastole is  consistent with right ventricular  pressure overload. Normal left ventricular internal  dimensions and wall thicknesses.  Right Heart: Severe right atrial enlargement. Right  ventricular enlargement with decreased right ventricular  systolic function. Normal tricuspid valve. Mild-moderate  tricuspid regurgitation. Normal pulmonic valve.  Pericardium/Pleura: Normal pericardium with trace  pericardial effusion.  Left pleural effusion.  Hemodynamic: Estimated right atrial pressure is 8 mm Hg.  Estimated right ventricular systolic pressure equals 72 mm  Hg, assuming right atrial pressure equals 8 mm Hg,  consistent with severe pulmonary hypertension.  ------------------------------------------------------------------------  Conclusions:  1. Calcified trileaflet aortic valve with normal opening.  2. Normal left ventricular internal dimensions and wall  thicknesses.  3. Hyperdynamic left ventricular systolic function.  Flattening of the interventricular septum in both systole  and diastole is  consistent with right ventricular pressure  overload.  4. Severe right atrial enlargement.  5. Right ventricular enlargement with decreased right  ventricular systolic function.  6. Normal tricuspid valve. Mild-moderate tricuspid  regurgitation.  7. Estimated pulmonary artery systolic pressure equals 72  mm Hg, assuming right atrial pressure equals 8 mm Hg,  consistent with severe pulmonary pressures.  *** Compared with echocardiogram of 2017, no  significant changes noted.  ------------------------------------------------------------------------  Confirmed on  2017 - 13:15:09 by Justin Cohen M.D.  ------------------------------------------------------------------------    < end of copied text >  ---------------------------------------------------------------------------------------------------------  MICROBIOLOGY:     Culture - Fungal, Body Fluid (18 @ 21:16)    Specimen Source: .Body Fluid Peritoneal Fluid    Culture Results:   No fungus isolated at 1 week. No additional interim reports will be  issued unless there is a change in culture status.    Culture - Body Fluid with Gram Stain (18 @ 21:16)    Gram Stain:   polymorphonuclear leukocytes seen per low power field  No organisms seen per oil power field  by cytocentrifuge    Specimen Source: Peritoneal Peritoneal Fluid    Culture Results:   No growth to date.    Culture - Acid Fast - Body Fluid w/Smear (18 @ 21:16)    Specimen Source: .Body Fluid Peritoneal Fluid    Acid Fast Bacilli Smear:   No acid fast bacilli seen by fluorochrome stain      RADIOLOGY:  [x] Chest radiographs reviewed and interpreted by me    < from: US Abdomen Limited (18 @ 15:17) >    EXAM:  US ABDOMEN LIMITED                            PROCEDURE DATE:  2018        INTERPRETATION:  CLINICAL INFORMATION: End-stage right heart failure.   Evaluate for ascites.    TECHNIQUE: Limited sonogram of the abdomen to assess for ascites.    COMPARISON: Abdomen and pelvis dated 3/10/2018.    FINDINGS:    Large volume ascites in the bilateral lower quadrants. A moderate right   upper quadrant ascites. Small left upper quadrant ascites.    Trace left pleural effusion.    IMPRESSION:     Moderate to large volume abdominal ascites.    Trace left pleural effusion.    RACHEL VELARDE M.D., RADIOLOGY RESIDENT  This document has been electronically signed.  CEDRIC MISHRA M.D., ATTENDING RADIOLOGIST  This document has been electronically signed. Mar 22 2018  3:55PM      < end of copied text >  ---------------------------------------------------------------------------------------------------------    < from: [facility] Duplex Lower Ext Vein Scan, Bilat (18 @ 15:51) >    EXAM:  DUPLEX SCAN EXT VEINS LOWER BI                            PROCEDURE DATE:  2018        INTERPRETATION:  CLINICAL INFORMATION: Bilateral lower extremity   swelling, rule out DVT.    COMPARISON: Prior examination, dated 3/5/2018 showed no thrombus.    TECHNIQUE: Duplex sonography of the BILATERAL LOWER extremities with   color and spectral Doppler, with and without compression.      FINDINGS: There is edema within the superficial soft tissues of both   lower extremities.    There is normal compressibility of the bilateral common femoral, femoral   and popliteal veins. No calf vein thrombosis is detected.    Doppler examination shows normal spontaneous and phasic flow.    IMPRESSION:     No evidence of bilateral lower extremity deep venous thrombosis.    MARTIR VALVERDE M.D., ATTENDING RADIOLOGIST  This document has been electronically signed. Mar 20 2018  4:43PM      < end of copied text >  ---------------------------------------------------------------------------------------------------------    < from: CT Abdomen and Pelvis w/ Oral Cont (03.10.18 @ 19:07) >    EXAM:  CT ABDOMEN AND PELVIS OC                            PROCEDURE DATE:  03/10/2018      INTERPRETATION:  CLINICAL INFORMATION: Abdominal distention and pain for   several days. Acute renal failure.    COMPARISON: CT abdomen pelvis 2015    PROCEDURE:   CT of the Abdomen and Pelvis was performed without intravenous contrast.   Intravenous contrast: None.  Oral contrast: positive contrast was administered.  Sagittal and coronal reformats were performed.    FINDINGS:    LOWER CHEST:Mild interlobular septal thickening and small bilateral   pleural effusions. Subsegmental atelectasis in the right lower lobe.   Cardiomegaly. Coronary calcification. Calcified hilar lymph nodes.    LIVER: Within normal limits.  BILE DUCTS: Normal caliber.  GALLBLADDER: Cholelithiasis.  SPLEEN: Within normal limits.  PANCREAS: Within normal limits.  ADRENALS: Within normal limits.  KIDNEYS/URETERS: Left lower pole renal cyst.    BLADDER: Within normal limits.  REPRODUCTIVE ORGANS: Posterior uterine fibroid.    BOWEL: No bowel obstruction. Sigmoid diverticulosis, without   diverticulitis. Normal appendix.     PERITONEUM: Large volume ascites.  VESSELS:  Atherosclerotic changes.  RETROPERITONEUM: No lymphadenopathy.    ABDOMINAL WALL: Within normal limits.  BONES: Degenerative changes. T12 vertebral body hemangioma.    IMPRESSION:     Large volume ascites, etiology unclear.    Small bilateral pleural effusions and mild pulmonary edema.    VIOLA ANDREWS M.D., ATTENDINGRADIOLOGIST  This document has been electronically signed. Mar 11 2018  8:52AM      < end of copied text >  ---------------------------------------------------------------------------------------------------------  < from: CT Chest No Cont (18 @ 18:24) >    EXAM:  CT CHEST                            PROCEDURE DATE:  2018        INTERPRETATION:  CLINICAL INFORMATION: Evaluate pleural effusions.   Shortness of breath.    COMPARISON: Chest CT dated 2017. Chest x-ray dated 3/1/2018.    PROCEDURE:   CT of the Chest was performed without intravenous contrast.  Sagittal and coronal reformats were performed.  Axial MIP reformats were also performed.    FINDINGS:    CHEST:     LUNGS AND LARGE AIRWAYS: Patent central airways.  Bibasilar subsegmental   atelectasis, right greater than left.  PLEURA: Trace bilateral pleural effusions.  VESSELS: Thoracic aortic and coronary artery atherosclerosis.  HEART: Cardiomegaly. Small pericardial effusion. Mitral annular   calcification. A densely calcified or metallic density measuring 1.1 x   0.4 cm overlies the basilar left pulmonary artery (3:52, 6:80).  MEDIASTINUM AND STEFANIA: No lymphadenopathy.  CHEST WALL AND LOWER NECK: Within normal limits.  VISUALIZED UPPER ABDOMEN: Small volume ascites. Atherosclerotic changes.  BONES: Multilevel degenerative disease.    IMPRESSION: Trace bilateral pleural effusions.  Bibasilar subsegmental atelectasis, right greater than left.  Densely calcified or metallic density overlies the left pulmonary artery.  See above..      JUAN JUAREZ M.D., RADIOLOGY RESIDENT  This document has been electronically signed.  MADONNA MORGAN M.D., ATTENDING RADIOLOGIST  This document has been electronically signed. Mar  4 2018 12:28PM      < end of copied text >  ---------------------------------------------------------------------------------------------------------

## 2018-03-30 NOTE — CONSULT NOTE ADULT - PROBLEM SELECTOR RECOMMENDATION 9
Dissolvable surgicel gauze in left nare  Continue with Nasal saline spray  Bacitracin to b/l anterior septum BID  Continue with humidified O2  Avoid blowing nose
Will DC Tapazole for now, patient advised to repeat TFTs in 4-6 weeks after DC, FU

## 2018-03-30 NOTE — PROGRESS NOTE ADULT - ASSESSMENT
72 year old female with CAD, dCHF, pHTN, HTN, DM, Afib and COPD p/w acute on chronic dCHF, ascites and acute on chronic kidney injury.     - CKD stage 4: likely due to HTN and DM and now CASSANDRA: non oliguric.   - acute on chronic dCHF: continued lower extremity edema   - GI: ascites s/p paracentesis. Abdomen remains distended        Recommendations  - No choice but the dc the bumex 0.5 mg po bid.  I have dw her re a second opinion and she did not want.  I still do not feel that HD will provide any benefit to her as her underlying comorbidities(ie PHTN cirrhosis) are present and likely contributing to her morbidity  - continue metoprolol 25 mg po q 8 hrs and Cardizem 240 mg po daily   - strict I+O and daily weights; Creatinine/renal profile is stagnant.  Will try another  IV alb today  - maintain low potassium diet, print out given on high and low potassium foods  - would defer another paracentesis until renal function improved   - The only other thought I had bladder pressures to make sure there is no external compression.  She refused a dubois but stated that she would re-entertain the idea on Monday(worried of UTI)    There is no role for inotropes as she responds with extreme tachycardia  Prognosis is guarded at best.  May need to have palliative re-evaluate

## 2018-03-30 NOTE — PROGRESS NOTE ADULT - ASSESSMENT
Right elbow, hip, knee, pain  malnutrition  volume overload/ascites/LE edema  sev PHTN/ Cor pulm  comp diast hf  sev copd  restrictive lung disease sec to ascites which diminishes diaphragmatic excursion  epistaxis - on and off  CASSANDRA on ckd 4  hyperkalemia - resolved  2 shattered teeth   nsvt  hyperkalemia    continue current care  appreciate renal input  albumin already given  pt refused Kayexalate and medical management of hyperkalemia was attempted  but pt refused d50 after insulin.  please check stat fs and repeat in half hour.

## 2018-03-30 NOTE — PROVIDER CONTACT NOTE (OTHER) - NAME OF MD/NP/PA/DO NOTIFIED:
APOLINAR Dao, NP
Carie Herrera, NP
JOEY Benito
JOEY Benito
JOEY Bernard
Jesús Merrill, NP
Jigna Herrera, NP
Lucille Waller, NP
Makenzie Caldwell
Makenzie King, NP
Marilin Espinal, NP
Marilin Espinal, NP
zuleyma marques

## 2018-03-30 NOTE — CONSULT NOTE ADULT - SUBJECTIVE AND OBJECTIVE BOX
Endocrinology Attending Covering For Dr. Urena    HPI:  72F with PMHx of AFib, asthma, CAD, cardiomegaly, enlarged lymph nodes, COPD, DM type II, PSHx of endoscopy, upper lobectomy, sent to the ED by PMD to be admitted for heart failure. Pt presents with complaints of shortness of breath secondary to fluid in her lungs, abdominal distension and LE edema. Her SOB has been constant, moderate to severe, without clear relieving or exacerbating factor and not associated with any fever, chills, cough, chest pain, abdominal pain, nausea, emesis, urinary symptoms, or any other complaints at this time. (01 Mar 2018 19:30)  Patient has history of hyperthyroidism on Methimazole 5mg, says she is on tx for long time, does not remember when she had her thyroid hormone levels tested for last time. Patient follows up with PCP.    PAST MEDICAL & SURGICAL HISTORY:  Hyperthyroidism  JOSE (obstructive sleep apnea)  Epistaxis  GIB (gastrointestinal bleeding)  CAD S/P percutaneous coronary angioplasty  Afib  Pulmonary HTN  GERD (gastroesophageal reflux disease)  Obesity  Cardiomegaly  Valvular heart disease  COPD (chronic obstructive pulmonary disease): Home O2  Sleep Apnea: by criteria  Loose, teeth: 3 bottom front loose teeth  Female stress incontinence  Shoulder pain, right  Constipation  Arthritis of Knee  H/O heartburn  History of lung cancer  Obese  Hx of hyperlipidemia  Asthma  Diabetes mellitus: type 2  dx about 4-5 years ago   no daily fingerstick  H/O: HTN (hypertension)  Enlarged lymph nodes  Lymph nodes enlarged: s/p biopsy mediastinal  benign  H/O endoscopy  left upper lobectomy      FAMILY HISTORY:  No pertinent family history in first degree relatives      Social History:    Outpatient Medications:    MEDICATIONS  (STANDING):  aspirin enteric coated 81 milliGRAM(s) Oral daily  atorvastatin 20 milliGRAM(s) Oral at bedtime  BACItracin   Ointment 1 Application(s) Topical two times a day  buDESOnide   0.5 milliGRAM(s) Respule 0.5 milliGRAM(s) Inhalation every 12 hours  clopidogrel Tablet 75 milliGRAM(s) Oral daily  digoxin     Tablet 0.125 milliGRAM(s) Oral every other day  diltiazem    milliGRAM(s) Oral daily  heparin  Injectable 5000 Unit(s) SubCutaneous every 8 hours  ipratropium    for Nebulization 500 MICROGram(s) Nebulizer every 6 hours  metoprolol     tartrate 25 milliGRAM(s) Oral every 8 hours  predniSONE   Tablet 10 milliGRAM(s) Oral daily  sodium chloride 0.65% Nasal 1 Spray(s) Both Nostrils four times a day  tiotropium 2.5 MICROgram(s)/olodaterol 2.5 MICROgram(s) Inhaler 2 Puff(s) Inhalation daily    MEDICATIONS  (PRN):  acetaminophen   Tablet. 650 milliGRAM(s) Oral every 6 hours PRN Mild Pain (1 - 3)  docusate sodium 100 milliGRAM(s) Oral two times a day PRN Constipation  Gas-X extra strength (simethicone 125 mg 1 Tablet(s) 1 Tablet(s) Chew three times a day PRN bloating  ondansetron Injectable 4 milliGRAM(s) IV Push every 8 hours PRN Nausea and/or Vomiting  sucralfate 1 Gram(s) Oral three times a day PRN stomach discomfort      Allergies    No Known Allergies    Intolerances    albuterol (Unknown)    Review of Systems:  Constitutional: No fever, no chills  Eyes: No blurry vision  Neuro: No tremors  HEENT: No pain, no neck swelling  Cardiovascular: No chest pain, no palpitations  Respiratory: Has SOB, no cough  GI: No nausea, vomiting, abdominal pain  : No dysuria  Skin: no rash  MSK: Has leg swelling, no foot ulcers.  Psych: no depression  Endocrine: no polyuria, polydipsia    ALL OTHER SYSTEMS REVIEWED AND NEGATIVE    UNABLE TO OBTAIN    PHYSICAL EXAM:  VITALS: T(C): 36.4 (03-30-18 @ 12:53)  T(F): 97.5 (03-30-18 @ 12:53), Max: 98 (03-29-18 @ 14:31)  HR: 58 (03-30-18 @ 12:53) (55 - 74)  BP: 93/60 (03-30-18 @ 12:53) (91/60 - 104/64)  RR:  (17 - 18)  SpO2:  (92% - 96%)  Wt(kg): --  GENERAL: NAD, well-groomed, well-developed  EYES: No proptosis, no lid lag  HEENT:  Atraumatic, Normocephalic  THYROID: Normal size, no palpable nodules  RESPIRATORY: Clear to auscultation bilaterally; No rales, rhonchi, wheezing  CARDIOVASCULAR: Si S2, No murmurs;  GI: Soft, non distended, normal bowel sounds  SKIN: Dry, intact, No rashes or lesions  MUSCULOSKELETAL: Has BL lower extremity edema.  NEURO:  no tremor, sensation decreased in feet BL,                              10.5   7.8   )-----------( 189      ( 29 Mar 2018 09:47 )             32.9       03-30    134<L>  |  96  |  128<H>  ----------------------------<  112<H>  5.6<H>   |  26  |  3.13<H>    EGFR if : 17<L>  EGFR if non : 14<L>    Ca    8.9      03-30  Mg     1.8     03-30  Phos  4.5     03-30        Thyroid Function Tests:  03-29 @ 11:04 TSH 8.52 FreeT4 0.9 T3 -- Anti TPO -- Anti Thyroglobulin Ab -- TSI --  03-28 @ 11:15 TSH 7.35 FreeT4 -- T3 -- Anti TPO -- Anti Thyroglobulin Ab -- TSI --      Hemoglobin A1C, Whole Blood: 5.8 % <H> [4.0 - 5.6] (03-30-18 @ 11:02)          Radiology:

## 2018-03-30 NOTE — PROVIDER CONTACT NOTE (OTHER) - DATE AND TIME:
02-Mar-2018 06:30
05-Mar-2018 09:00
06-Mar-2018 21:00
06-Mar-2018 22:00
07-Mar-2018 05:00
09-Mar-2018 20:10
10-Mar-2018 06:30
11-Mar-2018 06:45
14-Mar-2018 23:00
16-Mar-2018 00:43
16-Mar-2018 06:50
22-Mar-2018 20:10
24-Mar-2018 14:00
28-Mar-2018 00:00
30-Mar-2018 15:10

## 2018-03-30 NOTE — PROGRESS NOTE ADULT - SUBJECTIVE AND OBJECTIVE BOX
MEDICINE, PROGRESS NOTE [phone]    FRAN ALEXANDRA 72y [id number]    Patient seen and examined.  Patient is a 72y old  Female who presents with a chief complaint of shortness of breath and difficulty eating due to fullness (01 Mar 2018 19:30)  Pt feels weak and still c/o about mouth.    PAST MEDICAL & SURGICAL HISTORY:  Hyperthyroidism  JOSE (obstructive sleep apnea)  Epistaxis  GIB (gastrointestinal bleeding)  CAD S/P percutaneous coronary angioplasty  Afib  Pulmonary HTN  GERD (gastroesophageal reflux disease)  Obesity  Cardiomegaly  Valvular heart disease  COPD (chronic obstructive pulmonary disease): Home O2  Sleep Apnea: by criteria  Loose, teeth: 3 bottom front loose teeth  Female stress incontinence  Shoulder pain, right  Constipation  Arthritis of Knee  H/O heartburn  History of lung cancer  Obese  Hx of hyperlipidemia  Asthma  Diabetes mellitus: type 2  dx about 4-5 years ago   no daily fingerstick  H/O: HTN (hypertension)  Enlarged lymph nodes  Lymph nodes enlarged: s/p biopsy mediastinal  benign  H/O endoscopy  left upper lobectomy    MEDICATIONS  (STANDING):  aspirin enteric coated 81 milliGRAM(s) Oral daily  atorvastatin 20 milliGRAM(s) Oral at bedtime  BACItracin   Ointment 1 Application(s) Topical two times a day  buDESOnide   0.5 milliGRAM(s) Respule 0.5 milliGRAM(s) Inhalation every 12 hours  clopidogrel Tablet 75 milliGRAM(s) Oral daily  digoxin     Tablet 0.125 milliGRAM(s) Oral every other day  diltiazem    milliGRAM(s) Oral daily  heparin  Injectable 5000 Unit(s) SubCutaneous every 8 hours  ipratropium    for Nebulization 500 MICROGram(s) Nebulizer every 6 hours  metoprolol     tartrate 25 milliGRAM(s) Oral every 8 hours  predniSONE   Tablet 10 milliGRAM(s) Oral daily  sodium chloride 0.65% Nasal 1 Spray(s) Both Nostrils four times a day  tiotropium 2.5 MICROgram(s)/olodaterol 2.5 MICROgram(s) Inhaler 2 Puff(s) Inhalation daily    MEDICATIONS  (PRN):  acetaminophen   Tablet. 650 milliGRAM(s) Oral every 6 hours PRN Mild Pain (1 - 3)  docusate sodium 100 milliGRAM(s) Oral two times a day PRN Constipation  Gas-X extra strength (simethicone 125 mg 1 Tablet(s) 1 Tablet(s) Chew three times a day PRN bloating  ondansetron Injectable 4 milliGRAM(s) IV Push every 8 hours PRN Nausea and/or Vomiting  sucralfate 1 Gram(s) Oral three times a day PRN stomach discomfort    Allergies    No Known Allergies    Intolerances    albuterol (Unknown)      PHYSICAL EXAM:  Constitutional: NAD  HEENT: Normocephalic, EOMI  Neck:  No JVD  Respiratory: CTA B/L, No wheezes  Cardiovascular: S1, S2, RRR, + systolic murmur  Gastrointestinal: BS+, soft, NT/ND  Extremities: No peripheral edema  Neurological: AAOX3, no focal deficits  Psychiatric: Normal mood, normal affect  : No Gottlieb    Vital Signs Last 24 Hrs  T(C): 36.4 (30 Mar 2018 12:53), Max: 36.4 (29 Mar 2018 20:01)  T(F): 97.5 (30 Mar 2018 12:53), Max: 97.6 (29 Mar 2018 20:01)  HR: 58 (30 Mar 2018 12:53) (55 - 60)  BP: 93/60 (30 Mar 2018 12:53) (91/60 - 96/63)  BP(mean): --  RR: 18 (30 Mar 2018 12:53) (18 - 18)  SpO2: 95% (30 Mar 2018 12:53) (95% - 96%)  I&O's Summary    29 Mar 2018 07:01  -  30 Mar 2018 07:00  --------------------------------------------------------  IN: 530 mL / OUT: 200 mL / NET: 330 mL    30 Mar 2018 07:01  -  30 Mar 2018 17:45  --------------------------------------------------------  IN: 600 mL / OUT: 330 mL / NET: 270 mL        LABS:                        10.5   7.8   )-----------( 189      ( 29 Mar 2018 09:47 )             32.9     03-30    134<L>  |  96  |  128<H>  ----------------------------<  112<H>  5.6<H>   |  26  |  3.13<H>    Ca    8.9      30 Mar 2018 10:50  Phos  4.5     03-30  Mg     1.8     03-30

## 2018-03-30 NOTE — PROVIDER CONTACT NOTE (OTHER) - REASON
? give metoprolol.  HR 50's
BP 73/39 HR 51
Bradycardia
Pt refusing Kayexalate and fu EKG for potassium of 6.1
Pt refusing face tent mask
Pt refusing med
Pt refusing to go for x ray
Pt taking home medicine of simethicone
Pt's manual LU=487/58-pt due for AM meds
ectopy
pt refusing am meds
pt refusing medications
pt refusing meds and vital signs
pt refusing x-ray of abdomen.
pt refused kayexalate

## 2018-03-30 NOTE — PROGRESS NOTE ADULT - ASSESSMENT
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  3) chronic diastolic CHF (little evidence left sided heart failure on chest CT - trace pleural effusions - minimal ground glass opacities)    CKD with CASSANDRA due to intravascular volume depletion +/- hepato-renal syndrome    AF/atrial flutter    HTN/HLD/DM/CAD/PCI    PLAN/RECOMMENDATIONS    oxygen supplementation ATC to keep saturation greater than 92%  paracentesis versus placement of an indwelling intraperitoneal catheter when hemodynamically stable and renal function has improved  atrovent/pulmicort nebs  continue tiotropium 2.5 MICROgram(s)/olodaterol 2.5 MICROgram(s) Inhaler 2 Puff(s) Inhalation daily  prednisone 10mg daily  cardiac meds: cardizem CD/lopressor/digoxin/ASA/plavix/lipitor - off diuretics   unable to tolerate inotropic agents which resulted in tachycardia  GI/DVT prophylaxis - carafate/SQ heparin  patient had no improvement in pulmonary artery pressures with pulmonary artery vasodilators  renal follow-up appreciated    Will follow with you. Plan of care discussed with the patient and her  at length at bedside.  Prognosis is poor.     Chacho Gallego MD, [facility] - [phone]  Pulmonary Medicine

## 2018-03-31 LAB
ANION GAP SERPL CALC-SCNC: 17 MMOL/L — SIGNIFICANT CHANGE UP (ref 5–17)
BUN SERPL-MCNC: 134 MG/DL — HIGH (ref 7–23)
CALCIUM SERPL-MCNC: 8.9 MG/DL — SIGNIFICANT CHANGE UP (ref 8.4–10.5)
CHLORIDE SERPL-SCNC: 95 MMOL/L — LOW (ref 96–108)
CO2 SERPL-SCNC: 22 MMOL/L — SIGNIFICANT CHANGE UP (ref 22–31)
CREAT SERPL-MCNC: 3.16 MG/DL — HIGH (ref 0.5–1.3)
CULTURE RESULTS: SIGNIFICANT CHANGE UP
GLUCOSE SERPL-MCNC: 105 MG/DL — HIGH (ref 70–99)
HCT VFR BLD CALC: 31.4 % — LOW (ref 34.5–45)
HGB BLD-MCNC: 9.8 G/DL — LOW (ref 11.5–15.5)
MCHC RBC-ENTMCNC: 27.1 PG — SIGNIFICANT CHANGE UP (ref 27–34)
MCHC RBC-ENTMCNC: 31.2 GM/DL — LOW (ref 32–36)
MCV RBC AUTO: 86.7 FL — SIGNIFICANT CHANGE UP (ref 80–100)
NRBC # BLD: 0 /100 WBCS — SIGNIFICANT CHANGE UP (ref 0–0)
PLATELET # BLD AUTO: 220 K/UL — SIGNIFICANT CHANGE UP (ref 150–400)
POTASSIUM SERPL-MCNC: 5.3 MMOL/L — SIGNIFICANT CHANGE UP (ref 3.5–5.3)
POTASSIUM SERPL-SCNC: 5.3 MMOL/L — SIGNIFICANT CHANGE UP (ref 3.5–5.3)
RBC # BLD: 3.62 M/UL — LOW (ref 3.8–5.2)
RBC # FLD: 18.9 % — HIGH (ref 10.3–14.5)
SODIUM SERPL-SCNC: 134 MMOL/L — LOW (ref 135–145)
SPECIMEN SOURCE: SIGNIFICANT CHANGE UP
WBC # BLD: 7.77 K/UL — SIGNIFICANT CHANGE UP (ref 3.8–10.5)
WBC # FLD AUTO: 7.77 K/UL — SIGNIFICANT CHANGE UP (ref 3.8–10.5)

## 2018-03-31 RX ADMIN — Medication 240 MILLIGRAM(S): at 08:53

## 2018-03-31 RX ADMIN — Medication 10 MILLIGRAM(S): at 08:54

## 2018-03-31 RX ADMIN — Medication 500 MICROGRAM(S): at 19:45

## 2018-03-31 RX ADMIN — Medication 0.12 MILLIGRAM(S): at 11:35

## 2018-03-31 RX ADMIN — Medication 500 MICROGRAM(S): at 23:37

## 2018-03-31 RX ADMIN — Medication 81 MILLIGRAM(S): at 08:53

## 2018-03-31 RX ADMIN — CLOPIDOGREL BISULFATE 75 MILLIGRAM(S): 75 TABLET, FILM COATED ORAL at 11:35

## 2018-03-31 RX ADMIN — Medication 500 MICROGRAM(S): at 09:06

## 2018-03-31 RX ADMIN — Medication 25 MILLIGRAM(S): at 08:54

## 2018-03-31 RX ADMIN — ATORVASTATIN CALCIUM 20 MILLIGRAM(S): 80 TABLET, FILM COATED ORAL at 23:38

## 2018-03-31 RX ADMIN — Medication 1 SPRAY(S): at 11:35

## 2018-03-31 NOTE — CHART NOTE - NSCHARTNOTEFT_GEN_A_CORE
Called by RN to report pt with hypotension   - SBP 70's manually  - Pt seen and asymptomatic   - d/w  ( pt with severe PTH, no interventions at this time, pt can not tolerated IVF)   - Pt refusing Gottlieb placement , to check Bladder pressures, for ? paracentesis vs HD   - we will continue to monitor

## 2018-03-31 NOTE — PROGRESS NOTE ADULT - ASSESSMENT
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  3) chronic diastolic CHF (little evidence left sided heart failure on chest CT - trace pleural effusions - minimal ground glass opacities)    CKD with CASSANDRA due to intravascular volume depletion +/- hepato-renal syndrome    AF/atrial flutter    HTN/HLD/DM/CAD/PCI    PLAN/RECOMMENDATIONS    oxygen supplementation ATC to keep saturation greater than 92%  paracentesis versus placement of an indwelling intraperitoneal catheter when hemodynamically stable and renal function has improved  atrovent/pulmicort nebs  continue tiotropium 2.5 MICROgram(s)/olodaterol 2.5 MICROgram(s) Inhaler 2 Puff(s) Inhalation daily  prednisone 10mg daily  cardiac meds: cardizem CD/lopressor/digoxin/ASA/plavix/lipitor - off diuretics   unable to tolerate inotropic agents which resulted in tachycardia  GI/DVT prophylaxis - carafate/SQ heparin  patient had no improvement in pulmonary artery pressures with pulmonary artery vasodilators  renal follow-up appreciated    Will follow with you. Plan of care discussed with the patient and her  at length at bedside.  Prognosis is poor.     Chacho Gallego MD, [facility] - [phone]  Pulmonary Medicine 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  3) chronic diastolic CHF (little evidence left sided heart failure on chest CT - trace pleural effusions - minimal ground glass opacities)    CKD with CASSANDRA due to intravascular volume depletion +/- hepato-renal syndrome    AF/atrial flutter    HTN/HLD/DM/CAD/PCI    PLAN/RECOMMENDATIONS      oxygen supplementation ATC to keep saturation greater than 92%, as outlined before  paracentesis versus placement of an indwelling intraperitoneal catheter when hemodynamically stable and renal function has improved  atrovent/pulmicort nebs  continue tiotropium 2.5 MICROgram(s)/olodaterol 2.5 MICROgram(s) Inhaler 2 Puff(s) Inhalation daily  prednisone 10mg daily  cardiac meds: cardizem CD/lopressor/digoxin/ASA/plavix/lipitor - off diuretics   unable to tolerate inotropic agents which resulted in tachycardia  GI/DVT prophylaxis - carafate/SQ heparin  patient had no improvement in pulmonary artery pressures with pulmonary artery vasodilators  renal follow-up appreciated    Will follow with you. Plan of care discussed with the patient and her  at length at bedside.  Prognosis is poor.     Chacho Gallego MD, [facility] - [phone]  Pulmonary Medicine 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  3) chronic diastolic CHF (little evidence left sided heart failure on chest CT - trace pleural effusions - minimal ground glass opacities)    CKD with CASSANDRA due to intravascular volume depletion +/- hepato-renal syndrome    AF/atrial flutter    HTN/HLD/DM/CAD/PCI    PLAN/RECOMMENDATIONS      oxygen supplementation ATC to keep saturation greater than 92%, as outlined before  paracentesis versus placement of an indwelling intraperitoneal catheter when hemodynamically stable and renal function has improved  atrovent/pulmicort nebs  continue tiotropium 2.5 MICROgram(s)/olodaterol 2.5 MICROgram(s) Inhaler 2 Puff(s) Inhalation daily  prednisone 10mg daily  cardiac meds: cardizem CD/lopressor/digoxin/ASA/plavix/lipitor - off diuretics   unable to tolerate inotropic agents which resulted in tachycardia  GI/DVT prophylaxis - carafate/SQ heparin  patient had no improvement in pulmonary artery pressures with pulmonary artery vasodilators  renal follow-up appreciated    Plan of care discussed with the patient at bedside.  Prognosis is unchanged as outlined in previous notes    Theodore Negron MD, [facility] - [phone]  Pulmonary Medicine

## 2018-03-31 NOTE — PROGRESS NOTE ADULT - ASSESSMENT
Assessment  DMT2: 72y Female with DM T2  blood sugars fairly controled on diet,, eating meals,  compliant with low carb diet.A1c 5.8  CAD/CHF: On medications, stable, monitored.  Hyperthyroidism: , asymptomatic, in subclinical hypothyroid state now. TSH was 8.52 on 3-29-18, T4:4.5  HTN: Controlled, On med.    Plan    DM2;  Continue ADA diet, with Occasional FS check  CAD/CHF:   Stable on Medication    Hyperthyroidisim:   Tapazol was discontinued, F/U TFT,s in 2 weeks   HTN;  Continue current Med,s

## 2018-03-31 NOTE — PROGRESS NOTE ADULT - SUBJECTIVE AND OBJECTIVE BOX
NEPHROLOGY - NSN    Patient seen and examined. Patient has no complaints and is no acute distress. Refusing some medication/blood draws. Asymptomatic hypotension this afternoon     MEDICATIONS  (STANDING):  aspirin enteric coated 81 milliGRAM(s) Oral daily  atorvastatin 20 milliGRAM(s) Oral at bedtime  BACItracin   Ointment 1 Application(s) Topical two times a day  buDESOnide   0.5 milliGRAM(s) Respule 0.5 milliGRAM(s) Inhalation every 12 hours  clopidogrel Tablet 75 milliGRAM(s) Oral daily  digoxin     Tablet 0.125 milliGRAM(s) Oral every other day  diltiazem    milliGRAM(s) Oral daily  heparin  Injectable 5000 Unit(s) SubCutaneous every 8 hours  ipratropium    for Nebulization 500 MICROGram(s) Nebulizer every 6 hours  metoprolol     tartrate 25 milliGRAM(s) Oral every 8 hours  predniSONE   Tablet 10 milliGRAM(s) Oral daily  sodium chloride 0.65% Nasal 1 Spray(s) Both Nostrils four times a day  tiotropium 2.5 MICROgram(s)/olodaterol 2.5 MICROgram(s) Inhaler 2 Puff(s) Inhalation daily    VITALS:  T(C): , Max: 36.5 (03-31-18 @ 12:56)  T(F): , Max: 97.7 (03-31-18 @ 12:56)  HR: 56 (03-31-18 @ 12:56)  BP: 72/38 (03-31-18 @ 13:05)  BP(mean): --  RR: 19 (03-31-18 @ 12:56)  SpO2: 94% (03-31-18 @ 12:56)  Wt(kg): --  I and O's:    03-30 @ 07:01  -  03-31 @ 07:00  --------------------------------------------------------  IN: 600 mL / OUT: 330 mL / NET: 270 mL    03-31 @ 07:01  -  03-31 @ 15:10  --------------------------------------------------------  IN: 240 mL / OUT: 300 mL / NET: -60 mL      REVIEW OF SYSTEMS:  Denies any nausea, vomiting, diarrhea, fever or chills. Denies chest pain, focal weakness, hematuria or dysuria. Good oral intake. All other pertinent systems are reviewed and are negative.     PHYSICAL EXAM:  Constitutional: NAD  Respiratory: diminished B/L  Cardiovascular: S1 and S2  Gastrointestinal: BS+, soft, NT, distended  Extremities: + edema  Neurological: AAO x 3  : No Gottlieb  Access: Not applicable    LABS:                        9.8    7.77  )-----------( 220      ( 31 Mar 2018 08:19 )             31.4     03-31    134<L>  |  95<L>  |  134<H>  ----------------------------<  105<H>  5.3   |  22  |  3.16<H>    Ca    8.9      31 Mar 2018 06:48  Phos  4.5     03-30  Mg     1.8     03-30

## 2018-03-31 NOTE — PROGRESS NOTE ADULT - ASSESSMENT
Right elbow, hip, knee, pain  malnutrition  volume overload/ascites/LE edema  sev PHTN/ Cor pulm  comp diast hf  sev copd  restrictive lung disease sec to ascites which diminishes diaphragmatic excursion  epistaxis - on and off  CASSANDRA on ckd 4  hyperkalemia - resolved  2 shattered teeth   nsvt  hyperkalemia    continue current care  readdressed placing a Gottlieb and attempting bladder pressures to see if paracentesis may help however pt hesitant at present and wishes to discuss with her family first.  offered palliative care eval and pt stated she wants to live and doesn't want palliative involved  hold diruetics  address potassium with interventions she will allow, continue low potassium diet.

## 2018-03-31 NOTE — PROGRESS NOTE ADULT - SUBJECTIVE AND OBJECTIVE BOX
[facility] - [facility]     PROGRESS NOTE    CHIEF COMPLAINT: CRF (hypoxic); COPD; emphysema; JOSE; secondary pulmonary hypertension; right heart failure; epistaxis; ascites;     INTERVAL HISTORY: weak, tired and frail; severe abdominal distension with early satiety - still holding off with another paracentesis due to hemodynamic instability and poor renal function; quite short of breath with minimal exertion; knee pain also limiting ambulation; no cough, sputum production, chest congestion or wheeze; no fevers, chills or sweats; no chest pain/pressure or palpitations; legs swollen; off diuretics    REVIEW OF SYSTEMS:  Constitutional: As per interval history  HEENT: epistaxis - resolved  CV: As per interval history  Resp: As per interval history  GI: anorexia  : As per interval history  Musculoskeletal: diffuse pain  Skin: Within normal limits  Neurological: Within normal limits  Psychiatric: depressed  Endocrine: Within normal limits  Hematologic/Lymphatic: Within normal limits  Allergic/Immunologic: Within normal limits    MEDICATIONS:     Pulmonary "  buDESOnide   0.5 milliGRAM(s) Respule 0.5 milliGRAM(s) Inhalation every 12 hours  ipratropium    for Nebulization 500 MICROGram(s) Nebulizer every 6 hours  tiotropium 2.5 MICROgram(s)/olodaterol 2.5 MICROgram(s) Inhaler 2 Puff(s) Inhalation daily      Anti-microbials:      Cardiovascular:  digoxin     Tablet 0.125 milliGRAM(s) Oral every other day  diltiazem    milliGRAM(s) Oral daily  metoprolol     tartrate 25 milliGRAM(s) Oral every 8 hours      Other:  acetaminophen   Tablet. 650 milliGRAM(s) Oral every 6 hours PRN  aspirin enteric coated 81 milliGRAM(s) Oral daily  atorvastatin 20 milliGRAM(s) Oral at bedtime  BACItracin   Ointment 1 Application(s) Topical two times a day  clopidogrel Tablet 75 milliGRAM(s) Oral daily  docusate sodium 100 milliGRAM(s) Oral two times a day PRN  Gas-X extra strength (simethicone 125 mg 1 Tablet(s) 1 Tablet(s) Chew three times a day PRN  heparin  Injectable 5000 Unit(s) SubCutaneous every 8 hours  ondansetron Injectable 4 milliGRAM(s) IV Push every 8 hours PRN  predniSONE   Tablet 10 milliGRAM(s) Oral daily  sodium chloride 0.65% Nasal 1 Spray(s) Both Nostrils four times a day  sucralfate 1 Gram(s) Oral three times a day PRN        OBJECTIVE:    I&O's Detail    29 Mar 2018 07:  -  30 Mar 2018 07:00  --------------------------------------------------------  IN:    Oral Fluid: 530 mL  Total IN: 530 mL    OUT:    Voided: 200 mL  Total OUT: 200 mL    Total NET: 330 mL      30 Mar 2018 07:  -  30 Mar 2018 13:04  --------------------------------------------------------  IN:    Oral Fluid: 600 mL  Total IN: 600 mL    OUT:    Voided: 330 mL  Total OUT: 330 mL    Total NET: 270 mL      Daily Weight in k.8 (30 Mar 2018 08:46)    PHYSICAL EXAM:       ICU Vital Signs Last 24 Hrs  T(C): 36.4 (30 Mar 2018 12:53), Max: 36.7 (29 Mar 2018 14:31)  T(F): 97.5 (30 Mar 2018 12:53), Max: 98 (29 Mar 2018 14:31)  HR: 58 (30 Mar 2018 12:53) (55 - 74)  BP: 93/60 (30 Mar 2018 12:53) (91/60 - 104/64)  BP(mean): --  ABP: --  ABP(mean): --  RR: 18 (30 Mar 2018 12:53) (17 - 18)  SpO2: 95% (30 Mar 2018 12:53) (92% - 96%) on 5lpm     General: Awake. Alert. Cooperative. Weak and tired. No distress. Chronically ill appearing	  HEENT:   Atraumatic. Bitemporal wasting. Anicteric. Normal oral mucosa, PERRL, EOMI  Neck: Supple. Trachea midline. Thyroid without enlargement/tenderness/nodules. No carotid bruit. (+) JVD; loss of bilateral supraclavicular fat pads	  Cardiovascular: Irregularly irregular rate and rhythm. S1 S2 normal. II/VI systolic murmur  Respiratory: Respirations unlabored. Decreased breath sounds throughout. Bibasilar rales. Decreased chest wall excursion  Abdomen: Soft. Non-tender. Significantly distended with fluid wave. No organomegaly. No masses. Normal bowel sounds	  Extremities: Warm to touch. No clubbing or cyanosis. Moderate lower extremity edema up to the thigh. Loss of extremity muscle mass  Pulses: Decreased lower extremity peripheral pulses  Skin: Lower extremity venous stasis changes; Left knee abrasion  Lymph Nodes: Cervical, supraclavicular and axillary nodes normal  Neurological: Motor and sensory examination equal and normal. A and O x 3  Psychiatry: Depressed       LABS:                        10.5   7.8   )-----------( 189      ( 29 Mar 2018 09:47 )             32.9     -30    134<L>  |  96  |  128<H>  ----------------------------<  112<H>  5.6<H>   |  26  |  3.13<H>        136  |  95<L>  |  126<H>  ----------------------------<  107<H>  4.7   |  26  |  3.06<H>    Ca      8.9          Ca      9.1          Phos    4.5         Phos    4.5           Mg       1.8         Mg       1.7         Venous Blood Gas:   @ 00:52  7.34/50/56/26/85  VBG Lactate: 0.9    Venous Blood Gas:   @ 16:33  7.34/55/35/29/60  VBG Lactate: 1.3    Serum Pro-Brain Natriuretic Peptide: 11940 pg/mL ( @ 16:29)    CARDIAC MARKERS ( 01 Mar 2018 16:29 )  x     / 0.06 ng/mL / x     / x     / 5.4 ng/mL    < from: Transthoracic Echocardiogram (17 @ 18:58) >    Patient name: FRAN ALEXANDRA  YOB: 1945   Age: 71 (F)   MR#: [id number]  Study Date: 2017  Location: [address] [place], [place] [address]er: Thalia Amezquita [facility]  Study quality: Technically fair  Referring Physician: Pierce Cobb MD  Blood Pressure: 106/58 mmHg  Height: 160 cm  Weight: 64 kg  BSA: 1.7 m2  ------------------------------------------------------------------------  PROCEDURE: Transthoracic echocardiogram with 2-D, M-Mode  and complete spectral and color flow Doppler.  INDICATION: Other specified pulmonary heart diseases  (I27.89)  ------------------------------------------------------------------------  Dimensions:    Normal Values:  LA:     3.6    2.0 - 4.0 cm  Ao:     2.8    2.0 - 3.8 cm  SEPTUM: 0.7    0.6 - 1.2 cm  PWT:    0.9    0.6 - 1.1 cm  LVIDd:  4.1    3.0 - 5.6 cm  LVIDs:  1.9    1.8 - 4.0 cm  Derived variables:  LVMI: 58 g/m2  RWT: 0.43  Fractional short: 54 %  Doppler Peak Velocity (m/sec): AoV=1.8  ------------------------------------------------------------------------  Observations:  Mitral Valve: Mitral annular calcification, otherwise  normal mitral valve. Minimal mitral regurgitation.  Aortic Valve/Aorta: Calcified trileaflet aortic valve with  normal opening. Peak transaortic valve gradient equals 13  mm Hg, mean transaortic valve gradient equals 8 mm Hg. Peak  left ventricular outflow tract gradient equals 6 mm Hg,  mean gradient is equal to 3 mm Hg, LVOT velocity time  integral equals 19 cm.  Aortic Root: 2.8 cm.  LVOT diameter: 1.9 cm.  Left Atrium: Normal left atrium.  LA volume index = 25  cc/m2.  Left Ventricle: Hyperdynamic left ventricular systolic  function. Flattening of the interventricular septum in both  systole and diastole is  consistent with right ventricular  pressure overload. Normal left ventricular internal  dimensions and wall thicknesses.  Right Heart: Severe right atrial enlargement. Right  ventricular enlargement with decreased right ventricular  systolic function. Normal tricuspid valve. Mild-moderate  tricuspid regurgitation. Normal pulmonic valve.  Pericardium/Pleura: Normal pericardium with trace  pericardial effusion.  Left pleural effusion.  Hemodynamic: Estimated right atrial pressure is 8 mm Hg.  Estimated right ventricular systolic pressure equals 72 mm  Hg, assuming right atrial pressure equals 8 mm Hg,  consistent with severe pulmonary hypertension.  ------------------------------------------------------------------------  Conclusions:  1. Calcified trileaflet aortic valve with normal opening.  2. Normal left ventricular internal dimensions and wall  thicknesses.  3. Hyperdynamic left ventricular systolic function.  Flattening of the interventricular septum in both systole  and diastole is  consistent with right ventricular pressure  overload.  4. Severe right atrial enlargement.  5. Right ventricular enlargement with decreased right  ventricular systolic function.  6. Normal tricuspid valve. Mild-moderate tricuspid  regurgitation.  7. Estimated pulmonary artery systolic pressure equals 72  mm Hg, assuming right atrial pressure equals 8 mm Hg,  consistent with severe pulmonary pressures.  *** Compared with echocardiogram of 2017, no  significant changes noted.  ------------------------------------------------------------------------  Confirmed on  2017 - 13:15:09 by Justin Cohen M.D.  ------------------------------------------------------------------------    < end of copied text >  ---------------------------------------------------------------------------------------------------------  MICROBIOLOGY:     Culture - Fungal, Body Fluid (18 @ 21:16)    Specimen Source: .Body Fluid Peritoneal Fluid    Culture Results:   No fungus isolated at 1 week. No additional interim reports will be  issued unless there is a change in culture status.    Culture - Body Fluid with Gram Stain (18 @ 21:16)    Gram Stain:   polymorphonuclear leukocytes seen per low power field  No organisms seen per oil power field  by cytocentrifuge    Specimen Source: Peritoneal Peritoneal Fluid    Culture Results:   No growth to date.    Culture - Acid Fast - Body Fluid w/Smear (18 @ 21:16)    Specimen Source: .Body Fluid Peritoneal Fluid    Acid Fast Bacilli Smear:   No acid fast bacilli seen by fluorochrome stain      RADIOLOGY:  [x] Chest radiographs reviewed and interpreted by me    < from: US Abdomen Limited (18 @ 15:17) >    EXAM:  US ABDOMEN LIMITED                            PROCEDURE DATE:  2018        INTERPRETATION:  CLINICAL INFORMATION: End-stage right heart failure.   Evaluate for ascites.    TECHNIQUE: Limited sonogram of the abdomen to assess for ascites.    COMPARISON: Abdomen and pelvis dated 3/10/2018.    FINDINGS:    Large volume ascites in the bilateral lower quadrants. A moderate right   upper quadrant ascites. Small left upper quadrant ascites.    Trace left pleural effusion.    IMPRESSION:     Moderate to large volume abdominal ascites.    Trace left pleural effusion.    RACHEL VELARDE M.D., RADIOLOGY RESIDENT  This document has been electronically signed.  CEDRIC MISHRA M.D., ATTENDING RADIOLOGIST  This document has been electronically signed. Mar 22 2018  3:55PM      < end of copied text >  ---------------------------------------------------------------------------------------------------------    < from: [facility] Duplex Lower Ext Vein Scan, Bilat (18 @ 15:51) >    EXAM:  DUPLEX SCAN EXT VEINS LOWER BI                            PROCEDURE DATE:  2018        INTERPRETATION:  CLINICAL INFORMATION: Bilateral lower extremity   swelling, rule out DVT.    COMPARISON: Prior examination, dated 3/5/2018 showed no thrombus.    TECHNIQUE: Duplex sonography of the BILATERAL LOWER extremities with   color and spectral Doppler, with and without compression.      FINDINGS: There is edema within the superficial soft tissues of both   lower extremities.    There is normal compressibility of the bilateral common femoral, femoral   and popliteal veins. No calf vein thrombosis is detected.    Doppler examination shows normal spontaneous and phasic flow.    IMPRESSION:     No evidence of bilateral lower extremity deep venous thrombosis.    MARTIR VALVERDE M.D., ATTENDING RADIOLOGIST  This document has been electronically signed. Mar 20 2018  4:43PM      < end of copied text >  ---------------------------------------------------------------------------------------------------------    < from: CT Abdomen and Pelvis w/ Oral Cont (03.10.18 @ 19:07) >    EXAM:  CT ABDOMEN AND PELVIS OC                            PROCEDURE DATE:  03/10/2018      INTERPRETATION:  CLINICAL INFORMATION: Abdominal distention and pain for   several days. Acute renal failure.    COMPARISON: CT abdomen pelvis 2015    PROCEDURE:   CT of the Abdomen and Pelvis was performed without intravenous contrast.   Intravenous contrast: None.  Oral contrast: positive contrast was administered.  Sagittal and coronal reformats were performed.    FINDINGS:    LOWER CHEST:Mild interlobular septal thickening and small bilateral   pleural effusions. Subsegmental atelectasis in the right lower lobe.   Cardiomegaly. Coronary calcification. Calcified hilar lymph nodes.    LIVER: Within normal limits.  BILE DUCTS: Normal caliber.  GALLBLADDER: Cholelithiasis.  SPLEEN: Within normal limits.  PANCREAS: Within normal limits.  ADRENALS: Within normal limits.  KIDNEYS/URETERS: Left lower pole renal cyst.    BLADDER: Within normal limits.  REPRODUCTIVE ORGANS: Posterior uterine fibroid.    BOWEL: No bowel obstruction. Sigmoid diverticulosis, without   diverticulitis. Normal appendix.     PERITONEUM: Large volume ascites.  VESSELS:  Atherosclerotic changes.  RETROPERITONEUM: No lymphadenopathy.    ABDOMINAL WALL: Within normal limits.  BONES: Degenerative changes. T12 vertebral body hemangioma.    IMPRESSION:     Large volume ascites, etiology unclear.    Small bilateral pleural effusions and mild pulmonary edema.    VIOLA ANDREWS M.D., ATTENDINGRADIOLOGIST  This document has been electronically signed. Mar 11 2018  8:52AM      < end of copied text >  ---------------------------------------------------------------------------------------------------------  < from: CT Chest No Cont (18 @ 18:24) >    EXAM:  CT CHEST                            PROCEDURE DATE:  2018        INTERPRETATION:  CLINICAL INFORMATION: Evaluate pleural effusions.   Shortness of breath.    COMPARISON: Chest CT dated 2017. Chest x-ray dated 3/1/2018.    PROCEDURE:   CT of the Chest was performed without intravenous contrast.  Sagittal and coronal reformats were performed.  Axial MIP reformats were also performed.    FINDINGS:    CHEST:     LUNGS AND LARGE AIRWAYS: Patent central airways.  Bibasilar subsegmental   atelectasis, right greater than left.  PLEURA: Trace bilateral pleural effusions.  VESSELS: Thoracic aortic and coronary artery atherosclerosis.  HEART: Cardiomegaly. Small pericardial effusion. Mitral annular   calcification. A densely calcified or metallic density measuring 1.1 x   0.4 cm overlies the basilar left pulmonary artery (3:52, 6:80).  MEDIASTINUM AND STEFANIA: No lymphadenopathy.  CHEST WALL AND LOWER NECK: Within normal limits.  VISUALIZED UPPER ABDOMEN: Small volume ascites. Atherosclerotic changes.  BONES: Multilevel degenerative disease.    IMPRESSION: Trace bilateral pleural effusions.  Bibasilar subsegmental atelectasis, right greater than left.  Densely calcified or metallic density overlies the left pulmonary artery.  See above..      JUAN JUAREZ M.D., RADIOLOGY RESIDENT  This document has been electronically signed.  MADONNA MORGAN M.D., ATTENDING RADIOLOGIST  This document has been electronically signed. Mar  4 2018 12:28PM      < end of copied text >  --------------------------------------------------------------------------------------------------------- [facility] - [facility]     PROGRESS NOTE    CHIEF COMPLAINT: CRF (hypoxic); COPD; emphysema; JOSE; secondary pulmonary hypertension; right heart failure; epistaxis; ascites;     INTERVAL HISTORY:  No new complaints-remains weak, tired and frail; severe abdominal distension with early satiety - still holding off with another paracentesis due to hemodynamic instability and poor renal function; quite short of breath with minimal exertion; knee pain also limiting ambulation; no cough, sputum production, chest congestion or wheeze; no fevers, chills or sweats; no chest pain/pressure or palpitations; legs swollen; off diuretics-feels nebulizers, besides albuterol (no able to tolerate it) have been helpful.    REVIEW OF SYSTEMS:  Constitutional: As per interval history  HEENT: epistaxis - resolved  CV: As per interval history  Resp: As per interval history  GI: anorexia  : As per interval history  Musculoskeletal: diffuse pain  Skin: Within normal limits  Neurological: Within normal limits  Psychiatric: depressed  Endocrine: Within normal limits  Hematologic/Lymphatic: Within normal limits  Allergic/Immunologic: Within normal limits    MEDICATIONS:     Pulmonary "  buDESOnide   0.5 milliGRAM(s) Respule 0.5 milliGRAM(s) Inhalation every 12 hours  ipratropium    for Nebulization 500 MICROGram(s) Nebulizer every 6 hours  tiotropium 2.5 MICROgram(s)/olodaterol 2.5 MICROgram(s) Inhaler 2 Puff(s) Inhalation daily      Anti-microbials:      Cardiovascular:  digoxin     Tablet 0.125 milliGRAM(s) Oral every other day  diltiazem    milliGRAM(s) Oral daily  metoprolol     tartrate 25 milliGRAM(s) Oral every 8 hours      Other:  acetaminophen   Tablet. 650 milliGRAM(s) Oral every 6 hours PRN  aspirin enteric coated 81 milliGRAM(s) Oral daily  atorvastatin 20 milliGRAM(s) Oral at bedtime  BACItracin   Ointment 1 Application(s) Topical two times a day  clopidogrel Tablet 75 milliGRAM(s) Oral daily  docusate sodium 100 milliGRAM(s) Oral two times a day PRN  Gas-X extra strength (simethicone 125 mg 1 Tablet(s) 1 Tablet(s) Chew three times a day PRN  heparin  Injectable 5000 Unit(s) SubCutaneous every 8 hours  ondansetron Injectable 4 milliGRAM(s) IV Push every 8 hours PRN  predniSONE   Tablet 10 milliGRAM(s) Oral daily  sodium chloride 0.65% Nasal 1 Spray(s) Both Nostrils four times a day  sucralfate 1 Gram(s) Oral three times a day PRN        OBJECTIVE:    I&O's Detail    29 Mar 2018 07:  -  30 Mar 2018 07:00  --------------------------------------------------------  IN:    Oral Fluid: 530 mL  Total IN: 530 mL    OUT:    Voided: 200 mL  Total OUT: 200 mL    Total NET: 330 mL      30 Mar 2018 07:01  -  30 Mar 2018 13:04  --------------------------------------------------------  IN:    Oral Fluid: 600 mL  Total IN: 600 mL    OUT:    Voided: 330 mL  Total OUT: 330 mL    Total NET: 270 mL      Daily Weight in k.8 (30 Mar 2018 08:46)    PHYSICAL EXAM:       ICU Vital Signs Last 24 Hrs  T(C): 36.4 (30 Mar 2018 12:53), Max: 36.7 (29 Mar 2018 14:31)  T(F): 97.5 (30 Mar 2018 12:53), Max: 98 (29 Mar 2018 14:31)  HR: 58 (30 Mar 2018 12:53) (55 - 74)  BP: 93/60 (30 Mar 2018 12:53) (91/60 - 104/64)  BP(mean): --  ABP: --  ABP(mean): --  RR: 18 (30 Mar 2018 12:53) (17 - 18)  SpO2: 95% (30 Mar 2018 12:53) (92% - 96%) on 5lpm     General: Awake. Alert. Cooperative. Weak and tired. No distress. Chronically ill appearing	  HEENT:   Atraumatic. Bitemporal wasting. Anicteric. Normal oral mucosa, PERRL, EOMI  Neck: Supple. Trachea midline. Thyroid without enlargement/tenderness/nodules. No carotid bruit. (+) JVD; loss of bilateral supraclavicular fat pads	  Cardiovascular: Irregularly irregular rate and rhythm. S1 S2 normal. II/VI systolic murmur  Respiratory: Respirations unlabored. Decreased breath sounds throughout. Bibasilar rales. Decreased chest wall excursion  Abdomen: Soft. Non-tender. Significantly distended with fluid wave. No organomegaly. No masses. Normal bowel sounds	  Extremities: Warm to touch. No clubbing or cyanosis. Moderate lower extremity edema up to the thigh. Loss of extremity muscle mass  Pulses: Decreased lower extremity peripheral pulses  Skin: Lower extremity venous stasis changes; Left knee abrasion  Lymph Nodes: Cervical, supraclavicular and axillary nodes normal  Neurological: Motor and sensory examination equal and normal. A and O x 3  Psychiatry: Depressed       LABS:                        10.5   7.8   )-----------( 189      ( 29 Mar 2018 09:47 )             32.9     30    134<L>  |  96  |  128<H>  ----------------------------<  112<H>  5.6<H>   |  26  |  3.13<H>        136  |  95<L>  |  126<H>  ----------------------------<  107<H>  4.7   |  26  |  3.06<H>    Ca      8.9          Ca      9.1          Phos    4.5         Phos    4.5           Mg       1.8     30    Mg       1.7     29    Venous Blood Gas:   @ 00:52  7.34/50/56/26/85  VBG Lactate: 0.9    Venous Blood Gas:   @ 16:33  7.34/55/35/29/60  VBG Lactate: 1.3    Serum Pro-Brain Natriuretic Peptide: 44013 pg/mL ( @ 16:29)    CARDIAC MARKERS ( 01 Mar 2018 16:29 )  x     / 0.06 ng/mL / x     / x     / 5.4 ng/mL    < from: Transthoracic Echocardiogram (17 @ 18:58) >    Patient name: FRAN ALEXANDRA  YOB: 1945   Age: 71 (F)   MR#: [id number]  Study Date: 2017  Location: [address] [place], [place] [id number]: Thalia Amezquita [facility]  Study quality: Technically fair  Referring Physician: Pierce Cobb MD  Blood Pressure: 106/58 mmHg  Height: 160 cm  Weight: 64 kg  BSA: 1.7 m2  ------------------------------------------------------------------------  PROCEDURE: Transthoracic echocardiogram with 2-D, M-Mode  and complete spectral and color flow Doppler.  INDICATION: Other specified pulmonary heart diseases  (I27.89)  ------------------------------------------------------------------------  Dimensions:    Normal Values:  LA:     3.6    2.0 - 4.0 cm  Ao:     2.8    2.0 - 3.8 cm  SEPTUM: 0.7    0.6 - 1.2 cm  PWT:    0.9    0.6 - 1.1 cm  LVIDd:  4.1    3.0 - 5.6 cm  LVIDs:  1.9    1.8 - 4.0 cm  Derived variables:  LVMI: 58 g/m2  RWT: 0.43  Fractional short: 54 %  Doppler Peak Velocity (m/sec): AoV=1.8  ------------------------------------------------------------------------  Observations:  Mitral Valve: Mitral annular calcification, otherwise  normal mitral valve. Minimal mitral regurgitation.  Aortic Valve/Aorta: Calcified trileaflet aortic valve with  normal opening. Peak transaortic valve gradient equals 13  mm Hg, mean transaortic valve gradient equals 8 mm Hg. Peak  left ventricular outflow tract gradient equals 6 mm Hg,  mean gradient is equal to 3 mm Hg, LVOT velocity time  integral equals 19 cm.  Aortic Root: 2.8 cm.  LVOT diameter: 1.9 cm.  Left Atrium: Normal left atrium.  LA volume index = 25  cc/m2.  Left Ventricle: Hyperdynamic left ventricular systolic  function. Flattening of the interventricular septum in both  systole and diastole is  consistent with right ventricular  pressure overload. Normal left ventricular internal  dimensions and wall thicknesses.  Right Heart: Severe right atrial enlargement. Right  ventricular enlargement with decreased right ventricular  systolic function. Normal tricuspid valve. Mild-moderate  tricuspid regurgitation. Normal pulmonic valve.  Pericardium/Pleura: Normal pericardium with trace  pericardial effusion.  Left pleural effusion.  Hemodynamic: Estimated right atrial pressure is 8 mm Hg.  Estimated right ventricular systolic pressure equals 72 mm  Hg, assuming right atrial pressure equals 8 mm Hg,  consistent with severe pulmonary hypertension.  ------------------------------------------------------------------------  Conclusions:  1. Calcified trileaflet aortic valve with normal opening.  2. Normal left ventricular internal dimensions and wall  thicknesses.  3. Hyperdynamic left ventricular systolic function.  Flattening of the interventricular septum in both systole  and diastole is  consistent with right ventricular pressure  overload.  4. Severe right atrial enlargement.  5. Right ventricular enlargement with decreased right  ventricular systolic function.  6. Normal tricuspid valve. Mild-moderate tricuspid  regurgitation.  7. Estimated pulmonary artery systolic pressure equals 72  mm Hg, assuming right atrial pressure equals 8 mm Hg,  consistent with severe pulmonary pressures.  *** Compared with echocardiogram of 2017, no  significant changes noted.  ------------------------------------------------------------------------  Confirmed on  2017 - 13:15:09 by Justin Noel, M.D.  ------------------------------------------------------------------------    < end of copied text >  ---------------------------------------------------------------------------------------------------------  MICROBIOLOGY:     Culture - Fungal, Body Fluid (18 @ 21:16)    Specimen Source: .Body Fluid Peritoneal Fluid    Culture Results:   No fungus isolated at 1 week. No additional interim reports will be  issued unless there is a change in culture status.    Culture - Body Fluid with Gram Stain (18 @ 21:16)    Gram Stain:   polymorphonuclear leukocytes seen per low power field  No organisms seen per oil power field  by cytocentrifuge    Specimen Source: Peritoneal Peritoneal Fluid    Culture Results:   No growth to date.    Culture - Acid Fast - Body Fluid w/Smear (18 @ 21:16)    Specimen Source: .Body Fluid Peritoneal Fluid    Acid Fast Bacilli Smear:   No acid fast bacilli seen by fluorochrome stain      RADIOLOGY:  [x] Chest radiographs reviewed and interpreted by me    < from: US Abdomen Limited (18 @ 15:17) >    EXAM:  US ABDOMEN LIMITED                            PROCEDURE DATE:  2018        INTERPRETATION:  CLINICAL INFORMATION: End-stage right heart failure.   Evaluate for ascites.    TECHNIQUE: Limited sonogram of the abdomen to assess for ascites.    COMPARISON: Abdomen and pelvis dated 3/10/2018.    FINDINGS:    Large volume ascites in the bilateral lower quadrants. A moderate right   upper quadrant ascites. Small left upper quadrant ascites.    Trace left pleural effusion.    IMPRESSION:     Moderate to large volume abdominal ascites.    Trace left pleural effusion.    RACHEL VELARDE M.D., RADIOLOGY RESIDENT  This document has been electronically signed.  CEDRIC MISHRA M.D., ATTENDING RADIOLOGIST  This document has been electronically signed. Mar 22 2018  3:55PM      < end of copied text >  ---------------------------------------------------------------------------------------------------------    < from: [facility] Duplex Lower Ext Vein Scan, Bilat (18 @ 15:51) >    EXAM:  DUPLEX SCAN EXT VEINS LOWER BI                            PROCEDURE DATE:  2018        INTERPRETATION:  CLINICAL INFORMATION: Bilateral lower extremity   swelling, rule out DVT.    COMPARISON: Prior examination, dated 3/5/2018 showed no thrombus.    TECHNIQUE: Duplex sonography of the BILATERAL LOWER extremities with   color and spectral Doppler, with and without compression.      FINDINGS: There is edema within the superficial soft tissues of both   lower extremities.    There is normal compressibility of the bilateral common femoral, femoral   and popliteal veins. No calf vein thrombosis is detected.    Doppler examination shows normal spontaneous and phasic flow.    IMPRESSION:     No evidence of bilateral lower extremity deep venous thrombosis.    MARTIR VALVERDE M.D., ATTENDING RADIOLOGIST  This document has been electronically signed. Mar 20 2018  4:43PM      < end of copied text >  ---------------------------------------------------------------------------------------------------------    < from: CT Abdomen and Pelvis w/ Oral Cont (03.10.18 @ 19:07) >    EXAM:  CT ABDOMEN AND PELVIS OC                            PROCEDURE DATE:  03/10/2018      INTERPRETATION:  CLINICAL INFORMATION: Abdominal distention and pain for   several days. Acute renal failure.    COMPARISON: CT abdomen pelvis 2015    PROCEDURE:   CT of the Abdomen and Pelvis was performed without intravenous contrast.   Intravenous contrast: None.  Oral contrast: positive contrast was administered.  Sagittal and coronal reformats were performed.    FINDINGS:    LOWER CHEST:Mild interlobular septal thickening and small bilateral   pleural effusions. Subsegmental atelectasis in the right lower lobe.   Cardiomegaly. Coronary calcification. Calcified hilar lymph nodes.    LIVER: Within normal limits.  BILE DUCTS: Normal caliber.  GALLBLADDER: Cholelithiasis.  SPLEEN: Within normal limits.  PANCREAS: Within normal limits.  ADRENALS: Within normal limits.  KIDNEYS/URETERS: Left lower pole renal cyst.    BLADDER: Within normal limits.  REPRODUCTIVE ORGANS: Posterior uterine fibroid.    BOWEL: No bowel obstruction. Sigmoid diverticulosis, without   diverticulitis. Normal appendix.     PERITONEUM: Large volume ascites.  VESSELS:  Atherosclerotic changes.  RETROPERITONEUM: No lymphadenopathy.    ABDOMINAL WALL: Within normal limits.  BONES: Degenerative changes. T12 vertebral body hemangioma.    IMPRESSION:     Large volume ascites, etiology unclear.    Small bilateral pleural effusions and mild pulmonary edema.    VIOLA ANDREWS M.D., ATTENDINGRADIOLOGIST  This document has been electronically signed. Mar 11 2018  8:52AM      < end of copied text >  ---------------------------------------------------------------------------------------------------------  < from: CT Chest No Cont (18 @ 18:24) >    EXAM:  CT CHEST                            PROCEDURE DATE:  2018        INTERPRETATION:  CLINICAL INFORMATION: Evaluate pleural effusions.   Shortness of breath.    COMPARISON: Chest CT dated 2017. Chest x-ray dated 3/1/2018.    PROCEDURE:   CT of the Chest was performed without intravenous contrast.  Sagittal and coronal reformats were performed.  Axial MIP reformats were also performed.    FINDINGS:    CHEST:     LUNGS AND LARGE AIRWAYS: Patent central airways.  Bibasilar subsegmental   atelectasis, right greater than left.  PLEURA: Trace bilateral pleural effusions.  VESSELS: Thoracic aortic and coronary artery atherosclerosis.  HEART: Cardiomegaly. Small pericardial effusion. Mitral annular   calcification. A densely calcified or metallic density measuring 1.1 x   0.4 cm overlies the basilar left pulmonary artery (3:52, 6:80).  MEDIASTINUM AND STEFANIA: No lymphadenopathy.  CHEST WALL AND LOWER NECK: Within normal limits.  VISUALIZED UPPER ABDOMEN: Small volume ascites. Atherosclerotic changes.  BONES: Multilevel degenerative disease.    IMPRESSION: Trace bilateral pleural effusions.  Bibasilar subsegmental atelectasis, right greater than left.  Densely calcified or metallic density overlies the left pulmonary artery.  See above..      JUAN JUAREZ M.D., RADIOLOGY RESIDENT  This document has been electronically signed.  MADONNA MORGAN M.D., ATTENDING RADIOLOGIST  This document has been electronically signed. Mar  4 2018 12:28PM      < end of copied text >  ---------------------------------------------------------------------------------------------------------

## 2018-03-31 NOTE — PROGRESS NOTE ADULT - ASSESSMENT
72 year old female with CAD, dCHF, pHTN, HTN, DM, Afib and COPD p/w acute on chronic dCHF, ascites and acute on chronic kidney injury.   - CKD stage 4: likely due to HTN and DM and now CASSANDRA: non oliguric.   - acute on chronic dCHF: continued lower extremity edema   - GI: ascites s/p paracentesis. Abdomen remains distended  - hyperkalemia: likely due to CASSANDRA/nutritional; pt refusing kaluresis rx; K+ improving    Recommendations  - a/w holding metoprolol this evening   - resume metoprolol and Cardizem with hold parameter in the morning  - strict I+O and daily weights  - maintain low potassium diet  - not indicated for urgent RRT at this time  - avoid NSAIDs and nephrotoxins as able  - dose medication for a CrCL 10-15 cc/min

## 2018-03-31 NOTE — PROGRESS NOTE ADULT - SUBJECTIVE AND OBJECTIVE BOX
MEDICINE, PROGRESS NOTE [phone]    FRAN ALEXANDRA 72y [id number]    Patient seen and examined.  Patient is a 72y old  Female who presents with a chief complaint of shortness of breath and difficulty eating due to fullness (01 Mar 2018 19:30)  Pt with episodes of difficulty breathing and severe pain in right leg.    PAST MEDICAL & SURGICAL HISTORY:  Hyperthyroidism  JOSE (obstructive sleep apnea)  Epistaxis  GIB (gastrointestinal bleeding)  CAD S/P percutaneous coronary angioplasty  Afib  Pulmonary HTN  GERD (gastroesophageal reflux disease)  Obesity  Cardiomegaly  Valvular heart disease  COPD (chronic obstructive pulmonary disease): Home O2  Sleep Apnea: by criteria  Loose, teeth: 3 bottom front loose teeth  Female stress incontinence  Shoulder pain, right  Constipation  Arthritis of Knee  H/O heartburn  History of lung cancer  Obese  Hx of hyperlipidemia  Asthma  Diabetes mellitus: type 2  dx about 4-5 years ago   no daily fingerstick  H/O: HTN (hypertension)  Enlarged lymph nodes  Lymph nodes enlarged: s/p biopsy mediastinal  benign  H/O endoscopy  left upper lobectomy    MEDICATIONS  (STANDING):  aspirin enteric coated 81 milliGRAM(s) Oral daily  atorvastatin 20 milliGRAM(s) Oral at bedtime  BACItracin   Ointment 1 Application(s) Topical two times a day  buDESOnide   0.5 milliGRAM(s) Respule 0.5 milliGRAM(s) Inhalation every 12 hours  clopidogrel Tablet 75 milliGRAM(s) Oral daily  digoxin     Tablet 0.125 milliGRAM(s) Oral every other day  diltiazem    milliGRAM(s) Oral daily  heparin  Injectable 5000 Unit(s) SubCutaneous every 8 hours  ipratropium    for Nebulization 500 MICROGram(s) Nebulizer every 6 hours  metoprolol     tartrate 25 milliGRAM(s) Oral every 8 hours  predniSONE   Tablet 10 milliGRAM(s) Oral daily  sodium chloride 0.65% Nasal 1 Spray(s) Both Nostrils four times a day  tiotropium 2.5 MICROgram(s)/olodaterol 2.5 MICROgram(s) Inhaler 2 Puff(s) Inhalation daily    MEDICATIONS  (PRN):  acetaminophen   Tablet. 650 milliGRAM(s) Oral every 6 hours PRN Mild Pain (1 - 3)  docusate sodium 100 milliGRAM(s) Oral two times a day PRN Constipation  Gas-X extra strength (simethicone 125 mg 1 Tablet(s) 1 Tablet(s) Chew three times a day PRN bloating  ondansetron Injectable 4 milliGRAM(s) IV Push every 8 hours PRN Nausea and/or Vomiting  sucralfate 1 Gram(s) Oral three times a day PRN stomach discomfort    Allergies    No Known Allergies    Intolerances    albuterol (Unknown)      PHYSICAL EXAM:  Constitutional: NAD  HEENT: Normocephalic, EOMI  Neck:  No JVD  Respiratory: CTA B/L, No wheezes  Cardiovascular: S1, S2, RRR, + systolic murmur  Gastrointestinal: BS hypoactive, depressible but tense, + fluid wave, + distention  Extremities: +peripheral edema le b/l  Neurological: AAOX3, no focal deficits  Psychiatric: Normal mood, normal affect  : No Gottlieb    Vital Signs Last 24 Hrs  T(C): 36.5 (31 Mar 2018 12:56), Max: 36.5 (31 Mar 2018 12:56)  T(F): 97.7 (31 Mar 2018 12:56), Max: 97.7 (31 Mar 2018 12:56)  HR: 61 (31 Mar 2018 16:50) (56 - 104)  BP: 104/68 (31 Mar 2018 16:50) (72/38 - 106/68)  BP(mean): --  RR: 19 (31 Mar 2018 12:56) (18 - 19)  SpO2: 94% (31 Mar 2018 12:56) (94% - 97%)  I&O's Summary    30 Mar 2018 07:01  -  31 Mar 2018 07:00  --------------------------------------------------------  IN: 600 mL / OUT: 330 mL / NET: 270 mL    31 Mar 2018 07:01  -  31 Mar 2018 17:25  --------------------------------------------------------  IN: 240 mL / OUT: 300 mL / NET: -60 mL        LABS:                        9.8    7.77  )-----------( 220      ( 31 Mar 2018 08:19 )             31.4     03-31    134<L>  |  95<L>  |  134<H>  ----------------------------<  105<H>  5.3   |  22  |  3.16<H>    Ca    8.9      31 Mar 2018 06:48  Phos  4.5     03-30  Mg     1.8     03-30

## 2018-03-31 NOTE — PROGRESS NOTE ADULT - SUBJECTIVE AND OBJECTIVE BOX
Endocrinology Attending Covering for Dr. Urena      Chief complaint  Patient is a 72y old  Female who presents with a chief complaint of shortness of breath and difficulty eating due to fullness (01 Mar 2018 19:30)  Diabetes controled on diet, H/o hyperthyroidisim   Review of systems  Patient in bed, looks comfortable, no fever,  had no hypoglycemia.    Labs and Fingersticks  CAPILLARY BLOOD GLUCOSE      POCT Blood Glucose.: 172 mg/dL (30 Mar 2018 20:07)  POCT Blood Glucose.: 159 mg/dL (30 Mar 2018 19:01)  POCT Blood Glucose.: 88 mg/dL (30 Mar 2018 18:17)  POCT Blood Glucose.: 110 mg/dL (30 Mar 2018 17:36)      Anion Gap, Serum: 17 (03-31 @ 06:48)  Anion Gap, Serum: 12 (03-30 @ 10:50)    Hemoglobin A1C, Whole Blood: 5.8 <H> (03-30 @ 11:02)    Calcium, Total Serum: 8.9 (03-31 @ 06:48)  Calcium, Total Serum: 8.9 (03-30 @ 10:50)          03-31    134<L>  |  95<L>  |  134<H>  ----------------------------<  105<H>  5.3   |  22  |  3.16<H>    Ca    8.9      31 Mar 2018 06:48  Phos  4.5     03-30  Mg     1.8     03-30    TSH on 3-29 th 8.5 T4 :4.5                          9.8    7.77  )-----------( 220      ( 31 Mar 2018 08:19 )             31.4     Medications  MEDICATIONS  (STANDING):  aspirin enteric coated 81 milliGRAM(s) Oral daily  atorvastatin 20 milliGRAM(s) Oral at bedtime  BACItracin   Ointment 1 Application(s) Topical two times a day  buDESOnide   0.5 milliGRAM(s) Respule 0.5 milliGRAM(s) Inhalation every 12 hours  clopidogrel Tablet 75 milliGRAM(s) Oral daily  digoxin     Tablet 0.125 milliGRAM(s) Oral every other day  diltiazem    milliGRAM(s) Oral daily  heparin  Injectable 5000 Unit(s) SubCutaneous every 8 hours  ipratropium    for Nebulization 500 MICROGram(s) Nebulizer every 6 hours  metoprolol     tartrate 25 milliGRAM(s) Oral every 8 hours  predniSONE   Tablet 10 milliGRAM(s) Oral daily  sodium chloride 0.65% Nasal 1 Spray(s) Both Nostrils four times a day  tiotropium 2.5 MICROgram(s)/olodaterol 2.5 MICROgram(s) Inhaler 2 Puff(s) Inhalation daily      Physical Exam  General: Patient comfortable in bed  Vital Signs Last 12 Hrs  T(F): 97.7 (03-31-18 @ 12:56), Max: 97.7 (03-31-18 @ 12:56)  HR: 61 (03-31-18 @ 16:50) (56 - 104)  BP: 104/68 (03-31-18 @ 16:50) (72/38 - 106/68)  BP(mean): --  RR: 19 (03-31-18 @ 12:56) (18 - 19)  SpO2: 94% (03-31-18 @ 12:56) (94% - 97%)  Neck: No palpable thyroid nodules.  CVS: S1S2, No murmurs  Respiratory: No wheezing, no crepitations  GI: Abdomen soft, bowel sounds positive  Musculoskeletal:  edema lower extremities.   Skin: No skin rashes, no ecchymosis    Diagnostics

## 2018-04-01 LAB
ANION GAP SERPL CALC-SCNC: 13 MMOL/L — SIGNIFICANT CHANGE UP (ref 5–17)
BUN SERPL-MCNC: 130 MG/DL — HIGH (ref 7–23)
CALCIUM SERPL-MCNC: 8.9 MG/DL — SIGNIFICANT CHANGE UP (ref 8.4–10.5)
CHLORIDE SERPL-SCNC: 97 MMOL/L — SIGNIFICANT CHANGE UP (ref 96–108)
CO2 SERPL-SCNC: 24 MMOL/L — SIGNIFICANT CHANGE UP (ref 22–31)
CREAT SERPL-MCNC: 3.21 MG/DL — HIGH (ref 0.5–1.3)
GLUCOSE SERPL-MCNC: 114 MG/DL — HIGH (ref 70–99)
POTASSIUM SERPL-MCNC: 5.6 MMOL/L — HIGH (ref 3.5–5.3)
POTASSIUM SERPL-SCNC: 5.6 MMOL/L — HIGH (ref 3.5–5.3)
SODIUM SERPL-SCNC: 134 MMOL/L — LOW (ref 135–145)

## 2018-04-01 RX ORDER — CALCIUM GLUCONATE 100 MG/ML
1 VIAL (ML) INTRAVENOUS ONCE
Qty: 0 | Refills: 0 | Status: COMPLETED | OUTPATIENT
Start: 2018-04-01 | End: 2018-04-01

## 2018-04-01 RX ADMIN — ATORVASTATIN CALCIUM 20 MILLIGRAM(S): 80 TABLET, FILM COATED ORAL at 21:27

## 2018-04-01 RX ADMIN — Medication 25 MILLIGRAM(S): at 09:13

## 2018-04-01 RX ADMIN — Medication 500 MICROGRAM(S): at 23:21

## 2018-04-01 RX ADMIN — Medication 25 MILLIGRAM(S): at 23:21

## 2018-04-01 RX ADMIN — Medication 500 MICROGRAM(S): at 09:14

## 2018-04-01 RX ADMIN — Medication 200 GRAM(S): at 21:27

## 2018-04-01 RX ADMIN — Medication 81 MILLIGRAM(S): at 09:13

## 2018-04-01 RX ADMIN — CLOPIDOGREL BISULFATE 75 MILLIGRAM(S): 75 TABLET, FILM COATED ORAL at 11:51

## 2018-04-01 RX ADMIN — Medication 10 MILLIGRAM(S): at 09:13

## 2018-04-01 RX ADMIN — Medication 240 MILLIGRAM(S): at 11:51

## 2018-04-01 RX ADMIN — Medication 500 MICROGRAM(S): at 17:20

## 2018-04-01 NOTE — PROGRESS NOTE ADULT - ASSESSMENT
Right elbow, hip, knee, pain  malnutrition  volume overload/ascites/LE edema  sev PHTN/ Cor pulm  comp diast hf  sev copd  restrictive lung disease sec to ascites which diminishes diaphragmatic excursion  epistaxis - on and off  CASSANDRA on ckd 4  hyperkalemia   2 shattered teeth   nsvt  hyperkalemia    continue current care  pt still deciding about dubois and bladder pressures  treat hyperkalemia if pt allows  low potasium diet  appreciate renal input  appreciate pulm input  overall prognosis is poor, pt is not ready to talk to palliative.

## 2018-04-01 NOTE — PROGRESS NOTE ADULT - SUBJECTIVE AND OBJECTIVE BOX
[facility] - [facility]     PROGRESS NOTE    CHIEF COMPLAINT: CRF (hypoxic); COPD; emphysema; JOSE; secondary pulmonary hypertension; right heart failure; epistaxis; ascites;     INTERVAL HISTORY:  No new complaints-remains weak, tired and frail; severe abdominal distension with early satiety - still holding off with another paracentesis due to hemodynamic instability and poor renal function; quite short of breath with minimal exertion; knee pain also limiting ambulation; no cough, sputum production, chest congestion or wheeze; no fevers, chills or sweats; no chest pain/pressure or palpitations; legs swollen; off diuretics-feels nebulizers, besides albuterol (no able to tolerate it) have been helpful.    REVIEW OF SYSTEMS:  Constitutional: As per interval history  HEENT: epistaxis - resolved  CV: As per interval history  Resp: As per interval history  GI: anorexia  : As per interval history  Musculoskeletal: diffuse pain  Skin: Within normal limits  Neurological: Within normal limits  Psychiatric: depressed  Endocrine: Within normal limits  Hematologic/Lymphatic: Within normal limits  Allergic/Immunologic: Within normal limits    MEDICATIONS:     Pulmonary "  buDESOnide   0.5 milliGRAM(s) Respule 0.5 milliGRAM(s) Inhalation every 12 hours  ipratropium    for Nebulization 500 MICROGram(s) Nebulizer every 6 hours  tiotropium 2.5 MICROgram(s)/olodaterol 2.5 MICROgram(s) Inhaler 2 Puff(s) Inhalation daily      Anti-microbials:      Cardiovascular:  digoxin     Tablet 0.125 milliGRAM(s) Oral every other day  diltiazem    milliGRAM(s) Oral daily  metoprolol     tartrate 25 milliGRAM(s) Oral every 8 hours      Other:  acetaminophen   Tablet. 650 milliGRAM(s) Oral every 6 hours PRN  aspirin enteric coated 81 milliGRAM(s) Oral daily  atorvastatin 20 milliGRAM(s) Oral at bedtime  BACItracin   Ointment 1 Application(s) Topical two times a day  clopidogrel Tablet 75 milliGRAM(s) Oral daily  docusate sodium 100 milliGRAM(s) Oral two times a day PRN  Gas-X extra strength (simethicone 125 mg 1 Tablet(s) 1 Tablet(s) Chew three times a day PRN  heparin  Injectable 5000 Unit(s) SubCutaneous every 8 hours  ondansetron Injectable 4 milliGRAM(s) IV Push every 8 hours PRN  predniSONE   Tablet 10 milliGRAM(s) Oral daily  sodium chloride 0.65% Nasal 1 Spray(s) Both Nostrils four times a day  sucralfate 1 Gram(s) Oral three times a day PRN        OBJECTIVE:    I&O's Detail    29 Mar 2018 07:  -  30 Mar 2018 07:00  --------------------------------------------------------  IN:    Oral Fluid: 530 mL  Total IN: 530 mL    OUT:    Voided: 200 mL  Total OUT: 200 mL    Total NET: 330 mL      30 Mar 2018 07:01  -  30 Mar 2018 13:04  --------------------------------------------------------  IN:    Oral Fluid: 600 mL  Total IN: 600 mL    OUT:    Voided: 330 mL  Total OUT: 330 mL    Total NET: 270 mL      Daily Weight in k.8 (30 Mar 2018 08:46)    PHYSICAL EXAM:       ICU Vital Signs Last 24 Hrs  T(C): 36.4 (30 Mar 2018 12:53), Max: 36.7 (29 Mar 2018 14:31)  T(F): 97.5 (30 Mar 2018 12:53), Max: 98 (29 Mar 2018 14:31)  HR: 58 (30 Mar 2018 12:53) (55 - 74)  BP: 93/60 (30 Mar 2018 12:53) (91/60 - 104/64)  BP(mean): --  ABP: --  ABP(mean): --  RR: 18 (30 Mar 2018 12:53) (17 - 18)  SpO2: 95% (30 Mar 2018 12:53) (92% - 96%) on 5lpm     General: Awake. Alert. Cooperative. Weak and tired. No distress. Chronically ill appearing	  HEENT:   Atraumatic. Bitemporal wasting. Anicteric. Normal oral mucosa, PERRL, EOMI  Neck: Supple. Trachea midline. Thyroid without enlargement/tenderness/nodules. No carotid bruit. (+) JVD; loss of bilateral supraclavicular fat pads	  Cardiovascular: Irregularly irregular rate and rhythm. S1 S2 normal. II/VI systolic murmur  Respiratory: Respirations unlabored. Decreased breath sounds throughout. Bibasilar rales. Decreased chest wall excursion  Abdomen: Soft. Non-tender. Significantly distended with fluid wave. No organomegaly. No masses. Normal bowel sounds	  Extremities: Warm to touch. No clubbing or cyanosis. Moderate lower extremity edema up to the thigh. Loss of extremity muscle mass  Pulses: Decreased lower extremity peripheral pulses  Skin: Lower extremity venous stasis changes; Left knee abrasion  Lymph Nodes: Cervical, supraclavicular and axillary nodes normal  Neurological: Motor and sensory examination equal and normal. A and O x 3  Psychiatry: Depressed       LABS:                        10.5   7.8   )-----------( 189      ( 29 Mar 2018 09:47 )             32.9     30    134<L>  |  96  |  128<H>  ----------------------------<  112<H>  5.6<H>   |  26  |  3.13<H>        136  |  95<L>  |  126<H>  ----------------------------<  107<H>  4.7   |  26  |  3.06<H>    Ca      8.9          Ca      9.1          Phos    4.5         Phos    4.5           Mg       1.8     30    Mg       1.7     29    Venous Blood Gas:   @ 00:52  7.34/50/56/26/85  VBG Lactate: 0.9    Venous Blood Gas:   @ 16:33  7.34/55/35/29/60  VBG Lactate: 1.3    Serum Pro-Brain Natriuretic Peptide: 61139 pg/mL ( @ 16:29)    CARDIAC MARKERS ( 01 Mar 2018 16:29 )  x     / 0.06 ng/mL / x     / x     / 5.4 ng/mL    < from: Transthoracic Echocardiogram (17 @ 18:58) >    Patient name: FRAN ALEXANDRA  YOB: 1945   Age: 71 (F)   MR#: [id number]  Study Date: 2017  Location: [address] [place], [place] [id number]: Thalia Amezquita [facility]  Study quality: Technically fair  Referring Physician: Pierce Cobb MD  Blood Pressure: 106/58 mmHg  Height: 160 cm  Weight: 64 kg  BSA: 1.7 m2  ------------------------------------------------------------------------  PROCEDURE: Transthoracic echocardiogram with 2-D, M-Mode  and complete spectral and color flow Doppler.  INDICATION: Other specified pulmonary heart diseases  (I27.89)  ------------------------------------------------------------------------  Dimensions:    Normal Values:  LA:     3.6    2.0 - 4.0 cm  Ao:     2.8    2.0 - 3.8 cm  SEPTUM: 0.7    0.6 - 1.2 cm  PWT:    0.9    0.6 - 1.1 cm  LVIDd:  4.1    3.0 - 5.6 cm  LVIDs:  1.9    1.8 - 4.0 cm  Derived variables:  LVMI: 58 g/m2  RWT: 0.43  Fractional short: 54 %  Doppler Peak Velocity (m/sec): AoV=1.8  ------------------------------------------------------------------------  Observations:  Mitral Valve: Mitral annular calcification, otherwise  normal mitral valve. Minimal mitral regurgitation.  Aortic Valve/Aorta: Calcified trileaflet aortic valve with  normal opening. Peak transaortic valve gradient equals 13  mm Hg, mean transaortic valve gradient equals 8 mm Hg. Peak  left ventricular outflow tract gradient equals 6 mm Hg,  mean gradient is equal to 3 mm Hg, LVOT velocity time  integral equals 19 cm.  Aortic Root: 2.8 cm.  LVOT diameter: 1.9 cm.  Left Atrium: Normal left atrium.  LA volume index = 25  cc/m2.  Left Ventricle: Hyperdynamic left ventricular systolic  function. Flattening of the interventricular septum in both  systole and diastole is  consistent with right ventricular  pressure overload. Normal left ventricular internal  dimensions and wall thicknesses.  Right Heart: Severe right atrial enlargement. Right  ventricular enlargement with decreased right ventricular  systolic function. Normal tricuspid valve. Mild-moderate  tricuspid regurgitation. Normal pulmonic valve.  Pericardium/Pleura: Normal pericardium with trace  pericardial effusion.  Left pleural effusion.  Hemodynamic: Estimated right atrial pressure is 8 mm Hg.  Estimated right ventricular systolic pressure equals 72 mm  Hg, assuming right atrial pressure equals 8 mm Hg,  consistent with severe pulmonary hypertension.  ------------------------------------------------------------------------  Conclusions:  1. Calcified trileaflet aortic valve with normal opening.  2. Normal left ventricular internal dimensions and wall  thicknesses.  3. Hyperdynamic left ventricular systolic function.  Flattening of the interventricular septum in both systole  and diastole is  consistent with right ventricular pressure  overload.  4. Severe right atrial enlargement.  5. Right ventricular enlargement with decreased right  ventricular systolic function.  6. Normal tricuspid valve. Mild-moderate tricuspid  regurgitation.  7. Estimated pulmonary artery systolic pressure equals 72  mm Hg, assuming right atrial pressure equals 8 mm Hg,  consistent with severe pulmonary pressures.  *** Compared with echocardiogram of 2017, no  significant changes noted.  ------------------------------------------------------------------------  Confirmed on  2017 - 13:15:09 by Justin Noel, M.D.  ------------------------------------------------------------------------    < end of copied text >  ---------------------------------------------------------------------------------------------------------  MICROBIOLOGY:     Culture - Fungal, Body Fluid (18 @ 21:16)    Specimen Source: .Body Fluid Peritoneal Fluid    Culture Results:   No fungus isolated at 1 week. No additional interim reports will be  issued unless there is a change in culture status.    Culture - Body Fluid with Gram Stain (18 @ 21:16)    Gram Stain:   polymorphonuclear leukocytes seen per low power field  No organisms seen per oil power field  by cytocentrifuge    Specimen Source: Peritoneal Peritoneal Fluid    Culture Results:   No growth to date.    Culture - Acid Fast - Body Fluid w/Smear (18 @ 21:16)    Specimen Source: .Body Fluid Peritoneal Fluid    Acid Fast Bacilli Smear:   No acid fast bacilli seen by fluorochrome stain      RADIOLOGY:  [x] Chest radiographs reviewed and interpreted by me    < from: US Abdomen Limited (18 @ 15:17) >    EXAM:  US ABDOMEN LIMITED                            PROCEDURE DATE:  2018        INTERPRETATION:  CLINICAL INFORMATION: End-stage right heart failure.   Evaluate for ascites.    TECHNIQUE: Limited sonogram of the abdomen to assess for ascites.    COMPARISON: Abdomen and pelvis dated 3/10/2018.    FINDINGS:    Large volume ascites in the bilateral lower quadrants. A moderate right   upper quadrant ascites. Small left upper quadrant ascites.    Trace left pleural effusion.    IMPRESSION:     Moderate to large volume abdominal ascites.    Trace left pleural effusion.    RACHEL VELARDE M.D., RADIOLOGY RESIDENT  This document has been electronically signed.  CEDRIC MISHRA M.D., ATTENDING RADIOLOGIST  This document has been electronically signed. Mar 22 2018  3:55PM      < end of copied text >  ---------------------------------------------------------------------------------------------------------    < from: [facility] Duplex Lower Ext Vein Scan, Bilat (18 @ 15:51) >    EXAM:  DUPLEX SCAN EXT VEINS LOWER BI                            PROCEDURE DATE:  2018        INTERPRETATION:  CLINICAL INFORMATION: Bilateral lower extremity   swelling, rule out DVT.    COMPARISON: Prior examination, dated 3/5/2018 showed no thrombus.    TECHNIQUE: Duplex sonography of the BILATERAL LOWER extremities with   color and spectral Doppler, with and without compression.      FINDINGS: There is edema within the superficial soft tissues of both   lower extremities.    There is normal compressibility of the bilateral common femoral, femoral   and popliteal veins. No calf vein thrombosis is detected.    Doppler examination shows normal spontaneous and phasic flow.    IMPRESSION:     No evidence of bilateral lower extremity deep venous thrombosis.    MARTIR VALVERDE M.D., ATTENDING RADIOLOGIST  This document has been electronically signed. Mar 20 2018  4:43PM      < end of copied text >  ---------------------------------------------------------------------------------------------------------    < from: CT Abdomen and Pelvis w/ Oral Cont (03.10.18 @ 19:07) >    EXAM:  CT ABDOMEN AND PELVIS OC                            PROCEDURE DATE:  03/10/2018      INTERPRETATION:  CLINICAL INFORMATION: Abdominal distention and pain for   several days. Acute renal failure.    COMPARISON: CT abdomen pelvis 2015    PROCEDURE:   CT of the Abdomen and Pelvis was performed without intravenous contrast.   Intravenous contrast: None.  Oral contrast: positive contrast was administered.  Sagittal and coronal reformats were performed.    FINDINGS:    LOWER CHEST:Mild interlobular septal thickening and small bilateral   pleural effusions. Subsegmental atelectasis in the right lower lobe.   Cardiomegaly. Coronary calcification. Calcified hilar lymph nodes.    LIVER: Within normal limits.  BILE DUCTS: Normal caliber.  GALLBLADDER: Cholelithiasis.  SPLEEN: Within normal limits.  PANCREAS: Within normal limits.  ADRENALS: Within normal limits.  KIDNEYS/URETERS: Left lower pole renal cyst.    BLADDER: Within normal limits.  REPRODUCTIVE ORGANS: Posterior uterine fibroid.    BOWEL: No bowel obstruction. Sigmoid diverticulosis, without   diverticulitis. Normal appendix.     PERITONEUM: Large volume ascites.  VESSELS:  Atherosclerotic changes.  RETROPERITONEUM: No lymphadenopathy.    ABDOMINAL WALL: Within normal limits.  BONES: Degenerative changes. T12 vertebral body hemangioma.    IMPRESSION:     Large volume ascites, etiology unclear.    Small bilateral pleural effusions and mild pulmonary edema.    VIOLA ANDREWS M.D., ATTENDINGRADIOLOGIST  This document has been electronically signed. Mar 11 2018  8:52AM      < end of copied text >  ---------------------------------------------------------------------------------------------------------  < from: CT Chest No Cont (18 @ 18:24) >    EXAM:  CT CHEST                            PROCEDURE DATE:  2018        INTERPRETATION:  CLINICAL INFORMATION: Evaluate pleural effusions.   Shortness of breath.    COMPARISON: Chest CT dated 2017. Chest x-ray dated 3/1/2018.    PROCEDURE:   CT of the Chest was performed without intravenous contrast.  Sagittal and coronal reformats were performed.  Axial MIP reformats were also performed.    FINDINGS:    CHEST:     LUNGS AND LARGE AIRWAYS: Patent central airways.  Bibasilar subsegmental   atelectasis, right greater than left.  PLEURA: Trace bilateral pleural effusions.  VESSELS: Thoracic aortic and coronary artery atherosclerosis.  HEART: Cardiomegaly. Small pericardial effusion. Mitral annular   calcification. A densely calcified or metallic density measuring 1.1 x   0.4 cm overlies the basilar left pulmonary artery (3:52, 6:80).  MEDIASTINUM AND STEFANIA: No lymphadenopathy.  CHEST WALL AND LOWER NECK: Within normal limits.  VISUALIZED UPPER ABDOMEN: Small volume ascites. Atherosclerotic changes.  BONES: Multilevel degenerative disease.    IMPRESSION: Trace bilateral pleural effusions.  Bibasilar subsegmental atelectasis, right greater than left.  Densely calcified or metallic density overlies the left pulmonary artery.  See above..      JUAN JUAREZ M.D., RADIOLOGY RESIDENT  This document has been electronically signed.  MADONNA MORGAN M.D., ATTENDING RADIOLOGIST  This document has been electronically signed. Mar  4 2018 12:28PM      < end of copied text >  --------------------------------------------------------------------------------------------------------- [facility] - [facility]     PROGRESS NOTE    CHIEF COMPLAINT: CRF (hypoxic); COPD; emphysema; JOSE; secondary pulmonary hypertension; right heart failure; epistaxis; ascites;     INTERVAL HISTORY:  Resting comfortably-remains weak, tired and frail; severe abdominal distension with early satiety - still holding off with another paracentesis due to hemodynamic instability and poor renal function; quite short of breath with minimal exertion; knee pain also limiting ambulation; no cough, sputum production, chest congestion or wheeze; no fevers, chills or sweats; no chest pain/pressure or palpitations; legs swollen; off diuretics-feels nebulizers, besides albuterol (no able to tolerate it) have been helpful.    REVIEW OF SYSTEMS:  Constitutional: As per interval history  HEENT: epistaxis - resolved  CV: As per interval history  Resp: As per interval history  GI: anorexia  : As per interval history  Musculoskeletal: diffuse pain  Skin: Within normal limits  Neurological: Within normal limits  Psychiatric: depressed  Endocrine: Within normal limits  Hematologic/Lymphatic: Within normal limits  Allergic/Immunologic: Within normal limits    MEDICATIONS:     Pulmonary "  buDESOnide   0.5 milliGRAM(s) Respule 0.5 milliGRAM(s) Inhalation every 12 hours  ipratropium    for Nebulization 500 MICROGram(s) Nebulizer every 6 hours  tiotropium 2.5 MICROgram(s)/olodaterol 2.5 MICROgram(s) Inhaler 2 Puff(s) Inhalation daily      Anti-microbials:      Cardiovascular:  digoxin     Tablet 0.125 milliGRAM(s) Oral every other day  diltiazem    milliGRAM(s) Oral daily  metoprolol     tartrate 25 milliGRAM(s) Oral every 8 hours      Other:  acetaminophen   Tablet. 650 milliGRAM(s) Oral every 6 hours PRN  aspirin enteric coated 81 milliGRAM(s) Oral daily  atorvastatin 20 milliGRAM(s) Oral at bedtime  BACItracin   Ointment 1 Application(s) Topical two times a day  clopidogrel Tablet 75 milliGRAM(s) Oral daily  docusate sodium 100 milliGRAM(s) Oral two times a day PRN  Gas-X extra strength (simethicone 125 mg 1 Tablet(s) 1 Tablet(s) Chew three times a day PRN  heparin  Injectable 5000 Unit(s) SubCutaneous every 8 hours  ondansetron Injectable 4 milliGRAM(s) IV Push every 8 hours PRN  predniSONE   Tablet 10 milliGRAM(s) Oral daily  sodium chloride 0.65% Nasal 1 Spray(s) Both Nostrils four times a day  sucralfate 1 Gram(s) Oral three times a day PRN        OBJECTIVE:    I&O's Detail    29 Mar 2018 07:  -  30 Mar 2018 07:00  --------------------------------------------------------  IN:    Oral Fluid: 530 mL  Total IN: 530 mL    OUT:    Voided: 200 mL  Total OUT: 200 mL    Total NET: 330 mL      30 Mar 2018 07:01  -  30 Mar 2018 13:04  --------------------------------------------------------  IN:    Oral Fluid: 600 mL  Total IN: 600 mL    OUT:    Voided: 330 mL  Total OUT: 330 mL    Total NET: 270 mL      Daily Weight in k.8 (30 Mar 2018 08:46)    PHYSICAL EXAM:       ICU Vital Signs Last 24 Hrs  T(C): 36.4 (30 Mar 2018 12:53), Max: 36.7 (29 Mar 2018 14:31)  T(F): 97.5 (30 Mar 2018 12:53), Max: 98 (29 Mar 2018 14:31)  HR: 58 (30 Mar 2018 12:53) (55 - 74)  BP: 93/60 (30 Mar 2018 12:53) (91/60 - 104/64)  BP(mean): --  ABP: --  ABP(mean): --  RR: 18 (30 Mar 2018 12:53) (17 - 18)  SpO2: 95% (30 Mar 2018 12:53) (92% - 96%) on 5lpm     General: Awake. Alert. Cooperative. Weak and tired. No distress. Chronically ill appearing	  HEENT:   Atraumatic. Bitemporal wasting. Anicteric. Normal oral mucosa, PERRL, EOMI  Neck: Supple. Trachea midline. Thyroid without enlargement/tenderness/nodules. No carotid bruit. (+) JVD; loss of bilateral supraclavicular fat pads	  Cardiovascular: Irregularly irregular rate and rhythm. S1 S2 normal. II/VI systolic murmur  Respiratory: Respirations unlabored. Decreased breath sounds throughout. Bibasilar rales. Decreased chest wall excursion  Abdomen: Soft. Non-tender. Significantly distended with fluid wave. No organomegaly. No masses. Normal bowel sounds	  Extremities: Warm to touch. No clubbing or cyanosis. Moderate lower extremity edema up to the thigh. Loss of extremity muscle mass  Pulses: Decreased lower extremity peripheral pulses  Skin: Lower extremity venous stasis changes; Left knee abrasion  Lymph Nodes: Cervical, supraclavicular and axillary nodes normal  Neurological: Motor and sensory examination equal and normal. A and O x 3  Psychiatry: Depressed       LABS:                        10.5   7.8   )-----------( 189      ( 29 Mar 2018 09:47 )             32.9     30    134<L>  |  96  |  128<H>  ----------------------------<  112<H>  5.6<H>   |  26  |  3.13<H>        136  |  95<L>  |  126<H>  ----------------------------<  107<H>  4.7   |  26  |  3.06<H>    Ca      8.9          Ca      9.1          Phos    4.5         Phos    4.5           Mg       1.8     30    Mg       1.7     29    Venous Blood Gas:   @ 00:52  7.34/50/56/26/85  VBG Lactate: 0.9    Venous Blood Gas:   @ 16:33  7.34/55/35/29/60  VBG Lactate: 1.3    Serum Pro-Brain Natriuretic Peptide: 08723 pg/mL ( @ 16:29)    CARDIAC MARKERS ( 01 Mar 2018 16:29 )  x     / 0.06 ng/mL / x     / x     / 5.4 ng/mL    < from: Transthoracic Echocardiogram (17 @ 18:58) >    Patient name: FRAN ALEXANDRA  YOB: 1945   Age: 71 (F)   MR#: [id number]  Study Date: 2017  Location: [address] [place], [place] [id number]: Thalia Amezquita [facility]  Study quality: Technically fair  Referring Physician: Pierce Cobb MD  Blood Pressure: 106/58 mmHg  Height: 160 cm  Weight: 64 kg  BSA: 1.7 m2  ------------------------------------------------------------------------  PROCEDURE: Transthoracic echocardiogram with 2-D, M-Mode  and complete spectral and color flow Doppler.  INDICATION: Other specified pulmonary heart diseases  (I27.89)  ------------------------------------------------------------------------  Dimensions:    Normal Values:  LA:     3.6    2.0 - 4.0 cm  Ao:     2.8    2.0 - 3.8 cm  SEPTUM: 0.7    0.6 - 1.2 cm  PWT:    0.9    0.6 - 1.1 cm  LVIDd:  4.1    3.0 - 5.6 cm  LVIDs:  1.9    1.8 - 4.0 cm  Derived variables:  LVMI: 58 g/m2  RWT: 0.43  Fractional short: 54 %  Doppler Peak Velocity (m/sec): AoV=1.8  ------------------------------------------------------------------------  Observations:  Mitral Valve: Mitral annular calcification, otherwise  normal mitral valve. Minimal mitral regurgitation.  Aortic Valve/Aorta: Calcified trileaflet aortic valve with  normal opening. Peak transaortic valve gradient equals 13  mm Hg, mean transaortic valve gradient equals 8 mm Hg. Peak  left ventricular outflow tract gradient equals 6 mm Hg,  mean gradient is equal to 3 mm Hg, LVOT velocity time  integral equals 19 cm.  Aortic Root: 2.8 cm.  LVOT diameter: 1.9 cm.  Left Atrium: Normal left atrium.  LA volume index = 25  cc/m2.  Left Ventricle: Hyperdynamic left ventricular systolic  function. Flattening of the interventricular septum in both  systole and diastole is  consistent with right ventricular  pressure overload. Normal left ventricular internal  dimensions and wall thicknesses.  Right Heart: Severe right atrial enlargement. Right  ventricular enlargement with decreased right ventricular  systolic function. Normal tricuspid valve. Mild-moderate  tricuspid regurgitation. Normal pulmonic valve.  Pericardium/Pleura: Normal pericardium with trace  pericardial effusion.  Left pleural effusion.  Hemodynamic: Estimated right atrial pressure is 8 mm Hg.  Estimated right ventricular systolic pressure equals 72 mm  Hg, assuming right atrial pressure equals 8 mm Hg,  consistent with severe pulmonary hypertension.  ------------------------------------------------------------------------  Conclusions:  1. Calcified trileaflet aortic valve with normal opening.  2. Normal left ventricular internal dimensions and wall  thicknesses.  3. Hyperdynamic left ventricular systolic function.  Flattening of the interventricular septum in both systole  and diastole is  consistent with right ventricular pressure  overload.  4. Severe right atrial enlargement.  5. Right ventricular enlargement with decreased right  ventricular systolic function.  6. Normal tricuspid valve. Mild-moderate tricuspid  regurgitation.  7. Estimated pulmonary artery systolic pressure equals 72  mm Hg, assuming right atrial pressure equals 8 mm Hg,  consistent with severe pulmonary pressures.  *** Compared with echocardiogram of 2017, no  significant changes noted.  ------------------------------------------------------------------------  Confirmed on  2017 - 13:15:09 by Justin Noel, M.D.  ------------------------------------------------------------------------    < end of copied text >  ---------------------------------------------------------------------------------------------------------  MICROBIOLOGY:     Culture - Fungal, Body Fluid (18 @ 21:16)    Specimen Source: .Body Fluid Peritoneal Fluid    Culture Results:   No fungus isolated at 1 week. No additional interim reports will be  issued unless there is a change in culture status.    Culture - Body Fluid with Gram Stain (18 @ 21:16)    Gram Stain:   polymorphonuclear leukocytes seen per low power field  No organisms seen per oil power field  by cytocentrifuge    Specimen Source: Peritoneal Peritoneal Fluid    Culture Results:   No growth to date.    Culture - Acid Fast - Body Fluid w/Smear (18 @ 21:16)    Specimen Source: .Body Fluid Peritoneal Fluid    Acid Fast Bacilli Smear:   No acid fast bacilli seen by fluorochrome stain      RADIOLOGY:  [x] Chest radiographs reviewed and interpreted by me    < from: US Abdomen Limited (18 @ 15:17) >    EXAM:  US ABDOMEN LIMITED                            PROCEDURE DATE:  2018        INTERPRETATION:  CLINICAL INFORMATION: End-stage right heart failure.   Evaluate for ascites.    TECHNIQUE: Limited sonogram of the abdomen to assess for ascites.    COMPARISON: Abdomen and pelvis dated 3/10/2018.    FINDINGS:    Large volume ascites in the bilateral lower quadrants. A moderate right   upper quadrant ascites. Small left upper quadrant ascites.    Trace left pleural effusion.    IMPRESSION:     Moderate to large volume abdominal ascites.    Trace left pleural effusion.    RACHEL VELARDE M.D., RADIOLOGY RESIDENT  This document has been electronically signed.  CEDRIC MISHRA M.D., ATTENDING RADIOLOGIST  This document has been electronically signed. Mar 22 2018  3:55PM      < end of copied text >  ---------------------------------------------------------------------------------------------------------    < from: [facility] Duplex Lower Ext Vein Scan, Bilat (18 @ 15:51) >    EXAM:  DUPLEX SCAN EXT VEINS LOWER BI                            PROCEDURE DATE:  2018        INTERPRETATION:  CLINICAL INFORMATION: Bilateral lower extremity   swelling, rule out DVT.    COMPARISON: Prior examination, dated 3/5/2018 showed no thrombus.    TECHNIQUE: Duplex sonography of the BILATERAL LOWER extremities with   color and spectral Doppler, with and without compression.      FINDINGS: There is edema within the superficial soft tissues of both   lower extremities.    There is normal compressibility of the bilateral common femoral, femoral   and popliteal veins. No calf vein thrombosis is detected.    Doppler examination shows normal spontaneous and phasic flow.    IMPRESSION:     No evidence of bilateral lower extremity deep venous thrombosis.    MARTIR VALVERDE M.D., ATTENDING RADIOLOGIST  This document has been electronically signed. Mar 20 2018  4:43PM      < end of copied text >  ---------------------------------------------------------------------------------------------------------    < from: CT Abdomen and Pelvis w/ Oral Cont (03.10.18 @ 19:07) >    EXAM:  CT ABDOMEN AND PELVIS OC                            PROCEDURE DATE:  03/10/2018      INTERPRETATION:  CLINICAL INFORMATION: Abdominal distention and pain for   several days. Acute renal failure.    COMPARISON: CT abdomen pelvis 2015    PROCEDURE:   CT of the Abdomen and Pelvis was performed without intravenous contrast.   Intravenous contrast: None.  Oral contrast: positive contrast was administered.  Sagittal and coronal reformats were performed.    FINDINGS:    LOWER CHEST:Mild interlobular septal thickening and small bilateral   pleural effusions. Subsegmental atelectasis in the right lower lobe.   Cardiomegaly. Coronary calcification. Calcified hilar lymph nodes.    LIVER: Within normal limits.  BILE DUCTS: Normal caliber.  GALLBLADDER: Cholelithiasis.  SPLEEN: Within normal limits.  PANCREAS: Within normal limits.  ADRENALS: Within normal limits.  KIDNEYS/URETERS: Left lower pole renal cyst.    BLADDER: Within normal limits.  REPRODUCTIVE ORGANS: Posterior uterine fibroid.    BOWEL: No bowel obstruction. Sigmoid diverticulosis, without   diverticulitis. Normal appendix.     PERITONEUM: Large volume ascites.  VESSELS:  Atherosclerotic changes.  RETROPERITONEUM: No lymphadenopathy.    ABDOMINAL WALL: Within normal limits.  BONES: Degenerative changes. T12 vertebral body hemangioma.    IMPRESSION:     Large volume ascites, etiology unclear.    Small bilateral pleural effusions and mild pulmonary edema.    VIOLA ANDREWS M.D., ATTENDINGRADIOLOGIST  This document has been electronically signed. Mar 11 2018  8:52AM      < end of copied text >  ---------------------------------------------------------------------------------------------------------  < from: CT Chest No Cont (18 @ 18:24) >    EXAM:  CT CHEST                            PROCEDURE DATE:  2018        INTERPRETATION:  CLINICAL INFORMATION: Evaluate pleural effusions.   Shortness of breath.    COMPARISON: Chest CT dated 2017. Chest x-ray dated 3/1/2018.    PROCEDURE:   CT of the Chest was performed without intravenous contrast.  Sagittal and coronal reformats were performed.  Axial MIP reformats were also performed.    FINDINGS:    CHEST:     LUNGS AND LARGE AIRWAYS: Patent central airways.  Bibasilar subsegmental   atelectasis, right greater than left.  PLEURA: Trace bilateral pleural effusions.  VESSELS: Thoracic aortic and coronary artery atherosclerosis.  HEART: Cardiomegaly. Small pericardial effusion. Mitral annular   calcification. A densely calcified or metallic density measuring 1.1 x   0.4 cm overlies the basilar left pulmonary artery (3:52, 6:80).  MEDIASTINUM AND STEFANIA: No lymphadenopathy.  CHEST WALL AND LOWER NECK: Within normal limits.  VISUALIZED UPPER ABDOMEN: Small volume ascites. Atherosclerotic changes.  BONES: Multilevel degenerative disease.    IMPRESSION: Trace bilateral pleural effusions.  Bibasilar subsegmental atelectasis, right greater than left.  Densely calcified or metallic density overlies the left pulmonary artery.  See above..      JUAN JUAREZ M.D., RADIOLOGY RESIDENT  This document has been electronically signed.  MADONNA MORGAN M.D., ATTENDING RADIOLOGIST  This document has been electronically signed. Mar  4 2018 12:28PM      < end of copied text >  ---------------------------------------------------------------------------------------------------------

## 2018-04-01 NOTE — PROGRESS NOTE ADULT - SUBJECTIVE AND OBJECTIVE BOX
MEDICINE, PROGRESS NOTE [phone]    FRAN ALEXANDRA 72y [id number]    Patient seen and examined.  Patient is a 72y old  Female who presents with a chief complaint of shortness of breath and difficulty eating due to fullness (01 Mar 2018 19:30)  Pt has pain on right side of body.    PAST MEDICAL & SURGICAL HISTORY:  Hyperthyroidism  JOSE (obstructive sleep apnea)  Epistaxis  GIB (gastrointestinal bleeding)  CAD S/P percutaneous coronary angioplasty  Afib  Pulmonary HTN  GERD (gastroesophageal reflux disease)  Obesity  Cardiomegaly  Valvular heart disease  COPD (chronic obstructive pulmonary disease): Home O2  Sleep Apnea: by criteria  Loose, teeth: 3 bottom front loose teeth  Female stress incontinence  Shoulder pain, right  Constipation  Arthritis of Knee  H/O heartburn  History of lung cancer  Obese  Hx of hyperlipidemia  Asthma  Diabetes mellitus: type 2  dx about 4-5 years ago   no daily fingerstick  H/O: HTN (hypertension)  Enlarged lymph nodes  Lymph nodes enlarged: s/p biopsy mediastinal  benign  H/O endoscopy  left upper lobectomy    MEDICATIONS  (STANDING):  aspirin enteric coated 81 milliGRAM(s) Oral daily  atorvastatin 20 milliGRAM(s) Oral at bedtime  BACItracin   Ointment 1 Application(s) Topical two times a day  buDESOnide   0.5 milliGRAM(s) Respule 0.5 milliGRAM(s) Inhalation every 12 hours  clopidogrel Tablet 75 milliGRAM(s) Oral daily  digoxin     Tablet 0.125 milliGRAM(s) Oral every other day  diltiazem    milliGRAM(s) Oral daily  heparin  Injectable 5000 Unit(s) SubCutaneous every 8 hours  ipratropium    for Nebulization 500 MICROGram(s) Nebulizer every 6 hours  metoprolol     tartrate 25 milliGRAM(s) Oral every 8 hours  predniSONE   Tablet 10 milliGRAM(s) Oral daily  sodium chloride 0.65% Nasal 1 Spray(s) Both Nostrils four times a day  tiotropium 2.5 MICROgram(s)/olodaterol 2.5 MICROgram(s) Inhaler 2 Puff(s) Inhalation daily    MEDICATIONS  (PRN):  acetaminophen   Tablet. 650 milliGRAM(s) Oral every 6 hours PRN Mild Pain (1 - 3)  docusate sodium 100 milliGRAM(s) Oral two times a day PRN Constipation  Gas-X extra strength (simethicone 125 mg 1 Tablet(s) 1 Tablet(s) Chew three times a day PRN bloating  ondansetron Injectable 4 milliGRAM(s) IV Push every 8 hours PRN Nausea and/or Vomiting  sucralfate 1 Gram(s) Oral three times a day PRN stomach discomfort    Allergies    No Known Allergies    Intolerances    albuterol (Unknown)      PHYSICAL EXAM:  Constitutional: NAD  HEENT: Normocephalic, EOMI  Neck:  + JVD  Respiratory: Coarse bs b/l, dec bs at bases  Cardiovascular: S1, S2, RRR, + systolic murmur  Gastrointestinal: BS hypo, + distention, + fluid wave  Extremities: + peripheral edema le b/l  Neurological: AAOX3, no focal deficits  Psychiatric: Normal mood, normal affect  : No Gottlieb    Vital Signs Last 24 Hrs  T(C): 36.5 (01 Apr 2018 12:32), Max: 37.3 (31 Mar 2018 21:34)  T(F): 97.7 (01 Apr 2018 12:32), Max: 99.2 (31 Mar 2018 21:34)  HR: 58 (01 Apr 2018 17:17) (57 - 119)  BP: 115/71 (01 Apr 2018 17:17) (92/59 - 115/71)  BP(mean): --  RR: 18 (01 Apr 2018 12:32) (18 - 19)  SpO2: 90% (01 Apr 2018 12:32) (89% - 94%)  I&O's Summary    31 Mar 2018 07:01  -  01 Apr 2018 07:00  --------------------------------------------------------  IN: 440 mL / OUT: 600 mL / NET: -160 mL    01 Apr 2018 07:01  -  01 Apr 2018 18:40  --------------------------------------------------------  IN: 360 mL / OUT: 550 mL / NET: -190 mL        LABS:                        9.8    7.77  )-----------( 220      ( 31 Mar 2018 08:19 )             31.4     04-01    134<L>  |  97  |  130<H>  ----------------------------<  114<H>  5.6<H>   |  24  |  3.21<H>    Ca    8.9      01 Apr 2018 14:38

## 2018-04-01 NOTE — PROVIDER CONTACT NOTE (MEDICATION) - SITUATION
Notified provider pt refusing to have vitals taken, Pt stated, "I've had enough of the Vital Signs, I dont want them anymore. Im sorry." Pt refused despite multiple attempts.

## 2018-04-01 NOTE — PROGRESS NOTE ADULT - ASSESSMENT
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  3) chronic diastolic CHF (little evidence left sided heart failure on chest CT - trace pleural effusions - minimal ground glass opacities)    CKD with CASSANDRA due to intravascular volume depletion +/- hepato-renal syndrome    AF/atrial flutter    HTN/HLD/DM/CAD/PCI    PLAN/RECOMMENDATIONS      oxygen supplementation ATC to keep saturation greater than 92%, as outlined before  paracentesis versus placement of an indwelling intraperitoneal catheter when hemodynamically stable and renal function has improved  atrovent/pulmicort nebs  continue tiotropium 2.5 MICROgram(s)/olodaterol 2.5 MICROgram(s) Inhaler 2 Puff(s) Inhalation daily  prednisone 10mg daily  cardiac meds: cardizem CD/lopressor/digoxin/ASA/plavix/lipitor - off diuretics   unable to tolerate inotropic agents which resulted in tachycardia  GI/DVT prophylaxis - carafate/SQ heparin  patient had no improvement in pulmonary artery pressures with pulmonary artery vasodilators  renal follow-up appreciated    Plan of care discussed with the patient at bedside.  Prognosis is unchanged as outlined in previous notes    Theodore Negron MD, [facility] - [phone]  Pulmonary Medicine 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  3) chronic diastolic CHF (little evidence left sided heart failure on chest CT - trace pleural effusions - minimal ground glass opacities)    CKD with CASSANDRA due to intravascular volume depletion +/- hepato-renal syndrome    AF/atrial flutter    HTN/HLD/DM/CAD/PCI    PLAN/RECOMMENDATIONS      oxygen supplementation ATC to keep saturation greater than 92%, as outlined before  paracentesis versus placement of an indwelling intraperitoneal catheter when hemodynamically stable and renal function has improved  atrovent/pulmicort nebs  continue tiotropium 2.5 MICROgram(s)/olodaterol 2.5 MICROgram(s) Inhaler 2 Puff(s) Inhalation daily  prednisone 10mg daily  continuing cardiac medications  unable to tolerate inotropic agents which resulted in tachycardia  GI/DVT prophylaxis - carafate/SQ heparin  patient had no improvement in pulmonary artery pressures with pulmonary artery vasodilators  pulmonary hypertension eval may be helpful  renal follow-up appreciated  Remainder of management as outlined before    Plan of care discussed with the patient at bedside.  Prognosis is unchanged as outlined in previous notes    Theodore Negron MD, [facility] - [phone]  Pulmonary Medicine

## 2018-04-01 NOTE — PROGRESS NOTE ADULT - SUBJECTIVE AND OBJECTIVE BOX
Endocrinology Attending Covering for Dr. Urena      Chief complaint  Patient is a 72y old  Female who presents with a chief complaint of shortness of breath and difficulty eating due to fullness (01 Mar 2018 19:30)   Review of systems  Patient in bed, looks comfortable, no fever,   no hypoglycemia.    Labs and Fingersticks  CAPILLARY BLOOD GLUCOSE          Anion Gap, Serum: 13 (04-01 @ 14:38)  Anion Gap, Serum: 17 (03-31 @ 06:48)      Calcium, Total Serum: 8.9 (04-01 @ 14:38)  Calcium, Total Serum: 8.9 (03-31 @ 06:48)          04-01    134<L>  |  97  |  130<H>  ----------------------------<  114<H>  5.6<H>   |  24  |  3.21<H>    Ca    8.9      01 Apr 2018 14:38                          9.8    7.77  )-----------( 220      ( 31 Mar 2018 08:19 )             31.4     Medications  MEDICATIONS  (STANDING):  aspirin enteric coated 81 milliGRAM(s) Oral daily  atorvastatin 20 milliGRAM(s) Oral at bedtime  BACItracin   Ointment 1 Application(s) Topical two times a day  buDESOnide   0.5 milliGRAM(s) Respule 0.5 milliGRAM(s) Inhalation every 12 hours  clopidogrel Tablet 75 milliGRAM(s) Oral daily  digoxin     Tablet 0.125 milliGRAM(s) Oral every other day  diltiazem    milliGRAM(s) Oral daily  heparin  Injectable 5000 Unit(s) SubCutaneous every 8 hours  ipratropium    for Nebulization 500 MICROGram(s) Nebulizer every 6 hours  metoprolol     tartrate 25 milliGRAM(s) Oral every 8 hours  predniSONE   Tablet 10 milliGRAM(s) Oral daily  sodium chloride 0.65% Nasal 1 Spray(s) Both Nostrils four times a day  tiotropium 2.5 MICROgram(s)/olodaterol 2.5 MICROgram(s) Inhaler 2 Puff(s) Inhalation daily      Physical Exam  General: Patient comfortable in bed  Vital Signs Last 12 Hrs  T(F): 97.7 (04-01-18 @ 12:32), Max: 97.7 (04-01-18 @ 12:32)  HR: 79 (04-01-18 @ 12:32) (79 - 119)  BP: 102/63 (04-01-18 @ 12:32) (94/47 - 102/63)  BP(mean): --  RR: 18 (04-01-18 @ 12:32) (18 - 19)  SpO2: 90% (04-01-18 @ 12:32) (90% - 92%)  Neck: No palpable thyroid nodules.  CVS: S1S2, No murmurs  Respiratory: No wheezing, no crepitations  GI: Abdomen soft, bowel sounds positive  Musculoskeletal:  edema lower extremities.   Skin: No skin rashes, no ecchymosis    Diagnostics

## 2018-04-01 NOTE — PROVIDER CONTACT NOTE (MEDICATION) - ACTION/TREATMENT ORDERED:
held metoprolol
NP aware. RN will continue to monitor.
hold Bumex for now and follow up BP around 11am
hold Bumex this time.
wait for .
NP notified and made aware. pt educated on risks of refusing hep sq. will continue to monitor.
Continue to monitor.
okay to give lopressor tonight

## 2018-04-01 NOTE — PROGRESS NOTE ADULT - ASSESSMENT
Assessment  DMT2: 72y Female with DM T2  blood sugars fairly controled on diet A1c 5.8,, eating meals,  compliant with low carb diet  CAD/CHF: On medications, stable, monitored.  Hyperthyroidism: , asymptomatic, in subclinical hypothyroid state now. TSH was 8.52 on 3-29-18, T4:4.5  HTN: Controlled, On med.    Plan    DM2;  Continue ADA diet, with Occasional FS check  CAD/CHF:   Stable on Medication    Hyperthyroidisim:   Tapazol was discontinued, F/U TFT,s in 2 weeks   HTN;  Continue current Med,s

## 2018-04-01 NOTE — PROGRESS NOTE ADULT - SUBJECTIVE AND OBJECTIVE BOX
NEPHROLOGY - NSN    Patient seen and examined. Patient doesnt feel well. Reports not sleeping since midnight. Does not want any more vital signs taken. She is agreeable to blood draw today.     MEDICATIONS  (STANDING):  aspirin enteric coated 81 milliGRAM(s) Oral daily  atorvastatin 20 milliGRAM(s) Oral at bedtime  BACItracin   Ointment 1 Application(s) Topical two times a day  buDESOnide   0.5 milliGRAM(s) Respule 0.5 milliGRAM(s) Inhalation every 12 hours  clopidogrel Tablet 75 milliGRAM(s) Oral daily  digoxin     Tablet 0.125 milliGRAM(s) Oral every other day  diltiazem    milliGRAM(s) Oral daily  heparin  Injectable 5000 Unit(s) SubCutaneous every 8 hours  ipratropium    for Nebulization 500 MICROGram(s) Nebulizer every 6 hours  metoprolol     tartrate 25 milliGRAM(s) Oral every 8 hours  predniSONE   Tablet 10 milliGRAM(s) Oral daily  sodium chloride 0.65% Nasal 1 Spray(s) Both Nostrils four times a day  tiotropium 2.5 MICROgram(s)/olodaterol 2.5 MICROgram(s) Inhaler 2 Puff(s) Inhalation daily    VITALS:  T(C): , Max: 37.3 (03-31-18 @ 21:34)  T(F): , Max: 99.2 (03-31-18 @ 21:34)  HR: 79 (04-01-18 @ 12:32)  BP: 102/63 (04-01-18 @ 12:32)  BP(mean): --  RR: 18 (04-01-18 @ 12:32)  SpO2: 90% (04-01-18 @ 12:32)  Wt(kg): --  I and O's:    03-31 @ 07:01  -  04-01 @ 07:00  --------------------------------------------------------  IN: 440 mL / OUT: 600 mL / NET: -160 mL    04-01 @ 07:01  -  04-01 @ 13:56  --------------------------------------------------------  IN: 360 mL / OUT: 350 mL / NET: 10 mL    REVIEW OF SYSTEMS:  As per HPI.     PHYSICAL EXAM:  Constitutional: NAD, tired  Respiratory: diminished B/L  Cardiovascular: S1 and S2  Gastrointestinal: BS+, soft, NT, distended  Extremities: + edema  Neurological: AAO x 3  : No Gottlieb  Access: Not applicable    LABS:                        9.8    7.77  )-----------( 220      ( 31 Mar 2018 08:19 )             31.4     03-31    134<L>  |  95<L>  |  134<H>  ----------------------------<  105<H>  5.3   |  22  |  3.16<H>    Ca    8.9      31 Mar 2018 06:48

## 2018-04-01 NOTE — PROGRESS NOTE ADULT - ASSESSMENT
72 year old female with CAD, dCHF, pHTN, HTN, DM, Afib and COPD p/w acute on chronic dCHF, ascites and acute on chronic kidney injury.   - CKD stage 4: likely due to HTN and DM and now CASSANDRA: non oliguric  - acute on chronic dCHF: continued lower extremity edema   - GI: ascites s/p paracentesis. Abdomen distended, pt refusing bladder pressures 2/2 need for dubois insertion   - hyperkalemia: likely due to CASSANDRA/nutritional; K + improving on last blood draw    Recommendations  - continue metoprolol and Cardizem with hold parameter  - encourage compliance with treatment ie: routine vital signs  - strict I+O and daily weights  - maintain low potassium diet  - follow up BMP  - avoid NSAIDs and nephrotoxins as able  - dose medication for a CrCL 10-15 cc/min

## 2018-04-02 ENCOUNTER — RX RENEWAL (OUTPATIENT)
Age: 73
End: 2018-04-02

## 2018-04-02 LAB
ANION GAP SERPL CALC-SCNC: 15 MMOL/L — SIGNIFICANT CHANGE UP (ref 5–17)
BUN SERPL-MCNC: 134 MG/DL — HIGH (ref 7–23)
CALCIUM SERPL-MCNC: 8.6 MG/DL — SIGNIFICANT CHANGE UP (ref 8.4–10.5)
CHLORIDE SERPL-SCNC: 94 MMOL/L — LOW (ref 96–108)
CO2 SERPL-SCNC: 23 MMOL/L — SIGNIFICANT CHANGE UP (ref 22–31)
CREAT SERPL-MCNC: 3.38 MG/DL — HIGH (ref 0.5–1.3)
GLUCOSE SERPL-MCNC: 164 MG/DL — HIGH (ref 70–99)
HCT VFR BLD CALC: 31.4 % — LOW (ref 34.5–45)
HGB BLD-MCNC: 10 G/DL — LOW (ref 11.5–15.5)
MCHC RBC-ENTMCNC: 28.1 PG — SIGNIFICANT CHANGE UP (ref 27–34)
MCHC RBC-ENTMCNC: 31.9 GM/DL — LOW (ref 32–36)
MCV RBC AUTO: 88 FL — SIGNIFICANT CHANGE UP (ref 80–100)
PLATELET # BLD AUTO: 188 K/UL — SIGNIFICANT CHANGE UP (ref 150–400)
POTASSIUM SERPL-MCNC: 5.4 MMOL/L — HIGH (ref 3.5–5.3)
POTASSIUM SERPL-SCNC: 5.4 MMOL/L — HIGH (ref 3.5–5.3)
RBC # BLD: 3.57 M/UL — LOW (ref 3.8–5.2)
RBC # FLD: 18 % — HIGH (ref 10.3–14.5)
SODIUM SERPL-SCNC: 132 MMOL/L — LOW (ref 135–145)
WBC # BLD: 8.9 K/UL — SIGNIFICANT CHANGE UP (ref 3.8–10.5)
WBC # FLD AUTO: 8.9 K/UL — SIGNIFICANT CHANGE UP (ref 3.8–10.5)

## 2018-04-02 PROCEDURE — 71045 X-RAY EXAM CHEST 1 VIEW: CPT | Mod: 26

## 2018-04-02 RX ORDER — ALBUMIN HUMAN 25 %
50 VIAL (ML) INTRAVENOUS ONCE
Qty: 0 | Refills: 0 | Status: COMPLETED | OUTPATIENT
Start: 2018-04-02 | End: 2018-04-02

## 2018-04-02 RX ORDER — SODIUM CHLORIDE 9 MG/ML
250 INJECTION INTRAMUSCULAR; INTRAVENOUS; SUBCUTANEOUS
Qty: 0 | Refills: 0 | Status: COMPLETED | OUTPATIENT
Start: 2018-04-02 | End: 2018-04-02

## 2018-04-02 RX ADMIN — Medication 10 MILLIGRAM(S): at 09:01

## 2018-04-02 RX ADMIN — TIOTROPIUM BROMIDE AND OLODATEROL 2 PUFF(S): 3.124; 2.736 SPRAY, METERED RESPIRATORY (INHALATION) at 12:29

## 2018-04-02 RX ADMIN — Medication 1 SPRAY(S): at 09:02

## 2018-04-02 RX ADMIN — Medication 81 MILLIGRAM(S): at 09:01

## 2018-04-02 RX ADMIN — Medication 0.12 MILLIGRAM(S): at 12:28

## 2018-04-02 RX ADMIN — CLOPIDOGREL BISULFATE 75 MILLIGRAM(S): 75 TABLET, FILM COATED ORAL at 12:28

## 2018-04-02 RX ADMIN — ATORVASTATIN CALCIUM 20 MILLIGRAM(S): 80 TABLET, FILM COATED ORAL at 21:25

## 2018-04-02 RX ADMIN — Medication 50 MILLILITER(S): at 19:51

## 2018-04-02 RX ADMIN — Medication 1 SPRAY(S): at 12:29

## 2018-04-02 RX ADMIN — SODIUM CHLORIDE 50 MILLILITER(S): 9 INJECTION INTRAMUSCULAR; INTRAVENOUS; SUBCUTANEOUS at 21:07

## 2018-04-02 RX ADMIN — Medication 25 MILLIGRAM(S): at 09:02

## 2018-04-02 RX ADMIN — Medication 0.5 MILLIGRAM(S): at 16:35

## 2018-04-02 RX ADMIN — Medication 240 MILLIGRAM(S): at 09:01

## 2018-04-02 RX ADMIN — Medication 0.5 MILLIGRAM(S): at 09:02

## 2018-04-02 RX ADMIN — Medication 1 SPRAY(S): at 18:21

## 2018-04-02 NOTE — PROGRESS NOTE ADULT - SUBJECTIVE AND OBJECTIVE BOX
MEDICINE, PROGRESS NOTE [phone]    FRAN ALEXANDRA 72y [id number]    Patient seen and examined.  Patient is a 72y old  Female who presents with a chief complaint of shortness of breath and difficulty eating due to fullness (01 Mar 2018 19:30)  Pt feels sob at times and is having on and off belly pain.    PAST MEDICAL & SURGICAL HISTORY:  Hyperthyroidism  JOSE (obstructive sleep apnea)  Epistaxis  GIB (gastrointestinal bleeding)  CAD S/P percutaneous coronary angioplasty  Afib  Pulmonary HTN  GERD (gastroesophageal reflux disease)  Obesity  Cardiomegaly  Valvular heart disease  COPD (chronic obstructive pulmonary disease): Home O2  Sleep Apnea: by criteria  Loose, teeth: 3 bottom front loose teeth  Female stress incontinence  Shoulder pain, right  Constipation  Arthritis of Knee  H/O heartburn  History of lung cancer  Obese  Hx of hyperlipidemia  Asthma  Diabetes mellitus: type 2  dx about 4-5 years ago   no daily fingerstick  H/O: HTN (hypertension)  Enlarged lymph nodes  Lymph nodes enlarged: s/p biopsy mediastinal  benign  H/O endoscopy  left upper lobectomy    MEDICATIONS  (STANDING):  aspirin enteric coated 81 milliGRAM(s) Oral daily  atorvastatin 20 milliGRAM(s) Oral at bedtime  BACItracin   Ointment 1 Application(s) Topical two times a day  buDESOnide   0.5 milliGRAM(s) Respule 0.5 milliGRAM(s) Inhalation every 12 hours  clopidogrel Tablet 75 milliGRAM(s) Oral daily  diltiazem    milliGRAM(s) Oral daily  heparin  Injectable 5000 Unit(s) SubCutaneous every 8 hours  ipratropium    for Nebulization 500 MICROGram(s) Nebulizer every 6 hours  metoprolol     tartrate 25 milliGRAM(s) Oral every 8 hours  predniSONE   Tablet 10 milliGRAM(s) Oral daily  sodium chloride 0.65% Nasal 1 Spray(s) Both Nostrils four times a day  tiotropium 2.5 MICROgram(s)/olodaterol 2.5 MICROgram(s) Inhaler 2 Puff(s) Inhalation daily    MEDICATIONS  (PRN):  acetaminophen   Tablet. 650 milliGRAM(s) Oral every 6 hours PRN Mild Pain (1 - 3)  docusate sodium 100 milliGRAM(s) Oral two times a day PRN Constipation  Gas-X extra strength (simethicone 125 mg 1 Tablet(s) 1 Tablet(s) Chew three times a day PRN bloating  ondansetron Injectable 4 milliGRAM(s) IV Push every 8 hours PRN Nausea and/or Vomiting  sucralfate 1 Gram(s) Oral three times a day PRN stomach discomfort    Allergies    No Known Allergies    Intolerances    albuterol (Unknown)      PHYSICAL EXAM:  Constitutional: tachypneic  HEENT: Normocephalic, EOMI  Neck:  No JVD  Respiratory: no wheezing, coarse bs b/l, dec bs at bases  Cardiovascular: S1, S2, RRR, + systolic murmur  Gastrointestinal: BS+, soft, mild tenderness diffuse, + fluid wave,   Extremities: + peripheral edema le b/l  Neurological: AAOX3, no focal deficits  Psychiatric: Normal mood, normal affect  : No Gottlieb    Vital Signs Last 24 Hrs  T(C): 36.6 (02 Apr 2018 13:05), Max: 36.9 (01 Apr 2018 21:26)  T(F): 97.9 (02 Apr 2018 13:05), Max: 98.4 (01 Apr 2018 21:26)  HR: 60 (02 Apr 2018 13:31) (56 - 118)  BP: 83/51 (02 Apr 2018 13:31) (77/44 - 115/71)  BP(mean): --  RR: 19 (02 Apr 2018 13:05) (18 - 229)  SpO2: 93% (02 Apr 2018 13:05) (90% - 93%)  I&O's Summary    01 Apr 2018 07:01  -  02 Apr 2018 07:00  --------------------------------------------------------  IN: 660 mL / OUT: 900 mL / NET: -240 mL    02 Apr 2018 07:01  -  02 Apr 2018 15:45  --------------------------------------------------------  IN: 240 mL / OUT: 200 mL / NET: 40 mL        LABS:                        10.0   8.9   )-----------( 188      ( 02 Apr 2018 14:10 )             31.4     04-02    132<L>  |  94<L>  |  134<H>  ----------------------------<  164<H>  5.4<H>   |  23  |  3.38<H>    Ca    8.6      02 Apr 2018 14:10

## 2018-04-02 NOTE — PROGRESS NOTE ADULT - ASSESSMENT
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  3) chronic diastolic CHF (little evidence left sided heart failure on chest CT - trace pleural effusions - minimal ground glass opacities)    CKD with CASSANDRA due to intravascular volume depletion +/- hepato-renal syndrome  AF/atrial flutter  HTN/HLD/DM/CAD/PCI        RECOMMENDATIONS  Continue oxygen supplementation ATC to keep saturation greater than 92%  For paracentesis versus placement of an indwelling intraperitoneal catheter when hemodynamically stable and renal function has improved  Continue Stiolto respimat, atrovent/pulmicort nebs, albuterol caused too much tachycardia  Prednisone 10mg daily  Continuing cardiac medications  Unfortunately unable to tolerate inotropic agents which resulted in tachycardia  GI/DVT prophylaxis - carafate/SQ heparin  She also had no improvement in pulmonary artery pressures with pulmonary artery vasodilators  Pulmonary hypertension eval may be helpful  Continued renal follow-up appreciated  Unfortunately her prognosis continues to be poor    Joceline Ernandez MD, [facility] - [phone]  Pulmonary Medicine

## 2018-04-02 NOTE — PROGRESS NOTE ADULT - SUBJECTIVE AND OBJECTIVE BOX
Follow-up Pulm Progress Note - [facility] [place]    Pt feels the same.  No complaints of cough or wheeze.  Slight amount of mucus in throat.  Still no appetite and short of breath    Medications:  MEDICATIONS  (STANDING):  aspirin enteric coated 81 milliGRAM(s) Oral daily  atorvastatin 20 milliGRAM(s) Oral at bedtime  BACItracin   Ointment 1 Application(s) Topical two times a day  buDESOnide   0.5 milliGRAM(s) Respule 0.5 milliGRAM(s) Inhalation every 12 hours  clopidogrel Tablet 75 milliGRAM(s) Oral daily  digoxin     Tablet 0.125 milliGRAM(s) Oral every other day  diltiazem    milliGRAM(s) Oral daily  heparin  Injectable 5000 Unit(s) SubCutaneous every 8 hours  ipratropium    for Nebulization 500 MICROGram(s) Nebulizer every 6 hours  metoprolol     tartrate 25 milliGRAM(s) Oral every 8 hours  predniSONE   Tablet 10 milliGRAM(s) Oral daily  sodium chloride 0.65% Nasal 1 Spray(s) Both Nostrils four times a day  tiotropium 2.5 MICROgram(s)/olodaterol 2.5 MICROgram(s) Inhaler 2 Puff(s) Inhalation daily    MEDICATIONS  (PRN):  acetaminophen   Tablet. 650 milliGRAM(s) Oral every 6 hours PRN Mild Pain (1 - 3)  docusate sodium 100 milliGRAM(s) Oral two times a day PRN Constipation  Gas-X extra strength (simethicone 125 mg 1 Tablet(s) 1 Tablet(s) Chew three times a day PRN bloating  ondansetron Injectable 4 milliGRAM(s) IV Push every 8 hours PRN Nausea and/or Vomiting  sucralfate 1 Gram(s) Oral three times a day PRN stomach discomfort    Vital Signs Last 24 Hrs  T(C): 36.4 (02 Apr 2018 06:36), Max: 36.9 (01 Apr 2018 21:26)  T(F): 97.5 (02 Apr 2018 06:36), Max: 98.4 (01 Apr 2018 21:26)  HR: 75 (02 Apr 2018 06:36) (58 - 119)  BP: 96/52 (02 Apr 2018 06:36) (94/47 - 115/71)  BP(mean): --  RR: 229 (02 Apr 2018 06:36) (18 - 229)  SpO2: 90% (01 Apr 2018 21:26) (90% - 92%)          04-01 @ 07:01  -  04-02 @ 07:00  --------------------------------------------------------  IN: 660 mL / OUT: 900 mL / NET: -240 mL          LABS:    04-01    134<L>  |  97  |  130<H>  ----------------------------<  114<H>  5.6<H>   |  24  |  3.21<H>    Ca    8.9      01 Apr 2018 14:38        Physical Examination:  Awake and alert  Normocephalic atraumatic  NECK: supple, normal range of motion, no use of accessory muscles  PULM: Clear to auscultation bilaterally, with decreased bs at both bases  CVS: Regular rate and rhythm, 2/6 doron  Abd:  soft, non tender, distended with fluid wave  Extrem: No CCE, cachectic

## 2018-04-02 NOTE — PROGRESS NOTE ADULT - SUBJECTIVE AND OBJECTIVE BOX
No pain, no shortness of breath      VITAL:  T(C): , Max: 36.9 (04-01-18 @ 21:26)  T(F): , Max: 98.4 (04-01-18 @ 21:26)  HR: 60 (04-02-18 @ 13:31)  BP: 83/51 (04-02-18 @ 13:31)  BP(mean): --  RR: 19 (04-02-18 @ 13:05)  SpO2: 93% (04-02-18 @ 13:05)  Wt(kg): --      PHYSICAL EXAM:  Constitutional: NAD, tired  HEENT: DMM  Neck: supple  Respiratory: diminished B/L  Cardiovascular: S1 and S2  Gastrointestinal: BS+, soft, NT, distended  Extremities: + edema  Neurological: AAO x 3  : No Dubois      LABS:    Na(134)/K(5.6)/Cl(97)/HCO3(24)/BUN(130)/Cr(3.21)Glu(114)/Ca(8.9)/Mg(--)/PO4(--)    04-01 @ 14:38  Na(134)/K(5.3)/Cl(95)/HCO3(22)/BUN(134)/Cr(3.16)Glu(105)/Ca(8.9)/Mg(--)/PO4(--)    03-31 @ 06:48  Na(--)/K(5.1)/Cl(--)/HCO3(--)/BUN(--)/Cr(--)Glu(--)/Ca(--)/Mg(--)/PO4(--)    03-30 @ 23:31    IMPRESSION: 72F w/ HTN, DM2, CAD, COPD, cor pulmonale/HFpEF, AFib, and CKD4-5, 3/1/18 a/w acute on chronic HFpEF, with course c/b by CASSANDRA on CKD    (1)Renal - CASSANDRA on CKD4-5;   (2)Hyperkalemia - worsening  (3)CV - hypotensive  (4)Goals of care - Full code. Refusing dubois.     RECOMMEND:  (1)D/C Digoxin (likely contributing to her hyperkalemia)  (2)      Arnol Villatoro MD  [facility],   [phone] No pain, no shortness of breath      VITAL:  T(C): , Max: 36.9 (04-01-18 @ 21:26)  T(F): , Max: 98.4 (04-01-18 @ 21:26)  HR: 60 (04-02-18 @ 13:31)  BP: 83/51 (04-02-18 @ 13:31)  BP(mean): --  RR: 19 (04-02-18 @ 13:05)  SpO2: 93% (04-02-18 @ 13:05)      PHYSICAL EXAM:  Constitutional: NAD, tired  HEENT: DMM  Neck: supple  Respiratory: diminished B/L  Cardiovascular: S1 and S2  Gastrointestinal: BS+, soft, NT, distended  Extremities: + edema  Neurological: AAO x 3  : No Dubois      LABS:    Na(134)/K(5.6)/Cl(97)/HCO3(24)/BUN(130)/Cr(3.21)Glu(114)/Ca(8.9)/Mg(--)/PO4(--)    04-01 @ 14:38  Na(134)/K(5.3)/Cl(95)/HCO3(22)/BUN(134)/Cr(3.16)Glu(105)/Ca(8.9)/Mg(--)/PO4(--)    03-31 @ 06:48  Na(--)/K(5.1)/Cl(--)/HCO3(--)/BUN(--)/Cr(--)Glu(--)/Ca(--)/Mg(--)/PO4(--)    03-30 @ 23:31    IMPRESSION: 72F w/ HTN, DM2, CAD, COPD, cor pulmonale/HFpEF, AFib, and CKD4-5, 3/1/18 a/w acute on chronic HFpEF, with course c/b by CASSANDRA on CKD  (1)Renal - CASSANDRA on CKD4-5;   (2)Hyperkalemia - worsening  (3)CV - hypotensive - in association with her end-stage cor pulmonale.  (4)Goals of care - Full code. Refusing dubois. Patient has stated that she will oblige with my partner Dr. Smith's recommendations. He had advised against (and continues to advise against) chronic dialysis therapy.    RECOMMEND:  (1)D/C Digoxin (likely contributing to her hyperkalemia)  (2)CXR  (3)NS 50cc x 5h  (4)Albumin 25%, 50cc x 1  (5)Supportive therapy; No HD.    D/w'd medical attending Dr. Pierce Villatoro MD  [facility]  [phone]

## 2018-04-02 NOTE — PROGRESS NOTE ADULT - ASSESSMENT
Assessment  DMT2: 72y Female with DM T2  blood sugars fairly controled on diet A1c 5.8,, eating meals,  compliant with low carb diet  CAD/CHF: On medications, stable, monitored.  Hyperthyroidism: , asymptomatic, in subclinical hypothyroid state now. TSH was 8.52 on 3-29-18, T4:4.5  HTN: Controlled, On med.    Plan    DM2;  Continue ADA diet, with Occasional FS check  CAD/CHF:   Stable on Medication    Hyperthyroidisim:   Tapazol was discontinued, F/U TFT,s in about  2 weeks   HTN;  Continue current Med,s

## 2018-04-02 NOTE — PROGRESS NOTE ADULT - SUBJECTIVE AND OBJECTIVE BOX
Endocrinology Attending Covering for Dr. Urena      Chief complaint  Patient is a 72y old  Female who presents with a chief complaint of shortness of breath and difficulty eating due to fullness      Review of systems  no palpitation  Patient in bed, looks comfortable, no fever,  no hypoglycemia.    Labs and Fingersticks  CAPILLARY BLOOD GLUCOSE          Anion Gap, Serum: 15 (04-02 @ 14:10)  Anion Gap, Serum: 13 (04-01 @ 14:38)      Calcium, Total Serum: 8.6 (04-02 @ 14:10)  Calcium, Total Serum: 8.9 (04-01 @ 14:38)          04-02    132<L>  |  94<L>  |  134<H>  ----------------------------<  164<H>  5.4<H>   |  23  |  3.38<H>    Ca    8.6      02 Apr 2018 14:10                          10.0   8.9   )-----------( 188      ( 02 Apr 2018 14:10 )             31.4     Medications  MEDICATIONS  (STANDING):  albumin human 25% IVPB 50 milliLiter(s) IV Intermittent once  aspirin enteric coated 81 milliGRAM(s) Oral daily  atorvastatin 20 milliGRAM(s) Oral at bedtime  BACItracin   Ointment 1 Application(s) Topical two times a day  buDESOnide   0.5 milliGRAM(s) Respule 0.5 milliGRAM(s) Inhalation every 12 hours  clopidogrel Tablet 75 milliGRAM(s) Oral daily  diltiazem    milliGRAM(s) Oral daily  heparin  Injectable 5000 Unit(s) SubCutaneous every 8 hours  ipratropium    for Nebulization 500 MICROGram(s) Nebulizer every 6 hours  metoprolol     tartrate 25 milliGRAM(s) Oral every 8 hours  predniSONE   Tablet 10 milliGRAM(s) Oral daily  sodium chloride 0.65% Nasal 1 Spray(s) Both Nostrils four times a day  sodium chloride 0.9%. 250 milliLiter(s) (50 mL/Hr) IV Continuous <Continuous>  tiotropium 2.5 MICROgram(s)/olodaterol 2.5 MICROgram(s) Inhaler 2 Puff(s) Inhalation daily      Physical Exam  General: Patient comfortable in bed  Vital Signs Last 12 Hrs  T(F): 97.9 (04-02-18 @ 13:05), Max: 97.9 (04-02-18 @ 13:05)  HR: 60 (04-02-18 @ 13:31) (56 - 118)  BP: 83/51 (04-02-18 @ 13:31) (77/44 - 97/68)  BP(mean): --  RR: 19 (04-02-18 @ 13:05) (19 - 229)  SpO2: 93% (04-02-18 @ 13:05) (93% - 93%)  Neck: No palpable thyroid nodules.  CVS: S1S2, No murmurs  Respiratory: No wheezing, no crepitations  GI: Abdomen soft, bowel sounds positive  Musculoskeletal:  edema lower extremities.   Skin: No skin rashes, no ecchymosis    Diagnostics

## 2018-04-02 NOTE — PROGRESS NOTE ADULT - ASSESSMENT
Right elbow, hip, knee, pain  malnutrition  volume overload/ascites/LE edema  sev PHTN/ Cor pulm  comp diast hf  sev copd  restrictive lung disease sec to ascites which diminishes diaphragmatic excursion  epistaxis - on and off  CASSANDRA on ckd 4  hyperkalemia   2 shattered teeth   nsvt  hyperkalemia    continue current care  pt refusing kayexalate at present, states she will consider,   pt refusing dubois at present so unable to do bladder pressures.  will give albumin and low dose of ivf  monitor bp  will consider midodrine if pt agrees  pts overall prognosis is poor

## 2018-04-03 VITALS
RESPIRATION RATE: 24 BRPM | DIASTOLIC BLOOD PRESSURE: 57 MMHG | OXYGEN SATURATION: 86 % | SYSTOLIC BLOOD PRESSURE: 96 MMHG | HEART RATE: 77 BPM

## 2018-04-03 LAB
ANION GAP SERPL CALC-SCNC: 17 MMOL/L — SIGNIFICANT CHANGE UP (ref 5–17)
BUN SERPL-MCNC: 135 MG/DL — HIGH (ref 7–23)
CALCIUM SERPL-MCNC: 8.7 MG/DL — SIGNIFICANT CHANGE UP (ref 8.4–10.5)
CHLORIDE SERPL-SCNC: 94 MMOL/L — LOW (ref 96–108)
CO2 SERPL-SCNC: 21 MMOL/L — LOW (ref 22–31)
CREAT SERPL-MCNC: 3.34 MG/DL — HIGH (ref 0.5–1.3)
GLUCOSE BLDC GLUCOMTR-MCNC: 209 MG/DL — HIGH (ref 70–99)
GLUCOSE SERPL-MCNC: 159 MG/DL — HIGH (ref 70–99)
HCT VFR BLD CALC: 31.7 % — LOW (ref 34.5–45)
HGB BLD-MCNC: 10.3 G/DL — LOW (ref 11.5–15.5)
MCHC RBC-ENTMCNC: 28.8 PG — SIGNIFICANT CHANGE UP (ref 27–34)
MCHC RBC-ENTMCNC: 32.6 GM/DL — SIGNIFICANT CHANGE UP (ref 32–36)
MCV RBC AUTO: 88.2 FL — SIGNIFICANT CHANGE UP (ref 80–100)
PLATELET # BLD AUTO: 188 K/UL — SIGNIFICANT CHANGE UP (ref 150–400)
POTASSIUM SERPL-MCNC: 5.7 MMOL/L — HIGH (ref 3.5–5.3)
POTASSIUM SERPL-SCNC: 5.7 MMOL/L — HIGH (ref 3.5–5.3)
RBC # BLD: 3.59 M/UL — LOW (ref 3.8–5.2)
RBC # FLD: 18 % — HIGH (ref 10.3–14.5)
SODIUM SERPL-SCNC: 132 MMOL/L — LOW (ref 135–145)
WBC # BLD: 9.8 K/UL — SIGNIFICANT CHANGE UP (ref 3.8–10.5)
WBC # FLD AUTO: 9.8 K/UL — SIGNIFICANT CHANGE UP (ref 3.8–10.5)

## 2018-04-03 RX ORDER — NYSTATIN 500MM UNIT
500000 POWDER (EA) MISCELLANEOUS
Qty: 0 | Refills: 0 | Status: DISCONTINUED | OUTPATIENT
Start: 2018-04-03 | End: 2018-04-04

## 2018-04-03 RX ORDER — SODIUM POLYSTYRENE SULFONATE 4.1 MEQ/G
15 POWDER, FOR SUSPENSION ORAL ONCE
Qty: 0 | Refills: 0 | Status: COMPLETED | OUTPATIENT
Start: 2018-04-03 | End: 2018-04-03

## 2018-04-03 RX ADMIN — Medication 500 MICROGRAM(S): at 14:05

## 2018-04-03 RX ADMIN — Medication 25 MILLIGRAM(S): at 09:19

## 2018-04-03 RX ADMIN — Medication 0.5 MILLIGRAM(S): at 22:27

## 2018-04-03 RX ADMIN — Medication 10 MILLIGRAM(S): at 09:19

## 2018-04-03 RX ADMIN — Medication 1 SPRAY(S): at 14:05

## 2018-04-03 RX ADMIN — Medication 0.5 MILLIGRAM(S): at 09:19

## 2018-04-03 RX ADMIN — Medication 240 MILLIGRAM(S): at 09:19

## 2018-04-03 RX ADMIN — Medication 500 MICROGRAM(S): at 09:19

## 2018-04-03 RX ADMIN — ATORVASTATIN CALCIUM 20 MILLIGRAM(S): 80 TABLET, FILM COATED ORAL at 22:27

## 2018-04-03 RX ADMIN — CLOPIDOGREL BISULFATE 75 MILLIGRAM(S): 75 TABLET, FILM COATED ORAL at 14:04

## 2018-04-03 RX ADMIN — Medication 1 SPRAY(S): at 18:02

## 2018-04-03 RX ADMIN — TIOTROPIUM BROMIDE AND OLODATEROL 2 PUFF(S): 3.124; 2.736 SPRAY, METERED RESPIRATORY (INHALATION) at 14:09

## 2018-04-03 RX ADMIN — Medication 81 MILLIGRAM(S): at 09:19

## 2018-04-03 RX ADMIN — Medication 1 SPRAY(S): at 09:20

## 2018-04-03 RX ADMIN — Medication 25 MILLIGRAM(S): at 22:27

## 2018-04-03 NOTE — PROGRESS NOTE ADULT - ASSESSMENT
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  3) chronic diastolic CHF (little evidence left sided heart failure on chest CT - trace pleural effusions - minimal ground glass opacities)    CKD with CASSANDRA due to intravascular volume depletion +/- hepato-renal syndrome  AF/atrial flutter  HTN/HLD/DM/CAD/PCI        RECOMMENDATIONS  Continue oxygen supplementation ATC to keep saturation greater than 92%  For paracentesis versus placement of an indwelling intraperitoneal catheter when hemodynamically stable and renal function has improved, considering midodrine to help with blood pressure, at this time we likely have nothing to lose  Continue Stiolto respimat, atrovent/pulmicort nebs, albuterol caused too much tachycardia  Will add nystatin swish and swallow for throat  Prednisone 10mg daily  Continuing cardiac medications  Unfortunately unable to tolerate inotropic agents which resulted in tachycardia  GI/DVT prophylaxis - carafate/SQ heparin  She also had no improvement in pulmonary artery pressures with pulmonary artery vasodilators  Continued renal follow-up appreciated  Unfortunately her prognosis continues to be poor    Joceline Ernandez MD, [facility] - [phone]  Pulmonary Medicine

## 2018-04-03 NOTE — PROGRESS NOTE ADULT - ASSESSMENT
Right elbow, hip, knee, pain  malnutrition  volume overload/ascites/LE edema  sev PHTN/ Cor pulm  comp diast hf  sev copd  restrictive lung disease sec to ascites which diminishes diaphragmatic excursion  epistaxis - on and off  CASSANDRA on ckd 4  hyperkalemia   2 shattered teeth   nsvt  hyperkalemia    continue current care  kayexalate if pt agrees  palliative care eval has been offered multiple times and pt refuses  pt still contemplating dubois to assess bladd pressures  will try midodrine if pt agrees alix

## 2018-04-03 NOTE — PROGRESS NOTE ADULT - SUBJECTIVE AND OBJECTIVE BOX
Chief complaint  Patient is a 72y old  Female who presents with a chief complaint of shortness of breath and difficulty eating due to fullness (01 Mar 2018 19:30)   Review of systems  Patient in bed, looks comfortable, no fever, no hypoglycemia.    Labs and Fingersticks  CAPILLARY BLOOD GLUCOSE          Anion Gap, Serum: 17 (04-03 @ 13:31)  Anion Gap, Serum: 15 (04-02 @ 14:10)      Calcium, Total Serum: 8.7 (04-03 @ 13:31)  Calcium, Total Serum: 8.6 (04-02 @ 14:10)          04-03    132<L>  |  94<L>  |  135<H>  ----------------------------<  159<H>  5.7<H>   |  21<L>  |  3.34<H>    Ca    8.7      03 Apr 2018 13:31                          10.3   9.8   )-----------( 188      ( 03 Apr 2018 13:31 )             31.7     Medications  MEDICATIONS  (STANDING):  aspirin enteric coated 81 milliGRAM(s) Oral daily  atorvastatin 20 milliGRAM(s) Oral at bedtime  BACItracin   Ointment 1 Application(s) Topical two times a day  buDESOnide   0.5 milliGRAM(s) Respule 0.5 milliGRAM(s) Inhalation every 12 hours  clopidogrel Tablet 75 milliGRAM(s) Oral daily  diltiazem    milliGRAM(s) Oral daily  heparin  Injectable 5000 Unit(s) SubCutaneous every 8 hours  ipratropium    for Nebulization 500 MICROGram(s) Nebulizer every 6 hours  metoprolol     tartrate 25 milliGRAM(s) Oral every 8 hours  nystatin    Suspension 023847 Unit(s) Oral four times a day  predniSONE   Tablet 10 milliGRAM(s) Oral daily  sodium chloride 0.65% Nasal 1 Spray(s) Both Nostrils four times a day  tiotropium 2.5 MICROgram(s)/olodaterol 2.5 MICROgram(s) Inhaler 2 Puff(s) Inhalation daily      Physical Exam  General: Patient comfortable in bed  Vital Signs Last 12 Hrs  T(F): 97.5 (04-03-18 @ 14:40), Max: 97.5 (04-03-18 @ 14:40)  HR: 77 (04-03-18 @ 22:22) (58 - 77)  BP: 96/57 (04-03-18 @ 22:22) (96/57 - 100/58)  BP(mean): --  RR: 24 (04-03-18 @ 22:22) (18 - 24)  SpO2: 86% (04-03-18 @ 22:22) (86% - 93%)  Neck: No palpable thyroid nodules.  CVS: S1S2, No murmurs  Respiratory: No wheezing, no crepitations  GI: Abdomen soft, bowel sounds positive  Musculoskeletal:  edema lower extremities.   Skin: No skin rashes, no ecchymosis    Diagnostics    Free Thyroxine, Serum: AM Sched. Collection: 04-Apr-2018 06:00 (04-03 @ 23:25)

## 2018-04-03 NOTE — PROGRESS NOTE ADULT - PROVIDER SPECIALTY LIST ADULT
ENT
Endocrinology
Gastroenterology
Internal Medicine
Intervent Radiology
Nephrology
Psychiatry
Pulmonology
Intervent Radiology
Nephrology
Nephrology
Pulmonology
Gastroenterology
Gastroenterology
Internal Medicine
Nephrology
Pulmonology

## 2018-04-03 NOTE — PROGRESS NOTE ADULT - SUBJECTIVE AND OBJECTIVE BOX
No pain, no shortness of breath      VITAL:  T(C): , Max: 36.6 (04-02-18 @ 13:05)  T(F): , Max: 97.9 (04-02-18 @ 13:05)  HR: 104 (04-03-18 @ 09:24)  BP: 92/58 (04-03-18 @ 09:24)  BP(mean): --  RR: 22 (04-03-18 @ 06:08)  SpO2: 92% (04-03-18 @ 06:08)          LABS:                        10.0   8.9   )-----------( 188      ( 02 Apr 2018 14:10 )             31.4     Na(132)/K(5.4)/Cl(94)/HCO3(23)/BUN(134)/Cr(3.38)Glu(164)/Ca(8.6)/Mg(--)/PO4(--)    04-02 @ 14:10  Na(134)/K(5.6)/Cl(97)/HCO3(24)/BUN(130)/Cr(3.21)Glu(114)/Ca(8.9)/Mg(--)/PO4(--)    04-01 @ 14:38                  Arnol Villatoro MD  [facility],   [phone] No pain, no shortness of breath      VITAL:  T(C): , Max: 36.6 (04-02-18 @ 13:05)  T(F): , Max: 97.9 (04-02-18 @ 13:05)  HR: 104 (04-03-18 @ 09:24)  BP: 92/58 (04-03-18 @ 09:24)  BP(mean): --  RR: 22 (04-03-18 @ 06:08)  SpO2: 92% (04-03-18 @ 06:08)      PHYSICAL EXAM:  Constitutional: NAD, tired  HEENT: DMM  Neck: supple  Respiratory: diminished B/L  Cardiovascular: S1 and S2  Gastrointestinal: BS+, soft, NT, distended  Extremities: + edema  Neurological: AAO x 3  : No Dubois      LABS:                        10.0   8.9   )-----------( 188      ( 02 Apr 2018 14:10 )             31.4     Na(132)/K(5.4)/Cl(94)/HCO3(23)/BUN(134)/Cr(3.38)Glu(164)/Ca(8.6)/Mg(--)/PO4(--)    04-02 @ 14:10  Na(134)/K(5.6)/Cl(97)/HCO3(24)/BUN(130)/Cr(3.21)Glu(114)/Ca(8.9)/Mg(--)/PO4(--)    04-01 @ 14:38      IMAGING:  < from: Xray Chest 1 View- PORTABLE-Routine (04.02.18 @ 14:29) >  Bibasilar subsegmental atelectasis with otherwise clear lungs.        IMPRESSION: 72F w/ HTN, DM2, CAD, COPD, cor pulmonale/HFpEF, AFib, and CKD4-5, 3/1/18 a/w acute on chronic HFpEF, with course c/b by CASSANDRA on CKD  (1)Renal - CASSANDRA on CKD4-5; numbers slowly worsening.   (2)Hyperkalemia - CKD/CASSANDRA-associated  (3)CV - hypotensive - in association with her end-stage cor pulmonale.  (4)Goals of care - Full code. Refusing dubois. Patient has stated that she will oblige with my partner Dr. Ali's recommendations. He had advised against (and continues to advise against) chronic dialysis therapy. I do not consider her to be a reasonable candidate for dialysis. From a hemodynamic standpoint she will likely not tolerate it. Moreover, given her end-stage cor pulmonale, her life expectancy is very poor and will likely not be significantly extended by chronic HD.      RECOMMEND:  (1)No HD  (2)No diuretics; could give gentle IVF  (3)Dose new meds for GFR<15ml/min  (4)Favor Palliative evaluation/assistance in establishment of goals of care.      Arnol Villatoro MD  [facility]  [phone]

## 2018-04-03 NOTE — PROGRESS NOTE ADULT - SUBJECTIVE AND OBJECTIVE BOX
Follow-up Pulm Progress Note - [facility] [place]    No new respiratory events overnight.  She feels the same.  Still with a slight amount of mucus in her throat, it feels dry    Medications:  MEDICATIONS  (STANDING):  aspirin enteric coated 81 milliGRAM(s) Oral daily  atorvastatin 20 milliGRAM(s) Oral at bedtime  BACItracin   Ointment 1 Application(s) Topical two times a day  buDESOnide   0.5 milliGRAM(s) Respule 0.5 milliGRAM(s) Inhalation every 12 hours  clopidogrel Tablet 75 milliGRAM(s) Oral daily  diltiazem    milliGRAM(s) Oral daily  heparin  Injectable 5000 Unit(s) SubCutaneous every 8 hours  ipratropium    for Nebulization 500 MICROGram(s) Nebulizer every 6 hours  metoprolol     tartrate 25 milliGRAM(s) Oral every 8 hours  predniSONE   Tablet 10 milliGRAM(s) Oral daily  sodium chloride 0.65% Nasal 1 Spray(s) Both Nostrils four times a day  tiotropium 2.5 MICROgram(s)/olodaterol 2.5 MICROgram(s) Inhaler 2 Puff(s) Inhalation daily    MEDICATIONS  (PRN):  acetaminophen   Tablet. 650 milliGRAM(s) Oral every 6 hours PRN Mild Pain (1 - 3)  docusate sodium 100 milliGRAM(s) Oral two times a day PRN Constipation  Gas-X extra strength (simethicone 125 mg 1 Tablet(s) 1 Tablet(s) Chew three times a day PRN bloating  ondansetron Injectable 4 milliGRAM(s) IV Push every 8 hours PRN Nausea and/or Vomiting  sucralfate 1 Gram(s) Oral three times a day PRN stomach discomfort      Vital Signs Last 24 Hrs  T(C): 36.6 (03 Apr 2018 06:08), Max: 36.6 (02 Apr 2018 13:05)  T(F): 97.8 (03 Apr 2018 06:08), Max: 97.9 (02 Apr 2018 13:05)  HR: 66 (03 Apr 2018 06:08) (56 - 118)  BP: 100/54 (03 Apr 2018 06:08) (77/44 - 100/54)  BP(mean): --  RR: 22 (03 Apr 2018 06:08) (19 - 22)  SpO2: 92% (03 Apr 2018 06:08) (92% - 93%)          04-02 @ 07:01  -  04-03 @ 07:00  --------------------------------------------------------  IN: 290 mL / OUT: 200 mL / NET: 90 mL          LABS:                        10.0   8.9   )-----------( 188      ( 02 Apr 2018 14:10 )             31.4     04-02    132<L>  |  94<L>  |  134<H>  ----------------------------<  164<H>  5.4<H>   |  23  |  3.38<H>    Ca    8.6      02 Apr 2018 14:10           Physical Examination:  Awake and alert  Normocephalic atraumatic  NECK: supple, normal range of motion, no use of accessory muscles  PULM: Clear to auscultation bilaterally, with decreased bs at bases  CVS: Regular rate and rhythm, 2/6 doron  Abd:  soft, non tender, distended with fluid wave  Extrem: No CCE, cachectic

## 2018-04-03 NOTE — PROGRESS NOTE ADULT - SUBJECTIVE AND OBJECTIVE BOX
MEDICINE, PROGRESS NOTE [phone]    FRAN ALEXANDRA 72y [id number]    Patient was seen and examined earlier today.  Patient is a 72y old  Female who presents with a chief complaint of shortness of breath and difficulty eating due to fullness (01 Mar 2018 19:30)  Pt resting in bed, still with right leg pain.    PAST MEDICAL & SURGICAL HISTORY:  Hyperthyroidism  JOSE (obstructive sleep apnea)  Epistaxis  GIB (gastrointestinal bleeding)  CAD S/P percutaneous coronary angioplasty  Afib  Pulmonary HTN  GERD (gastroesophageal reflux disease)  Obesity  Cardiomegaly  Valvular heart disease  COPD (chronic obstructive pulmonary disease): Home O2  Sleep Apnea: by criteria  Loose, teeth: 3 bottom front loose teeth  Female stress incontinence  Shoulder pain, right  Constipation  Arthritis of Knee  H/O heartburn  History of lung cancer  Obese  Hx of hyperlipidemia  Asthma  Diabetes mellitus: type 2  dx about 4-5 years ago   no daily fingerstick  H/O: HTN (hypertension)  Enlarged lymph nodes  Lymph nodes enlarged: s/p biopsy mediastinal  benign  H/O endoscopy  left upper lobectomy    MEDICATIONS  (STANDING):  aspirin enteric coated 81 milliGRAM(s) Oral daily  atorvastatin 20 milliGRAM(s) Oral at bedtime  BACItracin   Ointment 1 Application(s) Topical two times a day  buDESOnide   0.5 milliGRAM(s) Respule 0.5 milliGRAM(s) Inhalation every 12 hours  clopidogrel Tablet 75 milliGRAM(s) Oral daily  diltiazem    milliGRAM(s) Oral daily  heparin  Injectable 5000 Unit(s) SubCutaneous every 8 hours  ipratropium    for Nebulization 500 MICROGram(s) Nebulizer every 6 hours  metoprolol     tartrate 25 milliGRAM(s) Oral every 8 hours  nystatin    Suspension [id number] Unit(s) Oral four times a day  predniSONE   Tablet 10 milliGRAM(s) Oral daily  sodium chloride 0.65% Nasal 1 Spray(s) Both Nostrils four times a day  tiotropium 2.5 MICROgram(s)/olodaterol 2.5 MICROgram(s) Inhaler 2 Puff(s) Inhalation daily    MEDICATIONS  (PRN):  acetaminophen   Tablet. 650 milliGRAM(s) Oral every 6 hours PRN Mild Pain (1 - 3)  docusate sodium 100 milliGRAM(s) Oral two times a day PRN Constipation  Gas-X extra strength (simethicone 125 mg 1 Tablet(s) 1 Tablet(s) Chew three times a day PRN bloating  ondansetron Injectable 4 milliGRAM(s) IV Push every 8 hours PRN Nausea and/or Vomiting  sucralfate 1 Gram(s) Oral three times a day PRN stomach discomfort    Allergies    No Known Allergies    Intolerances    albuterol (Unknown)      PHYSICAL EXAM:  Constitutional: NAD  HEENT: Normocephalic, EOMI  Neck:  No JVD  Respiratory: CTA B/L, No wheezes  Cardiovascular: S1, S2, RRR, + systolic murmur  Gastrointestinal: BS+, soft, NT, + distention, + fluid wave  Extremities: + peripheral edema le b/l  Neurological: AAOX3, no focal deficits  Psychiatric: Normal mood, normal affect  : No Gottlieb    Vital Signs Last 24 Hrs  T(C): 36.4 (03 Apr 2018 14:40), Max: 36.6 (03 Apr 2018 06:08)  T(F): 97.5 (03 Apr 2018 14:40), Max: 97.8 (03 Apr 2018 06:08)  HR: 58 (03 Apr 2018 18:08) (58 - 104)  BP: 100/58 (03 Apr 2018 18:08) (92/58 - 100/58)  BP(mean): --  RR: 18 (03 Apr 2018 14:40) (18 - 22)  SpO2: 93% (03 Apr 2018 14:40) (92% - 93%)  I&O's Summary    02 Apr 2018 07:01  -  03 Apr 2018 07:00  --------------------------------------------------------  IN: 290 mL / OUT: 200 mL / NET: 90 mL    03 Apr 2018 07:01  -  03 Apr 2018 20:53  --------------------------------------------------------  IN: 240 mL / OUT: 270 mL / NET: -30 mL        LABS:                        10.3   9.8   )-----------( 188      ( 03 Apr 2018 13:31 )             31.7     04-03    132<L>  |  94<L>  |  135<H>  ----------------------------<  159<H>  5.7<H>   |  21<L>  |  3.34<H>    Ca    8.7      03 Apr 2018 13:31

## 2018-04-04 PROCEDURE — 93970 EXTREMITY STUDY: CPT

## 2018-04-04 PROCEDURE — 84481 FREE ASSAY (FT-3): CPT

## 2018-04-04 PROCEDURE — 88112 CYTOPATH CELL ENHANCE TECH: CPT

## 2018-04-04 PROCEDURE — 85610 PROTHROMBIN TIME: CPT

## 2018-04-04 PROCEDURE — 93321 DOPPLER ECHO F-UP/LMTD STD: CPT

## 2018-04-04 PROCEDURE — 82947 ASSAY GLUCOSE BLOOD QUANT: CPT

## 2018-04-04 PROCEDURE — 82042 OTHER SOURCE ALBUMIN QUAN EA: CPT

## 2018-04-04 PROCEDURE — 99291 CRITICAL CARE FIRST HOUR: CPT

## 2018-04-04 PROCEDURE — 80053 COMPREHEN METABOLIC PANEL: CPT

## 2018-04-04 PROCEDURE — 84484 ASSAY OF TROPONIN QUANT: CPT

## 2018-04-04 PROCEDURE — 87581 M.PNEUMON DNA AMP PROBE: CPT

## 2018-04-04 PROCEDURE — 84295 ASSAY OF SERUM SODIUM: CPT

## 2018-04-04 PROCEDURE — 74176 CT ABD & PELVIS W/O CONTRAST: CPT

## 2018-04-04 PROCEDURE — 49083 ABD PARACENTESIS W/IMAGING: CPT

## 2018-04-04 PROCEDURE — 87633 RESP VIRUS 12-25 TARGETS: CPT

## 2018-04-04 PROCEDURE — 83735 ASSAY OF MAGNESIUM: CPT

## 2018-04-04 PROCEDURE — 83880 ASSAY OF NATRIURETIC PEPTIDE: CPT

## 2018-04-04 PROCEDURE — 87798 DETECT AGENT NOS DNA AMP: CPT

## 2018-04-04 PROCEDURE — 97161 PT EVAL LOW COMPLEX 20 MIN: CPT

## 2018-04-04 PROCEDURE — 82553 CREATINE MB FRACTION: CPT

## 2018-04-04 PROCEDURE — 99285 EMERGENCY DEPT VISIT HI MDM: CPT | Mod: 25

## 2018-04-04 PROCEDURE — 73080 X-RAY EXAM OF ELBOW: CPT

## 2018-04-04 PROCEDURE — 84132 ASSAY OF SERUM POTASSIUM: CPT

## 2018-04-04 PROCEDURE — 83615 LACTATE (LD) (LDH) ENZYME: CPT

## 2018-04-04 PROCEDURE — 88305 TISSUE EXAM BY PATHOLOGIST: CPT

## 2018-04-04 PROCEDURE — 85027 COMPLETE CBC AUTOMATED: CPT

## 2018-04-04 PROCEDURE — 96374 THER/PROPH/DIAG INJ IV PUSH: CPT

## 2018-04-04 PROCEDURE — 84443 ASSAY THYROID STIM HORMONE: CPT

## 2018-04-04 PROCEDURE — 84100 ASSAY OF PHOSPHORUS: CPT

## 2018-04-04 PROCEDURE — 93005 ELECTROCARDIOGRAM TRACING: CPT

## 2018-04-04 PROCEDURE — 80048 BASIC METABOLIC PNL TOTAL CA: CPT

## 2018-04-04 PROCEDURE — 87102 FUNGUS ISOLATION CULTURE: CPT

## 2018-04-04 PROCEDURE — 84157 ASSAY OF PROTEIN OTHER: CPT

## 2018-04-04 PROCEDURE — 87015 SPECIMEN INFECT AGNT CONCNTJ: CPT

## 2018-04-04 PROCEDURE — 80162 ASSAY OF DIGOXIN TOTAL: CPT

## 2018-04-04 PROCEDURE — 87205 SMEAR GRAM STAIN: CPT

## 2018-04-04 PROCEDURE — C1729: CPT

## 2018-04-04 PROCEDURE — 87070 CULTURE OTHR SPECIMN AEROBIC: CPT

## 2018-04-04 PROCEDURE — 89051 BODY FLUID CELL COUNT: CPT

## 2018-04-04 PROCEDURE — 76705 ECHO EXAM OF ABDOMEN: CPT

## 2018-04-04 PROCEDURE — 71046 X-RAY EXAM CHEST 2 VIEWS: CPT

## 2018-04-04 PROCEDURE — 82945 GLUCOSE OTHER FLUID: CPT

## 2018-04-04 PROCEDURE — 83605 ASSAY OF LACTIC ACID: CPT

## 2018-04-04 PROCEDURE — 84105 ASSAY OF URINE PHOSPHORUS: CPT

## 2018-04-04 PROCEDURE — 84300 ASSAY OF URINE SODIUM: CPT

## 2018-04-04 PROCEDURE — 84436 ASSAY OF TOTAL THYROXINE: CPT

## 2018-04-04 PROCEDURE — 73502 X-RAY EXAM HIP UNI 2-3 VIEWS: CPT

## 2018-04-04 PROCEDURE — 93308 TTE F-UP OR LMTD: CPT

## 2018-04-04 PROCEDURE — 82803 BLOOD GASES ANY COMBINATION: CPT

## 2018-04-04 PROCEDURE — 87486 CHLMYD PNEUM DNA AMP PROBE: CPT

## 2018-04-04 PROCEDURE — 87075 CULTR BACTERIA EXCEPT BLOOD: CPT

## 2018-04-04 PROCEDURE — P9047: CPT

## 2018-04-04 PROCEDURE — 82435 ASSAY OF BLOOD CHLORIDE: CPT

## 2018-04-04 PROCEDURE — 82330 ASSAY OF CALCIUM: CPT

## 2018-04-04 PROCEDURE — 82962 GLUCOSE BLOOD TEST: CPT

## 2018-04-04 PROCEDURE — 87206 SMEAR FLUORESCENT/ACID STAI: CPT

## 2018-04-04 PROCEDURE — 97110 THERAPEUTIC EXERCISES: CPT

## 2018-04-04 PROCEDURE — 97116 GAIT TRAINING THERAPY: CPT

## 2018-04-04 PROCEDURE — 94640 AIRWAY INHALATION TREATMENT: CPT

## 2018-04-04 PROCEDURE — 87116 MYCOBACTERIA CULTURE: CPT

## 2018-04-04 PROCEDURE — 84439 ASSAY OF FREE THYROXINE: CPT

## 2018-04-04 PROCEDURE — 84479 ASSAY OF THYROID (T3 OR T4): CPT

## 2018-04-04 PROCEDURE — 85014 HEMATOCRIT: CPT

## 2018-04-04 PROCEDURE — 85730 THROMBOPLASTIN TIME PARTIAL: CPT

## 2018-04-04 PROCEDURE — 83036 HEMOGLOBIN GLYCOSYLATED A1C: CPT

## 2018-04-04 PROCEDURE — 71045 X-RAY EXAM CHEST 1 VIEW: CPT

## 2018-04-04 PROCEDURE — 71250 CT THORAX DX C-: CPT

## 2018-04-04 PROCEDURE — 74018 RADEX ABDOMEN 1 VIEW: CPT

## 2018-04-04 NOTE — AIRWAY PLACEMENT NOTE ADULT - POST AIRWAY PLACEMENT ASSESSMENT:
breath sounds equal/skin color improved/CXR pending/chest excursion noted/breath sounds bilateral/positive end tidal CO2 noted

## 2018-04-04 NOTE — DISCHARGE NOTE FOR THE EXPIRED PATIENT - HOSPITAL COURSE
73 yo F PMHx of AFib, Asthma, CAD, Cardiomegaly, enlarged lymph nodes, COPD, DM Type II, Endoscopy, upper lobectomy, CHF.----Presents to ED A+OX3 accompanied by her family c/o SOB. Pt. reports right side heart failure, states "I need to get my abdomen drained again." Reports having abdominal fluid drained at the end of January, states "I noticed I was filling up again a week after." admitting dx of decompensated Diastolic HF and end stage pulmonary HTN.   pt decompensated with CHF and pulmonary HTN and .

## 2018-04-04 NOTE — CHART NOTE - NSCHARTNOTEFT_GEN_A_CORE
MARLON COLLIER NOTE    Briefly, this is a 72F PMH of HTN, T2DM, CAD, COPD, cor pulmonale/HFpEF, AFib, and CKD4-5 admitted on 3/1/18 for acute on chronic HFpEF, with course c/b by CASSANDRA on CKD.  Code blue called overhead at 23:48.  Upon arrival, CPR in progress and patient on already on monitor. Per RN at bedside, patient was in Aflutter on tele prior to Code blue being called. During first pulse check, patient was in asystole on monitor, so chest compressions were continued. Epi x1, sodium bicarbonate and calcium chloride were administered and patient was intubated by anesthesia. During second pulse check, patient noted to have a palpable pulse and appeared to be in sinus rhythm on the monitor. BP was 90s/60s. MICU resident at bedside. Patient started on levophed and labs were attempted to be drawn. Within minutes, patient's pulse became thready and patient again went into asystole on the monitor, so CPR was initiated and Epi x1 was given. Discussions were held with patient's  by one of the code team members throughout the event as he was present at bedside.  requested that only one round of CPR be performed after second cardiac arrest. The next pulse check revealed PEA arrest. Per 's request, no further interventions were done. Patient  at 12:05 AM.     Briseida Adamse, PGY3  MAR spectra #[id number] MARLON COLLIER NOTE    Briefly, this is a 72F PMH of HTN, T2DM, CAD, COPD, cor pulmonale/HFpEF, AFib, and CKD4-5 admitted on 3/1/18 for acute on chronic HFpEF, with course c/b by CASSANDRA on CKD.  Code blue called overhead at 23:48.  Upon arrival, CPR in progress and patient on already on monitor. Per RN at bedside, patient was in Aflutter on tele prior to Code blue being called. During first pulse check, patient was in asystole on monitor, so chest compressions were continued. Epi x1, sodium bicarbonate and calcium chloride were administered and patient was intubated by anesthesia. During second pulse check, ROSC briefly achieved as patient noted to have palpable femoral pulse and appeared to be in sinus rhythm on the monitor. BP was 90s/60s. MICU resident at bedside. Patient started on levophed and labs were attempted to be drawn. Within minutes, patient's pulse became thready and patient again went into asystole on the monitor, so CPR was initiated and Epi x1 was given. Discussions were held with patient's  by one of the code team members throughout the event.  requested that only one round of CPR be performed after second cardiac arrest. The next pulse check revealed PEA arrest. Per discussion with , no further interventions were done and CPR was stopped. Patient  at 12:05 AM.     Briseida Adames, PGY3  MAR spectra #[id number]

## 2018-04-04 NOTE — CHART NOTE - NSCHARTNOTEFT_GEN_A_CORE
See Code blue sheet for detailed assessment and plan. I personally provided  30 minutes for critical care time for cardiac arrest, patient  evaluated with  exam , cardiac monitor, treated with CPR protocol briefly achieved ROSC and was started on levophed and intubated , subsequently went in to PEA , chest compressions were resumed , after extensive discussion with family, family wished to stop further CPR attempts. Case discussed with primary team and family.

## 2018-04-21 LAB
CULTURE RESULTS: SIGNIFICANT CHANGE UP
SPECIMEN SOURCE: SIGNIFICANT CHANGE UP

## 2018-04-23 LAB
DIGITOXIN REPORTING LIMIT: 5 NG/ML — SIGNIFICANT CHANGE UP
DIGITOXIN SERPL-MCNC: SIGNIFICANT CHANGE UP NG/ML (ref 10–30)

## 2018-04-30 NOTE — PROGRESS NOTE ADULT - ASSESSMENT
[facility] Prior Authorization Team   Phone: [phone]  Fax: [phone]    Prior Authorization Approval    Authorization Effective Date: 03/27/2018   Authorization Expiration Date: 12/31/2099   Medication: mycophenolic acid 360 MG EC tablet   Approved Dose/Quantity: Take 1 tablet (360 mg) by mouth 2 times daily / #60  Reference #:     Insurance Company: [employer] - Phone [phone] Fax [phone]  Expected CoPay:       CoPay Card Available:      Foundation Assistance Needed:    Which Pharmacy is filling the prescription (Not needed for infusion/clinic administered):    Pharmacy Notified: no  Patient Notified: no        70 y/o F with PMH of severe pHTN (PASP: 73 in May 2017 with normal PCWP) 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 Recent admission at [place] approximately 5 weeks ago for large volume paracentesis (6.1L removed). Now presents again with RV failure-worsened LE edema, recurrent ascites, pleural effusions and pulmonary edema with worsened dyspnea and CASSANDRA on CKD. s/p 8.1 L paracentesis on 12/6. Started on dobutamine gtt for pHTN, discontinued 2nd Afib with RVR to 170s.

## 2018-08-22 NOTE — PROGRESS NOTE ADULT - SUBJECTIVE AND OBJECTIVE BOX
Follow-up Pulm Progress Note    No new respiratory events overnight.  Denies SOB/CP.   94% on 4.5L NC    Medications:  MEDICATIONS  (STANDING):  ALBUTerol/ipratropium for Nebulization 3 milliLiter(s) Nebulizer every 6 hours  allopurinol 200 milliGRAM(s) Oral daily  aspirin enteric coated 81 milliGRAM(s) Oral daily  atorvastatin 20 milliGRAM(s) Oral at bedtime  buMETAnide Injectable 1 milliGRAM(s) IV Push every 12 hours  clopidogrel Tablet 75 milliGRAM(s) Oral daily  digoxin     Tablet 0.125 milliGRAM(s) Oral every other day  diltiazem    milliGRAM(s) Oral daily  docusate sodium 100 milliGRAM(s) Oral two times a day  epoetin hilda Injectable 22808 Unit(s) SubCutaneous <User Schedule>  heparin  Injectable 5000 Unit(s) SubCutaneous every 8 hours  macitentan (OPSUMIT) 10 milliGRAM(s) 10 milliGRAM(s) Oral daily  methimazole 5 milliGRAM(s) Oral daily  metoprolol     tartrate 25 milliGRAM(s) Oral every 8 hours  pantoprazole    Tablet 40 milliGRAM(s) Oral before breakfast  predniSONE   Tablet 5 milliGRAM(s) Oral daily  roflumilast 500 MICROGram(s) Oral daily  tiotropium 2.5 MICROgram(s)/olodaterol 2.5 MICROgram(s) Inhaler 2 Puff(s) Inhalation daily    MEDICATIONS  (PRN):  acetaminophen   Tablet 650 milliGRAM(s) Oral every 6 hours PRN Moderate Pain (4 - 6)  ondansetron    Tablet 4 milliGRAM(s) Oral every 8 hours PRN Nausea and/or Vomiting  sodium chloride 0.65% Nasal 1 Spray(s) Both Nostrils five times a day PRN dry nose          Vital Signs Last 24 Hrs  T(C): 36.6 (10 Dec 2017 11:45), Max: 36.6 (09 Dec 2017 21:24)  T(F): 97.8 (10 Dec 2017 11:45), Max: 97.9 (09 Dec 2017 21:24)  HR: 83 (10 Dec 2017 11:45) (76 - 89)  BP: 115/67 (10 Dec 2017 11:45) (99/56 - 120/62)  BP(mean): --  RR: 18 (10 Dec 2017 11:45) (18 - 18)  SpO2: 97% (10 Dec 2017 11:45) (97% - 100%) on 4.5L NC          12-09 @ 07:01  -  12-10 @ 07:00  --------------------------------------------------------  IN: 680 mL / OUT: 1125 mL / NET: -445 mL          LABS:                        7.8    3.51  )-----------( 165      ( 10 Dec 2017 08:40 )             25.1     12-10    141  |  98  |  54<H>  ----------------------------<  99  4.7   |  32<H>  |  1.64<H>    Ca    8.8      10 Dec 2017 08:45            CAPILLARY BLOOD GLUCOSE                GAGAN Negative 12-08 @ 07:39  Anti SS-1 --  Anti SS-2 --  Anti RNP --  RF 15.0 12-08 @ 07:39    Atypical ANCA -- 12-08 @ 07:39  c-ANCA titer -- 12-08 @ 07:39  c-ANCA -- 12-08 @ 07:39  p-ANCA -- 12-08 @ 07:39          Fluid characteristics  -- 12-06 @ 14:33  pH --  LDH 85  tprot 3.2    Cell count  Appearance --  Fluid type --  BF lymph --  color --  eosinophil --  PMN --  Mesothelial --  Monocyte --  Other body cells --  Fluid characteristics  -- 12-06 @ 14:32  pH --  LDH --  tprot --    Cell count  Appearance Hazy  Fluid type --  BF lymph 4  color Yellow  eosinophil --  PMN 60  Mesothelial 20  Monocyte 15  Other body cells 1      CULTURES: (if applicable)  Culture Results:   Testing in progress (12-06 @ 17:13)  Culture Results:   No growth (12-06 @ 17:12)    Most recent blood culture -- 12-06 @ 17:13   -- -- .Body Fluid Peritoneal Fluid 12-06 @ 17:13  Most recent blood culture -- 12-06 @ 17:12   -- -- .Body Fluid Abdominal Fluid 12-06 @ 17:12    Blood culture 12-06 @ 17:12  --    polymorphonuclear leukocytes seen  No organisms seen  by cytocentrifuge  --  --  --    Urine culture    -->      Physical Examination:  PULM: Decreased BS at bases L>R  CVS: S1, S2 RRR +murmur    RADIOLOGY REVIEWED  CXR:12/7 Persistent L pleural effusion Ale Fermin, NP

## 2018-12-27 NOTE — PROGRESS NOTE ADULT - SUBJECTIVE AND OBJECTIVE BOX
NEPHROLOGY-[facility] [phone]        Patient seen and examined in bed.  She offered no complaints        MEDICATIONS  (STANDING):  allopurinol 100 milliGRAM(s) Oral daily  aspirin enteric coated 81 milliGRAM(s) Oral daily  atorvastatin 20 milliGRAM(s) Oral at bedtime  buMETAnide 1 milliGRAM(s) Oral two times a day  clopidogrel Tablet 75 milliGRAM(s) Oral daily  digoxin     Tablet 0.125 milliGRAM(s) Oral every other day  docusate sodium 100 milliGRAM(s) Oral two times a day  epoetin hilda Injectable 15702 Unit(s) SubCutaneous <User Schedule>  heparin  Injectable 5000 Unit(s) SubCutaneous every 8 hours  macitentan (OPSUMIT) 10 milliGRAM(s) 10 milliGRAM(s) Oral daily  methimazole 5 milliGRAM(s) Oral daily  metolazone 5 milliGRAM(s) Oral daily  metoprolol     tartrate 25 milliGRAM(s) Oral every 8 hours  pantoprazole    Tablet 40 milliGRAM(s) Oral before breakfast  predniSONE   Tablet 5 milliGRAM(s) Oral daily  roflumilast 500 MICROGram(s) Oral daily  sucralfate suspension 1 Gram(s) Oral three times a day before meals  tiotropium 2.5 MICROgram(s)/olodaterol 2.5 MICROgram(s) Inhaler 2 Puff(s) Inhalation daily      VITAL:  T(C): , Max: 36.6 (12-28-17 @ 11:18)  T(F): , Max: 97.9 (12-28-17 @ 11:18)  HR: 73 (12-28-17 @ 11:18)  BP: 128/80 (12-28-17 @ 11:18)  BP(mean): --  RR: 17 (12-28-17 @ 11:18)  SpO2: 95% (12-28-17 @ 11:18)  Wt(kg): --    I and O's:    12-27 @ 07:01  -  12-28 @ 07:00  --------------------------------------------------------  IN: 840 mL / OUT: 1950 mL / NET: -1110 mL    12-28 @ 07:01  -  12-28 @ 13:21  --------------------------------------------------------  IN: 240 mL / OUT: 0 mL / NET: 240 mL          PHYSICAL EXAM:    Constitutional: NAD; cachetic  HEENT: PERRLA    Neck:  No JVD  Respiratory: CTAB/L  Cardiovascular: S1 and S2  Gastrointestinal: BS+, soft, NT/ND  Extremities: No peripheral edema  Neurological: A/O x 3, no focal deficits  Psychiatric: Normal mood, normal affect  : No Gottlieb  Skin: No rashes  Access: Not applicable    LABS:                        9.6    6.14  )-----------( 182      ( 28 Dec 2017 08:58 )             30.9     12-28    137  |  85<L>  |  102<H>  ----------------------------<  97  3.7   |  43<H>  |  2.01<H>    Ca    9.3      28 Dec 2017 08:58            Urine Studies:          RADIOLOGY & ADDITIONAL STUDIES: 99

## 2019-02-02 NOTE — PROVIDER CONTACT NOTE (MEDICATION) - SITUATION
Pt refusing carafate 1gm PO. Educated pt the importance of the medication but pt still refuses. Pt states will talk to Dr. Cobb about the medication in a.m. No

## 2019-09-12 NOTE — PHYSICAL THERAPY INITIAL EVALUATION ADULT - DIAGNOSIS, PT EVAL
Decr. functional mobility 2/2 decr. dynamic balance, endurance; general deconditioning
Airway patent, Nasal mucosa clear. Mouth with normal mucosa. Throat has no vesicles, no oropharyngeal exudates and uvula is midline.

## 2019-10-08 NOTE — PROGRESS NOTE ADULT - SUBJECTIVE AND OBJECTIVE BOX
10/8/2019        RE: Edison Saldivar  [address] [place] [address]  [facility] [address]        [facility]  [facility] Record Number:  [id number]  [facility] of Service where encounter took place:  [facility]) [[id number]]  Chief Complaint   Patient presents with     RECHECK       HPI:    Edison Saldivar  is a 79 year old (1940), who is being seen today for an episodic care visit.  HPI information obtained from: facility chart records, facility staff, patient report, [facility] chart review and family/first contact wife report.   He came to this facility 10/1/2019 for short term rehab and medical management following hospitalization after presenting to the ED 9/27/2019 with worsening abdominal pain, nausea, poor intake and fever to 101.  He is status post duodenal polypectomy with sphincterotomy and pancreatic duct stenting 9/23/2019 at [facility]. In  the ED: WBC 18.3, K 3.0, lipase 63, INR 1.48. CT abdomen showed abnormal hazy infiltration around the pancreas consistent with pancreatitis. Case was discussed with [facility] who recommended antibiotics. He was treated with zosyn and symptoms gradually improved.  Blood cultures no growth to date. Diet was advanced to low fiber.   Discharged on Augmentin through 10/6/2019. Lasix was held and resumed at hospital discharge.      Today's concerns are:      Acute pancreatitis, unspecified complication status, unspecified pancreatitis type-feeling well with improved energy and strength. Wife is concerned that he may not have passed the stent and if it hasn't passed in his stool, they need to return to [facility] for endoscopic removal. Weight is down 7 lb from admission. Good appetite.   Intra-abdominal infection  Sneddon syndrome (H)-INR was 1.9 yesterday, down from 2.0 on his usual dose of coumadin 7.5 mg on Mon and Fridays, 5 mg the other days of the week. He received 7.5 mg last night. HR: 71-87   Heterozygous factor V Leiden mutation (H)  History  MEDICINE, PROGRESS NOTE [phone]    FRAN ALEXANDRA 72y [id number]    Patient seen and examined.  Patient is a 72y old  Female who presents with a chief complaint of shortness of breath and difficulty eating due to fullness (01 Mar 2018 19:30)  Pt feels full in belly.    PAST MEDICAL & SURGICAL HISTORY:  Hyperthyroidism  JOSE (obstructive sleep apnea)  Epistaxis  GIB (gastrointestinal bleeding)  CAD S/P percutaneous coronary angioplasty  Afib  Pulmonary HTN  GERD (gastroesophageal reflux disease)  Obesity  Cardiomegaly  Valvular heart disease  COPD (chronic obstructive pulmonary disease): Home O2  Sleep Apnea: by criteria  Loose, teeth: 3 bottom front loose teeth  Female stress incontinence  Shoulder pain, right  Constipation  Arthritis of Knee  H/O heartburn  History of lung cancer  Obese  Hx of hyperlipidemia  Asthma  Diabetes mellitus: type 2  dx about 4-5 years ago   no daily fingerstick  H/O: HTN (hypertension)  Enlarged lymph nodes  Lymph nodes enlarged: s/p biopsy mediastinal  benign  H/O endoscopy  left upper lobectomy    MEDICATIONS  (STANDING):  ALBUTerol/ipratropium for Nebulization 3 milliLiter(s) Nebulizer every 6 hours  allopurinol 100 milliGRAM(s) Oral daily  aspirin enteric coated 81 milliGRAM(s) Oral daily  atorvastatin 20 milliGRAM(s) Oral at bedtime  BACItracin   Ointment 1 Application(s) Topical two times a day  buDESOnide   0.5 milliGRAM(s) Respule 0.5 milliGRAM(s) Inhalation every 12 hours  clopidogrel Tablet 75 milliGRAM(s) Oral daily  digoxin     Tablet 0.125 milliGRAM(s) Oral every other day  diltiazem    milliGRAM(s) Oral daily  heparin  Injectable 5000 Unit(s) SubCutaneous every 8 hours  methimazole 5 milliGRAM(s) Oral daily  metolazone 5 milliGRAM(s) Oral <User Schedule>  metoprolol     tartrate 25 milliGRAM(s) Oral every 8 hours  predniSONE   Tablet 5 milliGRAM(s) Oral daily  sucralfate 1 Gram(s) Oral three times a day    MEDICATIONS  (PRN):  docusate sodium 100 milliGRAM(s) Oral two times a day PRN Constipation  Gas-X extra strength (simethicone 125 mg 1 Tablet(s) 1 Tablet(s) Chew three times a day PRN bloating  ondansetron Injectable 4 milliGRAM(s) IV Push every 8 hours PRN Nausea and/or Vomiting  sodium chloride 0.65% Nasal 1 Spray(s) Both Nostrils three times a day PRN Nasal Congestion    Allergies    No Known Allergies    Intolerances    albuterol (Unknown)      PHYSICAL EXAM:  Constitutional: NAD  HEENT: Normocephalic, EOMI  Neck:  No JVD  Respiratory: CTA B/L, No wheezes  Cardiovascular: S1, S2, RRR, + systolic murmur  Gastrointestinal: BS+ but hypoactive, +distention, +fluid wave   Extremities: + peripheral edema le b/l  Neurological: AAOX3, no focal deficits  Psychiatric: Normal mood, normal affect  : No Gottlieb    Vital Signs Last 24 Hrs  T(C): 36.6 (11 Mar 2018 13:25), Max: 36.6 (11 Mar 2018 13:25)  T(F): 97.8 (11 Mar 2018 13:25), Max: 97.8 (11 Mar 2018 13:25)  HR: 66 (11 Mar 2018 15:32) (58 - 124)  BP: 92/55 (11 Mar 2018 15:32) (92/55 - 105/68)  BP(mean): --  RR: 16 (11 Mar 2018 13:25) (16 - 18)  SpO2: 98% (11 Mar 2018 13:25) (90% - 98%)  I&O's Summary    10 Mar 2018 06:01  -  11 Mar 2018 07:00  --------------------------------------------------------  IN: 900 mL / OUT: 200 mL / NET: 700 mL    11 Mar 2018 07:01  -  11 Mar 2018 17:35  --------------------------------------------------------  IN: 360 mL / OUT: 0 mL / NET: 360 mL        LABS:                        9.9    10.3  )-----------( 258      ( 11 Mar 2018 11:56 )             29.5     03-11    128<L>  |  89<L>  |  105<H>  ----------------------------<  87  5.4<H>   |  22  |  2.87<H>    Ca    8.5      11 Mar 2018 11:56  Phos  4.6     03-11  Mg     2.0     03-11  Magnesium, Serum: 2.0 mg/dL (03-11 @ 11:56) of CVA (cerebrovascular accident)  Chronic anticoagulation  Vascular dementia without behavioral disturbance (H)-pleasant and talkative. Conversation is appropriate.   Chronic obstructive pulmonary disease, unspecified COPD type (H)  Morbid obesity (H)  KORY (obstructive sleep apnea)  Essential hypertension-BPs: 133/79, 126/72, 149/76, 138/78    Bilateral leg edema  Hypokalemia  Physical deconditioning-ambulating with walker and supervision. Requires stand by to min assist with cares.     Past Medical and Surgical History reviewed in Epic today.    MEDICATIONS:  Current Outpatient Medications   Medication Sig Dispense Refill     acetaminophen (TYLENOL) 325 MG tablet Take 2 tablets (650 mg) by mouth every 4 hours as needed for mild pain       albuterol (PROAIR HFA/PROVENTIL HFA/VENTOLIN HFA) 108 (90 Base) MCG/ACT inhaler Inhale 2 puffs into the lungs every 4 hours as needed for shortness of breath / dyspnea or wheezing       amLODIPine (NORVASC) 5 MG tablet Take 5 mg by mouth every morning       brimonidine-timolol (COMBIGAN) 0.2-0.5 % ophthalmic solution Place 1 drop into both eyes every morning       brinzolamide (AZOPT) 1 % ophthalmic suspension Place 1 drop into both eyes every morning       CALCIUM-MAGNESIUM-ZINC PO Take 1 tablet by mouth every morning 100/40/15       co-enzyme Q-10 100 MG CAPS capsule Take 100 mg by mouth every morning       docusate sodium (COLACE) 100 MG capsule Take 100 mg by mouth every evening       esomeprazole (NEXIUM) 40 MG DR capsule Take 40 mg by mouth every morning (before breakfast) Take 30-60 minutes before eating.       furosemide (LASIX) 40 MG tablet Take 80 mg by mouth daily       latanoprost (XALATAN) 0.005 % ophthalmic solution Place 1 drop into both eyes At Bedtime       Magnesium Oxide 250 MG TABS Take 500 mg by mouth daily       mirabegron (MYRBETRIQ) 50 MG 24 hr tablet Take 50 mg by mouth every morning       multivitamin w/minerals (THERA-VIT-M) tablet Take 1 tablet by  "mouth every morning       potassium chloride ER (K-TAB/KLOR-CON) 10 MEQ CR tablet Take 20 mEq by mouth daily       vitamin B-12 (CYANOCOBALAMIN) 1000 MCG tablet Take 1,000 mcg by mouth every morning       vitamin D3 (CHOLECALCIFEROL) 2000 units tablet Take 2,000 Units by mouth daily       warfarin ANTICOAGULANT (COUMADIN) 5 MG tablet Take 5 mg by mouth See Admin Instructions Tuesday, Wednesday, Thursday, Saturday, Sunday       warfarin ANTICOAGULANT (COUMADIN) 5 MG tablet Take 7.5 mg by mouth See Admin Instructions Monday and Friday         REVIEW OF SYSTEMS:  4 point ROS including Respiratory, CV, GI and , other than that noted in the HPI,  is negative    Objective:  /79   Pulse 75   Temp 97.9  F (36.6  C)   Resp 16   Ht 1.702 m (5' 7\")   SpO2 95%   BMI 37.93 kg/m     Exam:  GENERAL APPEARANCE:  Alert, in no distress, morbidly obese  ENT:  Mouth and posterior oropharynx normal, moist mucous membranes, [place]  EYES:  EOM normal, conjunctiva and lids normal   NECK:  No adenopathy,masses or thyromegaly  RESP:  respiratory effort and palpation of chest normal, lungs clear to auscultation , no respiratory distress  CV:  Palpation and auscultation of heart done , regular rate and rhythm, no murmur, +2 pedal pulses, peripheral edema 2+ in both LE   ABDOMEN:  soft, nontender, no hepatosplenomegaly or other masses  M/S:    gait steady with walker. KENDALL with good strength. No joint inflammation  SKIN:  no rashes or open areas  PSYCH:  oriented X 3, memory impaired , affect and mood normal    Labs:   Lab Results   Component Value Date    WBC 8.1 10/07/2019     Lab Results   Component Value Date    RBC 4.21 10/07/2019     Lab Results   Component Value Date    HGB 11.0 10/07/2019     Lab Results   Component Value Date    HCT 35.3 10/07/2019     Lab Results   Component Value Date    MCV 84 10/07/2019     Lab Results   Component Value Date    MCH 26.1 10/07/2019     Lab Results   Component Value Date    MCHC 31.2 " 10/07/2019     Lab Results   Component Value Date    RDW 17.5 10/07/2019     Lab Results   Component Value Date     10/07/2019     Last Comprehensive Metabolic Panel:  Sodium   Date Value Ref Range Status   10/07/2019 142 133 - 144 mmol/L Final     Potassium   Date Value Ref Range Status   10/07/2019 3.8 3.4 - 5.3 mmol/L Final     Chloride   Date Value Ref Range Status   10/07/2019 111 (H) 94 - 109 mmol/L Final     Carbon Dioxide   Date Value Ref Range Status   10/07/2019 27 20 - 32 mmol/L Final     Anion Gap   Date Value Ref Range Status   10/07/2019 4 3 - 14 mmol/L Final     Glucose   Date Value Ref Range Status   10/07/2019 105 (H) 70 - 99 mg/dL Final     Urea Nitrogen   Date Value Ref Range Status   10/07/2019 21 7 - 30 mg/dL Final     Creatinine   Date Value Ref Range Status   10/07/2019 1.08 0.66 - 1.25 mg/dL Final     GFR Estimate   Date Value Ref Range Status   10/07/2019 66 >60 mL/min/[1.73_m2] Final     Comment:     Non  GFR Calc  Starting 12/18/2018, serum creatinine based estimated GFR (eGFR) will be   calculated using the Chronic Kidney Disease Epidemiology Collaboration   (CKD-EPI) equation.       Calcium   Date Value Ref Range Status   10/07/2019 8.9 8.5 - 10.1 mg/dL Final         ASSESSMENT/PLAN  (K85.90) Acute pancreatitis, unspecified complication status, unspecified pancreatitis type  (primary encounter diagnosis)  (B99.9) Intra-abdominal infection  Comment: infection  appears to be resolved. Completed Augmentin 10/6/2019. We are unsure if he has passed the stent. His wife has paperwork from [facility] indicating that he should have KUB 2 weeks after surgery to follow up on the stent.   Plan: XR today to check on stent placement. Follow up with [facility] pending the result.     (I77.89) Sneddon syndrome (H)  (D68.51) Heterozygous factor V Leiden mutation (H)  (Z86.73) History of CVA (cerebrovascular accident)  (Z79.01) Chronic anticoagulation  Comment: INR  slightly low yesterday  at 1.9. He received 7.5 mg coumadin last night.   Plan: coumadin  7.5 mg tonight. INR in am.      (F01.50) Vascular dementia without behavioral disturbance (H)  Comment: cognition is at baseline, per wife  Plan: cognitive testing is in progress. Staff to assist with cares.      (J44.9) Chronic obstructive pulmonary disease, unspecified COPD type (H)  Comment: no acute respiratory issues  Plan: continue prn albuterol. Closely monitor respiratory status.      (E66.01) Obesity (BMI 35.0-39.9) with comorbidity (H)  (G47.33) KORY (obstructive sleep apnea)  Comment: chronic.   Plan: continue CPAP at usual home settings. He has a sleep study 10/16/2019 to assess if he would benefit from BiPAP.      (I10) Essential hypertension  Comment: improved control from tcu admission.   Plan: continue amlodipine, lasix. Monitor VS and adjust meds as indicated.      (R60.0) Bilateral leg edema  Comment: acute on chronic LE edema improved.  Edema of hands has resolved. Weight down 7 lbs, probably  due to diuresis.   Plan: continue lasix 80 mg daily, his usual home dose. Compression stockings and elevate legs. Follow weight, BMP.      (E87.6) Hypokalemia  Comment:  replaced   Plan:  continue KCl. Follow BMP     (R53.81) Physical deconditioning  Comment:  progressing in therapies   Plan:  continue PHYSICAL THERAPY/OT. Goal is to return home with his wife and home services if needed.       Electronically signed by:  NISA Landon CNP               Sincerely,        NISA Landon CNP

## 2020-01-13 NOTE — DISCHARGE NOTE ADULT - DISCHARGE DATE
Called pt and LM for pt to call Cardiology back regarding stress test and pre-auths for insurance. Pt's [facility] will not cover stress test but her secondary insurance ([facility]) is no auth required. Would like pt to have stress test done prior to appt with Sakina on 1/21/20.    05-Jan-2018

## 2020-03-17 NOTE — ED PROVIDER NOTE - MUSCULOSKELETAL, MLM
Walk in [employer] appears normal, range of motion is not limited, no muscle or joint tenderness

## 2020-03-24 NOTE — PROGRESS NOTE ADULT - ASSESSMENT
Please schedule a virtual visit uncontrolled diarrhea - improved  hMPV viral uri  Eustachian tube dysfunction  severe copd  severe phtn  bucky  anemia  vol overload  s/p large volume paracentesis with recurrence of moderate ascites  Afib/flutter with rvr - improved  electrolyte disturbance  hypokalemia - improved.    continue current care  continue meds  d/c planning alix on current regimen

## 2020-04-01 NOTE — PROVIDER CONTACT NOTE (MEDICATION) - ASSESSMENT
Pt AOx4. VSS. PT has multiple bag. PT refuses to let nurse look through medication. Pt refuses to send meds to pharmacy. Negative

## 2021-01-01 NOTE — DIETITIAN INITIAL EVALUATION ADULT. - ORAL INTAKE PTA
Statement Selected
fair/Pt reports fair po intake PTA. States diet at home varied, often home-cooked meals prepared with no salt eggs and toast, pasta and shrimp, chicken. Pt drinks mostly water or chocolate milk. Pt denies taking vitamin/mineral/herbal supplement PTA. Denies food allergies or intolerance.

## 2021-05-21 NOTE — PROVIDER CONTACT NOTE (CRITICAL VALUE NOTIFICATION) - ASSESSMENT
VSS   /66   HR 92   pt asymptomatic, in bed, no complaints of SOB/ Chest pain
pt asymptomatic  VSS   no complaints of SOB/chest pain/ no s/s of AMS
No

## 2021-08-17 NOTE — PROVIDER CONTACT NOTE (OTHER) - BACKGROUND
Pt admitted for heart failure Odomzo Counseling- I discussed with the patient the risks of Odomzo including but not limited to nausea, vomiting, diarrhea, constipation, weight loss, changes in the sense of taste, decreased appetite, muscle spasms, and hair loss.  The patient verbalized understanding of the proper use and possible adverse effects of Odomzo.  All of the patient's questions and concerns were addressed.

## 2021-12-01 NOTE — PROGRESS NOTE ADULT - PROBLEM SELECTOR PLAN 1
Monitor. Telemetery  EP consulted: Cardizem 5 mg IVP x 1 given, repeat another dose now, may need Cardizem drip  Continue Digoxin  Correct hypokalemia  STAT Mg level  Keep Mg>2, K>4  no ac due to epistasis

## 2022-03-22 NOTE — PROGRESS NOTE ADULT - PROBLEM SELECTOR PLAN 1
Patient has symptoms of active infection at present. Continue with medications.    2nd metapnuemovirus   -Unable to sleep at night 2nd cough   -Refusing to take Tessalon, Mucinex (not effective per patient)   -Hesitant to prescribe hycodan with her CKD   -Flonase/Nasal saline for eustachian tube dysfunction 2nd viral illness   -No evidence of PNA on CXR, no leukocytosis or fever 2nd metapnuemovirus   -Unable to sleep at night 2nd cough   -Refusing to take Tessalon, Mucinex (not effective per patient)   -Hycodan qHS PRN cough  -Flonase/Nasal saline for eustachian tube dysfunction 2nd viral illness   -No evidence of PNA on CXR, no leukocytosis or fever

## 2022-04-18 NOTE — PROGRESS NOTE ADULT - ASSESSMENT
Right elbow and hip pain  malnutrition  volume overload/ascites/LE edema  sev PHTN/ Cor pulm  decomp diast hf  sev copd  restrictive lung disease sec to ascites which diminishes diaphragmatic excrusion  epistaxis resolved  CASSANDRA  Failure to thrive  Abd pain and likely recurrence of ascites  Hyponatremia    continue current care  hold diuretics  albumin givne today  hold bb if rates are low BIBA from home, pt called 911 for dizziness, diaphoresis. EMS arrived, pt was bradycardic.

## 2022-08-02 NOTE — PHYSICAL THERAPY INITIAL EVALUATION ADULT - LEVEL OF INDEPENDENCE: SIT/STAND, REHAB EVAL
Regarding: Sore throat ear ache post nasal drip  ----- Message from Lissette Hummel sent at 8/2/2022  7:57 AM EDT -----  "I have a sore throat, ear ache, and post nasal drip " independent

## 2022-10-14 NOTE — PROGRESS NOTE ADULT - SUBJECTIVE AND OBJECTIVE BOX
Left message on voicemail   [facility] - [facility]     PROGRESS NOTE    CHIEF COMPLAINT: CRF (hypoxic); COPD; emphysema; JOSE; secondary pulmonary hypertension; right heart failure; epistaxis; ascites;     INTERVAL HISTORY:  weak, tired and frail; hypoxic on nasal canula after left nostril "packed" by ENT for recurrent epistaxis; ongoing abdominal discomfort following large volume paracentesis (9500cc) 4 days ago; still with mild to moderate abdominal fullness; hypotensive and tachycardic; no cough, sputum production, chest congestion or wheeze; no fevers, chills or sweats; no chest pain/pressure or palpitations; legs swollen    REVIEW OF SYSTEMS:  Constitutional: As per interval history  HEENT: epistaxis  CV: As per interval history  Resp: As per interval history  GI: Within normal limits   : Within normal limits  Musculoskeletal: Within normal limits  Skin: Within normal limits  Neurological: Within normal limits  Psychiatric: Within normal limits  Endocrine: Within normal limits  Hematologic/Lymphatic: Within normal limits  Allergic/Immunologic: Within normal limits    MEDICATIONS:     Pulmonary "  HYDROcodone/homatropine Syrup 5 milliLiter(s) Oral at bedtime PRN      Anti-microbials:      Cardiovascular:  buMETAnide Injectable 1 milliGRAM(s) IV Push every 12 hours  digoxin     Tablet 0.125 milliGRAM(s) Oral every other day  diltiazem    milliGRAM(s) Oral daily  metoprolol     tartrate 25 milliGRAM(s) Oral every 8 hours      Other:  allopurinol 100 milliGRAM(s) Oral daily  aspirin enteric coated 81 milliGRAM(s) Oral daily  atorvastatin 20 milliGRAM(s) Oral at bedtime  BACItracin   Ointment 1 Application(s) Topical two times a day  clopidogrel Tablet 75 milliGRAM(s) Oral daily  docusate sodium 100 milliGRAM(s) Oral two times a day PRN  Gas-X extra strength (simethicone 125 mg 1 Tablet(s) 1 Tablet(s) Chew three times a day PRN  heparin  Injectable 5000 Unit(s) SubCutaneous every 8 hours  methimazole 5 milliGRAM(s) Oral daily  ondansetron Injectable 4 milliGRAM(s) IV Push every 8 hours PRN  pantoprazole    Tablet 40 milliGRAM(s) Oral before breakfast  potassium chloride    Tablet ER 10 milliEquivalent(s) Oral daily  predniSONE   Tablet 5 milliGRAM(s) Oral daily  sodium chloride 0.65% Nasal 1 Spray(s) Both Nostrils three times a day PRN  sucralfate suspension 1 Gram(s) Oral four times a day PRN        OBJECTIVE:        I&O's Detail    05 Mar 2018 07:  -  06 Mar 2018 07:00  --------------------------------------------------------  IN:    Oral Fluid: 580 mL  Total IN: 580 mL    OUT:    Voided: 1000 mL  Total OUT: 1000 mL    Total NET: -420 mL      06 Mar 2018 07:  -  06 Mar 2018 15:19  --------------------------------------------------------  IN:    Oral Fluid: 720 mL  Total IN: 720 mL    OUT:    Voided: 250 mL  Total OUT: 250 mL    Total NET: 470 mL    Daily Weight in k.4 (06 Mar 2018 07:11)    PHYSICAL EXAM:       ICU Vital Signs Last 24 Hrs  T(C): 36.3 (06 Mar 2018 13:29), Max: 36.6 (05 Mar 2018 21:16)  T(F): 97.3 (06 Mar 2018 13:29), Max: 97.8 (05 Mar 2018 21:16)  HR: 126 (06 Mar 2018 13:29) (64 - 126)  BP: 91/62 (06 Mar 2018 13:29) (87/56 - 93/60)  BP(mean): --  ABP: --  ABP(mean): --  RR: 22 (06 Mar 2018 13:29) (20 - 22)  SpO2: 95% (06 Mar 2018 13:29) 85% on 5lpm     General: Awake. Alert. Cooperative. Weak and tired. No distress. Chronically ill appearing	  HEENT:   Atraumatic. Bitemporal wasting. Anicteric. Normal oral mucosa, PERRL, EOMI  Neck: Supple. Trachea midline. Thyroid without enlargement/tenderness/nodules. No carotid bruit. (+) JVD; loss of bilateral supraclavicular fat pads	  Cardiovascular: Irregularly irregular rate and rhythm. S1 S2 normal. II/VI systolic murmur  Respiratory: Respirations unlabored. Decreased breath sounds throughout especially at the bases. Decreased chest wall excursion  Abdomen: Soft. Non-tender. Moderately distended with fluid wave. No organomegaly. No masses. Normal bowel sounds	  Extremities: Warm to touch. No clubbing or cyanosis. Mild to moderate lower extremity edema up to the thigh. Loss of extremity muscle mass  Pulses: Decreased lower extremity peripheral pulses  Skin: Normal skin color. No rashes or lesions. No ecchymoses, No cyanosis. Warm to touch  Lymph Nodes: Cervical, supraclavicular and axillary nodes normal  Neurological: Motor and sensory examination equal and normal. A and O x 3  Psychiatry: Depressed      LABS:                        9.8    9.2   )-----------( 264      ( 05 Mar 2018 20:38 )             30.2     03-06    135  |  92<L>  |  101<H>  ----------------------------<  98  4.2   |  30  |  2.73<H>    03-05    133<L>  |  92<L>  |  97<H>  ----------------------------<  99  4.5   |  31  |  2.71<H>    Ca      8.7      03-06    Ca      8.6      03-05    Phos    4.1     03-06    Phos    4.5     03-04      Mg       2.0     03-06    Mg       2.0     03-04    TPro  5.7<L>  /  Alb  2.9<L>  /  TBili  0.4  /  DBili  x   /  AST  8<L>  /  ALT  <5<L>  /  AlkPhos  49  03-02    TPro  7.2  /  Alb  3.1<L>  /  TBili  0.5  /  DBili  x   /  AST  34  /  ALT  32  /  AlkPhos  38<L>  03-    Serum Pro-Brain Natriuretic Peptide: 79253 pg/mL ( @ 16:29)    CARDIAC MARKERS ( 01 Mar 2018 16:29 )  x     / 0.06 ng/mL / x     / x     / 5.4 ng/mL    < from: Transthoracic Echocardiogram (17 @ 18:58) >    Patient name: FRAN ALEXANDRA  YOB: 1945   Age: 71 (F)   MR#: [id number]  Study Date: 2017  Location: [address] [place], [place] [id number]: [facility]  Study quality: Technically fair  Referring Physician: Pierce Cobb MD  Blood Pressure: 106/58 mmHg  Height: 160 cm  Weight: 64 kg  BSA: 1.7 m2  ------------------------------------------------------------------------  PROCEDURE: Transthoracic echocardiogram with 2-D, M-Mode  and complete spectral and color flow Doppler.  INDICATION: Other specified pulmonary heart diseases  (I27.89)  ------------------------------------------------------------------------  Dimensions:    Normal Values:  LA:     3.6    2.0 - 4.0 cm  Ao:     2.8    2.0 - 3.8 cm  SEPTUM: 0.7    0.6 - 1.2 cm  PWT:    0.9    0.6 - 1.1 cm  LVIDd:  4.1    3.0 - 5.6 cm  LVIDs:  1.9    1.8 - 4.0 cm  Derived variables:  LVMI: 58 g/m2  RWT: 0.43  Fractional short: 54 %  Doppler Peak Velocity (m/sec): AoV=1.8  ------------------------------------------------------------------------  Observations:  Mitral Valve: Mitral annular calcification, otherwise  normal mitral valve. Minimal mitral regurgitation.  Aortic Valve/Aorta: Calcified trileaflet aortic valve with  normal opening. Peak transaortic valve gradient equals 13  mm Hg, mean transaortic valve gradient equals 8 mm Hg. Peak  left ventricular outflow tract gradient equals 6 mm Hg,  mean gradient is equal to 3 mm Hg, LVOT velocity time  integral equals 19 cm.  Aortic Root: 2.8 cm.  LVOT diameter: 1.9 cm.  Left Atrium: Normal left atrium.  LA volume index = 25  cc/m2.  Left Ventricle: Hyperdynamic left ventricular systolic  function. Flattening of the interventricular septum in both  systole and diastole is  consistent with right ventricular  pressure overload. Normal left ventricular internal  dimensions and wall thicknesses.  Right Heart: Severe right atrial enlargement. Right  ventricular enlargement with decreased right ventricular  systolic function. Normal tricuspid valve. Mild-moderate  tricuspid regurgitation. Normal pulmonic valve.  Pericardium/Pleura: Normal pericardium with trace  pericardial effusion.  Left pleural effusion.  Hemodynamic: Estimated right atrial pressure is 8 mm Hg.  Estimated right ventricular systolic pressure equals 72 mm  Hg, assuming right atrial pressure equals 8 mm Hg,  consistent with severe pulmonary hypertension.  ------------------------------------------------------------------------  Conclusions:  1. Calcified trileaflet aortic valve with normal opening.  2. Normal left ventricular internal dimensions and wall  thicknesses.  3. Hyperdynamic left ventricular systolic function.  Flattening of the interventricular septum in both systole  and diastole is  consistent with right ventricular pressure  overload.  4. Severe right atrial enlargement.  5. Right ventricular enlargement with decreased right  ventricular systolic function.  6. Normal tricuspid valve. Mild-moderate tricuspid  regurgitation.  7. Estimated pulmonary artery systolic pressure equals 72  mm Hg, assuming right atrial pressure equals 8 mm Hg,  consistent with severe pulmonary pressures.  *** Compared with echocardiogram of 2017, no  significant changes noted.  ------------------------------------------------------------------------  Confirmed on  2017 - 13:15:09 by Justin Cohen M.D.  ------------------------------------------------------------------------    < end of copied text >  ---------------------------------------------------------------------------------------------------------  MICROBIOLOGY:     Culture - Body Fluid with Gram Stain (18 @ 21:16)    Gram Stain:   polymorphonuclear leukocytes seen per low power field  No organisms seen per oil power field  by cytocentrifuge    Specimen Source: Peritoneal Peritoneal Fluid    Culture Results:   No growth to date.    Culture - Acid Fast - Body Fluid w/Smear (18 @ 21:16)    Specimen Source: .Body Fluid Peritoneal Fluid    Acid Fast Bacilli Smear:   No acid fast bacilli seen by fluorochrome stain      RADIOLOGY:  [x] Chest radiographs reviewed and interpreted by me    < from: [facility] Duplex Lower Ext Vein Scan, [place] (18 @ 11:36) >    EXAM:  DUPLEX SCAN EXT VEINS LOWER BI                            PROCEDURE DATE:  2018      INTERPRETATION:  History:Severe pulmonary hypertension. COPD. Bilateral   lower extremity edema and shortness of breath. Evaluate for DVT.    Bilateral lower extremity venous duplex ultrasound from 2017   demonstrated no DVT.    There is edema of the superficial soft tissues of the lower extremities.    Both common femoral, superficial femoral and popliteal veins are patent   and compressible without evidence of thrombus.    The posterior tibial and peroneal veins are patent.  No thrombus is seen.    IMPRESSION: No evidence of deep vein thrombosis of either lower extremity.    JUDY DE LEON M.D., RADIOLOGY RESIDENT  This document has been electronically signed.  MARTIR VALVERDE M.D., ATTENDING RADIOLOGIST  This document has been electronically signed. Mar  5 2018  2:37PM      < end of copied text >  ---------------------------------------------------------------------------------------------------------  < from: Xray Abdomen 1 View PORTABLE -Urgent (18 @ 17:11) >    EXAM:  XR ABDOMEN PORTABLE URGENT 1V                            PROCEDURE DATE:  2018      INTERPRETATION:  CLINICAL INFORMATION: Abdominal distention. Evaluate for   obstruction.    TECHNIQUE: Portable abdominal radiograph dated 3/4/2018.    COMPARISON: Femoral ultrasound dated 3/2/2018. CT chest dated 3/3/2018.    FINDINGS: Top of Form 1      Large volume ascites.  There are no dilated loops of small bowel.   Bowel gas and stool identified throughout the colon and rectum.   There is no free air visualized.  The visualized osseous structures are unremarkable for patient's stated   age.    IMPRESSION:    Nonobstructive bowel gas pattern.    Ascites.    RACHEL VELARDE M.D., RADIOLOGY RESIDENT  This document has been electronically signed.  PIERRE BAILEY M.D., ATTENDING RADIOLOGIST  This document has been electronically signed. Mar  5 2018 10:22AM      < end of copied text >  ---------------------------------------------------------------------------------------------------------  < from: CT Chest No Cont (18 @ 18:24) >    EXAM:  CT CHEST                            PROCEDURE DATE:  2018            INTERPRETATION:  CLINICAL INFORMATION: Evaluate pleural effusions.   Shortness of breath.    COMPARISON: Chest CT dated 2017. Chest x-ray dated 3/1/2018.    PROCEDURE:   CT of the Chest was performed without intravenous contrast.  Sagittal and coronal reformats were performed.  Axial MIP reformats were also performed.    FINDINGS:    CHEST:     LUNGS AND LARGE AIRWAYS: Patent central airways.  Bibasilar subsegmental   atelectasis, right greater than left.  PLEURA: Trace bilateral pleural effusions.  VESSELS: Thoracic aortic and coronary artery atherosclerosis.  HEART: Cardiomegaly. Small pericardial effusion. Mitral annular   calcification. A densely calcified or metallic density measuring 1.1 x   0.4 cm overlies the basilar left pulmonary artery (3:52, 6:80).  MEDIASTINUM AND STEFANIA: No lymphadenopathy.  CHEST WALL AND LOWER NECK: Within normal limits.  VISUALIZED UPPER ABDOMEN: Small volume ascites. Atherosclerotic changes.  BONES: Multilevel degenerative disease.    IMPRESSION: Trace bilateral pleural effusions.  Bibasilar subsegmental atelectasis, right greater than left.  Densely calcified or metallic density overlies the left pulmonary artery.  See above..      JUAN JUAREZ M.D., RADIOLOGY RESIDENT  This document has been electronically signed.  MADONNA MORGAN M.D., ATTENDING RADIOLOGIST  This document has been electronically signed. Mar  4 2018 12:28PM      < end of copied text >  ---------------------------------------------------------------------------------------------------------    < from: Xray Chest 2 Views PA/Lat (18 @ 17:19) >    EXAM:  XR CHEST PA LAT 2V                            PROCEDURE DATE:  2018        INTERPRETATION:  CLINICAL INFORMATION: Dyspnea.     EXAM: PA and lateral view chest radiograph    COMPARISON: Chest radiograph 2017    FINDINGS:   Thecardiac silhouette is normal.  Mediastinal surgical clips unchanged.  Aortic calcification.    Left pleural effusion similar in appearance to the prior.    No acute osseous abnormalities.      IMPRESSION:   Clear lungs.    SUGEY RODRIGUEZ, RADIOLOGY RESIDENT  This document has been electronically signed.  RODO SYED M.D., ATTENDING RADIOLOGIST  This document has been electronically signed. Mar  2 2018  9:51AM      < end of copied text >  ---------------------------------------------------------------------------------------------------------    < from: US Abdomen Limited (18 @ 08:50) >    EXAM:  US ABDOMEN LIMITED                            PROCEDURE DATE:  2018      INTERPRETATION:  Clinical information: Assess for ascites for   paracentesis.    Comparison: Ultrasound 2017.    Findings: Targeted ultrasound was performed for purposes of ascites   evaluation. A large amount of ascites is noted in all 4 quadrants.    IMPRESSION:    Diffuse, large volume abdominal ascites.    GAEL SCHERER M.D., ATTENDING RADIOLOGIST  This document has been electronically signed. Mar  2 2018  9:16AM      < end of copied text >  ---------------------------------------------------------------------------------------------------------

## 2022-11-28 NOTE — PROVIDER CONTACT NOTE (CRITICAL VALUE NOTIFICATION) - ACTION/TREATMENT ORDERED:
pt refused Bumex IV Push, PO Bumex provided will continue to monitor.
stat ECG, Give dextrose 50% IV push, Kayexalate, sodium bicarbonate IV push, and continue to monitor.
Performed By: Gilberto Harrington
Expiration Date (Optional): 12/30/2023
Detail Level: None
Urine Pregnancy Test Result: negative
Lot # (Optional): [id number]

## 2023-02-13 NOTE — ED PROVIDER NOTE - VASCULAR COMPROMISE
Refill request received for Fluticasone Prop. Spray 50MCG.  Put in Dr. Silva' nurse basket.   no vascular compromise

## 2023-03-03 NOTE — DISCHARGE NOTE ADULT - CONDITION (STATED IN TERMS THAT PERMIT A SPECIFIC MEASURABLE COMPARISON WITH CONDITION ON ADMISSION):
----- Message from Tremontana Chevalier sent at 3/2/2023 12:53 PM CST -----  Regarding: appt  #pt clling again to make sure we have her correct callback numbers. Pt states she is ready to schedule EEG, Please call pt @ [phone] or [phone] No appts avail for pt access to [facility].        stable

## 2023-06-21 NOTE — PROGRESS NOTE ADULT - ASSESSMENT
Right elbow and hip pain  malnutrition  volume overload/ascites/LE edema  sev PHTN/ Cor pulm  decomp diast hf  sev copd  restrictive lung disease sec to ascites which diminishes diaphragmatic excursion  epistaxis resolved  CASSANDRA  Failure to thrive  Abd pain and likely recurrence of ascites  Hyponatremia    continue current care  attempt diuresis  wrap legs  use ocean to moisturize nasal passages to get epistaxis under control.  monitor labs closely. 21-Jun-2023 00:26:35

## 2023-08-03 NOTE — PHYSICAL THERAPY INITIAL EVALUATION ADULT - LEVEL OF INDEPENDENCE: STAND/SIT, REHAB EVAL
independent wears glasses for distance and reading/no diplopia/no photophobia/no lacrimation L/no lacrimation R/no blurred vision L/no blurred vision R/no discharge L/no discharge R no diplopia/no photophobia/no lacrimation L/no lacrimation R/no discharge L/no discharge R

## 2023-09-19 NOTE — DISCHARGE NOTE ADULT - DATE:
Goal Outcome Evaluation:  Plan of Care Reviewed With: patient, family        Progress: improving  Outcome Evaluation: Pt increased ambulation distance, but showed worsening balance and endurance, ambulating 80ft with RWx and min Ax2 with multiple LOB noted. Limited by decreased functional endurance, balance deficit, and generalized weakness compared to baseline. d/c rec for SNF.      Anticipated Discharge Disposition (PT): skilled nursing facility   06/18/2017

## 2024-01-16 NOTE — PROVIDER CONTACT NOTE (OTHER) - REASON
Outreach attempts to coordinate scheduling on the patient's Service to Orthopedics order in workqueue [phone] requested on 1/6/2024 have been conducted.    Please contact Jenelle if further coordination is needed.    pt c/o palpitations, parameters not set for metoprolol

## 2024-02-13 NOTE — CONSULT NOTE ADULT - PROVIDER SPECIALTY LIST ADULT
Dr. Gharibeh here at the bedside and verbal order given to increase levophed to 0.2 mcg/kg/min.   
ENT
Endocrinology
Gastroenterology
Pulmonology
Dental
Psychiatry

## 2024-02-21 NOTE — PATIENT PROFILE ADULT. - NS PRO TALK SOMEONE YN
Detail Level: Zone Sunscreen Recommendation Label Override: SPF 30+ recommended Detail Level: Generalized no

## 2024-03-12 NOTE — PROGRESS NOTE ADULT - SUBJECTIVE AND OBJECTIVE BOX
[facility]-Symone Bardales NP- PROGRESS NOTE    Patient seen and examined earlier sitting on edge of bed with c/o abdominal discomfort.       Hospital Medications:   MEDICATIONS  (STANDING):  allopurinol 100 milliGRAM(s) Oral daily  aspirin enteric coated 81 milliGRAM(s) Oral daily  atorvastatin 20 milliGRAM(s) Oral at bedtime  buMETAnide Injectable 1 milliGRAM(s) IV Push every 12 hours  clopidogrel Tablet 75 milliGRAM(s) Oral daily  digoxin     Tablet 0.125 milliGRAM(s) Oral every other day  diltiazem    milliGRAM(s) Oral daily  heparin  Injectable 5000 Unit(s) SubCutaneous every 8 hours  methimazole 5 milliGRAM(s) Oral daily  metoprolol     tartrate 25 milliGRAM(s) Oral every 8 hours  pantoprazole    Tablet 40 milliGRAM(s) Oral before breakfast  potassium chloride    Tablet ER 10 milliEquivalent(s) Oral daily  predniSONE   Tablet 5 milliGRAM(s) Oral daily      REVIEW OF SYSTEMS:  As per HPI, 8 out of 10 ROS were unremarkable    VITALS:  T(F): 98.3 (03-04-18 @ 20:27), Max: 98.6 (03-03-18 @ 21:24)  HR: 63 (03-04-18 @ 20:27)  BP: 125/76 (03-04-18 @ 20:27)  RR: 18 (03-04-18 @ 20:27)  SpO2: 95% (03-04-18 @ 20:27)  Wt(kg): --    03-03 @ 07:01  -  03-04 @ 07:00  --------------------------------------------------------  IN: 780 mL / OUT: 1100 mL / NET: -320 mL    03-04 @ 07:01  -  03-04 @ 21:23  --------------------------------------------------------  IN: 1020 mL / OUT: 625 mL / NET: 395 mL          PHYSICAL EXAM:  Constitutional: NAD  HEENT: PERRL  Neck: No JVD  Respiratory: CTAB, no wheezes, rales or rhonchi  Cardiovascular: S1, S2, RRR  Gastrointestinal: BS+, +distention  Extremities: No peripheral edema  Neurological: A/O x 3, no focal deficits  Psychiatric: Normal mood, normal affect  : No CVA tenderness. No dubois.   Skin: No rashes      LABS:      03-04    132<L>  |  91<L>  |  96<H>  ----------------------------<  160<H>  4.1   |  26  |  2.70<H>  03-03    133<L>  |  94<L>  |  96<H>  ----------------------------<  129<H>  4.7   |  28  |  2.84<H>  03-02    138  |  93<L>  |  86<H>  ----------------------------<  103<H>  3.7   |  31  |  2.58<H>    Ca    8.4      04 Mar 2018 05:24  Phos  4.5     03-04  Mg     2.0     03-04  Mg     2.1     03-02    TPro  5.7<L>  /  Alb  2.9<L>  /  TBili  0.4  /  DBili  x   /  AST  8<L>  /  ALT  <5<L>  /  AlkPhos  49  03-02                            9.2    7.76  )-----------( 225      ( 04 Mar 2018 06:44 )             29.0 Valtrex Pregnancy And Lactation Text: this medication is Pregnancy Category B and is considered safe during pregnancy. This medication is not directly found in breast milk but it's metabolite acyclovir is present. Oxybutynin Counseling:  I discussed with the patient the risks of oxybutynin including but not limited to skin rash, drowsiness, dry mouth, difficulty urinating, and blurred vision. Skyrizi Counseling: I discussed with the patient the risks of risankizumab-rzaa including but not limited to immunosuppression, and serious infections.  The patient understands that monitoring is required including a PPD at baseline and must alert us or the primary physician if symptoms of infection or other concerning signs are noted. Litfulo Counseling: I discussed with the patient the risks of Litfulo therapy including but not limited to upper respiratory tract infections, shingles, cold sores, and nausea. Live vaccines should be avoided.  This medication has been linked to serious infections; higher rate of mortality; malignancy and lymphoproliferative disorders; major adverse cardiovascular events; thrombosis; gastrointestinal perforations; neutropenia; lymphopenia; anemia; liver enzyme elevations; and lipid elevations. Taltz Pregnancy And Lactation Text: The risk during pregnancy and breastfeeding is uncertain with this medication. Wartpeel Counseling:  I discussed with the patient the risks of Wartpeel including but not limited to erythema, scaling, itching, weeping, crusting, and pain. Elidel Pregnancy And Lactation Text: This medication is Pregnancy Category C. It is unknown if this medication is excreted in breast milk. Dupixent Counseling: I discussed with the patient the risks of dupilumab including but not limited to eye inflammation and irritation, cold sores, injection site reactions, allergic reactions and increased risk of parasitic infection. The patient understands that monitoring is required and they must alert us or the primary physician if symptoms of infection or other concerning signs are noted. Hydroquinone Counseling:  Patient advised that medication may result in skin irritation, lightening (hypopigmentation), dryness, and burning.  In the event of skin irritation, the patient was advised to reduce the amount of the drug applied or use it less frequently.  Rarely, spots that are treated with hydroquinone can become darker (pseudoochronosis).  Should this occur, patient instructed to stop medication and call the office. The patient verbalized understanding of the proper use and possible adverse effects of hydroquinone.  All of the patient's questions and concerns were addressed. Olumiant Counseling: I discussed with the patient the risks of Olumiant therapy including but not limited to upper respiratory tract infections, shingles, cold sores, and nausea. Live vaccines should be avoided.  This medication has been linked to serious infections; higher rate of mortality; malignancy and lymphoproliferative disorders; major adverse cardiovascular events; thrombosis; gastrointestinal perforations; neutropenia; lymphopenia; anemia; liver enzyme elevations; and lipid elevations. Olanzapine Counseling- I discussed with the patient the common side effects of olanzapine including but are not limited to: lack of energy, dry mouth, increased appetite, sleepiness, tremor, constipation, dizziness, changes in behavior, or restlessness.  Explained that teenagers are more likely to experience headaches, abdominal pain, pain in the arms or legs, tiredness, and sleepiness.  Serious side effects include but are not limited: increased risk of death in elderly patients who are confused, have memory loss, or dementia-related psychosis; hyperglycemia; increased cholesterol and triglycerides; and weight gain. Dutasteride Pregnancy And Lactation Text: This medication is absolutely contraindicated in women, especially during pregnancy and breast feeding. Feminization of male fetuses is possible if taking while pregnant. Eucrisa Pregnancy And Lactation Text: This medication has not been assigned a Pregnancy Risk Category but animal studies failed to show danger with the topical medication. It is unknown if the medication is excreted in breast milk. Libtayo Counseling- I discussed with the patient the risks of Libtayo including but not limited to nausea, vomiting, diarrhea, and bone or muscle pain.  The patient verbalized understanding of the proper use and possible adverse effects of Libtayo.  All of the patient's questions and concerns were addressed. Aklief counseling:  Patient advised to apply a pea-sized amount only at bedtime and wait 30 minutes after washing their face before applying.  If too drying, patient may add a non-comedogenic moisturizer.  The most commonly reported side effects including irritation, redness, scaling, dryness, stinging, burning, itching, and increased risk of sunburn.  The patient verbalized understanding of the proper use and possible adverse effects of retinoids.  All of the patient's questions and concerns were addressed. Fluconazole Pregnancy And Lactation Text: This medication is Pregnancy Category C and it isn't know if it is safe during pregnancy. It is also excreted in breast milk. Cephalexin Counseling: I counseled the patient regarding use of cephalexin as an antibiotic for prophylactic and/or therapeutic purposes. Cephalexin (commonly prescribed under brand name Keflex) is a cephalosporin antibiotic which is active against numerous classes of bacteria, including most skin bacteria. Side effects may include nausea, diarrhea, gastrointestinal upset, rash, hives, yeast infections, and in rare cases, hepatitis, kidney disease, seizures, fever, confusion, neurologic symptoms, and others. Patients with severe allergies to penicillin medications are cautioned that there is about a 10% incidence of cross-reactivity with cephalosporins. When possible, patients with penicillin allergies should use alternatives to cephalosporins for antibiotic therapy. Clindamycin Pregnancy And Lactation Text: This medication can be used in pregnancy if certain situations. Clindamycin is also present in breast milk. Spironolactone Counseling: Patient advised regarding risks of diarrhea, abdominal pain, hyperkalemia, birth defects (for female patients), liver toxicity and renal toxicity. The patient may need blood work to monitor liver and kidney function and potassium levels while on therapy. The patient verbalized understanding of the proper use and possible adverse effects of spironolactone.  All of the patient's questions and concerns were addressed. Cimetidine Counseling:  I discussed with the patient the risks of Cimetidine including but not limited to gynecomastia, headache, diarrhea, nausea, drowsiness, arrhythmias, pancreatitis, skin rashes, psychosis, bone marrow suppression and kidney toxicity. Humira Counseling:  I discussed with the patient the risks of adalimumab including but not limited to myelosuppression, immunosuppression, autoimmune hepatitis, demyelinating diseases, lymphoma, and serious infections.  The patient understands that monitoring is required including a PPD at baseline and must alert us or the primary physician if symptoms of infection or other concerning signs are noted. Cosentyx Pregnancy And Lactation Text: This medication is Pregnancy Category B and is considered safe during pregnancy. It is unknown if this medication is excreted in breast milk. Zoryve Pregnancy And Lactation Text: It is unknown if this medication can cause problems during pregnancy and breastfeeding. Drysol Pregnancy And Lactation Text: This medication is considered safe during pregnancy and breast feeding. Hydroxyzine Counseling: Patient advised that the medication is sedating and not to drive a car after taking this medication.  Patient informed of potential adverse effects including but not limited to dry mouth, urinary retention, and blurry vision.  The patient verbalized understanding of the proper use and possible adverse effects of hydroxyzine.  All of the patient's questions and concerns were addressed. Fluconazole Counseling:  Patient counseled regarding adverse effects of fluconazole including but not limited to headache, diarrhea, nausea, upset stomach, liver function test abnormalities, taste disturbance, and stomach pain.  There is a rare possibility of liver failure that can occur when taking fluconazole.  The patient understands that monitoring of LFTs and kidney function test may be required, especially at baseline. The patient verbalized understanding of the proper use and possible adverse effects of fluconazole.  All of the patient's questions and concerns were addressed. High Dose Vitamin A Counseling: Side effects reviewed, pt to contact office should one occur. High Dose Vitamin A Pregnancy And Lactation Text: High dose vitamin A therapy is contraindicated during pregnancy and breast feeding. Bimzelx Pregnancy And Lactation Text: This medication crosses the placenta and the safety is uncertain during pregnancy. It is unknown if this medication is present in breast milk. Topical Ketoconazole Counseling: Patient counseled that this medication may cause skin irritation or allergic reactions.  In the event of skin irritation, the patient was advised to reduce the amount of the drug applied or use it less frequently.   The patient verbalized understanding of the proper use and possible adverse effects of ketoconazole.  All of the patient's questions and concerns were addressed. Topical Ketoconazole Pregnancy And Lactation Text: This medication is Pregnancy Category B and is considered safe during pregnancy. It is unknown if it is excreted in breast milk. Sotyktu Counseling:  I discussed the most common side effects of Sotyktu including: common cold, sore throat, sinus infections, cold sores, canker sores, folliculitis, and acne.  I also discussed more serious side effects of Sotyktu including but not limited to: serious allergic reactions; increased risk for infections such as TB; cancers such as lymphomas; rhabdomyolysis and elevated CPK; and elevated triglycerides and liver enzymes.  Arava Pregnancy And Lactation Text: This medication is Pregnancy Category X and is absolutely contraindicated during pregnancy. It is unknown if it is excreted in breast milk. Opzelura Pregnancy And Lactation Text: There is insufficient data to evaluate drug-associated risk for major birth defects, miscarriage, or other adverse maternal or fetal outcomes.  There is a pregnancy registry that monitors pregnancy outcomes in pregnant persons exposed to the medication during pregnancy.  It is unknown if this medication is excreted in breast milk.  Do not breastfeed during treatment and for about 4 weeks after the last dose. Soolantra Counseling: I discussed with the patients the risks of topial Soolantra. This is a medicine which decreases the number of mites and inflammation in the skin. You experience burning, stinging, eye irritation or allergic reactions.  Please call our office if you develop any problems from using this medication. Tazorac Pregnancy And Lactation Text: This medication is not safe during pregnancy. It is unknown if this medication is excreted in breast milk. Protopic Pregnancy And Lactation Text: This medication is Pregnancy Category C. It is unknown if this medication is excreted in breast milk when applied topically. Cibinqo Pregnancy And Lactation Text: It is unknown if this medication will adversely affect pregnancy or breast feeding.  You should not take this medication if you are currently pregnant or planning a pregnancy or while breastfeeding. Metronidazole Counseling:  I discussed with the patient the risks of metronidazole including but not limited to seizures, nausea/vomiting, a metallic taste in the mouth, nausea/vomiting and severe allergy. Solaraze Counseling:  I discussed with the patient the risks of Solaraze including but not limited to erythema, scaling, itching, weeping, crusting, and pain. Bimzelx Counseling:  I discussed with the patient the risks of Bimzelx including but not limited to depression, immunosuppression, allergic reactions and infections.  The patient understands that monitoring is required including a PPD at baseline and must alert us or the primary physician if symptoms of infection or other concerning signs are noted. Use Enhanced Medication Counseling?: No Minocycline Pregnancy And Lactation Text: This medication is Pregnancy Category D and not consider safe during pregnancy. It is also excreted in breast milk. Sotyktu Pregnancy And Lactation Text: There is insufficient data to evaluate whether or not Sotyktu is safe to use during pregnancy.   It is not known if Sotyktu passes into breast milk and whether or not it is safe to use when breastfeeding.   Siliq Counseling:  I discussed with the patient the risks of Siliq including but not limited to new or worsening depression, suicidal thoughts and behavior, immunosuppression, malignancy, posterior leukoencephalopathy syndrome, and serious infections.  The patient understands that monitoring is required including a PPD at baseline and must alert us or the primary physician if symptoms of infection or other concerning signs are noted. There is also a special program designed to monitor depression which is required with Siliq. Bactrim Counseling:  I discussed with the patient the risks of sulfa antibiotics including but not limited to GI upset, allergic reaction, drug rash, diarrhea, dizziness, photosensitivity, and yeast infections.  Rarely, more serious reactions can occur including but not limited to aplastic anemia, agranulocytosis, methemoglobinemia, blood dyscrasias, liver or kidney failure, lung infiltrates or desquamative/blistering drug rashes. Cyclosporine Counseling:  I discussed with the patient the risks of cyclosporine including but not limited to hypertension, gingival hyperplasia,myelosuppression, immunosuppression, liver damage, kidney damage, neurotoxicity, lymphoma, and serious infections. The patient understands that monitoring is required including baseline blood pressure, CBC, CMP, lipid panel and uric acid, and then 1-2 times monthly CMP and blood pressure. Acitretin Pregnancy And Lactation Text: This medication is Pregnancy Category X and should not be given to women who are pregnant or may become pregnant in the future. This medication is excreted in breast milk. Carac Counseling:  I discussed with the patient the risks of Carac including but not limited to erythema, scaling, itching, weeping, crusting, and pain. Adbry Pregnancy And Lactation Text: It is unknown if this medication will adversely affect pregnancy or breast feeding. Tazorac Counseling:  Patient advised that medication is irritating and drying.  Patient may need to apply sparingly and wash off after an hour before eventually leaving it on overnight.  The patient verbalized understanding of the proper use and possible adverse effects of tazorac.  All of the patient's questions and concerns were addressed. Olanzapine Pregnancy And Lactation Text: This medication is pregnancy category C.   There are no adequate and well controlled trials with olanzapine in pregnant females.  Olanzapine should be used during pregnancy only if the potential benefit justifies the potential risk to the fetus.   In a study in lactating healthy women, olanzapine was excreted in breast milk.  It is recommended that women taking olanzapine should not breast feed. 5-Fu Pregnancy And Lactation Text: This medication is Pregnancy Category X and contraindicated in pregnancy and in women who may become pregnant. It is unknown if this medication is excreted in breast milk. Odomzo Counseling- I discussed with the patient the risks of Odomzo including but not limited to nausea, vomiting, diarrhea, constipation, weight loss, changes in the sense of taste, decreased appetite, muscle spasms, and hair loss.  The patient verbalized understanding of the proper use and possible adverse effects of Odomzo.  All of the patient's questions and concerns were addressed. Calcipotriene Counseling:  I discussed with the patient the risks of calcipotriene including but not limited to erythema, scaling, itching, and irritation. Ketoconazole Pregnancy And Lactation Text: This medication is Pregnancy Category C and it isn't know if it is safe during pregnancy. It is also excreted in breast milk and breast feeding isn't recommended. Klisyri Pregnancy And Lactation Text: It is unknown if this medication can harm a developing fetus or if it is excreted in breast milk. Bactrim Pregnancy And Lactation Text: This medication is Pregnancy Category D and is known to cause fetal risk.  It is also excreted in breast milk. Propranolol Counseling:  I discussed with the patient the risks of propranolol including but not limited to low heart rate, low blood pressure, low blood sugar, restlessness and increased cold sensitivity. They should call the office if they experience any of these side effects. Xolair Counseling:  Patient informed of potential adverse effects including but not limited to fever, muscle aches, rash and allergic reactions.  The patient verbalized understanding of the proper use and possible adverse effects of Xolair.  All of the patient's questions and concerns were addressed. Azithromycin Counseling:  I discussed with the patient the risks of azithromycin including but not limited to GI upset, allergic reaction, drug rash, diarrhea, and yeast infections. Dutasteride Female Counseling: Dutasteride Counseling:  I discussed with the patient the risks of use of dutasteride including but not limited to decreased libido and sexual dysfunction. Explained the teratogenic nature of the medication and stressed the importance of not getting pregnant during treatment. All of the patient's questions and concerns were addressed. Doxycycline Counseling:  Patient counseled regarding possible photosensitivity and increased risk for sunburn.  Patient instructed to avoid sunlight, if possible.  When exposed to sunlight, patients should wear protective clothing, sunglasses, and sunscreen.  The patient was instructed to call the office immediately if the following severe adverse effects occur:  hearing changes, easy bruising/bleeding, severe headache, or vision changes.  The patient verbalized understanding of the proper use and possible adverse effects of doxycycline.  All of the patient's questions and concerns were addressed. Klisyri Counseling:  I discussed with the patient the risks of Klisyri including but not limited to erythema, scaling, itching, weeping, crusting, and pain. Benzoyl Peroxide Pregnancy And Lactation Text: This medication is Pregnancy Category C. It is unknown if benzoyl peroxide is excreted in breast milk. Adbry Counseling: I discussed with the patient the risks of tralokinumab including but not limited to eye infection and irritation, cold sores, injection site reactions, worsening of asthma, allergic reactions and increased risk of parasitic infection.  Live vaccines should be avoided while taking tralokinumab. The patient understands that monitoring is required and they must alert us or the primary physician if symptoms of infection or other concerning signs are noted. Elidel Counseling: Patient may experience a mild burning sensation during topical application. Elidel is not approved in children less than 2 years of age. There have been case reports of hematologic and skin malignancies in patients using topical calcineurin inhibitors although causality is questionable. Olumiant Pregnancy And Lactation Text: Based on animal studies, Olumiant may cause embryo-fetal harm when administered to pregnant women.  The medication should not be used in pregnancy.  Breastfeeding is not recommended during treatment. Finasteride Female Counseling: Finasteride Counseling:  I discussed with the patient the risks of use of finasteride including but not limited to decreased libido and sexual dysfunction. Explained the teratogenic nature of the medication and stressed the importance of not getting pregnant during treatment. All of the patient's questions and concerns were addressed. Cephalexin Pregnancy And Lactation Text: This medication is Pregnancy Category B and considered safe during pregnancy.  It is also excreted in breast milk but can be used safely for shorter doses. Hydroxyzine Pregnancy And Lactation Text: This medication is not safe during pregnancy and should not be taken. It is also excreted in breast milk and breast feeding isn't recommended. Isotretinoin Pregnancy And Lactation Text: This medication is Pregnancy Category X and is considered extremely dangerous during pregnancy. It is unknown if it is excreted in breast milk. Thalidomide Counseling: I discussed with the patient the risks of thalidomide including but not limited to birth defects, anxiety, weakness, chest pain, dizziness, cough and severe allergy. Cellcept Pregnancy And Lactation Text: This medication is Pregnancy Category D and isn't considered safe during pregnancy. It is unknown if this medication is excreted in breast milk. Rhofade Pregnancy And Lactation Text: This medication has not been assigned a Pregnancy Risk Category. It is unknown if the medication is excreted in breast milk. SSKI Counseling:  I discussed with the patient the risks of SSKI including but not limited to thyroid abnormalities, metallic taste, GI upset, fever, headache, acne, arthralgias, paraesthesias, lymphadenopathy, easy bleeding, arrhythmias, and allergic reaction. Imiquimod Counseling:  I discussed with the patient the risks of imiquimod including but not limited to erythema, scaling, itching, weeping, crusting, and pain.  Patient understands that the inflammatory response to imiquimod is variable from person to person and was educated regarded proper titration schedule.  If flu-like symptoms develop, patient knows to discontinue the medication and contact us. Dupixent Pregnancy And Lactation Text: This medication likely crosses the placenta but the risk for the fetus is uncertain. This medication is excreted in breast milk. Clofazimine Counseling:  I discussed with the patient the risks of clofazimine including but not limited to skin and eye pigmentation, liver damage, nausea/vomiting, gastrointestinal bleeding and allergy. Eucrisa Counseling: Patient may experience a mild burning sensation during topical application. Eucrisa is not approved in children less than 3 months of age. Protopic Counseling: Patient may experience a mild burning sensation during topical application. Protopic is not approved in children less than 2 years of age. There have been case reports of hematologic and skin malignancies in patients using topical calcineurin inhibitors although causality is questionable. Erivedge Counseling- I discussed with the patient the risks of Erivedge including but not limited to nausea, vomiting, diarrhea, constipation, weight loss, changes in the sense of taste, decreased appetite, muscle spasms, and hair loss.  The patient verbalized understanding of the proper use and possible adverse effects of Erivedge.  All of the patient's questions and concerns were addressed. Valtrex Counseling: I discussed with the patient the risks of valacyclovir including but not limited to kidney damage, nausea, vomiting and severe allergy.  The patient understands that if the infection seems to be worsening or is not improving, they are to call. Prednisone Pregnancy And Lactation Text: This medication is Pregnancy Category C and it isn't know if it is safe during pregnancy. This medication is excreted in breast milk. Oral Minoxidil Pregnancy And Lactation Text: This medication should only be used when clearly needed if you are pregnant, attempting to become pregnant or breast feeding. Cyclophosphamide Counseling:  I discussed with the patient the risks of cyclophosphamide including but not limited to hair loss, hormonal abnormalities, decreased fertility, abdominal pain, diarrhea, nausea and vomiting, bone marrow suppression and infection. The patient understands that monitoring is required while taking this medication. Erythromycin Pregnancy And Lactation Text: This medication is Pregnancy Category B and is considered safe during pregnancy. It is also excreted in breast milk. Topical Clindamycin Counseling: Patient counseled that this medication may cause skin irritation or allergic reactions.  In the event of skin irritation, the patient was advised to reduce the amount of the drug applied or use it less frequently.   The patient verbalized understanding of the proper use and possible adverse effects of clindamycin.  All of the patient's questions and concerns were addressed. Zoryve Counseling:  I discussed with the patient that Zoryve is not for use in the eyes, mouth or vagina. The most commonly reported side effects include diarrhea, headache, insomnia, application site pain, upper respiratory tract infections, and urinary tract infections.  All of the patient's questions and concerns were addressed. Minocycline Counseling: Patient advised regarding possible photosensitivity and discoloration of the teeth, skin, lips, tongue and gums.  Patient instructed to avoid sunlight, if possible.  When exposed to sunlight, patients should wear protective clothing, sunglasses, and sunscreen.  The patient was instructed to call the office immediately if the following severe adverse effects occur:  hearing changes, easy bruising/bleeding, severe headache, or vision changes.  The patient verbalized understanding of the proper use and possible adverse effects of minocycline.  All of the patient's questions and concerns were addressed. Metronidazole Pregnancy And Lactation Text: This medication is Pregnancy Category B and considered safe during pregnancy.  It is also excreted in breast milk. Spironolactone Pregnancy And Lactation Text: This medication can cause feminization of the male fetus and should be avoided during pregnancy. The active metabolite is also found in breast milk. Tranexamic Acid Pregnancy And Lactation Text: It is unknown if this medication is safe during pregnancy or breast feeding. Solaraze Pregnancy And Lactation Text: This medication is Pregnancy Category B and is considered safe. There is some data to suggest avoiding during the third trimester. It is unknown if this medication is excreted in breast milk. Xolair Pregnancy And Lactation Text: This medication is Pregnancy Category B and is considered safe during pregnancy. This medication is excreted in breast milk. Cyclophosphamide Pregnancy And Lactation Text: This medication is Pregnancy Category D and it isn't considered safe during pregnancy. This medication is excreted in breast milk. Rhofade Counseling: Rhofade is a topical medication which can decrease superficial blood flow where applied. Side effects are uncommon and include stinging, redness and allergic reactions. Benzoyl Peroxide Counseling: Patient counseled that medicine may cause skin irritation and bleach clothing.  In the event of skin irritation, the patient was advised to reduce the amount of the drug applied or use it less frequently.   The patient verbalized understanding of the proper use and possible adverse effects of benzoyl peroxide.  All of the patient's questions and concerns were addressed. Doxycycline Pregnancy And Lactation Text: This medication is Pregnancy Category D and not consider safe during pregnancy. It is also excreted in breast milk but is considered safe for shorter treatment courses. Bexarotene Pregnancy And Lactation Text: This medication is Pregnancy Category X and should not be given to women who are pregnant or may become pregnant. This medication should not be used if you are breast feeding. Zyclara Counseling:  I discussed with the patient the risks of imiquimod including but not limited to erythema, scaling, itching, weeping, crusting, and pain.  Patient understands that the inflammatory response to imiquimod is variable from person to person and was educated regarded proper titration schedule.  If flu-like symptoms develop, patient knows to discontinue the medication and contact us. Topical Metronidazole Pregnancy And Lactation Text: This medication is Pregnancy Category B and considered safe during pregnancy.  It is also considered safe to use while breastfeeding. Opzelura Counseling:  I discussed with the patient the risks of Opzelura including but not limited to nasopharngitis, bronchitis, ear infection, eosinophila, hives, diarrhea, folliculitis, tonsillitis, and rhinorrhea.  Taken orally, this medication has been linked to serious infections; higher rate of mortality; malignancy and lymphoproliferative disorders; major adverse cardiovascular events; thrombosis; thrombocytopenia, anemia, and neutropenia; and lipid elevations. Azelaic Acid Counseling: Patient counseled that medicine may cause skin irritation and to avoid applying near the eyes.  In the event of skin irritation, the patient was advised to reduce the amount of the drug applied or use it less frequently.   The patient verbalized understanding of the proper use and possible adverse effects of azelaic acid.  All of the patient's questions and concerns were addressed. Cosentyx Counseling:  I discussed with the patient the risks of Cosentyx including but not limited to worsening of Crohn's disease, immunosuppression, allergic reactions and infections.  The patient understands that monitoring is required including a PPD at baseline and must alert us or the primary physician if symptoms of infection or other concerning signs are noted. Simponi Counseling:  I discussed with the patient the risks of golimumab including but not limited to myelosuppression, immunosuppression, autoimmune hepatitis, demyelinating diseases, lymphoma, and serious infections.  The patient understands that monitoring is required including a PPD at baseline and must alert us or the primary physician if symptoms of infection or other concerning signs are noted. Sski Pregnancy And Lactation Text: This medication is Pregnancy Category D and isn't considered safe during pregnancy. It is excreted in breast milk. Quinolones Counseling:  I discussed with the patient the risks of fluoroquinolones including but not limited to GI upset, allergic reaction, drug rash, diarrhea, dizziness, photosensitivity, yeast infections, liver function test abnormalities, tendonitis/tendon rupture. Rituxan Pregnancy And Lactation Text: This medication is Pregnancy Category C and it isn't know if it is safe during pregnancy. It is unknown if this medication is excreted in breast milk but similar antibodies are known to be excreted. Rifampin Counseling: I discussed with the patient the risks of rifampin including but not limited to liver damage, kidney damage, red-orange body fluids, nausea/vomiting and severe allergy. Tetracycline Counseling: Patient counseled regarding possible photosensitivity and increased risk for sunburn.  Patient instructed to avoid sunlight, if possible.  When exposed to sunlight, patients should wear protective clothing, sunglasses, and sunscreen.  The patient was instructed to call the office immediately if the following severe adverse effects occur:  hearing changes, easy bruising/bleeding, severe headache, or vision changes.  The patient verbalized understanding of the proper use and possible adverse effects of tetracycline.  All of the patient's questions and concerns were addressed. Patient understands to avoid pregnancy while on therapy due to potential birth defects. Minoxidil Counseling: Minoxidil is a topical medication which can increase blood flow where it is applied. It is uncertain how this medication increases hair growth. Side effects are uncommon and include stinging and allergic reactions. Topical Steroids Counseling: I discussed with the patient that prolonged use of topical steroids can result in the increased appearance of superficial blood vessels (telangiectasias), lightening (hypopigmentation) and thinning of the skin (atrophy).  Patient understands to avoid using high potency steroids in skin folds, the groin or the face.  The patient verbalized understanding of the proper use and possible adverse effects of topical steroids.  All of the patient's questions and concerns were addressed. Ketoconazole Counseling:   Patient counseled regarding improving absorption with orange juice.  Adverse effects include but are not limited to breast enlargement, headache, diarrhea, nausea, upset stomach, liver function test abnormalities, taste disturbance, and stomach pain.  There is a rare possibility of liver failure that can occur when taking ketoconazole. The patient understands that monitoring of LFTs may be required, especially at baseline. The patient verbalized understanding of the proper use and possible adverse effects of ketoconazole.  All of the patient's questions and concerns were addressed. Ivermectin Counseling:  Patient instructed to take medication on an empty stomach with a full glass of water.  Patient informed of potential adverse effects including but not limited to nausea, diarrhea, dizziness, itching, and swelling of the extremities or lymph nodes.  The patient verbalized understanding of the proper use and possible adverse effects of ivermectin.  All of the patient's questions and concerns were addressed. Dutasteride Male Counseling: Dustasteride Counseling:  I discussed with the patient the risks of use of dutasteride including but not limited to decreased libido, decreased ejaculate volume, and gynecomastia. Women who can become pregnant should not handle medication.  All of the patient's questions and concerns were addressed. Glycopyrrolate Counseling:  I discussed with the patient the risks of glycopyrrolate including but not limited to skin rash, drowsiness, dry mouth, difficulty urinating, and blurred vision. Clofazimine Pregnancy And Lactation Text: This medication is Pregnancy Category C and isn't considered safe during pregnancy. It is excreted in breast milk. Qbrexza Counseling:  I discussed with the patient the risks of Qbrexza including but not limited to headache, mydriasis, blurred vision, dry eyes, nasal dryness, dry mouth, dry throat, dry skin, urinary hesitation, and constipation.  Local skin reactions including erythema, burning, stinging, and itching can also occur. Hydroxychloroquine Pregnancy And Lactation Text: This medication has been shown to cause fetal harm but it isn't assigned a Pregnancy Risk Category. There are small amounts excreted in breast milk. 5-Fu Counseling: 5-Fluorouracil Counseling:  I discussed with the patient the risks of 5-fluorouracil including but not limited to erythema, scaling, itching, weeping, crusting, and pain. Isotretinoin Counseling: Patient should get monthly blood tests, not donate blood, not drive at night if vision affected, not share medication, and not undergo elective surgery for 6 months after tx completed. Side effects reviewed, pt to contact office should one occur. Rinvoq Counseling: I discussed with the patient the risks of Rinvoq therapy including but not limited to upper respiratory tract infections, shingles, cold sores, bronchitis, nausea, cough, fever, acne, and headache. Live vaccines should be avoided.  This medication has been linked to serious infections; higher rate of mortality; malignancy and lymphoproliferative disorders; major adverse cardiovascular events; thrombosis; thrombocytopenia, anemia, and neutropenia; lipid elevations; liver enzyme elevations; and gastrointestinal perforations. Nsaids Counseling: NSAID Counseling: I discussed with the patient that NSAIDs should be taken with food. Prolonged use of NSAIDs can result in the development of stomach ulcers.  Patient advised to stop taking NSAIDs if abdominal pain occurs.  The patient verbalized understanding of the proper use and possible adverse effects of NSAIDs.  All of the patient's questions and concerns were addressed. Rifampin Pregnancy And Lactation Text: This medication is Pregnancy Category C and it isn't know if it is safe during pregnancy. It is also excreted in breast milk and should not be used if you are breast feeding. Libtayo Pregnancy And Lactation Text: This medication is contraindicated in pregnancy and when breast feeding. Opioid Counseling: I discussed with the patient the potential side effects of opioids including but not limited to addiction, altered mental status, and depression. I stressed avoiding alcohol, benzodiazepines, muscle relaxants and sleep aids unless specifically okayed by a physician. The patient verbalized understanding of the proper use and possible adverse effects of opioids. All of the patient's questions and concerns were addressed. They were instructed to flush the remaining pills down the toilet if they did not need them for pain. Erythromycin Counseling:  I discussed with the patient the risks of erythromycin including but not limited to GI upset, allergic reaction, drug rash, diarrhea, increase in liver enzymes, and yeast infections. Dapsone Counseling: I discussed with the patient the risks of dapsone including but not limited to hemolytic anemia, agranulocytosis, rashes, methemoglobinemia, kidney failure, peripheral neuropathy, headaches, GI upset, and liver toxicity.  Patients who start dapsone require monitoring including baseline LFTs and weekly CBCs for the first month, then every month thereafter.  The patient verbalized understanding of the proper use and possible adverse effects of dapsone.  All of the patient's questions and concerns were addressed. Mirvaso Counseling: Mirvaso is a topical medication which can decrease superficial blood flow where applied. Side effects are uncommon and include stinging, redness and allergic reactions. Aklief Pregnancy And Lactation Text: It is unknown if this medication is safe to use during pregnancy.  It is unknown if this medication is excreted in breast milk.  Breastfeeding women should use the topical cream on the smallest area of the skin for the shortest time needed while breastfeeding.  Do not apply to nipple and areola. Xeljanz Counseling: I discussed with the patient the risks of Xeljanz therapy including increased risk of infection, liver issues, headache, diarrhea, or cold symptoms. Live vaccines should be avoided. They were instructed to call if they have any problems. Griseofulvin Pregnancy And Lactation Text: This medication is Pregnancy Category X and is known to cause serious birth defects. It is unknown if this medication is excreted in breast milk but breast feeding should be avoided. Doxepin Counseling:  Patient advised that the medication is sedating and not to drive a car after taking this medication. Patient informed of potential adverse effects including but not limited to dry mouth, urinary retention, and blurry vision.  The patient verbalized understanding of the proper use and possible adverse effects of doxepin.  All of the patient's questions and concerns were addressed. Litfulo Pregnancy And Lactation Text: Based on animal studies, Lifulo may cause embryo-fetal harm when administered to pregnant women.  The medication should not be used in pregnancy.  Breastfeeding is not recommended during treatment. Topical Sulfur Applications Counseling: Topical Sulfur Counseling: Patient counseled that this medication may cause skin irritation or allergic reactions.  In the event of skin irritation, the patient was advised to reduce the amount of the drug applied or use it less frequently.   The patient verbalized understanding of the proper use and possible adverse effects of topical sulfur application.  All of the patient's questions and concerns were addressed. Niacinamide Pregnancy And Lactation Text: These medications are considered safe during pregnancy. Glycopyrrolate Pregnancy And Lactation Text: This medication is Pregnancy Category B and is considered safe during pregnancy. It is unknown if it is excreted breast milk. Sarecycline Counseling: Patient advised regarding possible photosensitivity and discoloration of the teeth, skin, lips, tongue and gums.  Patient instructed to avoid sunlight, if possible.  When exposed to sunlight, patients should wear protective clothing, sunglasses, and sunscreen.  The patient was instructed to call the office immediately if the following severe adverse effects occur:  hearing changes, easy bruising/bleeding, severe headache, or vision changes.  The patient verbalized understanding of the proper use and possible adverse effects of sarecycline.  All of the patient's questions and concerns were addressed. Calcipotriene Pregnancy And Lactation Text: The use of this medication during pregnancy or lactation is not recommended as there is insufficient data. Winlevi Pregnancy And Lactation Text: This medication is considered safe during pregnancy and breastfeeding. Topical Steroids Applications Pregnancy And Lactation Text: Most topical steroids are considered safe to use during pregnancy and lactation.  Any topical steroid applied to the breast or nipple should be washed off before breastfeeding. Methotrexate Counseling:  Patient counseled regarding adverse effects of methotrexate including but not limited to nausea, vomiting, abnormalities in liver function tests. Patients may develop mouth sores, rash, diarrhea, and abnormalities in blood counts. The patient understands that monitoring is required including LFT's and blood counts.  There is a rare possibility of scarring of the liver and lung problems that can occur when taking methotrexate. Persistent nausea, loss of appetite, pale stools, dark urine, cough, and shortness of breath should be reported immediately. Patient advised to discontinue methotrexate treatment at least three months before attempting to become pregnant.  I discussed the need for folate supplements while taking methotrexate.  These supplements can decrease side effects during methotrexate treatment. The patient verbalized understanding of the proper use and possible adverse effects of methotrexate.  All of the patient's questions and concerns were addressed. Methotrexate Pregnancy And Lactation Text: This medication is Pregnancy Category X and is known to cause fetal harm. This medication is excreted in breast milk. Picato Counseling:  I discussed with the patient the risks of Picato including but not limited to erythema, scaling, itching, weeping, crusting, and pain. Doxepin Pregnancy And Lactation Text: This medication is Pregnancy Category C and it isn't known if it is safe during pregnancy. It is also excreted in breast milk and breast feeding isn't recommended. Finasteride Pregnancy And Lactation Text: This medication is absolutely contraindicated during pregnancy. It is unknown if it is excreted in breast milk. VTAMA Counseling: I discussed with the patient that VTAMA is not for use in the eyes, mouth or mouth. They should call the office if they develop any signs of allergic reactions to VTAMA. The patient verbalized understanding of the proper use and possible adverse effects of VTAMA.  All of the patient's questions and concerns were addressed. Colchicine Counseling:  Patient counseled regarding adverse effects including but not limited to stomach upset (nausea, vomiting, stomach pain, or diarrhea).  Patient instructed to limit alcohol consumption while taking this medication.  Colchicine may reduce blood counts especially with prolonged use.  The patient understands that monitoring of kidney function and blood counts may be required, especially at baseline. The patient verbalized understanding of the proper use and possible adverse effects of colchicine.  All of the patient's questions and concerns were addressed. Griseofulvin Counseling:  I discussed with the patient the risks of griseofulvin including but not limited to photosensitivity, cytopenia, liver damage, nausea/vomiting and severe allergy.  The patient understands that this medication is best absorbed when taken with a fatty meal (e.g., ice cream or french fries). Ivermectin Pregnancy And Lactation Text: This medication is Pregnancy Category C and it isn't known if it is safe during pregnancy. It is also excreted in breast milk. Cimzia Counseling:  I discussed with the patient the risks of Cimzia including but not limited to immunosuppression, allergic reactions and infections.  The patient understands that monitoring is required including a PPD at baseline and must alert us or the primary physician if symptoms of infection or other concerning signs are noted. Stelara Counseling:  I discussed with the patient the risks of ustekinumab including but not limited to immunosuppression, malignancy, posterior leukoencephalopathy syndrome, and serious infections.  The patient understands that monitoring is required including a PPD at baseline and must alert us or the primary physician if symptoms of infection or other concerning signs are noted. Cantharidin Counseling:  I discussed with the patient the risks of Cantharidin including but not limited to pain, redness, burning, itching, and blistering. Infliximab Counseling:  I discussed with the patient the risks of infliximab including but not limited to myelosuppression, immunosuppression, autoimmune hepatitis, demyelinating diseases, lymphoma, and serious infections.  The patient understands that monitoring is required including a PPD at baseline and must alert us or the primary physician if symptoms of infection or other concerning signs are noted. Birth Control Pills Pregnancy And Lactation Text: This medication should be avoided if pregnant and for the first 30 days post-partum. Hydroxychloroquine Counseling:  I discussed with the patient that a baseline ophthalmologic exam is needed at the start of therapy and every year thereafter while on therapy. A CBC may also be warranted for monitoring.  The side effects of this medication were discussed with the patient, including but not limited to agranulocytosis, aplastic anemia, seizures, rashes, retinopathy, and liver toxicity. Patient instructed to call the office should any adverse effect occur.  The patient verbalized understanding of the proper use and possible adverse effects of Plaquenil.  All the patient's questions and concerns were addressed. Terbinafine Counseling: Patient counseling regarding adverse effects of terbinafine including but not limited to headache, diarrhea, rash, upset stomach, liver function test abnormalities, itching, taste/smell disturbance, nausea, abdominal pain, and flatulence.  There is a rare possibility of liver failure that can occur when taking terbinafine.  The patient understands that a baseline LFT and kidney function test may be required. The patient verbalized understanding of the proper use and possible adverse effects of terbinafine.  All of the patient's questions and concerns were addressed. Dapsone Pregnancy And Lactation Text: This medication is Pregnancy Category C and is not considered safe during pregnancy or breast feeding. Opioid Pregnancy And Lactation Text: These medications can lead to premature delivery and should be avoided during pregnancy. These medications are also present in breast milk in small amounts. Taltz Counseling: I discussed with the patient the risks of ixekizumab including but not limited to immunosuppression, serious infections, worsening of inflammatory bowel disease and drug reactions.  The patient understands that monitoring is required including a PPD at baseline and must alert us or the primary physician if symptoms of infection or other concerning signs are noted. Enbrel Counseling:  I discussed with the patient the risks of etanercept including but not limited to myelosuppression, immunosuppression, autoimmune hepatitis, demyelinating diseases, lymphoma, and infections.  The patient understands that monitoring is required including a PPD at baseline and must alert us or the primary physician if symptoms of infection or other concerning signs are noted. Detail Level: Simple Tremfya Counseling: I discussed with the patient the risks of guselkumab including but not limited to immunosuppression, serious infections, and drug reactions.  The patient understands that monitoring is required including a PPD at baseline and must alert us or the primary physician if symptoms of infection or other concerning signs are noted. Cellcept Counseling:  I discussed with the patient the risks of mycophenolate mofetil including but not limited to infection/immunosuppression, GI upset, hypokalemia, hypercholesterolemia, bone marrow suppression, lymphoproliferative disorders, malignancy, GI ulceration/bleed/perforation, colitis, interstitial lung disease, kidney failure, progressive multifocal leukoencephalopathy, and birth defects.  The patient understands that monitoring is required including a baseline creatinine and regular CBC testing. In addition, patient must alert us immediately if symptoms of infection or other concerning signs are noted. Cimetidine Pregnancy And Lactation Text: This medication is Pregnancy Category B and is considered safe during pregnancy. It is also excreted in breast milk and breast feeding isn't recommended. Clindamycin Counseling: I counseled the patient regarding use of clindamycin as an antibiotic for prophylactic and/or therapeutic purposes. Clindamycin is active against numerous classes of bacteria, including skin bacteria. Side effects may include nausea, diarrhea, gastrointestinal upset, rash, hives, yeast infections, and in rare cases, colitis. Winlevi Counseling:  I discussed with the patient the risks of topical clascoterone including but not limited to erythema, scaling, itching, and stinging. Patient voiced their understanding. Itraconazole Counseling:  I discussed with the patient the risks of itraconazole including but not limited to liver damage, nausea/vomiting, neuropathy, and severe allergy.  The patient understands that this medication is best absorbed when taken with acidic beverages such as non-diet cola or ginger ale.  The patient understands that monitoring is required including baseline LFTs and repeat LFTs at intervals.  The patient understands that they are to contact us or the primary physician if concerning signs are noted. Xeljeriz Pregnancy And Lactation Text: This medication is Pregnancy Category D and is not considered safe during pregnancy.  The risk during breast feeding is also uncertain. Qbrexza Pregnancy And Lactation Text: There is no available data on Qbrexza use in pregnant women.  There is no available data on Qbrexza use in lactation. Otezla Counseling: The side effects of Otezla were discussed with the patient, including but not limited to worsening or new depression, weight loss, diarrhea, nausea, upper respiratory tract infection, and headache. Patient instructed to call the office should any adverse effect occur.  The patient verbalized understanding of the proper use and possible adverse effects of Otezla.  All the patient's questions and concerns were addressed. Drysol Counseling:  I discussed with the patient the risks of drysol/aluminum chloride including but not limited to skin rash, itching, irritation, burning. Oral Minoxidil Counseling- I discussed with the patient the risks of oral minoxidil including but not limited to shortness of breath, swelling of the feet or ankles, dizziness, lightheadedness, unwanted hair growth and allergic reaction.  The patient verbalized understanding of the proper use and possible adverse effects of oral minoxidil.  All of the patient's questions and concerns were addressed. Rinvoq Pregnancy And Lactation Text: Based on animal studies, Rinvoq may cause embryo-fetal harm when administered to pregnant women.  The medication should not be used in pregnancy.  Breastfeeding is not recommended during treatment and for 6 days after the last dose. Albendazole Counseling:  I discussed with the patient the risks of albendazole including but not limited to cytopenia, kidney damage, nausea/vomiting and severe allergy.  The patient understands that this medication is being used in an off-label manner. Cibinqo Counseling: I discussed with the patient the risks of Cibinqo therapy including but not limited to common cold, nausea, headache, cold sores, increased blood CPK levels, dizziness, UTIs, fatigue, acne, and vomitting. Live vaccines should be avoided.  This medication has been linked to serious infections; higher rate of mortality; malignancy and lymphoproliferative disorders; major adverse cardiovascular events; thrombosis; thrombocytopenia and lymphopenia; lipid elevations; and retinal detachment. Cimzia Pregnancy And Lactation Text: This medication crosses the placenta but can be considered safe in certain situations. Cimzia may be excreted in breast milk. Topical Retinoid counseling:  Patient advised to apply a pea-sized amount only at bedtime and wait 30 minutes after washing their face before applying.  If too drying, patient may add a non-comedogenic moisturizer. The patient verbalized understanding of the proper use and possible adverse effects of retinoids.  All of the patient's questions and concerns were addressed. Soolantra Pregnancy And Lactation Text: This medication is Pregnancy Category C. This medication is considered safe during breast feeding. Nsaids Pregnancy And Lactation Text: These medications are considered safe up to 30 weeks gestation. It is excreted in breast milk. Ilumya Counseling: I discussed with the patient the risks of tildrakizumab including but not limited to immunosuppression, malignancy, posterior leukoencephalopathy syndrome, and serious infections.  The patient understands that monitoring is required including a PPD at baseline and must alert us or the primary physician if symptoms of infection or other concerning signs are noted. Cantharidin Pregnancy And Lactation Text: This medication has not been proven safe during pregnancy. It is unknown if this medication is excreted in breast milk. Otezla Pregnancy And Lactation Text: This medication is Pregnancy Category C and it isn't known if it is safe during pregnancy. It is unknown if it is excreted in breast milk. Tranexamic Acid Counseling:  Patient advised of the small risk of bleeding problems with tranexamic acid. They were also instructed to call if they developed any nausea, vomiting or diarrhea. All of the patient's questions and concerns were addressed. Finasteride Male Counseling: Finasteride Counseling:  I discussed with the patient the risks of use of finasteride including but not limited to decreased libido, decreased ejaculate volume, gynecomastia, and depression. Women should not handle medication.  All of the patient's questions and concerns were addressed. Propranolol Pregnancy And Lactation Text: This medication is Pregnancy Category C and it isn't known if it is safe during pregnancy. It is excreted in breast milk. Rituxan Counseling:  I discussed with the patient the risks of Rituxan infusions. Side effects can include infusion reactions, severe drug rashes including mucocutaneous reactions, reactivation of latent hepatitis and other infections and rarely progressive multifocal leukoencephalopathy.  All of the patient's questions and concerns were addressed. Topical Sulfur Applications Pregnancy And Lactation Text: This medication is considered safe during pregnancy and breast feeding secondary to limited systemic absorption. Prednisone Counseling:  I discussed with the patient the risks of prolonged use of prednisone including but not limited to weight gain, insomnia, osteoporosis, mood changes, diabetes, susceptibility to infection, glaucoma and high blood pressure.  In cases where prednisone use is prolonged, patients should be monitored with blood pressure checks, serum glucose levels and an eye exam.  Additionally, the patient may need to be placed on GI prophylaxis, PCP prophylaxis, and calcium and vitamin D supplementation and/or a bisphosphonate.  The patient verbalized understanding of the proper use and the possible adverse effects of prednisone.  All of the patient's questions and concerns were addressed. Arava Counseling:  Patient counseled regarding adverse effects of Arava including but not limited to nausea, vomiting, abnormalities in liver function tests. Patients may develop mouth sores, rash, diarrhea, and abnormalities in blood counts. The patient understands that monitoring is required including LFTs and blood counts.  There is a rare possibility of scarring of the liver and lung problems that can occur when taking methotrexate. Persistent nausea, loss of appetite, pale stools, dark urine, cough, and shortness of breath should be reported immediately. Patient advised to discontinue Arava treatment and consult with a physician prior to attempting conception. The patient will have to undergo a treatment to eliminate Arava from the body prior to conception. Low Dose Naltrexone Counseling- I discussed with the patient the potential risks and side effects of low dose naltrexone including but not limited to: more vivid dreams, headaches, nausea, vomiting, abdominal pain, fatigue, dizziness, and anxiety. Acitretin Counseling:  I discussed with the patient the risks of acitretin including but not limited to hair loss, dry lips/skin/eyes, liver damage, hyperlipidemia, depression/suicidal ideation, photosensitivity.  Serious rare side effects can include but are not limited to pancreatitis, pseudotumor cerebri, bony changes, clot formation/stroke/heart attack.  Patient understands that alcohol is contraindicated since it can result in liver toxicity and significantly prolong the elimination of the drug by many years. Topical Metronidazole Counseling: Metronidazole is a topical antibiotic medication. You may experience burning, stinging, redness, or allergic reactions.  Please call our office if you develop any problems from using this medication. Birth Control Pills Counseling: Birth Control Pill Counseling: I discussed with the patient the potential side effects of OCPs including but not limited to increased risk of stroke, heart attack, thrombophlebitis, deep venous thrombosis, hepatic adenomas, breast changes, GI upset, headaches, and depression.  The patient verbalized understanding of the proper use and possible adverse effects of OCPs. All of the patient's questions and concerns were addressed. Azithromycin Pregnancy And Lactation Text: This medication is considered safe during pregnancy and is also secreted in breast milk. Bexarotene Counseling:  I discussed with the patient the risks of bexarotene including but not limited to hair loss, dry lips/skin/eyes, liver abnormalities, hyperlipidemia, pancreatitis, depression/suicidal ideation, photosensitivity, drug rash/allergic reactions, hypothyroidism, anemia, leukopenia, infection, cataracts, and teratogenicity.  Patient understands that they will need regular blood tests to check lipid profile, liver function tests, white blood cell count, thyroid function tests and pregnancy test if applicable. Niacinamide Counseling: I recommended taking niacin or niacinamide, also know as vitamin B3, twice daily. Recent evidence suggests that taking vitamin B3 (500 mg twice daily) can reduce the risk of actinic keratoses and non-melanoma skin cancers. Side effects of vitamin B3 include flushing and headache. Azathioprine Counseling:  I discussed with the patient the risks of azathioprine including but not limited to myelosuppression, immunosuppression, hepatotoxicity, lymphoma, and infections.  The patient understands that monitoring is required including baseline LFTs, Creatinine, possible TPMP genotyping and weekly CBCs for the first month and then every 2 weeks thereafter.  The patient verbalized understanding of the proper use and possible adverse effects of azathioprine.  All of the patient's questions and concerns were addressed. Gabapentin Counseling: I discussed with the patient the risks of gabapentin including but not limited to dizziness, somnolence, fatigue and ataxia. Low Dose Naltrexone Pregnancy And Lactation Text: Naltrexone is pregnancy category C.  There have been no adequate and well-controlled studies in pregnant women.  It should be used in pregnancy only if the potential benefit justifies the potential risk to the fetus.   Limited data indicates that naltrexone is minimally excreted into breastmilk.

## 2024-07-27 NOTE — ED ADULT NURSE NOTE - CHIEF COMPLAINT
Please call patients daughter  Uric acid is elevated  There is swelling in the shoulder  It could be gout but also something else    See if she is feeling better    We need to get her in with a shoulder specialist next week too    But depending on how she is doing today may need to go to the ER   The patient is a 71y Female complaining of pain, lower leg.

## 2024-11-15 NOTE — CONSULT NOTE ADULT - ASSESSMENT
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  loss of pulmonary capillary vessels in the setting of emphysema ASSESSMENT    chronic hypoxic respiratory failure - multifactorial - resulting in severe pulmonary artery hypertension and massive/recurrent ascites    COPD/emphysema  restrictive lung disease due to ascites limiting diaphragmatic excursion and 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  chronic diastolic CHF    PLAN/RECOMMENDATIONS    oxygen supplementation ATC to keep saturation greater than 92%  large volume paracentesis  may benefit from a chronic indwelling intraperitoneal catheter despite its inherent risks to improve quality of life that remains  diurese as tolerated by renal function and hemodynamics  cardiac meds: bumex/cardizem CD/lopressor/ASA/plavix/lipitor  GI/DVT prophylaxis - protonix/carafate/SQ heparin ASSESSMENT    chronic hypoxic respiratory failure - multifactorial - resulting in severe pulmonary artery hypertension and massive/recurrent ascites    COPD/emphysema  restrictive lung disease due to ascites limiting diaphragmatic excursion and 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  chronic diastolic CHF    PLAN/RECOMMENDATIONS    oxygen supplementation ATC to keep saturation greater than 92%  large volume paracentesis  may benefit from a chronic indwelling intraperitoneal catheter despite its inherent risks to improve quality of life   diurese as tolerated by renal function and hemodynamics  albuterol/atrovent/pulmicort nebs  cardiac meds: bumex/cardizem CD/lopressor/ASA/plavix/lipitor  GI/DVT prophylaxis - protonix/carafate/SQ heparin  prednisone 5mg daily (?)  patient has been treated with endothelin antagonists and phosphodiesterase inhibitors without improvement in PAPs in the past    Thank you for the courtesy of this referral. Please call with any questions.    Chacho Gallego MD, [facility] - [phone]  Pulmonary Medicine